# Patient Record
Sex: FEMALE | Race: WHITE | Employment: UNEMPLOYED | URBAN - METROPOLITAN AREA
[De-identification: names, ages, dates, MRNs, and addresses within clinical notes are randomized per-mention and may not be internally consistent; named-entity substitution may affect disease eponyms.]

---

## 2017-01-24 RX ORDER — VALSARTAN 160 MG/1
160 TABLET ORAL DAILY
Qty: 30 TABLET | Refills: 3 | Status: SHIPPED | OUTPATIENT
Start: 2017-01-24 | End: 2017-06-27 | Stop reason: SDUPTHER

## 2017-05-15 ENCOUNTER — HOSPITAL ENCOUNTER (OUTPATIENT)
Dept: ENDOSCOPY | Age: 57
Discharge: HOME | End: 2017-05-15
Attending: INTERNAL MEDICINE | Admitting: INTERNAL MEDICINE

## 2017-05-15 VITALS
DIASTOLIC BLOOD PRESSURE: 78 MMHG | RESPIRATION RATE: 20 BRPM | BODY MASS INDEX: 45.54 KG/M2 | HEART RATE: 58 BPM | SYSTOLIC BLOOD PRESSURE: 161 MMHG | TEMPERATURE: 96.7 F | OXYGEN SATURATION: 99 % | WEIGHT: 257 LBS | HEIGHT: 63 IN

## 2017-06-09 ENCOUNTER — OFFICE VISIT (OUTPATIENT)
Dept: ORTHOPEDIC SURGERY | Age: 57
End: 2017-06-09

## 2017-06-09 VITALS — HEIGHT: 63 IN | BODY MASS INDEX: 45.54 KG/M2 | WEIGHT: 257 LBS

## 2017-06-09 DIAGNOSIS — M25.511 RIGHT SHOULDER PAIN, UNSPECIFIED CHRONICITY: Primary | ICD-10-CM

## 2017-06-09 DIAGNOSIS — M75.111 INCOMPLETE TEAR OF RIGHT ROTATOR CUFF: ICD-10-CM

## 2017-06-09 PROCEDURE — L3660 SO 8 AB RSTR CAN/WEB PRE OTS: HCPCS | Performed by: PHYSICIAN ASSISTANT

## 2017-06-09 PROCEDURE — 73030 X-RAY EXAM OF SHOULDER: CPT | Performed by: PHYSICIAN ASSISTANT

## 2017-06-09 PROCEDURE — 99213 OFFICE O/P EST LOW 20 MIN: CPT | Performed by: PHYSICIAN ASSISTANT

## 2017-06-09 RX ORDER — TRAMADOL HYDROCHLORIDE 50 MG/1
50 TABLET ORAL EVERY 6 HOURS PRN
Qty: 25 TABLET | Refills: 0 | Status: SHIPPED | OUTPATIENT
Start: 2017-06-09 | End: 2017-06-19

## 2017-06-12 ENCOUNTER — TELEPHONE (OUTPATIENT)
Dept: ORTHOPEDIC SURGERY | Age: 57
End: 2017-06-12

## 2017-06-27 RX ORDER — VALSARTAN 160 MG/1
TABLET ORAL
Qty: 30 TABLET | Refills: 5 | Status: SHIPPED | OUTPATIENT
Start: 2017-06-27 | End: 2017-10-17 | Stop reason: SDUPTHER

## 2017-06-28 ENCOUNTER — OFFICE VISIT (OUTPATIENT)
Dept: ORTHOPEDIC SURGERY | Age: 57
End: 2017-06-28

## 2017-06-28 VITALS — HEIGHT: 63 IN | BODY MASS INDEX: 45.55 KG/M2 | WEIGHT: 257.06 LBS

## 2017-06-28 DIAGNOSIS — M75.111 INCOMPLETE TEAR OF RIGHT ROTATOR CUFF: Primary | ICD-10-CM

## 2017-06-28 PROCEDURE — 20610 DRAIN/INJ JOINT/BURSA W/O US: CPT | Performed by: ORTHOPAEDIC SURGERY

## 2017-06-28 PROCEDURE — 99214 OFFICE O/P EST MOD 30 MIN: CPT | Performed by: ORTHOPAEDIC SURGERY

## 2017-07-26 ENCOUNTER — OFFICE VISIT (OUTPATIENT)
Dept: ORTHOPEDIC SURGERY | Age: 57
End: 2017-07-26

## 2017-07-26 VITALS — BODY MASS INDEX: 47.29 KG/M2 | HEIGHT: 62 IN | WEIGHT: 257 LBS

## 2017-07-26 DIAGNOSIS — M75.101 TEAR OF RIGHT ROTATOR CUFF, UNSPECIFIED TEAR EXTENT: Primary | ICD-10-CM

## 2017-07-26 PROCEDURE — 99213 OFFICE O/P EST LOW 20 MIN: CPT | Performed by: ORTHOPAEDIC SURGERY

## 2017-07-26 PROCEDURE — L3670 SO ACRO/CLAV CAN WEB PRE OTS: HCPCS | Performed by: ORTHOPAEDIC SURGERY

## 2017-07-28 DIAGNOSIS — M75.121 COMPLETE ROTATOR CUFF TEAR OR RUPTURE OF RIGHT SHOULDER, NOT SPECIFIED AS TRAUMATIC: Primary | ICD-10-CM

## 2017-08-01 ENCOUNTER — OFFICE VISIT (OUTPATIENT)
Dept: FAMILY MEDICINE CLINIC | Age: 57
End: 2017-08-01

## 2017-08-01 ENCOUNTER — TELEPHONE (OUTPATIENT)
Dept: ORTHOPEDIC SURGERY | Age: 57
End: 2017-08-01

## 2017-08-01 VITALS
BODY MASS INDEX: 48.21 KG/M2 | DIASTOLIC BLOOD PRESSURE: 80 MMHG | TEMPERATURE: 97.4 F | HEART RATE: 60 BPM | SYSTOLIC BLOOD PRESSURE: 142 MMHG | RESPIRATION RATE: 16 BRPM | HEIGHT: 62 IN | OXYGEN SATURATION: 97 % | WEIGHT: 262 LBS

## 2017-08-01 DIAGNOSIS — I10 ESSENTIAL HYPERTENSION: ICD-10-CM

## 2017-08-01 DIAGNOSIS — Z01.818 PRE-OP EXAM: Primary | ICD-10-CM

## 2017-08-01 DIAGNOSIS — Z01.818 PRE-OP EXAM: ICD-10-CM

## 2017-08-01 DIAGNOSIS — M75.111 INCOMPLETE TEAR OF RIGHT ROTATOR CUFF: ICD-10-CM

## 2017-08-01 DIAGNOSIS — N18.3 CKD (CHRONIC KIDNEY DISEASE), STAGE 3 (MODERATE): ICD-10-CM

## 2017-08-01 LAB
ANION GAP SERPL CALCULATED.3IONS-SCNC: 19 MMOL/L (ref 3–16)
BUN BLDV-MCNC: 33 MG/DL (ref 7–20)
CALCIUM SERPL-MCNC: 9.6 MG/DL (ref 8.3–10.6)
CHLORIDE BLD-SCNC: 102 MMOL/L (ref 99–110)
CO2: 25 MMOL/L (ref 21–32)
CREAT SERPL-MCNC: 1.4 MG/DL (ref 0.6–1.1)
GFR AFRICAN AMERICAN: 47
GFR NON-AFRICAN AMERICAN: 39
GLUCOSE BLD-MCNC: 103 MG/DL (ref 70–99)
POTASSIUM SERPL-SCNC: 4.9 MMOL/L (ref 3.5–5.1)
SODIUM BLD-SCNC: 146 MMOL/L (ref 136–145)

## 2017-08-01 PROCEDURE — 99244 OFF/OP CNSLTJ NEW/EST MOD 40: CPT | Performed by: FAMILY MEDICINE

## 2017-08-01 PROCEDURE — 93000 ELECTROCARDIOGRAM COMPLETE: CPT | Performed by: FAMILY MEDICINE

## 2017-08-01 ASSESSMENT — ENCOUNTER SYMPTOMS
COLOR CHANGE: 0
EYE DISCHARGE: 0
EYE REDNESS: 0
NAUSEA: 0
SHORTNESS OF BREATH: 0
ABDOMINAL PAIN: 0

## 2017-08-01 ASSESSMENT — PATIENT HEALTH QUESTIONNAIRE - PHQ9
SUM OF ALL RESPONSES TO PHQ9 QUESTIONS 1 & 2: 0
SUM OF ALL RESPONSES TO PHQ QUESTIONS 1-9: 0
1. LITTLE INTEREST OR PLEASURE IN DOING THINGS: 0
2. FEELING DOWN, DEPRESSED OR HOPELESS: 0

## 2017-08-02 ENCOUNTER — TELEPHONE (OUTPATIENT)
Dept: FAMILY MEDICINE CLINIC | Age: 57
End: 2017-08-02

## 2017-08-22 ENCOUNTER — HOSPITAL ENCOUNTER (OUTPATIENT)
Dept: SURGERY | Age: 57
Discharge: HOME | End: 2017-08-22
Attending: ORTHOPAEDIC SURGERY | Admitting: ORTHOPAEDIC SURGERY

## 2017-08-22 VITALS
DIASTOLIC BLOOD PRESSURE: 62 MMHG | HEART RATE: 66 BPM | WEIGHT: 257 LBS | TEMPERATURE: 97.1 F | BODY MASS INDEX: 47.29 KG/M2 | RESPIRATION RATE: 17 BRPM | SYSTOLIC BLOOD PRESSURE: 140 MMHG | HEIGHT: 62 IN | OXYGEN SATURATION: 94 %

## 2017-08-22 RX ORDER — OXYCODONE HYDROCHLORIDE AND ACETAMINOPHEN 5; 325 MG/1; MG/1
1-2 TABLET ORAL EVERY 6 HOURS PRN
Qty: 40 TABLET | Refills: 0 | Status: SHIPPED | OUTPATIENT
Start: 2017-08-22 | End: 2017-10-25 | Stop reason: ALTCHOICE

## 2017-08-22 RX ORDER — FAMOTIDINE 10 MG
10 TABLET ORAL DAILY
COMMUNITY
End: 2018-10-12 | Stop reason: ALTCHOICE

## 2017-08-22 RX ORDER — SODIUM CHLORIDE 0.9 % (FLUSH) 0.9 %
10 SYRINGE (ML) INJECTION EVERY 12 HOURS SCHEDULED
Status: DISCONTINUED | OUTPATIENT
Start: 2017-08-22 | End: 2017-08-23 | Stop reason: HOSPADM

## 2017-08-22 RX ORDER — DIPHENHYDRAMINE HYDROCHLORIDE 50 MG/ML
12.5 INJECTION INTRAMUSCULAR; INTRAVENOUS
Status: ACTIVE | OUTPATIENT
Start: 2017-08-22 | End: 2017-08-22

## 2017-08-22 RX ORDER — ACETAMINOPHEN 10 MG/ML
1000 INJECTION, SOLUTION INTRAVENOUS ONCE
Status: COMPLETED | OUTPATIENT
Start: 2017-08-22 | End: 2017-08-22

## 2017-08-22 RX ORDER — ASCORBIC ACID 1000 MG
TABLET, EXTENDED RELEASE ORAL DAILY
COMMUNITY
End: 2019-04-29

## 2017-08-22 RX ORDER — LIDOCAINE HYDROCHLORIDE 10 MG/ML
1 INJECTION, SOLUTION EPIDURAL; INFILTRATION; INTRACAUDAL; PERINEURAL
Status: ACTIVE | OUTPATIENT
Start: 2017-08-22 | End: 2017-08-22

## 2017-08-22 RX ORDER — OXYCODONE HYDROCHLORIDE AND ACETAMINOPHEN 5; 325 MG/1; MG/1
1 TABLET ORAL PRN
Status: ACTIVE | OUTPATIENT
Start: 2017-08-22 | End: 2017-08-22

## 2017-08-22 RX ORDER — LABETALOL HYDROCHLORIDE 5 MG/ML
5 INJECTION, SOLUTION INTRAVENOUS EVERY 10 MIN PRN
Status: DISCONTINUED | OUTPATIENT
Start: 2017-08-22 | End: 2017-08-23 | Stop reason: HOSPADM

## 2017-08-22 RX ORDER — ONDANSETRON 2 MG/ML
4 INJECTION INTRAMUSCULAR; INTRAVENOUS
Status: COMPLETED | OUTPATIENT
Start: 2017-08-22 | End: 2017-08-22

## 2017-08-22 RX ORDER — OXYCODONE HYDROCHLORIDE AND ACETAMINOPHEN 5; 325 MG/1; MG/1
2 TABLET ORAL PRN
Status: ACTIVE | OUTPATIENT
Start: 2017-08-22 | End: 2017-08-22

## 2017-08-22 RX ORDER — HYDRALAZINE HYDROCHLORIDE 20 MG/ML
5 INJECTION INTRAMUSCULAR; INTRAVENOUS
Status: DISCONTINUED | OUTPATIENT
Start: 2017-08-22 | End: 2017-08-23 | Stop reason: HOSPADM

## 2017-08-22 RX ORDER — SODIUM CHLORIDE, SODIUM LACTATE, POTASSIUM CHLORIDE, CALCIUM CHLORIDE 600; 310; 30; 20 MG/100ML; MG/100ML; MG/100ML; MG/100ML
INJECTION, SOLUTION INTRAVENOUS CONTINUOUS
Status: DISCONTINUED | OUTPATIENT
Start: 2017-08-22 | End: 2017-08-23 | Stop reason: HOSPADM

## 2017-08-22 RX ORDER — MIDAZOLAM HYDROCHLORIDE 1 MG/ML
INJECTION INTRAMUSCULAR; INTRAVENOUS
Status: DISPENSED
Start: 2017-08-22 | End: 2017-08-22

## 2017-08-22 RX ORDER — SODIUM CHLORIDE 0.9 % (FLUSH) 0.9 %
10 SYRINGE (ML) INJECTION PRN
Status: DISCONTINUED | OUTPATIENT
Start: 2017-08-22 | End: 2017-08-23 | Stop reason: HOSPADM

## 2017-08-22 RX ADMIN — ACETAMINOPHEN 1000 MG: 10 INJECTION, SOLUTION INTRAVENOUS at 08:27

## 2017-08-22 RX ADMIN — ONDANSETRON 4 MG: 2 INJECTION INTRAMUSCULAR; INTRAVENOUS at 11:39

## 2017-08-22 RX ADMIN — SODIUM CHLORIDE, SODIUM LACTATE, POTASSIUM CHLORIDE, CALCIUM CHLORIDE: 600; 310; 30; 20 INJECTION, SOLUTION INTRAVENOUS at 08:10

## 2017-08-22 ASSESSMENT — PAIN - FUNCTIONAL ASSESSMENT: PAIN_FUNCTIONAL_ASSESSMENT: 0-10

## 2017-08-22 ASSESSMENT — PAIN SCALES - GENERAL: PAINLEVEL_OUTOF10: 0

## 2017-08-25 ENCOUNTER — HOSPITAL ENCOUNTER (OUTPATIENT)
Dept: PHYSICAL THERAPY | Age: 57
Discharge: HOME | End: 2017-07-31
Admitting: ORTHOPAEDIC SURGERY

## 2017-08-25 ENCOUNTER — HOSPITAL ENCOUNTER (OUTPATIENT)
Dept: PHYSICAL THERAPY | Age: 57
Discharge: HOME | End: 2017-08-25
Admitting: ORTHOPAEDIC SURGERY

## 2017-08-30 ENCOUNTER — HOSPITAL ENCOUNTER (OUTPATIENT)
Dept: PHYSICAL THERAPY | Age: 57
Discharge: HOME | End: 2017-08-30
Admitting: ORTHOPAEDIC SURGERY

## 2017-08-30 ENCOUNTER — OFFICE VISIT (OUTPATIENT)
Dept: ORTHOPEDIC SURGERY | Age: 57
End: 2017-08-30

## 2017-08-30 VITALS — BODY MASS INDEX: 47.29 KG/M2 | HEIGHT: 62 IN | WEIGHT: 257 LBS

## 2017-08-30 DIAGNOSIS — M75.101 TEAR OF RIGHT ROTATOR CUFF, UNSPECIFIED TEAR EXTENT: ICD-10-CM

## 2017-08-30 DIAGNOSIS — M25.511 PAIN OF RIGHT SHOULDER REGION: Primary | ICD-10-CM

## 2017-08-30 PROCEDURE — 73030 X-RAY EXAM OF SHOULDER: CPT | Performed by: ORTHOPAEDIC SURGERY

## 2017-08-30 PROCEDURE — 99024 POSTOP FOLLOW-UP VISIT: CPT | Performed by: ORTHOPAEDIC SURGERY

## 2017-09-06 ENCOUNTER — HOSPITAL ENCOUNTER (OUTPATIENT)
Dept: PHYSICAL THERAPY | Age: 57
Discharge: HOME | End: 2017-09-06
Admitting: ORTHOPAEDIC SURGERY

## 2017-09-13 ENCOUNTER — HOSPITAL ENCOUNTER (OUTPATIENT)
Dept: PHYSICAL THERAPY | Age: 57
Discharge: HOME | End: 2017-09-13
Admitting: ORTHOPAEDIC SURGERY

## 2017-09-20 ENCOUNTER — OFFICE VISIT (OUTPATIENT)
Dept: ORTHOPEDIC SURGERY | Age: 57
End: 2017-09-20

## 2017-09-20 VITALS
HEIGHT: 62 IN | DIASTOLIC BLOOD PRESSURE: 71 MMHG | BODY MASS INDEX: 47.29 KG/M2 | HEART RATE: 63 BPM | SYSTOLIC BLOOD PRESSURE: 121 MMHG | WEIGHT: 257 LBS

## 2017-09-20 DIAGNOSIS — M75.101 TEAR OF RIGHT ROTATOR CUFF, UNSPECIFIED TEAR EXTENT: Primary | ICD-10-CM

## 2017-09-20 PROCEDURE — 99024 POSTOP FOLLOW-UP VISIT: CPT | Performed by: ORTHOPAEDIC SURGERY

## 2017-09-21 ENCOUNTER — HOSPITAL ENCOUNTER (OUTPATIENT)
Dept: PHYSICAL THERAPY | Age: 57
Discharge: HOME | End: 2017-09-21
Admitting: ORTHOPAEDIC SURGERY

## 2017-09-28 ENCOUNTER — HOSPITAL ENCOUNTER (OUTPATIENT)
Dept: PHYSICAL THERAPY | Age: 57
Discharge: HOME | End: 2017-09-28
Admitting: ORTHOPAEDIC SURGERY

## 2017-10-04 ENCOUNTER — HOSPITAL ENCOUNTER (OUTPATIENT)
Dept: PHYSICAL THERAPY | Age: 57
Discharge: HOME | End: 2017-10-04
Admitting: ORTHOPAEDIC SURGERY

## 2017-10-04 NOTE — FLOWSHEET NOTE
[address]    [place], [address]                Physical Therapy Daily Treatment Note  Date:  10/4/2017    Patient Name:  Danielle Elliott   :  1960  MRN: [id number]  Restrictions/Precautions:    Medical/Treatment Diagnosis Information:  · Diagnosis: PT: R shoulder pain s/p RTC repair (patch), biceps debridement (17)  · Treatment Diagnosis: R shoulder pain O96.857  Insurance/Certification information:  PT Insurance Information: [facility]  Physician Information:  Referring Practitioner: Shantell Carvajal signed (Y/N):     Date of Patient follow up with Physician:     G-Code (if applicable):      Date G-Code Applied:         Progress Note: [x]  Yes  []  No  Next due by: Visit #10  MD 10/18/17    Latex Allergy:  [x]NO      []YES  Preferred Language for Healthcare:   [x]English       []other:    Visit # Insurance Allowable   8 30     Pain level:  5/10      SUBJECTIVE:  States she is sore from sleeping on her shdlr again last night    OBJECTIVE:   Observation:   Test measurements:  Shoulder flex: 140 degrees, ER 85 degrees    RESTRICTIONS/PRECAUTIONS: Passive ONLY 4 weeks- Ultra sling 3 weeks, Regular sling 3 weeks    Exercises/Interventions:   Therapeutic Ex Sets/sec Notes HEP   Seated P ER     Shoulder shrugs  Scap squeezes     Table slides flex  Table slides ER     Elbow, wrist ROM Reviewed           Manual therapy 15' PROM    Pulleys 5'     Supine cane ER  Supine cane flex  Supine cane chest press      SL ER  Prone ext  Supine shdlr flex   SL abd x30  x30 1#    pain     Painful today X  x   Wall slides x10  x   TB ext  TB IR x20  x30 Red  Red  Green to home                                                                                                                                                              Therapeutic Exercise and NMR EXR  [x] (55527) Provided verbal/tactile cueing for activities related to strengthening, flexibility, endurance, ROM  for improvements in scapular, scapulothoracic and UE control with self care, reaching, carrying, lifting, house/yardwork, driving/computer work.    [] (97384) Provided verbal/tactile cueing for activities related to improving balance, coordination, kinesthetic sense, posture, motor skill, proprioception  to assist with  scapular, scapulothoracic and UE control with self care, reaching, carrying, lifting, house/yardwork, driving/computer work. Therapeutic Activities:    [] (98852 or 72426) Provided verbal/tactile cueing for activities related to improving balance, coordination, kinesthetic sense, posture, motor skill, proprioception and motor activation to allow for proper function of scapular, scapulothoracic and UE control with self care, carrying, lifting, driving/computer work.      Home Exercise Program:    [x] (00218) Reviewed/Progressed HEP activities related to strengthening, flexibility, endurance, ROM of scapular, scapulothoracic and UE control with self care, reaching, carrying, lifting, house/yardwork, driving/computer work  [] (52223) Reviewed/Progressed HEP activities related to improving balance, coordination, kinesthetic sense, posture, motor skill, proprioception of scapular, scapulothoracic and UE control with self care, reaching, carrying, lifting, house/yardwork, driving/computer work      Manual Treatments:  PROM / STM / Oscillations-Mobs:  G-I, II, III, IV (PA's, Inf., Post.)  [x] (85781) Provided manual therapy to mobilize soft tissue/joints of cervical/CT, scapular GHJ and UE for the purpose of modulating pain, promoting relaxation,  increasing ROM, reducing/eliminating soft tissue swelling/inflammation/restriction, improving soft tissue extensibility and allowing for proper ROM for normal function with self care, reaching, carrying, lifting, house/yardwork, driving/computer work    Modalities:  CP at home per patient    Charges:  Timed Code Treatment Minutes: 30   Total Treatment Minutes: 30     [] EVAL LOW 05985  [x] WA(69572) x  1   [] IONTO  [] NMR (34365) x      [] VASO  [x] Manual (23586) x  1    [] Other:  [] TA x       [] Mech Traction (13994)  [] ES(attended) (49186)      [] ES (un) (56234):     GOALS:  Patient stated goal: Return arm to full function    Therapist goals for Patient:   Short Term Goals: To be achieved in: 2 weeks  1. Independent in HEP and progression per patient tolerance, in order to prevent re-injury. 2. Patient will have a decrease in pain to facilitate improvement in movement, function, and ADLs as indicated by Functional Deficits. Long Term Goals: To be achieved in: 12 weeks  1. Disability index score of 10% or less for the DASH to assist with reaching prior level of function. 2. Patient will demonstrate increased AROM to [facility] to allow for proper joint functioning as indicated by patients Functional Deficits. 3. Patient will demonstrate an increase in Strength to 5/5 to allow for proper functional mobility as indicated by patients Functional Deficits. 4. Patient will return to full functional activities without increased symptoms or restriction including ability to reach overhead, wash hair, and reach behind back without pain. 5. Patient will be able to repetitively reach lifting 5-10 pound items to return to work related activities. Progression Towards Functional goals:  [x] Patient is progressing as expected towards functional goals listed. [] Progression is slowed due to complexities listed. [] Progression has been slowed due to co-morbidities. [] Plan just implemented, too soon to assess goals progression  [] Other:     ASSESSMENT: added ex to HEP. ROM increasing. Progressing.  Green band to HEP to use when able    Treatment/Activity Tolerance:  [x] Patient tolerated treatment well [] Patient limited by fatique  [] Patient limited by pain  [] Patient limited by other medical complications  [] Other:     Prognosis: [x] Good [] Fair  [] Poor    Patient Requires Follow-up: [x] Yes  [] No    PLAN:   [x] Continue per plan of care [] Alter current plan (see comments)  [] Plan of care initiated [] Hold pending MD visit [] Discharge    Electronically signed by: Josephine Tran PT

## 2017-10-11 ENCOUNTER — OFFICE VISIT (OUTPATIENT)
Dept: FAMILY MEDICINE CLINIC | Age: 57
End: 2017-10-11

## 2017-10-11 VITALS
OXYGEN SATURATION: 96 % | HEART RATE: 76 BPM | HEIGHT: 62 IN | SYSTOLIC BLOOD PRESSURE: 166 MMHG | BODY MASS INDEX: 49.87 KG/M2 | WEIGHT: 271 LBS | DIASTOLIC BLOOD PRESSURE: 100 MMHG

## 2017-10-11 DIAGNOSIS — R53.83 FATIGUE, UNSPECIFIED TYPE: Primary | ICD-10-CM

## 2017-10-11 DIAGNOSIS — M25.50 ARTHRALGIA, UNSPECIFIED JOINT: ICD-10-CM

## 2017-10-11 DIAGNOSIS — G47.00 INSOMNIA, UNSPECIFIED TYPE: ICD-10-CM

## 2017-10-11 DIAGNOSIS — R53.83 FATIGUE, UNSPECIFIED TYPE: ICD-10-CM

## 2017-10-11 LAB
A/G RATIO: 1.3 (ref 1.1–2.2)
ALBUMIN SERPL-MCNC: 4.1 G/DL (ref 3.4–5)
ALP BLD-CCNC: 78 U/L (ref 40–129)
ALT SERPL-CCNC: 16 U/L (ref 10–40)
ANION GAP SERPL CALCULATED.3IONS-SCNC: 17 MMOL/L (ref 3–16)
AST SERPL-CCNC: 19 U/L (ref 15–37)
BASOPHILS ABSOLUTE: 0.1 K/UL (ref 0–0.2)
BASOPHILS RELATIVE PERCENT: 1.1 %
BILIRUB SERPL-MCNC: <0.2 MG/DL (ref 0–1)
BUN BLDV-MCNC: 25 MG/DL (ref 7–20)
C-REACTIVE PROTEIN: 15.4 MG/L (ref 0–5.1)
CALCIUM SERPL-MCNC: 9.5 MG/DL (ref 8.3–10.6)
CHLORIDE BLD-SCNC: 104 MMOL/L (ref 99–110)
CO2: 25 MMOL/L (ref 21–32)
CREAT SERPL-MCNC: 1.2 MG/DL (ref 0.6–1.1)
EOSINOPHILS ABSOLUTE: 0.3 K/UL (ref 0–0.6)
EOSINOPHILS RELATIVE PERCENT: 4.9 %
GFR AFRICAN AMERICAN: 56
GFR NON-AFRICAN AMERICAN: 46
GLOBULIN: 3.2 G/DL
GLUCOSE BLD-MCNC: 109 MG/DL (ref 70–99)
HCT VFR BLD CALC: 39.5 % (ref 36–48)
HEMOGLOBIN: 13.7 G/DL (ref 12–16)
LYMPHOCYTES ABSOLUTE: 2 K/UL (ref 1–5.1)
LYMPHOCYTES RELATIVE PERCENT: 29.1 %
MCH RBC QN AUTO: 30.9 PG (ref 26–34)
MCHC RBC AUTO-ENTMCNC: 34.8 G/DL (ref 31–36)
MCV RBC AUTO: 88.7 FL (ref 80–100)
MONO TEST: NEGATIVE
MONOCYTES ABSOLUTE: 0.5 K/UL (ref 0–1.3)
MONOCYTES RELATIVE PERCENT: 7.7 %
NEUTROPHILS ABSOLUTE: 4 K/UL (ref 1.7–7.7)
NEUTROPHILS RELATIVE PERCENT: 57.2 %
PDW BLD-RTO: 13.7 % (ref 12.4–15.4)
PLATELET # BLD: 298 K/UL (ref 135–450)
PMV BLD AUTO: 7.9 FL (ref 5–10.5)
POTASSIUM SERPL-SCNC: 4.6 MMOL/L (ref 3.5–5.1)
RBC # BLD: 4.45 M/UL (ref 4–5.2)
SEDIMENTATION RATE, ERYTHROCYTE: 50 MM/HR (ref 0–30)
SODIUM BLD-SCNC: 146 MMOL/L (ref 136–145)
TOTAL PROTEIN: 7.3 G/DL (ref 6.4–8.2)
TSH SERPL DL<=0.05 MIU/L-ACNC: 1.69 UIU/ML (ref 0.27–4.2)
WBC # BLD: 6.9 K/UL (ref 4–11)

## 2017-10-11 PROCEDURE — 99213 OFFICE O/P EST LOW 20 MIN: CPT | Performed by: NURSE PRACTITIONER

## 2017-10-11 RX ORDER — HYDROXYZINE HYDROCHLORIDE 25 MG/1
25 TABLET, FILM COATED ORAL NIGHTLY
Qty: 30 TABLET | Refills: 0 | Status: SHIPPED | OUTPATIENT
Start: 2017-10-11 | End: 2017-10-25 | Stop reason: ALTCHOICE

## 2017-10-11 RX ORDER — HYDROXYZINE HYDROCHLORIDE 10 MG/1
25 TABLET, FILM COATED ORAL NIGHTLY
Qty: 30 TABLET | Refills: 0 | Status: SHIPPED | OUTPATIENT
Start: 2017-10-11 | End: 2017-10-11 | Stop reason: SDUPTHER

## 2017-10-11 ASSESSMENT — ENCOUNTER SYMPTOMS
STRIDOR: 0
NAUSEA: 0
SORE THROAT: 0
VOMITING: 0
COUGH: 0
WHEEZING: 0
BACK PAIN: 1
SHORTNESS OF BREATH: 0

## 2017-10-11 NOTE — PROGRESS NOTES
Subjective:      Patient ID: Rayshawn Mitchell is a 61 y.o. female. Rich Stewart is here with complaints of joint pain and fatigue that started 4-5 weeks ago. She underwent right shoulder arthroscopy with rotator cuff repair on 8/22/17. She reports sleeping poorly due to \"hurting. \" Any little movement of her right arm wakes her up when sleeping. She also has to sleep in a certain position due to right shoulder pain. Fatigue   This is a new problem. The current episode started 1 to 4 weeks ago. The problem occurs constantly. The problem has been unchanged. Associated symptoms include arthralgias, fatigue and myalgias. Pertinent negatives include no chest pain, coughing, fever, joint swelling, nausea, neck pain, sore throat or vomiting. Review of Systems   Constitutional: Positive for fatigue. Negative for fever. HENT: Negative for sore throat. Respiratory: Negative for cough, shortness of breath, wheezing and stridor. Cardiovascular: Negative for chest pain, palpitations and leg swelling. Gastrointestinal: Negative for nausea and vomiting. Musculoskeletal: Positive for arthralgias, back pain and myalgias. Negative for gait problem, joint swelling, neck pain and neck stiffness. Right shoulder pain, joint pain \"all over\"     Vitals:    10/11/17 1507 10/11/17 1517   BP: (!) 166/100 (!) 166/100   Site: Left Arm    Position: Sitting    Pulse: 76    SpO2: 96%    Weight: 271 lb (122.9 kg)    Height: 5' 2\" (1.575 m)      Objective:   Physical Exam   Constitutional: She is oriented to person, place, and time. She appears well-developed and well-nourished. HENT:   Head: Normocephalic and atraumatic. Neck: Normal range of motion. Neck supple. No tracheal deviation present. No thyromegaly present. Cardiovascular: Normal rate, regular rhythm and normal heart sounds. Exam reveals no gallop. No murmur heard. Pulmonary/Chest: Effort normal and breath sounds normal. No respiratory distress.  She has no wheezes. She has no rales. She exhibits no tenderness. Lymphadenopathy:     She has no cervical adenopathy. Neurological: She is alert and oriented to person, place, and time. No cranial nerve deficit. Skin: Skin is warm and dry. No rash noted. She is not diaphoretic. No erythema. No pallor. Nursing note and vitals reviewed. Assessment:   1. Fatigue, unspecified type  - CBC Auto Differential; Future  - Comprehensive Metabolic Panel; Future  - Mononucleosis Screen; Future  - TSH without Reflex; Future  - Sedimentation rate, automated; Future  - C-Reactive Protein; Future    2. Insomnia, unspecified type  - hydrOXYzine (ATARAX) 25 MG tablet; Take 1 tablet by mouth nightly  Dispense: 30 tablet; Refill: 0    3. Arthralgia, unspecified joint  - Sedimentation rate, automated; Future  - C-Reactive Protein; Future    Plan:   - Will check blood work to rule out inflammatory/metablolic cause of symptoms  - Likely poor sleeping is causing her fatigue and limited sleep positions are causing increased joint pain  - Atarax PRN nightly for insomnia  - Tylenol for joint pain  - Follow up in 2 weeks    Outpatient Encounter Prescriptions as of 10/11/2017   Medication Sig Dispense Refill    hydrOXYzine (ATARAX) 25 MG tablet Take 1 tablet by mouth nightly 30 tablet 0    famotidine (PEPCID) 10 MG tablet Take 10 mg by mouth daily      Ascorbic Acid (VITAMIN C CR) 1000 MG TBCR Take by mouth daily      oxyCODONE-acetaminophen (PERCOCET) 5-325 MG per tablet Take 1-2 tablets by mouth every 6 hours as needed for Pain . 40 tablet 0    HYDROcodone-acetaminophen (NORCO) 5-325 MG per tablet Take 1 tablet by mouth every 6 hours as needed for Pain .  15 tablet 0    valsartan (DIOVAN) 160 MG tablet TAKE 1 TABLET BY MOUTH DAILY 30 tablet 5    citalopram (CELEXA) 40 MG tablet Take 1 tablet by mouth daily 30 tablet 11    Elastic Bandages & Supports (WRIST SPLINT/COCK-UP/RIGHT M) MISC 1 each by Does not apply route nightly 1

## 2017-10-11 NOTE — PATIENT INSTRUCTIONS
hydroxyzine passes into breast milk or if it could harm a nursing baby. You should not breast-feed while using this medicine. Do not give this medicine to a child without medical advice. How should I take hydroxyzine? Follow all directions on your prescription label. Your doctor may occasionally change your dose to make sure you get the best results. Do not use this medicine in larger or smaller amounts or for longer than recommended. Measure liquid medicine  with the dosing syringe provided, or with a special dose-measuring spoon or medicine cup. If you do not have a dose-measuring device, ask your pharmacist for one. Hydroxyzine is for short-term use only. You should not take this medicine for longer than 4 months. Call your doctor if your anxiety symptoms do not improve, or if they get worse. Store at room temperature away from moisture and heat. What happens if I miss a dose? Take the missed dose as soon as you remember. Skip the missed dose if it is almost time for your next scheduled dose. Do not take extra medicine to make up the missed dose. What happens if I overdose? Seek emergency medical attention or call the Poison Help line at 1-570.757.4145. Overdose symptoms may include severe drowsiness, nausea, vomiting, uncontrolled muscle movements, or seizure (convulsions). What should I avoid while taking hydroxyzine? This medicine may impair your thinking or reactions. Be careful if you drive or do anything that requires you to be alert. Drinking alcohol with this medicine can cause side effects. What are the possible side effects of hydroxyzine? Get emergency medical help if you have signs of an allergic reaction:  hives; difficulty breathing; swelling of your face, lips, tongue, or throat. Stop using hydroxyzine and call your doctor at once if you have:  · fast or pounding heartbeats;  · headache with chest pain; or  · severe dizziness, fainting.   Side effects such as dry mouth, outside of the [place] are appropriate, unless specifically indicated otherwise. [facility]'s drug information does not endorse drugs, diagnose patients or recommend therapy. [facility] drug information is an informational resource designed to assist licensed healthcare practitioners in caring for their patients and/or to serve consumers viewing this service as a supplement to, and not a substitute for, the expertise, skill, knowledge and judgment of healthcare practitioners. The absence of a warning for a given drug or drug combination in no way should be construed to indicate that the drug or drug combination is safe, effective or appropriate for any given patient. [facility] does not assume any responsibility for any aspect of healthcare administered with the aid of information [facility] provides. The information contained herein is not intended to cover all possible uses, directions, precautions, warnings, drug interactions, allergic reactions, or adverse effects. If you have questions about the drugs you are taking, check with your doctor, nurse or pharmacist.  Copyright 5733-8105 [address]: 7.. Revision date: 3/25/2016. Care instructions adapted under license by [facility] ([facility]). If you have questions about a medical condition or this instruction, always ask your healthcare professional. [place] [address] any warranty or liability for your use of this information.

## 2017-10-12 ENCOUNTER — HOSPITAL ENCOUNTER (OUTPATIENT)
Dept: PHYSICAL THERAPY | Age: 57
Discharge: HOME | End: 2017-10-12
Admitting: ORTHOPAEDIC SURGERY

## 2017-10-12 NOTE — OP NOTE
Orthopaedics and Sports Rehabilitation                        Date: 10/12/2017  Physician: Dawna Fink  Patient: María Guadarrama Diagnosis: R RC repair    Patient has received 8 sessions of Physical Therapy. ROM Initial (R) Initial (L) Current (R) Current (L)   PROM shldr flex                       ER                       IR   148  85  75    AROM shldr flex elbow extended   84    AROM shldr flex elbow flexed   129                  Strength                                            Functional Activity Checklist: The patient continues to have moderate difficulty with the following:   [] Personal care  [] Reaching / overhead  [] Standing    [] Housework chores  [] Climbing  [] Driving / riding in a vehicle    [] Work  [] Squatting  [] Bed / vehicle mobility    [] Lifting  [] Walking  [] Sleeping    [] Pushing / pulling  [] Sitting  [] Concentrating / reading     Specific Functional Improvement and Impression:  Good motion and strength. progressing    Summary:   [x] Patient is progressing as expected for this condition   [] Patient is progressing, but slower than expected for this condition   [] Patient is not progressing  Clinician Recommendations:   [x] Continue rehabilitation due to objective improvement and continued functional deficits. [] Follow up periodically to advance home exercise program to match level of function. [] Continue rehabitation due to objective improvement and continued functional deficits with progression to work conditioning. [] Discharge to post-rehab program secondary to maximizing \"medical necessity\" of physical / occupational therapy.    [] Discharge independent in a home program as:  [] All goals achieved  [] Maximized \"medical necessity\" of physical / occupational therapy  [] No subjective or objective improvements    Plan: cont PT 1-2x/week 6 weeks  Electronically signed by: Nery Causey PT   License #:     Physician Recommendations:  [] Follow treatment plan as

## 2017-10-12 NOTE — FLOWSHEET NOTE
[address]    [place], [address]                Physical Therapy Daily Treatment Note  Date:  10/12/2017    Patient Name:  Jenna Salas   :  1960  MRN: [id number]  Restrictions/Precautions:    Medical/Treatment Diagnosis Information:  · Diagnosis: PT: R shoulder pain s/p RTC repair (patch), biceps debridement (17)  · Treatment Diagnosis: R shoulder pain O59.604  Insurance/Certification information:  PT Insurance Information: [facility]  Physician Information:  Referring Practitioner: Charlotte Jacobs of care signed (Y/N):     Date of Patient follow up with Physician:     G-Code (if applicable):      Date G-Code Applied:         Progress Note: [x]  Yes  []  No  Next due by: Visit #10  MD 10/18/17    Latex Allergy:  [x]NO      []YES  Preferred Language for Healthcare:   [x]English       []other:    Visit # Insurance Allowable   8 30     Pain level:  5/10      SUBJECTIVE:  Nights are bad because her shldr keeps her up and now everything is hurting and she isn't feeling well because she can't sleep    OBJECTIVE:   Observation:   Test measurements:  Shoulder flex: 140 degrees, ER 85 degrees    RESTRICTIONS/PRECAUTIONS: Passive ONLY 4 weeks- Ultra sling 3 weeks, Regular sling 3 weeks    Exercises/Interventions:   Therapeutic Ex Sets/sec Notes HEP   Seated P ER     Shoulder shrugs  Scap squeezes     Table slides flex  Table slides ER     Elbow, wrist ROM Reviewed           Manual therapy 15' PROM    Pulleys 5'     Supine cane ER  Supine cane flex  Supine cane chest press      SL ER  Prone ext  Supine shdlr flex   SL abd x15/x15 1#  x30 1#    pain     Painful today X  x   Wall slides x7  x   TB ext  TB IR x20  x30 Red  Red  Green to home                                                                                                                                                              Therapeutic Exercise and NMR EXR  [x] (97777) Provided verbal/tactile cueing for activities related to strengthening, flexibility, endurance, ROM  for improvements in scapular, scapulothoracic and UE control with self care, reaching, carrying, lifting, house/yardwork, driving/computer work.    [] (96413) Provided verbal/tactile cueing for activities related to improving balance, coordination, kinesthetic sense, posture, motor skill, proprioception  to assist with  scapular, scapulothoracic and UE control with self care, reaching, carrying, lifting, house/yardwork, driving/computer work. Therapeutic Activities:    [] (75379 or 11372) Provided verbal/tactile cueing for activities related to improving balance, coordination, kinesthetic sense, posture, motor skill, proprioception and motor activation to allow for proper function of scapular, scapulothoracic and UE control with self care, carrying, lifting, driving/computer work.      Home Exercise Program:    [x] (61615) Reviewed/Progressed HEP activities related to strengthening, flexibility, endurance, ROM of scapular, scapulothoracic and UE control with self care, reaching, carrying, lifting, house/yardwork, driving/computer work  [] (44004) Reviewed/Progressed HEP activities related to improving balance, coordination, kinesthetic sense, posture, motor skill, proprioception of scapular, scapulothoracic and UE control with self care, reaching, carrying, lifting, house/yardwork, driving/computer work      Manual Treatments:  PROM / STM / Oscillations-Mobs:  G-I, II, III, IV (PA's, Inf., Post.)  [x] (09388) Provided manual therapy to mobilize soft tissue/joints of cervical/CT, scapular GHJ and UE for the purpose of modulating pain, promoting relaxation,  increasing ROM, reducing/eliminating soft tissue swelling/inflammation/restriction, improving soft tissue extensibility and allowing for proper ROM for normal function with self care, reaching, carrying, lifting, house/yardwork,

## 2017-10-17 DIAGNOSIS — F32.0 MILD SINGLE CURRENT EPISODE OF MAJOR DEPRESSIVE DISORDER (HCC): ICD-10-CM

## 2017-10-18 ENCOUNTER — OFFICE VISIT (OUTPATIENT)
Dept: ORTHOPEDIC SURGERY | Age: 57
End: 2017-10-18

## 2017-10-18 VITALS
DIASTOLIC BLOOD PRESSURE: 79 MMHG | HEART RATE: 88 BPM | WEIGHT: 257 LBS | SYSTOLIC BLOOD PRESSURE: 134 MMHG | HEIGHT: 62 IN | BODY MASS INDEX: 47.29 KG/M2

## 2017-10-18 DIAGNOSIS — M75.101 TEAR OF RIGHT ROTATOR CUFF, UNSPECIFIED TEAR EXTENT: Primary | ICD-10-CM

## 2017-10-18 PROCEDURE — 99024 POSTOP FOLLOW-UP VISIT: CPT | Performed by: ORTHOPAEDIC SURGERY

## 2017-10-18 RX ORDER — VALSARTAN 160 MG/1
160 TABLET ORAL DAILY
Qty: 90 TABLET | Refills: 2 | Status: ON HOLD | OUTPATIENT
Start: 2017-10-18 | End: 2018-06-20

## 2017-10-18 RX ORDER — CITALOPRAM 40 MG/1
40 TABLET ORAL DAILY
Qty: 90 TABLET | Refills: 2 | Status: SHIPPED | OUTPATIENT
Start: 2017-10-18 | End: 2018-10-19 | Stop reason: SDUPTHER

## 2017-10-18 NOTE — PROGRESS NOTES
Chief Complaint  Post-Op Check (RIGHT SHOULDER  SX 08/22/2017)    Post op right shoulder arthroscopy with rotator cuff repair using bioinductive patch, subacromial decompression and debridement of biceps tendon. Date of surgery: 08/22/2017       History of Present Illness:  Osman Cook is a 61 y.o. female     Here for follow up of right shoulder arthroscopy with rotator cuff repair using bioinductive patch, subacromial decompression and debridement of biceps tendon. Progressing well. She is 8 weeks postop. She currently is not taking any narcotic pain medicine. Has been taking aspirin. Since her last visit she had tried to do some painting which is aggravated her shoulder. Actually prior to that she had fallen twice which had injured her shoulder. She has seen improvement with outpatient physical therapy but it's been slow and some of that is because of the activities described above. 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 She certainly would not be able to do that at this point. Vital Signs:  Vitals:    10/18/17 1132   BP: 134/79   Pulse: 88       Pain Assessment:         Shoulder Examination  Patient is awake, alert, and in no acute distress. Distal circulatory status and neurologic status is intact. The wound is clean, dry, healing. There is no warmth, erythema, or purulent drainage over the incision. Sensation is intact to light touch in the axillary, median, radial, and ulnar nerve distribution. The EPL, FPL, and interossei are grossly intact. The right upper extremity is warm and well-perfused with brisk capillary refill. Patient tolerated gentle passive range of motion. Today she was able to hold her arm at 90° of abduction and forward flexion even against some resistance although it was painful. ROM has been progressing.         Assessment: Progressing well post op from right shoulder arthroscopy with rotator cuff repair

## 2017-10-24 ENCOUNTER — HOSPITAL ENCOUNTER (OUTPATIENT)
Dept: PHYSICAL THERAPY | Age: 57
Discharge: HOME | End: 2017-10-24
Admitting: ORTHOPAEDIC SURGERY

## 2017-10-24 NOTE — FLOWSHEET NOTE
Patient limited by pain  [] Patient limited by other medical complications  [] Other:     Prognosis: [x] Good [] Fair  [] Poor    Patient Requires Follow-up: [x] Yes  [] No    PLAN:   [x] Continue per plan of care [] Alter current plan (see comments)  [] Plan of care initiated [] Hold pending MD visit [] Discharge    Electronically signed by: Kimani Bernard PT

## 2017-10-25 ENCOUNTER — OFFICE VISIT (OUTPATIENT)
Dept: FAMILY MEDICINE CLINIC | Age: 57
End: 2017-10-25

## 2017-10-25 VITALS
HEIGHT: 62 IN | SYSTOLIC BLOOD PRESSURE: 130 MMHG | BODY MASS INDEX: 49.5 KG/M2 | HEART RATE: 68 BPM | DIASTOLIC BLOOD PRESSURE: 80 MMHG | WEIGHT: 269 LBS | OXYGEN SATURATION: 98 %

## 2017-10-25 DIAGNOSIS — R70.0 ELEVATED SED RATE: ICD-10-CM

## 2017-10-25 DIAGNOSIS — M25.50 PAIN IN JOINT INVOLVING MULTIPLE SITES: Primary | ICD-10-CM

## 2017-10-25 LAB — RHEUMATOID FACTOR: <10 IU/ML

## 2017-10-25 PROCEDURE — 3017F COLORECTAL CA SCREEN DOC REV: CPT | Performed by: FAMILY MEDICINE

## 2017-10-25 PROCEDURE — 99214 OFFICE O/P EST MOD 30 MIN: CPT | Performed by: FAMILY MEDICINE

## 2017-10-25 PROCEDURE — G8417 CALC BMI ABV UP PARAM F/U: HCPCS | Performed by: FAMILY MEDICINE

## 2017-10-25 PROCEDURE — G8484 FLU IMMUNIZE NO ADMIN: HCPCS | Performed by: FAMILY MEDICINE

## 2017-10-25 PROCEDURE — 1036F TOBACCO NON-USER: CPT | Performed by: FAMILY MEDICINE

## 2017-10-25 PROCEDURE — 3014F SCREEN MAMMO DOC REV: CPT | Performed by: FAMILY MEDICINE

## 2017-10-25 PROCEDURE — G8427 DOCREV CUR MEDS BY ELIG CLIN: HCPCS | Performed by: FAMILY MEDICINE

## 2017-10-25 ASSESSMENT — ENCOUNTER SYMPTOMS
EYE DISCHARGE: 0
EYE REDNESS: 0
SHORTNESS OF BREATH: 0
ABDOMINAL PAIN: 0
NAUSEA: 0
COLOR CHANGE: 0
BACK PAIN: 1

## 2017-10-25 NOTE — PROGRESS NOTES
Subjective:      Patient ID: Osman Cook is a 61 y.o. female. She is complaining of fatigue and joint pain today. The fatigue has been going on for about one month. It is slightly getting better. She is recovering from shoulder surgery. And she is doing shoulder exercises. Despite this she has having pain in many of her joints. Especially her hands and knees. She has no swelling in her legs. No shortness of breath. She does have a family history of pulmonary fibrosis which may be associated with connective tissue disease. Her sister is currently being worked up. I reviewed her lab and she is significant for an elevated sedimentation rate and elevated C reactive protein. Past Medical History:   Diagnosis Date    Chicken pox     Chronic back pain     CKD (chronic kidney disease) 4/15/2016    Depression 12/7/2010    Hypertension     Measles     Morbid obesity with BMI of 45.0-49.9, adult ([facility] [address] 1/29/2015    Osteoarthritis      Past Surgical History:   Procedure Laterality Date    JOINT REPLACEMENT  2009,2010    KNEES BILATERAL    SHOULDER ARTHROSCOPY Right 08/22/2017    TONSILLECTOMY AND ADENOIDECTOMY  1964     Social History     Social History    Marital status:      Spouse name: N/A    Number of children: N/A    Years of education: N/A     Occupational History    Not on file. Social History Main Topics    Smoking status: Former Smoker     Packs/day: 0.50     Years: 10.00     Types: Cigarettes     Quit date: 1/1/1985    Smokeless tobacco: Never Used    Alcohol use No    Drug use: No    Sexual activity: Yes     Partners: Male     Other Topics Concern    Not on file     Social History Narrative    No narrative on file       HPI    Review of Systems   Constitutional: Positive for fatigue. Negative for chills, fever and unexpected weight change. HENT: Negative for congestion. Eyes: Negative for discharge and redness. Respiratory: Negative for shortness of breath. Cardiovascular: Negative for chest pain, palpitations and leg swelling. Gastrointestinal: Negative for abdominal pain and nausea. Genitourinary: Negative for flank pain. Musculoskeletal: Positive for arthralgias, back pain, joint swelling and myalgias. Negative for gait problem. Skin: Negative for color change and pallor. Neurological: Negative for facial asymmetry, speech difficulty, weakness and numbness. Hematological: Does not bruise/bleed easily. Psychiatric/Behavioral: Negative for confusion. Objective:   Physical Exam   Constitutional: She is oriented to person, place, and time. She appears well-developed and well-nourished. No distress. HENT:   Head: Normocephalic and atraumatic. Eyes: Conjunctivae are normal. No scleral icterus. Cardiovascular: Normal rate. Pulmonary/Chest: Effort normal. No stridor. Musculoskeletal: She exhibits tenderness. She exhibits no edema. She has tenderness with range of motion of her hand and fingers and knee. She has no pedal edema. She admits to pedal edema at the end of the day only. Neurological: She is alert and oriented to person, place, and time. Skin: Skin is warm. She is not diaphoretic. No erythema. Psychiatric: She has a normal mood and affect. Her behavior is normal. Judgment and thought content normal.       Assessment:      1. Pain in joint involving multiple sites  Rheumatoid Factor    SAMMY profile    Anti-smooth muscle antibody    CCP Antibodies, IGG/IGA   2. Elevated sed rate  Rheumatoid Factor    SAMMY profile    Anti-smooth muscle antibody    CCP Antibodies, IGG/IGA           Plan:      Patrick Tran was seen today for fatigue and joint pain.     Diagnoses and all orders for this visit:    Pain in joint involving multiple sites  -     Rheumatoid Factor  -     SAMMY profile  -     Anti-smooth muscle antibody  -     CCP Antibodies, IGG/IGA    Elevated sed rate  -     Rheumatoid Factor  -     SAMMY profile  -     Anti-smooth muscle antibody  -     CCP Antibodies, IGG/IGA        We discussed her lab results today and her family history of possible connective tissue disease. Due to her elevated sedimentation rate and C-reactive protein and will go ahead and do more specific test. If they are positive or pain continues I recommend rheumatology.

## 2017-10-26 LAB
ANA INTERPRETATION: NORMAL
ANTI-NUCLEAR ANTIBODY (ANA): NEGATIVE

## 2017-10-27 LAB
CCP IGG ANTIBODIES: 4 UNITS (ref 0–19)
F-ACTIN AB IGG: 7 UNITS (ref 0–19)

## 2017-11-01 ENCOUNTER — HOSPITAL ENCOUNTER (OUTPATIENT)
Dept: PHYSICAL THERAPY | Age: 57
Discharge: HOME | End: 2017-11-01
Admitting: ORTHOPAEDIC SURGERY

## 2017-11-01 ENCOUNTER — HOSPITAL ENCOUNTER (OUTPATIENT)
Dept: PHYSICAL THERAPY | Age: 57
Discharge: HOME | End: 2017-11-30
Attending: ORTHOPAEDIC SURGERY | Admitting: ORTHOPAEDIC SURGERY

## 2017-11-01 NOTE — OP NOTE
Orthopaedics and Sports Rehabilitation                        Date: 11/1/2017 Physician: Bao Isaac  Patient: Isidoro Velasquez Diagnosis: R RC repair    Patient has received 10 sessions of Physical Therapy       ROM Initial (R) Initial (L) Current (R) Current (L)   Active shdlr flex   140    Passive ER   90    Passive IR   75                  Strength       shldr flex   4-/5    shdlr ER   5-/5    shdlr IR   5/5                    Functional Activity Checklist: The patient continues to have moderate difficulty with the following:   [] Personal care  [] Reaching / overhead  [] Standing    [] Housework chores  [] Climbing  [] Driving / riding in a vehicle    [] Work  [] Squatting  [] Bed / vehicle mobility    [] Lifting  [] Walking  [] Sleeping    [] Pushing / pulling  [] Sitting  [] Concentrating / reading     Specific Functional Improvement and Impression:  Strength progressing    Summary:   [x] Patient is progressing as expected for this condition   [] Patient is progressing, but slower than expected for this condition   [] Patient is not progressing  Clinician Recommendations:   [x] Continue rehabilitation due to objective improvement and continued functional deficits. [] Follow up periodically to advance home exercise program to match level of function. [] Continue rehabitation due to objective improvement and continued functional deficits with progression to work conditioning. [] Discharge to post-rehab program secondary to maximizing \"medical necessity\" of physical / occupational therapy.    [] Discharge independent in a home program as:  [] All goals achieved  [] Maximized \"medical necessity\" of physical / occupational therapy  [] No subjective or objective improvements    Plan: cont 1x/week to progress func strength  Electronically signed by: Luiz Mccullough PT   License #:     Physician Recommendations:  [] Follow treatment plan as above [] Discontinue physical therapy  [] Change plan to: _______________________________________________________________    [] JOHANNE [phone])  [x] EGO ([phone])  [] AND ([phone])          Fax   [phone]                       Fax  [phone]                  Fax  [phone]              [] [address] ([phone])  [] CBC ([phone])  [] [facility] ([phone])       Fax   [phone]                   Fax  [phone]                        Fax   [phone]

## 2017-11-01 NOTE — PLAN OF CARE
[address] [place] [address],[address] [place], [address]                Physical Therapy Daily Treatment Note  Date:  2017    Patient Name:  Adrianne Pace   :  1960  MRN: [id number]  Restrictions/Precautions:    Medical/Treatment Diagnosis Information:  · Diagnosis: PT: R shoulder pain s/p RTC repair (patch), biceps debridement (17)  · Treatment Diagnosis: R shoulder pain S57.672  Insurance/Certification information:  PT Insurance Information: [facility]  Physician Information:  Referring Practitioner: Ryan Sosa of care signed (Y/N):     Date of Patient follow up with Physician:     G-Code (if applicable):      Date G-Code Applied:    PT G-Codes  Functional Assessment Tool Used: quick  Score: 30%  Functional Limitation: Carrying, moving and handling objects  Carrying, Moving and Handling Objects Current Status (): At least 20 percent but less than 40 percent impaired, limited or restricted  Carrying, Moving and Handling Objects Goal Status (): At least 1 percent but less than 20 percent impaired, limited or restricted    Progress Note: [x]  Yes  []  No  Next due by: Visit #10  MD 17    Latex Allergy:  [x]NO      []YES  Preferred Language for Healthcare:   [x]English       []other:    Visit # Insurance Allowable   10 30     Pain level:  5/10      SUBJECTIVE:  Still feeling good.     OBJECTIVE:   Observation:   Test measurements:  Shoulder flex: 140 degrees, ER 85 degrees                                            AROM shldr flex 140   IR5/5, ER 5-/5 shldr flex=4-/5    RESTRICTIONS/PRECAUTIONS: Passive ONLY 4 weeks- Ultra sling 3 weeks, Regular sling 3 weeks    Exercises/Interventions:   Therapeutic Ex Sets/sec Notes HEP   Seated P ER     Shoulder shrugs  Scap squeezes     Table slides flex  Table slides ER     Elbow, wrist ROM Reviewed           Manual therapy 15' PROM    Pulleys 5'     Supine cane ER  Supine cane flex  Supine cane chest press      SL ER  Prone ext  Supine shdlr flex   SL abd x30 1#  x20 2#  x30  x30      X  x   Wall slides   x   TB ext  TB IR  TB rows  TB ER x20  x30  x30  x20 Blue  Blue  blue  black to home          See other flow sheet X                                                                                                                                                     Therapeutic Exercise and NMR EXR  [x] (54071) Provided verbal/tactile cueing for activities related to strengthening, flexibility, endurance, ROM  for improvements in scapular, scapulothoracic and UE control with self care, reaching, carrying, lifting, house/yardwork, driving/computer work.    [] (65847) Provided verbal/tactile cueing for activities related to improving balance, coordination, kinesthetic sense, posture, motor skill, proprioception  to assist with  scapular, scapulothoracic and UE control with self care, reaching, carrying, lifting, house/yardwork, driving/computer work. Therapeutic Activities:    [] (76570 or 08557) Provided verbal/tactile cueing for activities related to improving balance, coordination, kinesthetic sense, posture, motor skill, proprioception and motor activation to allow for proper function of scapular, scapulothoracic and UE control with self care, carrying, lifting, driving/computer work.      Home Exercise Program:    [x] (09413) Reviewed/Progressed HEP activities related to strengthening, flexibility, endurance, ROM of scapular, scapulothoracic and UE control with self care, reaching, carrying, lifting, house/yardwork, driving/computer work  [] (01792) Reviewed/Progressed HEP activities related to improving balance, coordination, kinesthetic sense, posture, motor skill, proprioception of scapular, scapulothoracic and UE control with self care, reaching, carrying, lifting, house/yardwork, driving/computer work      Manual Treatments:  PROM / STM / Oscillations-Mobs:  G-I, expected towards functional goals listed. [] Progression is slowed due to complexities listed. [] Progression has been slowed due to co-morbidities.   [] Plan just implemented, too soon to assess goals progression  [] Other:     ASSESSMENT: black band to HEP progressing with increased weights and ability to raise arm    Treatment/Activity Tolerance:  [x] Patient tolerated treatment well [] Patient limited by fatique  [] Patient limited by pain  [] Patient limited by other medical complications  [] Other:     Prognosis: [x] Good [] Fair  [] Poor    Patient Requires Follow-up: [x] Yes  [] No    PLAN:   [x] Continue per plan of care [] Alter current plan (see comments)  [] Plan of care initiated [] Hold pending MD visit [] Discharge    Electronically signed by: Cassia Louis PT

## 2017-11-08 ENCOUNTER — OFFICE VISIT (OUTPATIENT)
Dept: ORTHOPEDIC SURGERY | Age: 57
End: 2017-11-08

## 2017-11-08 VITALS — HEIGHT: 62 IN | WEIGHT: 269 LBS | BODY MASS INDEX: 49.5 KG/M2

## 2017-11-08 DIAGNOSIS — M75.101 TEAR OF RIGHT ROTATOR CUFF, UNSPECIFIED TEAR EXTENT: Primary | ICD-10-CM

## 2017-11-08 PROCEDURE — 99024 POSTOP FOLLOW-UP VISIT: CPT | Performed by: PHYSICIAN ASSISTANT

## 2017-11-08 NOTE — PROGRESS NOTES
rotator cuff, unspecified tear extent Yes         Treatment Plan:    Specific precautions were reviewed and emphasized. Patient will continue outpatient physical therapy To work on range of motion and strengthening  Follow up appointment was arranged at patient's convenience in approximately 6 weeks. This dictation was performed with a verbal recognition program (DRAGON) and it was checked for errors. It is possible that there are still dictated errors within this office note. If so, please bring any errors to my attention for an addendum. All efforts were made to ensure that this office note is accurate.

## 2017-11-16 ENCOUNTER — HOSPITAL ENCOUNTER (OUTPATIENT)
Dept: PAIN MANAGEMENT | Age: 57
Discharge: HOME | End: 2017-11-16
Attending: PAIN MEDICINE | Admitting: PAIN MEDICINE

## 2017-11-16 VITALS
TEMPERATURE: 97.2 F | OXYGEN SATURATION: 96 % | HEART RATE: 64 BPM | SYSTOLIC BLOOD PRESSURE: 139 MMHG | WEIGHT: 269 LBS | RESPIRATION RATE: 18 BRPM | HEIGHT: 62 IN | DIASTOLIC BLOOD PRESSURE: 80 MMHG | BODY MASS INDEX: 49.5 KG/M2

## 2017-11-16 PROCEDURE — 99152 MOD SED SAME PHYS/QHP 5/>YRS: CPT | Performed by: PAIN MEDICINE

## 2017-11-16 PROCEDURE — 64636 DESTROY L/S FACET JNT ADDL: CPT | Performed by: PAIN MEDICINE

## 2017-11-16 PROCEDURE — 64635 DESTROY LUMB/SAC FACET JNT: CPT | Performed by: PAIN MEDICINE

## 2017-11-16 PROCEDURE — 77003 FLUOROGUIDE FOR SPINE INJECT: CPT | Performed by: PAIN MEDICINE

## 2017-11-16 RX ORDER — SODIUM CHLORIDE, SODIUM LACTATE, POTASSIUM CHLORIDE, CALCIUM CHLORIDE 600; 310; 30; 20 MG/100ML; MG/100ML; MG/100ML; MG/100ML
INJECTION, SOLUTION INTRAVENOUS CONTINUOUS
Status: DISCONTINUED | OUTPATIENT
Start: 2017-11-16 | End: 2017-11-17 | Stop reason: HOSPADM

## 2017-11-16 RX ADMIN — SODIUM CHLORIDE, SODIUM LACTATE, POTASSIUM CHLORIDE, CALCIUM CHLORIDE: 600; 310; 30; 20 INJECTION, SOLUTION INTRAVENOUS at 13:22

## 2017-11-16 ASSESSMENT — PAIN - FUNCTIONAL ASSESSMENT: PAIN_FUNCTIONAL_ASSESSMENT: 0-10

## 2017-11-16 ASSESSMENT — PAIN DESCRIPTION - DESCRIPTORS: DESCRIPTORS: BURNING;STABBING

## 2017-11-16 ASSESSMENT — PAIN SCALES - GENERAL: PAINLEVEL_OUTOF10: 0

## 2017-11-16 NOTE — ANESTHESIA POST-OP
POST SEDATION ASSESSMENT      Patient:  Tanvir Palomino   :  1960  Medical Record No.:  [id number]   Date:  2017  Physician:  Trina Chen M.D.       Patient location: Recovery  Level of consciousness: Awake, Alert, Oriented  Pain Control: Good  Respiration: Adequate  Post-op assessment: No sedation complications    Last Vitals:   Vitals:    17 1507   BP: (!) 154/80   Pulse: 61   Resp: 12   Temp:    SpO2: 97%     Post-op Vitals: Stable    Hans Pierre  3:17 PM

## 2017-11-16 NOTE — PROCEDURES
Sim Tee is a 61 y.o. female patient. No diagnosis found. Past Medical History:   Diagnosis Date    Chicken pox     Chronic back pain     CKD (chronic kidney disease) 4/15/2016    Depression 12/7/2010    Hypertension     Measles     Morbid obesity with BMI of 45.0-49.9, adult ([facility] [address] 1/29/2015    Osteoarthritis      Blood pressure (!) 168/80, pulse 78, temperature 97.2 °F (36.2 °C), temperature source Temporal, resp. rate 16, height 5' 2\" (1.575 m), weight 269 lb (122 kg), last menstrual period 07/13/2011, SpO2 99 %, not currently breastfeeding. Samreen Duran MD  11/16/2017  DICTATING PHYSICIAN: Talisha Mcmahan M.D        PREOPERATIVE DIAGNOSES: Lumbar Spondylosis 721.3, M47.816, M47.817       POSTOPERATIVE DIAGNOSES: Lumbar Spondylosis       OPERATIVE PROCEDURES:  Radiofrequency Neurotomy of medial branches at _B L3,L4,L5__ levels to block the L45 and L5S1 facet joints. 91541 and 64636 X 1 WITH 28109,  BILATERAL   SURGEON: Talisha Mcmahan M.D   INDICATIONS:  Lumbar Spondylosis  OPERATIVE PROCEDURE:  Consent signed by patient after risks and benefits explained. Patient was in prone position. Back was prepped with Prevail and draped. Aseptic technique used at every stage of the procedure. Skin wheal with 1% Lidocaine with 30-gauge needle. A _18__ -gauge, _100__ -mm, curved tip radiofrequency needle with _10__ -mm active tip was placed under fluoroscopic guidance using A.P. views at the junction of superior articular process and sacral ala on the _B__ sides to access the __L5__ medial branches. The needle was walked anteriorly and superiorly by couple of millimeters. Then the lateral view was checked to make sure the tip of the needle is at the posterior part of the spine, clearly posterior to the posterior margin of the neural foramen at that level by at least couple of millimeters. Sensory stimulation at 50 hertz at 0.5 v did not create any sensation in the legs.  Motor stimulation at 2 hertz at 2 volts did not produce any muscle contraction in the _B__ legs. Sensory stimulation at 50hz at 0.5v did not create any pain down the leg. 0.3 cc of 1% lidocaine was injected and radiofrequency lesioning was done for 90 seconds at 80 degree centigrade. Needle pulled out intact. Exact similar procedure was performed at the junction of superior articular process and transverse process at _B L4,L5__ vertebral body levels to burn the __B L3,L4__ medial branches. Paraspinal muscle twitching was seen at _R L4,L5 __ vertebral levels. Patient did not feel radicular pain either during needle placement or during lesioning. Patient tolerated the procedure well. Intravenous sedation was with _100  mcg of Fentanyl and _2__ mg of Versed. Patient was monitored and stable. Discharge instructions were given.

## 2017-11-16 NOTE — PROGRESS NOTES
Pre-Operative:  1. Patient/Caregiver identifies - states name and date of birth. 2. The patient is free from signs and symptoms of injury. 3. The patient receives appropriate medication(s), safely administered during the Perioperative period. 4. The patient is free from signs and symptoms of infection. 5. The patient has wound / tissue perfusion. 6. The patients's fluid, electrolyte, and acid-base balances are established preoperatively. 7. The patient's pulmonary function is established preoperatively. 8. The patient's cardiovascular status is established preoperatively. 9. The patient / caregiver demonstrates knowledge of nutritional management related to the operative or other invasive procedure. 10. The patient/caregiver demonstrates knowledge of medication management. 11. The patient/caregiver demonstrates knowledge of pain management. 12. The patient participates in the rehabilitation process as applicable. 13. The patient/caregiver participates in decisions affection his or her Perioperative plan of care. 14. The patient's care is consistent with the individualized Perioperative plan of care. 15. The patient's right to privacy is maintained. 16. The patient is the recipient of competent and ethical care within legal standards of practice. 17. The patient's value system, lifestyle, ethnicity, and culture are considered, respected, and incorporated in the Perioperative plan of care and understands special services available. 18. The patient demonstrates and/or reports adequate pain control throughout the the Perioperative period. 19. The patient's neurological status is established preoperatively. 20. The patient/caregiver demonstrates knowledge of the expected responses to the operative or invasive procedure. 21. Patient/Caregiver has reduced anxiety. Interventions- Familiarize with environment and equipment.   22. Patient/Caregiver verbalizes understanding of Phase I and Phase II

## 2017-12-01 ENCOUNTER — HOSPITAL ENCOUNTER (OUTPATIENT)
Dept: PHYSICAL THERAPY | Age: 57
Discharge: HOME | End: 2017-12-31
Attending: ORTHOPAEDIC SURGERY | Admitting: ORTHOPAEDIC SURGERY

## 2017-12-20 ENCOUNTER — OFFICE VISIT (OUTPATIENT)
Dept: ORTHOPEDIC SURGERY | Age: 57
End: 2017-12-20

## 2017-12-20 VITALS — BODY MASS INDEX: 49.5 KG/M2 | WEIGHT: 269 LBS | HEIGHT: 62 IN

## 2017-12-20 DIAGNOSIS — M75.101 TEAR OF RIGHT ROTATOR CUFF, UNSPECIFIED TEAR EXTENT: Primary | ICD-10-CM

## 2017-12-20 PROCEDURE — 99213 OFFICE O/P EST LOW 20 MIN: CPT | Performed by: ORTHOPAEDIC SURGERY

## 2017-12-20 PROCEDURE — 3017F COLORECTAL CA SCREEN DOC REV: CPT | Performed by: ORTHOPAEDIC SURGERY

## 2017-12-20 PROCEDURE — G8484 FLU IMMUNIZE NO ADMIN: HCPCS | Performed by: ORTHOPAEDIC SURGERY

## 2017-12-20 PROCEDURE — 3014F SCREEN MAMMO DOC REV: CPT | Performed by: ORTHOPAEDIC SURGERY

## 2017-12-20 PROCEDURE — G8417 CALC BMI ABV UP PARAM F/U: HCPCS | Performed by: ORTHOPAEDIC SURGERY

## 2017-12-20 PROCEDURE — 1036F TOBACCO NON-USER: CPT | Performed by: ORTHOPAEDIC SURGERY

## 2017-12-20 PROCEDURE — G8427 DOCREV CUR MEDS BY ELIG CLIN: HCPCS | Performed by: ORTHOPAEDIC SURGERY

## 2017-12-20 NOTE — PROGRESS NOTES
Chief Complaint  Shoulder Pain (RIGHT     SX 08/22/2017    RCR, SAD)    Post op right shoulder arthroscopy with rotator cuff repair using bioinductive patch, subacromial decompression and debridement of biceps tendon. Date of surgery: 08/22/2017       History of Present Illness:  Sveta Mayes is a 61 y.o. female  Here for follow up of right shoulder arthroscopy with rotator cuff repair using bioinductive patch, subacromial decompression and debridement of biceps tendon. Progressing well. Has finished outpatient physical therapy. The patient's pain is rated at 0-2/10. The patient denies fever, wound drainage, increasing redness, pus, increasing swelling. Post op problems reported: none. Patient reports she's been very well overall. She is very happy with her range of motion. Taking oral pain medication as needed (OTC only). Vital Signs  There were no vitals filed for this visit. Pain Assessment            Shoulder Examination  Patient is awake, alert, and in no acute distress. Portals have healed well. Skin is intact. No swelling, ecchymosis or erythema. Distal circulatory status and neurologic status is intact. The wound is clean, dry, healing. There is no warmth, erythema, or purulent drainage over the incision. Sensation is intact to light touch in the axillary, median, radial, and ulnar nerve distribution. The EPL, FPL, and interossei are grossly intact. The right upper extremity is warm and well-perfused with brisk capillary refill. ROM has been progressing. Essentially full range of motion. Strength is continuing to improve. None  Diagnostic Test Findings: No new xrays were taken today        Assessment: Progressing well post op from right shoulder arthroscopy with rotator cuff repair using bioinductive patch, subacromial decompression and debridement of biceps tendon. .     Impression:  Encounter Diagnosis   Name Primary?     Tear of right rotator cuff, unspecified tear extent Yes         Treatment Plan:    Specific precautions were reviewed and emphasized. We encouraged the patient to continue with her home exercise program and will follow-up with us on a p.r.n. Basis    Deyanira Nolasco PA-C, scribing for Dr. Mirian Flores        This dictation was performed with a verbal recognition program [facility]) and it was checked for errors. It is possible that there are still dictated errors within this office note. If so, please bring any errors to my attention for an addendum. All efforts were made to ensure that this office note is accurate.

## 2017-12-21 ENCOUNTER — OFFICE VISIT (OUTPATIENT)
Dept: ORTHOPEDIC SURGERY | Age: 57
End: 2017-12-21

## 2017-12-21 ENCOUNTER — TELEPHONE (OUTPATIENT)
Dept: ORTHOPEDIC SURGERY | Age: 57
End: 2017-12-21

## 2017-12-21 VITALS — WEIGHT: 268.96 LBS | BODY MASS INDEX: 49.49 KG/M2 | HEIGHT: 62 IN

## 2017-12-21 DIAGNOSIS — G56.03 BILATERAL CARPAL TUNNEL SYNDROME: ICD-10-CM

## 2017-12-21 PROCEDURE — 3014F SCREEN MAMMO DOC REV: CPT | Performed by: ORTHOPAEDIC SURGERY

## 2017-12-21 PROCEDURE — G8427 DOCREV CUR MEDS BY ELIG CLIN: HCPCS | Performed by: ORTHOPAEDIC SURGERY

## 2017-12-21 PROCEDURE — 3017F COLORECTAL CA SCREEN DOC REV: CPT | Performed by: ORTHOPAEDIC SURGERY

## 2017-12-21 PROCEDURE — 1036F TOBACCO NON-USER: CPT | Performed by: ORTHOPAEDIC SURGERY

## 2017-12-21 PROCEDURE — G8417 CALC BMI ABV UP PARAM F/U: HCPCS | Performed by: ORTHOPAEDIC SURGERY

## 2017-12-21 PROCEDURE — G8484 FLU IMMUNIZE NO ADMIN: HCPCS | Performed by: ORTHOPAEDIC SURGERY

## 2017-12-21 PROCEDURE — 99214 OFFICE O/P EST MOD 30 MIN: CPT | Performed by: ORTHOPAEDIC SURGERY

## 2017-12-21 NOTE — LETTER
Cefazolin IVPB per weight base protocol If allergic to PCN         * Cefazolin 2 gram if < 119.9 kg    Clindamycin IVPB per weight base protocol         * Cefazolin 3 gram if > 120 kg  ( > 264 lbs)                     * Clindamycin 900 mg IVPB               *  Pediatric (<12 yo)  Dosin mg/kg                (maximum 2 grams)            IF ALLERGIC TO PCN/CEPHALOSPORIN, POSITIVE  MRSA/HISTORY OR >65 AGE (RISK OF C-DIFFICILE):       Vancomycin IVPB per weight base protocol           * Vancomycin 1gm if < 80 Kg (<176 lbs)           * Vancomycin 1.5 gm if > 80 kg to <120 kg (.176lbs to >264lbs.)           * Vancomycin 2 gm  If>120 kg(>264 lbs.)     ADDITIONAL MEDICATIONS:     OFIRMEV IVPB 1 gm over 15 minutes (adjust to body weight when needed)       DVT PREVENTION:        Knee high Antithrombic wraps for score of 3 or greater on VTE assessment form  (age 16 & older)           Bilateral                             Right                               Left       Thigh SHAYNA hose        Date: 2017  1:39 PM           Updated 2014

## 2017-12-21 NOTE — PROGRESS NOTES
changes      IMPRESSION AND PLAN:  Bilateral chronic carpal tunnel syndrome with failure to respond to conservative bracing. Ultimately I think she would be best served with definitive carpal tunnel release. We talked about staged or simultaneous surgeries and she would prefer to start initially with left carpal tunnel release, and then if doing well consider the opposite right carpal tunnel release. We discussed the risks, benefits, limitations, alternatives, and postop recovery following the proposed procedure. We will begin the process of scheduling surgery if there are no other preoperative medical contraindications. Please note that this transcription was created using voice recognition software. Any errors are unintentional and may be due to voice recognition transcription.

## 2017-12-22 NOTE — ADDENDUM NOTE
Encounter addended by: Piyush Carreon on: 12/22/2017  8:01 AM<BR>    Actions taken: Letter status changed

## 2017-12-26 ENCOUNTER — TELEPHONE (OUTPATIENT)
Dept: FAMILY MEDICINE CLINIC | Age: 57
End: 2017-12-26

## 2017-12-29 ENCOUNTER — TELEPHONE (OUTPATIENT)
Dept: ORTHOPEDIC SURGERY | Age: 57
End: 2017-12-29

## 2017-12-29 NOTE — ADDENDUM NOTE
Encounter addended by: Nancy Sidhu on: 12/29/2017  2:54 PM<BR>    Actions taken: Letter status changed

## 2018-01-02 ENCOUNTER — OFFICE VISIT (OUTPATIENT)
Dept: FAMILY MEDICINE CLINIC | Age: 58
End: 2018-01-02

## 2018-01-02 VITALS
DIASTOLIC BLOOD PRESSURE: 100 MMHG | HEART RATE: 70 BPM | TEMPERATURE: 97.7 F | SYSTOLIC BLOOD PRESSURE: 140 MMHG | HEIGHT: 62 IN | RESPIRATION RATE: 16 BRPM | WEIGHT: 276 LBS | BODY MASS INDEX: 50.79 KG/M2 | OXYGEN SATURATION: 94 %

## 2018-01-02 DIAGNOSIS — L65.9 HAIR LOSS: ICD-10-CM

## 2018-01-02 DIAGNOSIS — N18.30 STAGE 3 CHRONIC KIDNEY DISEASE (HCC): ICD-10-CM

## 2018-01-02 DIAGNOSIS — I10 ESSENTIAL HYPERTENSION: ICD-10-CM

## 2018-01-02 DIAGNOSIS — Z01.818 PRE-OP EXAM: Primary | ICD-10-CM

## 2018-01-02 DIAGNOSIS — G56.03 BILATERAL CARPAL TUNNEL SYNDROME: ICD-10-CM

## 2018-01-02 LAB — TSH REFLEX FT4: 3.9 UIU/ML (ref 0.27–4.2)

## 2018-01-02 PROCEDURE — 99244 OFF/OP CNSLTJ NEW/EST MOD 40: CPT | Performed by: FAMILY MEDICINE

## 2018-01-02 ASSESSMENT — ENCOUNTER SYMPTOMS
NAUSEA: 0
ABDOMINAL PAIN: 0
COLOR CHANGE: 0
EYE DISCHARGE: 0
SHORTNESS OF BREATH: 0
EYE REDNESS: 0

## 2018-01-02 NOTE — PROGRESS NOTES
Subjective:      Patient ID: Jayda Calderon is a 61 y.o. female. Nigel Marie is here to request of her surgeon for:  Dr. Rock Stevens, 1/8/18, Carpal tunnel  She has had pain and tingling in her hands and EMG showed bilateral carpal tunnel. Surgery was recommended. She is getting her left hand done first because her right arm is recovering from previous shoulder surgery in august. She tolerated that surgery well with no abnormal reactions. She has had no chest pain or shortness of breath since. She is under a lot of stress due to family member and hospice and son with pneumonia and daughter with concussion, As well as the holidays. Surgery for carpal tunnel recommended  after conservative therapy did not help. EMG result below  SUMMARY:   1.  Nerve conduction studies reveal abnormal prolongation of bilateral median  sensory and motor distal latencies. Bilateral median sensory evoked responses  are also diminished in amplitude. 2.  Needle EMG of the upper extremities is within normal limits.      IMPRESSION:    1. Moderate bilateral carpal tunnel syndrome. Bilateral median sensory  axonal loss appears to be present. No denervation appears to be present in  muscles tested today. 2.  No evidence of a cervical radiculopathy, brachioplexopathy, diffuse  peripheral neuropathy or other focal mononeuropathy involving the upper  extremities.     She is also here complaining of hair loss,   This is new. She does not have anything that is making it worse. Her last  TSH was in October 2017 and it was in the normal range. Her BMI is 50.     and her blood pressure is elevated today. She is taking her bp medication. We'll start daily as prescribed and tolerating it well.   Vitals:    01/02/18 1138 01/02/18 1143   BP: (!) 146/110 (!) 140/100   Site: Right Arm Left Arm   Position: Sitting Sitting   Cuff Size: Medium Adult Medium Adult   Pulse: 70    Resp: 16    Temp: 97.7 °F (36.5 °C)    SpO2: 94%    Weight: 276 lb (125.2 kg)    Height: 5' 2\" (1.575 m)      Body mass index is 50.48 kg/m². Wt Readings from Last 3 Encounters:   01/02/18 276 lb (125.2 kg)   12/29/17 268 lb (121.6 kg)   12/21/17 268 lb 15.4 oz (122 kg)     BP Readings from Last 3 Encounters:   01/02/18 (!) 140/100   11/16/17 139/80   10/25/17 130/80        Past Medical History:   Diagnosis Date    Acid reflux     Bilateral carpal tunnel syndrome 12/21/2017    Chicken pox     Chronic back pain     CKD (chronic kidney disease) 4/15/2016    Depression 12/7/2010    Hypertension     Measles     Morbid obesity with BMI of 45.0-49.9, adult ([facility] [address] 1/29/2015    Osteoarthritis      Patient Active Problem List   Diagnosis    Depression    Total knee replacement status    History of colonoscopy    Morbid obesity with BMI of 45.0-49.9, adult ([facility] [address]    Lumbar degenerative disc disease    Lumbar facet arthropathy    Lumbar stenosis    History of gout    CKD (chronic kidney disease)    Family history of hypertension    Essential hypertension    Lumbar spondylosis    Bilateral carpal tunnel syndrome     Past Surgical History:   Procedure Laterality Date    JOINT REPLACEMENT  2009,2010    KNEES BILATERAL    SHOULDER ARTHROSCOPY Right 08/22/2017    TONSILLECTOMY AND ADENOIDECTOMY  1964     Social History     Social History    Marital status:      Spouse name: N/A    Number of children: N/A    Years of education: N/A     Occupational History    Not on file.      Social History Main Topics    Smoking status: Former Smoker     Packs/day: 0.50     Years: 10.00     Types: Cigarettes     Quit date: 1/1/1985    Smokeless tobacco: Never Used    Alcohol use No    Drug use: No    Sexual activity: Yes     Partners: Male     Other Topics Concern    Not on file     Social History Narrative    No narrative on file     Allergies   Allergen Reactions    Protonix [Pantoprazole Sodium] Diarrhea     Outpatient Prescriptions Marked as Taking for the 1/2/18 thyromegaly present. Cardiovascular: Normal rate, regular rhythm, normal heart sounds and intact distal pulses. Exam reveals no gallop and no friction rub. No murmur heard. Pulmonary/Chest: Effort normal and breath sounds normal. No stridor. No respiratory distress. She has no wheezes. She has no rales. Abdominal: Soft. Bowel sounds are normal. She exhibits no mass. There is no tenderness. Musculoskeletal: She exhibits no edema or tenderness. Lymphadenopathy:     She has no cervical adenopathy. Neurological: She is alert and oriented to person, place, and time. She has normal reflexes. Skin: Skin is warm and dry. No rash noted. She is not diaphoretic. No erythema. No pallor. Psychiatric: She has a normal mood and affect. Her behavior is normal. Judgment and thought content normal.   Nursing note and vitals reviewed. Assessment:      1. Pre-op exam     2. Bilateral carpal tunnel syndrome     3. Hair loss     4. Essential hypertension     5. Stage 3 chronic kidney disease             Plan:          Pre-op exam  Pre op completed,   Cleared for surgery  Copy of this pre-op consultation was sent to the surgeon via [address] [place] [address] Rd, when in the [address] [place] [address] Rd system, as well as faxed to surgical pre op dept., and given to the patient to take with them on the day of surgery. Filled out form, see form. Bilateral carpal tunnel syndrome:She will be having surgery in her left arm/hand. Hair loss  TSH can be drawn today. And referral for dermatology as an option for her. Essential hypertension  We will continue to monitor because typically her blood pressures under control. No change in medication  Stage 3 chronic kidney disease, labs reviewed, stable, cleared for surgery. Previous EKG from within 6 months if his symptoms and no chest pain or exertional dyspnia since.

## 2018-01-05 ENCOUNTER — TELEPHONE (OUTPATIENT)
Dept: ORTHOPEDIC SURGERY | Age: 58
End: 2018-01-05

## 2018-01-29 ENCOUNTER — HOSPITAL ENCOUNTER (OUTPATIENT)
Dept: SURGERY | Age: 58
Discharge: HOME | End: 2018-01-29
Admitting: ORTHOPAEDIC SURGERY

## 2018-01-29 VITALS
WEIGHT: 276 LBS | BODY MASS INDEX: 50.79 KG/M2 | HEIGHT: 62 IN | RESPIRATION RATE: 18 BRPM | SYSTOLIC BLOOD PRESSURE: 132 MMHG | OXYGEN SATURATION: 94 % | DIASTOLIC BLOOD PRESSURE: 67 MMHG | HEART RATE: 58 BPM | TEMPERATURE: 97.1 F

## 2018-01-29 DIAGNOSIS — G56.03 BILATERAL CARPAL TUNNEL SYNDROME: Primary | ICD-10-CM

## 2018-01-29 RX ORDER — DIPHENHYDRAMINE HYDROCHLORIDE 50 MG/ML
12.5 INJECTION INTRAMUSCULAR; INTRAVENOUS
Status: ACTIVE | OUTPATIENT
Start: 2018-01-29 | End: 2018-01-29

## 2018-01-29 RX ORDER — HYDROCODONE BITARTRATE AND ACETAMINOPHEN 5; 325 MG/1; MG/1
1 TABLET ORAL EVERY 6 HOURS PRN
Qty: 10 TABLET | Refills: 0 | Status: SHIPPED | OUTPATIENT
Start: 2018-01-29 | End: 2018-02-05

## 2018-01-29 RX ORDER — SODIUM CHLORIDE 0.9 % (FLUSH) 0.9 %
10 SYRINGE (ML) INJECTION EVERY 12 HOURS SCHEDULED
Status: DISCONTINUED | OUTPATIENT
Start: 2018-01-29 | End: 2018-01-30 | Stop reason: HOSPADM

## 2018-01-29 RX ORDER — LABETALOL HYDROCHLORIDE 5 MG/ML
5 INJECTION, SOLUTION INTRAVENOUS EVERY 10 MIN PRN
Status: DISCONTINUED | OUTPATIENT
Start: 2018-01-29 | End: 2018-01-30 | Stop reason: HOSPADM

## 2018-01-29 RX ORDER — SODIUM CHLORIDE, SODIUM LACTATE, POTASSIUM CHLORIDE, CALCIUM CHLORIDE 600; 310; 30; 20 MG/100ML; MG/100ML; MG/100ML; MG/100ML
INJECTION, SOLUTION INTRAVENOUS CONTINUOUS
Status: DISCONTINUED | OUTPATIENT
Start: 2018-01-29 | End: 2018-01-30 | Stop reason: HOSPADM

## 2018-01-29 RX ORDER — SODIUM CHLORIDE 0.9 % (FLUSH) 0.9 %
10 SYRINGE (ML) INJECTION PRN
Status: DISCONTINUED | OUTPATIENT
Start: 2018-01-29 | End: 2018-01-30 | Stop reason: HOSPADM

## 2018-01-29 RX ORDER — ONDANSETRON 2 MG/ML
4 INJECTION INTRAMUSCULAR; INTRAVENOUS
Status: ACTIVE | OUTPATIENT
Start: 2018-01-29 | End: 2018-01-29

## 2018-01-29 RX ORDER — LIDOCAINE HYDROCHLORIDE 10 MG/ML
1 INJECTION, SOLUTION EPIDURAL; INFILTRATION; INTRACAUDAL; PERINEURAL
Status: ACTIVE | OUTPATIENT
Start: 2018-01-29 | End: 2018-01-29

## 2018-01-29 RX ORDER — MORPHINE SULFATE 2 MG/ML
2 INJECTION, SOLUTION INTRAMUSCULAR; INTRAVENOUS EVERY 5 MIN PRN
Status: DISCONTINUED | OUTPATIENT
Start: 2018-01-29 | End: 2018-01-30 | Stop reason: HOSPADM

## 2018-01-29 RX ORDER — HYDRALAZINE HYDROCHLORIDE 20 MG/ML
5 INJECTION INTRAMUSCULAR; INTRAVENOUS EVERY 10 MIN PRN
Status: DISCONTINUED | OUTPATIENT
Start: 2018-01-29 | End: 2018-01-30 | Stop reason: HOSPADM

## 2018-01-29 RX ORDER — MORPHINE SULFATE 2 MG/ML
1 INJECTION, SOLUTION INTRAMUSCULAR; INTRAVENOUS EVERY 5 MIN PRN
Status: DISCONTINUED | OUTPATIENT
Start: 2018-01-29 | End: 2018-01-30 | Stop reason: HOSPADM

## 2018-01-29 RX ORDER — OXYCODONE HYDROCHLORIDE AND ACETAMINOPHEN 5; 325 MG/1; MG/1
1 TABLET ORAL PRN
Status: ACTIVE | OUTPATIENT
Start: 2018-01-29 | End: 2018-01-29

## 2018-01-29 RX ORDER — PROMETHAZINE HYDROCHLORIDE 25 MG/ML
6.25 INJECTION, SOLUTION INTRAMUSCULAR; INTRAVENOUS
Status: ACTIVE | OUTPATIENT
Start: 2018-01-29 | End: 2018-01-29

## 2018-01-29 RX ORDER — OXYCODONE HYDROCHLORIDE AND ACETAMINOPHEN 5; 325 MG/1; MG/1
2 TABLET ORAL PRN
Status: ACTIVE | OUTPATIENT
Start: 2018-01-29 | End: 2018-01-29

## 2018-01-29 RX ORDER — MEPERIDINE HYDROCHLORIDE 50 MG/ML
12.5 INJECTION INTRAMUSCULAR; INTRAVENOUS; SUBCUTANEOUS EVERY 5 MIN PRN
Status: DISCONTINUED | OUTPATIENT
Start: 2018-01-29 | End: 2018-01-30 | Stop reason: HOSPADM

## 2018-01-29 RX ADMIN — SODIUM CHLORIDE, SODIUM LACTATE, POTASSIUM CHLORIDE, CALCIUM CHLORIDE: 600; 310; 30; 20 INJECTION, SOLUTION INTRAVENOUS at 07:11

## 2018-01-29 ASSESSMENT — PAIN SCALES - GENERAL
PAINLEVEL_OUTOF10: 0

## 2018-01-29 ASSESSMENT — PAIN - FUNCTIONAL ASSESSMENT: PAIN_FUNCTIONAL_ASSESSMENT: 0-10

## 2018-01-29 NOTE — ANESTHESIA PRE-OP
10/11/2017 03:53 PM    GLUCOSE 109 (H) 10/11/2017 03:53 PM     COAGS  No results found for: PROTIME, INR, APTT     Anesthesia Plan      MAC     ASA 3     (Risks, benefits and alternatives of MAC anesthesia discussed with pt. Questions answered. Willing to proceed.)  Induction: intravenous. Anesthetic plan and risks discussed with patient and spouse.                       Aaron Machuca MD   1/29/2018

## 2018-01-29 NOTE — PROGRESS NOTES
Discharge instructions reviewed with patient/responsible adult and understanding verbalized. Discharge instructions signed and copies given. Patient discharged home with belongings. Prescription norco sent with pt and/or family.   Assist to car

## 2018-01-29 NOTE — ANESTHESIA POST-OP
Postoperative Anesthesia Note    Name:    Ofe Flores  MRN:      [id number]    Patient Vitals for the past 12 hrs:   BP Temp Temp src Pulse Resp SpO2 Height Weight   01/29/18 0905 132/67 - - 58 18 94 % - -   01/29/18 0855 118/68 97.1 °F (36.2 °C) Temporal 60 18 94 % - -   01/29/18 0850 122/60 - - 64 18 96 % - -   01/29/18 0845 (!) 123/58 - - 59 16 92 % - -   01/29/18 0836 (!) 106/56 96.9 °F (36.1 °C) Temporal 70 15 96 % - -   01/29/18 0654 (!) 181/86 97.6 °F (36.4 °C) Oral 62 18 96 % 5' 2\" (1.575 m) 276 lb (125.2 kg)        LABS:    CBC  Lab Results   Component Value Date/Time    WBC 6.9 10/11/2017 03:53 PM    HGB 13.7 10/11/2017 03:53 PM    HCT 39.5 10/11/2017 03:53 PM     10/11/2017 03:53 PM     RENAL  Lab Results   Component Value Date/Time     (H) 10/11/2017 03:53 PM    K 4.6 10/11/2017 03:53 PM     10/11/2017 03:53 PM    CO2 25 10/11/2017 03:53 PM    BUN 25 (H) 10/11/2017 03:53 PM    CREATININE 1.2 (H) 10/11/2017 03:53 PM    GLUCOSE 109 (H) 10/11/2017 03:53 PM     COAGS  No results found for: PROTIME, INR, APTT    Intake & Output: In: 350 [I.V.:350]  Out: -     Nausea & Vomiting:  No    Level of Consciousness:  Awake    Pain Assessment:  Adequate analgesia    Anesthesia Complications:  No apparent anesthetic complications    SUMMARY      Vital signs stable on discharge from Stage I post anesthesia care.   Care transferred from Anesthesiology department on discharge from perioperative area

## 2018-02-09 ENCOUNTER — OFFICE VISIT (OUTPATIENT)
Dept: ORTHOPEDIC SURGERY | Age: 58
End: 2018-02-09

## 2018-02-09 VITALS — BODY MASS INDEX: 50.79 KG/M2 | WEIGHT: 276 LBS | HEIGHT: 62 IN

## 2018-02-09 DIAGNOSIS — G56.02 CARPAL TUNNEL SYNDROME OF LEFT WRIST: Primary | ICD-10-CM

## 2018-02-09 DIAGNOSIS — G56.03 BILATERAL CARPAL TUNNEL SYNDROME: ICD-10-CM

## 2018-02-09 PROCEDURE — 99024 POSTOP FOLLOW-UP VISIT: CPT | Performed by: ORTHOPAEDIC SURGERY

## 2018-02-09 PROCEDURE — L3908 WHO COCK-UP NONMOLDE PRE OTS: HCPCS | Performed by: ORTHOPAEDIC SURGERY

## 2018-02-12 ENCOUNTER — TELEPHONE (OUTPATIENT)
Dept: ORTHOPEDIC SURGERY | Age: 58
End: 2018-02-12

## 2018-03-01 ENCOUNTER — OFFICE VISIT (OUTPATIENT)
Dept: ORTHOPEDIC SURGERY | Age: 58
End: 2018-03-01

## 2018-03-01 VITALS — HEIGHT: 62 IN | WEIGHT: 276.02 LBS | BODY MASS INDEX: 50.79 KG/M2

## 2018-03-01 DIAGNOSIS — G56.01 CARPAL TUNNEL SYNDROME OF RIGHT WRIST: Primary | ICD-10-CM

## 2018-03-01 DIAGNOSIS — G56.03 BILATERAL CARPAL TUNNEL SYNDROME: ICD-10-CM

## 2018-03-01 PROCEDURE — L3908 WHO COCK-UP NONMOLDE PRE OTS: HCPCS | Performed by: ORTHOPAEDIC SURGERY

## 2018-03-01 PROCEDURE — 99024 POSTOP FOLLOW-UP VISIT: CPT | Performed by: ORTHOPAEDIC SURGERY

## 2018-03-19 ENCOUNTER — OFFICE VISIT (OUTPATIENT)
Dept: ORTHOPEDIC SURGERY | Age: 58
End: 2018-03-19

## 2018-03-19 VITALS
DIASTOLIC BLOOD PRESSURE: 75 MMHG | SYSTOLIC BLOOD PRESSURE: 134 MMHG | HEART RATE: 81 BPM | HEIGHT: 62 IN | BODY MASS INDEX: 50.79 KG/M2 | WEIGHT: 276 LBS

## 2018-03-19 DIAGNOSIS — M25.512 LEFT SHOULDER PAIN, UNSPECIFIED CHRONICITY: Primary | ICD-10-CM

## 2018-03-19 PROCEDURE — 20610 DRAIN/INJ JOINT/BURSA W/O US: CPT | Performed by: ORTHOPAEDIC SURGERY

## 2018-03-19 PROCEDURE — G8484 FLU IMMUNIZE NO ADMIN: HCPCS | Performed by: ORTHOPAEDIC SURGERY

## 2018-03-19 PROCEDURE — 3017F COLORECTAL CA SCREEN DOC REV: CPT | Performed by: ORTHOPAEDIC SURGERY

## 2018-03-19 PROCEDURE — 3014F SCREEN MAMMO DOC REV: CPT | Performed by: ORTHOPAEDIC SURGERY

## 2018-03-19 PROCEDURE — 99213 OFFICE O/P EST LOW 20 MIN: CPT | Performed by: ORTHOPAEDIC SURGERY

## 2018-03-19 PROCEDURE — 1036F TOBACCO NON-USER: CPT | Performed by: ORTHOPAEDIC SURGERY

## 2018-03-19 PROCEDURE — G8417 CALC BMI ABV UP PARAM F/U: HCPCS | Performed by: ORTHOPAEDIC SURGERY

## 2018-03-19 PROCEDURE — G8427 DOCREV CUR MEDS BY ELIG CLIN: HCPCS | Performed by: ORTHOPAEDIC SURGERY

## 2018-03-19 NOTE — PROGRESS NOTES
Chief Complaint:  Shoulder Pain (LEFT )      History of Present Illness:  Mary Carl is a 61 y.o. female here regarding left shoulder pain. She is right-hand dominant and has had a previous right shoulder rotator cuff repair. She apparently was moving boxes in late January and sustained an injury to her left nondominant shoulder. Since then she's noticed an inability to elevate her arm in a normal range of motion. She has difficulty sleeping and has some weakness. The pain is located diffusely throughout the shoulder. She describes the symptoms as aching and sharp. Symptoms improve with rest. The symptoms are worse with activity. Medical History:  Patient's medications, allergies, past medical, surgical, social and family histories were reviewed and updated as appropriate. Review of Systems:  Pertinent items are noted in HPI  Review of systems reviewed from Patient History Form dated on 3/19/2018 and available in the patient's chart under the Media tab. Vital Signs:  Vitals:    03/19/18 1343   BP: 134/75   Pulse: 81       Pain Assessment:  Pain Assessment  Location of Pain: Shoulder  Location Modifiers: Left  Severity of Pain: 6  Quality of Pain: Dull, Aching  Frequency of Pain: Intermittent  Aggravating Factors: Straightening, Stretching, Exercise  Limiting Behavior: No  Relieving Factors: Rest  Result of Injury: No  Work-Related Injury: No  Are there other pain locations you wish to document?: No         General Exam:   Constitutional: Patient is adequately groomed with no evidence of malnutrition  Mental Status: The patient is oriented to time, place and person. The patient's mood and affect are appropriate. Vascular: Examination reveals no swelling or calf tenderness. Peripheral pulses are palpable and 2+. Neurological: The patient has good coordination. There is no weakness or sensory deficit. Left Shoulder Examination:  Inspection:  No gross deformities noted.  No atrophy,

## 2018-04-12 ENCOUNTER — OFFICE VISIT (OUTPATIENT)
Dept: FAMILY MEDICINE CLINIC | Age: 58
End: 2018-04-12

## 2018-04-12 ENCOUNTER — HOSPITAL ENCOUNTER (OUTPATIENT)
Dept: GENERAL RADIOLOGY | Age: 58
Discharge: HOME | End: 2018-04-12

## 2018-04-12 VITALS
SYSTOLIC BLOOD PRESSURE: 132 MMHG | OXYGEN SATURATION: 96 % | BODY MASS INDEX: 49.32 KG/M2 | DIASTOLIC BLOOD PRESSURE: 88 MMHG | WEIGHT: 268 LBS | HEART RATE: 64 BPM | HEIGHT: 62 IN

## 2018-04-12 DIAGNOSIS — R05.9 COUGH: ICD-10-CM

## 2018-04-12 DIAGNOSIS — R07.9 CHEST PAIN, UNSPECIFIED TYPE: ICD-10-CM

## 2018-04-12 DIAGNOSIS — R06.02 SHORTNESS OF BREATH: Primary | ICD-10-CM

## 2018-04-12 DIAGNOSIS — J40 BRONCHITIS: ICD-10-CM

## 2018-04-12 PROCEDURE — 3017F COLORECTAL CA SCREEN DOC REV: CPT | Performed by: NURSE PRACTITIONER

## 2018-04-12 PROCEDURE — 3014F SCREEN MAMMO DOC REV: CPT | Performed by: NURSE PRACTITIONER

## 2018-04-12 PROCEDURE — 1036F TOBACCO NON-USER: CPT | Performed by: NURSE PRACTITIONER

## 2018-04-12 PROCEDURE — G8427 DOCREV CUR MEDS BY ELIG CLIN: HCPCS | Performed by: NURSE PRACTITIONER

## 2018-04-12 PROCEDURE — 93000 ELECTROCARDIOGRAM COMPLETE: CPT | Performed by: NURSE PRACTITIONER

## 2018-04-12 PROCEDURE — 99213 OFFICE O/P EST LOW 20 MIN: CPT | Performed by: NURSE PRACTITIONER

## 2018-04-12 PROCEDURE — G8417 CALC BMI ABV UP PARAM F/U: HCPCS | Performed by: NURSE PRACTITIONER

## 2018-04-12 RX ORDER — ALBUTEROL SULFATE 90 UG/1
2 AEROSOL, METERED RESPIRATORY (INHALATION) EVERY 6 HOURS PRN
Qty: 1 INHALER | Refills: 0 | Status: SHIPPED | OUTPATIENT
Start: 2018-04-12 | End: 2018-07-03

## 2018-04-12 RX ORDER — DOXYCYCLINE HYCLATE 100 MG
100 TABLET ORAL 2 TIMES DAILY
Qty: 20 TABLET | Refills: 0 | Status: SHIPPED | OUTPATIENT
Start: 2018-04-12 | End: 2018-04-22

## 2018-04-12 ASSESSMENT — ENCOUNTER SYMPTOMS
HEMOPTYSIS: 0
SORE THROAT: 0
COUGH: 1
SHORTNESS OF BREATH: 1
RHINORRHEA: 0
WHEEZING: 0

## 2018-04-25 ENCOUNTER — OFFICE VISIT (OUTPATIENT)
Dept: FAMILY MEDICINE CLINIC | Age: 58
End: 2018-04-25

## 2018-04-25 VITALS
DIASTOLIC BLOOD PRESSURE: 80 MMHG | HEART RATE: 69 BPM | WEIGHT: 269 LBS | OXYGEN SATURATION: 94 % | SYSTOLIC BLOOD PRESSURE: 116 MMHG | BODY MASS INDEX: 49.5 KG/M2 | HEIGHT: 62 IN

## 2018-04-25 DIAGNOSIS — R05.9 COUGH: ICD-10-CM

## 2018-04-25 DIAGNOSIS — R01.1 HEART MURMUR: ICD-10-CM

## 2018-04-25 DIAGNOSIS — I10 ESSENTIAL HYPERTENSION: ICD-10-CM

## 2018-04-25 DIAGNOSIS — R06.09 DYSPNEA ON EXERTION: Primary | ICD-10-CM

## 2018-04-25 LAB
A/G RATIO: 1.3 (ref 1.1–2.2)
ALBUMIN SERPL-MCNC: 4 G/DL (ref 3.4–5)
ALP BLD-CCNC: 69 U/L (ref 40–129)
ALT SERPL-CCNC: 21 U/L (ref 10–40)
ANION GAP SERPL CALCULATED.3IONS-SCNC: 13 MMOL/L (ref 3–16)
AST SERPL-CCNC: 24 U/L (ref 15–37)
BASOPHILS ABSOLUTE: 0.1 K/UL (ref 0–0.2)
BASOPHILS RELATIVE PERCENT: 1.3 %
BILIRUB SERPL-MCNC: 0.4 MG/DL (ref 0–1)
BUN BLDV-MCNC: 20 MG/DL (ref 7–20)
CALCIUM SERPL-MCNC: 9.2 MG/DL (ref 8.3–10.6)
CHLORIDE BLD-SCNC: 101 MMOL/L (ref 99–110)
CO2: 26 MMOL/L (ref 21–32)
CREAT SERPL-MCNC: 1.3 MG/DL (ref 0.6–1.1)
EOSINOPHILS ABSOLUTE: 0.3 K/UL (ref 0–0.6)
EOSINOPHILS RELATIVE PERCENT: 6.9 %
GFR AFRICAN AMERICAN: 51
GFR NON-AFRICAN AMERICAN: 42
GLOBULIN: 3 G/DL
GLUCOSE BLD-MCNC: 104 MG/DL (ref 70–99)
HCT VFR BLD CALC: 39 % (ref 36–48)
HEMOGLOBIN: 13.4 G/DL (ref 12–16)
LYMPHOCYTES ABSOLUTE: 2 K/UL (ref 1–5.1)
LYMPHOCYTES RELATIVE PERCENT: 42.3 %
MCH RBC QN AUTO: 29.6 PG (ref 26–34)
MCHC RBC AUTO-ENTMCNC: 34.4 G/DL (ref 31–36)
MCV RBC AUTO: 86 FL (ref 80–100)
MONOCYTES ABSOLUTE: 0.5 K/UL (ref 0–1.3)
MONOCYTES RELATIVE PERCENT: 9.8 %
NEUTROPHILS ABSOLUTE: 1.9 K/UL (ref 1.7–7.7)
NEUTROPHILS RELATIVE PERCENT: 39.7 %
PDW BLD-RTO: 14.4 % (ref 12.4–15.4)
PLATELET # BLD: 255 K/UL (ref 135–450)
PMV BLD AUTO: 7.9 FL (ref 5–10.5)
POTASSIUM SERPL-SCNC: 4.5 MMOL/L (ref 3.5–5.1)
RBC # BLD: 4.53 M/UL (ref 4–5.2)
SODIUM BLD-SCNC: 140 MMOL/L (ref 136–145)
TOTAL PROTEIN: 7 G/DL (ref 6.4–8.2)
TSH SERPL DL<=0.05 MIU/L-ACNC: 1.86 UIU/ML (ref 0.27–4.2)
WBC # BLD: 4.8 K/UL (ref 4–11)

## 2018-04-25 PROCEDURE — 99214 OFFICE O/P EST MOD 30 MIN: CPT | Performed by: NURSE PRACTITIONER

## 2018-04-25 PROCEDURE — G8417 CALC BMI ABV UP PARAM F/U: HCPCS | Performed by: NURSE PRACTITIONER

## 2018-04-25 PROCEDURE — 3017F COLORECTAL CA SCREEN DOC REV: CPT | Performed by: NURSE PRACTITIONER

## 2018-04-25 PROCEDURE — 1036F TOBACCO NON-USER: CPT | Performed by: NURSE PRACTITIONER

## 2018-04-25 PROCEDURE — G8427 DOCREV CUR MEDS BY ELIG CLIN: HCPCS | Performed by: NURSE PRACTITIONER

## 2018-04-25 ASSESSMENT — ENCOUNTER SYMPTOMS
HEMOPTYSIS: 0
SPUTUM PRODUCTION: 1
WHEEZING: 0
SHORTNESS OF BREATH: 1
RHINORRHEA: 0
COUGH: 1
SORE THROAT: 0

## 2018-05-02 ENCOUNTER — HOSPITAL ENCOUNTER (OUTPATIENT)
Dept: PULMONOLOGY | Age: 58
Discharge: HOME | End: 2018-05-02
Attending: NURSE PRACTITIONER | Admitting: NURSE PRACTITIONER

## 2018-05-02 VITALS — OXYGEN SATURATION: 96 %

## 2018-05-02 DIAGNOSIS — R06.09 OTHER FORMS OF DYSPNEA: ICD-10-CM

## 2018-05-02 RX ORDER — ALBUTEROL SULFATE 90 UG/1
4 AEROSOL, METERED RESPIRATORY (INHALATION) ONCE
Status: COMPLETED | OUTPATIENT
Start: 2018-05-02 | End: 2018-05-02

## 2018-05-02 RX ADMIN — ALBUTEROL SULFATE 4 PUFF: 90 AEROSOL, METERED RESPIRATORY (INHALATION) at 08:49

## 2018-05-04 ENCOUNTER — TELEPHONE (OUTPATIENT)
Dept: FAMILY MEDICINE CLINIC | Age: 58
End: 2018-05-04

## 2018-05-04 DIAGNOSIS — E66.01 MORBID OBESITY WITH BMI OF 45.0-49.9, ADULT (HCC): ICD-10-CM

## 2018-05-04 DIAGNOSIS — R06.09 DYSPNEA ON EXERTION: Primary | ICD-10-CM

## 2018-05-07 DIAGNOSIS — E66.01 MORBID OBESITY WITH BMI OF 45.0-49.9, ADULT (HCC): Primary | ICD-10-CM

## 2018-05-07 DIAGNOSIS — R06.09 OTHER FORM OF DYSPNEA: ICD-10-CM

## 2018-05-08 ENCOUNTER — OFFICE VISIT (OUTPATIENT)
Dept: FAMILY MEDICINE CLINIC | Age: 58
End: 2018-05-08

## 2018-05-08 VITALS
HEIGHT: 62 IN | SYSTOLIC BLOOD PRESSURE: 138 MMHG | OXYGEN SATURATION: 96 % | BODY MASS INDEX: 49.87 KG/M2 | WEIGHT: 271 LBS | HEART RATE: 73 BPM | DIASTOLIC BLOOD PRESSURE: 66 MMHG

## 2018-05-08 DIAGNOSIS — G89.29 ACUTE EXACERBATION OF CHRONIC LOW BACK PAIN: Primary | ICD-10-CM

## 2018-05-08 DIAGNOSIS — M54.50 ACUTE EXACERBATION OF CHRONIC LOW BACK PAIN: Primary | ICD-10-CM

## 2018-05-08 PROCEDURE — 99213 OFFICE O/P EST LOW 20 MIN: CPT | Performed by: NURSE PRACTITIONER

## 2018-05-08 PROCEDURE — 3017F COLORECTAL CA SCREEN DOC REV: CPT | Performed by: NURSE PRACTITIONER

## 2018-05-08 PROCEDURE — 1036F TOBACCO NON-USER: CPT | Performed by: NURSE PRACTITIONER

## 2018-05-08 PROCEDURE — G8427 DOCREV CUR MEDS BY ELIG CLIN: HCPCS | Performed by: NURSE PRACTITIONER

## 2018-05-08 PROCEDURE — G8417 CALC BMI ABV UP PARAM F/U: HCPCS | Performed by: NURSE PRACTITIONER

## 2018-05-08 RX ORDER — HYDROCODONE BITARTRATE AND ACETAMINOPHEN 5; 325 MG/1; MG/1
1 TABLET ORAL
COMMUNITY
Start: 2018-05-07 | End: 2018-05-10

## 2018-05-08 RX ORDER — GABAPENTIN 300 MG/1
300 CAPSULE ORAL
COMMUNITY
Start: 2018-05-07 | End: 2018-07-03

## 2018-05-08 RX ORDER — METHYLPREDNISOLONE 4 MG/1
4 TABLET ORAL
COMMUNITY
Start: 2018-05-07 | End: 2018-05-15 | Stop reason: ALTCHOICE

## 2018-05-08 RX ORDER — METHOCARBAMOL 750 MG/1
750 TABLET, FILM COATED ORAL
COMMUNITY
Start: 2018-05-07 | End: 2018-07-03

## 2018-05-08 ASSESSMENT — ENCOUNTER SYMPTOMS
BACK PAIN: 1
BOWEL INCONTINENCE: 0
ABDOMINAL PAIN: 0

## 2018-05-15 ENCOUNTER — OFFICE VISIT (OUTPATIENT)
Dept: FAMILY MEDICINE CLINIC | Age: 58
End: 2018-05-15

## 2018-05-15 VITALS
OXYGEN SATURATION: 97 % | BODY MASS INDEX: 49.69 KG/M2 | DIASTOLIC BLOOD PRESSURE: 78 MMHG | RESPIRATION RATE: 24 BRPM | SYSTOLIC BLOOD PRESSURE: 120 MMHG | WEIGHT: 270 LBS | HEART RATE: 67 BPM | HEIGHT: 62 IN

## 2018-05-15 DIAGNOSIS — M54.42 CHRONIC BILATERAL LOW BACK PAIN WITH LEFT-SIDED SCIATICA: Primary | ICD-10-CM

## 2018-05-15 DIAGNOSIS — G89.29 CHRONIC BILATERAL LOW BACK PAIN WITH LEFT-SIDED SCIATICA: Primary | ICD-10-CM

## 2018-05-15 PROCEDURE — G8427 DOCREV CUR MEDS BY ELIG CLIN: HCPCS | Performed by: NURSE PRACTITIONER

## 2018-05-15 PROCEDURE — 3017F COLORECTAL CA SCREEN DOC REV: CPT | Performed by: NURSE PRACTITIONER

## 2018-05-15 PROCEDURE — 96372 THER/PROPH/DIAG INJ SC/IM: CPT | Performed by: NURSE PRACTITIONER

## 2018-05-15 PROCEDURE — G8417 CALC BMI ABV UP PARAM F/U: HCPCS | Performed by: NURSE PRACTITIONER

## 2018-05-15 PROCEDURE — 1036F TOBACCO NON-USER: CPT | Performed by: NURSE PRACTITIONER

## 2018-05-15 PROCEDURE — 99213 OFFICE O/P EST LOW 20 MIN: CPT | Performed by: NURSE PRACTITIONER

## 2018-05-15 RX ORDER — DIPHENOXYLATE HYDROCHLORIDE AND ATROPINE SULFATE 2.5; .025 MG/1; MG/1
TABLET ORAL
COMMUNITY
Start: 2018-03-24 | End: 2018-09-07 | Stop reason: ALTCHOICE

## 2018-05-15 RX ORDER — KETOROLAC TROMETHAMINE 10 MG/1
10 TABLET, FILM COATED ORAL
Qty: 15 TABLET | Refills: 0 | Status: ON HOLD | OUTPATIENT
Start: 2018-05-15 | End: 2018-06-20 | Stop reason: HOSPADM

## 2018-05-15 ASSESSMENT — ENCOUNTER SYMPTOMS
BOWEL INCONTINENCE: 0
WHEEZING: 0
ABDOMINAL PAIN: 0
BACK PAIN: 1
STRIDOR: 0
SHORTNESS OF BREATH: 0

## 2018-05-18 ENCOUNTER — HOSPITAL ENCOUNTER (OUTPATIENT)
Dept: MRI IMAGING | Age: 58
Discharge: HOME | End: 2018-05-18

## 2018-05-18 DIAGNOSIS — M54.42 CHRONIC BILATERAL LOW BACK PAIN WITH LEFT-SIDED SCIATICA: ICD-10-CM

## 2018-05-18 DIAGNOSIS — G89.29 CHRONIC BILATERAL LOW BACK PAIN WITH LEFT-SIDED SCIATICA: ICD-10-CM

## 2018-05-18 DIAGNOSIS — M54.42 LOW BACK PAIN WITH LEFT-SIDED SCIATICA: ICD-10-CM

## 2018-05-22 ENCOUNTER — TELEPHONE (OUTPATIENT)
Dept: ORTHOPEDIC SURGERY | Age: 58
End: 2018-05-22

## 2018-05-22 ENCOUNTER — OFFICE VISIT (OUTPATIENT)
Dept: ORTHOPEDIC SURGERY | Age: 58
End: 2018-05-22

## 2018-05-22 VITALS
DIASTOLIC BLOOD PRESSURE: 75 MMHG | HEIGHT: 62 IN | WEIGHT: 270.06 LBS | BODY MASS INDEX: 49.7 KG/M2 | SYSTOLIC BLOOD PRESSURE: 134 MMHG

## 2018-05-22 DIAGNOSIS — M54.16 LUMBAR RADICULOPATHY: ICD-10-CM

## 2018-05-22 DIAGNOSIS — M48.061 SPINAL STENOSIS OF LUMBAR REGION WITHOUT NEUROGENIC CLAUDICATION: ICD-10-CM

## 2018-05-22 DIAGNOSIS — M43.16 SPONDYLOLISTHESIS OF LUMBAR REGION: Primary | ICD-10-CM

## 2018-05-22 PROCEDURE — G8417 CALC BMI ABV UP PARAM F/U: HCPCS | Performed by: PHYSICAL MEDICINE & REHABILITATION

## 2018-05-22 PROCEDURE — G8427 DOCREV CUR MEDS BY ELIG CLIN: HCPCS | Performed by: PHYSICAL MEDICINE & REHABILITATION

## 2018-05-22 PROCEDURE — 99244 OFF/OP CNSLTJ NEW/EST MOD 40: CPT | Performed by: PHYSICAL MEDICINE & REHABILITATION

## 2018-05-22 PROCEDURE — 3017F COLORECTAL CA SCREEN DOC REV: CPT | Performed by: PHYSICAL MEDICINE & REHABILITATION

## 2018-05-22 RX ORDER — PREDNISONE 10 MG/1
10 TABLET ORAL DAILY
Qty: 26 TABLET | Refills: 0 | Status: SHIPPED | OUTPATIENT
Start: 2018-05-22 | End: 2018-06-12 | Stop reason: ALTCHOICE

## 2018-05-24 ENCOUNTER — TELEPHONE (OUTPATIENT)
Dept: FAMILY MEDICINE CLINIC | Age: 58
End: 2018-05-24

## 2018-06-01 ENCOUNTER — TELEPHONE (OUTPATIENT)
Dept: ORTHOPEDIC SURGERY | Age: 58
End: 2018-06-01

## 2018-06-06 ENCOUNTER — OFFICE VISIT (OUTPATIENT)
Dept: PULMONOLOGY | Age: 58
End: 2018-06-06

## 2018-06-06 VITALS
OXYGEN SATURATION: 97 % | HEART RATE: 64 BPM | BODY MASS INDEX: 49.13 KG/M2 | WEIGHT: 267 LBS | SYSTOLIC BLOOD PRESSURE: 126 MMHG | DIASTOLIC BLOOD PRESSURE: 82 MMHG | HEIGHT: 62 IN

## 2018-06-06 DIAGNOSIS — R07.9 CHEST PAIN, UNSPECIFIED TYPE: Primary | ICD-10-CM

## 2018-06-06 DIAGNOSIS — R06.09 DOE (DYSPNEA ON EXERTION): ICD-10-CM

## 2018-06-06 PROCEDURE — G8417 CALC BMI ABV UP PARAM F/U: HCPCS | Performed by: INTERNAL MEDICINE

## 2018-06-06 PROCEDURE — 99204 OFFICE O/P NEW MOD 45 MIN: CPT | Performed by: INTERNAL MEDICINE

## 2018-06-06 PROCEDURE — 3017F COLORECTAL CA SCREEN DOC REV: CPT | Performed by: INTERNAL MEDICINE

## 2018-06-06 PROCEDURE — G8427 DOCREV CUR MEDS BY ELIG CLIN: HCPCS | Performed by: INTERNAL MEDICINE

## 2018-06-06 PROCEDURE — 1036F TOBACCO NON-USER: CPT | Performed by: INTERNAL MEDICINE

## 2018-06-08 ENCOUNTER — PAT TELEPHONE (OUTPATIENT)
Dept: PREADMISSION TESTING | Age: 58
End: 2018-06-08

## 2018-06-12 ENCOUNTER — HOSPITAL ENCOUNTER (OUTPATIENT)
Dept: PAIN MANAGEMENT | Age: 58
Discharge: HOME | End: 2018-06-12
Attending: PHYSICAL MEDICINE & REHABILITATION | Admitting: PHYSICAL MEDICINE & REHABILITATION

## 2018-06-12 ENCOUNTER — TELEPHONE (OUTPATIENT)
Dept: PULMONOLOGY | Age: 58
End: 2018-06-12

## 2018-06-12 VITALS
OXYGEN SATURATION: 96 % | TEMPERATURE: 97 F | SYSTOLIC BLOOD PRESSURE: 163 MMHG | HEIGHT: 62 IN | WEIGHT: 267 LBS | HEART RATE: 58 BPM | BODY MASS INDEX: 49.13 KG/M2 | DIASTOLIC BLOOD PRESSURE: 90 MMHG | RESPIRATION RATE: 16 BRPM

## 2018-06-12 ASSESSMENT — PAIN DESCRIPTION - DESCRIPTORS: DESCRIPTORS: ACHING;SHARP;BURNING

## 2018-06-12 ASSESSMENT — PAIN - FUNCTIONAL ASSESSMENT: PAIN_FUNCTIONAL_ASSESSMENT: 0-10

## 2018-06-13 ENCOUNTER — TELEPHONE (OUTPATIENT)
Dept: PULMONOLOGY | Age: 58
End: 2018-06-13

## 2018-06-13 ENCOUNTER — HOSPITAL ENCOUNTER (OUTPATIENT)
Dept: PULMONOLOGY | Age: 58
Discharge: HOME | End: 2018-06-13
Attending: INTERNAL MEDICINE | Admitting: INTERNAL MEDICINE

## 2018-06-13 DIAGNOSIS — R07.9 CHEST PAIN, UNSPECIFIED TYPE: ICD-10-CM

## 2018-06-13 DIAGNOSIS — R07.9 CHEST PAIN: ICD-10-CM

## 2018-06-13 LAB
LV EF: 66 %
LVEF MODALITY: NORMAL

## 2018-06-13 RX ORDER — 0.9 % SODIUM CHLORIDE 0.9 %
10 VIAL (ML) INJECTION PRN
Status: DISCONTINUED | OUTPATIENT
Start: 2018-06-13 | End: 2018-06-14 | Stop reason: HOSPADM

## 2018-06-13 RX ORDER — ALBUTEROL SULFATE 2.5 MG/3ML
2.5 SOLUTION RESPIRATORY (INHALATION) ONCE
Status: COMPLETED | OUTPATIENT
Start: 2018-06-13 | End: 2018-06-13

## 2018-06-13 RX ADMIN — Medication 10 ML: at 09:08

## 2018-06-13 RX ADMIN — ALBUTEROL SULFATE 2.5 MG: 2.5 SOLUTION RESPIRATORY (INHALATION) at 14:00

## 2018-06-13 RX ADMIN — Medication 10 ML: at 08:01

## 2018-06-14 ENCOUNTER — NURSE ONLY (OUTPATIENT)
Dept: CARDIOLOGY CLINIC | Age: 58
End: 2018-06-14

## 2018-06-14 DIAGNOSIS — I10 ESSENTIAL HYPERTENSION: Primary | ICD-10-CM

## 2018-06-14 DIAGNOSIS — R94.39 ABNORMAL STRESS TEST: ICD-10-CM

## 2018-06-15 ENCOUNTER — TELEPHONE (OUTPATIENT)
Dept: ORTHOPEDIC SURGERY | Age: 58
End: 2018-06-15

## 2018-06-19 PROBLEM — I25.10 CAD IN NATIVE ARTERY: Status: ACTIVE | Noted: 2018-06-19

## 2018-06-27 ENCOUNTER — OFFICE VISIT (OUTPATIENT)
Dept: CARDIOLOGY CLINIC | Age: 58
End: 2018-06-27

## 2018-06-27 ENCOUNTER — TELEPHONE (OUTPATIENT)
Dept: ORTHOPEDIC SURGERY | Age: 58
End: 2018-06-27

## 2018-06-27 VITALS
WEIGHT: 257.4 LBS | BODY MASS INDEX: 45.6 KG/M2 | DIASTOLIC BLOOD PRESSURE: 70 MMHG | HEART RATE: 62 BPM | SYSTOLIC BLOOD PRESSURE: 110 MMHG

## 2018-06-27 DIAGNOSIS — E78.00 PURE HYPERCHOLESTEROLEMIA: ICD-10-CM

## 2018-06-27 DIAGNOSIS — I10 ESSENTIAL HYPERTENSION: ICD-10-CM

## 2018-06-27 DIAGNOSIS — R06.02 SOB (SHORTNESS OF BREATH): ICD-10-CM

## 2018-06-27 DIAGNOSIS — I25.10 CORONARY ARTERY DISEASE INVOLVING NATIVE CORONARY ARTERY OF NATIVE HEART WITHOUT ANGINA PECTORIS: Primary | ICD-10-CM

## 2018-06-27 PROCEDURE — G8598 ASA/ANTIPLAT THER USED: HCPCS | Performed by: NURSE PRACTITIONER

## 2018-06-27 PROCEDURE — 1111F DSCHRG MED/CURRENT MED MERGE: CPT | Performed by: NURSE PRACTITIONER

## 2018-06-27 PROCEDURE — G8417 CALC BMI ABV UP PARAM F/U: HCPCS | Performed by: NURSE PRACTITIONER

## 2018-06-27 PROCEDURE — 99214 OFFICE O/P EST MOD 30 MIN: CPT | Performed by: NURSE PRACTITIONER

## 2018-06-27 PROCEDURE — G8427 DOCREV CUR MEDS BY ELIG CLIN: HCPCS | Performed by: NURSE PRACTITIONER

## 2018-06-27 PROCEDURE — 1036F TOBACCO NON-USER: CPT | Performed by: NURSE PRACTITIONER

## 2018-06-27 PROCEDURE — 3017F COLORECTAL CA SCREEN DOC REV: CPT | Performed by: NURSE PRACTITIONER

## 2018-06-29 ENCOUNTER — PAT TELEPHONE (OUTPATIENT)
Dept: PREADMISSION TESTING | Age: 58
End: 2018-06-29

## 2018-07-03 ENCOUNTER — HOSPITAL ENCOUNTER (OUTPATIENT)
Dept: PAIN MANAGEMENT | Age: 58
Discharge: HOME | End: 2018-07-03
Attending: PHYSICAL MEDICINE & REHABILITATION | Admitting: PHYSICAL MEDICINE & REHABILITATION

## 2018-07-03 VITALS
WEIGHT: 257 LBS | HEART RATE: 58 BPM | DIASTOLIC BLOOD PRESSURE: 66 MMHG | HEIGHT: 63 IN | BODY MASS INDEX: 45.54 KG/M2 | SYSTOLIC BLOOD PRESSURE: 160 MMHG | RESPIRATION RATE: 16 BRPM | TEMPERATURE: 97 F | OXYGEN SATURATION: 98 %

## 2018-07-03 ASSESSMENT — PAIN DESCRIPTION - DESCRIPTORS: DESCRIPTORS: ACHING

## 2018-07-03 ASSESSMENT — ACTIVITIES OF DAILY LIVING (ADL): EFFECT OF PAIN ON DAILY ACTIVITIES: STANDING, WALKING

## 2018-07-03 ASSESSMENT — PAIN - FUNCTIONAL ASSESSMENT: PAIN_FUNCTIONAL_ASSESSMENT: 0-10

## 2018-07-03 NOTE — PROGRESS NOTES
CASE CANCELLED BY DR Kristy Asif. PT ON NEWLY PRESCRIBED BLOOD THINNER. PT IN AGREEMENT, TAKEN TO PACU. ATTEMPTED TO INFORM FAMILY BUT NO ONE WAS IN LOBBY.     Bianca Mcmanus RN

## 2018-07-03 NOTE — LETTER
[address] [address]  Phone: Clint Hou MD        July 3, 2018     Patient: Gabriel Noel   YOB: 1960   Date of Visit: 7/3/2018       To Whom It May Concern:     Henry Islas's daughter was here with her for her procedure. Please excuse her absence from work. Thanks you    If you have any questions or concerns, please don't hesitate to call.     Sincerely,        Irvin Lu MD

## 2018-07-03 NOTE — PROGRESS NOTES
NURSING CARE PLANS FOLLOWED     · Potential for anxiety-decrease anxiety-allow patient to verbalize  · Potential for infection-no infection-proper infection controls  · Potential for fall the patient will move to fall risk after procedure- through the recovery  phase   · [place] to environment  · Call light in reach  · Instruct to call for assistance prior to getting up  · Non skid footwear on   · Bed wheels locked and bed in lowest position - siderails up times two  · Potential for deep sedation-no deep sedation-know maximum allowable dose         adverse reactions and nursing considerations.  Assess VS and LOC

## 2018-07-05 ENCOUNTER — TELEPHONE (OUTPATIENT)
Dept: ORTHOPEDIC SURGERY | Age: 58
End: 2018-07-05

## 2018-07-10 ENCOUNTER — TELEPHONE (OUTPATIENT)
Dept: CARDIOLOGY CLINIC | Age: 58
End: 2018-07-10

## 2018-07-10 ENCOUNTER — OFFICE VISIT (OUTPATIENT)
Dept: PULMONOLOGY | Age: 58
End: 2018-07-10

## 2018-07-10 VITALS
SYSTOLIC BLOOD PRESSURE: 130 MMHG | BODY MASS INDEX: 45.36 KG/M2 | HEIGHT: 63 IN | OXYGEN SATURATION: 96 % | HEART RATE: 88 BPM | WEIGHT: 256 LBS | DIASTOLIC BLOOD PRESSURE: 70 MMHG

## 2018-07-10 DIAGNOSIS — R06.09 DOE (DYSPNEA ON EXERTION): ICD-10-CM

## 2018-07-10 DIAGNOSIS — Z98.61 CAD S/P PERCUTANEOUS CORONARY ANGIOPLASTY: Primary | ICD-10-CM

## 2018-07-10 DIAGNOSIS — I25.10 CAD S/P PERCUTANEOUS CORONARY ANGIOPLASTY: Primary | ICD-10-CM

## 2018-07-10 PROCEDURE — 3017F COLORECTAL CA SCREEN DOC REV: CPT | Performed by: INTERNAL MEDICINE

## 2018-07-10 PROCEDURE — G8427 DOCREV CUR MEDS BY ELIG CLIN: HCPCS | Performed by: INTERNAL MEDICINE

## 2018-07-10 PROCEDURE — 1036F TOBACCO NON-USER: CPT | Performed by: INTERNAL MEDICINE

## 2018-07-10 PROCEDURE — G8417 CALC BMI ABV UP PARAM F/U: HCPCS | Performed by: INTERNAL MEDICINE

## 2018-07-10 PROCEDURE — 1111F DSCHRG MED/CURRENT MED MERGE: CPT | Performed by: INTERNAL MEDICINE

## 2018-07-10 PROCEDURE — G8598 ASA/ANTIPLAT THER USED: HCPCS | Performed by: INTERNAL MEDICINE

## 2018-07-10 PROCEDURE — 99214 OFFICE O/P EST MOD 30 MIN: CPT | Performed by: INTERNAL MEDICINE

## 2018-07-10 RX ORDER — CLOPIDOGREL BISULFATE 75 MG/1
75 TABLET ORAL DAILY
Qty: 90 TABLET | Refills: 3 | Status: ON HOLD | OUTPATIENT
Start: 2018-07-10 | End: 2019-06-27 | Stop reason: ALTCHOICE

## 2018-07-10 ASSESSMENT — SLEEP AND FATIGUE QUESTIONNAIRES
HOW LIKELY ARE YOU TO NOD OFF OR FALL ASLEEP WHILE SITTING INACTIVE IN A PUBLIC PLACE: 0
HOW LIKELY ARE YOU TO NOD OFF OR FALL ASLEEP WHILE SITTING AND TALKING TO SOMEONE: 0
ESS TOTAL SCORE: 6
HOW LIKELY ARE YOU TO NOD OFF OR FALL ASLEEP WHILE WATCHING TV: 0
HOW LIKELY ARE YOU TO NOD OFF OR FALL ASLEEP WHEN YOU ARE A PASSENGER IN A CAR FOR AN HOUR WITHOUT A BREAK: 0
HOW LIKELY ARE YOU TO NOD OFF OR FALL ASLEEP WHILE SITTING AND READING: 1
HOW LIKELY ARE YOU TO NOD OFF OR FALL ASLEEP WHILE LYING DOWN TO REST IN THE AFTERNOON WHEN CIRCUMSTANCES PERMIT: 3
HOW LIKELY ARE YOU TO NOD OFF OR FALL ASLEEP IN A CAR, WHILE STOPPED FOR A FEW MINUTES IN TRAFFIC: 0
HOW LIKELY ARE YOU TO NOD OFF OR FALL ASLEEP WHILE SITTING QUIETLY AFTER LUNCH WITHOUT ALCOHOL: 2

## 2018-07-10 NOTE — TELEPHONE ENCOUNTER
Please call pt. She is reporting that she is still having breathing issues with the Brilinta    Asking to use Plavix instead?     Please call her to advise

## 2018-07-10 NOTE — PROGRESS NOTES
[facility]    Outpatient Initial Note    Subjective:   CHIEF COMPLAINT / HPI:     The patient is 61 y.o. female who presents today for follow-up visit for  re-evaluation of dyspnea on exertion after [facility] s/p PTCA LAD. She had a positive nuclear stress test that prompted her coronary angiogram.  She also had a bronchial challenge that was negative. Since revascularization she's had no further chest heaviness. Her dyspnea on exertion is mildly improved but still present when she goes up a flight of stairs. She has no cough or wheezing. She occasionally snores but has no witnessed apnea. She does not have excessive daytime sleepiness and her [place] was 6    Initial visit June 12, 2018   She states that she gets short of breath walking up from the first to second floor. It is 14 steps and sometimes she has to stop midway and at the top. She also gets short of breath working in the kitchen. She also complains of occasional chest heaviness. She pretty had a cough but states that that is now mostly resolved. She can't take a deep breath and her appetite has been off. She and her  are on a ketogenic diet and have lost some weight. She denies any fevers, chills, hemoptysis, or peripheral edema. Her evaluation has included an echo which is normal.  She is also had PFTs and a chest x-ray. She is never had a cardiac stress test.  She has history of hypertension and hyperlipidemia. Past Medical History:    Past Medical History:   Diagnosis Date    Acid reflux     Bilateral carpal tunnel syndrome 12/21/2017    CAD in native artery 06/19/2018    Mid LAD stented with 3.5 x 15 mm Xience BRIAN.  EF 55%    Chicken pox     Chronic back pain     CKD (chronic kidney disease) 4/15/2016    Depression 12/7/2010    Hypertension     Measles     Morbid obesity with BMI of 45.0-49.9, adult ([facility] [address] 1/29/2015    Osteoarthritis     Pure hypercholesterolemia        Social History:    Patient is . She is unemployed. She smoked up to 1 PPD x 8 years and quit in 1985    Family History:  IPF    Current Medications:  Current Outpatient Prescriptions on File Prior to Visit   Medication Sig Dispense Refill    aspirin 81 MG chewable tablet Take 1 tablet by mouth daily 90 tablet 3    atorvastatin (LIPITOR) 40 MG tablet Take 1 tablet by mouth nightly 90 tablet 3    valsartan (DIOVAN) 160 MG tablet Take 1 tablet by mouth daily 90 tablet 3    diphenoxylate-atropine (LOMOTIL) 2.5-0.025 MG per tablet       Multiple Vitamin (MULTI-VITAMIN DAILY PO) Take by mouth daily       citalopram (CELEXA) 40 MG tablet Take 1 tablet by mouth daily 90 tablet 2    famotidine (PEPCID) 10 MG tablet Take 10 mg by mouth daily      Ascorbic Acid (VITAMIN C CR) 1000 MG TBCR Take by mouth daily      ticagrelor (BRILINTA) 90 MG TABS tablet Take 1 tablet by mouth 2 times daily 180 tablet 3     No current facility-administered medications on file prior to visit.         REVIEW OF SYSTEMS:    CONSTITUTIONAL: Negative for fevers and chills  HEENT: Negative for oropharyngeal exudate, post nasal drip, sinus pain / pressure, nasal congestion, ear pain  RESPIRATORY:  See HPI  CARDIOVASCULAR: Negative for chest pain, palpitations, edema  GASTROINTESTINAL: Negative for nausea, vomiting, diarrhea, constipation and abdominal pain  GENITOURINARY: Negative for dysuria, urinary frequency, urinary hesitancy  HEMATOLOGICAL: Negative for adenopathy  SKIN: Negative for clubbing, cyanosis, skin lesions  ENDOCRINE: Negative for polyuria, polydipsia, heat intolerance, cold intolerance   EXTREMITIES: Negative for weakness, decreased ROM  NEUROLOGICAL: Negative for unilateral weakness, speech or gait abnormalities    Objective:   PHYSICAL EXAM:        VITALS:  /70 (Site: Left Arm, Position: Sitting, Cuff Size: Large Adult)   Pulse 88   Ht 5' 3\" (1.6 m)   Wt 256 lb (116.1 kg)   LMP 07/13/2011   SpO2 96%   PF 98 L/min BMI 45.35 kg/m²   On RA  CONSTITUTIONAL:  Awake, alert, cooperative, no apparent distress, and appears stated age  [de-identified]: No oropharyngeal exudate, PERRL, no cervical adenopathy, no tracheal deviation, thyroid size normal  LUNGS:  No increased work of breathing and clear to auscultation, no crackles or wheezing  CARDIOVASCULAR:  normal S1 and S2 and no JVD  ABDOMEN:  Normal bowel sounds, non-distended and non-tender to palpation  EXT: No edema, no calf tenderness. Pulses are present bilaterally. NEUROLOGIC:  Mental Status Exam:  Level of Alertness:   awake  Orientation:   person, place, time. SKIN:  normal skin color, texture, turgor, no redness, warmth, or swelling     DATA:      Radiology Review:  Pertinent images / reports were reviewed as a part of this visit. CXr 4/12/2018 reveals the following:  FINDINGS:   The lungs are without acute focal process.  There is no effusion or   pneumothorax. The cardiomediastinal silhouette is without acute process. The   osseous structures are without acute process.           Impression   No acute process. Last PFTs: May 2018  TEST PERFORMED:  1. Spirometry with flow-volume loops obtained before and after  bronchodilation. 2.  Lung volumes by plethysmography. 3.  Diffusion capacity of carbon monoxide.     Test meets ATS criteria and the quality of flow-volume loops is sufficient  for interpretation. Good patient effort.     The FEV1 is 2.24 L or 89% predicted. The FEV1 to FVC ratio is 75. Post  bronchodilator, the FEV1 changed to 2.29 L or 91% predicted. Total lung  capacity is 94% predicted and diffusion is 83% predicted.     INTERPRETATION:  1. No obstruction, no significant post-bronchodilator improvement. 2.  Normal lung volumes. 3.  Normal diffusion capacity. 4.  Clinical correlation recommended, if strong suspicion for obstructive  lung disease exists, we would recommend further evaluation with  methacholine bronchoprovocation study.     Bronchial

## 2018-07-17 ENCOUNTER — OFFICE VISIT (OUTPATIENT)
Dept: ORTHOPEDIC SURGERY | Age: 58
End: 2018-07-17

## 2018-07-17 VITALS
DIASTOLIC BLOOD PRESSURE: 75 MMHG | BODY MASS INDEX: 44.3 KG/M2 | HEIGHT: 63 IN | WEIGHT: 250 LBS | SYSTOLIC BLOOD PRESSURE: 134 MMHG

## 2018-07-17 DIAGNOSIS — M51.26 LUMBAR DISC HERNIATION: Primary | ICD-10-CM

## 2018-07-17 DIAGNOSIS — M43.16 SPONDYLOLISTHESIS OF LUMBAR REGION: ICD-10-CM

## 2018-07-17 DIAGNOSIS — M54.16 LUMBAR RADICULOPATHY: ICD-10-CM

## 2018-07-17 PROCEDURE — G8598 ASA/ANTIPLAT THER USED: HCPCS | Performed by: PHYSICAL MEDICINE & REHABILITATION

## 2018-07-17 PROCEDURE — G8427 DOCREV CUR MEDS BY ELIG CLIN: HCPCS | Performed by: PHYSICAL MEDICINE & REHABILITATION

## 2018-07-17 PROCEDURE — 1111F DSCHRG MED/CURRENT MED MERGE: CPT | Performed by: PHYSICAL MEDICINE & REHABILITATION

## 2018-07-17 PROCEDURE — G8417 CALC BMI ABV UP PARAM F/U: HCPCS | Performed by: PHYSICAL MEDICINE & REHABILITATION

## 2018-07-17 PROCEDURE — 3017F COLORECTAL CA SCREEN DOC REV: CPT | Performed by: PHYSICAL MEDICINE & REHABILITATION

## 2018-07-17 PROCEDURE — 1036F TOBACCO NON-USER: CPT | Performed by: PHYSICAL MEDICINE & REHABILITATION

## 2018-07-17 PROCEDURE — 99213 OFFICE O/P EST LOW 20 MIN: CPT | Performed by: PHYSICAL MEDICINE & REHABILITATION

## 2018-07-17 NOTE — PROGRESS NOTES
deformity or injury. Range of motion is normal and pain-free. There is no gross instability. There are no rashes, ulcerations or lesions. Strength and tone are normal. No atrophy or abnormal movements are noted. Diagnostic Testing:    MR Lumbar spine shows 5/18/18  Mild degenerative anterolisthesis at L4-5, resulting in significant   subarticular effacement and likely descending L5 nerve root impingement. There is mild to moderate L4 neural foraminal narrowing bilaterally, and mild   spinal canal stenosis at this level.        Results for orders placed or performed during the hospital encounter of 57/51/70   Basic Metabolic Panel w/ Reflex to MG   Result Value Ref Range    Sodium 143 136 - 145 mmol/L    Potassium reflex Magnesium 4.1 3.5 - 5.1 mmol/L    Chloride 106 99 - 110 mmol/L    CO2 23 21 - 32 mmol/L    Anion Gap 14 3 - 16    Glucose 106 (H) 70 - 99 mg/dL    BUN 32 (H) 7 - 20 mg/dL    CREATININE 1.1 0.6 - 1.1 mg/dL    GFR Non- 51 (A) >60    GFR African American >60 >60    Calcium 9.2 8.3 - 10.6 mg/dL   CBC   Result Value Ref Range    WBC 5.4 4.0 - 11.0 K/uL    RBC 4.55 4.00 - 5.20 M/uL    Hemoglobin 13.5 12.0 - 16.0 g/dL    Hematocrit 40.2 36.0 - 48.0 %    MCV 88.4 80.0 - 100.0 fL    MCH 29.7 26.0 - 34.0 pg    MCHC 33.6 31.0 - 36.0 g/dL    RDW 13.8 12.4 - 15.4 %    Platelets 503 323 - 753 K/uL    MPV 7.8 5.0 - 10.5 fL   Protime-INR   Result Value Ref Range    Protime 11.6 9.8 - 13.0 sec    INR 1.02 0.86 - 1.14   Platelet count   Result Value Ref Range    Platelets 913 382 - 859 K/uL   CBC   Result Value Ref Range    WBC 6.6 4.0 - 11.0 K/uL    RBC 4.99 4.00 - 5.20 M/uL    Hemoglobin 14.6 12.0 - 16.0 g/dL    Hematocrit 43.9 36.0 - 48.0 %    MCV 88.0 80.0 - 100.0 fL    MCH 29.3 26.0 - 34.0 pg    MCHC 33.3 31.0 - 36.0 g/dL    RDW 13.8 12.4 - 15.4 %    Platelets 142 029 - 745 K/uL    MPV 7.6 5.0 - 10.5 fL   Basic Metabolic Panel w/ Reflex to MG   Result Value Ref Range    Sodium 138 136 - 145 mmol/L    Potassium reflex Magnesium 3.6 3.5 - 5.1 mmol/L    Chloride 99 99 - 110 mmol/L    CO2 23 21 - 32 mmol/L    Anion Gap 16 3 - 16    Glucose 108 (H) 70 - 99 mg/dL    BUN 34 (H) 7 - 20 mg/dL    CREATININE 1.3 (H) 0.6 - 1.1 mg/dL    GFR Non-African American 42 (A) >60    GFR  51 (A) >60    Calcium 9.7 8.3 - 10.6 mg/dL   Ejection Fraction Percentage   Result Value Ref Range    Left Ventricular Ejection Fraction 50 %    LEFT VENTRICULAR EJECTION FRACTION MODE Cardiac Cath     Left Ventricular Ejection Fraction High Value 55 %   POC ACT-Low Range   Result Value Ref Range    POC ACT  (H) 113 - 149 sec   POC ACT-Low Range   Result Value Ref Range    POC ACT  (H) 113 - 149 sec   Electrocardiogram, 12-lead    Result Value Ref Range    Ventricular Rate 47 BPM    Atrial Rate 47 BPM    P-R Interval 168 ms    QRS Duration 80 ms    Q-T Interval 480 ms    QTc Calculation (Bazett) 424 ms    P Axis 39 degrees    R Axis 22 degrees    T Axis 50 degrees    Diagnosis       Sinus bradycardia  Otherwise normal ECG    Confirmed by [facility] TAMMY CRAWFORD MD ([id number]) on 6/20/2018 3:40:32 PM     EKG 12 Lead   Result Value Ref Range    Ventricular Rate 53 BPM    Atrial Rate 53 BPM    P-R Interval 172 ms    QRS Duration 80 ms    Q-T Interval 496 ms    QTc Calculation (Bazett) 465 ms    P Axis 41 degrees    R Axis 18 degrees    T Axis 61 degrees    Diagnosis       Sinus bradycardia  Otherwise normal ECG    Confirmed by HACKWORTH MD, Madalyn Alpers ([id number]) on 6/20/2018 3:29:46 PM       Impression:       1. Lumbar disc herniation    2. Lumbar radiculopathy    3. Spondylolisthesis of lumbar region        Plan:  Clinical Course: Worsening right leg pain, improved left leg pain  1. Medications:  No new medications to add  2.  PT:  I will start the patient on a trial of PT to work on a lumbar stabilization program to focus on core strengthening, core stabilizing, lumbar stretches, hamstring flexibility, modalities as indicated for 6-8 visits over the next 4-6 weeks. 3. Further studies:  No further studies. 4. Interventional:  No further injections until she is off Plavix or it has been one year  5. Follow up:  4-6 weeks          Sury Mcbride MD, [place], [place]  Board Certified in [address] [place] [address] Certified and Fellowship Trained in [facility] ([facility])             This dictation was performed with a verbal recognition program [facility]) and it was checked for errors. It is possible that there are still dictated errors within this office note. If so, please bring any errors to my attention for an addendum. All efforts were made to ensure that this office note is accurate.

## 2018-07-19 ENCOUNTER — HOSPITAL ENCOUNTER (OUTPATIENT)
Dept: PHYSICAL THERAPY | Age: 58
Setting detail: THERAPIES SERIES
Discharge: HOME | End: 2018-07-19
Payer: COMMERCIAL

## 2018-07-19 PROCEDURE — 97140 MANUAL THERAPY 1/> REGIONS: CPT | Performed by: PHYSICAL THERAPIST

## 2018-07-19 PROCEDURE — 97110 THERAPEUTIC EXERCISES: CPT | Performed by: PHYSICAL THERAPIST

## 2018-07-19 PROCEDURE — G8982 BODY POS GOAL STATUS: HCPCS | Performed by: PHYSICAL THERAPIST

## 2018-07-19 PROCEDURE — 97161 PT EVAL LOW COMPLEX 20 MIN: CPT | Performed by: PHYSICAL THERAPIST

## 2018-07-19 PROCEDURE — G0283 ELEC STIM OTHER THAN WOUND: HCPCS | Performed by: PHYSICAL THERAPIST

## 2018-07-19 PROCEDURE — G8981 BODY POS CURRENT STATUS: HCPCS | Performed by: PHYSICAL THERAPIST

## 2018-07-19 NOTE — FLOWSHEET NOTE
Therapeutic Exercise and NMR EXR  [] (80104) Provided verbal/tactile cueing for activities related to strengthening, flexibility, endurance, ROM for improvements in LE, proximal hip, and core control with self care, mobility, lifting, ambulation.  [] (04090) Provided verbal/tactile cueing for activities related to improving balance, coordination, kinesthetic sense, posture, motor skill, proprioception  to assist with LE, proximal hip, and core control in self care, mobility, lifting, ambulation and eccentric single leg control.      NMR and Therapeutic Activities:    [] (16505 or 94975) Provided verbal/tactile cueing for activities related to improving balance, coordination, kinesthetic sense, posture, motor skill, proprioception and motor activation to allow for proper function of core, proximal hip and LE with self care and ADLs  [] (64170) Gait Re-education- Provided training and instruction to the patient for proper LE, core and proximal hip recruitment and positioning and eccentric body weight control with ambulation re-education including up and down stairs     Home Exercise Program:    [x] (78515) Reviewed/Progressed HEP activities related to strengthening, flexibility, endurance, ROM of core, proximal hip and LE for functional self-care, mobility, lifting and ambulation/stair navigation   [] (14715)Reviewed/Progressed HEP activities related to improving balance, coordination, kinesthetic sense, posture, motor skill, proprioception of core, proximal hip and LE for self care, mobility, lifting, and ambulation/stair navigation      Manual Treatments:  PROM / STM / Oscillations-Mobs:  G-I, II, III, IV (PA's, Inf., Post.)  [] (80031) Provided manual therapy to mobilize LE, proximal hip and/or LS spine soft tissue/joints for the purpose of modulating pain, promoting relaxation,  increasing ROM, reducing/eliminating soft tissue swelling/inflammation/restriction, improving soft tissue extensibility and allowing session. To be on vacation for one week, to FU when she returns.     Treatment/Activity Tolerance:  [] Patient tolerated treatment well [] Patient limited by fatique  [x] Patient limited by pain  [] Patient limited by other medical complications  [] Other:     Prognosis: [] Good [] Fair  [] Poor    Patient Requires Follow-up: [x] Yes  [] No    PLAN: See eval  [] Continue per plan of care [] Alter current plan (see comments)  [x] Plan of care initiated [] Hold pending MD visit [] Discharge    Electronically signed by: Phil Pardo PT

## 2018-07-19 NOTE — PLAN OF CARE
lower leg and foot (L5)      posterior calf (S1)      medial calcaneus (S2)        Neural dynamic tension testing Normal Abnormal Comments   Slump Test  - Degree of knee flexion:       SLR       0-30  x    30-70      Femoral nerve (L2-4)        Reflexes Normal Abnormal Comments   S1-2 Seated achilles      S1-2 Prone knee bend      L3-4 Patellar tendon      C5-6 Biceps      C6 Brachioradialis      C7-8 Triceps      Clonus      Babinski      Casillas's        Joint mobility: not tested   []Normal    []Hypo   []Hyper    Palpation: tender R L3-L5 paraspinals    Functional Mobility/Transfers: pain with bed mobility and changing of positions. Patient extends low back with pressure to R side with sharp pain into RLE. Posture: na    Gait: (include devices/WB status) decreased hip rotation, rigid spine    Bandages/Dressings/Incisions: NA    Orthopedic Special Tests:    Normal Abnormal N/A Comments   Toe walk         Heel Walk       Fwd Bend-aberrant or innominate mvmt)  x     Trendelenburg  x     Kemps/Quadrant       Stork       NIMISHA/Kwan       Hip scour       SLR  x     Crossed SLR  x     Supine to sit       Hip thrust       SI distraction/compression       PA/Spring       Prone Instability test       Prone knee bend       Sacral Spring/thrust                  [x] Patient history, allergies, meds reviewed. Medical chart reviewed. See intake form. Review Of Systems (ROS):  [x]Performed Review of systems (Integumentary, CardioPulmonary, Neurological) by intake and observation. Intake form has been scanned into medical record. Patient has been instructed to contact their primary care physician regarding ROS issues if not already being addressed at this time.       Co-morbidities/Complexities (which will affect course of rehabilitation):   []None           Arthritic conditions   []Rheumatoid arthritis (M05.9)  []Osteoarthritis (M19.91)   Cardiovascular conditions   [x]Hypertension (I10)  []Hyperlipidemia consistent with pathology which may benefit from Dry needling     []other:      Prognosis/Rehab Potential:      []Excellent   [x]Good    []Fair   []Poor    Tolerance of evaluation/treatment:    []Excellent   [x]Good    []Fair   []Poor     Physical Therapy Evaluation Complexity Justification  [] A history of present problem with:  [] no personal factors and/or comorbidities that impact the plan of care;  [x]1-2 personal factors and/or comorbidities that impact the plan of care  []3 personal factors and/or comorbidities that impact the plan of care  [] An examination of body systems using standardized tests and measures addressing any of the following: body structures and functions (impairments), activity limitations, and/or participation restrictions;:  [] a total of 1-2 or more elements   [] a total of 3 or more elements   [x] a total of 4 or more elements   [] A clinical presentation with:  [x] stable and/or uncomplicated characteristics   [] evolving clinical presentation with changing characteristics  [] unstable and unpredictable characteristics;   [] Clinical decision making of [x] low, [] moderate, [] high complexity using standardized patient assessment instrument and/or measurable assessment of functional outcome. [x] EVAL (LOW) 73075 (typically 20 minutes face-to-face)  [] EVAL (MOD) 73176 (typically 30 minutes face-to-face)  [] EVAL (HIGH) 15405 (typically 45 minutes face-to-face)  [] RE-EVAL     PLAN: Begin PT focusing on: proximal hip mobilizations, LB mobs, LB core activation, proximal hip activation, and HEP    Frequency/Duration:  1-2 days per week for 8 Weeks:  Interventions:  [x]  Therapeutic exercise including: strength training, ROM, for LE, Glutes and core   [x]  NMR activation and proprioception for glutes , LE and Core   [x]  Manual therapy as indicated for Hip complex, LE and spine to include: Dry Needling/IASTM, STM, PROM, Gr I-IV mobilizations, manipulation.    [x]  Modalities as needed that may include: thermal agents, E-stim, Biofeedback, US, iontophoresis as indicated  [x]  Patient education on joint protection, postural re-education, activity modification, progression of HEP. HEP instruction: (see scanned forms)    GOALS:  Patient stated goal: Stand > 30 minutes to cook    Therapist goals for Patient:   Short Term Goals: To be achieved in: 2 weeks  1. Independent in HEP and progression per patient tolerance, in order to prevent re-injury. 2. Patient will have a decrease in pain to facilitate improvement in movement, function, and ADLs as indicated by Functional Deficits. Long Term Goals: To be achieved in: 8 weeks  1. Disability index score of 10% or less for the LAURA to assist with reaching prior level of function. 2. Patient will demonstrate increased AROM to WNL, good LS mobility, good hip ROM to allow for proper joint functioning as indicated by patients Functional Deficits. 3. Patient will demonstrate an increase in Strength to good proximal hip and core activation to allow for proper functional mobility as indicated by patients Functional Deficits. 4. Patient will return to full functional activities without increased symptoms or restriction including ability to stand > 30 minutes and walk > 15 minutes painfree. 5. Patient will be able to sleep through the night without disturbance to return to PLOF.     Electronically signed by:  Natalie Frederick PT

## 2018-07-23 ENCOUNTER — TELEPHONE (OUTPATIENT)
Dept: CARDIOLOGY CLINIC | Age: 58
End: 2018-07-23

## 2018-07-23 RX ORDER — LOSARTAN POTASSIUM 50 MG/1
50 TABLET ORAL DAILY
Qty: 90 TABLET | Refills: 3 | Status: SHIPPED | OUTPATIENT
Start: 2018-07-23 | End: 2019-05-20 | Stop reason: SINTOL

## 2018-07-25 ENCOUNTER — OFFICE VISIT (OUTPATIENT)
Dept: FAMILY MEDICINE CLINIC | Age: 58
End: 2018-07-25

## 2018-07-25 ENCOUNTER — HOSPITAL ENCOUNTER (OUTPATIENT)
Dept: PHYSICAL THERAPY | Age: 58
Setting detail: THERAPIES SERIES
End: 2018-07-25
Payer: COMMERCIAL

## 2018-07-25 VITALS
BODY MASS INDEX: 45.35 KG/M2 | DIASTOLIC BLOOD PRESSURE: 86 MMHG | OXYGEN SATURATION: 95 % | SYSTOLIC BLOOD PRESSURE: 124 MMHG | HEART RATE: 57 BPM | WEIGHT: 256 LBS

## 2018-07-25 DIAGNOSIS — N95.1 POSTMENOPAUSAL SYNDROME: Primary | ICD-10-CM

## 2018-07-25 DIAGNOSIS — L75.0 BODY ODOR: ICD-10-CM

## 2018-07-25 PROCEDURE — G8598 ASA/ANTIPLAT THER USED: HCPCS | Performed by: FAMILY MEDICINE

## 2018-07-25 PROCEDURE — 3017F COLORECTAL CA SCREEN DOC REV: CPT | Performed by: FAMILY MEDICINE

## 2018-07-25 PROCEDURE — 99214 OFFICE O/P EST MOD 30 MIN: CPT | Performed by: FAMILY MEDICINE

## 2018-07-25 PROCEDURE — G8427 DOCREV CUR MEDS BY ELIG CLIN: HCPCS | Performed by: FAMILY MEDICINE

## 2018-07-25 PROCEDURE — 1036F TOBACCO NON-USER: CPT | Performed by: FAMILY MEDICINE

## 2018-07-25 PROCEDURE — G8417 CALC BMI ABV UP PARAM F/U: HCPCS | Performed by: FAMILY MEDICINE

## 2018-07-25 RX ORDER — PAROXETINE 7.5 MG/1
1 CAPSULE ORAL DAILY
Qty: 30 CAPSULE | Refills: 5 | Status: ON HOLD | OUTPATIENT
Start: 2018-07-25 | End: 2018-08-21

## 2018-07-25 RX ORDER — PAROXETINE HYDROCHLORIDE 20 MG/1
20 TABLET, FILM COATED ORAL DAILY
Qty: 30 TABLET | Refills: 5 | Status: SHIPPED | OUTPATIENT
Start: 2018-07-25 | End: 2018-10-12 | Stop reason: DRUGHIGH

## 2018-07-25 ASSESSMENT — ENCOUNTER SYMPTOMS
CONSTIPATION: 0
SHORTNESS OF BREATH: 0
EYE REDNESS: 0
NAUSEA: 0
DIARRHEA: 0
VOMITING: 0

## 2018-07-25 NOTE — PROGRESS NOTES
reviewed and are negative. Objective:   Physical Exam   Constitutional: She is oriented to person, place, and time. She appears well-developed and well-nourished. No distress. HENT:   Head: Normocephalic and atraumatic. Eyes: Conjunctivae are normal. Right eye exhibits no discharge. Left eye exhibits no discharge. No scleral icterus. Cardiovascular: Normal rate. Pulmonary/Chest: Effort normal. No stridor. Musculoskeletal: She exhibits no edema. Neurological: She is alert and oriented to person, place, and time. Skin: Skin is warm and dry. No rash noted. She is not diaphoretic. No erythema. No pallor. Psychiatric: She has a normal mood and affect. Her behavior is normal. Judgment and thought content normal.   Nursing note and vitals reviewed. Assessment:       Diagnosis Orders   1. Postmenopausal syndrome  Gris Khan MD   2. Body odor             Plan:      Brady Dinero was seen today for discuss medications. Diagnoses and all orders for this visit:    Postmenopausal syndrome  -     Gris Khan MD    Body odor       Miconazole Nitrate (ZEASORB-AF) 2 % LOTN; Apply 1 Film topically daily    See instructions below for:  -     PARoxetine (PAXIL) 20 MG tablet; Take 1 tablet by mouth daily  -     PARoxetine Mesylate (BRISDELLE) 7.5 MG CAPS; Take 1 capsule by mouth daily  Patient Instructions     Stop celexa  Start brisdelle if covered by insurance or reasonable price, if not get the printed Rx for Paxil and start it instead. Make appt with gyn for pap and postmenopausal symptoms  Refer to  [facility]  Dr. Salma Brunner.  [address] [address] [address]   [phone]       Or for perimenopausal consultation, sexual health consultation:  Dr Ivy Bobo  [address]   [facility] [address]  [phone]

## 2018-07-25 NOTE — PATIENT INSTRUCTIONS
Stop celexa  Start brisdelle if covered by insurance or reasonable price, if not get the printed Rx for Paxil and start it instead. Make appt with gyn for pap and postmenopausal symptoms  Refer to  [facility]  Dr. Prema Beasley.  [address] [address] [address] [facility]    or  [address]   [phone]       Or for perimenopausal consultation, sexual health consultation:  Dr Ronit Fournier  [address] [place] [address],[place] [id number] [phone]

## 2018-07-30 ENCOUNTER — OFFICE VISIT (OUTPATIENT)
Dept: CARDIOLOGY CLINIC | Age: 58
End: 2018-07-30

## 2018-07-30 VITALS
DIASTOLIC BLOOD PRESSURE: 72 MMHG | WEIGHT: 257.2 LBS | SYSTOLIC BLOOD PRESSURE: 124 MMHG | HEIGHT: 63 IN | OXYGEN SATURATION: 93 % | HEART RATE: 62 BPM | BODY MASS INDEX: 45.57 KG/M2

## 2018-07-30 DIAGNOSIS — I25.10 CAD IN NATIVE ARTERY: Primary | ICD-10-CM

## 2018-07-30 DIAGNOSIS — I10 ESSENTIAL HYPERTENSION: ICD-10-CM

## 2018-07-30 DIAGNOSIS — E78.00 PURE HYPERCHOLESTEROLEMIA: ICD-10-CM

## 2018-07-30 DIAGNOSIS — Z98.61 S/P PTCA (PERCUTANEOUS TRANSLUMINAL CORONARY ANGIOPLASTY): ICD-10-CM

## 2018-07-30 PROCEDURE — 99213 OFFICE O/P EST LOW 20 MIN: CPT | Performed by: INTERNAL MEDICINE

## 2018-07-30 PROCEDURE — 1036F TOBACCO NON-USER: CPT | Performed by: INTERNAL MEDICINE

## 2018-07-30 PROCEDURE — G8417 CALC BMI ABV UP PARAM F/U: HCPCS | Performed by: INTERNAL MEDICINE

## 2018-07-30 PROCEDURE — G8598 ASA/ANTIPLAT THER USED: HCPCS | Performed by: INTERNAL MEDICINE

## 2018-07-30 PROCEDURE — 93000 ELECTROCARDIOGRAM COMPLETE: CPT | Performed by: INTERNAL MEDICINE

## 2018-07-30 PROCEDURE — G8427 DOCREV CUR MEDS BY ELIG CLIN: HCPCS | Performed by: INTERNAL MEDICINE

## 2018-07-30 PROCEDURE — 3017F COLORECTAL CA SCREEN DOC REV: CPT | Performed by: INTERNAL MEDICINE

## 2018-07-30 NOTE — PROGRESS NOTES
60 y.o. here for CAD. Previous sob is much better. Doesn't really have it now; can do 14 steps w/o issue (had to do 2-3 stops). Was very sob on brlinta; switched to plavix and has no complaints. No angina. No n/v/LH/dizziness. No syncope. No LE edema. No palps. Past Medical History:   Diagnosis Date    Acid reflux     Bilateral carpal tunnel syndrome 12/21/2017    CAD in native artery 06/19/2018    Mid LAD stented with 3.5 x 15 mm Xience BRIAN. EF 55%    Chicken pox     Chronic back pain     CKD (chronic kidney disease) 4/15/2016    Depression 12/7/2010    Hypertension     Measles     Morbid obesity with BMI of 45.0-49.9, adult ([facility] [address] 1/29/2015    Osteoarthritis     Pure hypercholesterolemia      Past Surgical History:   Procedure Laterality Date    CARPAL TUNNEL RELEASE Left 01/29/2018    JOINT REPLACEMENT  0222,[id number]    KNEES BILATERAL    SHOULDER ARTHROSCOPY Right 08/22/2017    TONSILLECTOMY AND ADENOIDECTOMY  1964     Social History   Substance Use Topics    Smoking status: Former Smoker     Packs/day: 0.50     Years: 10.00     Types: Cigarettes     Quit date: 1/1/1985    Smokeless tobacco: Never Used    Alcohol use No     Allergies   Allergen Reactions    Protonix [Pantoprazole Sodium] Diarrhea     Family History   Problem Relation Age of Onset    High Blood Pressure Mother     Rheum Arthritis Mother     Cancer Father     Hypertension Father     Colon Cancer Father     High Blood Pressure Brother     Hypertension Sister     Emphysema Sister     Hypertension Brother     Rheum Arthritis Brother     Rheum Arthritis Sister      Review of Systems   General: No fevers, chills, fatigue, or night sweats. HEENT: No blurry or decreased vision. No changes in hearing, nasal discharge or sore throat. Cardiovascular: See HPI. No cramping in legs or buttocks when walking. Respiratory: No cough, hemoptysis, or wheezing. No history of asthma.    Gastrointestinal: No abdominal pain,

## 2018-08-01 ENCOUNTER — TELEPHONE (OUTPATIENT)
Dept: PRIMARY CARE CLINIC | Age: 58
End: 2018-08-01

## 2018-08-01 NOTE — TELEPHONE ENCOUNTER
Submitted PA via CMM for PARoxetine Mesylate 7.5MG OR CAPS. Trena COLEMAN Case ID: [id number]  Rx #: [id number]. Medication has been APPROVED through 08/01/2019. Please advise patient.

## 2018-08-09 ENCOUNTER — TELEPHONE (OUTPATIENT)
Dept: ORTHOPEDIC SURGERY | Age: 58
End: 2018-08-09

## 2018-08-09 ENCOUNTER — OFFICE VISIT (OUTPATIENT)
Dept: ORTHOPEDIC SURGERY | Age: 58
End: 2018-08-09

## 2018-08-09 VITALS
WEIGHT: 253 LBS | SYSTOLIC BLOOD PRESSURE: 134 MMHG | BODY MASS INDEX: 44.83 KG/M2 | HEIGHT: 63 IN | DIASTOLIC BLOOD PRESSURE: 75 MMHG

## 2018-08-09 DIAGNOSIS — M47.816 SPONDYLOSIS WITHOUT MYELOPATHY OR RADICULOPATHY, LUMBAR REGION: Primary | ICD-10-CM

## 2018-08-09 DIAGNOSIS — M70.62 GREATER TROCHANTERIC BURSITIS OF BOTH HIPS: ICD-10-CM

## 2018-08-09 DIAGNOSIS — M70.61 GREATER TROCHANTERIC BURSITIS OF BOTH HIPS: ICD-10-CM

## 2018-08-09 DIAGNOSIS — M53.3 SACROILIAC JOINT DYSFUNCTION OF RIGHT SIDE: ICD-10-CM

## 2018-08-09 PROCEDURE — 99213 OFFICE O/P EST LOW 20 MIN: CPT | Performed by: PHYSICAL MEDICINE & REHABILITATION

## 2018-08-09 PROCEDURE — 20610 DRAIN/INJ JOINT/BURSA W/O US: CPT | Performed by: PHYSICAL MEDICINE & REHABILITATION

## 2018-08-09 PROCEDURE — G8598 ASA/ANTIPLAT THER USED: HCPCS | Performed by: PHYSICAL MEDICINE & REHABILITATION

## 2018-08-09 PROCEDURE — G8427 DOCREV CUR MEDS BY ELIG CLIN: HCPCS | Performed by: PHYSICAL MEDICINE & REHABILITATION

## 2018-08-09 PROCEDURE — G8417 CALC BMI ABV UP PARAM F/U: HCPCS | Performed by: PHYSICAL MEDICINE & REHABILITATION

## 2018-08-09 PROCEDURE — 3017F COLORECTAL CA SCREEN DOC REV: CPT | Performed by: PHYSICAL MEDICINE & REHABILITATION

## 2018-08-09 PROCEDURE — 1036F TOBACCO NON-USER: CPT | Performed by: PHYSICAL MEDICINE & REHABILITATION

## 2018-08-09 NOTE — PROGRESS NOTES
Follow up: SPINE      CHIEF COMPLAINT:    Chief Complaint   Patient presents with    Lower Back Pain     states her low back pain is worsening, she is unable to have an EDGAR due to having a stent placed recently, she is requesting medication to help reduce her discomfort         HISTORY OF PRESENT ILLNESS:     The patient is a 60 y.o. female whom reports She has had worsening low back pain on the right side. She reports that this radiates from the right side of her gluteal area into the lateral thigh. She is currently on Plavix due to recent cardiac issues. She cannot come off this for at least 6 months. She was unable to complete her last lumbar epidural steroid injection due to being started on blood thinners in the interim. She also complains of difficulty ambulating due to hip pain. She denies having any focal weakness. She is frustrated by her level of pain and limitations with being on blood thinners. Pain Assessment  Location of Pain: Back  Location Modifiers: Posterior  Severity of Pain: 4  Quality of Pain: Aching, Throbbing  Duration of Pain: Persistent  Frequency of Pain: Constant  Aggravating Factors: Other (Comment), Bending (Worsens throughout the day / Twisting)  Limiting Behavior: Yes  Relieving Factors:  (N/A)  Result of Injury: No  Work-Related Injury: No  Are there other pain locations you wish to document?: No    Associated signs and symptoms:   Neurogenic bowel or bladder symptoms:  no   Perceived weakness:  no   Difficulty walking:  yes              Past Medical History:   Past Medical History:   Diagnosis Date    Acid reflux     Bilateral carpal tunnel syndrome 12/21/2017    CAD in native artery 06/19/2018    Mid LAD stented with 3.5 x 15 mm Xience BRIAN.  EF 55%    Chicken pox     Chronic back pain     CKD (chronic kidney disease) 4/15/2016    Depression 12/7/2010    Hypertension     Measles     Morbid obesity with BMI of 45.0-49.9, adult ([facility] [address] 1/29/2015    Osteoarthritis Hypertension Sister     Emphysema Sister     Hypertension Brother    [facility] Rheum Arthritis Brother     Rheum Arthritis Sister        REVIEW OF SYSTEMS:   CONSTITUTIONAL: Denies unexplained weight loss, fevers, chills or fatigue  NEUROLOGICAL: Denies unsteady gait or progressive weakness  MUSCULOSKELETAL: Denies joint swelling or redness  GI: Denies nausea, vomiting, diarrhea   : Denies bowel or bladder issues       PHYSICAL EXAM:    Vitals: Blood pressure 134/75, height 5' 3\" (1.6 m), weight 253 lb (114.8 kg), last menstrual period 07/13/2011, not currently breastfeeding. GENERAL EXAM:  · General Apparence: Patient is adequately groomed with no evidence of malnutrition. · Psychiatric: Orientation: The patient is oriented to time, place and person. The patient's mood and affect are appropriate   · Vascular: Examination reveals no swelling and palpation reveals no tenderness in upper or lower extremities. Good capillary refill. · Sensation is intact without deficit in the upper and lower extremities to light touch and pinprick  · Coordination of the upper and lower extremities are normal.      LUMBAR/SACRAL EXAMINATION:  · Inspection: Local inspection shows no step-off or bruising. Lumbar alignment is normal. No instability is noted. · Palpation:   No evidence of tenderness at the midline. Positive for tenderness over the right lumbar facets and over the right SI joint  There is no paraspinal spasm. · Range of Motion: Increased pain with facet loading to the right side  · Strength:   Strength testing is 5/5 in all muscle groups tested. · Special Tests:   Straight leg raise and crossed SLR negative. positive for Kwan's testing on the right side, positive for Gaenslen's testing on the right side  · Skin: There are no rashes, ulcerations or lesions. · Reflexes: Reflexes are symmetrically 2+ at the patellar and ankle tendons. Clonus absent bilaterally at the feet.   · Gait & station: antalgic on the 3:29:46 PM       Impression:       1. Spondylosis without myelopathy or radiculopathy, lumbar region    2. Sacroiliac joint dysfunction of right side    3. Greater trochanteric bursitis of both hips        Plan:  Clinical Course: are worsening    1. Medications:  She was encouraged to reduce her Tylenol intake  2. PT:  Encouraged to continue with HEP. 3. Further studies:  No further studies. 4. Interventional:  We discussed proceeding with lumbar facet blocks at the right L4 5, L5-S1 level as well as a right SI joint. Risks include but aren't limited to bleeding, infection, increased pain, lack pain relief and nerve injury. She verbalized understanding would like to proceed   After risks benefits alternatives discussed a right greater trochanter bursa injection was undertaken the bedside. The skin overlying the right hip was prepped with ChloraPrep ×3. A mixture of 40 mg of Kenalog with 3 ml of 0.5% Marcaine was drawn up and instilled over the most tender point of the right greater trochanter with a 22-gauge needle. The needle was removed after the mixture was instilled and a Band-Aid was applied. The identical procedure was repeated on the left side    5. Follow up:  4-6 weeks          Sury Francis MD, CRESENCIO, [facility]  Board Certified in [address] [place] [address]  Certified and Fellowship Trained in [facility] ([facility])             This dictation was performed with a verbal recognition program [facility]) and it was checked for errors. It is possible that there are still dictated errors within this office note. If so, please bring any errors to my attention for an addendum. All efforts were made to ensure that this office note is accurate.

## 2018-08-09 NOTE — TELEPHONE ENCOUNTER
Patient seen in the office today. Ordered IL, TF and SI joint injections. Patient has new heart stint x 1 month and placed on plavix. Per Dr. Lorrie Bolden, standard-patient can not be taken off of plavix or any other blood thinner for 1 year following stint.

## 2018-08-09 NOTE — TELEPHONE ENCOUNTER
Per Dr. Ze Shin, patients injections are facet and si joint. Patient does not need to hold plavix for these. Patient will be scheduled.

## 2018-08-10 RX ORDER — VALSARTAN 160 MG/1
160 TABLET ORAL DAILY
Qty: 90 TABLET | Refills: 2 | Status: ON HOLD | OUTPATIENT
Start: 2018-08-10 | End: 2018-08-21

## 2018-08-10 NOTE — TELEPHONE ENCOUNTER
Antibacterial creams are over the counter.  Polysporin ointment recommended, can use a thin film twice a day for 10 days

## 2018-08-15 ENCOUNTER — TELEPHONE (OUTPATIENT)
Dept: ORTHOPEDIC SURGERY | Age: 58
End: 2018-08-15

## 2018-08-15 NOTE — TELEPHONE ENCOUNTER
DOS   8/21/18  CPT   37709   [id number]   98993  93263.26  [id number]   NPR  DX   M47.816   M53.3   M70.61  M70.62  OP SX AUTH  [id number]  VALID  8/21/18 PER   WEBSITE  RIGHT  LEVELS    L4 - L5   L5 - S1    PROCEDURE   FACET INJECTION & SI JOINT INJECTION  DR. Melba Jacobs [address] [place] [address]

## 2018-08-21 ENCOUNTER — TELEPHONE (OUTPATIENT)
Dept: CARDIOLOGY CLINIC | Age: 58
End: 2018-08-21

## 2018-08-21 ENCOUNTER — HOSPITAL ENCOUNTER (OUTPATIENT)
Age: 58
Setting detail: OUTPATIENT SURGERY
Discharge: HOME | End: 2018-08-21
Attending: PHYSICAL MEDICINE & REHABILITATION | Admitting: PHYSICAL MEDICINE & REHABILITATION
Payer: COMMERCIAL

## 2018-08-21 VITALS
DIASTOLIC BLOOD PRESSURE: 78 MMHG | HEIGHT: 63 IN | WEIGHT: 247 LBS | BODY MASS INDEX: 43.77 KG/M2 | HEART RATE: 50 BPM | TEMPERATURE: 97 F | OXYGEN SATURATION: 99 % | RESPIRATION RATE: 16 BRPM | SYSTOLIC BLOOD PRESSURE: 148 MMHG

## 2018-08-21 PROCEDURE — 7100000010 HC PHASE II RECOVERY - FIRST 15 MIN: Performed by: PHYSICAL MEDICINE & REHABILITATION

## 2018-08-21 PROCEDURE — 3600000002 HC SURGERY LEVEL 2 BASE: Performed by: PHYSICAL MEDICINE & REHABILITATION

## 2018-08-21 PROCEDURE — 3600000012 HC SURGERY LEVEL 2 ADDTL 15MIN: Performed by: PHYSICAL MEDICINE & REHABILITATION

## 2018-08-21 PROCEDURE — 6360000002 HC RX W HCPCS: Performed by: PHYSICAL MEDICINE & REHABILITATION

## 2018-08-21 PROCEDURE — 7100000011 HC PHASE II RECOVERY - ADDTL 15 MIN: Performed by: PHYSICAL MEDICINE & REHABILITATION

## 2018-08-21 PROCEDURE — 2500000003 HC RX 250 WO HCPCS: Performed by: PHYSICAL MEDICINE & REHABILITATION

## 2018-08-21 PROCEDURE — 2709999900 HC NON-CHARGEABLE SUPPLY: Performed by: PHYSICAL MEDICINE & REHABILITATION

## 2018-08-21 PROCEDURE — 6360000004 HC RX CONTRAST MEDICATION: Performed by: PHYSICAL MEDICINE & REHABILITATION

## 2018-08-21 RX ORDER — LIDOCAINE HYDROCHLORIDE 10 MG/ML
INJECTION, SOLUTION EPIDURAL; INFILTRATION; INTRACAUDAL; PERINEURAL PRN
Status: DISCONTINUED | OUTPATIENT
Start: 2018-08-21 | End: 2018-08-21 | Stop reason: HOSPADM

## 2018-08-21 ASSESSMENT — PAIN DESCRIPTION - DESCRIPTORS
DESCRIPTORS: ACHING
DESCRIPTORS: OTHER (COMMENT)

## 2018-08-21 ASSESSMENT — PAIN DESCRIPTION - LOCATION: LOCATION: BACK

## 2018-08-21 ASSESSMENT — PAIN SCALES - GENERAL: PAINLEVEL_OUTOF10: 2

## 2018-08-21 ASSESSMENT — PAIN DESCRIPTION - PAIN TYPE: TYPE: CHRONIC PAIN

## 2018-08-21 ASSESSMENT — PAIN - FUNCTIONAL ASSESSMENT: PAIN_FUNCTIONAL_ASSESSMENT: 0-10

## 2018-08-21 NOTE — H&P
HISTORY AND PHYSICAL/PRE-SEDATION ASSESSMENT    Patient:  Ernie Kaba   :  1960  Medical Record No.:  [id number]   Date:  2018  Physician:  Riaz Cobos M.D. Facility: [address] [place] [address] History and Physical reviewed and agreed upon. Additional findings:    Allergies:  Protonix [pantoprazole sodium]    Home Medications:    Prior to Admission medications    Medication Sig Start Date End Date Taking? Authorizing Provider   PARoxetine (PAXIL) 20 MG tablet Take 1 tablet by mouth daily 18  Yes Dorie Holbrook DO   losartan (COZAAR) 50 MG tablet Take 1 tablet by mouth daily 18  Yes ROMEO Saavedra CNP   clopidogrel (PLAVIX) 75 MG tablet Take 1 tablet by mouth daily 7/10/18  Yes ROMEO Saavedra CNP   aspirin 81 MG chewable tablet Take 1 tablet by mouth daily 18  Yes ROMEO Saavedra CNP   atorvastatin (LIPITOR) 40 MG tablet Take 1 tablet by mouth nightly 18  Yes ROMEO Saavedra CNP   diphenoxylate-atropine (LOMOTIL) 2.5-0.025 MG per tablet  3/24/18  Yes Historical Provider, MD   Multiple Vitamin (MULTI-VITAMIN DAILY PO) Take by mouth daily    Yes Historical Provider, MD   citalopram (CELEXA) 40 MG tablet Take 1 tablet by mouth daily 10/18/17  Yes Dorie Holbrook DO   famotidine (PEPCID) 10 MG tablet Take 10 mg by mouth daily   Yes Historical Provider, MD   Ascorbic Acid (VITAMIN C CR) 1000 MG TBCR Take by mouth daily   Yes Historical Provider, MD       Vitals: Stable       PHYSICAL EXAM:  HENT: Airway patent and reviewed  Cardiovascular: Normal rate, regular rhythm, normal heart sounds. Pulmonary/Chest: No wheezes. No rhonchi. No rales. Abdominal: Soft. Bowel sounds are normal. No distension. ASA CLASS:         []   I. Normal, healthy adult           [x]   II.  Mild systemic disease            []   III.   Severe systemic disease      Sedation plan:   [x]  Local              []  Minimal                  []  General

## 2018-08-21 NOTE — TELEPHONE ENCOUNTER
Spoke with pt. Pt has been on Lomotil for about 2 years. Her HR trend, per Epic flowsheet, has been 50-60s since 6/2018. ECGs dating back to 6/2016 have shown mostly SB with HR ranging from 47-55. Pt is not on BB. She denies lightheadedness, dizziness, fatigue, or presyncope/syncope. Encouraged pt to occasionally check HR, keep log, and bring to appts. Advised to call our office if she develops any of the above symptoms. Pt verbalized understanding and was not aware of her h/o lower HR.

## 2018-08-28 ENCOUNTER — OFFICE VISIT (OUTPATIENT)
Dept: ORTHOPEDIC SURGERY | Age: 58
End: 2018-08-28

## 2018-08-28 VITALS
BODY MASS INDEX: 43.77 KG/M2 | DIASTOLIC BLOOD PRESSURE: 76 MMHG | WEIGHT: 247 LBS | HEIGHT: 63 IN | SYSTOLIC BLOOD PRESSURE: 135 MMHG

## 2018-08-28 DIAGNOSIS — M53.3 SACROILIAC JOINT DYSFUNCTION OF RIGHT SIDE: ICD-10-CM

## 2018-08-28 DIAGNOSIS — M43.16 SPONDYLOLISTHESIS OF LUMBAR REGION: ICD-10-CM

## 2018-08-28 DIAGNOSIS — M47.816 SPONDYLOSIS OF LUMBAR REGION WITHOUT MYELOPATHY OR RADICULOPATHY: Primary | ICD-10-CM

## 2018-08-28 PROCEDURE — 1036F TOBACCO NON-USER: CPT | Performed by: PHYSICAL MEDICINE & REHABILITATION

## 2018-08-28 PROCEDURE — 3017F COLORECTAL CA SCREEN DOC REV: CPT | Performed by: PHYSICAL MEDICINE & REHABILITATION

## 2018-08-28 PROCEDURE — G8427 DOCREV CUR MEDS BY ELIG CLIN: HCPCS | Performed by: PHYSICAL MEDICINE & REHABILITATION

## 2018-08-28 PROCEDURE — 99213 OFFICE O/P EST LOW 20 MIN: CPT | Performed by: PHYSICAL MEDICINE & REHABILITATION

## 2018-08-28 PROCEDURE — G8598 ASA/ANTIPLAT THER USED: HCPCS | Performed by: PHYSICAL MEDICINE & REHABILITATION

## 2018-08-28 PROCEDURE — G8417 CALC BMI ABV UP PARAM F/U: HCPCS | Performed by: PHYSICAL MEDICINE & REHABILITATION

## 2018-08-28 NOTE — PROGRESS NOTES
Follow up: VARINDER Cerda  1960  [id number]      CHIEF COMPLAINT:    Chief Complaint   Patient presents with    Lower Back Pain     f/u Right L4/5 & L5/S1 Facet and Right SI INJ 08/10/18, states she has substantial relief following the injections, she has a mild twinge of pain with certaing positions         HISTORY OF PRESENT ILLNESS:  Ms. Nehemiah Sifuentes is a 60 y.o. female returns for a follow up visit for multiple medical problems. Her current presenting problems are   1. Spondylosis of lumbar region without myelopathy or radiculopathy    2. Sacroiliac joint dysfunction of right side    3. Spondylolisthesis of lumbar region    . As per information/history obtained from the PADT(patient assessment and documentation tool) - She complains of pain in the lower back. She rates the pain 1/10 and describes it as aching. Pain is made worse by: certain positions when laying / sitting. She denies side effects from the current pain regimen. Patient reports that since the last follow up visit the physical functioning is better, family/social relationships are unchanged, mood is better and sleep patterns are unchanged, and that the overall functioning is better. Patient denies neurological bowel or bladder. She reports having 85% pain relief following lumbar facet injections the right L4 5 and L5-S1 as well as a right SI joint on 8/10/2018. Functionally she can perform of her activities (adls and iadls) without any problems. She also reports that she is overall much better mood as her pain has improved. She no longer has any left leg pain or any significant back pain. Certain twisting positions due to produce a twinge of pain.         Associated signs and symptoms:   Neurogenic bowel or bladder symptoms:  no   Perceived weakness:  no   Difficulty walking:  no              Past Medical History:   Past Medical History:   Diagnosis Date    Acid reflux     Bilateral carpal tunnel syndrome 12/21/2017  CAD in native artery 06/19/2018    Mid LAD stented with 3.5 x 15 mm Xience BRIAN. EF 55%    Chicken pox     Chronic back pain     CKD (chronic kidney disease) 4/15/2016    Depression 12/7/2010    Hypertension     Measles     Morbid obesity with BMI of 45.0-49.9, adult ([facility] [address] 1/29/2015    Osteoarthritis     Pure hypercholesterolemia       Past Surgical History:     Past Surgical History:   Procedure Laterality Date    CARPAL TUNNEL RELEASE Left 01/29/2018    JOINT REPLACEMENT  1986,1958    KNEES BILATERAL    NE NJX DX/THER SBST INTRLMNR CRV/THRC W/IMG GDN Right 8/21/2018    RIGHT LUMBAR FOUR/FIVE, LUMBAR FIVE/ SACRAL ONE FACET INJECTION AND RIGHT SACROILIAC JOINT INJECTION SITES CONFIRMED BY FLUOROSCOPY performed by Jorje Munoz MD at [address] [facility] ARTHROSCOPY Right 08/22/2017    TONSILLECTOMY AND ADENOIDECTOMY  1964     Current Medications:     Current Outpatient Prescriptions:     PARoxetine (PAXIL) 20 MG tablet, Take 1 tablet by mouth daily, Disp: 30 tablet, Rfl: 5    losartan (COZAAR) 50 MG tablet, Take 1 tablet by mouth daily, Disp: 90 tablet, Rfl: 3    clopidogrel (PLAVIX) 75 MG tablet, Take 1 tablet by mouth daily, Disp: 90 tablet, Rfl: 3    aspirin 81 MG chewable tablet, Take 1 tablet by mouth daily, Disp: 90 tablet, Rfl: 3    atorvastatin (LIPITOR) 40 MG tablet, Take 1 tablet by mouth nightly, Disp: 90 tablet, Rfl: 3    diphenoxylate-atropine (LOMOTIL) 2.5-0.025 MG per tablet, , Disp: , Rfl:     Multiple Vitamin (MULTI-VITAMIN DAILY PO), Take by mouth daily , Disp: , Rfl:     citalopram (CELEXA) 40 MG tablet, Take 1 tablet by mouth daily, Disp: 90 tablet, Rfl: 2    famotidine (PEPCID) 10 MG tablet, Take 10 mg by mouth daily, Disp: , Rfl:     Ascorbic Acid (VITAMIN C CR) 1000 MG TBCR, Take by mouth daily, Disp: , Rfl:   Allergies:  Protonix [pantoprazole sodium]  Social History:    reports that she quit smoking about 33 years ago. Her smoking use included Cigarettes. crossed SLR negative  · Skin: There are no rashes, ulcerations or lesions. · Reflexes: Reflexes are symmetrically 1+ at the patellar and ankle tendons. Clonus absent bilaterally at the feet. · Gait & station: normal, patient ambulates without assistance  · Additional Examinations:  · RIGHT LOWER EXTREMITY: Inspection/examination of the right lower extremity does not show any tenderness, deformity or injury. Range of motion is normal and pain-free. There is no gross instability. There are no rashes, ulcerations or lesions. Strength and tone are normal. No atrophy or abnormal movements are noted. · LEFT LOWER EXTREMITY:  Inspection/examination of the left lower extremity does not show any tenderness, deformity or injury. Range of motion is normal and pain-free. There is no gross instability. There are no rashes, ulcerations or lesions. Strength and tone are normal. No atrophy or abnormal movements are noted. Diagnostic Testing:    MR Lumbar spine shows 5/18/18  Mild degenerative anterolisthesis at L4-5, resulting in significant   subarticular effacement and likely descending L5 nerve root impingement. There is mild to moderate L4 neural foraminal narrowing bilaterally, and mild   spinal canal stenosis at this level.        Results for orders placed or performed during the hospital encounter of 73/96/58   Basic Metabolic Panel w/ Reflex to MG   Result Value Ref Range    Sodium 143 136 - 145 mmol/L    Potassium reflex Magnesium 4.1 3.5 - 5.1 mmol/L    Chloride 106 99 - 110 mmol/L    CO2 23 21 - 32 mmol/L    Anion Gap 14 3 - 16    Glucose 106 (H) 70 - 99 mg/dL    BUN 32 (H) 7 - 20 mg/dL    CREATININE 1.1 0.6 - 1.1 mg/dL    GFR Non- 51 (A) >60    GFR African American >60 >60    Calcium 9.2 8.3 - 10.6 mg/dL   CBC   Result Value Ref Range    WBC 5.4 4.0 - 11.0 K/uL    RBC 4.55 4.00 - 5.20 M/uL    Hemoglobin 13.5 12.0 - 16.0 g/dL    Hematocrit 40.2 36.0 - 48.0 %    MCV 88.4 80.0 - 100.0 fL    [facility]

## 2018-09-05 ENCOUNTER — OFFICE VISIT (OUTPATIENT)
Dept: OBGYN CLINIC | Age: 58
End: 2018-09-05

## 2018-09-05 VITALS
HEART RATE: 59 BPM | TEMPERATURE: 98.3 F | WEIGHT: 253.25 LBS | HEIGHT: 62 IN | SYSTOLIC BLOOD PRESSURE: 138 MMHG | BODY MASS INDEX: 46.6 KG/M2 | DIASTOLIC BLOOD PRESSURE: 74 MMHG

## 2018-09-05 DIAGNOSIS — Z12.39 BREAST CANCER SCREENING: ICD-10-CM

## 2018-09-05 DIAGNOSIS — Z01.419 ENCNTR FOR GYN EXAM (GENERAL) (ROUTINE) W/O ABN FINDINGS: Primary | ICD-10-CM

## 2018-09-05 DIAGNOSIS — Z12.4 PAP SMEAR FOR CERVICAL CANCER SCREENING: ICD-10-CM

## 2018-09-05 DIAGNOSIS — E66.01 MORBID OBESITY WITH BMI OF 45.0-49.9, ADULT (HCC): ICD-10-CM

## 2018-09-05 DIAGNOSIS — Z98.61 S/P PTCA (PERCUTANEOUS TRANSLUMINAL CORONARY ANGIOPLASTY): ICD-10-CM

## 2018-09-05 DIAGNOSIS — N95.1 HOT FLASHES DUE TO MENOPAUSE: ICD-10-CM

## 2018-09-05 DIAGNOSIS — I25.10 CAD IN NATIVE ARTERY: ICD-10-CM

## 2018-09-05 DIAGNOSIS — N95.2 VAGINAL ATROPHY: ICD-10-CM

## 2018-09-05 PROCEDURE — 99386 PREV VISIT NEW AGE 40-64: CPT | Performed by: OBSTETRICS & GYNECOLOGY

## 2018-09-05 ASSESSMENT — ENCOUNTER SYMPTOMS
VOMITING: 0
NAUSEA: 0
DIARRHEA: 0
CONSTIPATION: 0
BACK PAIN: 1
SORE THROAT: 0
COUGH: 0
ABDOMINAL PAIN: 0
SHORTNESS OF BREATH: 0
EYE DISCHARGE: 0

## 2018-09-05 NOTE — PROGRESS NOTES
Annual Exam      CC:   Chief Complaint   Patient presents with    Annual Exam       HPI:  60 y.o. Susan Pulido presents for her gynecologic annual exam.    Patient seen and examined. Patient is interested in HRT, had a stent and angioplasty placed 1.5 months ago and Cardiology said no to HRT. Dr. Soni Bath placed on Paxil and hot flashes have decreased from 3-4 times a day to 3 times a week. Patient became menopausal at age 51. Patient reports one episode of bleeding about 5 years ago with negative evaluation. No bleeding since that time. Reports vaginal dryness and associated dyspareunia, uses pure romance silicone like based lubricant with success. Health Maintenance:  Birth control: Postmenopausal  Pregnancy plans: None  Safe relationship: Yes -  to , Toro Santiago, for 37 years this November. Monogamous    Screening:  Last pap smear: Unknown - possible  - records not available. History of abnormal pap smears: Denies   Mammogram: 2016  Colonoscopy: Patient reports in 2017 with Dr. Danielle Matta - needs yearly   DEXA scan: N/A      Review of Systems:   Review of Systems   Constitutional: Negative for chills and fever. HENT: Negative for congestion and sore throat. Eyes: Negative for discharge and visual disturbance. Respiratory: Negative for cough and shortness of breath. Cardiovascular: Negative for chest pain and palpitations. Gastrointestinal: Negative for abdominal pain, constipation, diarrhea, nausea and vomiting. Genitourinary: Negative for dysuria, frequency, menstrual problem, pelvic pain and vaginal discharge. Musculoskeletal: Positive for back pain. Skin: Negative for rash. Neurological: Negative for dizziness and headaches. Hematological: Adenopathy: On Plavix. Bruises/bleeds easily.    Psychiatric/Behavioral:        Increased stress and depression - controlled with Celexa       Primary Care Physician: Michael Adan DO    Obstetric History  OB History    Para Term  AB Living   3 3 3         SAB TAB Ectopic Molar Multiple Live Births             3      # Outcome Date GA Lbr Tae/2nd Weight Sex Delivery Anes PTL Lv   3 Term 93 40w0d  6 lb 6 oz (2.892 kg) F Vag-Spont      2 Term 02/15/89 40w0d  6 lb 9 oz (2.977 kg) M Vag-Spont      1 Term 10/28/85 40w0d  6 lb 4 oz (2.835 kg) F Vag-Spont             Gynecologic History  Menstrual History:   LMP:    Age of Menarche: 10   Menstrual Period: regular   Interval Between Menses: 28 days   Duration of Menses: 2-3 days   Menstrual Flow: Light    Postmenopausal Bleeding: None currently, prior negative workup    Sexual History:   Contraception: see above   Currently is sexually active   One partner last 37 years   None history of STIs   Reports sexual problems - vaginal dryness and dyspareunia - improved with lubricant    Pap History:   History of abnormal pap smears: see above   Last pap: see above      Medical History:  Past Medical History:   Diagnosis Date    Acid reflux     Bilateral carpal tunnel syndrome 2017    CAD in native artery 2018    Mid LAD stented with 3.5 x 15 mm Xience BRIAN.  EF 55%    Chicken pox     Chronic back pain     CKD (chronic kidney disease) 4/15/2016    Depression 2010    Heart disease     Hypertension     Measles     Menopausal symptoms     Morbid obesity with BMI of 45.0-49.9, adult ([facility] [address] 2015    Osteoarthritis     Overactive bladder     Pure hypercholesterolemia     Stress incontinence        Medications:  Current Outpatient Prescriptions   Medication Sig Dispense Refill    PARoxetine (PAXIL) 20 MG tablet Take 1 tablet by mouth daily 30 tablet 5    losartan (COZAAR) 50 MG tablet Take 1 tablet by mouth daily 90 tablet 3    clopidogrel (PLAVIX) 75 MG tablet Take 1 tablet by mouth daily 90 tablet 3    aspirin 81 MG chewable tablet Take 1 tablet by mouth daily 90 tablet 3    atorvastatin (LIPITOR) 40 MG tablet Take 1 tablet by mouth nightly 90 tablet 3    Multiple Vitamin (MULTI-VITAMIN DAILY PO) Take by mouth daily       citalopram (CELEXA) 40 MG tablet Take 1 tablet by mouth daily 90 tablet 2    famotidine (PEPCID) 10 MG tablet Take 10 mg by mouth daily      Ascorbic Acid (VITAMIN C CR) 1000 MG TBCR Take by mouth daily      diphenoxylate-atropine (LOMOTIL) 2.5-0.025 MG per tablet        No current facility-administered medications for this visit. Surgical History:  Past Surgical History:   Procedure Laterality Date    CARPAL TUNNEL RELEASE Left 01/29/2018    JOINT REPLACEMENT  [id number]    KNEES BILATERAL    VT NJX DX/THER SBST INTRLMNR CRV/THRC W/IMG GDN Right 8/21/2018    RIGHT LUMBAR FOUR/FIVE, LUMBAR FIVE/ SACRAL ONE FACET INJECTION AND RIGHT SACROILIAC JOINT INJECTION SITES CONFIRMED BY FLUOROSCOPY performed by Riaz Cobos MD at [address] [facility] ARTHROSCOPY Right 08/22/2017    TONSILLECTOMY AND ADENOIDECTOMY  1964       Allergies:   Allergies   Allergen Reactions    Protonix [Pantoprazole Sodium] Diarrhea       Family History:  Family History   Problem Relation Age of Onset    High Blood Pressure Mother     Rheum Arthritis Mother     Cancer Father     Hypertension Father     Colon Cancer Father     High Blood Pressure Brother     Stroke Brother     Heart Defect Brother     Hypertension Sister     Rheum Arthritis Sister     Emphysema Sister     Rheum Arthritis Sister     Other Sister         bottom of lungs are getting hard    Hypertension Brother     Rheum Arthritis Brother     Heart Attack Paternal Grandfather     No Known Problems Paternal Grandmother     No Known Problems Maternal Grandmother     No Known Problems Maternal Grandfather     Heart Attack Maternal Aunt     No Known Problems Maternal Aunt     Brain Cancer Maternal Aunt     Other Maternal Aunt         Complications from surgery    Arthritis Maternal Aunt     Arthritis Maternal Aunt     Heart Disease Maternal Aunt gallop and no friction rub. No murmur heard. Pulmonary/Chest: Effort normal. No respiratory distress. She has no wheezes. Right breast exhibits no mass, no nipple discharge, no skin change and no tenderness. Left breast exhibits no mass, no nipple discharge, no skin change and no tenderness. Abdominal: Soft. She exhibits no distension and no mass. There is no tenderness. There is no rebound and no guarding. Genitourinary: There is no tenderness or lesion on the right labia. There is no tenderness or lesion on the left labia. Uterus is not deviated, not enlarged, not fixed and not tender. Cervix exhibits no motion tenderness, no discharge and no friability. Right adnexum displays no mass, no tenderness and no fullness. Left adnexum displays no mass, no tenderness and no fullness. No erythema or tenderness in the vagina. No vaginal discharge found. Musculoskeletal: She exhibits no edema. Neurological: She is alert and oriented to person, place, and time. Skin: Skin is warm and dry. Psychiatric: She has a normal mood and affect. Vitals reviewed. Assessment/Plan:  60 y.o.  presenting for her annual exam:    1. Encntr for gyn exam (general) (routine) w/o abn findings     - PAP SMEAR today     - Will call with results      - Discussed age based screening recommendations     - Plan for annual exam in 1 year    2. Pap smear for cervical cancer screening     - PAP SMEAR    3. Breast cancer screening     - [facility] DIGITAL SCREEN W OR WO CAD BILATERAL; Future    4. Vaginal atrophy     - Currently using lubricant     - Recommended vaginal moisturizers    5. Hot flashes due to menopause     - Current improvement with Paxil      - No HRT per Cardiology - will continue Paxil and follow up in  3 months to re-evaluate symptoms     6. CAD in native artery    7. S/P PTCA (percutaneous transluminal coronary angioplasty)    8.  Morbid obesity with BMI of 45.0-49.9, adult ([facility] [address]         Milton Clark DO

## 2018-09-06 ASSESSMENT — ENCOUNTER SYMPTOMS
VOMITING: 0
EYE REDNESS: 0
CONSTIPATION: 0
SHORTNESS OF BREATH: 0
NAUSEA: 0
DIARRHEA: 0

## 2018-09-06 NOTE — PROGRESS NOTES
09/07/18      Enedina Islas  See HPI for CC    HPI  Painful mole on back  New  And CC of leg cramps  Chronic intermittent  Getting worse  Happens several times a night  Tried nothing for it  Not improving with time  Outpatient Prescriptions Marked as Taking for the 9/7/18 encounter (Office Visit) with Tavares Nathaniel, DO   Medication Sig Dispense Refill    PARoxetine (PAXIL) 20 MG tablet Take 1 tablet by mouth daily 30 tablet 5    losartan (COZAAR) 50 MG tablet Take 1 tablet by mouth daily 90 tablet 3    clopidogrel (PLAVIX) 75 MG tablet Take 1 tablet by mouth daily 90 tablet 3    aspirin 81 MG chewable tablet Take 1 tablet by mouth daily 90 tablet 3    atorvastatin (LIPITOR) 40 MG tablet Take 1 tablet by mouth nightly 90 tablet 3    Multiple Vitamin (MULTI-VITAMIN DAILY PO) Take by mouth daily       citalopram (CELEXA) 40 MG tablet Take 1 tablet by mouth daily 90 tablet 2    famotidine (PEPCID) 10 MG tablet Take 10 mg by mouth daily      Ascorbic Acid (VITAMIN C CR) 1000 MG TBCR Take by mouth daily             Past Medical History:   Diagnosis Date    Acid reflux     Bilateral carpal tunnel syndrome 12/21/2017    CAD in native artery 06/19/2018    Mid LAD stented with 3.5 x 15 mm Xience BRIAN. EF 55%    Chicken pox     Chronic back pain     CKD (chronic kidney disease) 4/15/2016    Depression 12/7/2010    Heart disease     Hypertension     Measles     Menopausal symptoms     Morbid obesity with BMI of 45.0-49.9, [facility] 1/29/2015    Osteoarthritis     Overactive bladder     Pure hypercholesterolemia     Stress incontinence      Social History     Social History    Marital status:      Spouse name: N/A    Number of children: N/A    Years of education: N/A     Occupational History    Not on file.      Social History Main Topics    Smoking status: Former Smoker     Packs/day: 0.50     Years: 10.00     Types: Cigarettes     Quit date: 1/1/1985    Smokeless tobacco: Never Used

## 2018-09-07 ENCOUNTER — OFFICE VISIT (OUTPATIENT)
Dept: FAMILY MEDICINE CLINIC | Age: 58
End: 2018-09-07

## 2018-09-07 VITALS
BODY MASS INDEX: 46.74 KG/M2 | HEART RATE: 55 BPM | HEIGHT: 62 IN | OXYGEN SATURATION: 93 % | WEIGHT: 254 LBS | DIASTOLIC BLOOD PRESSURE: 92 MMHG | SYSTOLIC BLOOD PRESSURE: 144 MMHG

## 2018-09-07 DIAGNOSIS — R03.0 ELEVATED BLOOD PRESSURE READING: ICD-10-CM

## 2018-09-07 DIAGNOSIS — L91.8 INFLAMED ACROCHORDON: Primary | ICD-10-CM

## 2018-09-07 DIAGNOSIS — G47.62 NOCTURNAL LEG CRAMPS: ICD-10-CM

## 2018-09-07 LAB
HPV COMMENT: NORMAL
HPV TYPE 16: NOT DETECTED
HPV TYPE 18: NOT DETECTED
HPVOH (OTHER TYPES): NOT DETECTED
MAGNESIUM: 2.1 MG/DL (ref 1.8–2.4)
POTASSIUM SERPL-SCNC: 4.1 MMOL/L (ref 3.5–5.1)

## 2018-09-07 PROCEDURE — 99214 OFFICE O/P EST MOD 30 MIN: CPT | Performed by: FAMILY MEDICINE

## 2018-09-07 PROCEDURE — G8598 ASA/ANTIPLAT THER USED: HCPCS | Performed by: FAMILY MEDICINE

## 2018-09-07 PROCEDURE — G8417 CALC BMI ABV UP PARAM F/U: HCPCS | Performed by: FAMILY MEDICINE

## 2018-09-07 PROCEDURE — 1036F TOBACCO NON-USER: CPT | Performed by: FAMILY MEDICINE

## 2018-09-07 PROCEDURE — 3017F COLORECTAL CA SCREEN DOC REV: CPT | Performed by: FAMILY MEDICINE

## 2018-09-07 PROCEDURE — G8427 DOCREV CUR MEDS BY ELIG CLIN: HCPCS | Performed by: FAMILY MEDICINE

## 2018-09-07 ASSESSMENT — PATIENT HEALTH QUESTIONNAIRE - PHQ9
SUM OF ALL RESPONSES TO PHQ9 QUESTIONS 1 & 2: 0
SUM OF ALL RESPONSES TO PHQ QUESTIONS 1-9: 0
2. FEELING DOWN, DEPRESSED OR HOPELESS: 0
1. LITTLE INTEREST OR PLEASURE IN DOING THINGS: 0
SUM OF ALL RESPONSES TO PHQ QUESTIONS 1-9: 0

## 2018-09-07 NOTE — PATIENT INSTRUCTIONS
Eat a banana a day for potassium or other potassium rich foods:Bananas and other potassium rich foods such as beans, dark leafy greens, potatoes, squash, yogurt, fish, avocados, mushrooms. Take a multivitamin with magnesium, over the counter, one daily  Drink glass of tonic water each evening (contains quinine). Massage the cramps with your hands. Stretch the muscle in order to stop the cramp in action. Pull the toes up and back toward the knees to stretch out a cramping calf or lower leg. Patient Education        Nighttime Leg Cramps: Care Instructions  Your Care Instructions    Nighttime leg cramps happen when a leg muscle tightens up suddenly. This most often happens in the calf. But cramps in the thigh or foot are also common. Cramps often occur just as you fall asleep or wake up. Leg cramps can be painful. They can last a few seconds to a few minutes. Though they are common, experts don't know exactly what causes them. To treat muscle cramps, you can stretch and massage the muscle. If cramps keep coming back, your doctor may prescribe medicine that relaxes your muscles. Follow-up care is a key part of your treatment and safety. Be sure to make and go to all appointments, and call your doctor if you are having problems. It's also a good idea to know your test results and keep a list of the medicines you take. How can you care for yourself at home? · To stop a leg cramp, sit down and straighten your leg as you bend your foot up toward your knee. It may help to place a rolled towel under the ball of your foot and, while you hold the towel at both ends, gently pull the towel toward you while you keep your knee straight. This stretches the calf muscles. The cramp usually goes away after a few minutes. · Take a warm shower or bath to relax the muscle. Some people find that a heating pad placed on the muscle can also help. Others get relief by rubbing the calf with an ice pack.   · Stretch your muscles

## 2018-09-10 ENCOUNTER — OFFICE VISIT (OUTPATIENT)
Dept: SURGERY | Age: 58
End: 2018-09-10

## 2018-09-10 VITALS
HEART RATE: 68 BPM | BODY MASS INDEX: 46.45 KG/M2 | SYSTOLIC BLOOD PRESSURE: 99 MMHG | DIASTOLIC BLOOD PRESSURE: 82 MMHG | HEIGHT: 62 IN | WEIGHT: 252.4 LBS

## 2018-09-10 DIAGNOSIS — L98.9 SKIN LESION OF BACK: Primary | ICD-10-CM

## 2018-09-10 PROCEDURE — 11200 RMVL SKIN TAGS UP TO&INC 15: CPT | Performed by: SURGERY

## 2018-09-10 PROCEDURE — G8427 DOCREV CUR MEDS BY ELIG CLIN: HCPCS | Performed by: SURGERY

## 2018-09-10 PROCEDURE — 3017F COLORECTAL CA SCREEN DOC REV: CPT | Performed by: SURGERY

## 2018-09-10 PROCEDURE — 96372 THER/PROPH/DIAG INJ SC/IM: CPT | Performed by: SURGERY

## 2018-09-10 PROCEDURE — 99243 OFF/OP CNSLTJ NEW/EST LOW 30: CPT | Performed by: SURGERY

## 2018-09-10 PROCEDURE — G8417 CALC BMI ABV UP PARAM F/U: HCPCS | Performed by: SURGERY

## 2018-09-10 PROCEDURE — 11100 PR BIOPSY OF SKIN LESION: CPT | Performed by: SURGERY

## 2018-09-12 ENCOUNTER — TELEPHONE (OUTPATIENT)
Dept: CARDIOLOGY CLINIC | Age: 58
End: 2018-09-12

## 2018-09-13 DIAGNOSIS — R06.02 SOB (SHORTNESS OF BREATH): Primary | ICD-10-CM

## 2018-09-13 PROBLEM — L98.9 SKIN LESION OF BACK: Status: ACTIVE | Noted: 2018-09-13

## 2018-09-13 NOTE — PROGRESS NOTES
Department of General Surgery Consult    PATIENT NAME: Sonia Islas   YOB: 1960    ADMISSION DATE: No admission date for patient encounter. TODAY'S DATE: 9/10/18    Reason for Consult:  Skin tags of back    Chief Complaint: tender    Requesting Physician:  Kristy Byers    HISTORY OF PRESENT ILLNESS:              The patient is a 60 y.o. female who presents with three skin lesions. Has noticed for a while with some increase in size and tenderness of the central lesion. No drainage. No bleeding. Past Medical History:        Diagnosis Date    Acid reflux     Bilateral carpal tunnel syndrome 12/21/2017    CAD in native artery 06/19/2018    Mid LAD stented with 3.5 x 15 mm Xience BRIAN. EF 55%    Chicken pox     Chronic back pain     CKD (chronic kidney disease) 4/15/2016    Depression 12/7/2010    Heart disease     Hypertension     Measles     Menopausal symptoms     Morbid obesity with BMI of 45.0-49.9, adult ([address] 1/29/2015    Osteoarthritis     Overactive bladder     Pure hypercholesterolemia     Stress incontinence        Past Surgical History:        Procedure Laterality Date    CARPAL TUNNEL RELEASE Left 01/29/2018    JOINT REPLACEMENT  2009,2010    KNEES BILATERAL    MI NJX DX/THER SBST INTRLMNR CRV/THRC W/IMG GDN Right 8/21/2018    RIGHT LUMBAR FOUR/FIVE, LUMBAR FIVE/ SACRAL ONE FACET INJECTION AND RIGHT SACROILIAC JOINT INJECTION SITES CONFIRMED BY FLUOROSCOPY performed by Baron Murguia MD at [address] [place] [address] ARTHROSCOPY Right 08/22/2017    TONSILLECTOMY AND ADENOIDECTOMY  1964       Current Medications:   No current facility-administered medications for this visit. Prior to Admission medications    Medication Sig Start Date End Date Taking?  Authorizing Provider   PARoxetine (PAXIL) 20 MG tablet Take 1 tablet by mouth daily 7/25/18  Yes Renny Velasquez DO   losartan (COZAAR) 50 MG tablet Take 1 tablet by mouth daily 7/23/18  Yes Brayan Easley Maternal Uncle     Parkinsonism Maternal Uncle     Heart Attack Paternal Aunt     Breast Cancer Paternal Aunt     Dementia Paternal Aunt     Heart Attack Paternal Uncle     Heart Attack Paternal Uncle     Brain Cancer Maternal Uncle        REVIEW OF SYSTEMS:  CONSTITUTIONAL:  negative  HEENT:  negative  RESPIRATORY:  negative  CARDIOVASCULAR:  negative  GASTROINTESTINAL:  negative   GENITOURINARY:  negative  HEMATOLOGIC/LYMPHATIC:  negative  NEUROLOGICAL:  Negative  * All other ROS reviewed and negative. PHYSICAL EXAM:  VITALS:  BP 99/82 (Site: Right Wrist, Position: Sitting, Cuff Size: Medium Adult)   Pulse 68   Ht 5' 2\" (1.575 m)   Wt 252 lb 6.4 oz (114.5 kg)   LMP 07/13/2011   BMI 46.16 kg/m²   24HR INTAKE/OUTPUT:    [unfilled]  @UserTesting@    CONSTITUTIONAL:  alert, no apparent distress and morbidly obese  EYES:  PERRL, sclera clear  ENT:  Normocepalic,atraumatic, without obvious abnormality  NECK:  supple, symmetrical, trachea midline  LUNGS: Resp effort easy and unlabored, no crackles or wheezing  CARDIOVASCULAR:  NO JVD, regular rate and rhythm and no murmur noted  ABDOMEN:  , normal bowel sounds, soft, non-distended, non-tender, voluntary guarding absent, no masses palpated   MUSCULOSKELETAL: No clubbing or cyanosis, 0+ pitting edema lower extremities  NEUROLOGIC:  Mental Status Exam:  Level of Alertness:   awake  PSYCHIATRIC:   person, place, time  SKIN:  Two skin tags of central back about 0.5cm, central possible small skin tag vs epidermial cyst about 0.5cm    DATA:    CBC: No results for input(s): WBC, HGB, HCT, PLT in the last 72 hours. BMP:  No results for input(s): NA, K, CL, CO2, BUN, CREATININE, GLUCOSE in the last 72 hours. Hepatic: No results for input(s): AST, ALT, ALB, BILITOT, ALKPHOS in the last 72 hours. Mag:    No results for input(s): MG in the last 72 hours. Phos:   No results for input(s): PHOS in the last 72 hours.    INR: No results for input(s): INR in the

## 2018-09-17 ENCOUNTER — TELEPHONE (OUTPATIENT)
Dept: CARDIOLOGY CLINIC | Age: 58
End: 2018-09-17

## 2018-09-17 ENCOUNTER — OFFICE VISIT (OUTPATIENT)
Dept: FAMILY MEDICINE CLINIC | Age: 58
End: 2018-09-17

## 2018-09-17 VITALS
SYSTOLIC BLOOD PRESSURE: 146 MMHG | BODY MASS INDEX: 46.74 KG/M2 | OXYGEN SATURATION: 98 % | WEIGHT: 254 LBS | DIASTOLIC BLOOD PRESSURE: 88 MMHG | HEART RATE: 65 BPM | HEIGHT: 62 IN

## 2018-09-17 DIAGNOSIS — L30.9 DERMATITIS: Primary | ICD-10-CM

## 2018-09-17 PROCEDURE — G8417 CALC BMI ABV UP PARAM F/U: HCPCS | Performed by: NURSE PRACTITIONER

## 2018-09-17 PROCEDURE — 99213 OFFICE O/P EST LOW 20 MIN: CPT | Performed by: NURSE PRACTITIONER

## 2018-09-17 PROCEDURE — 1036F TOBACCO NON-USER: CPT | Performed by: NURSE PRACTITIONER

## 2018-09-17 PROCEDURE — 3017F COLORECTAL CA SCREEN DOC REV: CPT | Performed by: NURSE PRACTITIONER

## 2018-09-17 PROCEDURE — G8598 ASA/ANTIPLAT THER USED: HCPCS | Performed by: NURSE PRACTITIONER

## 2018-09-17 PROCEDURE — G8427 DOCREV CUR MEDS BY ELIG CLIN: HCPCS | Performed by: NURSE PRACTITIONER

## 2018-09-17 RX ORDER — METHYLPREDNISOLONE 4 MG/1
TABLET ORAL
Qty: 1 KIT | Refills: 0 | Status: SHIPPED | OUTPATIENT
Start: 2018-09-17 | End: 2018-10-12 | Stop reason: ALTCHOICE

## 2018-09-17 ASSESSMENT — ENCOUNTER SYMPTOMS
SHORTNESS OF BREATH: 0
WHEEZING: 0
COLOR CHANGE: 0
CHOKING: 0
COUGH: 0
STRIDOR: 0

## 2018-09-17 NOTE — TELEPHONE ENCOUNTER
Pt is calling to report that she has had a rash on her neck since 9/14    Went to PCP was prescribed a steroid dose pac    Is scheduled for a stress test wed  9/19    Can she still take the stress test    Please call to advise

## 2018-09-17 NOTE — PATIENT INSTRUCTIONS
Medrol dose pack (oral steroid taper)- you should avoid taking NSAIDs while on this medication (ex: ibuprofen, aleve, aspirin). Tylenol is OK to take.    Update in one week

## 2018-09-17 NOTE — PROGRESS NOTES
High Blood Pressure Brother     Stroke Brother     Heart Defect Brother     Hypertension Sister     Rheum Arthritis Sister     Emphysema Sister     Rheum Arthritis Sister     Other Sister         bottom of lungs are getting hard    Hypertension Brother     Rheum Arthritis Brother     Heart Attack Paternal Grandfather     No Known Problems Paternal Grandmother     No Known Problems Maternal Grandmother     No Known Problems Maternal Grandfather     Heart Attack Maternal Aunt     No Known Problems Maternal Aunt     Brain Cancer Maternal Aunt     Other Maternal Aunt         Complications from surgery    Arthritis Maternal Aunt     Arthritis Maternal Aunt     Heart Disease Maternal Aunt     High Blood Pressure Maternal Aunt     Cancer Maternal Uncle     Heart Disease Maternal Uncle     Heart Attack Maternal Uncle     High Blood Pressure Maternal Uncle     Heart Attack Maternal Uncle     High Blood Pressure Maternal Uncle     Parkinsonism Maternal Uncle     Heart Attack Paternal Aunt     Breast Cancer Paternal Aunt     Dementia Paternal Aunt     Heart Attack Paternal Uncle     Heart Attack Paternal Uncle     Brain Cancer Maternal Uncle        Review of Systems   Constitutional: Negative for diaphoresis, fatigue, fever and unexpected weight change. Respiratory: Negative for cough, choking, shortness of breath, wheezing and stridor. Cardiovascular: Negative for chest pain, palpitations and leg swelling. Skin: Positive for rash. Negative for color change and pallor. Physical Exam   Constitutional: She is oriented to person, place, and time. She appears well-developed and well-nourished. No distress. HENT:   Head: Normocephalic and atraumatic. Cardiovascular: Normal rate, regular rhythm and normal heart sounds. Exam reveals no gallop and no friction rub. No murmur heard. Pulmonary/Chest: Effort normal and breath sounds normal. No respiratory distress.  She has no

## 2018-09-18 ENCOUNTER — TELEPHONE (OUTPATIENT)
Dept: SURGERY | Age: 58
End: 2018-09-18

## 2018-09-19 ENCOUNTER — HOSPITAL ENCOUNTER (OUTPATIENT)
Dept: NUCLEAR MEDICINE | Age: 58
Discharge: HOME | End: 2018-09-19
Payer: COMMERCIAL

## 2018-09-19 ENCOUNTER — HOSPITAL ENCOUNTER (OUTPATIENT)
Dept: NON INVASIVE DIAGNOSTICS | Age: 58
Discharge: HOME | End: 2018-09-19
Payer: COMMERCIAL

## 2018-09-19 DIAGNOSIS — R06.02 SOB (SHORTNESS OF BREATH): ICD-10-CM

## 2018-09-19 LAB
LV EF: 62 %
LVEF MODALITY: NORMAL

## 2018-09-19 PROCEDURE — 2580000003 HC RX 258: Performed by: INTERNAL MEDICINE

## 2018-09-19 PROCEDURE — 78452 HT MUSCLE IMAGE SPECT MULT: CPT

## 2018-09-19 PROCEDURE — 6360000002 HC RX W HCPCS: Performed by: INTERNAL MEDICINE

## 2018-09-19 PROCEDURE — A9502 TC99M TETROFOSMIN: HCPCS | Performed by: INTERNAL MEDICINE

## 2018-09-19 PROCEDURE — 93017 CV STRESS TEST TRACING ONLY: CPT

## 2018-09-19 PROCEDURE — 3430000000 HC RX DIAGNOSTIC RADIOPHARMACEUTICAL: Performed by: INTERNAL MEDICINE

## 2018-09-19 RX ORDER — 0.9 % SODIUM CHLORIDE 0.9 %
10 VIAL (ML) INJECTION 2 TIMES DAILY
Status: DISCONTINUED | OUTPATIENT
Start: 2018-09-19 | End: 2018-09-20 | Stop reason: HOSPADM

## 2018-09-19 RX ADMIN — TETROFOSMIN 10 MILLICURIE: 1.38 INJECTION, POWDER, LYOPHILIZED, FOR SOLUTION INTRAVENOUS at 09:19

## 2018-09-19 RX ADMIN — SODIUM CHLORIDE 10 ML: 9 INJECTION, SOLUTION INTRAMUSCULAR; INTRAVENOUS; SUBCUTANEOUS at 10:32

## 2018-09-19 RX ADMIN — REGADENOSON 0.4 MG: 0.08 INJECTION, SOLUTION INTRAVENOUS at 10:32

## 2018-09-19 RX ADMIN — TETROFOSMIN 30 MILLICURIE: 1.38 INJECTION, POWDER, LYOPHILIZED, FOR SOLUTION INTRAVENOUS at 10:32

## 2018-09-19 RX ADMIN — SODIUM CHLORIDE 10 ML: 9 INJECTION, SOLUTION INTRAMUSCULAR; INTRAVENOUS; SUBCUTANEOUS at 09:19

## 2018-09-20 ENCOUNTER — OFFICE VISIT (OUTPATIENT)
Dept: SURGERY | Age: 58
End: 2018-09-20

## 2018-09-20 VITALS
WEIGHT: 255 LBS | HEIGHT: 62 IN | SYSTOLIC BLOOD PRESSURE: 156 MMHG | DIASTOLIC BLOOD PRESSURE: 95 MMHG | HEART RATE: 59 BPM | BODY MASS INDEX: 46.93 KG/M2

## 2018-09-20 DIAGNOSIS — L98.9 SKIN LESION OF BACK: Primary | ICD-10-CM

## 2018-09-20 PROCEDURE — 99024 POSTOP FOLLOW-UP VISIT: CPT | Performed by: SURGERY

## 2018-10-01 ENCOUNTER — TELEPHONE (OUTPATIENT)
Dept: FAMILY MEDICINE CLINIC | Age: 58
End: 2018-10-01

## 2018-10-12 ENCOUNTER — OFFICE VISIT (OUTPATIENT)
Dept: CARDIOLOGY CLINIC | Age: 58
End: 2018-10-12
Payer: COMMERCIAL

## 2018-10-12 VITALS
DIASTOLIC BLOOD PRESSURE: 80 MMHG | WEIGHT: 256.4 LBS | HEIGHT: 63 IN | SYSTOLIC BLOOD PRESSURE: 130 MMHG | BODY MASS INDEX: 45.43 KG/M2 | HEART RATE: 62 BPM | OXYGEN SATURATION: 96 %

## 2018-10-12 DIAGNOSIS — I25.10 CORONARY ARTERY DISEASE INVOLVING NATIVE CORONARY ARTERY OF NATIVE HEART WITHOUT ANGINA PECTORIS: ICD-10-CM

## 2018-10-12 DIAGNOSIS — E78.00 PURE HYPERCHOLESTEROLEMIA: ICD-10-CM

## 2018-10-12 DIAGNOSIS — R06.02 SOB (SHORTNESS OF BREATH): Primary | ICD-10-CM

## 2018-10-12 DIAGNOSIS — I10 ESSENTIAL HYPERTENSION: ICD-10-CM

## 2018-10-12 PROCEDURE — 1036F TOBACCO NON-USER: CPT | Performed by: NURSE PRACTITIONER

## 2018-10-12 PROCEDURE — 99214 OFFICE O/P EST MOD 30 MIN: CPT | Performed by: NURSE PRACTITIONER

## 2018-10-12 PROCEDURE — G8417 CALC BMI ABV UP PARAM F/U: HCPCS | Performed by: NURSE PRACTITIONER

## 2018-10-12 PROCEDURE — G8484 FLU IMMUNIZE NO ADMIN: HCPCS | Performed by: NURSE PRACTITIONER

## 2018-10-12 PROCEDURE — 3017F COLORECTAL CA SCREEN DOC REV: CPT | Performed by: NURSE PRACTITIONER

## 2018-10-12 PROCEDURE — G8598 ASA/ANTIPLAT THER USED: HCPCS | Performed by: NURSE PRACTITIONER

## 2018-10-12 PROCEDURE — G8427 DOCREV CUR MEDS BY ELIG CLIN: HCPCS | Performed by: NURSE PRACTITIONER

## 2018-10-12 PROCEDURE — 93000 ELECTROCARDIOGRAM COMPLETE: CPT | Performed by: NURSE PRACTITIONER

## 2018-10-12 RX ORDER — M-VIT,TX,IRON,MINS/CALC/FOLIC 27MG-0.4MG
1 TABLET ORAL DAILY
COMMUNITY
End: 2021-07-26

## 2018-10-12 NOTE — PROGRESS NOTES
CC/HPI:  60 y.o. with CAD, HTN, HLD and CKD who is here for follow up after Nuc stress test. She continues to have SOB with exertion an occasional fluttering in her chest. She denies angina, orthopnea, LH/dizzines, or syncope. No change in edema. No fevers, cough, n/v/d or GI/ bleeding. She is walking/exercising and dieting. Past Medical History:   Diagnosis Date    Acid reflux     Bilateral carpal tunnel syndrome 12/21/2017    CAD in native artery 06/19/2018    Mid LAD stented with 3.5 x 15 mm Xience BRIAN.  EF 55%    Chicken pox     Chronic back pain     CKD (chronic kidney disease) 4/15/2016    Depression 12/7/2010    Heart disease     Hypertension     Measles     Menopausal symptoms     Morbid obesity with BMI of 45.0-49.9, adult ([facility] [address] 1/29/2015    Osteoarthritis     Overactive bladder     Pure hypercholesterolemia     Stress incontinence      Past Surgical History:   Procedure Laterality Date    CARPAL TUNNEL RELEASE Left 01/29/2018    JOINT REPLACEMENT  2009,2010    KNEES BILATERAL    CT NJX DX/THER SBST INTRLMNR CRV/THRC W/IMG GDN Right 8/21/2018    RIGHT LUMBAR FOUR/FIVE, LUMBAR FIVE/ SACRAL ONE FACET INJECTION AND RIGHT SACROILIAC JOINT INJECTION SITES CONFIRMED BY FLUOROSCOPY performed by Dale Villegas MD at [address] [facility] ARTHROSCOPY Right 08/22/2017    TONSILLECTOMY AND ADENOIDECTOMY  1964     Family History   Problem Relation Age of Onset    High Blood Pressure Mother     Rheum Arthritis Mother     Cancer Father     Hypertension Father     Colon Cancer Father     High Blood Pressure Brother     Stroke Brother     Heart Defect Brother     Hypertension Sister     Rheum Arthritis Sister     Emphysema Sister     Rheum Arthritis Sister     Other Sister         bottom of lungs are getting hard    Hypertension Brother     Rheum Arthritis Brother     Heart Attack Paternal Grandfather     No Known Problems Paternal Grandmother     No Known Problems

## 2018-11-26 ENCOUNTER — OFFICE VISIT (OUTPATIENT)
Dept: ORTHOPEDIC SURGERY | Age: 58
End: 2018-11-26
Payer: COMMERCIAL

## 2018-11-26 VITALS
WEIGHT: 255 LBS | HEIGHT: 63 IN | HEART RATE: 86 BPM | SYSTOLIC BLOOD PRESSURE: 152 MMHG | BODY MASS INDEX: 45.18 KG/M2 | DIASTOLIC BLOOD PRESSURE: 88 MMHG

## 2018-11-26 DIAGNOSIS — M79.672 LEFT FOOT PAIN: Primary | ICD-10-CM

## 2018-11-26 PROCEDURE — L4361 PNEUMA/VAC WALK BOOT PRE OTS: HCPCS | Performed by: PHYSICIAN ASSISTANT

## 2018-11-26 PROCEDURE — 3017F COLORECTAL CA SCREEN DOC REV: CPT | Performed by: PHYSICIAN ASSISTANT

## 2018-11-26 PROCEDURE — G8427 DOCREV CUR MEDS BY ELIG CLIN: HCPCS | Performed by: PHYSICIAN ASSISTANT

## 2018-11-26 PROCEDURE — 1036F TOBACCO NON-USER: CPT | Performed by: PHYSICIAN ASSISTANT

## 2018-11-26 PROCEDURE — 99213 OFFICE O/P EST LOW 20 MIN: CPT | Performed by: PHYSICIAN ASSISTANT

## 2018-11-26 PROCEDURE — G8484 FLU IMMUNIZE NO ADMIN: HCPCS | Performed by: PHYSICIAN ASSISTANT

## 2018-11-26 PROCEDURE — G8417 CALC BMI ABV UP PARAM F/U: HCPCS | Performed by: PHYSICIAN ASSISTANT

## 2018-11-26 PROCEDURE — G8598 ASA/ANTIPLAT THER USED: HCPCS | Performed by: PHYSICIAN ASSISTANT

## 2018-11-26 RX ORDER — METHYLPREDNISOLONE 4 MG/1
TABLET ORAL
Qty: 1 KIT | Refills: 0 | Status: SHIPPED | OUTPATIENT
Start: 2018-11-26 | End: 2018-12-27

## 2018-11-27 ENCOUNTER — TELEPHONE (OUTPATIENT)
Dept: ORTHOPEDIC SURGERY | Age: 58
End: 2018-11-27

## 2018-12-05 ENCOUNTER — OFFICE VISIT (OUTPATIENT)
Dept: FAMILY MEDICINE CLINIC | Age: 58
End: 2018-12-05
Payer: COMMERCIAL

## 2018-12-05 VITALS
OXYGEN SATURATION: 93 % | HEART RATE: 85 BPM | HEIGHT: 63 IN | SYSTOLIC BLOOD PRESSURE: 136 MMHG | BODY MASS INDEX: 45.54 KG/M2 | WEIGHT: 257 LBS | TEMPERATURE: 98.2 F | DIASTOLIC BLOOD PRESSURE: 80 MMHG

## 2018-12-05 DIAGNOSIS — J11.1 BRONCHITIS WITH FLU: ICD-10-CM

## 2018-12-05 DIAGNOSIS — R68.89 FLU-LIKE SYMPTOMS: Primary | ICD-10-CM

## 2018-12-05 DIAGNOSIS — R05.9 COUGH IN ADULT: ICD-10-CM

## 2018-12-05 LAB
INFLUENZA A ANTIBODY: NORMAL
INFLUENZA B ANTIBODY: NORMAL

## 2018-12-05 PROCEDURE — G8417 CALC BMI ABV UP PARAM F/U: HCPCS | Performed by: INTERNAL MEDICINE

## 2018-12-05 PROCEDURE — G8427 DOCREV CUR MEDS BY ELIG CLIN: HCPCS | Performed by: INTERNAL MEDICINE

## 2018-12-05 PROCEDURE — G8484 FLU IMMUNIZE NO ADMIN: HCPCS | Performed by: INTERNAL MEDICINE

## 2018-12-05 PROCEDURE — G8598 ASA/ANTIPLAT THER USED: HCPCS | Performed by: INTERNAL MEDICINE

## 2018-12-05 PROCEDURE — 99213 OFFICE O/P EST LOW 20 MIN: CPT | Performed by: INTERNAL MEDICINE

## 2018-12-05 PROCEDURE — 1036F TOBACCO NON-USER: CPT | Performed by: INTERNAL MEDICINE

## 2018-12-05 PROCEDURE — 87804 INFLUENZA ASSAY W/OPTIC: CPT | Performed by: INTERNAL MEDICINE

## 2018-12-05 PROCEDURE — 3017F COLORECTAL CA SCREEN DOC REV: CPT | Performed by: INTERNAL MEDICINE

## 2018-12-05 RX ORDER — OSELTAMIVIR PHOSPHATE 75 MG/1
75 CAPSULE ORAL 2 TIMES DAILY
Qty: 10 CAPSULE | Refills: 0 | Status: SHIPPED | OUTPATIENT
Start: 2018-12-05 | End: 2018-12-10

## 2018-12-05 RX ORDER — DEXTROMETHORPHAN HYDROBROMIDE AND PROMETHAZINE HYDROCHLORIDE 15; 6.25 MG/5ML; MG/5ML
5 SYRUP ORAL 4 TIMES DAILY PRN
Qty: 180 ML | Refills: 0 | Status: SHIPPED | OUTPATIENT
Start: 2018-12-05 | End: 2018-12-12

## 2018-12-05 RX ORDER — AZITHROMYCIN 250 MG/1
250 TABLET, FILM COATED ORAL SEE ADMIN INSTRUCTIONS
Qty: 6 TABLET | Refills: 0 | Status: SHIPPED | OUTPATIENT
Start: 2018-12-05 | End: 2018-12-10

## 2018-12-05 ASSESSMENT — ENCOUNTER SYMPTOMS
COUGH: 1
FLU SYMPTOMS: 1

## 2018-12-05 NOTE — PATIENT INSTRUCTIONS
Patient Education        Influenza (Flu): Care Instructions  Your Care Instructions    Influenza (flu) is an infection in the lungs and breathing passages. It is caused by the influenza virus. There are different strains, or types, of the flu virus from year to year. Unlike the common cold, the flu comes on suddenly and the symptoms, such as a cough, congestion, fever, chills, fatigue, aches, and pains, are more severe. These symptoms may last up to 10 days. Although the flu can make you feel very sick, it usually doesn't cause serious health problems. Home treatment is usually all you need for flu symptoms. But your doctor may prescribe antiviral medicine to prevent other health problems, such as pneumonia, from developing. Older people and those who have a long-term health condition, such as lung disease, are most at risk for having pneumonia or other health problems. Follow-up care is a key part of your treatment and safety. Be sure to make and go to all appointments, and call your doctor if you are having problems. It's also a good idea to know your test results and keep a list of the medicines you take. How can you care for yourself at home? · Get plenty of rest.  · Drink plenty of fluids, enough so that your urine is light yellow or clear like water. If you have kidney, heart, or liver disease and have to limit fluids, talk with your doctor before you increase the amount of fluids you drink. · Take an over-the-counter pain medicine if needed, such as acetaminophen (Tylenol), ibuprofen (Advil, Motrin), or naproxen (Aleve), to relieve fever, headache, and muscle aches. Read and follow all instructions on the label. No one younger than 20 should take aspirin. It has been linked to Reye syndrome, a serious illness. · Do not smoke. Smoking can make the flu worse. If you need help quitting, talk to your doctor about stop-smoking programs and medicines.  These can increase your chances of quitting for you to talk to their doctors about preventing the flu. They may get antiviral medicine to keep from getting the flu from you. · To prevent the flu in the future, get a flu vaccine every fall. Encourage people living with you to get the vaccine. · Cover your mouth when you cough or sneeze. When should you call for help? Call 911 anytime you think you may need emergency care. For example, call if:    · You have severe trouble breathing.    Call your doctor now or seek immediate medical care if:    · You have new or worse trouble breathing.     · You seem to be getting much sicker.     · You feel very sleepy or confused.     · You have a new or higher fever.     · You get a new rash.    Watch closely for changes in your health, and be sure to contact your doctor if:    · You begin to get better and then get worse.     · You are not getting better after 1 week. Where can you learn more? Go to [email or web address] and sign in to your [facility] account. Enter K615 in the [employer] box to learn more about \"Influenza (Flu): Care Instructions. \"     If you do not have an account, please click on the \"Sign Up Now\" link. Current as of: December 6, 2017  Content Version: 11.8  © 9702-0636 Healthwise, Incorporated. Care instructions adapted under license by [facility] [place] ([facility]). If you have questions about a medical condition or this instruction, always ask your healthcare professional. [place] [address] any warranty or liability for your use of this information.

## 2018-12-05 NOTE — PROGRESS NOTES
Subjective:      Patient ID: Evens Morrison is a 60 y.o. female. Came in with flu-like symptoms. Influenza   This is a new problem. Episode onset: past 3 days. The problem occurs daily. The problem has been gradually worsening. Associated symptoms include arthralgias, chills, congestion, coughing, fatigue, a fever, headaches and myalgias. Nothing aggravates the symptoms. Review of Systems   Constitutional: Positive for chills, fatigue and fever. HENT: Positive for congestion. Respiratory: Positive for cough. Musculoskeletal: Positive for arthralgias and myalgias. Neurological: Positive for headaches. Outpatient Prescriptions Marked as Taking for the 12/5/18 encounter (Office Visit) with Rodri Abdi MD   Medication Sig Dispense Refill    methylPREDNISolone (MEDROL, JOSÉ LUIS,) 4 MG tablet Take by mouth.  6 po day one, 5 po day 2, 4 po day 3, 3 po day 4, 2 po day 5, 1 po day 6. 1 kit 0    citalopram (CELEXA) 40 MG tablet TAKE 1 TABLET BY MOUTH EVERY DAY 90 tablet 3    Multiple Vitamins-Minerals (THERAPEUTIC MULTIVITAMIN-MINERALS) tablet Take 1 tablet by mouth daily      PARoxetine Mesylate 7.5 MG CAPS TAKE 1 CAPSULE BY MOUTH DAILY 90 capsule 3    losartan (COZAAR) 50 MG tablet Take 1 tablet by mouth daily 90 tablet 3    clopidogrel (PLAVIX) 75 MG tablet Take 1 tablet by mouth daily 90 tablet 3    aspirin 81 MG chewable tablet Take 1 tablet by mouth daily 90 tablet 3    atorvastatin (LIPITOR) 40 MG tablet Take 1 tablet by mouth nightly 90 tablet 3    Ascorbic Acid (VITAMIN C CR) 1000 MG TBCR Take by mouth daily         Allergies   Allergen Reactions    Protonix [Pantoprazole Sodium] Diarrhea       Social History   Substance Use Topics    Smoking status: Former Smoker     Packs/day: 0.50     Years: 10.00     Types: Cigarettes     Quit date: 1/1/1985    Smokeless tobacco: Never Used    Alcohol use No       Objective:   /80 (Site: Left Upper Arm, Position: Sitting, Cuff

## 2018-12-10 ENCOUNTER — HOSPITAL ENCOUNTER (OUTPATIENT)
Dept: MAMMOGRAPHY | Age: 58
Discharge: HOME | End: 2018-12-10
Payer: COMMERCIAL

## 2018-12-10 DIAGNOSIS — Z12.39 BREAST CANCER SCREENING: ICD-10-CM

## 2018-12-10 PROCEDURE — 77063 BREAST TOMOSYNTHESIS BI: CPT

## 2018-12-11 ENCOUNTER — OFFICE VISIT (OUTPATIENT)
Dept: FAMILY MEDICINE CLINIC | Age: 58
End: 2018-12-11
Payer: COMMERCIAL

## 2018-12-11 VITALS
DIASTOLIC BLOOD PRESSURE: 80 MMHG | WEIGHT: 257 LBS | HEIGHT: 63 IN | HEART RATE: 71 BPM | OXYGEN SATURATION: 97 % | BODY MASS INDEX: 45.54 KG/M2 | SYSTOLIC BLOOD PRESSURE: 130 MMHG

## 2018-12-11 DIAGNOSIS — J20.9 ACUTE BRONCHITIS WITH BRONCHOSPASM: Primary | ICD-10-CM

## 2018-12-11 PROCEDURE — G8427 DOCREV CUR MEDS BY ELIG CLIN: HCPCS | Performed by: INTERNAL MEDICINE

## 2018-12-11 PROCEDURE — G8417 CALC BMI ABV UP PARAM F/U: HCPCS | Performed by: INTERNAL MEDICINE

## 2018-12-11 PROCEDURE — G8598 ASA/ANTIPLAT THER USED: HCPCS | Performed by: INTERNAL MEDICINE

## 2018-12-11 PROCEDURE — 3017F COLORECTAL CA SCREEN DOC REV: CPT | Performed by: INTERNAL MEDICINE

## 2018-12-11 PROCEDURE — 99213 OFFICE O/P EST LOW 20 MIN: CPT | Performed by: INTERNAL MEDICINE

## 2018-12-11 PROCEDURE — G8484 FLU IMMUNIZE NO ADMIN: HCPCS | Performed by: INTERNAL MEDICINE

## 2018-12-11 PROCEDURE — 1036F TOBACCO NON-USER: CPT | Performed by: INTERNAL MEDICINE

## 2018-12-11 RX ORDER — METHYLPREDNISOLONE 4 MG/1
4 TABLET ORAL SEE ADMIN INSTRUCTIONS
Qty: 1 KIT | Refills: 0 | Status: SHIPPED | OUTPATIENT
Start: 2018-12-11 | End: 2018-12-17

## 2018-12-16 NOTE — PROGRESS NOTES
Came in for follow up of uRI    SUBJECTIVE:   Donald Wheeler is a 60 y.o. female who complains of congestion and dry cough for 6 days. She denies a history of chest pain, fevers and weakness and has a history of asthma. Patient denies smoke cigarettes. OBJECTIVE:  She appears well, vital signs are as noted. Ears normal.  Throat and pharynx normal.  Neck supple. No adenopathy in the neck. Nose is congested. Sinuses non tender. The chest has some wheezes, no rales. ASSESSMENT:/plan:    1. Acute bronchitis with bronchospasm    - methylPREDNISolone (MEDROL, JOSÉ LUIS,) 4 MG tablet; Take 1 tablet by mouth See Admin Instructions for 6 days Take with food. Dispense: 1 kit; Refill: 0          . Call or return to clinic prn if these symptoms worsen or fail to improve as anticipated.

## 2018-12-27 ENCOUNTER — OFFICE VISIT (OUTPATIENT)
Dept: FAMILY MEDICINE CLINIC | Age: 58
End: 2018-12-27
Payer: COMMERCIAL

## 2018-12-27 VITALS
OXYGEN SATURATION: 95 % | HEART RATE: 67 BPM | BODY MASS INDEX: 45.54 KG/M2 | SYSTOLIC BLOOD PRESSURE: 130 MMHG | DIASTOLIC BLOOD PRESSURE: 88 MMHG | TEMPERATURE: 97.5 F | WEIGHT: 257 LBS | HEIGHT: 63 IN

## 2018-12-27 DIAGNOSIS — R05.9 COUGH: ICD-10-CM

## 2018-12-27 DIAGNOSIS — J21.9 ACUTE BRONCHIOLITIS DUE TO UNSPECIFIED ORGANISM: Primary | ICD-10-CM

## 2018-12-27 PROCEDURE — 99213 OFFICE O/P EST LOW 20 MIN: CPT | Performed by: NURSE PRACTITIONER

## 2018-12-27 RX ORDER — ALBUTEROL SULFATE 90 UG/1
2 AEROSOL, METERED RESPIRATORY (INHALATION) EVERY 4 HOURS PRN
Qty: 1 INHALER | Refills: 2 | Status: SHIPPED | OUTPATIENT
Start: 2018-12-27 | End: 2020-03-03 | Stop reason: SDUPTHER

## 2018-12-27 RX ORDER — BENZONATATE 100 MG/1
100 CAPSULE ORAL 2 TIMES DAILY PRN
Qty: 20 CAPSULE | Refills: 1 | Status: SHIPPED | OUTPATIENT
Start: 2018-12-27 | End: 2019-01-03

## 2018-12-27 ASSESSMENT — ENCOUNTER SYMPTOMS
WHEEZING: 1
TROUBLE SWALLOWING: 0
EYE REDNESS: 0
CHOKING: 0
EYE DISCHARGE: 0
ABDOMINAL PAIN: 0
SHORTNESS OF BREATH: 1
SORE THROAT: 0
COUGH: 1
SINUS PAIN: 1
VOICE CHANGE: 0
BACK PAIN: 0
SINUS PRESSURE: 1
COLOR CHANGE: 0
APNEA: 0
RHINORRHEA: 1
CHEST TIGHTNESS: 0
EYE PAIN: 0
FACIAL SWELLING: 0
ABDOMINAL DISTENTION: 0
EYE ITCHING: 0

## 2019-01-07 ENCOUNTER — HOSPITAL ENCOUNTER (OUTPATIENT)
Age: 59
Discharge: HOME | End: 2019-01-07
Payer: COMMERCIAL

## 2019-01-07 ENCOUNTER — HOSPITAL ENCOUNTER (OUTPATIENT)
Dept: GENERAL RADIOLOGY | Age: 59
Discharge: HOME | End: 2019-01-07
Payer: COMMERCIAL

## 2019-01-07 ENCOUNTER — OFFICE VISIT (OUTPATIENT)
Dept: PULMONOLOGY | Age: 59
End: 2019-01-07
Payer: COMMERCIAL

## 2019-01-07 ENCOUNTER — TELEPHONE (OUTPATIENT)
Dept: PULMONOLOGY | Age: 59
End: 2019-01-07

## 2019-01-07 VITALS
RESPIRATION RATE: 18 BRPM | SYSTOLIC BLOOD PRESSURE: 124 MMHG | WEIGHT: 257 LBS | HEIGHT: 63 IN | HEART RATE: 73 BPM | OXYGEN SATURATION: 94 % | BODY MASS INDEX: 45.54 KG/M2 | DIASTOLIC BLOOD PRESSURE: 70 MMHG

## 2019-01-07 DIAGNOSIS — R05.3 COUGH, PERSISTENT: ICD-10-CM

## 2019-01-07 DIAGNOSIS — R06.09 DOE (DYSPNEA ON EXERTION): ICD-10-CM

## 2019-01-07 DIAGNOSIS — R05.3 COUGH, PERSISTENT: Primary | ICD-10-CM

## 2019-01-07 PROCEDURE — 99214 OFFICE O/P EST MOD 30 MIN: CPT | Performed by: INTERNAL MEDICINE

## 2019-01-07 PROCEDURE — G8427 DOCREV CUR MEDS BY ELIG CLIN: HCPCS | Performed by: INTERNAL MEDICINE

## 2019-01-07 PROCEDURE — G8598 ASA/ANTIPLAT THER USED: HCPCS | Performed by: INTERNAL MEDICINE

## 2019-01-07 PROCEDURE — G8484 FLU IMMUNIZE NO ADMIN: HCPCS | Performed by: INTERNAL MEDICINE

## 2019-01-07 PROCEDURE — G8417 CALC BMI ABV UP PARAM F/U: HCPCS | Performed by: INTERNAL MEDICINE

## 2019-01-07 PROCEDURE — 71046 X-RAY EXAM CHEST 2 VIEWS: CPT

## 2019-01-07 PROCEDURE — 1036F TOBACCO NON-USER: CPT | Performed by: INTERNAL MEDICINE

## 2019-01-07 PROCEDURE — 3017F COLORECTAL CA SCREEN DOC REV: CPT | Performed by: INTERNAL MEDICINE

## 2019-01-07 RX ORDER — ALBUTEROL SULFATE 2.5 MG/3ML
SOLUTION RESPIRATORY (INHALATION)
Status: DISCONTINUED
Start: 2019-01-07 | End: 2019-01-08 | Stop reason: HOSPADM

## 2019-01-07 RX ORDER — PROMETHAZINE HYDROCHLORIDE AND CODEINE PHOSPHATE 6.25; 1 MG/5ML; MG/5ML
5 SYRUP ORAL EVERY 4 HOURS PRN
Qty: 180 ML | Refills: 0 | Status: SHIPPED | OUTPATIENT
Start: 2019-01-07 | End: 2019-06-18 | Stop reason: SDUPTHER

## 2019-01-07 RX ORDER — LEVOFLOXACIN 500 MG/1
500 TABLET, FILM COATED ORAL DAILY
Qty: 7 TABLET | Refills: 0 | Status: SHIPPED | OUTPATIENT
Start: 2019-01-07 | End: 2019-01-14

## 2019-01-07 RX ORDER — PREDNISONE 20 MG/1
TABLET ORAL
Qty: 30 TABLET | Refills: 0 | Status: SHIPPED | OUTPATIENT
Start: 2019-01-07 | End: 2019-03-25 | Stop reason: ALTCHOICE

## 2019-01-21 RX ORDER — PAROXETINE 7.5 MG/1
1 CAPSULE ORAL DAILY
Qty: 30 CAPSULE | Refills: 5 | Status: SHIPPED | OUTPATIENT
Start: 2019-01-21 | End: 2019-04-29 | Stop reason: ALTCHOICE

## 2019-01-28 ENCOUNTER — OFFICE VISIT (OUTPATIENT)
Dept: CARDIOLOGY CLINIC | Age: 59
End: 2019-01-28
Payer: COMMERCIAL

## 2019-01-28 VITALS
SYSTOLIC BLOOD PRESSURE: 130 MMHG | HEART RATE: 86 BPM | DIASTOLIC BLOOD PRESSURE: 76 MMHG | BODY MASS INDEX: 45.92 KG/M2 | WEIGHT: 259.2 LBS

## 2019-01-28 DIAGNOSIS — I25.10 CAD IN NATIVE ARTERY: ICD-10-CM

## 2019-01-28 DIAGNOSIS — E78.00 PURE HYPERCHOLESTEROLEMIA: ICD-10-CM

## 2019-01-28 DIAGNOSIS — R06.09 DOE (DYSPNEA ON EXERTION): Primary | ICD-10-CM

## 2019-01-28 DIAGNOSIS — E66.01 MORBID OBESITY WITH BMI OF 45.0-49.9, ADULT (HCC): ICD-10-CM

## 2019-01-28 DIAGNOSIS — I10 ESSENTIAL HYPERTENSION: ICD-10-CM

## 2019-01-28 DIAGNOSIS — Z98.61 S/P PTCA (PERCUTANEOUS TRANSLUMINAL CORONARY ANGIOPLASTY): ICD-10-CM

## 2019-01-28 PROCEDURE — G8417 CALC BMI ABV UP PARAM F/U: HCPCS | Performed by: INTERNAL MEDICINE

## 2019-01-28 PROCEDURE — G8598 ASA/ANTIPLAT THER USED: HCPCS | Performed by: INTERNAL MEDICINE

## 2019-01-28 PROCEDURE — 3017F COLORECTAL CA SCREEN DOC REV: CPT | Performed by: INTERNAL MEDICINE

## 2019-01-28 PROCEDURE — G8427 DOCREV CUR MEDS BY ELIG CLIN: HCPCS | Performed by: INTERNAL MEDICINE

## 2019-01-28 PROCEDURE — 1036F TOBACCO NON-USER: CPT | Performed by: INTERNAL MEDICINE

## 2019-01-28 PROCEDURE — 99214 OFFICE O/P EST MOD 30 MIN: CPT | Performed by: INTERNAL MEDICINE

## 2019-01-28 PROCEDURE — G8484 FLU IMMUNIZE NO ADMIN: HCPCS | Performed by: INTERNAL MEDICINE

## 2019-02-01 ENCOUNTER — OFFICE VISIT (OUTPATIENT)
Dept: PULMONOLOGY | Age: 59
End: 2019-02-01
Payer: COMMERCIAL

## 2019-02-01 ENCOUNTER — TELEPHONE (OUTPATIENT)
Dept: PULMONOLOGY | Age: 59
End: 2019-02-01

## 2019-02-01 VITALS
SYSTOLIC BLOOD PRESSURE: 140 MMHG | HEART RATE: 73 BPM | WEIGHT: 263 LBS | BODY MASS INDEX: 46.6 KG/M2 | OXYGEN SATURATION: 98 % | HEIGHT: 63 IN | DIASTOLIC BLOOD PRESSURE: 100 MMHG

## 2019-02-01 DIAGNOSIS — I25.10 CAD S/P PERCUTANEOUS CORONARY ANGIOPLASTY: ICD-10-CM

## 2019-02-01 DIAGNOSIS — Z98.61 CAD S/P PERCUTANEOUS CORONARY ANGIOPLASTY: ICD-10-CM

## 2019-02-01 DIAGNOSIS — R05.3 CHRONIC COUGH: Primary | ICD-10-CM

## 2019-02-01 DIAGNOSIS — K21.9 CHRONIC GERD: ICD-10-CM

## 2019-02-01 PROCEDURE — 99214 OFFICE O/P EST MOD 30 MIN: CPT | Performed by: INTERNAL MEDICINE

## 2019-02-01 PROCEDURE — G8417 CALC BMI ABV UP PARAM F/U: HCPCS | Performed by: INTERNAL MEDICINE

## 2019-02-01 PROCEDURE — 1036F TOBACCO NON-USER: CPT | Performed by: INTERNAL MEDICINE

## 2019-02-01 PROCEDURE — G8484 FLU IMMUNIZE NO ADMIN: HCPCS | Performed by: INTERNAL MEDICINE

## 2019-02-01 PROCEDURE — G8427 DOCREV CUR MEDS BY ELIG CLIN: HCPCS | Performed by: INTERNAL MEDICINE

## 2019-02-01 PROCEDURE — G8598 ASA/ANTIPLAT THER USED: HCPCS | Performed by: INTERNAL MEDICINE

## 2019-02-01 PROCEDURE — 3017F COLORECTAL CA SCREEN DOC REV: CPT | Performed by: INTERNAL MEDICINE

## 2019-02-15 ENCOUNTER — TELEPHONE (OUTPATIENT)
Dept: PULMONOLOGY | Age: 59
End: 2019-02-15

## 2019-02-15 DIAGNOSIS — R05.9 COUGH: Primary | ICD-10-CM

## 2019-02-22 ENCOUNTER — HOSPITAL ENCOUNTER (OUTPATIENT)
Dept: CT IMAGING | Age: 59
Discharge: HOME | End: 2019-02-22
Payer: COMMERCIAL

## 2019-02-22 DIAGNOSIS — R05.9 COUGH: ICD-10-CM

## 2019-02-22 PROCEDURE — 71250 CT THORAX DX C-: CPT

## 2019-02-27 ENCOUNTER — OFFICE VISIT (OUTPATIENT)
Dept: PULMONOLOGY | Age: 59
End: 2019-02-27
Payer: COMMERCIAL

## 2019-02-27 ENCOUNTER — TELEPHONE (OUTPATIENT)
Dept: CARDIOLOGY CLINIC | Age: 59
End: 2019-02-27

## 2019-02-27 VITALS
WEIGHT: 265 LBS | HEART RATE: 67 BPM | BODY MASS INDEX: 46.95 KG/M2 | OXYGEN SATURATION: 96 % | DIASTOLIC BLOOD PRESSURE: 80 MMHG | HEIGHT: 63 IN | SYSTOLIC BLOOD PRESSURE: 100 MMHG

## 2019-02-27 DIAGNOSIS — R05.3 CHRONIC COUGH: ICD-10-CM

## 2019-02-27 DIAGNOSIS — J84.9 ILD (INTERSTITIAL LUNG DISEASE) (HCC): Primary | ICD-10-CM

## 2019-02-27 DIAGNOSIS — J84.9 ILD (INTERSTITIAL LUNG DISEASE) (HCC): ICD-10-CM

## 2019-02-27 LAB
C-REACTIVE PROTEIN: 7.1 MG/L (ref 0–5.1)
RHEUMATOID FACTOR: <10 IU/ML

## 2019-02-27 PROCEDURE — 3017F COLORECTAL CA SCREEN DOC REV: CPT | Performed by: INTERNAL MEDICINE

## 2019-02-27 PROCEDURE — G8417 CALC BMI ABV UP PARAM F/U: HCPCS | Performed by: INTERNAL MEDICINE

## 2019-02-27 PROCEDURE — G8598 ASA/ANTIPLAT THER USED: HCPCS | Performed by: INTERNAL MEDICINE

## 2019-02-27 PROCEDURE — G8427 DOCREV CUR MEDS BY ELIG CLIN: HCPCS | Performed by: INTERNAL MEDICINE

## 2019-02-27 PROCEDURE — G8484 FLU IMMUNIZE NO ADMIN: HCPCS | Performed by: INTERNAL MEDICINE

## 2019-02-27 PROCEDURE — 99214 OFFICE O/P EST MOD 30 MIN: CPT | Performed by: INTERNAL MEDICINE

## 2019-02-27 PROCEDURE — 1036F TOBACCO NON-USER: CPT | Performed by: INTERNAL MEDICINE

## 2019-02-28 LAB
ANA INTERPRETATION: ABNORMAL
ANA PATTERN: ABNORMAL
ANA TITER: ABNORMAL
ANTI-NUCLEAR ANTIBODY (ANA): POSITIVE

## 2019-03-01 LAB
ANCA IFA: NORMAL
ANGIOTENSIN CONVERTING ENZYME: 53 U/L (ref 9–67)
CCP IGG ANTIBODIES: 3 UNITS (ref 0–19)

## 2019-03-02 LAB — IGE: 5 KU/L

## 2019-03-04 LAB
ALLERGEN BEEF: <0.1 KU/L
ALLERGEN PHOMA BETAE: <0.1 KU/L
ALLERGEN PORK: <0.1 KU/L
ALLERGEN SEE NOTE: NORMAL
ALLERGEN, FEATHER MIX: NEGATIVE KU/L
ASPERGILLUS FLAVUS ANTIBODIES: NORMAL
ASPERGILLUS FUMIGATUS #1: NORMAL
ASPERGILLUS FUMIGATUS #2: NORMAL
ASPERGILLUS FUMIGATUS #3: NORMAL
ASPERGILLUS FUMIGATUS #6: NORMAL
AUREOBASIDIUM PULLULANS: NORMAL
MICROPOLYSPORA FAENI: NORMAL
PIGEON SERUM ABS: NORMAL
SACCHAROMONOSPORA VIRIDIS: NORMAL
THERMOACTINOMYCES CANDIDUS AB: NORMAL
THERMOACTINOMYCES SACCHARI: NORMAL
THERMOACTINOMYCES VULGARIS #1: NORMAL

## 2019-03-08 ENCOUNTER — TELEPHONE (OUTPATIENT)
Dept: PULMONOLOGY | Age: 59
End: 2019-03-08

## 2019-03-13 ENCOUNTER — OFFICE VISIT (OUTPATIENT)
Dept: PULMONOLOGY | Age: 59
End: 2019-03-13
Payer: COMMERCIAL

## 2019-03-13 VITALS
OXYGEN SATURATION: 97 % | HEART RATE: 62 BPM | SYSTOLIC BLOOD PRESSURE: 124 MMHG | DIASTOLIC BLOOD PRESSURE: 78 MMHG | HEIGHT: 63 IN | BODY MASS INDEX: 45.79 KG/M2 | WEIGHT: 258.4 LBS

## 2019-03-13 DIAGNOSIS — K21.9 CHRONIC GERD: ICD-10-CM

## 2019-03-13 DIAGNOSIS — R05.3 CHRONIC COUGH: Primary | ICD-10-CM

## 2019-03-13 DIAGNOSIS — J84.9 ILD (INTERSTITIAL LUNG DISEASE) (HCC): ICD-10-CM

## 2019-03-13 PROCEDURE — 3017F COLORECTAL CA SCREEN DOC REV: CPT | Performed by: INTERNAL MEDICINE

## 2019-03-13 PROCEDURE — G8417 CALC BMI ABV UP PARAM F/U: HCPCS | Performed by: INTERNAL MEDICINE

## 2019-03-13 PROCEDURE — G8427 DOCREV CUR MEDS BY ELIG CLIN: HCPCS | Performed by: INTERNAL MEDICINE

## 2019-03-13 PROCEDURE — G8598 ASA/ANTIPLAT THER USED: HCPCS | Performed by: INTERNAL MEDICINE

## 2019-03-13 PROCEDURE — 1036F TOBACCO NON-USER: CPT | Performed by: INTERNAL MEDICINE

## 2019-03-13 PROCEDURE — G8484 FLU IMMUNIZE NO ADMIN: HCPCS | Performed by: INTERNAL MEDICINE

## 2019-03-13 PROCEDURE — 99214 OFFICE O/P EST MOD 30 MIN: CPT | Performed by: INTERNAL MEDICINE

## 2019-03-25 ENCOUNTER — OFFICE VISIT (OUTPATIENT)
Dept: PULMONOLOGY | Age: 59
End: 2019-03-25
Payer: COMMERCIAL

## 2019-03-25 VITALS
HEIGHT: 63 IN | BODY MASS INDEX: 45.71 KG/M2 | OXYGEN SATURATION: 98 % | HEART RATE: 68 BPM | SYSTOLIC BLOOD PRESSURE: 140 MMHG | DIASTOLIC BLOOD PRESSURE: 90 MMHG | WEIGHT: 258 LBS

## 2019-03-25 DIAGNOSIS — K21.9 CHRONIC GERD: ICD-10-CM

## 2019-03-25 DIAGNOSIS — R05.3 CHRONIC COUGH: Primary | ICD-10-CM

## 2019-03-25 DIAGNOSIS — J84.9 ILD (INTERSTITIAL LUNG DISEASE) (HCC): ICD-10-CM

## 2019-03-25 PROCEDURE — G8417 CALC BMI ABV UP PARAM F/U: HCPCS | Performed by: INTERNAL MEDICINE

## 2019-03-25 PROCEDURE — 99214 OFFICE O/P EST MOD 30 MIN: CPT | Performed by: INTERNAL MEDICINE

## 2019-03-25 PROCEDURE — 3017F COLORECTAL CA SCREEN DOC REV: CPT | Performed by: INTERNAL MEDICINE

## 2019-03-25 PROCEDURE — G8598 ASA/ANTIPLAT THER USED: HCPCS | Performed by: INTERNAL MEDICINE

## 2019-03-25 PROCEDURE — 1036F TOBACCO NON-USER: CPT | Performed by: INTERNAL MEDICINE

## 2019-03-25 PROCEDURE — G8427 DOCREV CUR MEDS BY ELIG CLIN: HCPCS | Performed by: INTERNAL MEDICINE

## 2019-03-25 PROCEDURE — G8484 FLU IMMUNIZE NO ADMIN: HCPCS | Performed by: INTERNAL MEDICINE

## 2019-03-25 RX ORDER — PREDNISONE 20 MG/1
TABLET ORAL
Qty: 35 TABLET | Refills: 0 | Status: SHIPPED | OUTPATIENT
Start: 2019-03-25 | End: 2019-04-22

## 2019-04-29 ENCOUNTER — OFFICE VISIT (OUTPATIENT)
Dept: CARDIOLOGY CLINIC | Age: 59
End: 2019-04-29
Payer: COMMERCIAL

## 2019-04-29 VITALS
OXYGEN SATURATION: 94 % | WEIGHT: 256.8 LBS | BODY MASS INDEX: 45.5 KG/M2 | HEART RATE: 71 BPM | HEIGHT: 63 IN | SYSTOLIC BLOOD PRESSURE: 132 MMHG | DIASTOLIC BLOOD PRESSURE: 82 MMHG

## 2019-04-29 DIAGNOSIS — E78.2 MIXED HYPERLIPIDEMIA: ICD-10-CM

## 2019-04-29 DIAGNOSIS — I25.10 CAD IN NATIVE ARTERY: Primary | ICD-10-CM

## 2019-04-29 DIAGNOSIS — R06.09 DOE (DYSPNEA ON EXERTION): ICD-10-CM

## 2019-04-29 DIAGNOSIS — I10 ESSENTIAL HYPERTENSION: ICD-10-CM

## 2019-04-29 PROCEDURE — 1036F TOBACCO NON-USER: CPT | Performed by: NURSE PRACTITIONER

## 2019-04-29 PROCEDURE — 3017F COLORECTAL CA SCREEN DOC REV: CPT | Performed by: NURSE PRACTITIONER

## 2019-04-29 PROCEDURE — G8417 CALC BMI ABV UP PARAM F/U: HCPCS | Performed by: NURSE PRACTITIONER

## 2019-04-29 PROCEDURE — G8598 ASA/ANTIPLAT THER USED: HCPCS | Performed by: NURSE PRACTITIONER

## 2019-04-29 PROCEDURE — 99214 OFFICE O/P EST MOD 30 MIN: CPT | Performed by: NURSE PRACTITIONER

## 2019-04-29 PROCEDURE — G8427 DOCREV CUR MEDS BY ELIG CLIN: HCPCS | Performed by: NURSE PRACTITIONER

## 2019-04-29 RX ORDER — FAMOTIDINE 10 MG
10 TABLET ORAL 2 TIMES DAILY
COMMUNITY
End: 2019-05-03 | Stop reason: SDUPTHER

## 2019-04-29 RX ORDER — CITALOPRAM 40 MG/1
40 TABLET ORAL DAILY
COMMUNITY
End: 2019-10-16

## 2019-04-29 NOTE — PROGRESS NOTES
CC/HPI:  60 y.o. patient of Dr. Ketty Mancia wthi CAD, HTN, HLD and chronic SOB who is here for follow up. She is seeing Dr Solomon Habermann for a chronic cough and TAVERAS. She's had a cough since Thanksgiving. She c/o fatigue and occasional LH/dizziness. Denies cp, orthopnea, palpitations or syncope. No LE edema, weight gain or GI/ bleeding. Tolerating medications. Past Medical History:   Diagnosis Date    Acid reflux     Bilateral carpal tunnel syndrome 12/21/2017    CAD in native artery 06/19/2018    Mid LAD stented with 3.5 x 15 mm Xience BRIAN.  EF 55%    Chicken pox     Chronic back pain     CKD (chronic kidney disease) 4/15/2016    Depression 12/7/2010    Heart disease     Hypertension     Measles     Menopausal symptoms     Morbid obesity with BMI of 45.0-49.9, adult ([facility] [address] 1/29/2015    Osteoarthritis     Overactive bladder     Pure hypercholesterolemia     Stress incontinence      Past Surgical History:   Procedure Laterality Date    CARPAL TUNNEL RELEASE Left 01/29/2018    JOINT REPLACEMENT  2009,2010    KNEES BILATERAL    MA NJX DX/THER SBST INTRLMNR CRV/THRC W/IMG GDN Right 8/21/2018    RIGHT LUMBAR FOUR/FIVE, LUMBAR FIVE/ SACRAL ONE FACET INJECTION AND RIGHT SACROILIAC JOINT INJECTION SITES CONFIRMED BY FLUOROSCOPY performed by Ingris Camacho MD at [address] [facility] ARTHROSCOPY Right 08/22/2017    TONSILLECTOMY AND ADENOIDECTOMY  1964     Family History   Problem Relation Age of Onset    High Blood Pressure Mother     Rheum Arthritis Mother     Cancer Father     Hypertension Father     Colon Cancer Father     High Blood Pressure Brother     Stroke Brother     Heart Defect Brother     Hypertension Sister     Rheum Arthritis Sister     Emphysema Sister     Rheum Arthritis Sister     Other Sister         bottom of lungs are getting hard    Hypertension Brother     Rheum Arthritis Brother     Heart Attack Paternal Grandfather     No Known Problems Paternal Grandmother     No Known Problems Maternal Grandmother     No Known Problems Maternal Grandfather     Heart Attack Maternal Aunt     No Known Problems Maternal Aunt     Brain Cancer Maternal Aunt     Other Maternal Aunt         Complications from surgery    Arthritis Maternal Aunt     Arthritis Maternal Aunt     Heart Disease Maternal Aunt     High Blood Pressure Maternal Aunt     Cancer Maternal Uncle     Heart Disease Maternal Uncle     Heart Attack Maternal Uncle     High Blood Pressure Maternal Uncle     Heart Attack Maternal Uncle     High Blood Pressure Maternal Uncle     Parkinsonism Maternal Uncle     Heart Attack Paternal Aunt     Breast Cancer Paternal Aunt     Dementia Paternal Aunt     Heart Attack Paternal Uncle     Heart Attack Paternal Uncle     Brain Cancer Maternal Uncle      Social History     Tobacco Use    Smoking status: Former Smoker     Packs/day: 0.50     Years: 10.00     Pack years: 5.00     Types: Cigarettes     Last attempt to quit: 1985     Years since quittin.3    Smokeless tobacco: Never Used   Substance Use Topics    Alcohol use: No    Drug use: No     Allergies:Protonix [pantoprazole sodium]    Review of Systems  General: No changes in weight, fatigue, or night sweats. HEENT: No blurry or decreased vision. No changes in hearing, nasal discharge or sore throat. Cardiovascular:  See HPI. Respiratory: No cough, hemoptysis, or wheezing. No history of asthma. +TAVERAS  Gastrointestinal:  No abdominal pain, hematochezia, melana, constipation, diarrhea, or history of GI ulcers. Genito-Urinary: No dysuria or hematuria. No urgency or polyuria. Musculoskeletal:  No complaints of joint pain, joint swelling or muscular weakness/soreness. Neurological:  No dizziness, headaches, numbness/tingling, speech problems or weakness. No history of a stroke or TIA. Psychological:  No anxiety or depression.   Hematological and Lymphatic: No abnormal bleeding or bruising, blood clots, jaundice or swollen lymph nodes. Endocrine:   No malaise/lethargy, palpitations, polydipsia/polyuria, temperature intolerance or unexpected weight changes  Skin:  No rashes or non-healing ulcers. Physical Exam:  LMP 07/13/2011  Last menstrual period 07/13/2011, not currently breastfeeding. Vitals:    04/29/19 1256   BP: 132/82   Pulse: 71   SpO2: 94%     General:  Alert and oriented. No acute distress. Appears comfortable. HEENT:  Normocephalic. No trauma. EOMI. Neck:  Supple, no JVD  Cardiovascular: RRR  Pulses: 2+ carotid no bruit   Lungs:  Clear to auscultation. No rales, wheezes, or rhonchi. Abd:  Soft, non-tender, non-distended. No peritoneal signs. Ext:  No clubbing, cyanosis, or edema. Neuro:  CN's 2-12 grossly in tact. Gait normal.  Motor and sensory exams grossly normal.  Skin:  No rashes or skin breakdown.     CBC:   Lab Results   Component Value Date    WBC 6.6 06/20/2018    HGB 14.6 06/20/2018    HCT 43.9 06/20/2018    MCV 88.0 06/20/2018     06/20/2018     BMP:  Lab Results   Component Value Date    CREATININE 1.3 (H) 06/20/2018    BUN 34 (H) 06/20/2018     06/20/2018    K 4.1 09/07/2018    CL 99 06/20/2018    CO2 23 06/20/2018     Mag:   Lab Results   Component Value Date    MG 2.10 09/07/2018     LIVER PROFILE:   Lab Results   Component Value Date    ALT 21 04/25/2018    AST 24 04/25/2018    ALKPHOS 69 04/25/2018    BILITOT 0.4 04/25/2018     PT/INR:   Lab Results   Component Value Date    INR 1.02 06/19/2018    PROTIME 11.6 06/19/2018     BNP:  No results found for: BNP  LIPIDS:  No components found for: CHLPL  Lab Results   Component Value Date    TRIG 350 (H) 04/15/2016    TRIG 256 (H) 10/14/2015     Lab Results   Component Value Date    HDL 43 04/15/2016    HDL 43 10/14/2015     Lab Results   Component Value Date    LDLCALC see below 04/15/2016    LDLCALC 140 (H) 10/14/2015     Lab Results   Component Value Date    LABVLDL see below 04/15/2016    LABVLDL 51 10/14/2015 TSH:  Lab Results   Component Value Date    TSH 1.86 2018       IMAGIN/22/2019 CT chest:     Bilateral and fairly symmetric reticulation most pronounced about the lower lobes with immediate subpleural sparing may relate to interstitial pneumonitis and may relate to nonspecific interstitial pneumonitis. Findings may be present in connective    tissue disease including SLE, autoimmune disease including rheumatoid arthritis as well as other etiologies including hypersensitivity pneumonitis, drug-induced pneumonitis as well as other causes.       No pulmonary fibrosis.       No lobar consolidation. 10/12/2018 ECG: Sinus, no ischemic abnl/changes    2018 Nuc stress:     1. Abnormal myocardial perfusion study with moderate perfusion abnormality along the anterior wall and lateral wall on both rest images and stress images suggesting remote infarct with scarring. Similar findings may represent attenuation artifact as    well. No evidence for pharmacologically-induced ischemia. 2. Normal wall motion and ejection fraction of 62%. 2018 Cath: Angiographic Findings:  Left Main:  Normal  Left Anterior Descending:  Mid 60-70% hazy stenosis. iFR was 0.87. Circumflex:  Dominant. No obstructive plaque  Right Coronary:  Non-dominant. No obstructive plaque  Left Ventriculogram:  LVEF 50-55%. 2018 Nuc stress:      Pharmacologic stress induced reversible ischemia anterior and   anterolateral walls     2018 Echo:   Normal left ventricular systolic function with ejection fraction of 55-60%.   No regional wall motion abnormalites are seen.   Left ventricular diastolic filling pressure is normal.   Aortic valve appears sclerotic but opens adequately.     Medications:   Current Outpatient Medications   Medication Sig Dispense Refill    AMOXICILLIN PO Take by mouth      PARoxetine Mesylate 7.5 MG CAPS TAKE 1 CAPSULE BY MOUTH DAILY 30 capsule 5    albuterol sulfate HFA (PROAIR HFA) 108 (90 Base) MCG/ACT inhaler Inhale 2 puffs into the lungs every 4 hours as needed for Wheezing 1 Inhaler 2    Multiple Vitamins-Minerals (THERAPEUTIC MULTIVITAMIN-MINERALS) tablet Take 1 tablet by mouth daily      losartan (COZAAR) 50 MG tablet Take 1 tablet by mouth daily 90 tablet 3    clopidogrel (PLAVIX) 75 MG tablet Take 1 tablet by mouth daily 90 tablet 3    aspirin 81 MG chewable tablet Take 1 tablet by mouth daily 90 tablet 3    atorvastatin (LIPITOR) 40 MG tablet Take 1 tablet by mouth nightly 90 tablet 3    Ascorbic Acid (VITAMIN C CR) 1000 MG TBCR Take by mouth daily       No current facility-administered medications for this visit. Assessment:  1. CAD in native artery    2. Essential hypertension    3. Mixed hyperlipidemia    4. TAVERAS (dyspnea on exertion)        Plan:  Echo  CMP/lipids  ASA, statin  Losartan   Ok to stop plavix in June.    Follow up in 6 months

## 2019-05-03 ENCOUNTER — HOSPITAL ENCOUNTER (OUTPATIENT)
Dept: NON INVASIVE DIAGNOSTICS | Age: 59
Discharge: HOME | End: 2019-05-03
Payer: COMMERCIAL

## 2019-05-03 ENCOUNTER — OFFICE VISIT (OUTPATIENT)
Dept: PULMONOLOGY | Age: 59
End: 2019-05-03
Payer: COMMERCIAL

## 2019-05-03 VITALS
OXYGEN SATURATION: 98 % | HEIGHT: 60 IN | BODY MASS INDEX: 51.28 KG/M2 | SYSTOLIC BLOOD PRESSURE: 134 MMHG | DIASTOLIC BLOOD PRESSURE: 78 MMHG | HEART RATE: 72 BPM | WEIGHT: 261.2 LBS

## 2019-05-03 DIAGNOSIS — I25.10 CAD IN NATIVE ARTERY: ICD-10-CM

## 2019-05-03 DIAGNOSIS — E78.2 MIXED HYPERLIPIDEMIA: ICD-10-CM

## 2019-05-03 DIAGNOSIS — R06.09 DOE (DYSPNEA ON EXERTION): ICD-10-CM

## 2019-05-03 DIAGNOSIS — K21.9 CHRONIC GERD: ICD-10-CM

## 2019-05-03 DIAGNOSIS — R93.89 ABNORMAL CT OF THE CHEST: ICD-10-CM

## 2019-05-03 DIAGNOSIS — R05.9 COUGH: Primary | ICD-10-CM

## 2019-05-03 DIAGNOSIS — J84.9 ILD (INTERSTITIAL LUNG DISEASE) (HCC): ICD-10-CM

## 2019-05-03 DIAGNOSIS — I10 ESSENTIAL HYPERTENSION: ICD-10-CM

## 2019-05-03 LAB
A/G RATIO: 1.3 (ref 1.1–2.2)
ALBUMIN SERPL-MCNC: 3.9 G/DL (ref 3.4–5)
ALP BLD-CCNC: 67 U/L (ref 40–129)
ALT SERPL-CCNC: 22 U/L (ref 10–40)
ANION GAP SERPL CALCULATED.3IONS-SCNC: 11 MMOL/L (ref 3–16)
AST SERPL-CCNC: 18 U/L (ref 15–37)
BILIRUB SERPL-MCNC: 0.4 MG/DL (ref 0–1)
BUN BLDV-MCNC: 21 MG/DL (ref 7–20)
CALCIUM SERPL-MCNC: 9.4 MG/DL (ref 8.3–10.6)
CHLORIDE BLD-SCNC: 107 MMOL/L (ref 99–110)
CHOLESTEROL, TOTAL: 182 MG/DL (ref 0–199)
CO2: 27 MMOL/L (ref 21–32)
CREAT SERPL-MCNC: 1.5 MG/DL (ref 0.6–1.1)
GFR AFRICAN AMERICAN: 43
GFR NON-AFRICAN AMERICAN: 35
GLOBULIN: 3.1 G/DL
GLUCOSE BLD-MCNC: 105 MG/DL (ref 70–99)
HDLC SERPL-MCNC: 42 MG/DL (ref 40–60)
LDL CHOLESTEROL CALCULATED: 91 MG/DL
LV EF: 58 %
LVEF MODALITY: NORMAL
POTASSIUM SERPL-SCNC: 4.3 MMOL/L (ref 3.5–5.1)
SODIUM BLD-SCNC: 145 MMOL/L (ref 136–145)
TOTAL PROTEIN: 7 G/DL (ref 6.4–8.2)
TRIGL SERPL-MCNC: 243 MG/DL (ref 0–150)
VLDLC SERPL CALC-MCNC: 49 MG/DL

## 2019-05-03 PROCEDURE — 99214 OFFICE O/P EST MOD 30 MIN: CPT | Performed by: INTERNAL MEDICINE

## 2019-05-03 PROCEDURE — 93306 TTE W/DOPPLER COMPLETE: CPT

## 2019-05-03 PROCEDURE — 3017F COLORECTAL CA SCREEN DOC REV: CPT | Performed by: INTERNAL MEDICINE

## 2019-05-03 PROCEDURE — G8598 ASA/ANTIPLAT THER USED: HCPCS | Performed by: INTERNAL MEDICINE

## 2019-05-03 PROCEDURE — G8417 CALC BMI ABV UP PARAM F/U: HCPCS | Performed by: INTERNAL MEDICINE

## 2019-05-03 PROCEDURE — G8427 DOCREV CUR MEDS BY ELIG CLIN: HCPCS | Performed by: INTERNAL MEDICINE

## 2019-05-03 PROCEDURE — 1036F TOBACCO NON-USER: CPT | Performed by: INTERNAL MEDICINE

## 2019-05-03 RX ORDER — FAMOTIDINE 40 MG/1
40 TABLET, FILM COATED ORAL 2 TIMES DAILY
Qty: 60 TABLET | Refills: 5 | Status: SHIPPED | OUTPATIENT
Start: 2019-05-03 | End: 2021-05-26 | Stop reason: SDUPTHER

## 2019-05-03 NOTE — PROGRESS NOTES
[facility]    Outpatient Initial Note    Subjective:   CHIEF COMPLAINT / HPI:     The patient is 60 y.o. female who is here for follow-up of chronic cough. I saw her on March 13 and 25th and her cough has  not improved since then despite avoiding woodworking for now 8 weeks and a extended prednisone taper starting at 40 mg and tapering over 4 weeks to off. She has associated dyspnea on exertion. She occasionally coughs with eating but there is no clear aspiration. She is intolerant of proton pump inhibitors and she thinks her reflux is well controlled on Pepcid 10 mg by mouth twice a day. She has no fevers, chills, night sweats, anorexia, sputum production, chest pain, or hemoptysis. 3/13/2019  Marlen presents for follow-up of blood work. Her SAMMY was speckled 1-80, otherwise blood work was unremarkable. For the last 2 weeks she has not been around woodworking. She states over the last day or so she's noticed substantial improvement of her cough    Last visit 2/28  Gilson Hernandez presents for follow-up of a dry cough. After last visit February 1 I added omeprazole to her Pepcid to see if this was a GERD related cough. I also obtained a CT chest.  Her dry cough is unchanged, occurring daily, and not associated with dyspnea on exertion, chest tightness, or wheezing. 2/1/2019  Marlen presents for re-eval of cough that has now gone on since Thanksgiving. Last visit I checked a CXR that was negative. I thought this was a post viral cough and gave an extended course of prednisone which did not help. She has no infectious symptoms. No post nasal drip. SHe does have uncontrolled GERD. Bronchial challenge over the summer was normal. SHe is not on an ACE    1/7/2019  Gilson Hernandez presents for evaluation of 5 weeks of a dry cough, chest congestion, wheezing and TAVERAS. She has no fevers, chills, purulent sputum, anorexia, hemoptysis or weight loss.  She has seen her PCP who has given her a medrol dose pack, Z pack, phenergan DM, Tessalon perles and an albuterol MDI without help. Her  has been battling the same and improved with a longer pred taper, levaquin and tussionex cough syrup    7/10/2018  Larry Lewis presents today for follow-up visit for  re-evaluation of dyspnea on exertion after [facility] s/p PTCA LAD. She had a positive nuclear stress test that prompted her coronary angiogram.  She also had a bronchial challenge that was negative. Since revascularization she's had no further chest heaviness. Her dyspnea on exertion is mildly improved but still present when she goes up a flight of stairs. She has no cough or wheezing. She occasionally snores but has no witnessed apnea. She does not have excessive daytime sleepiness and her Mullin was 6    Initial visit June 12, 2018   She states that she gets short of breath walking up from the first to second floor. It is 14 steps and sometimes she has to stop midway and at the top. She also gets short of breath working in the kitchen. She also complains of occasional chest heaviness. She pretty had a cough but states that that is now mostly resolved. She can't take a deep breath and her appetite has been off. She and her  are on a ketogenic diet and have lost some weight. She denies any fevers, chills, hemoptysis, or peripheral edema. Her evaluation has included an echo which is normal.  She is also had PFTs and a chest x-ray. She is never had a cardiac stress test.  She has history of hypertension and hyperlipidemia. Past Medical History:    Past Medical History:   Diagnosis Date    Acid reflux     Bilateral carpal tunnel syndrome 12/21/2017    CAD in native artery 06/19/2018    Mid LAD stented with 3.5 x 15 mm Xience BRIAN.  EF 55%    Chicken pox     Chronic back pain     CKD (chronic kidney disease) 4/15/2016    Depression 12/7/2010    Heart disease     Hypertension     Measles     Menopausal symptoms     Morbid obesity with BMI of 45.0-49.9, adult ([facility] [address] 1/29/2015    Osteoarthritis     Overactive bladder     Pure hypercholesterolemia     Stress incontinence        Social History:    Patient is . She is unemployed. She smoked up to 1 PPD x 8 years and quit in 1985    Family History:  IPF    Current Medications:  Current Outpatient Medications on File Prior to Visit   Medication Sig Dispense Refill    citalopram (CELEXA) 40 MG tablet Take 40 mg by mouth daily      albuterol sulfate HFA (PROAIR HFA) 108 (90 Base) MCG/ACT inhaler Inhale 2 puffs into the lungs every 4 hours as needed for Wheezing 1 Inhaler 2    Multiple Vitamins-Minerals (THERAPEUTIC MULTIVITAMIN-MINERALS) tablet Take 1 tablet by mouth daily      losartan (COZAAR) 50 MG tablet Take 1 tablet by mouth daily 90 tablet 3    clopidogrel (PLAVIX) 75 MG tablet Take 1 tablet by mouth daily 90 tablet 3    aspirin 81 MG chewable tablet Take 1 tablet by mouth daily 90 tablet 3    atorvastatin (LIPITOR) 40 MG tablet Take 1 tablet by mouth nightly 90 tablet 3     No current facility-administered medications on file prior to visit.         REVIEW OF SYSTEMS:    CONSTITUTIONAL: Negative for fevers and chills  HEENT: Negative for oropharyngeal exudate, post nasal drip, sinus pain / pressure, nasal congestion, ear pain  RESPIRATORY:  See HPI  CARDIOVASCULAR: Negative for chest pain, palpitations, edema  GASTROINTESTINAL: Negative for nausea, vomiting, diarrhea, constipation and abdominal pain  GENITOURINARY: Negative for dysuria, urinary frequency, urinary hesitancy  HEMATOLOGICAL: Negative for adenopathy  SKIN: Negative for clubbing, cyanosis, skin lesions  ENDOCRINE: Negative for polyuria, polydipsia, heat intolerance, cold intolerance   EXTREMITIES: Negative for weakness, decreased ROM  NEUROLOGICAL: Negative for unilateral weakness, speech or gait abnormalities    Objective:   PHYSICAL EXAM:        VITALS:  /78 (Site: Right Upper Arm, Position: Sitting, Cuff

## 2019-05-06 DIAGNOSIS — E78.2 MIXED HYPERLIPIDEMIA: ICD-10-CM

## 2019-05-06 DIAGNOSIS — I25.10 CAD IN NATIVE ARTERY: Primary | ICD-10-CM

## 2019-05-06 RX ORDER — ATORVASTATIN CALCIUM 80 MG/1
TABLET, FILM COATED ORAL
Qty: 90 TABLET | Refills: 2 | Status: SHIPPED | OUTPATIENT
Start: 2019-05-06 | End: 2019-07-25 | Stop reason: SDUPTHER

## 2019-05-06 RX ORDER — ATORVASTATIN CALCIUM 80 MG/1
80 TABLET, FILM COATED ORAL NIGHTLY
Qty: 30 TABLET | Refills: 3 | Status: SHIPPED | OUTPATIENT
Start: 2019-05-06 | End: 2019-05-06 | Stop reason: SDUPTHER

## 2019-05-06 NOTE — TELEPHONE ENCOUNTER
Medication:   Requested Prescriptions     Pending Prescriptions Disp Refills    atorvastatin (LIPITOR) 80 MG tablet [Pharmacy Med Name: ATORVASTATIN 80MG TABLETS] 90 tablet 2     Sig: TAKE ONE TABLET BY MOUTH NIGHTLY       Last OV: 4/19/2019  Last lab: 05/03/2019

## 2019-05-08 ENCOUNTER — TELEPHONE (OUTPATIENT)
Dept: CARDIOLOGY CLINIC | Age: 59
End: 2019-05-08

## 2019-05-08 DIAGNOSIS — R16.0 LIVER MASS: Primary | ICD-10-CM

## 2019-05-08 NOTE — TELEPHONE ENCOUNTER
Spoke with pt regarding results and new orders. Pt verbalized understanding and all questions answered at this time.

## 2019-05-08 NOTE — TELEPHONE ENCOUNTER
See echo result encounter. Echo looks ok. Normal EF and wall motion  Liver mass seen. Needs liver ultrasound .   Order placed in epic  Follow up with PCP

## 2019-05-10 ENCOUNTER — HOSPITAL ENCOUNTER (OUTPATIENT)
Dept: ULTRASOUND IMAGING | Age: 59
Discharge: HOME | End: 2019-05-10
Payer: COMMERCIAL

## 2019-05-10 DIAGNOSIS — R16.0 LIVER MASS: ICD-10-CM

## 2019-05-10 PROCEDURE — 76705 ECHO EXAM OF ABDOMEN: CPT

## 2019-05-13 ENCOUNTER — HOSPITAL ENCOUNTER (OUTPATIENT)
Dept: GENERAL RADIOLOGY | Age: 59
Discharge: HOME | End: 2019-05-13
Payer: COMMERCIAL

## 2019-05-13 DIAGNOSIS — R05.9 COUGH: ICD-10-CM

## 2019-05-13 PROCEDURE — 74230 X-RAY XM SWLNG FUNCJ C+: CPT

## 2019-05-13 PROCEDURE — 92611 MOTION FLUOROSCOPY/SWALLOW: CPT

## 2019-05-13 NOTE — PROCEDURES
reports having cough since November, Md wishes to r/o silent aspiration    Onset of problem:   Date of Onset: November 2018    Behavior/Cognition/Vision/Hearing:  Behavior/Cognition: Alert; Cooperative;Pleasant mood  Vision: Within Functional Limits  Hearing: Within functional limits    Impressions:  Swallow WNL's. No penetration/aspiration or pharyngeal residue identified. No difficulty with mastication of solids. Treatment Dx and ICD 10: dysphagia   Patient Position: Lateral and Patient Degrees: 90  Consistencies Administered: Reg solid; Thin cup; Thin teaspoon; Thin straw;Puree  Dysphagia Outcome Severity Scale: Level 7: Normal in all situations  Penetration-Aspiration Scale (PAS): 1 - Material does not enter the airway    Recommended Diet:  Solid consistency: Regular  Liquid consistency:  Thin  Liquid administration via: Cup;Straw    Medication administration: (as rachel)    Safe Swallow Protocol:  Supervision: Independent  Compensatory Swallowing Strategies:   Upright as possible for all oral intake    Recommendations/Treatment  Requires SLP Intervention: No  Recommendations: (follow up with referring Md)  D/C Recommendations: Home independently     Recommended Exercises: n/a   Therapeutic Interventions: Patient/Family education    Education: Images and recommendations were reviewed with pt following this exam.   Patient Education: Pt educated to MBS study and results  Patient Education Response: Verbalizes understanding    Prognosis for safe diet advancement: good  Duration/Frequency of Treatment  Duration/Frequency of Treatment: no follow up indicated    Goals:    n/a     Oral Preparation / Oral Phase  Oral Phase: WNL    Pharyngeal Phase  Pharyngeal Phase: WNL    Esophageal Phase  Not assessed    Pain   Patient Currently in Pain: Denies     Therapy Time:   Individual Concurrent Group Co-treatment   Time In 0930         Time Out 0955         Minutes 25               Plan:  Recommended diet: Regular diet/thin liquids  Follow up with referring MD for further recommendations. Needs met prior to leaving radiology  Althea Villegas M.S./[facility]-SLP #[id number]  Pg.  # [phone]

## 2019-05-20 ENCOUNTER — TELEPHONE (OUTPATIENT)
Dept: CARDIOLOGY CLINIC | Age: 59
End: 2019-05-20

## 2019-05-20 DIAGNOSIS — I25.10 CAD IN NATIVE ARTERY: Primary | ICD-10-CM

## 2019-05-20 RX ORDER — LISINOPRIL 20 MG/1
20 TABLET ORAL DAILY
Qty: 30 TABLET | Refills: 5 | Status: SHIPPED | OUTPATIENT
Start: 2019-05-20 | End: 2019-05-20 | Stop reason: SDUPTHER

## 2019-05-20 RX ORDER — LISINOPRIL 20 MG/1
20 TABLET ORAL DAILY
Qty: 90 TABLET | Refills: 3 | Status: SHIPPED | OUTPATIENT
Start: 2019-05-20 | End: 2020-05-16

## 2019-05-20 NOTE — TELEPHONE ENCOUNTER
[facility] Medication Refills:    Requested Prescriptions     Pending Prescriptions Disp Refills    lisinopril (PRINIVIL;ZESTRIL) 20 MG tablet [Pharmacy Med Name: LISINOPRIL 20MG TABLETS] 90 tablet 3     Sig: TAKE 1 TABLET BY MOUTH DAILY                     Last Office Visit:4/29/19Next Office Visit:12/4/19  Last Refill:5/20/19Last Labs: 5/3/19

## 2019-05-21 ENCOUNTER — OFFICE VISIT (OUTPATIENT)
Dept: PULMONOLOGY | Age: 59
End: 2019-05-21
Payer: COMMERCIAL

## 2019-05-21 VITALS
WEIGHT: 257 LBS | OXYGEN SATURATION: 97 % | DIASTOLIC BLOOD PRESSURE: 80 MMHG | BODY MASS INDEX: 45.54 KG/M2 | SYSTOLIC BLOOD PRESSURE: 100 MMHG | HEART RATE: 59 BPM | HEIGHT: 63 IN

## 2019-05-21 DIAGNOSIS — R05.3 CHRONIC COUGH: ICD-10-CM

## 2019-05-21 DIAGNOSIS — J84.9 ILD (INTERSTITIAL LUNG DISEASE) (HCC): Primary | ICD-10-CM

## 2019-05-21 DIAGNOSIS — Z98.61 CAD S/P PERCUTANEOUS CORONARY ANGIOPLASTY: ICD-10-CM

## 2019-05-21 DIAGNOSIS — I25.10 CAD S/P PERCUTANEOUS CORONARY ANGIOPLASTY: ICD-10-CM

## 2019-05-21 DIAGNOSIS — K21.9 CHRONIC GERD: ICD-10-CM

## 2019-05-21 PROCEDURE — 1036F TOBACCO NON-USER: CPT | Performed by: INTERNAL MEDICINE

## 2019-05-21 PROCEDURE — 3017F COLORECTAL CA SCREEN DOC REV: CPT | Performed by: INTERNAL MEDICINE

## 2019-05-21 PROCEDURE — G8598 ASA/ANTIPLAT THER USED: HCPCS | Performed by: INTERNAL MEDICINE

## 2019-05-21 PROCEDURE — 99214 OFFICE O/P EST MOD 30 MIN: CPT | Performed by: INTERNAL MEDICINE

## 2019-05-21 PROCEDURE — G8427 DOCREV CUR MEDS BY ELIG CLIN: HCPCS | Performed by: INTERNAL MEDICINE

## 2019-05-21 PROCEDURE — G8417 CALC BMI ABV UP PARAM F/U: HCPCS | Performed by: INTERNAL MEDICINE

## 2019-05-21 NOTE — PROGRESS NOTES
of prednisone which did not help. She has no infectious symptoms. No post nasal drip. SHe does have uncontrolled GERD. Bronchial challenge over the summer was normal. SHe is not on an ACE    1/7/2019  [email or web address] presents for evaluation of 5 weeks of a dry cough, chest congestion, wheezing and TAVERAS. She has no fevers, chills, purulent sputum, anorexia, hemoptysis or weight loss. She has seen her PCP who has given her a medrol dose pack, Z pack, phenergan DM, Tessalon perles and an albuterol MDI without help. Her  has been battling the same and improved with a longer pred taper, levaquin and tussionex cough syrup    7/10/2018  Christianvivian Harshal presents today for follow-up visit for  re-evaluation of dyspnea on exertion after [facility] s/p PTCA LAD. She had a positive nuclear stress test that prompted her coronary angiogram.  She also had a bronchial challenge that was negative. Since revascularization she's had no further chest heaviness. Her dyspnea on exertion is mildly improved but still present when she goes up a flight of stairs. She has no cough or wheezing. She occasionally snores but has no witnessed apnea. She does not have excessive daytime sleepiness and her Weiser was 6    Initial visit June 12, 2018   She states that she gets short of breath walking up from the first to second floor. It is 14 steps and sometimes she has to stop midway and at the top. She also gets short of breath working in the kitchen. She also complains of occasional chest heaviness. She pretty had a cough but states that that is now mostly resolved. She can't take a deep breath and her appetite has been off. She and her  are on a ketogenic diet and have lost some weight. She denies any fevers, chills, hemoptysis, or peripheral edema. Her evaluation has included an echo which is normal.  She is also had PFTs and a chest x-ray. She is never had a cardiac stress test.  She has history of hypertension and hyperlipidemia.     Past Medical History:    Past Medical History:   Diagnosis Date    Acid reflux     Bilateral carpal tunnel syndrome 12/21/2017    CAD in native artery 06/19/2018    Mid LAD stented with 3.5 x 15 mm Xience BRIAN. EF 55%    Chicken pox     Chronic back pain     CKD (chronic kidney disease) 4/15/2016    Depression 12/7/2010    Heart disease     Hypertension     Measles     Menopausal symptoms     Morbid obesity with BMI of 45.0-49.9, adult ([facility] [address] 1/29/2015    Osteoarthritis     Overactive bladder     Pure hypercholesterolemia     Stress incontinence        Social History:    Patient is . She is unemployed. She smoked up to 1 PPD x 8 years and quit in 1985    Family History:  IPF    Current Medications:  Current Outpatient Medications on File Prior to Visit   Medication Sig Dispense Refill    lisinopril (PRINIVIL;ZESTRIL) 20 MG tablet TAKE 1 TABLET BY MOUTH DAILY 90 tablet 3    atorvastatin (LIPITOR) 80 MG tablet TAKE ONE TABLET BY MOUTH NIGHTLY 90 tablet 2    famotidine (PEPCID) 40 MG tablet Take 1 tablet by mouth 2 times daily 60 tablet 5    citalopram (CELEXA) 40 MG tablet Take 40 mg by mouth daily      albuterol sulfate HFA (PROAIR HFA) 108 (90 Base) MCG/ACT inhaler Inhale 2 puffs into the lungs every 4 hours as needed for Wheezing 1 Inhaler 2    Multiple Vitamins-Minerals (THERAPEUTIC MULTIVITAMIN-MINERALS) tablet Take 1 tablet by mouth daily      aspirin 81 MG chewable tablet Take 1 tablet by mouth daily 90 tablet 3    clopidogrel (PLAVIX) 75 MG tablet Take 1 tablet by mouth daily 90 tablet 3     No current facility-administered medications on file prior to visit.         REVIEW OF SYSTEMS:    CONSTITUTIONAL: Negative for fevers and chills  HEENT: Negative for oropharyngeal exudate, post nasal drip, sinus pain / pressure, nasal congestion, ear pain  RESPIRATORY:  See HPI  CARDIOVASCULAR: Negative for chest pain, palpitations, edema  GASTROINTESTINAL: Negative for nausea, vomiting, diarrhea, pulmonary fibrosis.       No lobar consolidation. Last PFTs: May 2018  TEST PERFORMED:  1. Spirometry with flow-volume loops obtained before and after  bronchodilation. 2.  Lung volumes by plethysmography. 3.  Diffusion capacity of carbon monoxide.     Test meets ATS criteria and the quality of flow-volume loops is sufficient  for interpretation. Good patient effort.     The FEV1 is 2.24 L or 89% predicted. The FEV1 to FVC ratio is 75. Post  bronchodilator, the FEV1 changed to 2.29 L or 91% predicted. Total lung  capacity is 94% predicted and diffusion is 83% predicted.     INTERPRETATION:  1. No obstruction, no significant post-bronchodilator improvement. 2.  Normal lung volumes. 3.  Normal diffusion capacity. 4.  Clinical correlation recommended, if strong suspicion for obstructive  lung disease exists, we would recommend further evaluation with  methacholine bronchoprovocation study. Bronchial challenge June 13, 2018  IMPRESSION:  1. Normal spirometry. 2.  No significant response to bronchodilators. 3.  Negative bronchial challenge indicative of no airway hyperreactivity or  hyperresponsiveness.     Myocardial stress test June 13, 2018  Impression       Pharmacologic stress induced reversible ischemia anterior and   anterolateral walls. Left heart catheterization June 19, 2018  Conclusions:  -Severe single vessel CAD; LAD stented with a 3.5 x 15 mm Xiecne BRIAN to 10 RUBY. A \"pinched\" diag was treated with a 2.0 x 12 mm balloon.  -Elevated LVEDP  -Systemic HTN  -Normal LVEF    MBS  Impression       Normal modified barium swallow. Assessment:      Diagnosis Orders   1. ILD (interstitial lung disease) ([address]  CT CHEST WO CONTRAST   2. Chronic GERD     3. Chronic cough     4. CAD S/P percutaneous coronary angioplasty         Plan:     Etiology of chronic cough still unclear. However a steroid responsive ILD, CTD-ILD and GERD seem unlikely at this point.   She does mention that the symptoms started after being placed on Plavix and losartan, although these medicines are rarely found to be pneumo toxic. She stopped losartan several days ago and will be off Plavix in approximately 8 days nonetheless and we can see if her cough improves. Continue therapy with Pepcid for her GERD    Repeat CT chest in 4 weeks and follow up for reevaluation. If cough and CT abnormality still present then will need either bronchoscopy with BAL and transbronchial biopsy versus open lung biopsy.

## 2019-06-04 DIAGNOSIS — I25.10 CAD IN NATIVE ARTERY: ICD-10-CM

## 2019-06-04 DIAGNOSIS — E78.2 MIXED HYPERLIPIDEMIA: ICD-10-CM

## 2019-06-04 LAB
A/G RATIO: 1.5 (ref 1.1–2.2)
ALBUMIN SERPL-MCNC: 4.4 G/DL (ref 3.4–5)
ALP BLD-CCNC: 67 U/L (ref 40–129)
ALT SERPL-CCNC: 17 U/L (ref 10–40)
ANION GAP SERPL CALCULATED.3IONS-SCNC: 13 MMOL/L (ref 3–16)
AST SERPL-CCNC: 19 U/L (ref 15–37)
BILIRUB SERPL-MCNC: 0.5 MG/DL (ref 0–1)
BUN BLDV-MCNC: 20 MG/DL (ref 7–20)
CALCIUM SERPL-MCNC: 9.2 MG/DL (ref 8.3–10.6)
CHLORIDE BLD-SCNC: 107 MMOL/L (ref 99–110)
CHOLESTEROL, TOTAL: 166 MG/DL (ref 0–199)
CO2: 24 MMOL/L (ref 21–32)
CREAT SERPL-MCNC: 1.2 MG/DL (ref 0.6–1.1)
GFR AFRICAN AMERICAN: 55
GFR NON-AFRICAN AMERICAN: 46
GLOBULIN: 2.9 G/DL
GLUCOSE BLD-MCNC: 107 MG/DL (ref 70–99)
HDLC SERPL-MCNC: 38 MG/DL (ref 40–60)
LDL CHOLESTEROL CALCULATED: 91 MG/DL
POTASSIUM SERPL-SCNC: 3.9 MMOL/L (ref 3.5–5.1)
SODIUM BLD-SCNC: 144 MMOL/L (ref 136–145)
TOTAL PROTEIN: 7.3 G/DL (ref 6.4–8.2)
TRIGL SERPL-MCNC: 187 MG/DL (ref 0–150)
VLDLC SERPL CALC-MCNC: 37 MG/DL

## 2019-06-13 ENCOUNTER — HOSPITAL ENCOUNTER (OUTPATIENT)
Dept: CT IMAGING | Age: 59
Discharge: HOME | End: 2019-06-13
Payer: COMMERCIAL

## 2019-06-13 DIAGNOSIS — J84.9 ILD (INTERSTITIAL LUNG DISEASE) (HCC): ICD-10-CM

## 2019-06-13 PROCEDURE — 71250 CT THORAX DX C-: CPT

## 2019-06-18 ENCOUNTER — OFFICE VISIT (OUTPATIENT)
Dept: PULMONOLOGY | Age: 59
End: 2019-06-18
Payer: COMMERCIAL

## 2019-06-18 VITALS
HEART RATE: 69 BPM | DIASTOLIC BLOOD PRESSURE: 92 MMHG | HEIGHT: 63 IN | BODY MASS INDEX: 44.3 KG/M2 | SYSTOLIC BLOOD PRESSURE: 174 MMHG | WEIGHT: 250 LBS | RESPIRATION RATE: 16 BRPM | OXYGEN SATURATION: 97 %

## 2019-06-18 DIAGNOSIS — J84.9 ILD (INTERSTITIAL LUNG DISEASE) (HCC): Primary | ICD-10-CM

## 2019-06-18 DIAGNOSIS — I25.10 CAD S/P PERCUTANEOUS CORONARY ANGIOPLASTY: ICD-10-CM

## 2019-06-18 DIAGNOSIS — R05.3 COUGH, PERSISTENT: ICD-10-CM

## 2019-06-18 DIAGNOSIS — Z98.61 CAD S/P PERCUTANEOUS CORONARY ANGIOPLASTY: ICD-10-CM

## 2019-06-18 PROCEDURE — 1036F TOBACCO NON-USER: CPT | Performed by: INTERNAL MEDICINE

## 2019-06-18 PROCEDURE — 3017F COLORECTAL CA SCREEN DOC REV: CPT | Performed by: INTERNAL MEDICINE

## 2019-06-18 PROCEDURE — 99214 OFFICE O/P EST MOD 30 MIN: CPT | Performed by: INTERNAL MEDICINE

## 2019-06-18 PROCEDURE — G8598 ASA/ANTIPLAT THER USED: HCPCS | Performed by: INTERNAL MEDICINE

## 2019-06-18 PROCEDURE — G8427 DOCREV CUR MEDS BY ELIG CLIN: HCPCS | Performed by: INTERNAL MEDICINE

## 2019-06-18 PROCEDURE — G8417 CALC BMI ABV UP PARAM F/U: HCPCS | Performed by: INTERNAL MEDICINE

## 2019-06-18 RX ORDER — PROMETHAZINE HYDROCHLORIDE AND CODEINE PHOSPHATE 6.25; 1 MG/5ML; MG/5ML
5 SYRUP ORAL EVERY 4 HOURS PRN
Qty: 180 ML | Refills: 0 | Status: SHIPPED | OUTPATIENT
Start: 2019-06-18 | End: 2020-03-04 | Stop reason: SDUPTHER

## 2019-06-18 NOTE — PROGRESS NOTES
[facility]    Outpatient Initial Note    Subjective:   CHIEF COMPLAINT / HPI:     The patient is 60 y.o. female who is here for follow-up of chronic cough and nonspecific ILD. She is now off Plavix and losartan without any change in cough. She had CT chest that shows unchanged basilar predominate nonspecific ILD. Her cough is daily and unrelenting. She coughed continuously through my office visit. She feels that her dyspnea on exertion is somewhat worse and gets winded doing routine activities of daily living. No fever, chills, night sweats, weight loss, hemoptysis, sputum production, CP or wheezing    5/21/2019  Homer Call is here for follow-up of chronic cough and nonspecific ILD. Last visit I doubled her Pepcid to 40 mg by mouth twice a day and obtained a modified barium swallow which was normal.  Her daily chronic dry cough is unchanged. She has some associated dyspnea on exertion and wheezing but no chest pain. She has no fevers, chills, night sweats, anorexia, weight loss, hemoptysis, or sputum production    5/3/2019  Homer Call is here for follow-up of chronic cough. I saw her on March 13 and 25th and her cough has  not improved since then despite avoiding woodworking for now 8 weeks and a extended prednisone taper starting at 40 mg and tapering over 4 weeks to off. She has associated dyspnea on exertion. She occasionally coughs with eating but there is no clear aspiration. She is intolerant of proton pump inhibitors and she thinks her reflux is well controlled on Pepcid 10 mg by mouth twice a day. She has no fevers, chills, night sweats, anorexia, sputum production, chest pain, or hemoptysis. 3/13/2019  Marlen presents for follow-up of blood work. Her SAMMY was speckled 1-80, otherwise blood work was unremarkable. For the last 2 weeks she has not been around woodworking.   She states over the last day or so she's noticed substantial improvement of her cough    Last visit 2/28  Kylie Castañeda presents for follow-up of a dry cough. After last visit February 1 I added omeprazole to her Pepcid to see if this was a GERD related cough. I also obtained a CT chest.  Her dry cough is unchanged, occurring daily, and not associated with dyspnea on exertion, chest tightness, or wheezing. 2/1/2019  Marlen presents for re-eval of cough that has now gone on since Thanksgiving. Last visit I checked a CXR that was negative. I thought this was a post viral cough and gave an extended course of prednisone which did not help. She has no infectious symptoms. No post nasal drip. SHe does have uncontrolled GERD. Bronchial challenge over the summer was normal. SHe is not on an ACE    1/7/2019  Kylie Castañeda presents for evaluation of 5 weeks of a dry cough, chest congestion, wheezing and TAVERAS. She has no fevers, chills, purulent sputum, anorexia, hemoptysis or weight loss. She has seen her PCP who has given her a medrol dose pack, Z pack, phenergan DM, Tessalon perles and an albuterol MDI without help. Her  has been battling the same and improved with a longer pred taper, levaquin and tussionex cough syrup    7/10/2018  Daljit Nelson presents today for follow-up visit for  re-evaluation of dyspnea on exertion after [facility] s/p PTCA LAD. She had a positive nuclear stress test that prompted her coronary angiogram.  She also had a bronchial challenge that was negative. Since revascularization she's had no further chest heaviness. Her dyspnea on exertion is mildly improved but still present when she goes up a flight of stairs. She has no cough or wheezing. She occasionally snores but has no witnessed apnea. She does not have excessive daytime sleepiness and her [place] was 6    Initial visit June 12, 2018   She states that she gets short of breath walking up from the first to second floor. It is 14 steps and sometimes she has to stop midway and at the top.   She also gets short of breath working in the kitchen. She also complains of occasional chest heaviness. She pretty had a cough but states that that is now mostly resolved. She can't take a deep breath and her appetite has been off. She and her  are on a ketogenic diet and have lost some weight. She denies any fevers, chills, hemoptysis, or peripheral edema. Her evaluation has included an echo which is normal.  She is also had PFTs and a chest x-ray. She is never had a cardiac stress test.  She has history of hypertension and hyperlipidemia. Past Medical History:    Past Medical History:   Diagnosis Date    Acid reflux     Bilateral carpal tunnel syndrome 12/21/2017    CAD in native artery 06/19/2018    Mid LAD stented with 3.5 x 15 mm Xience BRIAN. EF 55%    Chicken pox     Chronic back pain     CKD (chronic kidney disease) 4/15/2016    Depression 12/7/2010    Heart disease     Hypertension     Measles     Menopausal symptoms     Morbid obesity with BMI of 45.0-49.9, adult ([facility] [address] 1/29/2015    Osteoarthritis     Overactive bladder     Pure hypercholesterolemia     Stress incontinence        Social History:    Patient is . She is unemployed.   She smoked up to 1 PPD x 8 years and quit in 1985    Family History:  IPF    Current Medications:  Current Outpatient Medications on File Prior to Visit   Medication Sig Dispense Refill    atorvastatin (LIPITOR) 80 MG tablet TAKE ONE TABLET BY MOUTH NIGHTLY 90 tablet 2    famotidine (PEPCID) 40 MG tablet Take 1 tablet by mouth 2 times daily 60 tablet 5    citalopram (CELEXA) 40 MG tablet Take 40 mg by mouth daily      albuterol sulfate HFA (PROAIR HFA) 108 (90 Base) MCG/ACT inhaler Inhale 2 puffs into the lungs every 4 hours as needed for Wheezing 1 Inhaler 2    Multiple Vitamins-Minerals (THERAPEUTIC MULTIVITAMIN-MINERALS) tablet Take 1 tablet by mouth daily      aspirin 81 MG chewable tablet Take 1 tablet by mouth daily 90 tablet 3    lisinopril (PRINIVIL;ZESTRIL) 20 MG tablet TAKE 1 TABLET BY MOUTH DAILY 90 tablet 3    clopidogrel (PLAVIX) 75 MG tablet Take 1 tablet by mouth daily 90 tablet 3     No current facility-administered medications on file prior to visit. REVIEW OF SYSTEMS:    CONSTITUTIONAL: Negative for fevers and chills  HEENT: Negative for oropharyngeal exudate, post nasal drip, sinus pain / pressure, nasal congestion, ear pain  RESPIRATORY:  See HPI  CARDIOVASCULAR: Negative for chest pain, palpitations, edema  GASTROINTESTINAL: Negative for nausea, vomiting, diarrhea, constipation and abdominal pain  GENITOURINARY: Negative for dysuria, urinary frequency, urinary hesitancy  HEMATOLOGICAL: Negative for adenopathy  SKIN: Negative for clubbing, cyanosis, skin lesions  ENDOCRINE: Negative for polyuria, polydipsia, heat intolerance, cold intolerance   EXTREMITIES: Negative for weakness, decreased ROM  NEUROLOGICAL: Negative for unilateral weakness, speech or gait abnormalities    Objective:   PHYSICAL EXAM:        VITALS:  BP (!) 174/92 Comment: pt was coughing right before BP was taken  Pulse 69   Resp 16   Ht 5' 3\" (1.6 m)   Wt 250 lb (113.4 kg)   LMP 07/13/2011   SpO2 97%   Breastfeeding? No   BMI 44.29 kg/m²   On RA  CONSTITUTIONAL:  Awake, alert, cooperative, no apparent distress, and appears stated age  [de-identified]: No oropharyngeal exudate, PERRL, no cervical adenopathy, no tracheal deviation, thyroid size normal  LUNGS:  No increased work of breathing and clear to auscultation, no crackles or wheezing  CARDIOVASCULAR:  normal S1 and S2 and no JVD  ABDOMEN:  Normal bowel sounds, non-distended and non-tender to palpation  EXT: No edema, no calf tenderness. Pulses are present bilaterally. NEUROLOGIC:  Mental Status Exam:  Level of Alertness:   awake  Orientation:   person, place, time.   SKIN:  normal skin color, texture, turgor, no redness, warmth, or swelling     DATA:      Radiology Review:  Pertinent images / reports were reviewed as a part of this visit. CT Chest 2/22/2019 reveals the following:     Impression       Bilateral and fairly symmetric reticulation most pronounced about the lower lobes with immediate subpleural sparing may relate to interstitial pneumonitis and may relate to nonspecific interstitial pneumonitis. Findings may be present in connective    tissue disease including SLE, autoimmune disease including rheumatoid arthritis as well as other etiologies including hypersensitivity pneumonitis, drug-induced pneumonitis as well as other causes.       No pulmonary fibrosis.       No lobar consolidation. CT chest June 13, 2019  FINDINGS: The mediastinum appears normal without signs of any lymph node enlargement. The thyroid gland extends down to the level of the sternum bilaterally.       The aorta is of normal caliber as well as the pulmonary arteries.       Subpleural areas of pulmonary linear changes, likely pulmonary fibrosis or interstitial lung disease are appreciated with 1 to 2 mm pulmonary nodule in the left upper lobe on image 42 series 9, well circumscribed. Additional subpleural linear changes    noted right middle lobe right left lower lobe and lingula near the lung base.       Poorly defined nodules noted just above the right hemidiaphragm the anterior aspect right lower lobe measuring 5 x 4 mm       There are some calcified subcarinal lymph nodes appreciated.       Calcification the left anterior descending coronary artery is appreciated without cardiac enlargement.       No pleural or pericardial effusion is noted.           Impression       1.  Small amount of subpleural linear change which may represent interstitial pneumonitis or interstitial lung disease most pronounced in the lower lobes and middle lobes without any significant pulmonary fibrotic change or volume loss.    2.  Several punctate nodules appreciated one in the left upper lobe subpleural measuring 1 to 2 mm another in the left upper lobe with one poorly defined 3. CAD S/P percutaneous coronary angioplasty         Plan:     Patient still with constant daily dry cough and dyspnea on exertion. Extensive work-up has revealed basilar predominant ILD without honeycombing or bronchiectasis. 1.  Bronch with BAL +/- TBBx  2.   Phenergan with codeine to help control cough    Likely will need open lung biopsy

## 2019-06-27 ENCOUNTER — HOSPITAL ENCOUNTER (OUTPATIENT)
Age: 59
Setting detail: OUTPATIENT SURGERY
Discharge: HOME | End: 2019-06-27
Attending: INTERNAL MEDICINE | Admitting: INTERNAL MEDICINE
Payer: COMMERCIAL

## 2019-06-27 VITALS
BODY MASS INDEX: 44.3 KG/M2 | HEART RATE: 77 BPM | TEMPERATURE: 97.2 F | HEIGHT: 63 IN | WEIGHT: 250 LBS | SYSTOLIC BLOOD PRESSURE: 172 MMHG | RESPIRATION RATE: 18 BRPM | DIASTOLIC BLOOD PRESSURE: 75 MMHG | OXYGEN SATURATION: 98 %

## 2019-06-27 LAB
APPEARANCE BAL (LAVAGE): CLEAR
CLOT EVALUATION BAL: ABNORMAL
COLOR LAVAGE: COLORLESS
EOSIN: 3 %
EPITHELIAL CELLS FLUID: 4 %
LYMPHOCYTES, BAL: 27 % (ref 5–10)
MACROPHAGES, BAL: 44 % (ref 90–95)
MONOCYTES, BAL: 4 %
NUMBER OF CELLS COUNTED BAL (LAVAGE): 200
RBC, BAL: 89 /CUMM
SEGMENTED NEUTROPHILS, BAL: 18 % (ref 5–10)
WBC/EPI CELLS BAL: 315 /CUMM

## 2019-06-27 PROCEDURE — 87641 MR-STAPH DNA AMP PROBE: CPT

## 2019-06-27 PROCEDURE — 3609011300 HC BRONCHOSCOPY BRONCHIAL/ENDOBRNCL BX 1+ SITES: Performed by: INTERNAL MEDICINE

## 2019-06-27 PROCEDURE — 88185 FLOWCYTOMETRY/TC ADD-ON: CPT

## 2019-06-27 PROCEDURE — 88305 TISSUE EXAM BY PATHOLOGIST: CPT

## 2019-06-27 PROCEDURE — 87205 SMEAR GRAM STAIN: CPT

## 2019-06-27 PROCEDURE — 31624 DX BRONCHOSCOPE/LAVAGE: CPT | Performed by: INTERNAL MEDICINE

## 2019-06-27 PROCEDURE — 6360000002 HC RX W HCPCS: Performed by: INTERNAL MEDICINE

## 2019-06-27 PROCEDURE — 87077 CULTURE AEROBIC IDENTIFY: CPT

## 2019-06-27 PROCEDURE — 87486 CHLMYD PNEUM DNA AMP PROBE: CPT

## 2019-06-27 PROCEDURE — 2709999900 HC NON-CHARGEABLE SUPPLY: Performed by: INTERNAL MEDICINE

## 2019-06-27 PROCEDURE — 88112 CYTOPATH CELL ENHANCE TECH: CPT

## 2019-06-27 PROCEDURE — 87254 VIRUS INOCULATION SHELL VIA: CPT

## 2019-06-27 PROCEDURE — 87206 SMEAR FLUORESCENT/ACID STAI: CPT

## 2019-06-27 PROCEDURE — 3609011500 HC BRONCHOSCOPY DIAGNOSTIC OR CELL WASH ONLY: Performed by: INTERNAL MEDICINE

## 2019-06-27 PROCEDURE — 88184 FLOWCYTOMETRY/ TC 1 MARKER: CPT

## 2019-06-27 PROCEDURE — 88312 SPECIAL STAINS GROUP 1: CPT

## 2019-06-27 PROCEDURE — 87116 MYCOBACTERIA CULTURE: CPT

## 2019-06-27 PROCEDURE — 87581 M.PNEUMON DNA AMP PROBE: CPT

## 2019-06-27 PROCEDURE — 89051 BODY FLUID CELL COUNT: CPT

## 2019-06-27 PROCEDURE — 87651 STREP A DNA AMP PROBE: CPT

## 2019-06-27 PROCEDURE — 87541 LEGION PNEUMO DNA AMP PROB: CPT

## 2019-06-27 PROCEDURE — 87015 SPECIMEN INFECT AGNT CONCNTJ: CPT

## 2019-06-27 PROCEDURE — 3609010800 HC BRONCHOSCOPY ALVEOLAR LAVAGE: Performed by: INTERNAL MEDICINE

## 2019-06-27 PROCEDURE — 99152 MOD SED SAME PHYS/QHP 5/>YRS: CPT | Performed by: INTERNAL MEDICINE

## 2019-06-27 PROCEDURE — 87798 DETECT AGENT NOS DNA AMP: CPT

## 2019-06-27 PROCEDURE — 87253 VIRUS INOCULATE TISSUE ADDL: CPT

## 2019-06-27 PROCEDURE — 7100000011 HC PHASE II RECOVERY - ADDTL 15 MIN: Performed by: INTERNAL MEDICINE

## 2019-06-27 PROCEDURE — 87305 ASPERGILLUS AG IA: CPT

## 2019-06-27 PROCEDURE — 99153 MOD SED SAME PHYS/QHP EA: CPT | Performed by: INTERNAL MEDICINE

## 2019-06-27 PROCEDURE — 7100000010 HC PHASE II RECOVERY - FIRST 15 MIN: Performed by: INTERNAL MEDICINE

## 2019-06-27 PROCEDURE — 87070 CULTURE OTHR SPECIMN AEROBIC: CPT

## 2019-06-27 PROCEDURE — 87081 CULTURE SCREEN ONLY: CPT

## 2019-06-27 PROCEDURE — 87186 SC STD MICRODIL/AGAR DIL: CPT

## 2019-06-27 PROCEDURE — 87633 RESP VIRUS 12-25 TARGETS: CPT

## 2019-06-27 PROCEDURE — 87252 VIRUS INOCULATION TISSUE: CPT

## 2019-06-27 PROCEDURE — 31625 BRONCHOSCOPY W/BIOPSY(S): CPT | Performed by: INTERNAL MEDICINE

## 2019-06-27 RX ORDER — MIDAZOLAM HYDROCHLORIDE 1 MG/ML
INJECTION INTRAMUSCULAR; INTRAVENOUS PRN
Status: DISCONTINUED | OUTPATIENT
Start: 2019-06-27 | End: 2019-06-27 | Stop reason: ALTCHOICE

## 2019-06-27 RX ORDER — FENTANYL CITRATE 50 UG/ML
INJECTION, SOLUTION INTRAMUSCULAR; INTRAVENOUS PRN
Status: DISCONTINUED | OUTPATIENT
Start: 2019-06-27 | End: 2019-06-27 | Stop reason: ALTCHOICE

## 2019-06-27 ASSESSMENT — PAIN - FUNCTIONAL ASSESSMENT
PAIN_FUNCTIONAL_ASSESSMENT: FACES
PAIN_FUNCTIONAL_ASSESSMENT: 0-10
PAIN_FUNCTIONAL_ASSESSMENT: PREVENTS OR INTERFERES WITH ALL ACTIVE AND SOME PASSIVE ACTIVITIES
PAIN_FUNCTIONAL_ASSESSMENT: FACES
PAIN_FUNCTIONAL_ASSESSMENT: 0-10
PAIN_FUNCTIONAL_ASSESSMENT: 0-10
PAIN_FUNCTIONAL_ASSESSMENT: FACES
PAIN_FUNCTIONAL_ASSESSMENT: 0-10

## 2019-06-27 ASSESSMENT — PAIN SCALES - GENERAL: PAINLEVEL_OUTOF10: 0

## 2019-06-27 ASSESSMENT — PAIN DESCRIPTION - DESCRIPTORS: DESCRIPTORS: BURNING

## 2019-06-27 NOTE — H&P
H&P  PROCEDURE:  BRONCHOSCOPY WITH BRONCHOALVEOLAR LAVAGE    HPI: 60 y.o. female who is here for Bronchoscopy for nonspecific ILD. She is now off Plavix and losartan without any change in cough. She had CT chest that shows unchanged basilar predominate nonspecific ILD. Her cough is daily and unrelenting. She coughed continuously through my office visit. She feels that her dyspnea on exertion is somewhat worse and gets winded doing routine activities of daily living. No fever, chills, night sweats, weight loss, hemoptysis, sputum production, CP or wheezing    Allergies and medications have been reviewed    HENT: Airway patent and reviewed  Cardiovascular: Normal rate, regular rhythm, normal heart sounds. Pulmonary/Chest: No wheezes. No rhonchi. No rales. Abdominal: Soft. Bowel sounds are normal. No distension. ASA CLASS      II. Mild systemic disease      Mallampati: 2    Sedation plan: Moderate       Post Procedure Plan   Return to same level of care    CT Chest 6/13/2019     FINDINGS: The mediastinum appears normal without signs of any lymph node enlargement. The thyroid gland extends down to the level of the sternum bilaterally.       The aorta is of normal caliber as well as the pulmonary arteries.       Subpleural areas of pulmonary linear changes, likely pulmonary fibrosis or interstitial lung disease are appreciated with 1 to 2 mm pulmonary nodule in the left upper lobe on image 42 series 9, well circumscribed.  Additional subpleural linear changes    noted right middle lobe right left lower lobe and lingula near the lung base.       Poorly defined nodules noted just above the right hemidiaphragm the anterior aspect right lower lobe measuring 5 x 4 mm       There are some calcified subcarinal lymph nodes appreciated.       Calcification the left anterior descending coronary artery is appreciated without cardiac enlargement.       No pleural or pericardial effusion is noted.           Impression     1.  Small amount of subpleural linear change which may represent interstitial pneumonitis or interstitial lung disease most pronounced in the lower lobes and middle lobes without any significant pulmonary fibrotic change or volume loss. 2.  Several punctate nodules appreciated one in the left upper lobe subpleural measuring 1 to 2 mm another in the left upper lobe with one poorly defined irregular nodule just above the right hemidiaphragm on image 69 image series 9 and image 68 series 3    measuring up to 5 x 4 mm which should be followed yearly.       3. No spiculated masses. Mild bronchial dilation in the lower lobes     The risks and benefits as well as alternatives to the procedure have been discussed with the patient. The patient understands and agrees to proceed.      A/P    ILD NOS - Will proceed with Bronch BAL +/- TBBx LLL

## 2019-06-27 NOTE — PROCEDURES
PROCEDURE:  BRONCHOSCOPY WITH BRONCHOALVEOLAR LAVAGE and endobronchial biopsy     The risks and benefits as well as alternatives to the procedure have been discussed with the patient. The patient understands and agrees to proceed. DESCRIPTION OF PROCEDURE: A time out was taken. Type of sedation used: Moderate Sedation  Physician/patient face-to-face sedation start time: 8:43 am  Physician/patient face-to-face sedation stop time: 09:08 am  Total moderate sedation time in minutes: 25 minutes  Patient was monitored continuously 1:1 throughout the entire procedure while sedation was administered    ASA 2  Mallampati 3  Versed 3 mg IV push  Fentanyl 75 mcg IV push    The scope was passed with ease via the right nares. A complete airway inspection was performed. No endobronchial lesions were identified. Mucosa was normal.  No secretions throughout the bilateral airways. 1.  Washings were obtained from throughout the airways and sent for AFB  2. A bronchoalveolar lavage was obtained from the RLL with 120 ml instilled and 35 ml recovered. BAL was sent for immunocompromise panel and CD4 to CD8 ratio  4. Endobronchial biopsies were obtained from vicente x3 and secondary vicente between right middle lobe and right lower lobe x1    The patient tolerated the procedure well. Recovery will be per endoscopy protocol. FOLLOW UP:  is with me in approximately 10 days. Patient is instructed to call with concerns and if follow up has not already been scheduled. In the event of severe symptoms or if the patient is unable to reach my office, instructions are given to proceed to the emergency department.

## 2019-06-28 ENCOUNTER — TELEPHONE (OUTPATIENT)
Dept: PULMONOLOGY | Age: 59
End: 2019-06-28

## 2019-06-28 LAB
ASPERGILLUS GALACTO AG: NEGATIVE
ASPERGILLUS GALACTO INDEX: 0.13

## 2019-06-28 NOTE — TELEPHONE ENCOUNTER
Dr. Gilbert Jose was notified that [employer] from yesterday's bronch was resulted and that nothing was detected.

## 2019-06-30 LAB
FINAL REPORT: NORMAL
PRELIMINARY: NORMAL

## 2019-07-01 LAB
CULTURE, RESPIRATORY: ABNORMAL
CULTURE, RESPIRATORY: ABNORMAL
GRAM STAIN RESULT: ABNORMAL
ORGANISM: ABNORMAL
REPORT: NORMAL

## 2019-07-04 LAB
FINAL REPORT: NORMAL
PRELIMINARY: NORMAL

## 2019-07-05 LAB
FINAL REPORT: NORMAL
PRELIMINARY: NORMAL

## 2019-07-08 LAB
FINAL REPORT: NORMAL
PRELIMINARY: NORMAL

## 2019-07-09 ENCOUNTER — OFFICE VISIT (OUTPATIENT)
Dept: PULMONOLOGY | Age: 59
End: 2019-07-09
Payer: COMMERCIAL

## 2019-07-09 VITALS
HEIGHT: 63 IN | HEART RATE: 71 BPM | SYSTOLIC BLOOD PRESSURE: 130 MMHG | WEIGHT: 256 LBS | BODY MASS INDEX: 45.36 KG/M2 | OXYGEN SATURATION: 95 % | DIASTOLIC BLOOD PRESSURE: 78 MMHG

## 2019-07-09 DIAGNOSIS — J84.9 ILD (INTERSTITIAL LUNG DISEASE) (HCC): Primary | ICD-10-CM

## 2019-07-09 PROCEDURE — G8428 CUR MEDS NOT DOCUMENT: HCPCS | Performed by: INTERNAL MEDICINE

## 2019-07-09 PROCEDURE — 3017F COLORECTAL CA SCREEN DOC REV: CPT | Performed by: INTERNAL MEDICINE

## 2019-07-09 PROCEDURE — G8598 ASA/ANTIPLAT THER USED: HCPCS | Performed by: INTERNAL MEDICINE

## 2019-07-09 PROCEDURE — G8417 CALC BMI ABV UP PARAM F/U: HCPCS | Performed by: INTERNAL MEDICINE

## 2019-07-09 PROCEDURE — 1036F TOBACCO NON-USER: CPT | Performed by: INTERNAL MEDICINE

## 2019-07-09 PROCEDURE — 99214 OFFICE O/P EST MOD 30 MIN: CPT | Performed by: INTERNAL MEDICINE

## 2019-07-09 RX ORDER — LEVOFLOXACIN 500 MG/1
500 TABLET, FILM COATED ORAL DAILY
Qty: 7 TABLET | Refills: 0 | Status: SHIPPED | OUTPATIENT
Start: 2019-07-09 | End: 2019-07-16

## 2019-07-10 NOTE — PROGRESS NOTES
HFA) 108 (90 Base) MCG/ACT inhaler Inhale 2 puffs into the lungs every 4 hours as needed for Wheezing 1 Inhaler 2    Multiple Vitamins-Minerals (THERAPEUTIC MULTIVITAMIN-MINERALS) tablet Take 1 tablet by mouth daily      aspirin 81 MG chewable tablet Take 1 tablet by mouth daily 90 tablet 3     No current facility-administered medications on file prior to visit. REVIEW OF SYSTEMS:    CONSTITUTIONAL: Negative for fevers and chills  HEENT: Negative for oropharyngeal exudate, post nasal drip, sinus pain / pressure, nasal congestion, ear pain  RESPIRATORY:  See HPI  CARDIOVASCULAR: Negative for chest pain, palpitations, edema  GASTROINTESTINAL: Negative for nausea, vomiting, diarrhea, constipation and abdominal pain  GENITOURINARY: Negative for dysuria, urinary frequency, urinary hesitancy  HEMATOLOGICAL: Negative for adenopathy  SKIN: Negative for clubbing, cyanosis, skin lesions  ENDOCRINE: Negative for polyuria, polydipsia, heat intolerance, cold intolerance   EXTREMITIES: Negative for weakness, decreased ROM  NEUROLOGICAL: Negative for unilateral weakness, speech or gait abnormalities    Objective:   PHYSICAL EXAM:        VITALS:  /78 (Site: Right Upper Arm, Position: Sitting, Cuff Size: Large Adult)   Pulse 71   Ht 5' 3\" (1.6 m)   Wt 256 lb (116.1 kg)   LMP 07/13/2009   SpO2 95%   BMI 45.35 kg/m²   On RA  CONSTITUTIONAL:  Awake, alert, cooperative, no apparent distress, and appears stated age  HEENT: No oropharyngeal exudate, PERRL, no cervical adenopathy, no tracheal deviation, thyroid size normal  LUNGS:  No increased work of breathing and clear to auscultation, no crackles or wheezing  CARDIOVASCULAR:  normal S1 and S2 and no JVD  ABDOMEN:  Normal bowel sounds, non-distended and non-tender to palpation  EXT: No edema, no calf tenderness. Pulses are present bilaterally. NEUROLOGIC:  Mental Status Exam:  Level of Alertness:   awake  Orientation:   person, place, time.   SKIN:  normal skin color, texture, turgor, no redness, warmth, or swelling     DATA:      Radiology Review:  Pertinent images / reports were reviewed as a part of this visit. CT Chest 2/22/2019 reveals the following:     Impression       Bilateral and fairly symmetric reticulation most pronounced about the lower lobes with immediate subpleural sparing may relate to interstitial pneumonitis and may relate to nonspecific interstitial pneumonitis. Findings may be present in connective    tissue disease including SLE, autoimmune disease including rheumatoid arthritis as well as other etiologies including hypersensitivity pneumonitis, drug-induced pneumonitis as well as other causes.       No pulmonary fibrosis.       No lobar consolidation. CT chest June 13, 2019  FINDINGS: The mediastinum appears normal without signs of any lymph node enlargement. The thyroid gland extends down to the level of the sternum bilaterally.       The aorta is of normal caliber as well as the pulmonary arteries.       Subpleural areas of pulmonary linear changes, likely pulmonary fibrosis or interstitial lung disease are appreciated with 1 to 2 mm pulmonary nodule in the left upper lobe on image 42 series 9, well circumscribed. Additional subpleural linear changes    noted right middle lobe right left lower lobe and lingula near the lung base.       Poorly defined nodules noted just above the right hemidiaphragm the anterior aspect right lower lobe measuring 5 x 4 mm       There are some calcified subcarinal lymph nodes appreciated.       Calcification the left anterior descending coronary artery is appreciated without cardiac enlargement.       No pleural or pericardial effusion is noted.           Impression       1.  Small amount of subpleural linear change which may represent interstitial pneumonitis or interstitial lung disease most pronounced in the lower lobes and middle lobes without any significant pulmonary fibrotic change or volume loss.    2.  Several punctate nodules appreciated one in the left upper lobe subpleural measuring 1 to 2 mm another in the left upper lobe with one poorly defined irregular nodule just above the right hemidiaphragm on image 69 image series 9 and image 68 series 3    measuring up to 5 x 4 mm which should be followed yearly.       3. No spiculated masses. Mild bronchial dilation in the lower lobes     Last PFTs: May 2018  TEST PERFORMED:  1. Spirometry with flow-volume loops obtained before and after  bronchodilation. 2.  Lung volumes by plethysmography. 3.  Diffusion capacity of carbon monoxide.     Test meets ATS criteria and the quality of flow-volume loops is sufficient  for interpretation. Good patient effort.     The FEV1 is 2.24 L or 89% predicted. The FEV1 to FVC ratio is 75. Post  bronchodilator, the FEV1 changed to 2.29 L or 91% predicted. Total lung  capacity is 94% predicted and diffusion is 83% predicted.     INTERPRETATION:  1. No obstruction, no significant post-bronchodilator improvement. 2.  Normal lung volumes. 3.  Normal diffusion capacity. 4.  Clinical correlation recommended, if strong suspicion for obstructive  lung disease exists, we would recommend further evaluation with  methacholine bronchoprovocation study. Bronchial challenge June 13, 2018  IMPRESSION:  1. Normal spirometry. 2.  No significant response to bronchodilators. 3.  Negative bronchial challenge indicative of no airway hyperreactivity or  hyperresponsiveness.     Myocardial stress test June 13, 2018  Impression       Pharmacologic stress induced reversible ischemia anterior and   anterolateral walls. Left heart catheterization June 19, 2018  Conclusions:  -Severe single vessel CAD; LAD stented with a 3.5 x 15 mm Xiecne BRIAN to 10 RUBY. A \"pinched\" diag was treated with a 2.0 x 12 mm balloon.  -Elevated LVEDP  -Systemic HTN  -Normal LVEF    MBS  Impression       Normal modified barium swallow.          Assessment: Diagnosis Orders   1. ILD (interstitial lung disease) [facility])  Samira Ramirez MD, Cardiothoracic Surgery, [facility]       Plan:     Patient still with constant daily dry cough and dyspnea on exertion. Extensive work-up has revealed basilar predominant ILD without honeycombing or bronchiectasis. 1.  Await Cx  2.   Phenergan with codeine to help control cough    Refer to Dr. Lionel Hendricks for open lung biopsy once/if Cx negative

## 2019-07-19 NOTE — TELEPHONE ENCOUNTER
Office received a refill request for pt's Atorvastatin 40 mg Tablets. This Rx is too soon. CG last filled on 5/6/19 QTY 90 w/2 refills. Tried to contact pharmacy [phone]  transferred me to customer service and hung up.

## 2019-07-22 ENCOUNTER — OFFICE VISIT (OUTPATIENT)
Dept: CARDIOTHORACIC SURGERY | Age: 59
End: 2019-07-22
Payer: COMMERCIAL

## 2019-07-22 VITALS
BODY MASS INDEX: 45.54 KG/M2 | HEIGHT: 63 IN | HEART RATE: 64 BPM | OXYGEN SATURATION: 98 % | WEIGHT: 257 LBS | DIASTOLIC BLOOD PRESSURE: 80 MMHG | SYSTOLIC BLOOD PRESSURE: 110 MMHG

## 2019-07-22 DIAGNOSIS — E78.00 PURE HYPERCHOLESTEROLEMIA: ICD-10-CM

## 2019-07-22 DIAGNOSIS — E66.01 MORBID OBESITY WITH BMI OF 45.0-49.9, ADULT (HCC): ICD-10-CM

## 2019-07-22 DIAGNOSIS — R06.02 SOB (SHORTNESS OF BREATH): Primary | ICD-10-CM

## 2019-07-22 PROCEDURE — G8417 CALC BMI ABV UP PARAM F/U: HCPCS | Performed by: THORACIC SURGERY (CARDIOTHORACIC VASCULAR SURGERY)

## 2019-07-22 PROCEDURE — G8427 DOCREV CUR MEDS BY ELIG CLIN: HCPCS | Performed by: THORACIC SURGERY (CARDIOTHORACIC VASCULAR SURGERY)

## 2019-07-22 PROCEDURE — 3017F COLORECTAL CA SCREEN DOC REV: CPT | Performed by: THORACIC SURGERY (CARDIOTHORACIC VASCULAR SURGERY)

## 2019-07-22 PROCEDURE — G8598 ASA/ANTIPLAT THER USED: HCPCS | Performed by: THORACIC SURGERY (CARDIOTHORACIC VASCULAR SURGERY)

## 2019-07-22 PROCEDURE — 1036F TOBACCO NON-USER: CPT | Performed by: THORACIC SURGERY (CARDIOTHORACIC VASCULAR SURGERY)

## 2019-07-22 PROCEDURE — 99203 OFFICE O/P NEW LOW 30 MIN: CPT | Performed by: THORACIC SURGERY (CARDIOTHORACIC VASCULAR SURGERY)

## 2019-07-22 NOTE — PROGRESS NOTES
Sensation and motor function grossly intact. Psychiatric: oriented, appropriate mood/affect. MEDICAL DECISION MAKING/TESTING  Studies personally reviewed. Echo:     CXR:     CT  Small amount of subpleural linear change which may represent interstitial pneumonitis or interstitial lung disease most pronounced in the lower lobes and middle lobes without any significant pulmonary fibrotic change or volume loss. 2.  Several punctate nodules appreciated one in the left upper lobe subpleural measuring 1 to 2 mm another in the left upper lobe with one poorly defined irregular nodule just above the right hemidiaphragm on image 69 image series 9 and image 68 series 3    measuring up to 5 x 4 mm which should be followed yearly.       3. No spiculated masses. Mild bronchial dilation in the lower lobes         PET/CT    PFT          Labs:   CBC: No results for input(s): WBC, HGB, HCT, MCV, PLT in the last 72 hours. BMP: No results for input(s): NA, K, CL, CO2, PHOS, BUN, CREATININE, CALCIUM, MG in the last 72 hours. Cardiac Enzymes: No results for input(s): CKTOTAL, CKMB, CKMBINDEX, TROPONINI in the last 72 hours. PT/INR: No results for input(s): PROTIME, INR in the last 72 hours. APTT: No results for input(s): APTT in the last 72 hours.   Liver Profile:  Lab Results   Component Value Date    AST 19 06/04/2019    ALT 17 06/04/2019    BILITOT 0.5 06/04/2019    ALKPHOS 67 06/04/2019     Lab Results   Component Value Date    CHOL 166 06/04/2019    HDL 38 06/04/2019    TRIG 187 06/04/2019     UA: No results found for: NITRITE, COLORU, PHUR, LABCAST, [address], RBCUA, MUCUS, TRICHOMONAS, YEAST, BACTERIA, CLARITYU, SPECGRAV, LEUKOCYTESUR, UROBILINOGEN, BILIRUBINUR, BLOODU, GLUCOSEU, AMORPHOUS    History obtained: chart, pt    Risk factors:      Diagnosis: Interstitial lung disease of unknown cause    Plan: Right video-assisted thoracoscopy wedge resection right upper lobe and lower lobe for diagnosis we will send for pathology and culture and sensitivity      Typical periop/postop course reviewed including initial limitations on driving/heavy lifting. Risks, benefits and postoperative complications discussed including bleeding, infection, stroke, death, postop pulmonary and renal issues.     Mena Funez MD FACS

## 2019-07-23 ENCOUNTER — TELEPHONE (OUTPATIENT)
Dept: CARDIOTHORACIC SURGERY | Age: 59
End: 2019-07-23

## 2019-07-25 RX ORDER — ATORVASTATIN CALCIUM 80 MG/1
TABLET, FILM COATED ORAL
Qty: 90 TABLET | Refills: 2 | Status: SHIPPED | OUTPATIENT
Start: 2019-07-25 | End: 2019-12-04

## 2019-08-05 RX ORDER — CLOPIDOGREL BISULFATE 75 MG/1
75 TABLET ORAL DAILY
Qty: 90 TABLET | Refills: 3 | OUTPATIENT
Start: 2019-08-05

## 2019-08-05 RX ORDER — ASPIRIN 81 MG/1
81 TABLET, CHEWABLE ORAL DAILY
Qty: 90 TABLET | Refills: 3 | Status: SHIPPED | OUTPATIENT
Start: 2019-08-05 | End: 2020-10-19

## 2019-08-13 LAB
AFB CULTURE (MYCOBACTERIA): NORMAL
AFB CULTURE (MYCOBACTERIA): NORMAL
AFB SMEAR: NORMAL
AFB SMEAR: NORMAL

## 2019-08-22 RX ORDER — SODIUM CHLORIDE 9 MG/ML
INJECTION, SOLUTION INTRAVENOUS CONTINUOUS
Status: CANCELLED | OUTPATIENT
Start: 2019-09-04

## 2019-08-23 ENCOUNTER — HOSPITAL ENCOUNTER (OUTPATIENT)
Dept: PREADMISSION TESTING | Age: 59
Discharge: HOME | End: 2019-08-27
Payer: COMMERCIAL

## 2019-08-23 ENCOUNTER — HOSPITAL ENCOUNTER (OUTPATIENT)
Dept: GENERAL RADIOLOGY | Age: 59
Discharge: HOME | End: 2019-08-23
Payer: COMMERCIAL

## 2019-08-23 LAB
ABO/RH: NORMAL
ALBUMIN SERPL-MCNC: 4.1 G/DL (ref 3.4–5)
ALP BLD-CCNC: 80 U/L (ref 40–129)
ALT SERPL-CCNC: 17 U/L (ref 10–40)
ANION GAP SERPL CALCULATED.3IONS-SCNC: 10 MMOL/L (ref 3–16)
ANTIBODY SCREEN: NORMAL
APTT: 36.5 SEC (ref 26–36)
AST SERPL-CCNC: 19 U/L (ref 15–37)
BACTERIA: ABNORMAL /HPF
BILIRUB SERPL-MCNC: 0.5 MG/DL (ref 0–1)
BILIRUBIN DIRECT: <0.2 MG/DL (ref 0–0.3)
BILIRUBIN URINE: NEGATIVE
BILIRUBIN, INDIRECT: NORMAL MG/DL (ref 0–1)
BLOOD, URINE: NEGATIVE
BUN BLDV-MCNC: 20 MG/DL (ref 7–20)
CALCIUM SERPL-MCNC: 9.6 MG/DL (ref 8.3–10.6)
CHLORIDE BLD-SCNC: 104 MMOL/L (ref 99–110)
CLARITY: CLEAR
CO2: 29 MMOL/L (ref 21–32)
COLOR: YELLOW
CREAT SERPL-MCNC: 1.4 MG/DL (ref 0.6–1.2)
EKG ATRIAL RATE: 53 BPM
EKG DIAGNOSIS: NORMAL
EKG P AXIS: 47 DEGREES
EKG P-R INTERVAL: 158 MS
EKG Q-T INTERVAL: 458 MS
EKG QRS DURATION: 80 MS
EKG QTC CALCULATION (BAZETT): 429 MS
EKG R AXIS: 54 DEGREES
EKG T AXIS: 43 DEGREES
EKG VENTRICULAR RATE: 53 BPM
EPITHELIAL CELLS, UA: ABNORMAL /HPF
GFR AFRICAN AMERICAN: 46
GFR NON-AFRICAN AMERICAN: 38
GLUCOSE BLD-MCNC: 105 MG/DL (ref 70–99)
GLUCOSE URINE: NEGATIVE MG/DL
HCT VFR BLD CALC: 38.7 % (ref 36–48)
HEMOGLOBIN: 13.2 G/DL (ref 12–16)
INR BLD: 1.02 (ref 0.86–1.14)
KETONES, URINE: NEGATIVE MG/DL
LEUKOCYTE ESTERASE, URINE: ABNORMAL
MAGNESIUM: 1.8 MG/DL (ref 1.8–2.4)
MCH RBC QN AUTO: 30.2 PG (ref 26–34)
MCHC RBC AUTO-ENTMCNC: 34.2 G/DL (ref 31–36)
MCV RBC AUTO: 88.1 FL (ref 80–100)
MICROSCOPIC EXAMINATION: YES
MUCUS: ABNORMAL /LPF
NITRITE, URINE: NEGATIVE
PDW BLD-RTO: 13.4 % (ref 12.4–15.4)
PH UA: 6 (ref 5–8)
PLATELET # BLD: 262 K/UL (ref 135–450)
PMV BLD AUTO: 7.5 FL (ref 5–10.5)
POTASSIUM SERPL-SCNC: 4.2 MMOL/L (ref 3.5–5.1)
PROTEIN UA: NEGATIVE MG/DL
PROTHROMBIN TIME: 11.6 SEC (ref 9.8–13)
RBC # BLD: 4.39 M/UL (ref 4–5.2)
RBC UA: ABNORMAL /HPF (ref 0–2)
SODIUM BLD-SCNC: 143 MMOL/L (ref 136–145)
SPECIFIC GRAVITY UA: 1.02 (ref 1–1.03)
TOTAL PROTEIN: 7.8 G/DL (ref 6.4–8.2)
URINE TYPE: ABNORMAL
UROBILINOGEN, URINE: 0.2 E.U./DL
WBC # BLD: 4.7 K/UL (ref 4–11)
WBC UA: ABNORMAL /HPF (ref 0–5)

## 2019-08-23 PROCEDURE — 86901 BLOOD TYPING SEROLOGIC RH(D): CPT

## 2019-08-23 PROCEDURE — 85610 PROTHROMBIN TIME: CPT

## 2019-08-23 PROCEDURE — 71046 X-RAY EXAM CHEST 2 VIEWS: CPT

## 2019-08-23 PROCEDURE — 83735 ASSAY OF MAGNESIUM: CPT

## 2019-08-23 PROCEDURE — 81001 URINALYSIS AUTO W/SCOPE: CPT

## 2019-08-23 PROCEDURE — 93005 ELECTROCARDIOGRAM TRACING: CPT | Performed by: THORACIC SURGERY (CARDIOTHORACIC VASCULAR SURGERY)

## 2019-08-23 PROCEDURE — 80076 HEPATIC FUNCTION PANEL: CPT

## 2019-08-23 PROCEDURE — 86900 BLOOD TYPING SEROLOGIC ABO: CPT

## 2019-08-23 PROCEDURE — 85730 THROMBOPLASTIN TIME PARTIAL: CPT

## 2019-08-23 PROCEDURE — 85027 COMPLETE CBC AUTOMATED: CPT

## 2019-08-23 PROCEDURE — 93010 ELECTROCARDIOGRAM REPORT: CPT | Performed by: INTERNAL MEDICINE

## 2019-08-23 PROCEDURE — 86850 RBC ANTIBODY SCREEN: CPT

## 2019-08-23 PROCEDURE — 80048 BASIC METABOLIC PNL TOTAL CA: CPT

## 2019-08-23 PROCEDURE — 87086 URINE CULTURE/COLONY COUNT: CPT

## 2019-08-23 NOTE — PROGRESS NOTES
discharge. 3. You and your family will be given written instructions about your diet, activity, dressing care, medications, and return visits. 4. Once at home, should issues with nausea, pain, or bleeding occur, or should you notice any signs of infection, you should call your surgeon. 5. Always clean your hands before and after caring for your wound. Do not let your family touch your surgery site without cleaning their hands. 6. Narcotic pain medications can cause significant constipation. You may want to add a stool softener to your postoperative medication schedule or speak to your surgeon on how best to manage this SIDE EFFECT. SPECIAL INSTRUCTIONS     Thank you for allowing us to care for you. We strive to exceed your expectations in the overall delivery of care and service provided to you and your family. If you need to contact us for any reason, please call us at [phone]. Instructions reviewed and copy given to patient during preadmission testing visit. Juliette Saenz. 8/23/2019 .1:09 PM      ADDITIONAL EDUCATIONAL INFORMATION REVIEWED / PROVIDED TO YOU AND YOUR FAMILY:  Yes Taking Control of Your Pain   Yes FAQs about Surgical Site Infections    No Cardiac Surgery Instructions for AM admission to the hospital  No Bactroban® Nasal Ointment Instructions for Cardiac Surgery  No Learning About Preventing Pressure Sores  Yes Cardiac Surgery Preoperative Hibiclens® Bathing Instructions  YES / [id number]} Your Care after Heart Surgery Binder    No Jackson® Wipes Bathing Instructions (Obtained from:  [email or web address] pdf )  Yes Hibiclens® Bathing Instructions  No Antibacterial Soap

## 2019-08-24 LAB — URINE CULTURE, ROUTINE: NORMAL

## 2019-08-26 VITALS
TEMPERATURE: 97.1 F | HEART RATE: 59 BPM | OXYGEN SATURATION: 98 % | WEIGHT: 255 LBS | SYSTOLIC BLOOD PRESSURE: 148 MMHG | DIASTOLIC BLOOD PRESSURE: 73 MMHG | BODY MASS INDEX: 45.18 KG/M2 | HEIGHT: 63 IN | RESPIRATION RATE: 16 BRPM

## 2019-09-03 ENCOUNTER — ANESTHESIA EVENT (OUTPATIENT)
Dept: OPERATING ROOM | Age: 59
DRG: 167 | End: 2019-09-03
Payer: COMMERCIAL

## 2019-09-04 ENCOUNTER — APPOINTMENT (OUTPATIENT)
Dept: GENERAL RADIOLOGY | Age: 59
DRG: 167 | End: 2019-09-04
Attending: THORACIC SURGERY (CARDIOTHORACIC VASCULAR SURGERY)
Payer: COMMERCIAL

## 2019-09-04 ENCOUNTER — HOSPITAL ENCOUNTER (INPATIENT)
Age: 59
LOS: 1 days | Discharge: HOME | DRG: 167 | End: 2019-09-05
Attending: THORACIC SURGERY (CARDIOTHORACIC VASCULAR SURGERY) | Admitting: THORACIC SURGERY (CARDIOTHORACIC VASCULAR SURGERY)
Payer: COMMERCIAL

## 2019-09-04 ENCOUNTER — ANESTHESIA (OUTPATIENT)
Dept: OPERATING ROOM | Age: 59
DRG: 167 | End: 2019-09-04
Payer: COMMERCIAL

## 2019-09-04 VITALS
DIASTOLIC BLOOD PRESSURE: 69 MMHG | RESPIRATION RATE: 8 BRPM | SYSTOLIC BLOOD PRESSURE: 147 MMHG | TEMPERATURE: 95.5 F | OXYGEN SATURATION: 100 %

## 2019-09-04 DIAGNOSIS — J84.9 INTERSTITIAL LUNG DISEASE (HCC): Primary | ICD-10-CM

## 2019-09-04 LAB
ABO/RH: NORMAL
ANTIBODY SCREEN: NORMAL
HCT VFR BLD CALC: 38.5 % (ref 36–48)
HEMOGLOBIN: 12.9 G/DL (ref 12–16)
MCH RBC QN AUTO: 29.5 PG (ref 26–34)
MCHC RBC AUTO-ENTMCNC: 33.4 G/DL (ref 31–36)
MCV RBC AUTO: 88.5 FL (ref 80–100)
PDW BLD-RTO: 13.5 % (ref 12.4–15.4)
PLATELET # BLD: 230 K/UL (ref 135–450)
PMV BLD AUTO: 7.6 FL (ref 5–10.5)
RBC # BLD: 4.36 M/UL (ref 4–5.2)
WBC # BLD: 6.3 K/UL (ref 4–11)

## 2019-09-04 PROCEDURE — 86850 RBC ANTIBODY SCREEN: CPT

## 2019-09-04 PROCEDURE — G0008 ADMIN INFLUENZA VIRUS VAC: HCPCS | Performed by: THORACIC SURGERY (CARDIOTHORACIC VASCULAR SURGERY)

## 2019-09-04 PROCEDURE — 2580000003 HC RX 258: Performed by: THORACIC SURGERY (CARDIOTHORACIC VASCULAR SURGERY)

## 2019-09-04 PROCEDURE — 87205 SMEAR GRAM STAIN: CPT

## 2019-09-04 PROCEDURE — 6370000000 HC RX 637 (ALT 250 FOR IP)

## 2019-09-04 PROCEDURE — 6360000002 HC RX W HCPCS: Performed by: NURSE ANESTHETIST, CERTIFIED REGISTERED

## 2019-09-04 PROCEDURE — 6360000002 HC RX W HCPCS: Performed by: THORACIC SURGERY (CARDIOTHORACIC VASCULAR SURGERY)

## 2019-09-04 PROCEDURE — 3600000004 HC SURGERY LEVEL 4 BASE: Performed by: THORACIC SURGERY (CARDIOTHORACIC VASCULAR SURGERY)

## 2019-09-04 PROCEDURE — 87116 MYCOBACTERIA CULTURE: CPT

## 2019-09-04 PROCEDURE — 88342 IMHCHEM/IMCYTCHM 1ST ANTB: CPT

## 2019-09-04 PROCEDURE — 2500000003 HC RX 250 WO HCPCS: Performed by: NURSE ANESTHETIST, CERTIFIED REGISTERED

## 2019-09-04 PROCEDURE — 2500000003 HC RX 250 WO HCPCS: Performed by: THORACIC SURGERY (CARDIOTHORACIC VASCULAR SURGERY)

## 2019-09-04 PROCEDURE — 88341 IMHCHEM/IMCYTCHM EA ADD ANTB: CPT

## 2019-09-04 PROCEDURE — 0BBF4ZX EXCISION OF RIGHT LOWER LUNG LOBE, PERCUTANEOUS ENDOSCOPIC APPROACH, DIAGNOSTIC: ICD-10-PCS | Performed by: THORACIC SURGERY (CARDIOTHORACIC VASCULAR SURGERY)

## 2019-09-04 PROCEDURE — 7100000000 HC PACU RECOVERY - FIRST 15 MIN: Performed by: THORACIC SURGERY (CARDIOTHORACIC VASCULAR SURGERY)

## 2019-09-04 PROCEDURE — 87206 SMEAR FLUORESCENT/ACID STAI: CPT

## 2019-09-04 PROCEDURE — 88307 TISSUE EXAM BY PATHOLOGIST: CPT

## 2019-09-04 PROCEDURE — 2720000010 HC SURG SUPPLY STERILE: Performed by: THORACIC SURGERY (CARDIOTHORACIC VASCULAR SURGERY)

## 2019-09-04 PROCEDURE — 71045 X-RAY EXAM CHEST 1 VIEW: CPT

## 2019-09-04 PROCEDURE — C9290 INJ, BUPIVACAINE LIPOSOME: HCPCS | Performed by: THORACIC SURGERY (CARDIOTHORACIC VASCULAR SURGERY)

## 2019-09-04 PROCEDURE — 3600000014 HC SURGERY LEVEL 4 ADDTL 15MIN: Performed by: THORACIC SURGERY (CARDIOTHORACIC VASCULAR SURGERY)

## 2019-09-04 PROCEDURE — 94150 VITAL CAPACITY TEST: CPT

## 2019-09-04 PROCEDURE — 87070 CULTURE OTHR SPECIMN AEROBIC: CPT

## 2019-09-04 PROCEDURE — 3700000001 HC ADD 15 MINUTES (ANESTHESIA): Performed by: THORACIC SURGERY (CARDIOTHORACIC VASCULAR SURGERY)

## 2019-09-04 PROCEDURE — 90686 IIV4 VACC NO PRSV 0.5 ML IM: CPT | Performed by: THORACIC SURGERY (CARDIOTHORACIC VASCULAR SURGERY)

## 2019-09-04 PROCEDURE — 2709999900 HC NON-CHARGEABLE SUPPLY: Performed by: THORACIC SURGERY (CARDIOTHORACIC VASCULAR SURGERY)

## 2019-09-04 PROCEDURE — 2060000000 HC ICU INTERMEDIATE R&B

## 2019-09-04 PROCEDURE — 32607 THORACOSCOPY W/BX INFILTRATE: CPT | Performed by: THORACIC SURGERY (CARDIOTHORACIC VASCULAR SURGERY)

## 2019-09-04 PROCEDURE — 87176 TISSUE HOMOGENIZATION CULTR: CPT

## 2019-09-04 PROCEDURE — 85027 COMPLETE CBC AUTOMATED: CPT

## 2019-09-04 PROCEDURE — 6360000002 HC RX W HCPCS: Performed by: ANESTHESIOLOGY

## 2019-09-04 PROCEDURE — 87015 SPECIMEN INFECT AGNT CONCNTJ: CPT

## 2019-09-04 PROCEDURE — 3E0T3BZ INTRODUCTION OF ANESTHETIC AGENT INTO PERIPHERAL NERVES AND PLEXI, PERCUTANEOUS APPROACH: ICD-10-PCS | Performed by: THORACIC SURGERY (CARDIOTHORACIC VASCULAR SURGERY)

## 2019-09-04 PROCEDURE — 86900 BLOOD TYPING SEROLOGIC ABO: CPT

## 2019-09-04 PROCEDURE — 6370000000 HC RX 637 (ALT 250 FOR IP): Performed by: THORACIC SURGERY (CARDIOTHORACIC VASCULAR SURGERY)

## 2019-09-04 PROCEDURE — 3700000000 HC ANESTHESIA ATTENDED CARE: Performed by: THORACIC SURGERY (CARDIOTHORACIC VASCULAR SURGERY)

## 2019-09-04 PROCEDURE — 0WP9X0Z REMOVAL OF DRAINAGE DEVICE FROM RIGHT PLEURAL CAVITY, EXTERNAL APPROACH: ICD-10-PCS | Performed by: THORACIC SURGERY (CARDIOTHORACIC VASCULAR SURGERY)

## 2019-09-04 PROCEDURE — 7100000001 HC PACU RECOVERY - ADDTL 15 MIN: Performed by: THORACIC SURGERY (CARDIOTHORACIC VASCULAR SURGERY)

## 2019-09-04 PROCEDURE — 0BBC4ZX EXCISION OF RIGHT UPPER LUNG LOBE, PERCUTANEOUS ENDOSCOPIC APPROACH, DIAGNOSTIC: ICD-10-PCS | Performed by: THORACIC SURGERY (CARDIOTHORACIC VASCULAR SURGERY)

## 2019-09-04 PROCEDURE — 88312 SPECIAL STAINS GROUP 1: CPT

## 2019-09-04 PROCEDURE — 86901 BLOOD TYPING SEROLOGIC RH(D): CPT

## 2019-09-04 PROCEDURE — C1729 CATH, DRAINAGE: HCPCS | Performed by: THORACIC SURGERY (CARDIOTHORACIC VASCULAR SURGERY)

## 2019-09-04 PROCEDURE — 88313 SPECIAL STAINS GROUP 2: CPT

## 2019-09-04 PROCEDURE — 87102 FUNGUS ISOLATION CULTURE: CPT

## 2019-09-04 RX ORDER — MAGNESIUM HYDROXIDE 1200 MG/15ML
LIQUID ORAL CONTINUOUS PRN
Status: COMPLETED | OUTPATIENT
Start: 2019-09-04 | End: 2019-09-04

## 2019-09-04 RX ORDER — MORPHINE SULFATE 4 MG/ML
4 INJECTION, SOLUTION INTRAMUSCULAR; INTRAVENOUS
Status: DISCONTINUED | OUTPATIENT
Start: 2019-09-04 | End: 2019-09-05

## 2019-09-04 RX ORDER — M-VIT,TX,IRON,MINS/CALC/FOLIC 27MG-0.4MG
1 TABLET ORAL DAILY
Status: DISCONTINUED | OUTPATIENT
Start: 2019-09-04 | End: 2019-09-05 | Stop reason: HOSPADM

## 2019-09-04 RX ORDER — METOCLOPRAMIDE HYDROCHLORIDE 5 MG/ML
10 INJECTION INTRAMUSCULAR; INTRAVENOUS
Status: DISCONTINUED | OUTPATIENT
Start: 2019-09-04 | End: 2019-09-04 | Stop reason: HOSPADM

## 2019-09-04 RX ORDER — DEXAMETHASONE SODIUM PHOSPHATE 4 MG/ML
4 INJECTION, SOLUTION INTRA-ARTICULAR; INTRALESIONAL; INTRAMUSCULAR; INTRAVENOUS; SOFT TISSUE EVERY 6 HOURS
Status: DISCONTINUED | OUTPATIENT
Start: 2019-09-04 | End: 2019-09-05 | Stop reason: HOSPADM

## 2019-09-04 RX ORDER — LIDOCAINE HYDROCHLORIDE 10 MG/ML
INJECTION, SOLUTION INFILTRATION; PERINEURAL
Status: DISCONTINUED
Start: 2019-09-04 | End: 2019-09-04

## 2019-09-04 RX ORDER — MEPERIDINE HYDROCHLORIDE 25 MG/ML
12.5 INJECTION INTRAMUSCULAR; INTRAVENOUS; SUBCUTANEOUS EVERY 5 MIN PRN
Status: DISCONTINUED | OUTPATIENT
Start: 2019-09-04 | End: 2019-09-04 | Stop reason: HOSPADM

## 2019-09-04 RX ORDER — ROCURONIUM BROMIDE 10 MG/ML
INJECTION, SOLUTION INTRAVENOUS PRN
Status: DISCONTINUED | OUTPATIENT
Start: 2019-09-04 | End: 2019-09-04 | Stop reason: SDUPTHER

## 2019-09-04 RX ORDER — ALBUTEROL SULFATE 2.5 MG/3ML
2.5 SOLUTION RESPIRATORY (INHALATION)
Status: DISCONTINUED | OUTPATIENT
Start: 2019-09-04 | End: 2019-09-04 | Stop reason: ALTCHOICE

## 2019-09-04 RX ORDER — SODIUM CHLORIDE, SODIUM LACTATE, POTASSIUM CHLORIDE, CALCIUM CHLORIDE 600; 310; 30; 20 MG/100ML; MG/100ML; MG/100ML; MG/100ML
INJECTION, SOLUTION INTRAVENOUS CONTINUOUS
Status: DISCONTINUED | OUTPATIENT
Start: 2019-09-04 | End: 2019-09-04

## 2019-09-04 RX ORDER — ALBUTEROL SULFATE 2.5 MG/3ML
2.5 SOLUTION RESPIRATORY (INHALATION) EVERY 4 HOURS PRN
Status: DISCONTINUED | OUTPATIENT
Start: 2019-09-04 | End: 2019-09-05 | Stop reason: HOSPADM

## 2019-09-04 RX ORDER — LISINOPRIL 20 MG/1
20 TABLET ORAL DAILY
Status: DISCONTINUED | OUTPATIENT
Start: 2019-09-04 | End: 2019-09-05 | Stop reason: HOSPADM

## 2019-09-04 RX ORDER — LABETALOL 20 MG/4 ML (5 MG/ML) INTRAVENOUS SYRINGE
5 EVERY 10 MIN PRN
Status: DISCONTINUED | OUTPATIENT
Start: 2019-09-04 | End: 2019-09-04 | Stop reason: HOSPADM

## 2019-09-04 RX ORDER — OXYCODONE HYDROCHLORIDE 5 MG/1
10 TABLET ORAL PRN
Status: DISCONTINUED | OUTPATIENT
Start: 2019-09-04 | End: 2019-09-04 | Stop reason: HOSPADM

## 2019-09-04 RX ORDER — PHENYLEPHRINE HYDROCHLORIDE 10 MG/ML
INJECTION INTRAVENOUS PRN
Status: DISCONTINUED | OUTPATIENT
Start: 2019-09-04 | End: 2019-09-04 | Stop reason: SDUPTHER

## 2019-09-04 RX ORDER — OXYCODONE HYDROCHLORIDE 5 MG/1
10 TABLET ORAL EVERY 4 HOURS PRN
Status: DISCONTINUED | OUTPATIENT
Start: 2019-09-04 | End: 2019-09-05 | Stop reason: HOSPADM

## 2019-09-04 RX ORDER — DIPHENHYDRAMINE HYDROCHLORIDE 50 MG/ML
12.5 INJECTION INTRAMUSCULAR; INTRAVENOUS
Status: DISCONTINUED | OUTPATIENT
Start: 2019-09-04 | End: 2019-09-04 | Stop reason: HOSPADM

## 2019-09-04 RX ORDER — HYDRALAZINE HYDROCHLORIDE 20 MG/ML
5 INJECTION INTRAMUSCULAR; INTRAVENOUS EVERY 10 MIN PRN
Status: DISCONTINUED | OUTPATIENT
Start: 2019-09-04 | End: 2019-09-04 | Stop reason: HOSPADM

## 2019-09-04 RX ORDER — SODIUM CHLORIDE 9 MG/ML
INJECTION, SOLUTION INTRAVENOUS CONTINUOUS
Status: DISCONTINUED | OUTPATIENT
Start: 2019-09-04 | End: 2019-09-04

## 2019-09-04 RX ORDER — OXYCODONE HYDROCHLORIDE 5 MG/1
5 TABLET ORAL PRN
Status: DISCONTINUED | OUTPATIENT
Start: 2019-09-04 | End: 2019-09-04 | Stop reason: HOSPADM

## 2019-09-04 RX ORDER — DOCUSATE SODIUM 100 MG/1
100 CAPSULE, LIQUID FILLED ORAL 2 TIMES DAILY
Status: DISCONTINUED | OUTPATIENT
Start: 2019-09-04 | End: 2019-09-05 | Stop reason: HOSPADM

## 2019-09-04 RX ORDER — ACETAMINOPHEN 325 MG/1
650 TABLET ORAL EVERY 4 HOURS PRN
Status: DISCONTINUED | OUTPATIENT
Start: 2019-09-04 | End: 2019-09-05 | Stop reason: HOSPADM

## 2019-09-04 RX ORDER — CITALOPRAM 40 MG/1
40 TABLET ORAL DAILY
Status: DISCONTINUED | OUTPATIENT
Start: 2019-09-04 | End: 2019-09-05 | Stop reason: HOSPADM

## 2019-09-04 RX ORDER — ONDANSETRON 2 MG/ML
4 INJECTION INTRAMUSCULAR; INTRAVENOUS EVERY 6 HOURS PRN
Status: DISCONTINUED | OUTPATIENT
Start: 2019-09-04 | End: 2019-09-05 | Stop reason: HOSPADM

## 2019-09-04 RX ORDER — ONDANSETRON 2 MG/ML
INJECTION INTRAMUSCULAR; INTRAVENOUS PRN
Status: DISCONTINUED | OUTPATIENT
Start: 2019-09-04 | End: 2019-09-04 | Stop reason: SDUPTHER

## 2019-09-04 RX ORDER — FAMOTIDINE 20 MG/1
20 TABLET, FILM COATED ORAL 2 TIMES DAILY
Status: DISCONTINUED | OUTPATIENT
Start: 2019-09-05 | End: 2019-09-05 | Stop reason: HOSPADM

## 2019-09-04 RX ORDER — GLYCOPYRROLATE 1 MG/5 ML
SYRINGE (ML) INTRAVENOUS PRN
Status: DISCONTINUED | OUTPATIENT
Start: 2019-09-04 | End: 2019-09-04 | Stop reason: SDUPTHER

## 2019-09-04 RX ORDER — FENTANYL CITRATE 50 UG/ML
INJECTION, SOLUTION INTRAMUSCULAR; INTRAVENOUS PRN
Status: DISCONTINUED | OUTPATIENT
Start: 2019-09-04 | End: 2019-09-04 | Stop reason: SDUPTHER

## 2019-09-04 RX ORDER — DEXAMETHASONE SODIUM PHOSPHATE 4 MG/ML
INJECTION, SOLUTION INTRA-ARTICULAR; INTRALESIONAL; INTRAMUSCULAR; INTRAVENOUS; SOFT TISSUE PRN
Status: DISCONTINUED | OUTPATIENT
Start: 2019-09-04 | End: 2019-09-04 | Stop reason: SDUPTHER

## 2019-09-04 RX ORDER — DEXTROSE, SODIUM CHLORIDE, AND POTASSIUM CHLORIDE 5; .45; .15 G/100ML; G/100ML; G/100ML
INJECTION INTRAVENOUS CONTINUOUS
Status: DISCONTINUED | OUTPATIENT
Start: 2019-09-04 | End: 2019-09-05

## 2019-09-04 RX ORDER — SODIUM CHLORIDE 0.9 % (FLUSH) 0.9 %
10 SYRINGE (ML) INJECTION EVERY 12 HOURS SCHEDULED
Status: DISCONTINUED | OUTPATIENT
Start: 2019-09-04 | End: 2019-09-05 | Stop reason: HOSPADM

## 2019-09-04 RX ORDER — PROPOFOL 10 MG/ML
INJECTION, EMULSION INTRAVENOUS PRN
Status: DISCONTINUED | OUTPATIENT
Start: 2019-09-04 | End: 2019-09-04 | Stop reason: SDUPTHER

## 2019-09-04 RX ORDER — ATORVASTATIN CALCIUM 20 MG/1
20 TABLET, FILM COATED ORAL DAILY
Status: DISCONTINUED | OUTPATIENT
Start: 2019-09-04 | End: 2019-09-05 | Stop reason: HOSPADM

## 2019-09-04 RX ORDER — SODIUM CHLORIDE 0.9 % (FLUSH) 0.9 %
10 SYRINGE (ML) INJECTION PRN
Status: DISCONTINUED | OUTPATIENT
Start: 2019-09-04 | End: 2019-09-05 | Stop reason: HOSPADM

## 2019-09-04 RX ORDER — ASPIRIN 81 MG/1
81 TABLET, CHEWABLE ORAL DAILY
Status: DISCONTINUED | OUTPATIENT
Start: 2019-09-04 | End: 2019-09-05 | Stop reason: HOSPADM

## 2019-09-04 RX ORDER — PROMETHAZINE HYDROCHLORIDE 25 MG/ML
6.25 INJECTION, SOLUTION INTRAMUSCULAR; INTRAVENOUS
Status: DISCONTINUED | OUTPATIENT
Start: 2019-09-04 | End: 2019-09-04 | Stop reason: HOSPADM

## 2019-09-04 RX ORDER — OXYCODONE HYDROCHLORIDE 5 MG/1
5 TABLET ORAL EVERY 4 HOURS PRN
Status: DISCONTINUED | OUTPATIENT
Start: 2019-09-04 | End: 2019-09-05 | Stop reason: HOSPADM

## 2019-09-04 RX ORDER — MORPHINE SULFATE 2 MG/ML
2 INJECTION, SOLUTION INTRAMUSCULAR; INTRAVENOUS
Status: DISCONTINUED | OUTPATIENT
Start: 2019-09-04 | End: 2019-09-05

## 2019-09-04 RX ORDER — MORPHINE SULFATE 4 MG/ML
1 INJECTION, SOLUTION INTRAMUSCULAR; INTRAVENOUS EVERY 5 MIN PRN
Status: DISCONTINUED | OUTPATIENT
Start: 2019-09-04 | End: 2019-09-04 | Stop reason: HOSPADM

## 2019-09-04 RX ADMIN — SUGAMMADEX 200 MG: 100 INJECTION, SOLUTION INTRAVENOUS at 08:23

## 2019-09-04 RX ADMIN — HYDROMORPHONE HYDROCHLORIDE 0.5 MG: 1 INJECTION, SOLUTION INTRAMUSCULAR; INTRAVENOUS; SUBCUTANEOUS at 09:14

## 2019-09-04 RX ADMIN — CEFAZOLIN SODIUM 2 G: 10 INJECTION, POWDER, FOR SOLUTION INTRAVENOUS at 16:42

## 2019-09-04 RX ADMIN — ATORVASTATIN CALCIUM 20 MG: 20 TABLET, FILM COATED ORAL at 16:42

## 2019-09-04 RX ADMIN — OXYCODONE HYDROCHLORIDE 10 MG: 5 TABLET ORAL at 21:58

## 2019-09-04 RX ADMIN — HYDROMORPHONE HYDROCHLORIDE 0.25 MG: 1 INJECTION, SOLUTION INTRAMUSCULAR; INTRAVENOUS; SUBCUTANEOUS at 15:06

## 2019-09-04 RX ADMIN — Medication 0.2 MG: at 08:01

## 2019-09-04 RX ADMIN — FAMOTIDINE 20 MG: 10 INJECTION, SOLUTION INTRAVENOUS at 07:43

## 2019-09-04 RX ADMIN — HYDROMORPHONE HYDROCHLORIDE 0.25 MG: 1 INJECTION, SOLUTION INTRAMUSCULAR; INTRAVENOUS; SUBCUTANEOUS at 15:58

## 2019-09-04 RX ADMIN — CITALOPRAM HYDROBROMIDE 40 MG: 40 TABLET ORAL at 16:38

## 2019-09-04 RX ADMIN — FENTANYL CITRATE 200 MCG: 50 INJECTION INTRAMUSCULAR; INTRAVENOUS at 07:48

## 2019-09-04 RX ADMIN — HYDROMORPHONE HYDROCHLORIDE 0.5 MG: 1 INJECTION, SOLUTION INTRAMUSCULAR; INTRAVENOUS; SUBCUTANEOUS at 11:44

## 2019-09-04 RX ADMIN — PHENYLEPHRINE HYDROCHLORIDE 160 MCG: 10 INJECTION INTRAVENOUS at 08:03

## 2019-09-04 RX ADMIN — DEXAMETHASONE SODIUM PHOSPHATE 4 MG: 4 INJECTION, SOLUTION INTRAMUSCULAR; INTRAVENOUS at 21:58

## 2019-09-04 RX ADMIN — FENTANYL CITRATE 50 MCG: 50 INJECTION INTRAMUSCULAR; INTRAVENOUS at 08:19

## 2019-09-04 RX ADMIN — PROPOFOL 70 MG: 10 INJECTION, EMULSION INTRAVENOUS at 08:25

## 2019-09-04 RX ADMIN — SODIUM CHLORIDE: 9 INJECTION, SOLUTION INTRAVENOUS at 06:22

## 2019-09-04 RX ADMIN — LISINOPRIL 20 MG: 20 TABLET ORAL at 16:38

## 2019-09-04 RX ADMIN — POTASSIUM CHLORIDE, DEXTROSE MONOHYDRATE AND SODIUM CHLORIDE: 150; 5; 450 INJECTION, SOLUTION INTRAVENOUS at 11:07

## 2019-09-04 RX ADMIN — DOCUSATE SODIUM 100 MG: 100 CAPSULE, LIQUID FILLED ORAL at 21:58

## 2019-09-04 RX ADMIN — ONDANSETRON 4 MG: 2 INJECTION INTRAMUSCULAR; INTRAVENOUS at 07:43

## 2019-09-04 RX ADMIN — DEXAMETHASONE SODIUM PHOSPHATE 4 MG: 4 INJECTION, SOLUTION INTRAMUSCULAR; INTRAVENOUS at 10:09

## 2019-09-04 RX ADMIN — PROPOFOL 150 MG: 10 INJECTION, EMULSION INTRAVENOUS at 07:48

## 2019-09-04 RX ADMIN — HYDROMORPHONE HYDROCHLORIDE 0.5 MG: 1 INJECTION, SOLUTION INTRAMUSCULAR; INTRAVENOUS; SUBCUTANEOUS at 09:38

## 2019-09-04 RX ADMIN — FENTANYL CITRATE 100 MCG: 50 INJECTION INTRAMUSCULAR; INTRAVENOUS at 07:43

## 2019-09-04 RX ADMIN — OXYCODONE HYDROCHLORIDE 10 MG: 5 TABLET ORAL at 17:26

## 2019-09-04 RX ADMIN — Medication 10 ML: at 21:59

## 2019-09-04 RX ADMIN — ASPIRIN 81 MG 81 MG: 81 TABLET ORAL at 16:38

## 2019-09-04 RX ADMIN — ROCURONIUM BROMIDE 100 MG: 10 INJECTION, SOLUTION INTRAVENOUS at 07:48

## 2019-09-04 RX ADMIN — INFLUENZA A VIRUS A/BRISBANE/02/2018 IVR-190 (H1N1) ANTIGEN (PROPIOLACTONE INACTIVATED), INFLUENZA A VIRUS A/KANSAS/14/2017 X-327 (H3N2) ANTIGEN (PROPIOLACTONE INACTIVATED), INFLUENZA B VIRUS B/MARYLAND/15/2016 ANTIGEN (PROPIOLACTONE INACTIVATED), INFLUENZA B VIRUS B/PHUKET/3073/2013 BVR-1B ANTIGEN (PROPIOLACTONE INACTIVATED) 0.5 ML: 15; 15; 15; 15 INJECTION, SUSPENSION INTRAMUSCULAR at 16:39

## 2019-09-04 RX ADMIN — HYDROMORPHONE HYDROCHLORIDE 0.5 MG: 1 INJECTION, SOLUTION INTRAMUSCULAR; INTRAVENOUS; SUBCUTANEOUS at 09:05

## 2019-09-04 RX ADMIN — DEXAMETHASONE SODIUM PHOSPHATE 4 MG: 4 INJECTION, SOLUTION INTRAMUSCULAR; INTRAVENOUS at 16:38

## 2019-09-04 RX ADMIN — SUGAMMADEX 200 MG: 100 INJECTION, SOLUTION INTRAVENOUS at 08:26

## 2019-09-04 RX ADMIN — Medication 2 G: at 08:01

## 2019-09-04 RX ADMIN — MULTIPLE VITAMINS W/ MINERALS TAB 1 TABLET: TAB at 16:38

## 2019-09-04 RX ADMIN — DEXAMETHASONE SODIUM PHOSPHATE 8 MG: 4 INJECTION, SOLUTION INTRAMUSCULAR; INTRAVENOUS at 07:58

## 2019-09-04 ASSESSMENT — PULMONARY FUNCTION TESTS
PIF_VALUE: 0
PIF_VALUE: 21
PIF_VALUE: 28
PIF_VALUE: 22
PIF_VALUE: 20
PIF_VALUE: 15
PIF_VALUE: 25
PIF_VALUE: 16
PIF_VALUE: 4
PIF_VALUE: 24
PIF_VALUE: 28
PIF_VALUE: 15
PIF_VALUE: 18
PIF_VALUE: 28
PIF_VALUE: 24
PIF_VALUE: 28
PIF_VALUE: 26
PIF_VALUE: 0
PIF_VALUE: 16
PIF_VALUE: 10
PIF_VALUE: 5
PIF_VALUE: 15
PIF_VALUE: 27
PIF_VALUE: 27
PIF_VALUE: 18
PIF_VALUE: 7
PIF_VALUE: 0
PIF_VALUE: 22
PIF_VALUE: 20
PIF_VALUE: 18
PIF_VALUE: 0
PIF_VALUE: 22
PIF_VALUE: 4
PIF_VALUE: 27
PIF_VALUE: 20
PIF_VALUE: 33
PIF_VALUE: 28
PIF_VALUE: 27
PIF_VALUE: 30
PIF_VALUE: 5
PIF_VALUE: 5
PIF_VALUE: 20
PIF_VALUE: 5
PIF_VALUE: 28
PIF_VALUE: 16
PIF_VALUE: 14
PIF_VALUE: 19
PIF_VALUE: 16
PIF_VALUE: 27
PIF_VALUE: 0
PIF_VALUE: 23
PIF_VALUE: 18
PIF_VALUE: 10
PIF_VALUE: 15
PIF_VALUE: 23
PIF_VALUE: 11
PIF_VALUE: 28
PIF_VALUE: 27
PIF_VALUE: 28
PIF_VALUE: 18

## 2019-09-04 ASSESSMENT — PAIN - FUNCTIONAL ASSESSMENT
PAIN_FUNCTIONAL_ASSESSMENT: 0-10
PAIN_FUNCTIONAL_ASSESSMENT: ACTIVITIES ARE NOT PREVENTED

## 2019-09-04 ASSESSMENT — PAIN SCALES - GENERAL
PAINLEVEL_OUTOF10: 0
PAINLEVEL_OUTOF10: 1
PAINLEVEL_OUTOF10: 0
PAINLEVEL_OUTOF10: 7
PAINLEVEL_OUTOF10: 7
PAINLEVEL_OUTOF10: 6
PAINLEVEL_OUTOF10: 5
PAINLEVEL_OUTOF10: 5
PAINLEVEL_OUTOF10: 8
PAINLEVEL_OUTOF10: 0
PAINLEVEL_OUTOF10: 8
PAINLEVEL_OUTOF10: 7
PAINLEVEL_OUTOF10: 8

## 2019-09-04 ASSESSMENT — PAIN DESCRIPTION - ONSET
ONSET: OTHER (COMMENT)
ONSET: ON-GOING

## 2019-09-04 ASSESSMENT — PAIN DESCRIPTION - PAIN TYPE
TYPE: SURGICAL PAIN
TYPE: SURGICAL PAIN
TYPE: ACUTE PAIN

## 2019-09-04 ASSESSMENT — PAIN DESCRIPTION - LOCATION
LOCATION: CHEST
LOCATION: RIB CAGE;OTHER (COMMENT)
LOCATION: CHEST

## 2019-09-04 ASSESSMENT — PAIN DESCRIPTION - FREQUENCY
FREQUENCY: CONTINUOUS
FREQUENCY: INTERMITTENT
FREQUENCY: CONTINUOUS

## 2019-09-04 ASSESSMENT — PAIN DESCRIPTION - DESCRIPTORS
DESCRIPTORS: ACHING;SHARP
DESCRIPTORS: ACHING

## 2019-09-04 ASSESSMENT — ENCOUNTER SYMPTOMS: SHORTNESS OF BREATH: 1

## 2019-09-04 ASSESSMENT — PAIN DESCRIPTION - PROGRESSION
CLINICAL_PROGRESSION: GRADUALLY IMPROVING
CLINICAL_PROGRESSION: NOT CHANGED
CLINICAL_PROGRESSION: RAPIDLY WORSENING

## 2019-09-04 ASSESSMENT — PAIN DESCRIPTION - ORIENTATION
ORIENTATION: RIGHT

## 2019-09-04 NOTE — ANESTHESIA PRE PROCEDURE
> = 3 FB Dental:    (+) upper dentures and partials      Pulmonary:   (+) shortness of breath:                             Cardiovascular:    (+) hypertension:, CAD:,     CABG/stent: s/p stent in 2018. Neuro/Psych:   (+) neuromuscular disease:, psychiatric history:            GI/Hepatic/Renal:   (+) GERD:,           Endo/Other:                     Abdominal:           Vascular:                                        Anesthesia Plan      general     ASA 1    (43-year-old female presents for RIGHT VIDEO ASSISTED THORACOSCOPIC SURGERY, WEDGE RESECTION AND RIGHT UPPER AND LOWER LOBE NODULES WITH BIOPSY (Right ). Plan general anesthesia with ASA standard monitors. Questions answered. Patient agreeable with anesthetic plan.  )  Induction: intravenous. Anesthetic plan and risks discussed with patient. Plan discussed with CRNA.     Attending anesthesiologist reviewed and agrees with Pre Eval content        Heri Mcclure MD   9/4/2019

## 2019-09-04 NOTE — PROGRESS NOTES
Department of Surgery:  Post-op Note      Procedure(s) Performed: RIGHT VIDEO ASSISTED THORACOSCOPIC SURGERY, WEDGE RESECTION AND RIGHT UPPER AND LOWER LOBE NODULES WITH BIOPSY (Right)    Subjective:  Patient's pain is well-controlled. She only complains of some mild muscular-like pain in her right shoulder, which feels better with placement of an ice-pack. She denies any shortness of breath or chest pain. Has tolerated some ginger-susana, denies nausea or vomiting. Her throat is mildly sore, but tolerable. Was OOB to the restroom with minimal SOB while walking, voiding appropriately. Objective:  Anesthesia type: General      I/O    Intra op    Post op     Fluids  700 155     EBL <50 0     Urine  -  1x     Exam:/65   Pulse 60   Temp 97 °F (36.1 °C) (Temporal)   Resp 11   Ht 5' 3\" (1.6 m)   Wt 255 lb (115.7 kg)   LMP 07/13/2009   SpO2 97%   BMI 45.17 kg/m²   Post-op vital signs:  Stable   Exam:   General appearance: alert, sitting up in bed, in NAD  Lungs: mild atelectatic changes in right lung base, clear to auscultation in all other fields, on RA, normal effort  Chest: incisions c/d/i, well approximated with surgical glue; chest tube site covered with dressing with minimal ss staining on outer gauze. No crepitus  Heart: regular rate and rhythm, S1, S2 normal, no murmur, click, rub or gallop  Abdomen: soft, nontender  Extremities: no edema or cyanosis, SCDs in place    AP CHEST X-RAY AT 1520:       REASON FOR EXAM: Right-sided chest tube removal       COMPARISON: 5 hours earlier same day       FINDINGS:       Portable AP view of the chest demonstrates interval removal of the right-sided chest tube since earlier same day. Tiny residual right apical pneumothorax with only a few millimeters of pleural separation. Lungs clear. Heart size within normal limits. No    pleural fluid.          Assessment and Plan  Pt is a 60y.o. year old female s/p RIGHT VIDEO ASSISTED THORACOSCOPIC SURGERY, WEDGE RESECTION

## 2019-09-04 NOTE — PROGRESS NOTES
Patient's  now in at bedside. Update given to him about patient's present condition. He verbalized his understanding. Dr. Bebe Arcos also in to see patient. Update given to him about patient's present condition.

## 2019-09-04 NOTE — PROGRESS NOTES
PACU Transfer Note    Vitals:    09/04/19 1530   BP: (!) 146/68   Pulse: 58   Resp: 15   Temp: 98.5 °F (36.9 °C)   SpO2: 95%   Blood pressure is withn 20% of her admission blood pressure    No intake/output data recorded. Pain assessment:  present - adequately treated  Pain Level:  5(Aching pain post procedure)  Pain is presently a 2 out of 10  Report given to Receiving unit RN.    9/4/2019 3:37 PM

## 2019-09-04 NOTE — BRIEF OP NOTE
Brief Postoperative Note  ______________________________________________________________    Patient: Shannan Bailey  YOB: 1960  MRN: [id number]  Date of Procedure: 9/4/2019    Pre-Op Diagnosis: INTERSTITIAL LUNG DISEASE J84.9    Post-Op Diagnosis: Same       Procedure(s):  RIGHT VIDEO ASSISTED THORACOSCOPIC SURGERY, WEDGE RESECTION AND RIGHT UPPER AND LOWER LOBE NODULES WITH BIOPSY, INTERCOSTAL NERVE BLOCK TIMES SEVEN LEVELS    Anesthesia: General    Surgeon(s):  Vera Gallagher MD    Assistant:jaquelin henderson    Estimated Blood Loss (mL): less than 50     Complications: None    Specimens:   ID Type Source Tests Collected by Time Destination   1 : 1. RIGHT LOWER LOBE STAPLE LINE Tissue Tissue FUNGUS CULTURE, TISSUE CULTURE, ACID FAST CULTURE WITH SMEAR Jami Mariee MD 9/4/2019 1092    A : A. RIGHT LOWER LOBE Tissue Tissue SURGICAL PATHOLOGY Vera Gallagher MD 9/4/2019 5901    B : B. RIGHT UPPER LOBE Tissue Tissue SURGICAL PATHOLOGY Vera Gallagher MD 9/4/2019 9979        Implants:  * No implants in log *      Drains:   Chest Tube 1 Right Pleural 24 Urdu (Active)   Suction To water seal 9/4/2019  8:45 AM   Chest Tube Airleak No 9/4/2019  8:45 AM   Drainage Description Serosanguinous 9/4/2019  8:45 AM   Dressing Status Clean;Dry; Intact 9/4/2019  8:45 AM   Dressing Type Other (Comment) 9/4/2019  8:45 AM       Findings: clean lung     Vera Gallagher MD  Date: 9/4/2019  Time: 9:24 AM

## 2019-09-05 ENCOUNTER — APPOINTMENT (OUTPATIENT)
Dept: GENERAL RADIOLOGY | Age: 59
DRG: 167 | End: 2019-09-05
Attending: THORACIC SURGERY (CARDIOTHORACIC VASCULAR SURGERY)
Payer: COMMERCIAL

## 2019-09-05 VITALS
OXYGEN SATURATION: 95 % | RESPIRATION RATE: 18 BRPM | WEIGHT: 255 LBS | BODY MASS INDEX: 45.18 KG/M2 | TEMPERATURE: 98 F | HEIGHT: 63 IN | SYSTOLIC BLOOD PRESSURE: 134 MMHG | DIASTOLIC BLOOD PRESSURE: 56 MMHG | HEART RATE: 60 BPM

## 2019-09-05 LAB
A/G RATIO: 1.1 (ref 1.1–2.2)
ALBUMIN SERPL-MCNC: 3.9 G/DL (ref 3.4–5)
ALBUMIN SERPL-MCNC: 4 G/DL (ref 3.4–5)
ALP BLD-CCNC: 72 U/L (ref 40–129)
ALT SERPL-CCNC: 8 U/L (ref 10–40)
ANION GAP SERPL CALCULATED.3IONS-SCNC: 10 MMOL/L (ref 3–16)
ANION GAP SERPL CALCULATED.3IONS-SCNC: 9 MMOL/L (ref 3–16)
AST SERPL-CCNC: 24 U/L (ref 15–37)
BILIRUB SERPL-MCNC: <0.2 MG/DL (ref 0–1)
BUN BLDV-MCNC: 23 MG/DL (ref 7–20)
BUN BLDV-MCNC: 24 MG/DL (ref 7–20)
CALCIUM SERPL-MCNC: 9 MG/DL (ref 8.3–10.6)
CALCIUM SERPL-MCNC: 9.2 MG/DL (ref 8.3–10.6)
CHLORIDE BLD-SCNC: 103 MMOL/L (ref 99–110)
CHLORIDE BLD-SCNC: 107 MMOL/L (ref 99–110)
CO2: 24 MMOL/L (ref 21–32)
CO2: 25 MMOL/L (ref 21–32)
CREAT SERPL-MCNC: 1.3 MG/DL (ref 0.6–1.2)
CREAT SERPL-MCNC: 1.3 MG/DL (ref 0.6–1.2)
GFR AFRICAN AMERICAN: 51
GFR AFRICAN AMERICAN: 51
GFR NON-AFRICAN AMERICAN: 42
GFR NON-AFRICAN AMERICAN: 42
GLOBULIN: 3.6 G/DL
GLUCOSE BLD-MCNC: 158 MG/DL (ref 70–99)
GLUCOSE BLD-MCNC: 189 MG/DL (ref 70–99)
HCT VFR BLD CALC: 36.8 % (ref 36–48)
HEMOGLOBIN: 12.3 G/DL (ref 12–16)
MCH RBC QN AUTO: 30 PG (ref 26–34)
MCHC RBC AUTO-ENTMCNC: 33.6 G/DL (ref 31–36)
MCV RBC AUTO: 89.2 FL (ref 80–100)
PDW BLD-RTO: 13.3 % (ref 12.4–15.4)
PHOSPHORUS: 2.6 MG/DL (ref 2.5–4.9)
PLATELET # BLD: 241 K/UL (ref 135–450)
PMV BLD AUTO: 7.9 FL (ref 5–10.5)
POTASSIUM REFLEX MAGNESIUM: 5 MMOL/L (ref 3.5–5.1)
POTASSIUM SERPL-SCNC: 5.3 MMOL/L (ref 3.5–5.1)
RBC # BLD: 4.12 M/UL (ref 4–5.2)
SODIUM BLD-SCNC: 137 MMOL/L (ref 136–145)
SODIUM BLD-SCNC: 141 MMOL/L (ref 136–145)
TOTAL PROTEIN: 7.5 G/DL (ref 6.4–8.2)
WBC # BLD: 10.1 K/UL (ref 4–11)

## 2019-09-05 PROCEDURE — 80053 COMPREHEN METABOLIC PANEL: CPT

## 2019-09-05 PROCEDURE — 71045 X-RAY EXAM CHEST 1 VIEW: CPT

## 2019-09-05 PROCEDURE — 2500000003 HC RX 250 WO HCPCS: Performed by: THORACIC SURGERY (CARDIOTHORACIC VASCULAR SURGERY)

## 2019-09-05 PROCEDURE — 6360000002 HC RX W HCPCS: Performed by: THORACIC SURGERY (CARDIOTHORACIC VASCULAR SURGERY)

## 2019-09-05 PROCEDURE — 85027 COMPLETE CBC AUTOMATED: CPT

## 2019-09-05 PROCEDURE — 36415 COLL VENOUS BLD VENIPUNCTURE: CPT

## 2019-09-05 PROCEDURE — 6370000000 HC RX 637 (ALT 250 FOR IP): Performed by: THORACIC SURGERY (CARDIOTHORACIC VASCULAR SURGERY)

## 2019-09-05 PROCEDURE — 2580000003 HC RX 258: Performed by: THORACIC SURGERY (CARDIOTHORACIC VASCULAR SURGERY)

## 2019-09-05 RX ORDER — OXYCODONE HYDROCHLORIDE AND ACETAMINOPHEN 5; 325 MG/1; MG/1
1 TABLET ORAL EVERY 6 HOURS PRN
Qty: 28 TABLET | Refills: 0 | Status: SHIPPED | OUTPATIENT
Start: 2019-09-05 | End: 2019-09-12

## 2019-09-05 RX ORDER — METHYLPREDNISOLONE 4 MG/1
TABLET ORAL
Qty: 21 TABLET | Refills: 0 | Status: SHIPPED | OUTPATIENT
Start: 2019-09-05 | End: 2019-09-11

## 2019-09-05 RX ORDER — DOCUSATE SODIUM 100 MG/1
100 CAPSULE, LIQUID FILLED ORAL 2 TIMES DAILY
Qty: 14 CAPSULE | Refills: 0 | Status: SHIPPED | OUTPATIENT
Start: 2019-09-05 | End: 2019-09-12

## 2019-09-05 RX ADMIN — ENOXAPARIN SODIUM 40 MG: 40 INJECTION SUBCUTANEOUS at 10:24

## 2019-09-05 RX ADMIN — FAMOTIDINE 20 MG: 20 TABLET ORAL at 10:25

## 2019-09-05 RX ADMIN — DEXAMETHASONE SODIUM PHOSPHATE 4 MG: 4 INJECTION, SOLUTION INTRAMUSCULAR; INTRAVENOUS at 04:37

## 2019-09-05 RX ADMIN — Medication 10 ML: at 08:37

## 2019-09-05 RX ADMIN — ASPIRIN 81 MG 81 MG: 81 TABLET ORAL at 08:36

## 2019-09-05 RX ADMIN — POTASSIUM CHLORIDE, DEXTROSE MONOHYDRATE AND SODIUM CHLORIDE: 150; 5; 450 INJECTION, SOLUTION INTRAVENOUS at 04:38

## 2019-09-05 RX ADMIN — ATORVASTATIN CALCIUM 20 MG: 20 TABLET, FILM COATED ORAL at 08:36

## 2019-09-05 RX ADMIN — DEXAMETHASONE SODIUM PHOSPHATE 4 MG: 4 INJECTION, SOLUTION INTRAMUSCULAR; INTRAVENOUS at 10:24

## 2019-09-05 RX ADMIN — MULTIPLE VITAMINS W/ MINERALS TAB 1 TABLET: TAB at 08:36

## 2019-09-05 RX ADMIN — LISINOPRIL 20 MG: 20 TABLET ORAL at 08:36

## 2019-09-05 RX ADMIN — CITALOPRAM HYDROBROMIDE 40 MG: 40 TABLET ORAL at 08:36

## 2019-09-05 RX ADMIN — CEFAZOLIN SODIUM 2 G: 10 INJECTION, POWDER, FOR SOLUTION INTRAVENOUS at 00:25

## 2019-09-05 ASSESSMENT — PAIN SCALES - GENERAL
PAINLEVEL_OUTOF10: 0

## 2019-09-05 NOTE — CARE COORDINATION
Disposition: Home- No Services Needed    LOC at discharge: Not Applicable  YAKOV Completed: Not Indicated    Notification completed in HENS/PAS?:  Not Applicable    IMM Completed:   Not Indicated    Transportation:  Transportation PLAN for discharge: family   Mode of Transport: Private Car  Reason for medical transport: Not Applicable  Name of Transport Company: Not Applicable  Time of Transport:     Transport form completed: Not Indicated    Home Care:  [address] ordered at discharge: Not [address]: Not Applicable  Orders faxed: No      Referrals made at [facility] for outpatient continued care:  Not Applicable    Additional CM Notes: CM following for discharge planning. Patient indicated no CM needs at this time. Patient will transport home with spouse. Dale Flynn and her family were provided with choice of provider; she and her family are in agreement with the discharge plan.     Care Transitions patient: No    Shae Boudreaux RN  [facility]  Case Management Department  Ph: [phone]  Fax: [phone]

## 2019-09-05 NOTE — PLAN OF CARE
Problem: Pain:  Goal: Pain level will decrease  Description  Pain level will decrease  9/5/2019 0930 by Evelin Snider RN  Note:   Pt reports surgical pain in R ribcage area 3/10 on numeric pain scale from VATS procedure yesterday. Pt denies need for pain medication at this time and states pain level is improving. Will continue to monitor pain level. Problem: Breathing Pattern - Ineffective:  Goal: Ability to achieve and maintain a regular respiratory rate will improve  Description  Ability to achieve and maintain a regular respiratory rate will improve  Note:   SpO2 level 94% on room air. Lungs are clear to auscultation in all lobes. Pt reports baseline TAVERAS, but denies dizziness or cough at this time. Pt reports she has been ambulating to the bathroom and in the hallways independently without difficulty. CXR completed this AM. Dressing to R chest wall dry/intact with old serosanguinous drainage present. No crepitus noted. Will continue to monitor breathing pattern/oxygenation and will report changes in condition to physician.

## 2019-09-09 ENCOUNTER — OFFICE VISIT (OUTPATIENT)
Dept: CARDIOTHORACIC SURGERY | Age: 59
End: 2019-09-09

## 2019-09-09 VITALS
SYSTOLIC BLOOD PRESSURE: 136 MMHG | HEART RATE: 68 BPM | HEIGHT: 63 IN | TEMPERATURE: 97.6 F | WEIGHT: 257 LBS | BODY MASS INDEX: 45.54 KG/M2 | DIASTOLIC BLOOD PRESSURE: 70 MMHG | OXYGEN SATURATION: 94 %

## 2019-09-09 DIAGNOSIS — J84.9 INTERSTITIAL LUNG DISEASE (HCC): Primary | ICD-10-CM

## 2019-09-09 LAB
ANAEROBIC CULTURE: NORMAL
GRAM STAIN RESULT: NORMAL
WOUND/ABSCESS: NORMAL

## 2019-09-09 PROCEDURE — 99024 POSTOP FOLLOW-UP VISIT: CPT | Performed by: THORACIC SURGERY (CARDIOTHORACIC VASCULAR SURGERY)

## 2019-09-17 ENCOUNTER — OFFICE VISIT (OUTPATIENT)
Dept: PULMONOLOGY | Age: 59
End: 2019-09-17
Payer: COMMERCIAL

## 2019-09-17 ENCOUNTER — TELEPHONE (OUTPATIENT)
Dept: CARDIOLOGY CLINIC | Age: 59
End: 2019-09-17

## 2019-09-17 VITALS
HEIGHT: 63 IN | DIASTOLIC BLOOD PRESSURE: 60 MMHG | OXYGEN SATURATION: 97 % | HEART RATE: 66 BPM | SYSTOLIC BLOOD PRESSURE: 98 MMHG | WEIGHT: 258.3 LBS | BODY MASS INDEX: 45.77 KG/M2

## 2019-09-17 DIAGNOSIS — J84.9 ILD (INTERSTITIAL LUNG DISEASE) (HCC): ICD-10-CM

## 2019-09-17 DIAGNOSIS — J84.9 ILD (INTERSTITIAL LUNG DISEASE) (HCC): Primary | ICD-10-CM

## 2019-09-17 DIAGNOSIS — I25.10 CAD S/P PERCUTANEOUS CORONARY ANGIOPLASTY: ICD-10-CM

## 2019-09-17 DIAGNOSIS — R06.09 DOE (DYSPNEA ON EXERTION): ICD-10-CM

## 2019-09-17 DIAGNOSIS — Z98.61 CAD S/P PERCUTANEOUS CORONARY ANGIOPLASTY: ICD-10-CM

## 2019-09-17 PROCEDURE — G8427 DOCREV CUR MEDS BY ELIG CLIN: HCPCS | Performed by: INTERNAL MEDICINE

## 2019-09-17 PROCEDURE — 99214 OFFICE O/P EST MOD 30 MIN: CPT | Performed by: INTERNAL MEDICINE

## 2019-09-17 PROCEDURE — 1111F DSCHRG MED/CURRENT MED MERGE: CPT | Performed by: INTERNAL MEDICINE

## 2019-09-17 PROCEDURE — 3017F COLORECTAL CA SCREEN DOC REV: CPT | Performed by: INTERNAL MEDICINE

## 2019-09-17 PROCEDURE — G8417 CALC BMI ABV UP PARAM F/U: HCPCS | Performed by: INTERNAL MEDICINE

## 2019-09-17 PROCEDURE — 1036F TOBACCO NON-USER: CPT | Performed by: INTERNAL MEDICINE

## 2019-09-17 PROCEDURE — G8598 ASA/ANTIPLAT THER USED: HCPCS | Performed by: INTERNAL MEDICINE

## 2019-09-17 NOTE — TELEPHONE ENCOUNTER
Patient noticed in her My Chart that she had bradycardia on her EKG from 8/23/19. She wanted to know if she should schedule appointment with Dr. Fatou Clarke for this. She can be reached at [phone].

## 2019-09-18 LAB
C-REACTIVE PROTEIN: 10.3 MG/L (ref 0–5.1)
C3 COMPLEMENT: 151.8 MG/DL (ref 90–180)
C4 COMPLEMENT: 32.7 MG/DL (ref 10–40)
RHEUMATOID FACTOR: <10 IU/ML

## 2019-09-18 NOTE — PROGRESS NOTES
Former[facility]    Outpatient Initial Note    Subjective:   CHIEF COMPLAINT / HPI:     The patient is 60 y.o. female is here for follow-up of open lung biopsy for undiagnosed ILD. She had no postoperative complications. Her chronic dyspnea on exertion is unchanged but she does state that her cough is better. 7/9  Ligia Mccann is here for follow up of ILD NOS. She had Bronch 6/27 with BAL and Bx of vicente. Other than 10,000 - 100,000 S Aureus and mild elevation of CD4:CD8 in BAL the bronch has been non-diagnostic. She continues to have Mullen and cough. No new changes    6/18  [facility] is here for follow-up of chronic cough and nonspecific ILD. She is now off Plavix and losartan without any change in cough. She had CT chest that shows unchanged basilar predominate nonspecific ILD. Her cough is daily and unrelenting. She coughed continuously through my office visit. She feels that her dyspnea on exertion is somewhat worse and gets winded doing routine activities of daily living. No fever, chills, night sweats, weight loss, hemoptysis, sputum production, CP or wheezing    5/21/2019  [facility] is here for follow-up of chronic cough and nonspecific ILD. Last visit I doubled her Pepcid to 40 mg by mouth twice a day and obtained a modified barium swallow which was normal.  Her daily chronic dry cough is unchanged. She has some associated dyspnea on exertion and wheezing but no chest pain. She has no fevers, chills, night sweats, anorexia, weight loss, hemoptysis, or sputum production    5/3/2019  [facility] is here for follow-up of chronic cough. I saw her on March 13 and 25th and her cough has  not improved since then despite avoiding woodworking for now 8 weeks and a extended prednisone taper starting at 40 mg and tapering over 4 weeks to off. She has associated dyspnea on exertion. She occasionally coughs with eating but there is no clear aspiration.   She is intolerant of proton pump inhibitors and she thinks her reflux is well controlled on Pepcid 10 mg by mouth twice a day. She has no fevers, chills, night sweats, anorexia, sputum production, chest pain, or hemoptysis. 3/13/2019  Marlen presents for follow-up of blood work. Her SAMMY was speckled 1-80, otherwise blood work was unremarkable. For the last 2 weeks she has not been around woodworking. She states over the last day or so she's noticed substantial improvement of her cough    Last visit 2/28  Hannah Sharma presents for follow-up of a dry cough. After last visit February 1 I added omeprazole to her Pepcid to see if this was a GERD related cough. I also obtained a CT chest.  Her dry cough is unchanged, occurring daily, and not associated with dyspnea on exertion, chest tightness, or wheezing. 2/1/2019  Marlen presents for re-eval of cough that has now gone on since Thanksgiving. Last visit I checked a CXR that was negative. I thought this was a post viral cough and gave an extended course of prednisone which did not help. She has no infectious symptoms. No post nasal drip. SHe does have uncontrolled GERD. Bronchial challenge over the summer was normal. SHe is not on an ACE    1/7/2019  Hannah Sharma presents for evaluation of 5 weeks of a dry cough, chest congestion, wheezing and TAVERAS. She has no fevers, chills, purulent sputum, anorexia, hemoptysis or weight loss. She has seen her PCP who has given her a medrol dose pack, Z pack, phenergan DM, Tessalon perles and an albuterol MDI without help. Her  has been battling the same and improved with a longer pred taper, levaquin and tussionex cough syrup    7/10/2018  Eliazar Castelan presents today for follow-up visit for  re-evaluation of dyspnea on exertion after [facility] s/p PTCA LAD. She had a positive nuclear stress test that prompted her coronary angiogram.  She also had a bronchial challenge that was negative. Since revascularization she's had no further chest heaviness.   Her dyspnea on exertion and no JVD  ABDOMEN:  Normal bowel sounds, non-distended and non-tender to palpation  EXT: No edema, no calf tenderness. Pulses are present bilaterally. NEUROLOGIC:  Mental Status Exam:  Level of Alertness:   awake  Orientation:   person, place, time. SKIN:  normal skin color, texture, turgor, no redness, warmth, or swelling     DATA:      Radiology Review:  Pertinent images / reports were reviewed as a part of this visit. CT Chest 2/22/2019 reveals the following:     Impression       Bilateral and fairly symmetric reticulation most pronounced about the lower lobes with immediate subpleural sparing may relate to interstitial pneumonitis and may relate to nonspecific interstitial pneumonitis. Findings may be present in connective    tissue disease including SLE, autoimmune disease including rheumatoid arthritis as well as other etiologies including hypersensitivity pneumonitis, drug-induced pneumonitis as well as other causes.       No pulmonary fibrosis.       No lobar consolidation. CT chest June 13, 2019  FINDINGS: The mediastinum appears normal without signs of any lymph node enlargement. The thyroid gland extends down to the level of the sternum bilaterally.       The aorta is of normal caliber as well as the pulmonary arteries.       Subpleural areas of pulmonary linear changes, likely pulmonary fibrosis or interstitial lung disease are appreciated with 1 to 2 mm pulmonary nodule in the left upper lobe on image 42 series 9, well circumscribed. Additional subpleural linear changes    noted right middle lobe right left lower lobe and lingula near the lung base.       Poorly defined nodules noted just above the right hemidiaphragm the anterior aspect right lower lobe measuring 5 x 4 mm       There are some calcified subcarinal lymph nodes appreciated.       Calcification the left anterior descending coronary artery is appreciated without cardiac enlargement.       No pleural or pericardial effusion is noted. collagen stain performed on part A  show no evidence of increased interstitial fibrosis are collagen  fibrosis.  Please see outside consultation report for complete detailed  report and comments . ............................ Lenny Thurman M.D.  (Electronic Signature)  09/12/2019      FINAL DIAGNOSIS:       A. Wedge biopsy, right lower lobe, lung:       - Specimen sent for external consultation to evaluate for         interstitial lung disease, consultative report currently pending.       B. Wedge biopsy, right upper lobe, lung:       - Specimen sent for external consultation evaluate for interstitial         lung disease, consultative report currently pending.       -    Assessment:      Diagnosis Orders   1. ILD (interstitial lung disease) ([facility])  SAMMY    Anti-Scleroderma Antibody    C3 Complement    C4 Complement    Complement, Total    Rheumatoid Factor    Sedimentation rate, automated    C-Reactive Protein    Anti SSA    Anti SSB    ANTI-DON 1 ANTIBODY, IGG    Anti-Centromere B    Ribonucleic Protein Antibody    Anti-Scleroderma Antibody   2. TAVERAS (dyspnea on exertion)     3. CAD S/P percutaneous coronary angioplasty         Plan:     Open lung biopsy shows no evidence of UIP which is fortunate. It does show a patchy interstitial chronic pneumonitis with ill formed nonnecrotizing granulomas and arterial vasculopathy. Differential includes connective tissue disease, hypersensitivity pneumonitis, and sarcoid. I have previously checked an SAMMY that was mildly elevated with a negative RF. ACE level and hypersensitive pneumonitis panel was unremarkable. Hypersensitive pneumonitis was in my differential and she was doing woodworking. I had her stop that and gave her prednisone over about a month which did not really help. I will do an extensive serologic connective tissue disease evaluation.   I will likely want to start her on prednisone at 0.5 mg/kg, which would work out to about 60 mg a day with a gradual

## 2019-09-18 NOTE — TELEPHONE ENCOUNTER
No she does not need to see Dr. Flavia Amato unless she is having LH/dizziness or other symptoms. She's had sinus bradycardia since at least 2016.

## 2019-09-19 LAB
ANA INTERPRETATION: ABNORMAL
ANA PATTERN: ABNORMAL
ANA TITER: ABNORMAL
ANTI-CENTROMERE B IGG: 4.3 AI (ref 0–0.9)
ANTI-JO1 IGG: <0.2 AI (ref 0–0.9)
ANTI-NUCLEAR ANTIBODY (ANA): POSITIVE
ANTI-RNP IGG: 0.6 AI (ref 0–0.9)
ANTI-SCL70 IGG: <0.2 AI (ref 0–0.9)
ANTI-SMITH IGG: <0.2 AI (ref 0–0.9)
ANTI-SS-A IGG: <0.2 AI (ref 0–0.9)
ANTI-SS-B IGG: <0.2 AI (ref 0–0.9)

## 2019-09-21 LAB — COMPLEMENT TOTAL (CH50): 96 CAE UNITS (ref 60–144)

## 2019-09-24 ENCOUNTER — OFFICE VISIT (OUTPATIENT)
Dept: ORTHOPEDIC SURGERY | Age: 59
End: 2019-09-24
Payer: COMMERCIAL

## 2019-09-24 VITALS
DIASTOLIC BLOOD PRESSURE: 76 MMHG | HEIGHT: 63 IN | SYSTOLIC BLOOD PRESSURE: 141 MMHG | BODY MASS INDEX: 45.78 KG/M2 | WEIGHT: 258.38 LBS | HEART RATE: 72 BPM

## 2019-09-24 DIAGNOSIS — M70.62 GREATER TROCHANTERIC BURSITIS OF BOTH HIPS: ICD-10-CM

## 2019-09-24 DIAGNOSIS — M51.36 DDD (DEGENERATIVE DISC DISEASE), LUMBAR: ICD-10-CM

## 2019-09-24 DIAGNOSIS — M25.559: ICD-10-CM

## 2019-09-24 DIAGNOSIS — M43.16 SPONDYLOLISTHESIS OF LUMBAR REGION: Primary | ICD-10-CM

## 2019-09-24 DIAGNOSIS — M70.61 GREATER TROCHANTERIC BURSITIS OF BOTH HIPS: ICD-10-CM

## 2019-09-24 PROCEDURE — G8427 DOCREV CUR MEDS BY ELIG CLIN: HCPCS | Performed by: PHYSICAL MEDICINE & REHABILITATION

## 2019-09-24 PROCEDURE — 1111F DSCHRG MED/CURRENT MED MERGE: CPT | Performed by: PHYSICAL MEDICINE & REHABILITATION

## 2019-09-24 PROCEDURE — 20611 DRAIN/INJ JOINT/BURSA W/US: CPT | Performed by: PHYSICAL MEDICINE & REHABILITATION

## 2019-09-24 PROCEDURE — 3017F COLORECTAL CA SCREEN DOC REV: CPT | Performed by: PHYSICAL MEDICINE & REHABILITATION

## 2019-09-24 PROCEDURE — G8598 ASA/ANTIPLAT THER USED: HCPCS | Performed by: PHYSICAL MEDICINE & REHABILITATION

## 2019-09-24 PROCEDURE — 1036F TOBACCO NON-USER: CPT | Performed by: PHYSICAL MEDICINE & REHABILITATION

## 2019-09-24 PROCEDURE — G8417 CALC BMI ABV UP PARAM F/U: HCPCS | Performed by: PHYSICAL MEDICINE & REHABILITATION

## 2019-09-24 PROCEDURE — 99214 OFFICE O/P EST MOD 30 MIN: CPT | Performed by: PHYSICAL MEDICINE & REHABILITATION

## 2019-09-24 NOTE — PROGRESS NOTES
Maternal Uncle     Heart Attack Paternal Aunt     Breast Cancer Paternal Aunt     Dementia Paternal Aunt     Heart Attack Paternal Uncle     Heart Attack Paternal Uncle     Brain Cancer Maternal Uncle        REVIEW OF SYSTEMS:   CONSTITUTIONAL: Denies unexplained weight loss, fevers, chills or fatigue  NEUROLOGICAL: Denies unsteady gait or progressive weakness  MUSCULOSKELETAL: Denies joint swelling or redness  GI: Denies nausea, vomiting, diarrhea   : Denies bowel or bladder issues       PHYSICAL EXAM:    Vitals: Blood pressure (!) 141/76, pulse 72, height 5' 2.99\" (1.6 m), weight 258 lb 6.1 oz (117.2 kg), last menstrual period 07/13/2009, not currently breastfeeding. GENERAL EXAM:  · General Apparence: Patient is adequately groomed with no evidence of malnutrition. · Psychiatric: Orientation: The patient is oriented to time, place and person. The patient's mood and affect are appropriate   · Vascular: Examination reveals no swelling and palpation reveals no tenderness in upper or lower extremities. Good capillary refill. · The lymphatic examination of the neck, axillae and groin reveals all areas to be without enlargement or induration  · Sensation is intact without deficit in the upper and lower extremities to light touch and pinprick  · Coordination of the upper and lower extremities are normal.    CERVICAL EXAMINATION:  · Inspection: Local inspection shows no step-off or bruising. Cervical alignment is normal. No instability is noted. · Palpation and Percussion: No evidence of tenderness at the midline. Paraspinal tenderness is not present. There is no paraspinal spasm. Skin:There are no rashes, ulcerations or lesions  · Range of Motion:  limited by 25% in all planes due to pain   · Strength: 5/5 bilateral upper extremities  · Special Tests:   Spurling's and Casillas's are negative bilaterally. Leonard and Impingement tests are negative bilaterally.   · Skin:There are no rashes, ulcerations or lesions in right & left upper extremities. · Reflexes: Bilaterally triceps, biceps and brachioradialis are 2+. Clonus absent bilaterally at the feet. No pathological reflexes are noted. · Gait & station: normal, patient ambulates without assistance and no ataxia  · Additional Examinations:  · RIGHT UPPER EXTREMITY:  Inspection/examination of the right upper extremity does not show any tenderness, deformity or injury. Range of motion is unremarkable and pain-free. There is no gross instability. There are no rashes, ulcerations or lesions. Strength and tone are normal. No atrophy or abnormal movements are noted. · LEFT UPPER EXTREMITY: Inspection/examination of the left upper extremity does not show any tenderness, deformity or injury. Range of motion is unremarkable and pain-free. There is no gross instability. There are no rashes, ulcerations or lesions. Strength and tone are normal. No atrophy or abnormal movements are noted. LUMBAR/SACRAL EXAMINATION:  · Inspection: Local inspection shows no step-off or bruising. Lumbar alignment is normal. No instability is noted. · Palpation:   No evidence of tenderness at the midline. Lumbar paraspinal tenderness: Mild L4/5 and L5/S1 tenderness  Bursal tenderness Bilateral GT bursa tenderness  There is no paraspinal spasm. · Range of Motion: limited by 50% in all planes due to pain  · Strength:   Strength testing is 5/5 in all muscle groups tested. · Special Tests:   Straight leg raise and crossed SLR negative. Kwan's testing is negative bilaterally. FADIR's testing is negative bilaterally. · Skin: There are no rashes, ulcerations or lesions. · Reflexes: Reflexes are symmetrically 2+ at the patellar and ankle tendons. Clonus absent bilaterally at the feet.   · Gait & station: normal, patient ambulates without assistance and no ataxia  · Additional Examinations:  · RIGHT LOWER EXTREMITY: Inspection/examination of the right lower extremity does not show any

## 2019-09-24 NOTE — LETTER
Please schedule the following with:     Date:   10/15/209 @ 8:15     Account: [de-identified]  Patient: Wilmar Edgar    : 1960  Address:  [address] [place] [address] [place]    Phone (H):  [phone] (home) [phone] (work)     ----------------------------------------------------------------------------------------------  Diagnosis:     ICD-10-CM    1. Spondylolisthesis of lumbar region M43.16    2. DDD (degenerative disc disease), lumbar M51.36    3. Greater trochanteric bursitis of both hips M70.61 US GUIDED NEEDLE PLACEMENT    M70.62 PA ARTHROCENTESIS ASPIR&/INJ MAJOR JT/BURSA W/O US     PA TRIAMCINOLONE ACETONIDE INJ   4. 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  Dewayne Breen MD.      ----------------------------------------------------------------------------------------------  Injection Scheduled For:    At:    [address]    Pre-Cert#    [employer] Insurance     Pre-Cert#    Comments or Special instructions:    · Infection control  · Tested positive for MRSA in past 12 months:  no  · Tested positive for MSSA \"staph infection\" in past 12 months: no  · Tested positive for VRE (Vancomycin Resistant Enterococci) in past 12 months:   no  · Currently on any antibiotics for an infection: no  · Anticoagulants:  · On a blood thinner:  yes , ASA 81 mg   · Any history of bleeding disorder: no   · Advanced Liver disease: no   · Advanced Renal disease: no   · Glaucoma: no   · Diabetes: no     Sedation:  No  -----------------------------------------------------------------------------------------------  Allergies   Allergen Reactions    Protonix [Pantoprazole Sodium] Diarrhea

## 2019-10-02 ENCOUNTER — TELEPHONE (OUTPATIENT)
Dept: PULMONOLOGY | Age: 59
End: 2019-10-02

## 2019-10-02 RX ORDER — PREDNISONE 20 MG/1
60 TABLET ORAL DAILY
Qty: 30 TABLET | Refills: 2 | Status: SHIPPED | OUTPATIENT
Start: 2019-10-02 | End: 2019-10-02

## 2019-10-02 RX ORDER — PREDNISONE 20 MG/1
60 TABLET ORAL DAILY
Qty: 90 TABLET | Refills: 2 | Status: SHIPPED | OUTPATIENT
Start: 2019-10-02 | End: 2020-04-21 | Stop reason: SDUPTHER

## 2019-10-02 RX ORDER — SULFAMETHOXAZOLE AND TRIMETHOPRIM 400; 80 MG/1; MG/1
TABLET ORAL
Qty: 12 TABLET | Refills: 5 | Status: SHIPPED | OUTPATIENT
Start: 2019-10-02 | End: 2020-04-01

## 2019-10-04 ENCOUNTER — TELEPHONE (OUTPATIENT)
Dept: CARDIOTHORACIC SURGERY | Age: 59
End: 2019-10-04

## 2019-10-07 LAB
FUNGUS (MYCOLOGY) CULTURE: NORMAL
FUNGUS STAIN: NORMAL

## 2019-10-08 ENCOUNTER — TELEPHONE (OUTPATIENT)
Dept: ORTHOPEDIC SURGERY | Age: 59
End: 2019-10-08

## 2019-10-14 ENCOUNTER — TELEPHONE (OUTPATIENT)
Dept: PULMONOLOGY | Age: 59
End: 2019-10-14

## 2019-10-15 ENCOUNTER — HOSPITAL ENCOUNTER (OUTPATIENT)
Age: 59
Setting detail: OUTPATIENT SURGERY
Discharge: HOME | End: 2019-10-15
Attending: PHYSICAL MEDICINE & REHABILITATION | Admitting: PHYSICAL MEDICINE & REHABILITATION
Payer: COMMERCIAL

## 2019-10-15 VITALS
HEIGHT: 63 IN | OXYGEN SATURATION: 97 % | RESPIRATION RATE: 16 BRPM | SYSTOLIC BLOOD PRESSURE: 153 MMHG | BODY MASS INDEX: 42.88 KG/M2 | TEMPERATURE: 97.8 F | HEART RATE: 51 BPM | DIASTOLIC BLOOD PRESSURE: 52 MMHG | WEIGHT: 242 LBS

## 2019-10-15 PROCEDURE — 6360000004 HC RX CONTRAST MEDICATION: Performed by: PHYSICAL MEDICINE & REHABILITATION

## 2019-10-15 PROCEDURE — 6360000002 HC RX W HCPCS: Performed by: PHYSICAL MEDICINE & REHABILITATION

## 2019-10-15 PROCEDURE — 2580000003 HC RX 258: Performed by: PHYSICAL MEDICINE & REHABILITATION

## 2019-10-15 PROCEDURE — 2500000003 HC RX 250 WO HCPCS: Performed by: PHYSICAL MEDICINE & REHABILITATION

## 2019-10-15 PROCEDURE — 2709999900 HC NON-CHARGEABLE SUPPLY: Performed by: PHYSICAL MEDICINE & REHABILITATION

## 2019-10-15 PROCEDURE — 7100000010 HC PHASE II RECOVERY - FIRST 15 MIN: Performed by: PHYSICAL MEDICINE & REHABILITATION

## 2019-10-15 PROCEDURE — 3600000002 HC SURGERY LEVEL 2 BASE: Performed by: PHYSICAL MEDICINE & REHABILITATION

## 2019-10-15 RX ORDER — BUPIVACAINE HYDROCHLORIDE 5 MG/ML
INJECTION, SOLUTION EPIDURAL; INTRACAUDAL PRN
Status: DISCONTINUED | OUTPATIENT
Start: 2019-10-15 | End: 2019-10-15 | Stop reason: ALTCHOICE

## 2019-10-15 RX ORDER — METHYLPREDNISOLONE ACETATE 80 MG/ML
INJECTION, SUSPENSION INTRA-ARTICULAR; INTRALESIONAL; INTRAMUSCULAR; SOFT TISSUE PRN
Status: DISCONTINUED | OUTPATIENT
Start: 2019-10-15 | End: 2019-10-15 | Stop reason: ALTCHOICE

## 2019-10-15 RX ORDER — LIDOCAINE HYDROCHLORIDE 10 MG/ML
INJECTION, SOLUTION EPIDURAL; INFILTRATION; INTRACAUDAL; PERINEURAL PRN
Status: DISCONTINUED | OUTPATIENT
Start: 2019-10-15 | End: 2019-10-15 | Stop reason: ALTCHOICE

## 2019-10-15 RX ORDER — 0.9 % SODIUM CHLORIDE 0.9 %
VIAL (ML) INJECTION PRN
Status: DISCONTINUED | OUTPATIENT
Start: 2019-10-15 | End: 2019-10-15 | Stop reason: ALTCHOICE

## 2019-10-15 ASSESSMENT — PAIN - FUNCTIONAL ASSESSMENT
PAIN_FUNCTIONAL_ASSESSMENT: 0-10
PAIN_FUNCTIONAL_ASSESSMENT: PREVENTS OR INTERFERES SOME ACTIVE ACTIVITIES AND ADLS

## 2019-10-16 DIAGNOSIS — F32.0 MILD SINGLE CURRENT EPISODE OF MAJOR DEPRESSIVE DISORDER (HCC): ICD-10-CM

## 2019-10-16 RX ORDER — CITALOPRAM 40 MG/1
TABLET ORAL
Qty: 90 TABLET | Refills: 3 | Status: SHIPPED | OUTPATIENT
Start: 2019-10-16 | End: 2021-03-04 | Stop reason: SDUPTHER

## 2019-10-22 LAB
AFB CULTURE (MYCOBACTERIA): NORMAL
AFB SMEAR: NORMAL

## 2019-11-01 ENCOUNTER — OFFICE VISIT (OUTPATIENT)
Dept: ORTHOPEDIC SURGERY | Age: 59
End: 2019-11-01
Payer: COMMERCIAL

## 2019-11-01 VITALS
WEIGHT: 242.06 LBS | HEIGHT: 63 IN | BODY MASS INDEX: 42.89 KG/M2 | SYSTOLIC BLOOD PRESSURE: 138 MMHG | DIASTOLIC BLOOD PRESSURE: 73 MMHG

## 2019-11-01 DIAGNOSIS — M43.16 SPONDYLOLISTHESIS OF LUMBAR REGION: ICD-10-CM

## 2019-11-01 DIAGNOSIS — M51.36 DDD (DEGENERATIVE DISC DISEASE), LUMBAR: ICD-10-CM

## 2019-11-01 DIAGNOSIS — M70.62 GREATER TROCHANTERIC BURSITIS OF BOTH HIPS: ICD-10-CM

## 2019-11-01 DIAGNOSIS — M25.559: Primary | ICD-10-CM

## 2019-11-01 DIAGNOSIS — M70.61 GREATER TROCHANTERIC BURSITIS OF BOTH HIPS: ICD-10-CM

## 2019-11-01 PROCEDURE — G8417 CALC BMI ABV UP PARAM F/U: HCPCS | Performed by: PHYSICIAN ASSISTANT

## 2019-11-01 PROCEDURE — 99213 OFFICE O/P EST LOW 20 MIN: CPT | Performed by: PHYSICIAN ASSISTANT

## 2019-11-01 PROCEDURE — 1036F TOBACCO NON-USER: CPT | Performed by: PHYSICIAN ASSISTANT

## 2019-11-01 PROCEDURE — G8598 ASA/ANTIPLAT THER USED: HCPCS | Performed by: PHYSICIAN ASSISTANT

## 2019-11-01 PROCEDURE — G8427 DOCREV CUR MEDS BY ELIG CLIN: HCPCS | Performed by: PHYSICIAN ASSISTANT

## 2019-11-01 PROCEDURE — 3017F COLORECTAL CA SCREEN DOC REV: CPT | Performed by: PHYSICIAN ASSISTANT

## 2019-11-01 PROCEDURE — G8482 FLU IMMUNIZE ORDER/ADMIN: HCPCS | Performed by: PHYSICIAN ASSISTANT

## 2019-11-04 ENCOUNTER — TELEPHONE (OUTPATIENT)
Dept: PULMONOLOGY | Age: 59
End: 2019-11-04

## 2019-11-15 ENCOUNTER — OFFICE VISIT (OUTPATIENT)
Dept: PULMONOLOGY | Age: 59
End: 2019-11-15
Payer: COMMERCIAL

## 2019-11-15 VITALS
DIASTOLIC BLOOD PRESSURE: 82 MMHG | OXYGEN SATURATION: 95 % | HEART RATE: 83 BPM | RESPIRATION RATE: 16 BRPM | BODY MASS INDEX: 44.65 KG/M2 | HEIGHT: 63 IN | SYSTOLIC BLOOD PRESSURE: 126 MMHG | WEIGHT: 252 LBS

## 2019-11-15 DIAGNOSIS — Z98.61 CAD S/P PERCUTANEOUS CORONARY ANGIOPLASTY: ICD-10-CM

## 2019-11-15 DIAGNOSIS — I25.10 CAD S/P PERCUTANEOUS CORONARY ANGIOPLASTY: ICD-10-CM

## 2019-11-15 DIAGNOSIS — J84.9 ILD (INTERSTITIAL LUNG DISEASE) (HCC): Primary | ICD-10-CM

## 2019-11-15 PROCEDURE — G8427 DOCREV CUR MEDS BY ELIG CLIN: HCPCS | Performed by: INTERNAL MEDICINE

## 2019-11-15 PROCEDURE — G8598 ASA/ANTIPLAT THER USED: HCPCS | Performed by: INTERNAL MEDICINE

## 2019-11-15 PROCEDURE — G8417 CALC BMI ABV UP PARAM F/U: HCPCS | Performed by: INTERNAL MEDICINE

## 2019-11-15 PROCEDURE — G8482 FLU IMMUNIZE ORDER/ADMIN: HCPCS | Performed by: INTERNAL MEDICINE

## 2019-11-15 PROCEDURE — 99214 OFFICE O/P EST MOD 30 MIN: CPT | Performed by: INTERNAL MEDICINE

## 2019-11-15 PROCEDURE — 1036F TOBACCO NON-USER: CPT | Performed by: INTERNAL MEDICINE

## 2019-11-15 PROCEDURE — 3017F COLORECTAL CA SCREEN DOC REV: CPT | Performed by: INTERNAL MEDICINE

## 2019-11-19 ENCOUNTER — OFFICE VISIT (OUTPATIENT)
Dept: FAMILY MEDICINE CLINIC | Age: 59
End: 2019-11-19
Payer: COMMERCIAL

## 2019-11-19 VITALS
HEART RATE: 95 BPM | BODY MASS INDEX: 43.79 KG/M2 | DIASTOLIC BLOOD PRESSURE: 88 MMHG | WEIGHT: 247.2 LBS | SYSTOLIC BLOOD PRESSURE: 132 MMHG | OXYGEN SATURATION: 98 %

## 2019-11-19 DIAGNOSIS — Z76.89 ENCOUNTER TO ESTABLISH CARE WITH NEW DOCTOR: Primary | ICD-10-CM

## 2019-11-19 DIAGNOSIS — Z23 NEED FOR VACCINATION: ICD-10-CM

## 2019-11-19 DIAGNOSIS — R25.2 MUSCLE CRAMPS: ICD-10-CM

## 2019-11-19 DIAGNOSIS — Z76.89 ENCOUNTER TO ESTABLISH CARE WITH NEW DOCTOR: ICD-10-CM

## 2019-11-19 DIAGNOSIS — J84.9 INTERSTITIAL LUNG DISEASE (HCC): ICD-10-CM

## 2019-11-19 DIAGNOSIS — E78.00 PURE HYPERCHOLESTEROLEMIA: ICD-10-CM

## 2019-11-19 DIAGNOSIS — I25.10 CAD IN NATIVE ARTERY: ICD-10-CM

## 2019-11-19 DIAGNOSIS — Z98.61 S/P PTCA (PERCUTANEOUS TRANSLUMINAL CORONARY ANGIOPLASTY): ICD-10-CM

## 2019-11-19 PROBLEM — M79.672 LEFT FOOT PAIN: Status: RESOLVED | Noted: 2018-11-26 | Resolved: 2019-11-19

## 2019-11-19 LAB
ANION GAP SERPL CALCULATED.3IONS-SCNC: 18 MMOL/L (ref 3–16)
BUN BLDV-MCNC: 30 MG/DL (ref 7–20)
CALCIUM SERPL-MCNC: 9.5 MG/DL (ref 8.3–10.6)
CHLORIDE BLD-SCNC: 105 MMOL/L (ref 99–110)
CHOLESTEROL, TOTAL: 181 MG/DL (ref 0–199)
CO2: 22 MMOL/L (ref 21–32)
CREAT SERPL-MCNC: 1.7 MG/DL (ref 0.6–1.2)
GFR AFRICAN AMERICAN: 37
GFR NON-AFRICAN AMERICAN: 31
GLUCOSE BLD-MCNC: 148 MG/DL (ref 70–99)
HDLC SERPL-MCNC: 51 MG/DL (ref 40–60)
LDL CHOLESTEROL CALCULATED: 77 MG/DL
MAGNESIUM: 2.1 MG/DL (ref 1.8–2.4)
POTASSIUM SERPL-SCNC: 4.5 MMOL/L (ref 3.5–5.1)
SODIUM BLD-SCNC: 145 MMOL/L (ref 136–145)
TOTAL CK: 53 U/L (ref 26–192)
TRIGL SERPL-MCNC: 265 MG/DL (ref 0–150)
VLDLC SERPL CALC-MCNC: 53 MG/DL

## 2019-11-19 PROCEDURE — 90472 IMMUNIZATION ADMIN EACH ADD: CPT | Performed by: FAMILY MEDICINE

## 2019-11-19 PROCEDURE — 90750 HZV VACC RECOMBINANT IM: CPT | Performed by: FAMILY MEDICINE

## 2019-11-19 PROCEDURE — 90715 TDAP VACCINE 7 YRS/> IM: CPT | Performed by: FAMILY MEDICINE

## 2019-11-19 PROCEDURE — 90471 IMMUNIZATION ADMIN: CPT | Performed by: FAMILY MEDICINE

## 2019-11-19 PROCEDURE — 99214 OFFICE O/P EST MOD 30 MIN: CPT | Performed by: FAMILY MEDICINE

## 2019-11-19 PROCEDURE — 90732 PPSV23 VACC 2 YRS+ SUBQ/IM: CPT | Performed by: FAMILY MEDICINE

## 2019-11-19 ASSESSMENT — ENCOUNTER SYMPTOMS
RHINORRHEA: 0
VOMITING: 0
SHORTNESS OF BREATH: 0
SORE THROAT: 0
PHOTOPHOBIA: 0
ABDOMINAL PAIN: 0
BLOOD IN STOOL: 0
COUGH: 0
CHEST TIGHTNESS: 0
NAUSEA: 0
DIARRHEA: 0
COLOR CHANGE: 0
TROUBLE SWALLOWING: 0
CONSTIPATION: 0
BACK PAIN: 0

## 2019-11-20 LAB
ESTIMATED AVERAGE GLUCOSE: 137 MG/DL
HBA1C MFR BLD: 6.4 %

## 2019-12-04 ENCOUNTER — OFFICE VISIT (OUTPATIENT)
Dept: CARDIOLOGY CLINIC | Age: 59
End: 2019-12-04
Payer: COMMERCIAL

## 2019-12-04 VITALS
DIASTOLIC BLOOD PRESSURE: 84 MMHG | HEART RATE: 72 BPM | HEIGHT: 63 IN | SYSTOLIC BLOOD PRESSURE: 130 MMHG | WEIGHT: 257.2 LBS | BODY MASS INDEX: 45.57 KG/M2

## 2019-12-04 DIAGNOSIS — Z98.61 S/P PTCA (PERCUTANEOUS TRANSLUMINAL CORONARY ANGIOPLASTY): ICD-10-CM

## 2019-12-04 DIAGNOSIS — I25.10 CAD IN NATIVE ARTERY: Primary | ICD-10-CM

## 2019-12-04 DIAGNOSIS — I10 ESSENTIAL HYPERTENSION: ICD-10-CM

## 2019-12-04 DIAGNOSIS — E78.2 MIXED HYPERLIPIDEMIA: ICD-10-CM

## 2019-12-04 DIAGNOSIS — E66.01 MORBID OBESITY WITH BMI OF 45.0-49.9, ADULT (HCC): ICD-10-CM

## 2019-12-04 PROCEDURE — G8598 ASA/ANTIPLAT THER USED: HCPCS | Performed by: INTERNAL MEDICINE

## 2019-12-04 PROCEDURE — 99214 OFFICE O/P EST MOD 30 MIN: CPT | Performed by: INTERNAL MEDICINE

## 2019-12-04 PROCEDURE — 1036F TOBACCO NON-USER: CPT | Performed by: INTERNAL MEDICINE

## 2019-12-04 PROCEDURE — G8427 DOCREV CUR MEDS BY ELIG CLIN: HCPCS | Performed by: INTERNAL MEDICINE

## 2019-12-04 PROCEDURE — G8417 CALC BMI ABV UP PARAM F/U: HCPCS | Performed by: INTERNAL MEDICINE

## 2019-12-04 PROCEDURE — 3017F COLORECTAL CA SCREEN DOC REV: CPT | Performed by: INTERNAL MEDICINE

## 2019-12-04 PROCEDURE — G8482 FLU IMMUNIZE ORDER/ADMIN: HCPCS | Performed by: INTERNAL MEDICINE

## 2019-12-04 RX ORDER — ROSUVASTATIN CALCIUM 5 MG/1
5 TABLET, COATED ORAL DAILY
Qty: 30 TABLET | Refills: 5 | Status: ON HOLD | OUTPATIENT
Start: 2019-12-04 | End: 2020-03-03

## 2019-12-12 ENCOUNTER — OFFICE VISIT (OUTPATIENT)
Dept: RHEUMATOLOGY | Age: 59
End: 2019-12-12
Payer: COMMERCIAL

## 2019-12-12 VITALS
SYSTOLIC BLOOD PRESSURE: 122 MMHG | DIASTOLIC BLOOD PRESSURE: 80 MMHG | BODY MASS INDEX: 45 KG/M2 | WEIGHT: 254 LBS | HEIGHT: 63 IN

## 2019-12-12 DIAGNOSIS — Z13.89 SCREENING FOR RHEUMATIC DISORDER: ICD-10-CM

## 2019-12-12 DIAGNOSIS — Z78.0 POSTMENOPAUSAL: ICD-10-CM

## 2019-12-12 DIAGNOSIS — J84.9 ILD (INTERSTITIAL LUNG DISEASE) (HCC): Primary | ICD-10-CM

## 2019-12-12 DIAGNOSIS — R76.8 POSITIVE ANA (ANTINUCLEAR ANTIBODY): ICD-10-CM

## 2019-12-12 DIAGNOSIS — R79.89 ELEVATED SERUM CREATININE: ICD-10-CM

## 2019-12-12 PROCEDURE — 99204 OFFICE O/P NEW MOD 45 MIN: CPT | Performed by: INTERNAL MEDICINE

## 2019-12-12 PROCEDURE — G8427 DOCREV CUR MEDS BY ELIG CLIN: HCPCS | Performed by: INTERNAL MEDICINE

## 2019-12-12 PROCEDURE — G8417 CALC BMI ABV UP PARAM F/U: HCPCS | Performed by: INTERNAL MEDICINE

## 2019-12-12 PROCEDURE — G8482 FLU IMMUNIZE ORDER/ADMIN: HCPCS | Performed by: INTERNAL MEDICINE

## 2019-12-16 ENCOUNTER — HOSPITAL ENCOUNTER (OUTPATIENT)
Dept: GENERAL RADIOLOGY | Age: 59
Discharge: HOME | End: 2019-12-16
Payer: COMMERCIAL

## 2019-12-16 DIAGNOSIS — Z78.0 POSTMENOPAUSAL: ICD-10-CM

## 2019-12-16 PROCEDURE — 77080 DXA BONE DENSITY AXIAL: CPT

## 2019-12-18 ENCOUNTER — TELEPHONE (OUTPATIENT)
Dept: RHEUMATOLOGY | Age: 59
End: 2019-12-18

## 2019-12-18 RX ORDER — ALENDRONATE SODIUM 35 MG/1
35 TABLET ORAL
Qty: 4 TABLET | Refills: 3 | Status: SHIPPED | OUTPATIENT
Start: 2019-12-18 | End: 2020-03-04

## 2019-12-20 ENCOUNTER — TELEPHONE (OUTPATIENT)
Dept: PULMONOLOGY | Age: 59
End: 2019-12-20

## 2019-12-23 DIAGNOSIS — J84.9 ILD (INTERSTITIAL LUNG DISEASE) (HCC): Primary | ICD-10-CM

## 2019-12-24 DIAGNOSIS — J84.9 ILD (INTERSTITIAL LUNG DISEASE) (HCC): ICD-10-CM

## 2019-12-27 LAB — CHROMIUM, SERUM: 8.4 UG/L

## 2020-01-03 ENCOUNTER — OFFICE VISIT (OUTPATIENT)
Dept: FAMILY MEDICINE CLINIC | Age: 60
End: 2020-01-03
Payer: COMMERCIAL

## 2020-01-03 VITALS
SYSTOLIC BLOOD PRESSURE: 134 MMHG | DIASTOLIC BLOOD PRESSURE: 84 MMHG | OXYGEN SATURATION: 96 % | HEART RATE: 90 BPM | WEIGHT: 257 LBS | BODY MASS INDEX: 45.53 KG/M2

## 2020-01-03 PROCEDURE — G8417 CALC BMI ABV UP PARAM F/U: HCPCS | Performed by: FAMILY MEDICINE

## 2020-01-03 PROCEDURE — 3017F COLORECTAL CA SCREEN DOC REV: CPT | Performed by: FAMILY MEDICINE

## 2020-01-03 PROCEDURE — 1036F TOBACCO NON-USER: CPT | Performed by: FAMILY MEDICINE

## 2020-01-03 PROCEDURE — G8482 FLU IMMUNIZE ORDER/ADMIN: HCPCS | Performed by: FAMILY MEDICINE

## 2020-01-03 PROCEDURE — 99213 OFFICE O/P EST LOW 20 MIN: CPT | Performed by: FAMILY MEDICINE

## 2020-01-03 PROCEDURE — G8427 DOCREV CUR MEDS BY ELIG CLIN: HCPCS | Performed by: FAMILY MEDICINE

## 2020-01-03 RX ORDER — AMOXICILLIN AND CLAVULANATE POTASSIUM 875; 125 MG/1; MG/1
1 TABLET, FILM COATED ORAL 2 TIMES DAILY
Qty: 14 TABLET | Refills: 0 | Status: SHIPPED | OUTPATIENT
Start: 2020-01-03 | End: 2020-01-10

## 2020-01-03 ASSESSMENT — ENCOUNTER SYMPTOMS
COUGH: 1
SORE THROAT: 1
HOARSE VOICE: 0
WHEEZING: 0
CHEST TIGHTNESS: 0
RHINORRHEA: 0
SINUS PRESSURE: 1
SWOLLEN GLANDS: 0
SHORTNESS OF BREATH: 0
TROUBLE SWALLOWING: 0
HEMOPTYSIS: 0
HEARTBURN: 0

## 2020-01-03 NOTE — PROGRESS NOTES
[address] [facility]  Office Visit      Patient: Madeleine Hendrix is a 60 y.o. female who presents today to [facility] care and with the following Chief Complaint(s):  Chief Complaint   Patient presents with    Cough    Pharyngitis         Sinusitis   This is a new problem. Episode onset: 1 week. The problem is unchanged. There has been no fever. The pain is moderate. Associated symptoms include congestion, coughing, headaches, sinus pressure and a sore throat. Pertinent negatives include no chills, diaphoresis, ear pain, hoarse voice, neck pain, shortness of breath, sneezing or swollen glands. Treatments tried: tylenol pm. The treatment provided no relief. Cough   The cough is productive of sputum. Associated symptoms include headaches, nasal congestion, postnasal drip and a sore throat. Pertinent negatives include no chest pain, chills, ear congestion, ear pain, fever, heartburn, hemoptysis, myalgias, rash, rhinorrhea, shortness of breath, sweats, weight loss or wheezing. The symptoms are aggravated by lying down. She has tried oral steroids for the symptoms. The treatment provided no relief.  ILD on prednisone         Current Outpatient Medications   Medication Sig Dispense Refill    amoxicillin-clavulanate (AUGMENTIN) 875-125 MG per tablet Take 1 tablet by mouth 2 times daily for 7 days 14 tablet 0    alendronate (FOSAMAX) 35 MG tablet Take 1 tablet by mouth every 7 days 4 tablet 3    rosuvastatin (CRESTOR) 5 MG tablet Take 1 tablet by mouth daily 30 tablet 5    citalopram (CELEXA) 40 MG tablet TAKE 1 TABLET BY MOUTH EVERY DAY 90 tablet 3    sulfamethoxazole-trimethoprim (BACTRIM) 400-80 MG per tablet Take Bactrim 1 pill every Monday, Wednesday and Friday 12 tablet 5    predniSONE (DELTASONE) 20 MG tablet Take 3 tablets by mouth daily (Patient taking differently: Take 30 mg by mouth daily ) 90 tablet 2    aspirin 81 MG chewable tablet Take 1 tablet by mouth daily 90 tablet 3    lisinopril (PRINIVIL;ZESTRIL) 20 MG tablet TAKE 1 TABLET BY MOUTH DAILY 90 tablet 3    famotidine (PEPCID) 40 MG tablet Take 1 tablet by mouth 2 times daily 60 tablet 5    albuterol sulfate HFA (PROAIR HFA) 108 (90 Base) MCG/ACT inhaler Inhale 2 puffs into the lungs every 4 hours as needed for Wheezing 1 Inhaler 2    Multiple Vitamins-Minerals (THERAPEUTIC MULTIVITAMIN-MINERALS) tablet Take 1 tablet by mouth daily       No current facility-administered medications for this visit. Past Medical History:   Diagnosis Date    Acid reflux     Bilateral carpal tunnel syndrome 12/21/2017    CAD in native artery 06/19/2018    Mid LAD stented with 3.5 x 15 mm Xience BRIAN.  EF 55%    Chicken pox     Chronic back pain     CKD (chronic kidney disease) 4/15/2016    Depression 12/7/2010    Heart disease     Hypertension     Interstitial lung disease [facility] [address] [facility]     Measles     Menopausal symptoms     Morbid obesity with BMI of 45.0-49.9, adult ([facility] [address] 1/29/2015    Osteoarthritis     Overactive bladder     Pure hypercholesterolemia     Stress incontinence      Past Surgical History:   Procedure Laterality Date    BRONCHOSCOPY  6/27/2019    BRONCHOSCOPY ALVEOLAR LAVAGE performed by Maria Teresa Harrison MD at [address]  6/27/2019    BRONCHOSCOPY DIAGNOSTIC OR CELL [address] ONLY performed by Maria Teresa Harrison MD at [address]  6/27/2019    BRONCHOSCOPY BIOPSY BRONCHUS performed by Maria Teresa Harrison MD at [address] [place] [address] Bilateral 01/29/2018    EPIDURAL STEROID INJECTION N/A 10/15/2019    MIDLINE CAUDAL EPIDURAL STEROID INJECTION AND COCCYGEAL JOINT INJECTION SITES CONFIRMED BY FLUOROSCOPY performed by Violetet Fabian MD at [place] [address]  2009,2010    KNEES BILATERAL    MI Cesar Kelvin [address] 83 DX/THER SBST INTRLMNR CRV/THRC W/IMG GDN Right 8/21/2018    RIGHT LUMBAR FOUR/FIVE, LUMBAR FIVE/ SACRAL ONE FACET INJECTION AND RIGHT SACROILIAC JOINT INJECTION SITES CONFIRMED BY FLUOROSCOPY performed by Josph Goltz, MD at [address] [facility] ARTHROSCOPY Right 08/22/2017    ROTATOR CUFF REPAIR    THORACOSCOPY Right 9/4/2019    RIGHT VIDEO ASSISTED THORACOSCOPIC SURGERY, WEDGE RESECTION AND RIGHT UPPER AND LOWER LOBE NODULES WITH BIOPSY, INTERCOSTAL NERVE BLOCK TIMES SEVEN LEVELS performed by Abhishek Welch MD at [place] [address]     Family History   Problem Relation Age of Onset    High Blood Pressure Mother     Rheum Arthritis Mother     Cancer Father     Hypertension Father     Colon Cancer Father     High Blood Pressure Brother     Stroke Brother     Heart Defect Brother     Hypertension Sister     Rheum Arthritis Sister     Emphysema Sister     Rheum Arthritis Sister     Other Sister         bottom of lungs are getting hard    Hypertension Brother     Rheum Arthritis Brother     Heart Attack Paternal Grandfather     No Known Problems Paternal Grandmother     No Known Problems Maternal Grandmother     No Known Problems Maternal Grandfather     Heart Attack Maternal Aunt     No Known Problems Maternal Aunt     Brain Cancer Maternal Aunt     Other Maternal Aunt         Complications from surgery    Arthritis Maternal Aunt     Arthritis Maternal Aunt     Heart Disease Maternal Aunt     High Blood Pressure Maternal Aunt     Cancer Maternal Uncle     Heart Disease Maternal Uncle     Heart Attack Maternal Uncle     High Blood Pressure Maternal Uncle     Heart Attack Maternal Uncle     High Blood Pressure Maternal Uncle     Parkinsonism Maternal Uncle     Heart Attack Paternal Aunt     Breast Cancer Paternal Aunt     Dementia Paternal Aunt     Heart Attack Paternal Uncle     Heart Attack Paternal Uncle     Brain Cancer Maternal Uncle      Social History     Tobacco Use    Smoking status: Former Smoker     Packs/day: 0.50     Years: 10.00     Pack years: 5.00     Types: Cigarettes     Last attempt to quit: Medicine  1/3/2020

## 2020-01-14 ENCOUNTER — OFFICE VISIT (OUTPATIENT)
Dept: ORTHOPEDIC SURGERY | Age: 60
End: 2020-01-14
Payer: COMMERCIAL

## 2020-01-14 ENCOUNTER — OFFICE VISIT (OUTPATIENT)
Dept: PULMONOLOGY | Age: 60
End: 2020-01-14
Payer: COMMERCIAL

## 2020-01-14 VITALS
DIASTOLIC BLOOD PRESSURE: 84 MMHG | BODY MASS INDEX: 46.6 KG/M2 | OXYGEN SATURATION: 97 % | HEIGHT: 63 IN | SYSTOLIC BLOOD PRESSURE: 138 MMHG | HEART RATE: 83 BPM | RESPIRATION RATE: 16 BRPM | WEIGHT: 263 LBS

## 2020-01-14 VITALS
SYSTOLIC BLOOD PRESSURE: 114 MMHG | HEART RATE: 80 BPM | DIASTOLIC BLOOD PRESSURE: 73 MMHG | WEIGHT: 263.01 LBS | HEIGHT: 63 IN | BODY MASS INDEX: 46.6 KG/M2

## 2020-01-14 PROCEDURE — 1036F TOBACCO NON-USER: CPT | Performed by: PHYSICAL MEDICINE & REHABILITATION

## 2020-01-14 PROCEDURE — 99214 OFFICE O/P EST MOD 30 MIN: CPT | Performed by: PHYSICAL MEDICINE & REHABILITATION

## 2020-01-14 PROCEDURE — 1036F TOBACCO NON-USER: CPT | Performed by: INTERNAL MEDICINE

## 2020-01-14 PROCEDURE — G8428 CUR MEDS NOT DOCUMENT: HCPCS | Performed by: INTERNAL MEDICINE

## 2020-01-14 PROCEDURE — 99214 OFFICE O/P EST MOD 30 MIN: CPT | Performed by: INTERNAL MEDICINE

## 2020-01-14 PROCEDURE — G8482 FLU IMMUNIZE ORDER/ADMIN: HCPCS | Performed by: PHYSICAL MEDICINE & REHABILITATION

## 2020-01-14 PROCEDURE — G8417 CALC BMI ABV UP PARAM F/U: HCPCS | Performed by: PHYSICAL MEDICINE & REHABILITATION

## 2020-01-14 PROCEDURE — G8482 FLU IMMUNIZE ORDER/ADMIN: HCPCS | Performed by: INTERNAL MEDICINE

## 2020-01-14 PROCEDURE — 3017F COLORECTAL CA SCREEN DOC REV: CPT | Performed by: INTERNAL MEDICINE

## 2020-01-14 PROCEDURE — G8417 CALC BMI ABV UP PARAM F/U: HCPCS | Performed by: INTERNAL MEDICINE

## 2020-01-14 PROCEDURE — 3017F COLORECTAL CA SCREEN DOC REV: CPT | Performed by: PHYSICAL MEDICINE & REHABILITATION

## 2020-01-14 PROCEDURE — G8427 DOCREV CUR MEDS BY ELIG CLIN: HCPCS | Performed by: PHYSICAL MEDICINE & REHABILITATION

## 2020-01-14 RX ORDER — PREDNISONE 10 MG/1
TABLET ORAL
Qty: 60 TABLET | Refills: 3 | Status: SHIPPED | OUTPATIENT
Start: 2020-01-14 | End: 2020-01-14

## 2020-01-14 NOTE — PROGRESS NOTES
[facility] Pulmonary and Critical Care    Outpatient Initial Note    Subjective:   CHIEF COMPLAINT / HPI:     The patient is 60 y.o. 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 Since last visit she was seen by rheumatology. She is down to prednisone 30 mg daily and dyspnea exertion is much improved. She has no significant cough. She does mention she has gained weight especially in the face. 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 Differential included connective tissue disease, sarcoidosis, and chronic hypersensitivity pneumonitis. I did have her fill out a ILD questionnaire and did not find a suspected cause for hypersensitivity pneumonitis. Woodworking was a thought in the past.  Last visit I did do an extensive serologic connective tissue disease work-up. Pertinent positives were a mildly positive SAMMY at 1-80 nuclear older pattern and a positive anti-centromere. She has no history of connective tissue disease and never has seen a rheumatologist.  Clinically I do not have a lot of symptoms that is overwhelmingly positive for a connective tissue disease. She currently is on prednisone 40 mg a day and clinically has responded nicely. Dyspnea on exertion and cough are significantly improved. She does not seem to have any significant adverse effects from her prednisone. She is taking Bactrim for PCP prophylaxis    11/15/2019  Joshua Turcios is here for follow-up of open lung biopsy for undiagnosed ILD. She had no postoperative complications. Her chronic dyspnea on exertion is unchanged but she does state that her cough is better. 7/9  Elsa Duran is here for follow up of ILD NOS. She had Bronch 6/27 with BAL and Bx of vicente. Other than 10,000 - 100,000 S Aureus and mild elevation of CD4:CD8 in BAL the bronch has been non-diagnostic. She continues to have Mullen and cough.  No new changes    6/18  [place] is here for follow-up of chronic cough and nonspecific ILD. She is now off Plavix and losartan without any change in cough. She had CT chest that shows unchanged basilar predominate nonspecific ILD. Her cough is daily and unrelenting. She coughed continuously through my office visit. She feels that her dyspnea on exertion is somewhat worse and gets winded doing routine activities of daily living. No fever, chills, night sweats, weight loss, hemoptysis, sputum production, CP or wheezing    5/21/2019  Sunita Dinero is here for follow-up of chronic cough and nonspecific ILD. Last visit I doubled her Pepcid to 40 mg by mouth twice a day and obtained a modified barium swallow which was normal.  Her daily chronic dry cough is unchanged. She has some associated dyspnea on exertion and wheezing but no chest pain. She has no fevers, chills, night sweats, anorexia, weight loss, hemoptysis, or sputum production    5/3/2019  Mauriceeric Bar is here for follow-up of chronic cough. I saw her on March 13 and 25th and her cough has  not improved since then despite avoiding woodworking for now 8 weeks and a extended prednisone taper starting at 40 mg and tapering over 4 weeks to off. She has associated dyspnea on exertion. She occasionally coughs with eating but there is no clear aspiration. She is intolerant of proton pump inhibitors and she thinks her reflux is well controlled on Pepcid 10 mg by mouth twice a day. She has no fevers, chills, night sweats, anorexia, sputum production, chest pain, or hemoptysis. 3/13/2019  Marlen presents for follow-up of blood work. Her SAMMY was speckled 1-80, otherwise blood work was unremarkable. For the last 2 weeks she has not been around woodworking. She states over the last day or so she's noticed substantial improvement of her cough    Last visit 2/28  Sunita Dinero presents for follow-up of a dry cough. After last visit February 1 I added omeprazole to her Pepcid to see if this was a GERD related cough.   I also obtained a CT chest.  Her dry cough is unchanged, occurring daily, and not associated with dyspnea on exertion, chest tightness, or wheezing. 2/1/2019  Marlen presents for re-eval of cough that has now gone on since Thanksgiving. Last visit I checked a CXR that was negative. I thought this was a post viral cough and gave an extended course of prednisone which did not help. She has no infectious symptoms. No post nasal drip. SHe does have uncontrolled GERD. Bronchial challenge over the summer was normal. SHe is not on an ACE    1/7/2019  Mateone Leroy presents for evaluation of 5 weeks of a dry cough, chest congestion, wheezing and TAVERAS. She has no fevers, chills, purulent sputum, anorexia, hemoptysis or weight loss. She has seen her PCP who has given her a medrol dose pack, Z pack, phenergan DM, Tessalon perles and an albuterol MDI without help. Her  has been battling the same and improved with a longer pred taper, levaquin and tussionex cough syrup    7/10/2018  Skyla Arvizu presents today for follow-up visit for  re-evaluation of dyspnea on exertion after [facility] s/p PTCA LAD. She had a positive nuclear stress test that prompted her coronary angiogram.  She also had a bronchial challenge that was negative. Since revascularization she's had no further chest heaviness. Her dyspnea on exertion is mildly improved but still present when she goes up a flight of stairs. She has no cough or wheezing. She occasionally snores but has no witnessed apnea. She does not have excessive daytime sleepiness and her [place] was 6    Initial visit June 12, 2018   She states that she gets short of breath walking up from the first to second floor. It is 14 steps and sometimes she has to stop midway and at the top. She also gets short of breath working in the kitchen. She also complains of occasional chest heaviness. She pretty had a cough but states that that is now mostly resolved.   She can't take a deep breath and her appetite has been off. She and her  are on a ketogenic diet and have lost some weight. She denies any fevers, chills, hemoptysis, or peripheral edema. Her evaluation has included an echo which is normal.  She is also had PFTs and a chest x-ray. She is never had a cardiac stress test.  She has history of hypertension and hyperlipidemia. Past Medical History:    Past Medical History:   Diagnosis Date    Acid reflux     Bilateral carpal tunnel syndrome 12/21/2017    CAD in native artery 06/19/2018    Mid LAD stented with 3.5 x 15 mm Xience BRIAN. EF 55%    Chicken pox     Chronic back pain     CKD (chronic kidney disease) 4/15/2016    Depression 12/7/2010    Heart disease     Hypertension     Interstitial lung disease ([facility] [address]     Measles     Menopausal symptoms     Morbid obesity with BMI of 45.0-49.9, adult ([facility] [address] 1/29/2015    Osteoarthritis     Overactive bladder     Pure hypercholesterolemia     Stress incontinence        Social History:    Patient is . She is unemployed.   She smoked up to 1 PPD x 8 years and quit in 1985    Family History:  IPF    Current Medications:  Current Outpatient Medications on File Prior to Visit   Medication Sig Dispense Refill    rosuvastatin (CRESTOR) 5 MG tablet Take 1 tablet by mouth daily 30 tablet 5    citalopram (CELEXA) 40 MG tablet TAKE 1 TABLET BY MOUTH EVERY DAY 90 tablet 3    sulfamethoxazole-trimethoprim (BACTRIM) 400-80 MG per tablet Take Bactrim 1 pill every Monday, Wednesday and Friday 12 tablet 5    predniSONE (DELTASONE) 20 MG tablet Take 3 tablets by mouth daily (Patient taking differently: Take 30 mg by mouth daily ) 90 tablet 2    aspirin 81 MG chewable tablet Take 1 tablet by mouth daily 90 tablet 3    lisinopril (PRINIVIL;ZESTRIL) 20 MG tablet TAKE 1 TABLET BY MOUTH DAILY 90 tablet 3    famotidine (PEPCID) 40 MG tablet Take 1 tablet by mouth 2 times daily 60 tablet 5    albuterol sulfate HFA (PROAIR HFA) 108 (90 Base) MCG/ACT significant pulmonary fibrotic change or volume loss. 2.  Several punctate nodules appreciated one in the left upper lobe subpleural measuring 1 to 2 mm another in the left upper lobe with one poorly defined irregular nodule just above the right hemidiaphragm on image 69 image series 9 and image 68 series 3    measuring up to 5 x 4 mm which should be followed yearly.       3. No spiculated masses. Mild bronchial dilation in the lower lobes     Last PFTs: May 2018  TEST PERFORMED:  1. Spirometry with flow-volume loops obtained before and after  bronchodilation. 2.  Lung volumes by plethysmography. 3.  Diffusion capacity of carbon monoxide.     Test meets ATS criteria and the quality of flow-volume loops is sufficient  for interpretation. Good patient effort.     The FEV1 is 2.24 L or 89% predicted. The FEV1 to FVC ratio is 75. Post  bronchodilator, the FEV1 changed to 2.29 L or 91% predicted. Total lung  capacity is 94% predicted and diffusion is 83% predicted.     INTERPRETATION:  1. No obstruction, no significant post-bronchodilator improvement. 2.  Normal lung volumes. 3.  Normal diffusion capacity. 4.  Clinical correlation recommended, if strong suspicion for obstructive  lung disease exists, we would recommend further evaluation with  methacholine bronchoprovocation study. Bronchial challenge June 13, 2018  IMPRESSION:  1. Normal spirometry. 2.  No significant response to bronchodilators. 3.  Negative bronchial challenge indicative of no airway hyperreactivity or  hyperresponsiveness.     Myocardial stress test June 13, 2018  Impression       Pharmacologic stress induced reversible ischemia anterior and   anterolateral walls. Left heart catheterization June 19, 2018  Conclusions:  -Severe single vessel CAD; LAD stented with a 3.5 x 15 mm Xiecne BRIAN to 10 RUBY.  A \"pinched\" diag was treated with a 2.0 x 12 mm balloon.  -Elevated LVEDP  -Systemic HTN  -Normal LVEF    MBS  Impression     prednisone therapy

## 2020-01-14 NOTE — PROGRESS NOTES
days, Disp: 4 tablet, Rfl: 3    rosuvastatin (CRESTOR) 5 MG tablet, Take 1 tablet by mouth daily, Disp: 30 tablet, Rfl: 5    citalopram (CELEXA) 40 MG tablet, TAKE 1 TABLET BY MOUTH EVERY DAY, Disp: 90 tablet, Rfl: 3    sulfamethoxazole-trimethoprim (BACTRIM) 400-80 MG per tablet, Take Bactrim 1 pill every Monday, Wednesday and Friday, Disp: 12 tablet, Rfl: 5    predniSONE (DELTASONE) 20 MG tablet, Take 3 tablets by mouth daily (Patient taking differently: Take 30 mg by mouth daily ), Disp: 90 tablet, Rfl: 2    aspirin 81 MG chewable tablet, Take 1 tablet by mouth daily, Disp: 90 tablet, Rfl: 3    lisinopril (PRINIVIL;ZESTRIL) 20 MG tablet, TAKE 1 TABLET BY MOUTH DAILY, Disp: 90 tablet, Rfl: 3    famotidine (PEPCID) 40 MG tablet, Take 1 tablet by mouth 2 times daily, Disp: 60 tablet, Rfl: 5    albuterol sulfate HFA (PROAIR HFA) 108 (90 Base) MCG/ACT inhaler, Inhale 2 puffs into the lungs every 4 hours as needed for Wheezing, Disp: 1 Inhaler, Rfl: 2    Multiple Vitamins-Minerals (THERAPEUTIC MULTIVITAMIN-MINERALS) tablet, Take 1 tablet by mouth daily, Disp: , Rfl:   Allergies:  Atorvastatin and Protonix [pantoprazole sodium]  Social History:    reports that she quit smoking about 35 years ago. Her smoking use included cigarettes. She has a 5.00 pack-year smoking history. She has never used smokeless tobacco. She reports that she does not drink alcohol or use drugs.   Family History:   Family History   Problem Relation Age of Onset    High Blood Pressure Mother     Rheum Arthritis Mother     Cancer Father     Hypertension Father     Colon Cancer Father     High Blood Pressure Brother     Stroke Brother     Heart Defect Brother     Hypertension Sister     Rheum Arthritis Sister     Emphysema Sister     Rheum Arthritis Sister     Other Sister         bottom of lungs are getting hard    Hypertension Brother     Rheum Arthritis Brother     Heart Attack Paternal Grandfather     No Known Problems Paternal Grandmother     No Known Problems Maternal Grandmother     No Known Problems Maternal Grandfather     Heart Attack Maternal Aunt     No Known Problems Maternal Aunt     Brain Cancer Maternal Aunt     Other Maternal Aunt         Complications from surgery    Arthritis Maternal Aunt     Arthritis Maternal Aunt     Heart Disease Maternal Aunt     High Blood Pressure Maternal Aunt     Cancer Maternal Uncle     Heart Disease Maternal Uncle     Heart Attack Maternal Uncle     High Blood Pressure Maternal Uncle     Heart Attack Maternal Uncle     High Blood Pressure Maternal Uncle     Parkinsonism Maternal Uncle     Heart Attack Paternal Aunt     Breast Cancer Paternal Aunt     Dementia Paternal Aunt     Heart Attack Paternal Uncle     Heart Attack Paternal Uncle     Brain Cancer Maternal Uncle        REVIEW OF SYSTEMS:   CONSTITUTIONAL: Denies unexplained weight loss, fevers, chills or fatigue  NEUROLOGICAL: Denies unsteady gait or progressive weakness  MUSCULOSKELETAL: Denies joint swelling or redness  GI: Denies nausea, vomiting, diarrhea   : Denies bowel or bladder issues       PHYSICAL EXAM:    Vitals: Blood pressure 114/73, pulse 80, height 5' 2.99\" (1.6 m), weight 263 lb 0.1 oz (119.3 kg), last menstrual period 07/13/2009, not currently breastfeeding. GENERAL EXAM:  · General Apparence: Patient is adequately groomed with no evidence of malnutrition. · Psychiatric: Orientation: The patient is oriented to time, place and person. The patient's mood and affect are appropriate   · Vascular: Examination reveals no swelling and palpation reveals no tenderness in upper or lower extremities. Good capillary refill.    · The lymphatic examination of the neck, axillae and groin reveals all areas to be without enlargement or induration  · Sensation is intact without deficit in the upper and lower extremities to light touch and pinprick  · Coordination of the upper and lower extremities are planes due to pain  · Strength:   Strength testing is 5/5 in all muscle groups tested. · Special Tests:   Straight leg raise and crossed SLR negative. · Reflexes: Reflexes are symmetrically 2+ at the patellar and ankle tendons. Clonus absent bilaterally at the feet. · Gait & station: antalgic on the right and no ataxia  · Additional Examinations:  · RIGHT LOWER EXTREMITY: Inspection/examination of the right lower extremity does not show any tenderness, deformity or injury. Range of motion is normal and pain-free. There is no gross instability. There are no rashes, ulcerations or lesions. Strength and tone are normal. No atrophy or abnormal movements are noted. · LEFT LOWER EXTREMITY:  Inspection/examination of the left lower extremity does not show any tenderness, deformity or injury. Range of motion is normal and pain-free. There is no gross instability. There are no rashes, ulcerations or lesions. Strength and tone are normal. No atrophy or abnormal movements are noted. Diagnostic Testing:    MR Lumbar spine shows 2018 -   Mild degenerative anterolisthesis at L4-5, resulting in significant   subarticular effacement and likely descending L5 nerve root impingement. There is mild to moderate L4 neural foraminal narrowing bilaterally, and mild   spinal canal stenosis at this level       Results for orders placed or performed in visit on 12/24/19   CHROMIUM LEVEL   Result Value Ref Range    Chromium, Serum 8.4 (H) <=5.0 ug/L     Impression:       1. Spondylolisthesis of lumbar region    2. DDD (degenerative disc disease), lumbar    3. Lumbar spondylosis    4. Lumbar radiculitis        Plan:  Clinical Course: Above diagnoses are worsening    I discussed the diagnosis and the treatment options with Brady Lee today.      In Summary:  The various treatment options were outlined and discussed with Enedina Islas including:  Conservative care options: physical therapy, ice, medications, bracing, and activity modification. The indications for therapeutic injections. The indications for additional imaging/laboratory studies. The indications for (possible future) interventions. After considering the various options discussed, Deisy Parr elected to pursue a course of treatment that includes the followin. Medications:  No further recommendations for new medications. 2. PT:  Encouraged to continue with HEP. 3. Further studies:  I will obtain a Lumbar MR without contrast to evaluate for soft tissue pathology or stenosis contributing to the back pain and paresthesias. 4. Interventional:  After further imaging is obtained, interventional options will be reviewed and recommended. 5. Follow up:  1-2 weeks      Enedina Islas was instructed to call the office if her symptoms worsen or if new symptoms appear prior to the next scheduled visit. She is specifically instructed to contact the office between now & her scheduled appointment if she has concerns related to her condition or if she needs assistance in scheduling the above tests. She is welcome to call for an appointment sooner if she has any additional concerns or questions. Sebastian Hannah ATC, am scribing for and in the presence of Dr. Cristiano Sanders. 20 1:04 PM Ayaan Stout ATC    The physical examination was performed between the patient and Dr. Cristiano Sanders. All counseling during the appointment was performed between the patient and provider. I, Dr. Bernardo Crandall, personally performed the services described in this documentation as scribed by EDDI Marcos in my presence and it is both accurate and complete. Kiana Aguirre.  Eula Dixon MD, CRESENCIO, [facility]  Board Certified in [address] [place] [address]  Certified and Fellowship Trained in [facility] ([facility])             This dictation was performed with a verbal recognition program [facility] CF) and it was checked for errors. It is possible that there are still dictated errors within this office note. If so, please bring any errors to my attention for an addendum. All efforts were made to ensure that this office note is accurate.

## 2020-01-15 ENCOUNTER — TELEPHONE (OUTPATIENT)
Dept: ORTHOPEDIC SURGERY | Age: 60
End: 2020-01-15

## 2020-01-21 ENCOUNTER — HOSPITAL ENCOUNTER (OUTPATIENT)
Dept: MRI IMAGING | Age: 60
Discharge: HOME | End: 2020-01-21
Payer: COMMERCIAL

## 2020-01-21 PROCEDURE — 72148 MRI LUMBAR SPINE W/O DYE: CPT

## 2020-01-29 ENCOUNTER — TELEPHONE (OUTPATIENT)
Dept: PULMONOLOGY | Age: 60
End: 2020-01-29

## 2020-01-29 ENCOUNTER — TELEPHONE (OUTPATIENT)
Dept: ORTHOPEDIC SURGERY | Age: 60
End: 2020-01-29

## 2020-01-29 ENCOUNTER — OFFICE VISIT (OUTPATIENT)
Dept: ORTHOPEDIC SURGERY | Age: 60
End: 2020-01-29
Payer: COMMERCIAL

## 2020-01-29 VITALS
DIASTOLIC BLOOD PRESSURE: 73 MMHG | BODY MASS INDEX: 46.6 KG/M2 | WEIGHT: 263.01 LBS | HEIGHT: 63 IN | SYSTOLIC BLOOD PRESSURE: 138 MMHG

## 2020-01-29 PROCEDURE — G8482 FLU IMMUNIZE ORDER/ADMIN: HCPCS | Performed by: PHYSICIAN ASSISTANT

## 2020-01-29 PROCEDURE — G8427 DOCREV CUR MEDS BY ELIG CLIN: HCPCS | Performed by: PHYSICIAN ASSISTANT

## 2020-01-29 PROCEDURE — G8417 CALC BMI ABV UP PARAM F/U: HCPCS | Performed by: PHYSICIAN ASSISTANT

## 2020-01-29 PROCEDURE — 99214 OFFICE O/P EST MOD 30 MIN: CPT | Performed by: PHYSICIAN ASSISTANT

## 2020-01-29 PROCEDURE — 1036F TOBACCO NON-USER: CPT | Performed by: PHYSICIAN ASSISTANT

## 2020-01-29 PROCEDURE — 3017F COLORECTAL CA SCREEN DOC REV: CPT | Performed by: PHYSICIAN ASSISTANT

## 2020-01-29 NOTE — PROGRESS NOTES
disease) 4/15/2016    Depression 12/7/2010    Heart disease     Hypertension     Interstitial lung disease ([facility] [address]     Measles     Menopausal symptoms     Morbid obesity with BMI of 45.0-49.9, adult ([facility] [address] 1/29/2015    Osteoarthritis     Overactive bladder     Pure hypercholesterolemia     Stress incontinence       Past Surgical History:     Past Surgical History:   Procedure Laterality Date    BRONCHOSCOPY  6/27/2019    BRONCHOSCOPY ALVEOLAR LAVAGE performed by Sher Pisano MD at [address] [facility]  6/27/2019    BRONCHOSCOPY DIAGNOSTIC OR CELL [address] performed by Sher Pisano MD at [address] [facility]  6/27/2019    BRONCHOSCOPY BIOPSY BRONCHUS performed by Sher Pisano MD at [address] [facility] Bilateral 01/29/2018    EPIDURAL STEROID INJECTION N/A 10/15/2019    MIDLINE CAUDAL EPIDURAL STEROID INJECTION AND COCCYGEAL JOINT INJECTION SITES CONFIRMED BY FLUOROSCOPY performed by Jamarcus Pagan MD at [facility] [address]  2009,2010    KNEES BILATERAL    LA Cesar Kelvin [address] DX/THER SBST INTRLMNR CRV/THRC W/IMG GDN Right 8/21/2018    RIGHT LUMBAR FOUR/FIVE, LUMBAR FIVE/ SACRAL ONE FACET INJECTION AND RIGHT SACROILIAC JOINT INJECTION SITES CONFIRMED BY FLUOROSCOPY performed by Jamarcus Pagan MD at [address] [facility] ARTHROSCOPY Right 08/22/2017    ROTATOR CUFF REPAIR    THORACOSCOPY Right 9/4/2019    RIGHT VIDEO ASSISTED THORACOSCOPIC SURGERY, WEDGE RESECTION AND RIGHT UPPER AND LOWER LOBE NODULES WITH BIOPSY, INTERCOSTAL NERVE BLOCK TIMES SEVEN LEVELS performed by Fadi Rivera MD at [place] [address]     Current Medications:     Current Outpatient Medications:     Pantoprazole Sodium (PROTONIX PO), Take by mouth, Disp: , Rfl:     alendronate (FOSAMAX) 35 MG tablet, Take 1 tablet by mouth every 7 days, Disp: 4 tablet, Rfl: 3    rosuvastatin (CRESTOR) 5 MG tablet, Take 1 tablet by mouth daily, Disp: 30 tablet, Rfl: 5    citalopram (CELEXA) 40 MG tablet, TAKE 1 TABLET BY MOUTH EVERY DAY, Disp: 90 tablet, Rfl: 3    sulfamethoxazole-trimethoprim (BACTRIM) 400-80 MG per tablet, Take Bactrim 1 pill every Monday, Wednesday and Friday, Disp: 12 tablet, Rfl: 5    predniSONE (DELTASONE) 20 MG tablet, Take 3 tablets by mouth daily (Patient taking differently: Take 30 mg by mouth daily ), Disp: 90 tablet, Rfl: 2    aspirin 81 MG chewable tablet, Take 1 tablet by mouth daily, Disp: 90 tablet, Rfl: 3    lisinopril (PRINIVIL;ZESTRIL) 20 MG tablet, TAKE 1 TABLET BY MOUTH DAILY, Disp: 90 tablet, Rfl: 3    famotidine (PEPCID) 40 MG tablet, Take 1 tablet by mouth 2 times daily, Disp: 60 tablet, Rfl: 5    albuterol sulfate HFA (PROAIR HFA) 108 (90 Base) MCG/ACT inhaler, Inhale 2 puffs into the lungs every 4 hours as needed for Wheezing, Disp: 1 Inhaler, Rfl: 2    Multiple Vitamins-Minerals (THERAPEUTIC MULTIVITAMIN-MINERALS) tablet, Take 1 tablet by mouth daily, Disp: , Rfl:   Allergies:  Atorvastatin and Protonix [pantoprazole sodium]  Social History:    reports that she quit smoking about 35 years ago. Her smoking use included cigarettes. She has a 5.00 pack-year smoking history. She has never used smokeless tobacco. She reports that she does not drink alcohol or use drugs.   Family History:   Family History   Problem Relation Age of Onset    High Blood Pressure Mother     Rheum Arthritis Mother     Cancer Father     Hypertension Father     Colon Cancer Father     High Blood Pressure Brother     Stroke Brother     Heart Defect Brother     Hypertension Sister     Rheum Arthritis Sister     Emphysema Sister     Rheum Arthritis Sister     Other Sister         bottom of lungs are getting hard    Hypertension Brother     Rheum Arthritis Brother     Heart Attack Paternal Grandfather     No Known Problems Paternal Grandmother     No Known Problems Maternal Grandmother     No Known Problems Maternal Grandfather deformity or injury. Range of motion is unremarkable and pain-free. There is no gross instability. There are no rashes, ulcerations or lesions. Strength and tone are normal. No atrophy or abnormal movements are noted. · LEFT UPPER EXTREMITY: Inspection/examination of the left upper extremity does not show any tenderness, deformity or injury. Range of motion is unremarkable and pain-free. There is no gross instability. There are no rashes, ulcerations or lesions. Strength and tone are normal. No atrophy or abnormal movements are noted. LUMBAR/SACRAL EXAMINATION:  · Inspection: Local inspection shows no step-off or bruising. Lumbar alignment is normal. No instability is noted. · Palpation:   No evidence of tenderness at the midline. Lumbar paraspinal tenderness: Mild L4/5 and L5/S1 tenderness  Bursal tenderness No tenderness bilaterally  There is no paraspinal spasm. · Range of Motion: limited by 50% in all planes due to pain  · Strength:   Strength testing is 5/5 in all muscle groups tested. · Special Tests:   Straight leg raise and crossed SLR negative. Kwan's testing is negative bilaterally. FADIR's testing is negative bilaterally. Bowstring test negative. Slump test negative. · Skin: There are no rashes, ulcerations or lesions. · Reflexes: Reflexes are symmetrically 1+ at the patellar and ankle tendons. Clonus absent bilaterally at the feet. · Gait & station: normal, patient ambulates without assistance and no ataxia  · Additional Examinations:  · RIGHT LOWER EXTREMITY: Inspection/examination of the right lower extremity does show knee tenderness, with no deformity or injury. Range of motion is normal and pain-free. There is no gross instability. There are no rashes, ulcerations or lesions. Strength and tone are normal. No atrophy or abnormal movements are noted. · LEFT LOWER EXTREMITY:  Inspection/examination of the left lower extremity does not show any tenderness, deformity or injury.  Range

## 2020-01-29 NOTE — TELEPHONE ENCOUNTER
S/W [facility] requested message to be sent to Dr. Nico Charlton and staff regarding clearance for patient for procedure due to long-term Bactrim Rx. Provided office number for call back.

## 2020-01-30 ENCOUNTER — TELEPHONE (OUTPATIENT)
Dept: ORTHOPEDIC SURGERY | Age: 60
End: 2020-01-30

## 2020-01-30 NOTE — TELEPHONE ENCOUNTER
PT IS CLEARED FOR EDGAR LSP BY DR. Carolina Hancock. Deepika Montalvo DOES SHE NEED ANY CLEARANCE PAPERWORK COMPLETED? PLEASE CONTACT WILDER -[phone].

## 2020-02-03 ENCOUNTER — TELEPHONE (OUTPATIENT)
Dept: ORTHOPEDIC SURGERY | Age: 60
End: 2020-02-03

## 2020-02-03 NOTE — TELEPHONE ENCOUNTER
DOS   02/11/2020  CPT   59823   90156  DX   M43.16   M51.36  M54.16  OP SX AUTH  [id number]  VALID   02/11/2020 - 05/11/2020    RIGHT  LEVELS   L4  L5     PROCEDURE   TRANS FORAMINAL EDGAR  DR. Salter  INSURANCE:   [facility]      CPT   04354.36  [id number]   [address] [facility]

## 2020-02-11 ENCOUNTER — TELEPHONE (OUTPATIENT)
Dept: ORTHOPEDIC SURGERY | Age: 60
End: 2020-02-11

## 2020-02-12 ENCOUNTER — OFFICE VISIT (OUTPATIENT)
Dept: ORTHOPEDIC SURGERY | Age: 60
End: 2020-02-12
Payer: COMMERCIAL

## 2020-02-12 VITALS
SYSTOLIC BLOOD PRESSURE: 138 MMHG | BODY MASS INDEX: 46.6 KG/M2 | WEIGHT: 263.01 LBS | HEIGHT: 63 IN | DIASTOLIC BLOOD PRESSURE: 73 MMHG

## 2020-02-12 PROCEDURE — 99214 OFFICE O/P EST MOD 30 MIN: CPT | Performed by: PHYSICIAN ASSISTANT

## 2020-02-12 PROCEDURE — G8427 DOCREV CUR MEDS BY ELIG CLIN: HCPCS | Performed by: PHYSICIAN ASSISTANT

## 2020-02-12 PROCEDURE — 3017F COLORECTAL CA SCREEN DOC REV: CPT | Performed by: PHYSICIAN ASSISTANT

## 2020-02-12 PROCEDURE — G8482 FLU IMMUNIZE ORDER/ADMIN: HCPCS | Performed by: PHYSICIAN ASSISTANT

## 2020-02-12 PROCEDURE — G8417 CALC BMI ABV UP PARAM F/U: HCPCS | Performed by: PHYSICIAN ASSISTANT

## 2020-02-12 PROCEDURE — 1036F TOBACCO NON-USER: CPT | Performed by: PHYSICIAN ASSISTANT

## 2020-02-12 RX ORDER — TRAMADOL HYDROCHLORIDE 50 MG/1
50 TABLET ORAL 3 TIMES DAILY
Qty: 21 TABLET | Refills: 0 | Status: SHIPPED | OUTPATIENT
Start: 2020-02-12 | End: 2020-02-19

## 2020-02-12 NOTE — PROGRESS NOTES
Medications:     traMADol (ULTRAM) 50 MG tablet, Take 1 tablet by mouth 3 times daily for 7 days. , Disp: 21 tablet, Rfl: 0    Pantoprazole Sodium (PROTONIX PO), Take by mouth, Disp: , Rfl:     alendronate (FOSAMAX) 35 MG tablet, Take 1 tablet by mouth every 7 days, Disp: 4 tablet, Rfl: 3    rosuvastatin (CRESTOR) 5 MG tablet, Take 1 tablet by mouth daily, Disp: 30 tablet, Rfl: 5    citalopram (CELEXA) 40 MG tablet, TAKE 1 TABLET BY MOUTH EVERY DAY, Disp: 90 tablet, Rfl: 3    sulfamethoxazole-trimethoprim (BACTRIM) 400-80 MG per tablet, Take Bactrim 1 pill every Monday, Wednesday and Friday, Disp: 12 tablet, Rfl: 5    predniSONE (DELTASONE) 20 MG tablet, Take 3 tablets by mouth daily (Patient taking differently: Take 30 mg by mouth daily ), Disp: 90 tablet, Rfl: 2    aspirin 81 MG chewable tablet, Take 1 tablet by mouth daily, Disp: 90 tablet, Rfl: 3    lisinopril (PRINIVIL;ZESTRIL) 20 MG tablet, TAKE 1 TABLET BY MOUTH DAILY, Disp: 90 tablet, Rfl: 3    famotidine (PEPCID) 40 MG tablet, Take 1 tablet by mouth 2 times daily, Disp: 60 tablet, Rfl: 5    albuterol sulfate HFA (PROAIR HFA) 108 (90 Base) MCG/ACT inhaler, Inhale 2 puffs into the lungs every 4 hours as needed for Wheezing, Disp: 1 Inhaler, Rfl: 2    Multiple Vitamins-Minerals (THERAPEUTIC MULTIVITAMIN-MINERALS) tablet, Take 1 tablet by mouth daily, Disp: , Rfl:   Allergies:  Atorvastatin and Protonix [pantoprazole sodium]  Social History:    reports that she quit smoking about 35 years ago. Her smoking use included cigarettes. She has a 5.00 pack-year smoking history. She has never used smokeless tobacco. She reports that she does not drink alcohol or use drugs.   Family History:   Family History   Problem Relation Age of Onset    High Blood Pressure Mother     Rheum Arthritis Mother     Cancer Father     Hypertension Father     Colon Cancer Father     High Blood Pressure Brother     Stroke Brother     Heart Defect Brother     Hypertension Sister     Rheum Arthritis Sister     Emphysema Sister     Rheum Arthritis Sister     Other Sister         bottom of lungs are getting hard    Hypertension Brother     Rheum Arthritis Brother     Heart Attack Paternal Grandfather     No Known Problems Paternal Grandmother     No Known Problems Maternal Grandmother     No Known Problems Maternal Grandfather     Heart Attack Maternal Aunt     No Known Problems Maternal Aunt     Brain Cancer Maternal Aunt     Other Maternal Aunt         Complications from surgery    Arthritis Maternal Aunt     Arthritis Maternal Aunt     Heart Disease Maternal Aunt     High Blood Pressure Maternal Aunt     Cancer Maternal Uncle     Heart Disease Maternal Uncle     Heart Attack Maternal Uncle     High Blood Pressure Maternal Uncle     Heart Attack Maternal Uncle     High Blood Pressure Maternal Uncle     Parkinsonism Maternal Uncle     Heart Attack Paternal Aunt     Breast Cancer Paternal Aunt     Dementia Paternal Aunt     Heart Attack Paternal Uncle     Heart Attack Paternal Uncle     Brain Cancer Maternal Uncle        REVIEW OF SYSTEMS:   CONSTITUTIONAL: Denies unexplained weight loss, fevers, chills or fatigue  NEUROLOGICAL: Denies unsteady gait or progressive weakness  MUSCULOSKELETAL: Denies joint swelling or redness  GI: Denies nausea, vomiting, diarrhea   : Denies bowel or bladder issues       PHYSICAL EXAM:    Vitals: Blood pressure 138/73, height 5' 2.99\" (1.6 m), weight 263 lb 0.1 oz (119.3 kg), last menstrual period 07/13/2009, not currently breastfeeding. GENERAL EXAM:  · General Apparence: Patient is adequately groomed with no evidence of malnutrition. · Psychiatric: Orientation: The patient is oriented to time, place and person. The patient's mood and affect are appropriate   · Vascular: Examination reveals no swelling and palpation reveals no tenderness in upper or lower extremities. Good capillary refill.    · The lymphatic examination of the neck, axillae and groin reveals all areas to be without enlargement or induration  · Sensation is intact without deficit in the upper and lower extremities to light touch and pinprick  · Coordination of the upper and lower extremities are normal.  · RIGHT UPPER EXTREMITY:  Inspection/examination of the right upper extremity does not show any tenderness, deformity or injury. Range of motion is unremarkable and pain-free. There is no gross instability. There are no rashes, ulcerations or lesions. Strength and tone are normal. No atrophy or abnormal movements are noted. · LEFT UPPER EXTREMITY: Inspection/examination of the left upper extremity does not show any tenderness, deformity or injury. Range of motion is unremarkable and pain-free. There is no gross instability. There are no rashes, ulcerations or lesions. Strength and tone are normal. No atrophy or abnormal movements are noted. LUMBAR/SACRAL EXAMINATION:  · Inspection: Local inspection shows no step-off or bruising. Lumbar alignment is normal. No instability is noted. · Palpation:   No evidence of tenderness at the midline. Lumbar paraspinal tenderness: Mild L4/5 and L5/S1 tenderness  Bursal tenderness No tenderness bilaterally  There is no paraspinal spasm. · Range of Motion: limited by 50% in all planes due to pain  · Strength:   Strength testing is 5/5 in all muscle groups tested. · Special Tests:   Straight leg raise positive right and negative left and crossed SLR negative. Kwan's testing is negative bilaterally. FADIR's testing is negative bilaterally. Bowstring test negative. Slump test negative. · Skin: There are no rashes, ulcerations or lesions. · Reflexes: Reflexes are symmetrically 2+ at the patellar and ankle tendons. Clonus absent bilaterally at the feet.   · Gait & station: normal, patient ambulates without assistance and no ataxia  · Additional Examinations:  · RIGHT LOWER EXTREMITY: Inspection/examination of the right lower extremity does not show any tenderness, deformity or injury. Range of motion is normal and pain-free. There is no gross instability. There are no rashes, ulcerations or lesions. Strength and tone are normal. No atrophy or abnormal movements are noted. · LEFT LOWER EXTREMITY:  Inspection/examination of the left lower extremity does not show any tenderness, deformity or injury. Range of motion is normal and pain-free. There is no gross instability. There are no rashes, ulcerations or lesions. Strength and tone are normal. No atrophy or abnormal movements are noted. Diagnostic Testing:    No new diagnostics  Results for orders placed or performed in visit on 19   CHROMIUM LEVEL   Result Value Ref Range    Chromium, Serum 8.4 (H) <=5.0 ug/L     Impression:       1. Spondylolisthesis of lumbar region    2. DDD (degenerative disc disease), lumbar    3. Lumbar radiculopathy        Plan:  Clinical Course: Above diagnoses are worsening    I discussed the diagnosis and the treatment options with Amy Ham today. In Summary:  The various treatment options were outlined and discussed with Enedina Islas including:  Conservative care options: physical therapy, ice, medications, bracing, and activity modification. The indications for therapeutic injections. The indications for additional imaging/laboratory studies. The indications for (possible future) interventions. After considering the various options discussed, Amy Ham elected to pursue a course of treatment that includes the followin. Medications:    OARRS reviewed and appropriate. Low risk on ORT. 4 A's reviewed. Begin Tramadol 50 mg 1 po BID #21. Informed verbal consent was obtained.    Goals of the current treatment regimen include: improvement in pain, improvement in overall in physical performance, ability to perform daily activities, work or disability, emotional distress, health care utilization and decreased medication consumption. Progress will be monitored towards achieving/maintaining therapeutic goals:    1. Improving perceived interference of pain with ADL's, ability to perform HEP, continue to improve in overall flexibility, ROM, strength and endurance, ability to do household activities indoor and/or outdoor, work and social/leisure activities. Improve psychosocial and physical functioning. 2. Improving sleep to 6-7 hours a night. Improve mood/ anxiety and depression symptoms    3. Reduction of reliance on opioid analgesia/more appropriate opioid use. Utilization of adjuvant medications as appropriate. Risks and benefits of the medications and alternative treatments have been discussed with the patient. The patient was advised against drinking alcohol with the opioid pain medicines, advised against driving or handling machinery when starting or adjusting the dose of medicines, feeling groggy or drowsy, or if having any cognitive issues related to the current medications. The patient is fully aware of the risk of overdose and death, if medicines are misused and not taken as prescribed. If the patient develops new symptoms or if the symptoms worsen, the patient was told to call the office. RX Monitoring 11/6/2017   Attestation The Prescription Monitoring Report for this patient was reviewed today. 2. PT:  Encouraged to continue with Home exercise program.    3. Further studies: No further studies. 4. Interventional:  We discussed pursuing a Right L4 and L5 TF epidural steroid injection to address the pain. Radiologic imaging and symptoms confirm the pain etiology. Risks, benefits and alternatives of interventional options were discussed. These include and are not limited to bleeding, infection, spinal headache, nerve injury and lack of pain relief. The patient verbalized understanding and would like to proceed. The patient will be scheduled accordingly.     5. Follow up:

## 2020-02-12 NOTE — LETTER
Please schedule the following with:     Date:   20 @ 8:00    Account: [de-identified]  Patient: Mitali Parker    : 1960  Address:  [address] [place] [address] [place]    Phone (H):  [phone] (home) [phone] (work)     ----------------------------------------------------------------------------------------------  Diagnosis:     ICD-10-CM    1. Spondylolisthesis of lumbar region M43.16    2. DDD (degenerative disc disease), lumbar M51.36    3. Lumbar radiculopathy M54.16      Procedure: Transforaminal Epidural Steroid Injection     Levels: L4 and L5   Side: Right   CPT Codes 23525, 59439    ----------------------------------------------------------------------------------------------  Injection # 1  [email or web address]    Attending Physician       Manisha Armenta. Raegan Dudley MD.  ----------------------------------------------------------------------------------------------  Injection Scheduled For:    At:    [address] [facility]    Pre-Cert#    2nd Insurance     Pre-Cert#    Comments:     · Infection control  ? Tested positive for MRSA in past 12 months:  no  ? Tested positive for MSSA \"staph infection\" in past 12 months: no  ? Tested positive for VRE (Vancomycin Resistant Enterococci) in past 12 months:   no  ? Currently on any antibiotics for an infection: no  · Anticoagulants:  ? On a blood thinner:  ASA   ?  Any history of bleeding disorder: no   · Advanced Liver disease: no   · Advanced Renal disease: no   · Glaucoma: no   · Diabetes: no      Sedation:         No  -----------------------------------------------------------------------------------------------  Allergies   Allergen Reactions    Atorvastatin Other (See Comments)     Multiple muscle spasms/cramps over body    Protonix [Pantoprazole Sodium] Diarrhea

## 2020-03-03 ENCOUNTER — HOSPITAL ENCOUNTER (OUTPATIENT)
Age: 60
Setting detail: OUTPATIENT SURGERY
Discharge: HOME | End: 2020-03-03
Attending: PHYSICAL MEDICINE & REHABILITATION | Admitting: PHYSICAL MEDICINE & REHABILITATION
Payer: COMMERCIAL

## 2020-03-03 VITALS
BODY MASS INDEX: 46.07 KG/M2 | OXYGEN SATURATION: 96 % | HEIGHT: 63 IN | WEIGHT: 260 LBS | RESPIRATION RATE: 16 BRPM | DIASTOLIC BLOOD PRESSURE: 89 MMHG | TEMPERATURE: 98.6 F | HEART RATE: 96 BPM | SYSTOLIC BLOOD PRESSURE: 151 MMHG

## 2020-03-03 PROCEDURE — 6360000002 HC RX W HCPCS: Performed by: PHYSICAL MEDICINE & REHABILITATION

## 2020-03-03 PROCEDURE — 3600000002 HC SURGERY LEVEL 2 BASE: Performed by: PHYSICAL MEDICINE & REHABILITATION

## 2020-03-03 PROCEDURE — 2500000003 HC RX 250 WO HCPCS: Performed by: PHYSICAL MEDICINE & REHABILITATION

## 2020-03-03 PROCEDURE — 2709999900 HC NON-CHARGEABLE SUPPLY: Performed by: PHYSICAL MEDICINE & REHABILITATION

## 2020-03-03 PROCEDURE — 6360000004 HC RX CONTRAST MEDICATION: Performed by: PHYSICAL MEDICINE & REHABILITATION

## 2020-03-03 PROCEDURE — 7100000010 HC PHASE II RECOVERY - FIRST 15 MIN: Performed by: PHYSICAL MEDICINE & REHABILITATION

## 2020-03-03 RX ORDER — ALBUTEROL SULFATE 90 UG/1
2 AEROSOL, METERED RESPIRATORY (INHALATION) EVERY 4 HOURS PRN
Qty: 1 INHALER | Refills: 2 | Status: SHIPPED | OUTPATIENT
Start: 2020-03-03 | End: 2020-03-03

## 2020-03-03 RX ORDER — LIDOCAINE HYDROCHLORIDE 10 MG/ML
INJECTION, SOLUTION EPIDURAL; INFILTRATION; INTRACAUDAL; PERINEURAL PRN
Status: DISCONTINUED | OUTPATIENT
Start: 2020-03-03 | End: 2020-03-03 | Stop reason: ALTCHOICE

## 2020-03-03 RX ORDER — ALBUTEROL SULFATE 90 UG/1
2 AEROSOL, METERED RESPIRATORY (INHALATION) EVERY 4 HOURS PRN
Qty: 42.5 G | Refills: 0 | Status: SHIPPED | OUTPATIENT
Start: 2020-03-03 | End: 2020-04-13

## 2020-03-03 ASSESSMENT — PAIN SCALES - GENERAL: PAINLEVEL_OUTOF10: 0

## 2020-03-03 ASSESSMENT — PAIN DESCRIPTION - DESCRIPTORS: DESCRIPTORS: BURNING;ACHING

## 2020-03-03 NOTE — H&P
RIGHT LUMBAR FOUR/FIVE, LUMBAR FIVE/ SACRAL ONE FACET INJECTION AND RIGHT SACROILIAC JOINT INJECTION SITES CONFIRMED BY FLUOROSCOPY performed by Marina Gillette MD at [address] [facility] ARTHROSCOPY Right 08/22/2017    ROTATOR CUFF REPAIR    THORACOSCOPY Right 9/4/2019    RIGHT VIDEO ASSISTED THORACOSCOPIC SURGERY, WEDGE RESECTION AND RIGHT UPPER AND LOWER LOBE NODULES WITH BIOPSY, INTERCOSTAL NERVE BLOCK TIMES SEVEN LEVELS performed by Alexi Vega MD at [place] [address]     Current Medications:   Prior to Admission medications    Medication Sig Start Date End Date Taking?  Authorizing Provider   Pantoprazole Sodium (PROTONIX PO) Take by mouth    Historical Provider, MD   alendronate (FOSAMAX) 35 MG tablet Take 1 tablet by mouth every 7 days 12/18/19   Kirt Turner MD   rosuvastatin (CRESTOR) 5 MG tablet Take 1 tablet by mouth daily 12/4/19   Martine Wu MD   citalopram (CELEXA) 40 MG tablet TAKE 1 TABLET BY MOUTH EVERY DAY 10/16/19   Nancy Rodarte DO   sulfamethoxazole-trimethoprim (BACTRIM) 400-80 MG per tablet Take Bactrim 1 pill every Monday, Wednesday and Friday 10/2/19   Raoul Yanez MD   predniSONE (DELTASONE) 20 MG tablet Take 3 tablets by mouth daily  Patient taking differently: Take 30 mg by mouth daily  10/2/19 10/1/20  Raoul Yanez MD   aspirin 81 MG chewable tablet Take 1 tablet by mouth daily 8/5/19   ROMEO Estrada CNP   lisinopril (PRINIVIL;ZESTRIL) 20 MG tablet TAKE 1 TABLET BY MOUTH DAILY 5/20/19   ROMEO Estrada CNP   famotidine (PEPCID) 40 MG tablet Take 1 tablet by mouth 2 times daily 5/3/19   Raoul Yanez MD   albuterol sulfate HFA (PROAIR HFA) 108 (90 Base) MCG/ACT inhaler Inhale 2 puffs into the lungs every 4 hours as needed for Wheezing 12/27/18   ROMEO Hicks CNP   Multiple Vitamins-Minerals (THERAPEUTIC MULTIVITAMIN-MINERALS) tablet Take 1 tablet by mouth daily    Historical Provider, MD     Allergies: Atorvastatin and Protonix [pantoprazole sodium]  Social History:    reports that she quit smoking about 35 years ago. Her smoking use included cigarettes. She has a 5.00 pack-year smoking history. She has never used smokeless tobacco. She reports that she does not drink alcohol or use drugs. Family History:   Family History   Problem Relation Age of Onset    High Blood Pressure Mother     Rheum Arthritis Mother     Cancer Father     Hypertension Father     Colon Cancer Father     High Blood Pressure Brother     Stroke Brother     Heart Defect Brother     Hypertension Sister     Rheum Arthritis Sister     Emphysema Sister     Rheum Arthritis Sister     Other Sister         bottom of lungs are getting hard    Hypertension Brother     Rheum Arthritis Brother     Heart Attack Paternal Grandfather     No Known Problems Paternal Grandmother     No Known Problems Maternal Grandmother     No Known Problems Maternal Grandfather     Heart Attack Maternal Aunt     No Known Problems Maternal Aunt     Brain Cancer Maternal Aunt     Other Maternal Aunt         Complications from surgery    Arthritis Maternal Aunt     Arthritis Maternal Aunt     Heart Disease Maternal Aunt     High Blood Pressure Maternal Aunt     Cancer Maternal Uncle     Heart Disease Maternal Uncle     Heart Attack Maternal Uncle     High Blood Pressure Maternal Uncle     Heart Attack Maternal Uncle     High Blood Pressure Maternal Uncle     Parkinsonism Maternal Uncle     Heart Attack Paternal Aunt     Breast Cancer Paternal Aunt     Dementia Paternal Aunt     Heart Attack Paternal Uncle     Heart Attack Paternal Uncle     Brain Cancer Maternal Uncle        Vitals: Blood pressure (!) 144/96, pulse 102, temperature 98.6 °F (37 °C), temperature source Temporal, resp. rate 16, height 5' 3\" (1.6 m), weight 260 lb (117.9 kg), last menstrual period 07/13/2009, SpO2 95 %, not currently breastfeeding.       PHYSICAL EXAM:  HENT: Airway patent and reviewed  Cardiovascular: Normal rate, regular rhythm, normal heart sounds. Pulmonary/Chest: No wheezes. No rhonchi. No rales. Abdominal: Soft. Bowel sounds are normal. No distension. Extremities: Moves all extremities equally  Lumbar Spine: Painful range of motion, no midline tenderness       Diagnosis:Lumbar radiculopathy    Plan: Proceed with planned procedure      ASA CLASS:         []   I. Normal, healthy adult           [x]   II.  Mild systemic disease            []   III. Severe systemic disease      Mallampati: Mallampati Class II - (soft palate, fauces & uvula are visible)      Sedation plan:   [x]  Local              []  Minimal                  []  General anesthesia    Patient's condition acceptable for planned procedure/sedation. Post Procedure Plan   Return to same level of care   ______________________     The risks and benefits as well as alternatives to the procedure have been discussed with the patient and or family. The patient and or next of kin understands and agrees to proceed.     Leyla Carbajal M.D.

## 2020-03-04 ENCOUNTER — OFFICE VISIT (OUTPATIENT)
Dept: FAMILY MEDICINE CLINIC | Age: 60
End: 2020-03-04
Payer: COMMERCIAL

## 2020-03-04 VITALS
SYSTOLIC BLOOD PRESSURE: 140 MMHG | DIASTOLIC BLOOD PRESSURE: 90 MMHG | OXYGEN SATURATION: 97 % | HEART RATE: 78 BPM | BODY MASS INDEX: 46.23 KG/M2 | WEIGHT: 261 LBS

## 2020-03-04 PROCEDURE — G8417 CALC BMI ABV UP PARAM F/U: HCPCS | Performed by: FAMILY MEDICINE

## 2020-03-04 PROCEDURE — 3017F COLORECTAL CA SCREEN DOC REV: CPT | Performed by: FAMILY MEDICINE

## 2020-03-04 PROCEDURE — G8482 FLU IMMUNIZE ORDER/ADMIN: HCPCS | Performed by: FAMILY MEDICINE

## 2020-03-04 PROCEDURE — G8427 DOCREV CUR MEDS BY ELIG CLIN: HCPCS | Performed by: FAMILY MEDICINE

## 2020-03-04 PROCEDURE — 99213 OFFICE O/P EST LOW 20 MIN: CPT | Performed by: FAMILY MEDICINE

## 2020-03-04 PROCEDURE — 1036F TOBACCO NON-USER: CPT | Performed by: FAMILY MEDICINE

## 2020-03-04 RX ORDER — TRAZODONE HYDROCHLORIDE 50 MG/1
50 TABLET ORAL NIGHTLY
Qty: 30 TABLET | Refills: 0 | Status: SHIPPED | OUTPATIENT
Start: 2020-03-04 | End: 2020-03-31

## 2020-03-04 RX ORDER — PROMETHAZINE HYDROCHLORIDE AND CODEINE PHOSPHATE 6.25; 1 MG/5ML; MG/5ML
5 SYRUP ORAL EVERY 4 HOURS PRN
Qty: 180 ML | Refills: 0 | Status: SHIPPED | OUTPATIENT
Start: 2020-03-04 | End: 2020-03-18

## 2020-03-04 ASSESSMENT — ENCOUNTER SYMPTOMS
SORE THROAT: 1
FLU SYMPTOMS: 1
RHINORRHEA: 1
TROUBLE SWALLOWING: 0
DIARRHEA: 0
CONSTIPATION: 0
VISUAL CHANGE: 0
ABDOMINAL PAIN: 0
VOMITING: 0
CHANGE IN BOWEL HABIT: 0
NAUSEA: 0
SWOLLEN GLANDS: 0
COUGH: 1
SHORTNESS OF BREATH: 0
SINUS PAIN: 0
WHEEZING: 0
CHEST TIGHTNESS: 0
SINUS PRESSURE: 0

## 2020-03-11 ENCOUNTER — OFFICE VISIT (OUTPATIENT)
Dept: CARDIOLOGY CLINIC | Age: 60
End: 2020-03-11
Payer: COMMERCIAL

## 2020-03-11 VITALS
DIASTOLIC BLOOD PRESSURE: 60 MMHG | HEART RATE: 64 BPM | SYSTOLIC BLOOD PRESSURE: 134 MMHG | WEIGHT: 263.6 LBS | BODY MASS INDEX: 48.51 KG/M2 | HEIGHT: 62 IN

## 2020-03-11 PROCEDURE — G8482 FLU IMMUNIZE ORDER/ADMIN: HCPCS | Performed by: NURSE PRACTITIONER

## 2020-03-11 PROCEDURE — 3017F COLORECTAL CA SCREEN DOC REV: CPT | Performed by: NURSE PRACTITIONER

## 2020-03-11 PROCEDURE — G8417 CALC BMI ABV UP PARAM F/U: HCPCS | Performed by: NURSE PRACTITIONER

## 2020-03-11 PROCEDURE — 1036F TOBACCO NON-USER: CPT | Performed by: NURSE PRACTITIONER

## 2020-03-11 PROCEDURE — 99214 OFFICE O/P EST MOD 30 MIN: CPT | Performed by: NURSE PRACTITIONER

## 2020-03-11 PROCEDURE — G8427 DOCREV CUR MEDS BY ELIG CLIN: HCPCS | Performed by: NURSE PRACTITIONER

## 2020-03-12 ENCOUNTER — OFFICE VISIT (OUTPATIENT)
Dept: PULMONOLOGY | Age: 60
End: 2020-03-12
Payer: COMMERCIAL

## 2020-03-12 VITALS
BODY MASS INDEX: 45.95 KG/M2 | DIASTOLIC BLOOD PRESSURE: 82 MMHG | HEIGHT: 63 IN | HEART RATE: 74 BPM | OXYGEN SATURATION: 98 % | SYSTOLIC BLOOD PRESSURE: 128 MMHG | RESPIRATION RATE: 16 BRPM | WEIGHT: 259.3 LBS

## 2020-03-12 PROCEDURE — G8482 FLU IMMUNIZE ORDER/ADMIN: HCPCS | Performed by: INTERNAL MEDICINE

## 2020-03-12 PROCEDURE — G8427 DOCREV CUR MEDS BY ELIG CLIN: HCPCS | Performed by: INTERNAL MEDICINE

## 2020-03-12 PROCEDURE — 3017F COLORECTAL CA SCREEN DOC REV: CPT | Performed by: INTERNAL MEDICINE

## 2020-03-12 PROCEDURE — 99214 OFFICE O/P EST MOD 30 MIN: CPT | Performed by: INTERNAL MEDICINE

## 2020-03-12 PROCEDURE — 1036F TOBACCO NON-USER: CPT | Performed by: INTERNAL MEDICINE

## 2020-03-12 PROCEDURE — G8417 CALC BMI ABV UP PARAM F/U: HCPCS | Performed by: INTERNAL MEDICINE

## 2020-03-12 NOTE — PROGRESS NOTES
Former Smoker     Packs/day: 0.50     Years: 10.00     Pack years: 5.00     Types: Cigarettes     Last attempt to quit: 1985     Years since quittin.2    Smokeless tobacco: Never Used   Substance Use Topics    Alcohol use: No    Drug use: No     Allergies:Atorvastatin and Protonix [pantoprazole sodium]    Review of Systems  General: No changes in weight, fatigue, or night sweats. HEENT: No blurry or decreased vision. No changes in hearing, nasal discharge or sore throat. Cardiovascular:  See HPI. Respiratory: No cough, hemoptysis, or wheezing. No history of asthma. +TAVERAS  Gastrointestinal:  No abdominal pain, hematochezia, melana, constipation, diarrhea, or history of GI ulcers. Genito-Urinary: No dysuria or hematuria. No urgency or polyuria. Musculoskeletal:  No complaints of joint pain, joint swelling or muscular weakness/soreness. Neurological:  No dizziness, headaches, numbness/tingling, speech problems or weakness. No history of a stroke or TIA. Psychological:  No anxiety or depression. Hematological and Lymphatic: No abnormal bleeding or bruising, blood clots, jaundice or swollen lymph nodes. Endocrine:   No malaise/lethargy, palpitations, polydipsia/polyuria, temperature intolerance or unexpected weight changes  Skin:  No rashes or non-healing ulcers. Physical Exam:  /60   Pulse 64   Ht 5' 2\" (1.575 m)   Wt 263 lb 9.6 oz (119.6 kg)   LMP 2009   BMI 48.21 kg/m²  Blood pressure 134/60, pulse 64, height 5' 2\" (1.575 m), weight 263 lb 9.6 oz (119.6 kg), last menstrual period 2009, not currently breastfeeding. General:  Alert and oriented. No acute distress. Appears comfortable. HEENT:  Normocephalic. No trauma. EOMI. Neck:  Supple, no JVD  Cardiovascular: RRR  Pulses: 2+ carotid no bruit   Lungs:  Clear. Normal effort  Abd:  Soft, non-tender, non-distended. No peritoneal signs. Ext:  No clubbing, cyanosis, or edema. Neuro:  CN's 2-12 grossly in tact. consolidation. 10/12/2018 ECG: Sinus, no ischemic abnl/changes    9/19/2018 Nuc stress:     1. Abnormal myocardial perfusion study with moderate perfusion abnormality along the anterior wall and lateral wall on both rest images and stress images suggesting remote infarct with scarring. Similar findings may represent attenuation artifact as    well. No evidence for pharmacologically-induced ischemia. 2. Normal wall motion and ejection fraction of 62%. 6/2018 Cath: Angiographic Findings:  Left Main:  Normal  Left Anterior Descending:  Mid 60-70% hazy stenosis. iFR was 0.87. Circumflex:  Dominant. No obstructive plaque  Right Coronary:  Non-dominant. No obstructive plaque  Left Ventriculogram:  LVEF 50-55%. Mid LAD:  Direct Stent: 3.5 x 15 mm Xience BRIAN to 10 RUBY     Post stent, the first large diagonal had a 95-99% stenosis. We performed kissing balloon inflations on the ostial diag (through the stent struts) and the LAD using a 2.0 x 12 mm balloon in the diagonal and the 3.5 x 15 mm stent balloon in the LAD. Inflations taken to 10 RUBY each. 70-80% stenosis remained, but there was MARYLU 3 flow in both arteries. 6/13/2018 Nuc stress:      Pharmacologic stress induced reversible ischemia anterior and   anterolateral walls     5/2/2018 Echo:   Normal left ventricular systolic function with ejection fraction of 55-60%.   No regional wall motion abnormalites are seen.   Left ventricular diastolic filling pressure is normal.   Aortic valve appears sclerotic but opens adequately. Medications:   Current Outpatient Medications   Medication Sig Dispense Refill    promethazine-codeine (PHENERGAN WITH CODEINE) 6.25-10 MG/5ML syrup Take 5 mLs by mouth every 4 hours as needed for Cough for up to 14 days.  180 mL 0    traZODone (DESYREL) 50 MG tablet Take 1 tablet by mouth nightly 30 tablet 0    CO ENZYME Q-10 PO Take by mouth daily      albuterol sulfate  (90 Base) MCG/ACT inhaler INHALE 2 PUFFS INTO THE LUNGS EVERY 4 HOURS AS NEEDED FOR WHEEZING 42.5 g 0    citalopram (CELEXA) 40 MG tablet TAKE 1 TABLET BY MOUTH EVERY DAY 90 tablet 3    sulfamethoxazole-trimethoprim (BACTRIM) 400-80 MG per tablet Take Bactrim 1 pill every Monday, Wednesday and Friday 12 tablet 5    predniSONE (DELTASONE) 20 MG tablet Take 3 tablets by mouth daily (Patient taking differently: Take 20 mg by mouth daily ) 90 tablet 2    aspirin 81 MG chewable tablet Take 1 tablet by mouth daily 90 tablet 3    lisinopril (PRINIVIL;ZESTRIL) 20 MG tablet TAKE 1 TABLET BY MOUTH DAILY 90 tablet 3    famotidine (PEPCID) 40 MG tablet Take 1 tablet by mouth 2 times daily 60 tablet 5    Multiple Vitamins-Minerals (THERAPEUTIC MULTIVITAMIN-MINERALS) tablet Take 1 tablet by mouth daily       No current facility-administered medications for this visit. Assessment:  1. CAD in native artery    2. Essential hypertension    3. Mixed hyperlipidemia    4.  TAVERAS (dyspnea on exertion)        Plan:  CAD   ASA, lisinopril   No BB d/t bradycardia  HTN   /60   Lisinopril, Will not increase d/t Cr 1.7  HLD   LDL 77   Intolerant of statin; mylagia   Co-Q10  TAVERAS   Chronic and unchanged,    Cont to f/u Dr. Rajat Valente     F/U with Dr Sindhu Devine in September

## 2020-03-13 NOTE — PROGRESS NOTES
[facility] Pulmonary and Critical Care    Outpatient Initial Note    Subjective:   CHIEF COMPLAINT / HPI:     The patient is 60 y.o. 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 She is down to prednisone 15 mg daily and dyspnea exertion is much improved. 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 Differential included connective tissue disease, sarcoidosis, and chronic hypersensitivity pneumonitis. I did have her fill out a ILD questionnaire and did not find a suspected cause for hypersensitivity pneumonitis. Woodworking was a thought in the past.  Last visit I did do an extensive serologic connective tissue disease work-up. Pertinent positives were a mildly positive SAMMY at 1-80 nuclear older pattern and a positive anti-centromere. She has no history of connective tissue disease and never has seen a rheumatologist.  Clinically I do not have a lot of symptoms that is overwhelmingly positive for a connective tissue disease. She currently is on prednisone 40 mg a day and clinically has responded nicely. Dyspnea on exertion and cough are significantly improved. She does not seem to have any significant adverse effects from her prednisone. She is taking Bactrim for PCP prophylaxis    11/15/2019  Susie Domingo is here for follow-up of open lung biopsy for undiagnosed ILD. She had no postoperative complications. Her chronic dyspnea on exertion is unchanged but she does state that her cough is better. 7/9  Anthony Rushing is here for follow up of ILD NOS. She had Bronch 6/27 with BAL and Bx of vicente. Other than 10,000 - 100,000 S Aureus and mild elevation of CD4:CD8 in BAL the bronch has been non-diagnostic. She continues to have Mullen and cough. No new changes    6/18  Susie Domingo is here for follow-up of chronic cough and nonspecific ILD.   She is now off Plavix and losartan without any change in cough. She had CT chest that shows unchanged basilar predominate nonspecific ILD. Her cough is daily and unrelenting. She coughed continuously through my office visit. She feels that her dyspnea on exertion is somewhat worse and gets winded doing routine activities of daily living. No fever, chills, night sweats, weight loss, hemoptysis, sputum production, CP or wheezing    5/21/2019  Jackelin Cuadra is here for follow-up of chronic cough and nonspecific ILD. Last visit I doubled her Pepcid to 40 mg by mouth twice a day and obtained a modified barium swallow which was normal.  Her daily chronic dry cough is unchanged. She has some associated dyspnea on exertion and wheezing but no chest pain. She has no fevers, chills, night sweats, anorexia, weight loss, hemoptysis, or sputum production    5/3/2019  Jackelin Cuadra is here for follow-up of chronic cough. I saw her on March 13 and 25th and her cough has  not improved since then despite avoiding woodworking for now 8 weeks and a extended prednisone taper starting at 40 mg and tapering over 4 weeks to off. She has associated dyspnea on exertion. She occasionally coughs with eating but there is no clear aspiration. She is intolerant of proton pump inhibitors and she thinks her reflux is well controlled on Pepcid 10 mg by mouth twice a day. She has no fevers, chills, night sweats, anorexia, sputum production, chest pain, or hemoptysis. 3/13/2019  Marlen presents for follow-up of blood work. Her SAMMY was speckled 1-80, otherwise blood work was unremarkable. For the last 2 weeks she has not been around woodworking. She states over the last day or so she's noticed substantial improvement of her cough    Last visit 2/28  Jackelin Cuadra presents for follow-up of a dry cough. After last visit February 1 I added omeprazole to her Pepcid to see if this was a GERD related cough.   I also obtained a CT chest.  Her dry cough is unchanged, occurring daily, and not associated with dyspnea on exertion, chest tightness, or wheezing. 2/1/2019  Marlen presents for re-eval of cough that has now gone on since Thanksgiving. Last visit I checked a CXR that was negative. I thought this was a post viral cough and gave an extended course of prednisone which did not help. She has no infectious symptoms. No post nasal drip. SHe does have uncontrolled GERD. Bronchial challenge over the summer was normal. SHe is not on an ACE    1/7/2019  Leonardo Tejeda presents for evaluation of 5 weeks of a dry cough, chest congestion, wheezing and TAVERAS. She has no fevers, chills, purulent sputum, anorexia, hemoptysis or weight loss. She has seen her PCP who has given her a medrol dose pack, Z pack, phenergan DM, Tessalon perles and an albuterol MDI without help. Her  has been battling the same and improved with a longer pred taper, levaquin and tussionex cough syrup    7/10/2018  Alina Blackwood presents today for follow-up visit for  re-evaluation of dyspnea on exertion after [facility] s/p PTCA LAD. She had a positive nuclear stress test that prompted her coronary angiogram.  She also had a bronchial challenge that was negative. Since revascularization she's had no further chest heaviness. Her dyspnea on exertion is mildly improved but still present when she goes up a flight of stairs. She has no cough or wheezing. She occasionally snores but has no witnessed apnea. She does not have excessive daytime sleepiness and her Chitina was 6    Initial visit June 12, 2018   She states that she gets short of breath walking up from the first to second floor. It is 14 steps and sometimes she has to stop midway and at the top. She also gets short of breath working in the kitchen. She also complains of occasional chest heaviness. She pretty had a cough but states that that is now mostly resolved. She can't take a deep breath and her appetite has been off.   She and her  are on a ketogenic diet and have lost some weight. She denies any fevers, chills, hemoptysis, or peripheral edema. Her evaluation has included an echo which is normal.  She is also had PFTs and a chest x-ray. She is never had a cardiac stress test.  She has history of hypertension and hyperlipidemia. Past Medical History:    Past Medical History:   Diagnosis Date    Acid reflux     Bilateral carpal tunnel syndrome 12/21/2017    CAD in native artery 06/19/2018    Mid LAD stented with 3.5 x 15 mm Xience BRIAN. EF 55%    Chicken pox     Chronic back pain     CKD (chronic kidney disease) 4/15/2016    Depression 12/7/2010    Heart disease     Hypertension     Interstitial lung disease ([facility] [address]     Measles     Menopausal symptoms     Morbid obesity with BMI of 45.0-49.9, adult ([facility] [address] 1/29/2015    Osteoarthritis     Overactive bladder     Pure hypercholesterolemia     Stress incontinence        Social History:    Patient is . She is unemployed. She smoked up to 1 PPD x 8 years and quit in 1985    Family History:  IPF    Current Medications:  Current Outpatient Medications on File Prior to Visit   Medication Sig Dispense Refill    promethazine-codeine (PHENERGAN WITH CODEINE) 6.25-10 MG/5ML syrup Take 5 mLs by mouth every 4 hours as needed for Cough for up to 14 days.  180 mL 0    traZODone (DESYREL) 50 MG tablet Take 1 tablet by mouth nightly 30 tablet 0    CO ENZYME Q-10 PO Take by mouth daily      albuterol sulfate  (90 Base) MCG/ACT inhaler INHALE 2 PUFFS INTO THE LUNGS EVERY 4 HOURS AS NEEDED FOR WHEEZING 42.5 g 0    citalopram (CELEXA) 40 MG tablet TAKE 1 TABLET BY MOUTH EVERY DAY 90 tablet 3    sulfamethoxazole-trimethoprim (BACTRIM) 400-80 MG per tablet Take Bactrim 1 pill every Monday, Wednesday and Friday 12 tablet 5    predniSONE (DELTASONE) 20 MG tablet Take 3 tablets by mouth daily (Patient taking differently: Take 20 mg by mouth daily ) 90 tablet 2    aspirin 81 MG chewable tablet Take 1 tablet by mouth daily 90 tablet 3    lisinopril (PRINIVIL;ZESTRIL) 20 MG tablet TAKE 1 TABLET BY MOUTH DAILY 90 tablet 3    famotidine (PEPCID) 40 MG tablet Take 1 tablet by mouth 2 times daily 60 tablet 5    Multiple Vitamins-Minerals (THERAPEUTIC MULTIVITAMIN-MINERALS) tablet Take 1 tablet by mouth daily       No current facility-administered medications on file prior to visit. REVIEW OF SYSTEMS:    CONSTITUTIONAL: Negative for fevers and chills  HEENT: Negative for oropharyngeal exudate, post nasal drip, sinus pain / pressure, nasal congestion, ear pain  RESPIRATORY:  See HPI  CARDIOVASCULAR: Negative for chest pain, palpitations, edema  GASTROINTESTINAL: Negative for nausea, vomiting, diarrhea, constipation and abdominal pain  GENITOURINARY: Negative for dysuria, urinary frequency, urinary hesitancy  HEMATOLOGICAL: Negative for adenopathy  SKIN: Negative for clubbing, cyanosis, skin lesions  ENDOCRINE: Negative for polyuria, polydipsia, heat intolerance, cold intolerance   EXTREMITIES: Negative for weakness, decreased ROM  NEUROLOGICAL: Negative for unilateral weakness, speech or gait abnormalities    Objective:   PHYSICAL EXAM:        VITALS:  /82 (Site: Left Upper Arm, Position: Sitting, Cuff Size: Large Adult)   Pulse 74   Resp 16   Ht 5' 3\" (1.6 m)   Wt 259 lb 4.8 oz (117.6 kg)   LMP 07/13/2009   SpO2 98%   Breastfeeding No   BMI 45.93 kg/m²   On RA  CONSTITUTIONAL:  Awake, alert, cooperative, no apparent distress, and appears stated age  HEENT: No oropharyngeal exudate, PERRL, no cervical adenopathy, no tracheal deviation, thyroid size normal  LUNGS:  No increased work of breathing and clear to auscultation, no crackles or wheezing  CARDIOVASCULAR:  normal S1 and S2 and no JVD  ABDOMEN:  Normal bowel sounds, non-distended and non-tender to palpation  EXT: No edema, no calf tenderness. Pulses are present bilaterally.   NEUROLOGIC:  Mental Status Exam:  Level of Alertness:   awake  Orientation: person, place, time. SKIN:  normal skin color, texture, turgor, no redness, warmth, or swelling     DATA:      Radiology Review:  Pertinent images / reports were reviewed as a part of this visit. CT Chest 2/22/2019 reveals the following:     Impression       Bilateral and fairly symmetric reticulation most pronounced about the lower lobes with immediate subpleural sparing may relate to interstitial pneumonitis and may relate to nonspecific interstitial pneumonitis. Findings may be present in connective    tissue disease including SLE, autoimmune disease including rheumatoid arthritis as well as other etiologies including hypersensitivity pneumonitis, drug-induced pneumonitis as well as other causes.       No pulmonary fibrosis.       No lobar consolidation. CT chest June 13, 2019  FINDINGS: The mediastinum appears normal without signs of any lymph node enlargement. The thyroid gland extends down to the level of the sternum bilaterally.       The aorta is of normal caliber as well as the pulmonary arteries.       Subpleural areas of pulmonary linear changes, likely pulmonary fibrosis or interstitial lung disease are appreciated with 1 to 2 mm pulmonary nodule in the left upper lobe on image 42 series 9, well circumscribed.  Additional subpleural linear changes    noted right middle lobe right left lower lobe and lingula near the lung base.       Poorly defined nodules noted just above the right hemidiaphragm the anterior aspect right lower lobe measuring 5 x 4 mm       There are some calcified subcarinal lymph nodes appreciated.       Calcification the left anterior descending coronary artery is appreciated without cardiac enlargement.       No pleural or pericardial effusion is noted.           Impression       1.  Small amount of subpleural linear change which may represent interstitial pneumonitis or interstitial lung disease most pronounced in the lower lobes and middle lobes without any significant pulmonary jeana. Surgical pathology -open lung biopsy -September 4, 2019  ADDENDUM:  ... External consultation has been completed on this case and  the slides have been reviewed by Dr. Joya Larry MD,  PhD at [address] [place] [address].  Dr. Alivia Andrews consultative diagnosis is as follows:  \" Lung, right upper and lower lobes, VATS wedge biopsies (A and B):     - Patchy cellular chronic interstitial pneumonia associated with       ill-formed nonnecrotizing granulomas.     - Arterial vasculopathy.     - No fungal or acid fast microorganisms identified by special stains.     - See case comment \". .. Ema Sites Ema Sites Ema Sites      In a comment, Dr. Alivia Andrews states that: \" The main       findings are patchy bronchiolocentric and subpleural chronic       interstitial pneumonia associated with ill-defined, nonnecrotizing       granulomas and a moderate to severe arterial vasculopathy.  A       primary etiologic consideration for this combination of       histopathologic findings is collagen vascular disease.  Infection       and chronic hypersensitivity pneumonitis are also differential       diagnosis.  Morphologic features diagnostic of usual interstitial       pneumonia seen in idiopathic pulmonary fibrosis are not present. AFB and GMS stain shows no evidence of fungal forms CD3 stains and CD20  stains are positive in the inflammatory component with no atypical T or B  cell infiltrates identified.  CD3 and CD20 stains were performed on  selected blocks from both parts A and B.  CD68 stain material is positive  in a macro 5 population and a nonspecific staining pattern.  Trichrome  stains performed on parts A and B and collagen stain performed on part A  show no evidence of increased interstitial fibrosis are collagen  fibrosis.  Please see outside consultation report for complete detailed  report and comments . ............................     DANIELA COLEMAN

## 2020-03-19 ENCOUNTER — TELEPHONE (OUTPATIENT)
Dept: ORTHOPEDIC SURGERY | Age: 60
End: 2020-03-19

## 2020-03-19 ENCOUNTER — HOSPITAL ENCOUNTER (OUTPATIENT)
Dept: ULTRASOUND IMAGING | Age: 60
Discharge: HOME | End: 2020-03-19
Payer: COMMERCIAL

## 2020-03-19 PROCEDURE — 76770 US EXAM ABDO BACK WALL COMP: CPT

## 2020-03-30 ENCOUNTER — HOSPITAL ENCOUNTER (EMERGENCY)
Age: 60
Discharge: HOME | End: 2020-03-30
Attending: EMERGENCY MEDICINE
Payer: COMMERCIAL

## 2020-03-30 ENCOUNTER — APPOINTMENT (OUTPATIENT)
Dept: CT IMAGING | Age: 60
End: 2020-03-30
Payer: COMMERCIAL

## 2020-03-30 VITALS
RESPIRATION RATE: 20 BRPM | HEART RATE: 92 BPM | SYSTOLIC BLOOD PRESSURE: 155 MMHG | OXYGEN SATURATION: 96 % | DIASTOLIC BLOOD PRESSURE: 78 MMHG | TEMPERATURE: 98.5 F

## 2020-03-30 LAB
A/G RATIO: 1.4 (ref 1.1–2.2)
ALBUMIN SERPL-MCNC: 4.1 G/DL (ref 3.4–5)
ALP BLD-CCNC: 62 U/L (ref 40–129)
ALT SERPL-CCNC: 22 U/L (ref 10–40)
ANION GAP SERPL CALCULATED.3IONS-SCNC: 14 MMOL/L (ref 3–16)
AST SERPL-CCNC: 20 U/L (ref 15–37)
BASOPHILS ABSOLUTE: 0 K/UL (ref 0–0.2)
BASOPHILS RELATIVE PERCENT: 0.4 %
BILIRUB SERPL-MCNC: 0.3 MG/DL (ref 0–1)
BILIRUBIN URINE: NEGATIVE
BLOOD, URINE: NEGATIVE
BUN BLDV-MCNC: 25 MG/DL (ref 7–20)
C-REACTIVE PROTEIN: 27.2 MG/L (ref 0–5.1)
CALCIUM SERPL-MCNC: 9.6 MG/DL (ref 8.3–10.6)
CHLORIDE BLD-SCNC: 105 MMOL/L (ref 99–110)
CLARITY: CLEAR
CO2: 27 MMOL/L (ref 21–32)
COLOR: YELLOW
CREAT SERPL-MCNC: 1.5 MG/DL (ref 0.6–1.2)
EOSINOPHILS ABSOLUTE: 0.3 K/UL (ref 0–0.6)
EOSINOPHILS RELATIVE PERCENT: 3.2 %
GFR AFRICAN AMERICAN: 43
GFR NON-AFRICAN AMERICAN: 35
GLOBULIN: 3 G/DL
GLUCOSE BLD-MCNC: 102 MG/DL (ref 70–99)
GLUCOSE URINE: NEGATIVE MG/DL
HCT VFR BLD CALC: 41.1 % (ref 36–48)
HEMOGLOBIN: 13.7 G/DL (ref 12–16)
KETONES, URINE: NEGATIVE MG/DL
LEUKOCYTE ESTERASE, URINE: NEGATIVE
LIPASE: 16 U/L (ref 13–60)
LYMPHOCYTES ABSOLUTE: 1.9 K/UL (ref 1–5.1)
LYMPHOCYTES RELATIVE PERCENT: 21.4 %
MCH RBC QN AUTO: 30.2 PG (ref 26–34)
MCHC RBC AUTO-ENTMCNC: 33.3 G/DL (ref 31–36)
MCV RBC AUTO: 90.6 FL (ref 80–100)
MICROSCOPIC EXAMINATION: NORMAL
MONOCYTES ABSOLUTE: 0.8 K/UL (ref 0–1.3)
MONOCYTES RELATIVE PERCENT: 8.8 %
NEUTROPHILS ABSOLUTE: 5.9 K/UL (ref 1.7–7.7)
NEUTROPHILS RELATIVE PERCENT: 66.2 %
NITRITE, URINE: NEGATIVE
PDW BLD-RTO: 13.2 % (ref 12.4–15.4)
PH UA: 6 (ref 5–8)
PLATELET # BLD: 268 K/UL (ref 135–450)
PMV BLD AUTO: 7.4 FL (ref 5–10.5)
POTASSIUM REFLEX MAGNESIUM: 4.2 MMOL/L (ref 3.5–5.1)
PROTEIN UA: NEGATIVE MG/DL
RBC # BLD: 4.54 M/UL (ref 4–5.2)
SEDIMENTATION RATE, ERYTHROCYTE: 37 MM/HR (ref 0–30)
SODIUM BLD-SCNC: 146 MMOL/L (ref 136–145)
SPECIFIC GRAVITY UA: 1.02 (ref 1–1.03)
TOTAL PROTEIN: 7.1 G/DL (ref 6.4–8.2)
URINE REFLEX TO CULTURE: NORMAL
URINE TYPE: NORMAL
UROBILINOGEN, URINE: 0.2 E.U./DL
WBC # BLD: 8.9 K/UL (ref 4–11)

## 2020-03-30 PROCEDURE — 6360000004 HC RX CONTRAST MEDICATION: Performed by: EMERGENCY MEDICINE

## 2020-03-30 PROCEDURE — 81003 URINALYSIS AUTO W/O SCOPE: CPT

## 2020-03-30 PROCEDURE — 6360000002 HC RX W HCPCS: Performed by: EMERGENCY MEDICINE

## 2020-03-30 PROCEDURE — 85025 COMPLETE CBC W/AUTO DIFF WBC: CPT

## 2020-03-30 PROCEDURE — 36415 COLL VENOUS BLD VENIPUNCTURE: CPT

## 2020-03-30 PROCEDURE — 96375 TX/PRO/DX INJ NEW DRUG ADDON: CPT

## 2020-03-30 PROCEDURE — 99284 EMERGENCY DEPT VISIT MOD MDM: CPT

## 2020-03-30 PROCEDURE — 86140 C-REACTIVE PROTEIN: CPT

## 2020-03-30 PROCEDURE — 85652 RBC SED RATE AUTOMATED: CPT

## 2020-03-30 PROCEDURE — 96376 TX/PRO/DX INJ SAME DRUG ADON: CPT

## 2020-03-30 PROCEDURE — 83690 ASSAY OF LIPASE: CPT

## 2020-03-30 PROCEDURE — 74176 CT ABD & PELVIS W/O CONTRAST: CPT

## 2020-03-30 PROCEDURE — 80053 COMPREHEN METABOLIC PANEL: CPT

## 2020-03-30 PROCEDURE — 96374 THER/PROPH/DIAG INJ IV PUSH: CPT

## 2020-03-30 PROCEDURE — 2580000003 HC RX 258: Performed by: EMERGENCY MEDICINE

## 2020-03-30 RX ORDER — CIPROFLOXACIN 500 MG/1
500 TABLET, FILM COATED ORAL 3 TIMES DAILY
Qty: 21 TABLET | Refills: 0 | Status: SHIPPED | OUTPATIENT
Start: 2020-03-30 | End: 2020-04-06

## 2020-03-30 RX ORDER — METRONIDAZOLE 500 MG/1
500 TABLET ORAL 2 TIMES DAILY
Qty: 14 TABLET | Refills: 0 | Status: SHIPPED | OUTPATIENT
Start: 2020-03-30 | End: 2020-04-06

## 2020-03-30 RX ORDER — MORPHINE SULFATE 4 MG/ML
4 INJECTION, SOLUTION INTRAMUSCULAR; INTRAVENOUS EVERY 30 MIN PRN
Status: COMPLETED | OUTPATIENT
Start: 2020-03-30 | End: 2020-03-30

## 2020-03-30 RX ORDER — OXYCODONE HYDROCHLORIDE AND ACETAMINOPHEN 5; 325 MG/1; MG/1
1 TABLET ORAL EVERY 6 HOURS PRN
Qty: 10 TABLET | Refills: 0 | Status: SHIPPED | OUTPATIENT
Start: 2020-03-30 | End: 2020-04-01 | Stop reason: SDUPTHER

## 2020-03-30 RX ORDER — 0.9 % SODIUM CHLORIDE 0.9 %
1000 INTRAVENOUS SOLUTION INTRAVENOUS ONCE
Status: COMPLETED | OUTPATIENT
Start: 2020-03-30 | End: 2020-03-30

## 2020-03-30 RX ORDER — MORPHINE SULFATE 4 MG/ML
4 INJECTION, SOLUTION INTRAMUSCULAR; INTRAVENOUS ONCE
Status: COMPLETED | OUTPATIENT
Start: 2020-03-30 | End: 2020-03-30

## 2020-03-30 RX ORDER — ONDANSETRON 2 MG/ML
4 INJECTION INTRAMUSCULAR; INTRAVENOUS ONCE
Status: COMPLETED | OUTPATIENT
Start: 2020-03-30 | End: 2020-03-30

## 2020-03-30 RX ADMIN — MORPHINE SULFATE 4 MG: 4 INJECTION INTRAVENOUS at 07:40

## 2020-03-30 RX ADMIN — ONDANSETRON 4 MG: 2 INJECTION INTRAMUSCULAR; INTRAVENOUS at 06:37

## 2020-03-30 RX ADMIN — MORPHINE SULFATE 4 MG: 4 INJECTION INTRAVENOUS at 06:37

## 2020-03-30 RX ADMIN — SODIUM CHLORIDE 1000 ML: 9 INJECTION, SOLUTION INTRAVENOUS at 07:40

## 2020-03-30 RX ADMIN — IOHEXOL 50 ML: 240 INJECTION, SOLUTION INTRATHECAL; INTRAVASCULAR; INTRAVENOUS; ORAL at 08:45

## 2020-03-30 RX ADMIN — MORPHINE SULFATE 4 MG: 4 INJECTION INTRAVENOUS at 10:08

## 2020-03-30 ASSESSMENT — PAIN DESCRIPTION - ORIENTATION
ORIENTATION: RIGHT;LOWER
ORIENTATION: RIGHT;LOWER

## 2020-03-30 ASSESSMENT — PAIN DESCRIPTION - LOCATION
LOCATION: ABDOMEN
LOCATION: ABDOMEN

## 2020-03-30 ASSESSMENT — PAIN - FUNCTIONAL ASSESSMENT
PAIN_FUNCTIONAL_ASSESSMENT: ACTIVITIES ARE NOT PREVENTED
PAIN_FUNCTIONAL_ASSESSMENT: ACTIVITIES ARE NOT PREVENTED

## 2020-03-30 ASSESSMENT — PAIN DESCRIPTION - FREQUENCY
FREQUENCY: CONTINUOUS
FREQUENCY: CONTINUOUS

## 2020-03-30 ASSESSMENT — ENCOUNTER SYMPTOMS
SHORTNESS OF BREATH: 0
DIARRHEA: 0
WHEEZING: 0
EYE PAIN: 0
NAUSEA: 1
VOMITING: 0
COUGH: 0
ABDOMINAL PAIN: 1

## 2020-03-30 ASSESSMENT — PAIN SCALES - GENERAL
PAINLEVEL_OUTOF10: 3
PAINLEVEL_OUTOF10: 8
PAINLEVEL_OUTOF10: 6
PAINLEVEL_OUTOF10: 7

## 2020-03-30 ASSESSMENT — PAIN DESCRIPTION - PAIN TYPE
TYPE: ACUTE PAIN
TYPE: ACUTE PAIN

## 2020-03-30 ASSESSMENT — PAIN DESCRIPTION - DESCRIPTORS
DESCRIPTORS: DISCOMFORT;ACHING
DESCRIPTORS: DISCOMFORT;ACHING;SHARP;STABBING

## 2020-03-30 ASSESSMENT — PAIN DESCRIPTION - ONSET
ONSET: AWAKENED FROM SLEEP
ONSET: AWAKENED FROM SLEEP

## 2020-03-30 NOTE — ED PROVIDER NOTES
[address] ENCOUNTER          ATTENDING PHYSICIAN NOTE       Date of evaluation: 3/30/2020    Chief Complaint     Abdominal Pain      History of Present Illness     Chiquita Guillaume is a 60 y.o. female who presents with a chief complaint of abdominal pain. Pain began yesterday evening around 6pm. The pain has been constant since then. It is pressure like in nature. Located in the right lower quadrant of the abdomen and radiating to the umbilicus. It is worsened with movement. Currently 7/10 in severity. Associated with nausea but no vomiting. No fevers. No dysuria or hematuria. No vaginal bleeding or discharge. Review of Systems     Review of Systems   Constitutional: Negative for chills and fever. HENT: Negative for congestion. Eyes: Negative for pain. Respiratory: Negative for cough, shortness of breath and wheezing. Cardiovascular: Negative for chest pain and leg swelling. Gastrointestinal: Positive for abdominal pain and nausea. Negative for diarrhea and vomiting. Genitourinary: Negative for dysuria. Musculoskeletal: Negative for arthralgias. Skin: Negative for rash. Neurological: Negative for weakness and headaches. All other systems reviewed and are negative. Past Medical, Surgical, Family, and Social History         Diagnosis Date    Acid reflux     Bilateral carpal tunnel syndrome 12/21/2017    CAD in native artery 06/19/2018    Mid LAD stented with 3.5 x 15 mm Xience BRIAN.  EF 55%    Chicken pox     Chronic back pain     CKD (chronic kidney disease) 4/15/2016    Depression 12/7/2010    Heart disease     Hypertension     Interstitial lung disease ([address]     Measles     Menopausal symptoms     Morbid obesity with BMI of 45.0-49.9, adult ([address] 1/29/2015    Osteoarthritis     Overactive bladder     Pure hypercholesterolemia     Stress incontinence          Procedure Laterality Date    BRONCHOSCOPY  6/27/2019    BRONCHOSCOPY ALVEOLAR LAVAGE performed by Gisel Argueta MD at [address]  6/27/2019    BRONCHOSCOPY DIAGNOSTIC OR CELL [address] ONLY performed by Gisel Argueta MD at [address]  6/27/2019    BRONCHOSCOPY BIOPSY BRONCHUS performed by Gisel Argueta MD at [address] Bilateral 01/29/2018    EPIDURAL STEROID INJECTION N/A 10/15/2019    MIDLINE CAUDAL EPIDURAL STEROID INJECTION AND COCCYGEAL JOINT INJECTION SITES CONFIRMED BY FLUOROSCOPY performed by Ernst Louis MD at [facility]  2009,2010    KNEES BILATERAL    PAIN MANAGEMENT PROCEDURE Right 3/3/2020    RIGHT LUMBAR FOUR AND LUMBAR FIVE TRANSFORAMINAL EPIDURAL STEROID INJECTION SITE CONFIRMED BY FLUOROSCOPY performed by Ernst Louis MD at [facility] DX/THER SBST INTRLMNR CRV/THRC W/IMG GDN Right 8/21/2018    RIGHT LUMBAR FOUR/FIVE, LUMBAR FIVE/ SACRAL ONE FACET INJECTION AND RIGHT SACROILIAC JOINT INJECTION SITES CONFIRMED BY FLUOROSCOPY performed by Ernst Louis MD at [facility] ARTHROSCOPY Right 08/22/2017    ROTATOR CUFF REPAIR    THORACOSCOPY Right 9/4/2019    RIGHT VIDEO ASSISTED THORACOSCOPIC SURGERY, WEDGE RESECTION AND RIGHT UPPER AND LOWER LOBE NODULES WITH BIOPSY, INTERCOSTAL NERVE BLOCK TIMES SEVEN LEVELS performed by Tarik Rider MD at [place] [address]     Her family history includes Arthritis in her maternal aunt and maternal aunt; Syliva Grumet in her maternal aunt and maternal uncle; Breast Cancer in her paternal aunt; Cancer in her father and maternal uncle; Colon Cancer in her father; Dementia in her paternal aunt; Emphysema in her sister; Heart Attack in her maternal aunt, maternal uncle, maternal uncle, paternal aunt, paternal grandfather, paternal uncle, and paternal uncle;  Heart Defect in her brother; Heart Disease in her maternal aunt and maternal uncle; High Blood Pressure in her brother, General:  Non-toxic, no acute distress, fully cooperative with my exam    HEENT:  NCAT    Neck:  Supple    Pulmonary:   No increased work of breathing    Cardiac:  RRR, no murmur, thrill or gallop. Capillary refill <3s. 2+ distal pulses    Abdomen:  Soft, TTP in the RLQ with guarding, nondistended    Musculoskeletal:  Grossly intact without obvious injury or deformity    Neuro:  No focal motor deficit    Skin: No rashes      Diagnostic Results     RADIOLOGY:  No orders to display       LABS:   No results found for this visit on 03/30/20. RECENT VITALS:  BP: (!) 186/93, Temp: 98.5 °F (36.9 °C), Pulse: 92, Resp: 20, SpO2: 96 %     Procedures         ED Course     Nursing Notes, Past Medical Hx, Past Surgical Hx, Social Hx, Allergies, and Family Hx were reviewed. The patient was given the following medications:  Orders Placed This Encounter   Medications    ondansetron (ZOFRAN) injection 4 mg    morphine injection 4 mg       CONSULTS:  None    MEDICAL DECISION MAKING / ASSESSMENT / Grace Hutchison is a 60 y.o. female with right lower abdominal pain. Tender on examination. Concern for appendicitis. No UTI symptoms. Labs pending. Turned over to Dr. Brenna Guillen to follow up on labs and order imaging as indicated. Clinical Impression     Abdominal Pain    Disposition     PATIENT REFERRED TO:  No follow-up provider specified.     DISCHARGE MEDICATIONS:  New Prescriptions    No medications on file       [address] Enrique Lazcano MD  03/30/20 [id number]

## 2020-03-30 NOTE — ED PROVIDER NOTES
Encounter   Medications    ondansetron (ZOFRAN) injection 4 mg    morphine injection 4 mg    0.9 % sodium chloride bolus    iohexol (OMNIPAQUE 240) injection 50 mL    morphine injection 4 mg    ciprofloxacin (CIPRO) 500 MG tablet     Sig: Take 1 tablet by mouth 3 times daily for 7 days     Dispense:  21 tablet     Refill:  0    metroNIDAZOLE (FLAGYL) 500 MG tablet     Sig: Take 1 tablet by mouth 2 times daily for 7 days     Dispense:  14 tablet     Refill:  0    oxyCODONE-acetaminophen (PERCOCET) 5-325 MG per tablet     Sig: Take 1 tablet by mouth every 6 hours as needed for Pain for up to 10 doses. WARNING:  May cause drowsiness. May impair ability to operate vehicles or machinery. Do not use in combination with alcohol. Dispense:  10 tablet     Refill:  0            CONSULTS:  IP CONSULT TO PRIMARY CARE PROVIDER    MEDICAL DECISION MAKING     Lashanda Marie is a 60 y.o. female patient had still right lower quadrant pain but from 10 down to 3. She was given 4 mg of morphine. CT showed mild wall thickening and terminal ileum. Normal appendix no bowel obstruction. I discussed at length with the patient the possible etiologies. She denies any hematochezia diarrhea. I did speak with her primary care. We will send sed rate and CRP. We will start her on Cipro Flagyl. Percocet No. 10 was prescribed short course for her pain they will call her for recheck and she is return for worsening symptoms of  pain or vomiting      Clinical Impression     1.  Ileitis        Disposition/Plan     PATIENT REFERRED TO:  Srinivas Connors MD  [address] [place] [address]  [phone]      for recheck 24 hrs      DISCHARGE MEDICATIONS:  New Prescriptions    CIPROFLOXACIN (CIPRO) 500 MG TABLET    Take 1 tablet by mouth 3 times daily for 7 days    METRONIDAZOLE (FLAGYL) 500 MG TABLET    Take 1 tablet by mouth 2 times daily for 7 days    OXYCODONE-ACETAMINOPHEN (PERCOCET) 5-325 MG PER TABLET

## 2020-03-31 RX ORDER — TRAZODONE HYDROCHLORIDE 50 MG/1
TABLET ORAL
Qty: 30 TABLET | Refills: 0 | Status: SHIPPED | OUTPATIENT
Start: 2020-03-31 | End: 2020-07-14 | Stop reason: SDUPTHER

## 2020-04-01 ENCOUNTER — TELEMEDICINE (OUTPATIENT)
Dept: FAMILY MEDICINE CLINIC | Age: 60
End: 2020-04-01
Payer: COMMERCIAL

## 2020-04-01 VITALS
HEART RATE: 70 BPM | TEMPERATURE: 98.6 F | SYSTOLIC BLOOD PRESSURE: 131 MMHG | BODY MASS INDEX: 46.41 KG/M2 | WEIGHT: 262 LBS | DIASTOLIC BLOOD PRESSURE: 55 MMHG

## 2020-04-01 PROCEDURE — G8427 DOCREV CUR MEDS BY ELIG CLIN: HCPCS | Performed by: FAMILY MEDICINE

## 2020-04-01 PROCEDURE — 99213 OFFICE O/P EST LOW 20 MIN: CPT | Performed by: FAMILY MEDICINE

## 2020-04-01 PROCEDURE — 3017F COLORECTAL CA SCREEN DOC REV: CPT | Performed by: FAMILY MEDICINE

## 2020-04-01 RX ORDER — OXYCODONE HYDROCHLORIDE AND ACETAMINOPHEN 5; 325 MG/1; MG/1
1 TABLET ORAL EVERY 6 HOURS PRN
Qty: 12 TABLET | Refills: 0 | Status: SHIPPED | OUTPATIENT
Start: 2020-04-02 | End: 2020-04-05

## 2020-04-01 ASSESSMENT — ENCOUNTER SYMPTOMS
ANAL BLEEDING: 0
CONSTIPATION: 0
FLATUS: 0
NAUSEA: 0
ABDOMINAL PAIN: 1
HEMATOCHEZIA: 0
ABDOMINAL DISTENTION: 0
BLOOD IN STOOL: 0
VOMITING: 0
DIARRHEA: 0
BELCHING: 0

## 2020-04-01 NOTE — PROGRESS NOTES
[address] [facility]  Office Visit      Patient: Jay Beatty is a 60 y.o. female who presents today with the following Chief Complaint(s):  Chief Complaint   Patient presents with    Abdominal Pain     presents with abdominal pain, has been recently dx'd w/ illeitis. still presents pain. ** Visit was completed virtually by video using [email or web address]  **      Abdominal Pain   This is a new problem. Episode onset: 3 days ago. The onset quality is sudden. The problem occurs constantly. The problem has been unchanged. The pain is located in the RLQ. The pain is moderate. The quality of the pain is aching and sharp. The abdominal pain does not radiate. Pertinent negatives include no anorexia, arthralgias, belching, constipation, diarrhea, dysuria, fever, flatus, frequency, headaches, hematochezia, hematuria, melena, myalgias, nausea, vomiting or weight loss. The pain is aggravated by movement. Relieved by: pain medication. She has tried oral narcotic analgesics (antibiotics) for the symptoms. Prior diagnostic workup includes CT scan. Diagnosed with ileitis on CT scan as there was inflammation / infection on distal ileum  She has no fever / chills, weight loss, blood in stool to suggest Crohn's   Currently on prednisone 5 mg for interstitial lung disease  Was started on cipro / flagyl for ileitis   Has not yet noticed improvement      Current Outpatient Medications   Medication Sig Dispense Refill    traZODone (DESYREL) 50 MG tablet TAKE 1 TABLET BY MOUTH AT NIGHT 30 tablet 0    ciprofloxacin (CIPRO) 500 MG tablet Take 1 tablet by mouth 3 times daily for 7 days 21 tablet 0    metroNIDAZOLE (FLAGYL) 500 MG tablet Take 1 tablet by mouth 2 times daily for 7 days 14 tablet 0    oxyCODONE-acetaminophen (PERCOCET) 5-325 MG per tablet Take 1 tablet by mouth every 6 hours as needed for Pain for up to 10 doses. WARNING:  May cause drowsiness. May impair ability to operate vehicles or machinery.   Do not use in distress. Appearance: Normal appearance. She is not ill-appearing, toxic-appearing or diaphoretic. HENT:      Head: Normocephalic and atraumatic. Eyes:      Extraocular Movements: Extraocular movements intact. Conjunctiva/sclera: Conjunctivae normal.   Neck:      Musculoskeletal: Normal range of motion and neck supple. Pulmonary:      Effort: Pulmonary effort is normal.   Musculoskeletal: Normal range of motion. Skin:     General: Skin is warm and dry. Coloration: Skin is not jaundiced. Neurological:      General: No focal deficit present. Mental Status: She is alert and oriented to person, place, and time. Mental status is at baseline. Assessment:  Encounter Diagnosis   Name Primary?  Ileitis        Plan:  1. Ileitis  - wall thickening of distal ileum on CT scan -- suggestive of ileitis vs inflammation  - mildly elevated sed rate / CRP but no leukocytosis  - suspect infectious ileitis which should improve with antibiotics  - if not getting better, must consider possibility of new-onset Crohn's however she has no other symptoms besides pain at this point-- would recommend GI referral if not better after course of cipro / flagyl    - discussed reasons to call / return  - oxyCODONE-acetaminophen (PERCOCET) 5-325 MG per tablet; Take 1 tablet by mouth every 6 hours as needed for Pain for up to 3 days. WARNING:  May cause drowsiness. May impair ability to operate vehicles or machinery. Do not use in combination with alcohol. Dispense: 12 tablet; Refill: 0      Return if symptoms worsen or fail to improve. ** Visit was completed virtually by video using [email or web address], [address] [facility]  4/1/2020

## 2020-04-02 ENCOUNTER — HOSPITAL ENCOUNTER (OUTPATIENT)
Dept: CT IMAGING | Age: 60
Discharge: HOME | End: 2020-04-02
Payer: COMMERCIAL

## 2020-04-02 ENCOUNTER — TELEPHONE (OUTPATIENT)
Dept: FAMILY MEDICINE CLINIC | Age: 60
End: 2020-04-02

## 2020-04-02 PROCEDURE — 74177 CT ABD & PELVIS W/CONTRAST: CPT

## 2020-04-02 PROCEDURE — 6360000004 HC RX CONTRAST MEDICATION: Performed by: FAMILY MEDICINE

## 2020-04-02 RX ADMIN — IOHEXOL 50 ML: 240 INJECTION, SOLUTION INTRATHECAL; INTRAVASCULAR; INTRAVENOUS; ORAL at 12:10

## 2020-04-02 RX ADMIN — IOPAMIDOL 80 ML: 755 INJECTION, SOLUTION INTRAVENOUS at 12:10

## 2020-04-02 NOTE — TELEPHONE ENCOUNTER
Pt in excruciating pain. Not able to get comfortable standing, sitting, or laying. Pain meds not helping. Pain level with med is 4/10. Right now pain level is a 9/10- took last dose of med at 8:34 am. Nausea on and off, no fever, no vomiting. Not eaten or had anything to drink today. No other meds taken today. No diarrhea or constipation. No pain in back just abdominal area. Pt has not had a bm today. Last one was Sunday- normal bm on Sunday. Cannot press on stomach. Too much pain. Last night was last time patient had any gas. She is able to drink fine but not much appetite.

## 2020-04-02 NOTE — TELEPHONE ENCOUNTER
Discussed with patient -- will need stat CT scan to re-evaluate for worsening of her ileitis, appendicitis, abscess formation, or worst case signs of perforation

## 2020-04-06 ENCOUNTER — TELEPHONE (OUTPATIENT)
Dept: PULMONOLOGY | Age: 60
End: 2020-04-06

## 2020-04-13 RX ORDER — ALBUTEROL SULFATE 90 UG/1
2 AEROSOL, METERED RESPIRATORY (INHALATION) EVERY 4 HOURS PRN
Qty: 8.5 G | Refills: 1 | Status: SHIPPED | OUTPATIENT
Start: 2020-04-13 | End: 2020-04-13

## 2020-04-13 RX ORDER — ALBUTEROL SULFATE 90 UG/1
2 AEROSOL, METERED RESPIRATORY (INHALATION) EVERY 4 HOURS PRN
Qty: 42.5 G | Refills: 1 | Status: SHIPPED | OUTPATIENT
Start: 2020-04-13

## 2020-04-21 ENCOUNTER — VIRTUAL VISIT (OUTPATIENT)
Dept: FAMILY MEDICINE CLINIC | Age: 60
End: 2020-04-21
Payer: COMMERCIAL

## 2020-04-21 VITALS — BODY MASS INDEX: 45.17 KG/M2 | WEIGHT: 255 LBS

## 2020-04-21 PROCEDURE — G8427 DOCREV CUR MEDS BY ELIG CLIN: HCPCS | Performed by: FAMILY MEDICINE

## 2020-04-21 PROCEDURE — 99213 OFFICE O/P EST LOW 20 MIN: CPT | Performed by: FAMILY MEDICINE

## 2020-04-21 PROCEDURE — 3017F COLORECTAL CA SCREEN DOC REV: CPT | Performed by: FAMILY MEDICINE

## 2020-04-21 RX ORDER — PREDNISONE 20 MG/1
TABLET ORAL
Qty: 26 TABLET | Refills: 0 | Status: SHIPPED | OUTPATIENT
Start: 2020-04-21 | End: 2020-05-07

## 2020-04-21 NOTE — PROGRESS NOTES
Social History     Tobacco Use    Smoking status: Former Smoker     Packs/day: 0.50     Years: 10.00     Pack years: 5.00     Types: Cigarettes     Last attempt to quit: 1985     Years since quittin.3    Smokeless tobacco: Never Used   Substance Use Topics    Alcohol use: Yes     Alcohol/week: 1.0 standard drinks     Types: 1 Shots of liquor per week     Comment: occ    Drug use: No            PHYSICAL EXAMINATION:  [ INSTRUCTIONS:  \"[x]\" Indicates a positive item  \"[]\" Indicates a negative item  -- DELETE ALL ITEMS NOT EXAMINED]  Vital Signs: (As obtained by patient/caregiver or practitioner observation)    Blood pressure-  Heart rate-    Respiratory rate-    Temperature-  Pulse oximetry-     Constitutional: [x] Appears well-developed and well-nourished [x] No apparent distress      [] Abnormal-   Mental status  [x] Alert and awake  [x] Oriented to person/place/time [x]Able to follow commands      Eyes:  EOM    [x]  Normal  [] Abnormal-  Sclera  [x]  Normal  [] Abnormal -         Discharge [x]  None visible  [] Abnormal -    HENT:   [x] Normocephalic, atraumatic.   [] Abnormal   [x] Mouth/Throat: Mucous membranes are moist.     External Ears [] Normal  [] Abnormal-     Neck: [x] No visualized mass     Pulmonary/Chest: [x] Respiratory effort normal.  [x] No visualized signs of difficulty breathing or respiratory distress        [] Abnormal-      Musculoskeletal:   [] Normal gait with no signs of ataxia         [] Normal range of motion of neck        [x] Abnormal- antalgic gait due to foot pain,  Swelling and erythema present on left foot located near base of big toe and spreads over dorsum of foot,  Tenderness to light touch when patient palpates      Neurological:        [x] No Facial Asymmetry (Cranial nerve 7 motor function) (limited exam to video visit)          [] No gaze palsy        [] Abnormal-         Skin:        [x] No significant exanthematous lesions or discoloration noted on facial electronic signature was used to authenticate this note.

## 2020-05-07 ENCOUNTER — VIRTUAL VISIT (OUTPATIENT)
Dept: PULMONOLOGY | Age: 60
End: 2020-05-07
Payer: COMMERCIAL

## 2020-05-07 PROCEDURE — 3017F COLORECTAL CA SCREEN DOC REV: CPT | Performed by: INTERNAL MEDICINE

## 2020-05-07 PROCEDURE — G8428 CUR MEDS NOT DOCUMENT: HCPCS | Performed by: INTERNAL MEDICINE

## 2020-05-07 PROCEDURE — 1036F TOBACCO NON-USER: CPT | Performed by: INTERNAL MEDICINE

## 2020-05-07 PROCEDURE — 99214 OFFICE O/P EST MOD 30 MIN: CPT | Performed by: INTERNAL MEDICINE

## 2020-05-07 PROCEDURE — G8417 CALC BMI ABV UP PARAM F/U: HCPCS | Performed by: INTERNAL MEDICINE

## 2020-05-07 NOTE — PROGRESS NOTES
Pertinent positives were a mildly positive SAMMY at 1-80 nuclear older pattern and a positive anti-centromere. She has no history of connective tissue disease and never has seen a rheumatologist.  Clinically I do not have a lot of symptoms that is overwhelmingly positive for a connective tissue disease. She currently is on prednisone 40 mg a day and clinically has responded nicely. Dyspnea on exertion and cough are significantly improved. She does not seem to have any significant adverse effects from her prednisone. She is taking Bactrim for PCP prophylaxis    11/15/2019  Ji Perera is here for follow-up of open lung biopsy for undiagnosed ILD. She had no postoperative complications. Her chronic dyspnea on exertion is unchanged but she does state that her cough is better. 7/9  Pedro Luis Valentine is here for follow up of ILD NOS. She had Bronch 6/27 with BAL and Bx of vicente. Other than 10,000 - 100,000 S Aureus and mild elevation of CD4:CD8 in BAL the bronch has been non-diagnostic. She continues to have Mullen and cough. No new changes    6/18  Ji Perera is here for follow-up of chronic cough and nonspecific ILD. She is now off Plavix and losartan without any change in cough. She had CT chest that shows unchanged basilar predominate nonspecific ILD. Her cough is daily and unrelenting. She coughed continuously through my office visit. She feels that her dyspnea on exertion is somewhat worse and gets winded doing routine activities of daily living. No fever, chills, night sweats, weight loss, hemoptysis, sputum production, CP or wheezing    5/21/2019  Ji Perera is here for follow-up of chronic cough and nonspecific ILD. Last visit I doubled her Pepcid to 40 mg by mouth twice a day and obtained a modified barium swallow which was normal.  Her daily chronic dry cough is unchanged. She has some associated dyspnea on exertion and wheezing but no chest pain.   She has no fevers, chills, night sweats, anorexia, weight loss, has been battling the same and improved with a longer pred taper, levaquin and tussionex cough syrup    7/10/2018  Wesley Patel presents today for follow-up visit for  re-evaluation of dyspnea on exertion after [facility] s/p PTCA LAD. She had a positive nuclear stress test that prompted her coronary angiogram.  She also had a bronchial challenge that was negative. Since revascularization she's had no further chest heaviness. Her dyspnea on exertion is mildly improved but still present when she goes up a flight of stairs. She has no cough or wheezing. She occasionally snores but has no witnessed apnea. She does not have excessive daytime sleepiness and her [place] was 6    Initial visit June 12, 2018   She states that she gets short of breath walking up from the first to second floor. It is 14 steps and sometimes she has to stop midway and at the top. She also gets short of breath working in the kitchen. She also complains of occasional chest heaviness. She pretty had a cough but states that that is now mostly resolved. She can't take a deep breath and her appetite has been off. She and her  are on a ketogenic diet and have lost some weight. She denies any fevers, chills, hemoptysis, or peripheral edema. Her evaluation has included an echo which is normal.  She is also had PFTs and a chest x-ray. She is never had a cardiac stress test.  She has history of hypertension and hyperlipidemia. Past Medical History:    Past Medical History:   Diagnosis Date    Acid reflux     Bilateral carpal tunnel syndrome 12/21/2017    CAD in native artery 06/19/2018    Mid LAD stented with 3.5 x 15 mm Xience BRIAN.  EF 55%    Chicken pox     Chronic back pain     CKD (chronic kidney disease) 4/15/2016    Depression 12/7/2010    Heart disease     Hypertension     Interstitial lung disease ([address]     Measles     Menopausal symptoms     Morbid obesity with BMI of 45.0-49.9, adult ([address] 1/29/2015    Osteoarthritis EXTREMITIES: Negative for weakness, decreased ROM  NEUROLOGICAL: Negative for unilateral weakness, speech or gait abnormalities    Objective:   PHYSICAL EXAM:        VITALS:  LMP 07/13/2011   On RA    No physical exam due to virtual visit    DATA:      Radiology Review:  Pertinent images / reports were reviewed as a part of this visit. CT Chest 2/22/2019 reveals the following:     Impression       Bilateral and fairly symmetric reticulation most pronounced about the lower lobes with immediate subpleural sparing may relate to interstitial pneumonitis and may relate to nonspecific interstitial pneumonitis. Findings may be present in connective    tissue disease including SLE, autoimmune disease including rheumatoid arthritis as well as other etiologies including hypersensitivity pneumonitis, drug-induced pneumonitis as well as other causes.       No pulmonary fibrosis.       No lobar consolidation. CT chest June 13, 2019  FINDINGS: The mediastinum appears normal without signs of any lymph node enlargement. The thyroid gland extends down to the level of the sternum bilaterally.       The aorta is of normal caliber as well as the pulmonary arteries.       Subpleural areas of pulmonary linear changes, likely pulmonary fibrosis or interstitial lung disease are appreciated with 1 to 2 mm pulmonary nodule in the left upper lobe on image 42 series 9, well circumscribed.  Additional subpleural linear changes    noted right middle lobe right left lower lobe and lingula near the lung base.       Poorly defined nodules noted just above the right hemidiaphragm the anterior aspect right lower lobe measuring 5 x 4 mm       There are some calcified subcarinal lymph nodes appreciated.       Calcification the left anterior descending coronary artery is appreciated without cardiac enlargement.       No pleural or pericardial effusion is noted.           Impression       1.  Small amount of subpleural linear change which may represent interstitial pneumonitis or interstitial lung disease most pronounced in the lower lobes and middle lobes without any significant pulmonary fibrotic change or volume loss. 2.  Several punctate nodules appreciated one in the left upper lobe subpleural measuring 1 to 2 mm another in the left upper lobe with one poorly defined irregular nodule just above the right hemidiaphragm on image 69 image series 9 and image 68 series 3    measuring up to 5 x 4 mm which should be followed yearly.       3. No spiculated masses. Mild bronchial dilation in the lower lobes     Last PFTs: May 2018  TEST PERFORMED:  1. Spirometry with flow-volume loops obtained before and after  bronchodilation. 2.  Lung volumes by plethysmography. 3.  Diffusion capacity of carbon monoxide.     Test meets ATS criteria and the quality of flow-volume loops is sufficient  for interpretation. Good patient effort.     The FEV1 is 2.24 L or 89% predicted. The FEV1 to FVC ratio is 75. Post  bronchodilator, the FEV1 changed to 2.29 L or 91% predicted. Total lung  capacity is 94% predicted and diffusion is 83% predicted.     INTERPRETATION:  1. No obstruction, no significant post-bronchodilator improvement. 2.  Normal lung volumes. 3.  Normal diffusion capacity. 4.  Clinical correlation recommended, if strong suspicion for obstructive  lung disease exists, we would recommend further evaluation with  methacholine bronchoprovocation study. Bronchial challenge June 13, 2018  IMPRESSION:  1. Normal spirometry. 2.  No significant response to bronchodilators. 3.  Negative bronchial challenge indicative of no airway hyperreactivity or  hyperresponsiveness.     Myocardial stress test June 13, 2018  Impression       Pharmacologic stress induced reversible ischemia anterior and   anterolateral walls. Left heart catheterization June 19, 2018  Conclusions:  -Severe single vessel CAD; LAD stented with a 3.5 x 15 mm Xiecne BRIAN to 10 RUBY.  A

## 2020-05-15 NOTE — TELEPHONE ENCOUNTER
[facility] Medication Refills:    lisinopril (PRINIVIL;ZESTRIL) 20 MG tablet         Number: 90    Refills:3    Last Office Visit: 3/11/20    Next Office Visit: 9/23/20

## 2020-05-16 RX ORDER — LISINOPRIL 20 MG/1
20 TABLET ORAL DAILY
Qty: 90 TABLET | Refills: 3 | Status: SHIPPED | OUTPATIENT
Start: 2020-05-16 | End: 2020-08-13 | Stop reason: HOSPADM

## 2020-06-17 ENCOUNTER — TELEPHONE (OUTPATIENT)
Dept: FAMILY MEDICINE CLINIC | Age: 60
End: 2020-06-17

## 2020-06-17 ENCOUNTER — TELEPHONE (OUTPATIENT)
Dept: OBGYN CLINIC | Age: 60
End: 2020-06-17

## 2020-06-18 ENCOUNTER — OFFICE VISIT (OUTPATIENT)
Dept: OBGYN CLINIC | Age: 60
End: 2020-06-18
Payer: COMMERCIAL

## 2020-06-18 VITALS
BODY MASS INDEX: 47.31 KG/M2 | WEIGHT: 267 LBS | HEIGHT: 63 IN | SYSTOLIC BLOOD PRESSURE: 124 MMHG | DIASTOLIC BLOOD PRESSURE: 82 MMHG | TEMPERATURE: 97.2 F | HEART RATE: 70 BPM

## 2020-06-18 PROCEDURE — 99396 PREV VISIT EST AGE 40-64: CPT | Performed by: OBSTETRICS & GYNECOLOGY

## 2020-06-18 RX ORDER — ASCORBIC ACID 500 MG
500 TABLET ORAL DAILY
COMMUNITY
End: 2021-02-17

## 2020-06-18 NOTE — PROGRESS NOTES
Allergies: Allergies   Allergen Reactions    Atorvastatin Other (See Comments)     Multiple muscle spasms/cramps over body    Protonix [Pantoprazole Sodium] Diarrhea       Family History:  Family History   Problem Relation Age of Onset    High Blood Pressure Mother     Rheum Arthritis Mother     Cancer Father     Hypertension Father     Colon Cancer Father     High Blood Pressure Brother     Stroke Brother     Heart Defect Brother     Hypertension Sister     Rheum Arthritis Sister     Emphysema Sister     Rheum Arthritis Sister     Other Sister         bottom of lungs are getting hard    Hypertension Brother     Rheum Arthritis Brother     Heart Attack Paternal Grandfather     No Known Problems Paternal Grandmother     No Known Problems Maternal Grandmother     No Known Problems Maternal Grandfather     Heart Attack Maternal Aunt     No Known Problems Maternal Aunt     Brain Cancer Maternal Aunt     Other Maternal Aunt         Complications from surgery    Arthritis Maternal Aunt     Arthritis Maternal Aunt     Heart Disease Maternal Aunt     High Blood Pressure Maternal Aunt     Cancer Maternal Uncle     Heart Disease Maternal Uncle     Heart Attack Maternal Uncle     High Blood Pressure Maternal Uncle     Heart Attack Maternal Uncle     High Blood Pressure Maternal Uncle     Parkinsonism Maternal Uncle     Heart Attack Paternal Aunt     Breast Cancer Paternal Aunt     Dementia Paternal Aunt     Heart Attack Paternal Uncle     Heart Attack Paternal Uncle     Brain Cancer Maternal Uncle         Reports personal/family history of cervical, uterine, ovarian, vulvar, breast, or colon cancers.      - Father  from colorectal CA     - Paternal sister - Breast cancer - (age 19-27) no genetic testing  Denies personal/family history of bleeding or clotting disorders  Denies personal/family history of genetic disorders    Social History:  Social History     Socioeconomic History    Marital status:      Spouse name: None    Number of children: None    Years of education: None    Highest education level: None   Occupational History    None   Social Needs    Financial resource strain: None    Food insecurity     Worry: None     Inability: None    Transportation needs     Medical: None     Non-medical: None   Tobacco Use    Smoking status: Former Smoker     Packs/day: 0.50     Years: 10.00     Pack years: 5.00     Types: Cigarettes     Last attempt to quit: 1985     Years since quittin.4    Smokeless tobacco: Never Used   Substance and Sexual Activity    Alcohol use: Yes     Alcohol/week: 1.0 standard drinks     Types: 1 Shots of liquor per week     Comment: occ    Drug use: No    Sexual activity: Yes     Partners: Male   Lifestyle    Physical activity     Days per week: None     Minutes per session: None    Stress: None   Relationships    Social connections     Talks on phone: None     Gets together: None     Attends Muslim service: None     Active member of club or organization: None     Attends meetings of clubs or organizations: None     Relationship status: None    Intimate partner violence     Fear of current or ex partner: None     Emotionally abused: None     Physically abused: None     Forced sexual activity: None   Other Topics Concern    None   Social History Narrative    None       Objective:  /82 (Site: Right Lower Arm, Position: Sitting, Cuff Size: Medium Adult)   Pulse 70   Temp 97.2 °F (36.2 °C) (Temporal)   Ht 5' 3\" (1.6 m)   Wt 267 lb (121.1 kg)   LMP 2011   BMI 47.30 kg/m²     Exam:   Physical Exam  Vitals signs reviewed. Exam conducted with a chaperone present. Constitutional:       Appearance: She is well-developed. HENT:      Head: Normocephalic and atraumatic. Eyes:      Extraocular Movements: Extraocular movements intact.       Conjunctiva/sclera: Conjunctivae normal.   Neck:      Musculoskeletal: screening     - Pap smear collected today - will call with results     - Age based screening recommendations discussed     - Will follow-up in 1 year for annual exam     3. Dyspareunia in female     - Currently using lubricants with minimal relief     - Reviewed vaginal moisturizers and patient would like to try     - Reviewed use of vaginal estrogen, patient is concerned regarding this as Cardiology has advised no HRT. Reviewed low systemic absorption and patient is still hesitant. Reviewed use of Intrarosa, a DHEA product. Patient would like to review with her Cardiologist at upcoming follow-up.      4. Vaginal atrophy     - See above       Molly Gonzalez, DO

## 2020-06-22 ENCOUNTER — HOSPITAL ENCOUNTER (OUTPATIENT)
Dept: CT IMAGING | Age: 60
Discharge: HOME | End: 2020-06-22
Payer: COMMERCIAL

## 2020-06-22 LAB
HPV COMMENT: NORMAL
HPV TYPE 16: NOT DETECTED
HPV TYPE 18: NOT DETECTED
HPVOH (OTHER TYPES): NOT DETECTED

## 2020-06-22 PROCEDURE — 71250 CT THORAX DX C-: CPT

## 2020-06-26 ENCOUNTER — HOSPITAL ENCOUNTER (OUTPATIENT)
Dept: MAMMOGRAPHY | Age: 60
Discharge: HOME | End: 2020-06-26
Payer: COMMERCIAL

## 2020-06-26 PROCEDURE — 77063 BREAST TOMOSYNTHESIS BI: CPT

## 2020-06-30 ASSESSMENT — ENCOUNTER SYMPTOMS
CONSTIPATION: 0
COUGH: 0
NAUSEA: 0
SHORTNESS OF BREATH: 0
DIARRHEA: 0
SORE THROAT: 0
VOMITING: 0
ABDOMINAL PAIN: 0

## 2020-07-01 ENCOUNTER — VIRTUAL VISIT (OUTPATIENT)
Dept: PULMONOLOGY | Age: 60
End: 2020-07-01
Payer: COMMERCIAL

## 2020-07-01 PROCEDURE — 3017F COLORECTAL CA SCREEN DOC REV: CPT | Performed by: INTERNAL MEDICINE

## 2020-07-01 PROCEDURE — 1036F TOBACCO NON-USER: CPT | Performed by: INTERNAL MEDICINE

## 2020-07-01 PROCEDURE — G8428 CUR MEDS NOT DOCUMENT: HCPCS | Performed by: INTERNAL MEDICINE

## 2020-07-01 PROCEDURE — G8417 CALC BMI ABV UP PARAM F/U: HCPCS | Performed by: INTERNAL MEDICINE

## 2020-07-01 PROCEDURE — 99214 OFFICE O/P EST MOD 30 MIN: CPT | Performed by: INTERNAL MEDICINE

## 2020-07-01 NOTE — PROGRESS NOTES
Pulmonology Video Visit    Pursuant to the emergency declaration under the [facility], [facility]5 waiver authority and the Coronavirus Preparedness and [facility] Appropriations Act this Video Visit was insisted, with patient's consent, to reduce the patient's risk of exposure to COVID-19 and provide continuity of care for an established patient. The patient was at home, while the provider was at the clinic. Services were provided through a synchronous discussion through a Video Visit to substitute for in-person clinic visit, and coded as such. [facility] Pulmonary and Critical Care    Outpatient Initial Note    Subjective:   CHIEF COMPLAINT / HPI:     The patient is 60 y.o. female that has asked for a virtual visit for ILD. Since last visit her prednisone has been weaned slowly to 10 mg daily and she had a CT chest about 1 week ago. Overall she continues to do pretty well and still has a significant improvement in dyspnea on exertion and cough compared to initiation of her prednisone. She is no longer gaining weight and her weight has gone up and down dependent on her dietary habits. She does mention that the current weather does make her symptoms a little bit worse. She is excited for her daughter's wedding coming up in early September. 5/7/2020  Yury Burris has asked for a virtual visit for her ILD. Her ileitis has resolved. However while she was in [place] visiting her terminally ill father-in-law, whom is subsequently passed away, she developed gout and currently is on prednisone 60 mg with intended wean back to her chronic 10 mg dose over the next 9 days. She states that she has no cough or dyspnea on exertion. Her gout is slowly getting better. She has no specific adverse side effects from her prednisone, which she has been on since September 2019    3/12/2020  Yury Burris is here for follow-up of nonspecific ILD despite open lung biopsy.  She is her dyspnea on exertion is somewhat worse and gets winded doing routine activities of daily living. No fever, chills, night sweats, weight loss, hemoptysis, sputum production, CP or wheezing    5/21/2019  Rachel Iglesias is here for follow-up of chronic cough and nonspecific ILD. Last visit I doubled her Pepcid to 40 mg by mouth twice a day and obtained a modified barium swallow which was normal.  Her daily chronic dry cough is unchanged. She has some associated dyspnea on exertion and wheezing but no chest pain. She has no fevers, chills, night sweats, anorexia, weight loss, hemoptysis, or sputum production    5/3/2019  Rachel Iglesias is here for follow-up of chronic cough. I saw her on March 13 and 25th and her cough has  not improved since then despite avoiding woodworking for now 8 weeks and a extended prednisone taper starting at 40 mg and tapering over 4 weeks to off. She has associated dyspnea on exertion. She occasionally coughs with eating but there is no clear aspiration. She is intolerant of proton pump inhibitors and she thinks her reflux is well controlled on Pepcid 10 mg by mouth twice a day. She has no fevers, chills, night sweats, anorexia, sputum production, chest pain, or hemoptysis. 3/13/2019  Marlen presents for follow-up of blood work. Her SAMMY was speckled 1-80, otherwise blood work was unremarkable. For the last 2 weeks she has not been around woodworking. She states over the last day or so she's noticed substantial improvement of her cough    Last visit 2/28  Rachel Iglesias presents for follow-up of a dry cough. After last visit February 1 I added omeprazole to her Pepcid to see if this was a GERD related cough. I also obtained a CT chest.  Her dry cough is unchanged, occurring daily, and not associated with dyspnea on exertion, chest tightness, or wheezing. 2/1/2019  Marlen presents for re-eval of cough that has now gone on since Thanksgiving. Last visit I checked a CXR that was negative.  I thought this was a post viral cough and gave an extended course of prednisone which did not help. She has no infectious symptoms. No post nasal drip. SHe does have uncontrolled GERD. Bronchial challenge over the summer was normal. SHe is not on an ACE    1/7/2019  Vedia Kaur presents for evaluation of 5 weeks of a dry cough, chest congestion, wheezing and TAVERAS. She has no fevers, chills, purulent sputum, anorexia, hemoptysis or weight loss. She has seen her PCP who has given her a medrol dose pack, Z pack, phenergan DM, Tessalon perles and an albuterol MDI without help. Her  has been battling the same and improved with a longer pred taper, levaquin and tussionex cough syrup    7/10/2018  Cali Centeno presents today for follow-up visit for  re-evaluation of dyspnea on exertion after [facility] s/p PTCA LAD. She had a positive nuclear stress test that prompted her coronary angiogram.  She also had a bronchial challenge that was negative. Since revascularization she's had no further chest heaviness. Her dyspnea on exertion is mildly improved but still present when she goes up a flight of stairs. She has no cough or wheezing. She occasionally snores but has no witnessed apnea. She does not have excessive daytime sleepiness and her [place] was 6    Initial visit June 12, 2018   She states that she gets short of breath walking up from the first to second floor. It is 14 steps and sometimes she has to stop midway and at the top. She also gets short of breath working in the kitchen. She also complains of occasional chest heaviness. She pretty had a cough but states that that is now mostly resolved. She can't take a deep breath and her appetite has been off. She and her  are on a ketogenic diet and have lost some weight. She denies any fevers, chills, hemoptysis, or peripheral edema. Her evaluation has included an echo which is normal.  She is also had PFTs and a chest x-ray.   She is never had a cardiac stress test.  She has history of hypertension and hyperlipidemia. Past Medical History:    Past Medical History:   Diagnosis Date    Acid reflux     Bilateral carpal tunnel syndrome 12/21/2017    CAD in native artery 06/19/2018    Mid LAD stented with 3.5 x 15 mm Xience BRIAN. EF 55%    Chicken pox     Chronic back pain     CKD (chronic kidney disease) 4/15/2016    Depression 12/7/2010    Heart disease     Hypertension     Interstitial lung disease ([facility] [address]     Measles     Menopausal symptoms     Morbid obesity with BMI of 45.0-49.9, adult ([facility] [address] 1/29/2015    Osteoarthritis     Overactive bladder     Pure hypercholesterolemia     Stress incontinence        Social History:    Patient is . She is unemployed. She smoked up to 1 PPD x 8 years and quit in 1985    Family History:  IPF    Current Medications:  Current Outpatient Medications on File Prior to Visit   Medication Sig Dispense Refill    vitamin C (ASCORBIC ACID) 500 MG tablet Take 500 mg by mouth daily      lisinopril (PRINIVIL;ZESTRIL) 20 MG tablet TAKE 1 TABLET BY MOUTH DAILY 90 tablet 3    predniSONE (DELTASONE) 10 MG tablet Take 1 tablet by mouth daily 30 tablet 2    albuterol sulfate  (90 Base) MCG/ACT inhaler INHALE 2 PUFFS INTO THE LUNGS EVERY 4 HOURS AS NEEDED FOR WHEEZING 42.5 g 1    traZODone (DESYREL) 50 MG tablet TAKE 1 TABLET BY MOUTH AT NIGHT (Patient taking differently: as needed ) 30 tablet 0    CO ENZYME Q-10 PO Take by mouth daily      citalopram (CELEXA) 40 MG tablet TAKE 1 TABLET BY MOUTH EVERY DAY 90 tablet 3    aspirin 81 MG chewable tablet Take 1 tablet by mouth daily 90 tablet 3    famotidine (PEPCID) 40 MG tablet Take 1 tablet by mouth 2 times daily 60 tablet 5    Multiple Vitamins-Minerals (THERAPEUTIC MULTIVITAMIN-MINERALS) tablet Take 1 tablet by mouth daily       No current facility-administered medications on file prior to visit.         REVIEW OF SYSTEMS:    CONSTITUTIONAL: Negative for fevers and chills  HEENT: Negative for oropharyngeal exudate, post nasal drip, sinus pain / pressure, nasal congestion, ear pain  RESPIRATORY:  See HPI  CARDIOVASCULAR: Negative for chest pain, palpitations, edema  GASTROINTESTINAL: Negative for nausea, vomiting, diarrhea, constipation and abdominal pain  GENITOURINARY: Negative for dysuria, urinary frequency, urinary hesitancy  HEMATOLOGICAL: Negative for adenopathy  SKIN: Negative for clubbing, cyanosis, skin lesions  ENDOCRINE: Negative for polyuria, polydipsia, heat intolerance, cold intolerance   EXTREMITIES: Negative for weakness, decreased ROM  NEUROLOGICAL: Negative for unilateral weakness, speech or gait abnormalities    Objective:   PHYSICAL EXAM:        VITALS:  LMP 07/13/2011   On RA    No physical exam due to virtual visit    DATA:      Radiology Review:  Pertinent images / reports were reviewed as a part of this visit. CT Chest 2/22/2019 reveals the following:     Impression       Bilateral and fairly symmetric reticulation most pronounced about the lower lobes with immediate subpleural sparing may relate to interstitial pneumonitis and may relate to nonspecific interstitial pneumonitis. Findings may be present in connective    tissue disease including SLE, autoimmune disease including rheumatoid arthritis as well as other etiologies including hypersensitivity pneumonitis, drug-induced pneumonitis as well as other causes.       No pulmonary fibrosis.       No lobar consolidation. CT chest June 13, 2019  FINDINGS: The mediastinum appears normal without signs of any lymph node enlargement. The thyroid gland extends down to the level of the sternum bilaterally.       The aorta is of normal caliber as well as the pulmonary arteries.       Subpleural areas of pulmonary linear changes, likely pulmonary fibrosis or interstitial lung disease are appreciated with 1 to 2 mm pulmonary nodule in the left upper lobe on image 42 series 9, well circumscribed.  Additional subpleural linear changes    noted right middle lobe right left lower lobe and lingula near the lung base.       Poorly defined nodules noted just above the right hemidiaphragm the anterior aspect right lower lobe measuring 5 x 4 mm       There are some calcified subcarinal lymph nodes appreciated.       Calcification the left anterior descending coronary artery is appreciated without cardiac enlargement.       No pleural or pericardial effusion is noted.           Impression       1.  Small amount of subpleural linear change which may represent interstitial pneumonitis or interstitial lung disease most pronounced in the lower lobes and middle lobes without any significant pulmonary fibrotic change or volume loss. 2.  Several punctate nodules appreciated one in the left upper lobe subpleural measuring 1 to 2 mm another in the left upper lobe with one poorly defined irregular nodule just above the right hemidiaphragm on image 69 image series 9 and image 68 series 3    measuring up to 5 x 4 mm which should be followed yearly.       3. No spiculated masses. Mild bronchial dilation in the lower lobes     Last PFTs: May 2018  TEST PERFORMED:  1. Spirometry with flow-volume loops obtained before and after  bronchodilation. 2.  Lung volumes by plethysmography. 3.  Diffusion capacity of carbon monoxide.     Test meets ATS criteria and the quality of flow-volume loops is sufficient  for interpretation. Good patient effort.     The FEV1 is 2.24 L or 89% predicted. The FEV1 to FVC ratio is 75. Post  bronchodilator, the FEV1 changed to 2.29 L or 91% predicted. Total lung  capacity is 94% predicted and diffusion is 83% predicted.     INTERPRETATION:  1. No obstruction, no significant post-bronchodilator improvement. 2.  Normal lung volumes. 3.  Normal diffusion capacity.   4.  Clinical correlation recommended, if strong suspicion for obstructive  lung disease exists, we would recommend further evaluation with  methacholine bronchoprovocation study. Bronchial challenge June 13, 2018  IMPRESSION:  1. Normal spirometry. 2.  No significant response to bronchodilators. 3.  Negative bronchial challenge indicative of no airway hyperreactivity or  hyperresponsiveness.     Myocardial stress test June 13, 2018  Impression       Pharmacologic stress induced reversible ischemia anterior and   anterolateral walls. Left heart catheterization June 19, 2018  Conclusions:  -Severe single vessel CAD; LAD stented with a 3.5 x 15 mm Xiecne BRIAN to 10 RUBY. A \"pinched\" diag was treated with a 2.0 x 12 mm balloon.  -Elevated LVEDP  -Systemic HTN  -Normal LVEF    MBS  Impression       Normal modified barium swallow. Surgical pathology -open lung biopsy -September 4, 2019  ADDENDUM:  ... External consultation has been completed on this case and  the slides have been reviewed by Dr. Yuri Castelan MD,  PhD at [address] [place] [address].  Dr. Kristen Osorio consultative diagnosis is as follows:  \" Lung, right upper and lower lobes, VATS wedge biopsies (A and B):     - Patchy cellular chronic interstitial pneumonia associated with       ill-formed nonnecrotizing granulomas.     - Arterial vasculopathy.     - No fungal or acid fast microorganisms identified by special stains.     - See case comment \". .. Az Kirk          In a comment, Dr. Kristen Osorio states that: \" The main       findings are patchy bronchiolocentric and subpleural chronic       interstitial pneumonia associated with ill-defined, nonnecrotizing       granulomas and a moderate to severe arterial vasculopathy.  A       primary etiologic consideration for this combination of       histopathologic findings is collagen vascular disease.  Infection       and chronic hypersensitivity pneumonitis are also differential       diagnosis.  Morphologic features diagnostic of usual interstitial       pneumonia seen in idiopathic pulmonary fibrosis are not present. AFB and GMS stain shows no evidence of fungal forms CD3 stains and CD20  stains are positive in the inflammatory component with no atypical T or B  cell infiltrates identified.  CD3 and CD20 stains were performed on  selected blocks from both parts A and B.  CD68 stain material is positive  in a macro 5 population and a nonspecific staining pattern.  Trichrome  stains performed on parts A and B and collagen stain performed on part A  show no evidence of increased interstitial fibrosis are collagen  fibrosis.  Please see outside consultation report for complete detailed  report and comments . ............................ Phillip Lacey M.D.  (Electronic Signature)  09/12/2019      FINAL DIAGNOSIS:       A. Wedge biopsy, right lower lobe, lung:       - Specimen sent for external consultation to evaluate for         interstitial lung disease, consultative report currently pending.       B. Wedge biopsy, right upper lobe, lung:       - Specimen sent for external consultation evaluate for interstitial         lung disease, consultative report currently pending.       -    Assessment:      Diagnosis Orders   1. ILD (interstitial lung disease) ([facility] [address]     2. Chronic GERD         Plan:     Open lung biopsy shows no evidence of UIP which is fortunate. It does show a patchy interstitial chronic pneumonitis with ill formed nonnecrotizing granulomas and arterial vasculopathy. Differential includes connective tissue disease, hypersensitivity pneumonitis, and sarcoid. I have previously checked an SAMMY that was mildly elevated with a negative RF. ACE level and hypersensitive pneumonitis panel was unremarkable. Hypersensitive pneumonitis was in my differential when she was doing woodworking and hypersensitive pneumonitis still could be the etiology.   I had her stop that in the spring of this year and gave her prednisone over about a month which did not really

## 2020-07-08 ENCOUNTER — VIRTUAL VISIT (OUTPATIENT)
Dept: FAMILY MEDICINE CLINIC | Age: 60
End: 2020-07-08
Payer: COMMERCIAL

## 2020-07-08 PROCEDURE — 3017F COLORECTAL CA SCREEN DOC REV: CPT | Performed by: NURSE PRACTITIONER

## 2020-07-08 PROCEDURE — G8427 DOCREV CUR MEDS BY ELIG CLIN: HCPCS | Performed by: NURSE PRACTITIONER

## 2020-07-08 PROCEDURE — 99214 OFFICE O/P EST MOD 30 MIN: CPT | Performed by: NURSE PRACTITIONER

## 2020-07-08 ASSESSMENT — PATIENT HEALTH QUESTIONNAIRE - PHQ9
SUM OF ALL RESPONSES TO PHQ QUESTIONS 1-9: 0
SUM OF ALL RESPONSES TO PHQ QUESTIONS 1-9: 0
SUM OF ALL RESPONSES TO PHQ9 QUESTIONS 1 & 2: 0
2. FEELING DOWN, DEPRESSED OR HOPELESS: 0
1. LITTLE INTEREST OR PLEASURE IN DOING THINGS: 0

## 2020-07-08 NOTE — PROGRESS NOTES
2020    TELEHEALTH EVALUATION -- Audio/Visual (During IBJXT-06 public health emergency)    HPI:    Danny Donovan (:  1960) has requested an audio/video evaluation for the following concern(s):        Review of Systems    Prior to Visit Medications    Medication Sig Taking? Authorizing Provider   vitamin C (ASCORBIC ACID) 500 MG tablet Take 500 mg by mouth daily Yes Historical Provider, MD   lisinopril (PRINIVIL;ZESTRIL) 20 MG tablet TAKE 1 TABLET BY MOUTH DAILY Yes ROMEO Stokes CNP   predniSONE (DELTASONE) 10 MG tablet Take 1 tablet by mouth daily Yes Cate Thomas MD   albuterol sulfate  (90 Base) MCG/ACT inhaler INHALE 2 PUFFS INTO THE LUNGS EVERY 4 HOURS AS NEEDED FOR WHEEZING Yes Yasmeen Wilkinson MD   traZODone (DESYREL) 50 MG tablet TAKE 1 TABLET BY MOUTH AT NIGHT  Patient taking differently: as needed  Yes Yasmeen Wilkinson MD   CO ENZYME Q-10 PO Take by mouth daily Yes Historical Provider, MD   citalopram (CELEXA) 40 MG tablet TAKE 1 TABLET BY MOUTH EVERY DAY Yes Yue Tomlin DO   aspirin 81 MG chewable tablet Take 1 tablet by mouth daily Yes ROMEO Stokes CNP   famotidine (PEPCID) 40 MG tablet Take 1 tablet by mouth 2 times daily Yes Cate Thomas MD   Multiple Vitamins-Minerals (THERAPEUTIC MULTIVITAMIN-MINERALS) tablet Take 1 tablet by mouth daily Yes Historical Provider, MD       Social History     Tobacco Use    Smoking status: Former Smoker     Packs/day: 0.50     Years: 10.00     Pack years: 5.00     Types: Cigarettes     Last attempt to quit: 1985     Years since quittin.5    Smokeless tobacco: Never Used   Substance Use Topics    Alcohol use: Yes     Alcohol/week: 1.0 standard drinks     Types: 1 Shots of liquor per week     Comment: occ    Drug use: No        Past Medical History:   Diagnosis Date    Acid reflux     Bilateral carpal tunnel syndrome 2017    CAD in native artery 2018    Mid LAD stented with 3.5 x 15 mm Xience BRIAN. EF 55%    Chicken pox     Chronic back pain     CKD (chronic kidney disease) 4/15/2016    Depression 12/7/2010    Heart disease     Hypertension     Interstitial lung disease ([facility] [address]     Measles     Menopausal symptoms     Morbid obesity with BMI of 45.0-49.9, adult ([facility] [address] 1/29/2015    Osteoarthritis     Overactive bladder     Pure hypercholesterolemia     Stress incontinence        Past Surgical History:   Procedure Laterality Date    BRONCHOSCOPY  6/27/2019    BRONCHOSCOPY ALVEOLAR LAVAGE performed by Lise Caldwell MD at [address]  6/27/2019    BRONCHOSCOPY DIAGNOSTIC OR CELL [address] ONLY performed by Lise Caldwell MD at [address]  6/27/2019    BRONCHOSCOPY BIOPSY BRONCHUS performed by Lise Caldwell MD at [address] [facility] Bilateral 01/29/2018    EPIDURAL STEROID INJECTION N/A 10/15/2019    MIDLINE CAUDAL EPIDURAL STEROID INJECTION AND COCCYGEAL JOINT INJECTION SITES CONFIRMED BY FLUOROSCOPY performed by Bishnu Matthews MD at [facility]  2009,2010    KNEES BILATERAL    PAIN MANAGEMENT PROCEDURE Right 3/3/2020    RIGHT LUMBAR FOUR AND LUMBAR FIVE TRANSFORAMINAL EPIDURAL STEROID INJECTION SITE CONFIRMED BY FLUOROSCOPY performed by Bishnu Matthews MD at [facility] DX/THER SBST INTRLMNR CRV/THRC W/IMG GDN Right 8/21/2018    RIGHT LUMBAR FOUR/FIVE, LUMBAR FIVE/ SACRAL ONE FACET INJECTION AND RIGHT SACROILIAC JOINT INJECTION SITES CONFIRMED BY FLUOROSCOPY performed by Bishnu Matthews MD at [address] [place] [address] ARTHROSCOPY Right 08/22/2017    ROTATOR CUFF REPAIR    THORACOSCOPY Right 9/4/2019    RIGHT VIDEO ASSISTED THORACOSCOPIC SURGERY, WEDGE RESECTION AND RIGHT UPPER AND LOWER LOBE NODULES WITH BIOPSY, INTERCOSTAL NERVE BLOCK TIMES SEVEN LEVELS performed by Marianne Waterman MD at [address] [place] [address] North:  [ INSTRUCTIONS:  \"[x]\" Indicates

## 2020-07-08 NOTE — ASSESSMENT & PLAN NOTE
Stable.   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

## 2020-07-09 ENCOUNTER — TELEPHONE (OUTPATIENT)
Dept: FAMILY MEDICINE CLINIC | Age: 60
End: 2020-07-09

## 2020-07-13 NOTE — TELEPHONE ENCOUNTER
[facility] received a call from:    Name of Caller: Patient    Relationship to patient: self    Organization name: N/A/    Best contact number: [phone]    Reason for call:   Patient called to follow up on med refill request.  In reading notes - it is the prescription for:  traZODone (DESYREL) 50 MG tablet   That she needs:    Pharmacy verified in Epic:  [facility] [address] [place] [address] - [place] [address] [place] [address], [facility].

## 2020-07-14 RX ORDER — TRAZODONE HYDROCHLORIDE 50 MG/1
TABLET ORAL
Qty: 30 TABLET | Refills: 0 | Status: SHIPPED | OUTPATIENT
Start: 2020-07-14 | End: 2020-08-17

## 2020-07-20 ENCOUNTER — OFFICE VISIT (OUTPATIENT)
Dept: CARDIOLOGY CLINIC | Age: 60
End: 2020-07-20
Payer: COMMERCIAL

## 2020-07-20 VITALS
BODY MASS INDEX: 47.58 KG/M2 | WEIGHT: 268.6 LBS | DIASTOLIC BLOOD PRESSURE: 80 MMHG | TEMPERATURE: 97.4 F | HEART RATE: 80 BPM | SYSTOLIC BLOOD PRESSURE: 122 MMHG

## 2020-07-20 PROCEDURE — G8427 DOCREV CUR MEDS BY ELIG CLIN: HCPCS | Performed by: NURSE PRACTITIONER

## 2020-07-20 PROCEDURE — 1036F TOBACCO NON-USER: CPT | Performed by: NURSE PRACTITIONER

## 2020-07-20 PROCEDURE — G8417 CALC BMI ABV UP PARAM F/U: HCPCS | Performed by: NURSE PRACTITIONER

## 2020-07-20 PROCEDURE — 93000 ELECTROCARDIOGRAM COMPLETE: CPT | Performed by: NURSE PRACTITIONER

## 2020-07-20 PROCEDURE — 99214 OFFICE O/P EST MOD 30 MIN: CPT | Performed by: NURSE PRACTITIONER

## 2020-07-20 PROCEDURE — 3017F COLORECTAL CA SCREEN DOC REV: CPT | Performed by: NURSE PRACTITIONER

## 2020-07-20 NOTE — PROGRESS NOTES
[facility]  Office Visit           Debora Herrmann MD,  [address] [facility] FNP CVNP       Cardiology             Davide Caseyford  1960 July 20, 2020    Primary Cardiologist:  Dominic Villagran      CC:SOB   Interval Hx: Ms. Kerry Jade is here today with c/o increasing SOB with exertion /  c/o left arm numbness that occurs at rest that wax & wanes for over a week at rest,   she describes it as two fingers numb which may be nerve related but will do Lexiscan d/t  cardiac hx & SOB  / no c/p chest pressure with actiivty   (of note she c/o hx of chronic back problems)     Today VSS/ wt stable and no c/o pain at this time   Appears euvolemic     PMH CAD, stent 6/2018 / HTN  optimal / HLD  optimal  obesity  Body mass index is 47.58 kg/m². / discussed diet & activity  /on steroids  ILD / follows Mike Pap  / CRI stable     6/2018 Cath:  Left Main:  Normal  Left Anterior Descending:  Mid 60-70% hazy stenosis. iFR was 0.87. Circumflex:  Dominant. No obstructive plaque  Right Coronary:  Non-dominant. No obstructive plaque  Left Ventriculogram:  LVEF 50-55%. 9/19/18 stress test   1. Abnormal myocardial perfusion study with moderate perfusion abnormality along the anterior wall and lateral wall on both rest images and stress images suggesting remote infarct with scarring. Similar findings may represent attenuation artifact as    well. No evidence for pharmacologically-induced ischemia. 2. Normal wall motion and ejection fraction of 62%. Echo 5/7/2019    Normal left ventricle size. Borderline left ventricular hypertrophy. Overall   left ventricular systolic function appears normal with an ejection fraction   visually estimated at 55-60%. No regional wall motion abnormalities are   noted. Diastolic filling parameters suggests normal diastolic function. MItral leaflet tip mildly thickened. Mild mitral regurgitation is present. Mild tricuspid regurgitation.  Estimated pulmonary artery systolic pressure   is normal at 29 mmHg assuming a right atrial pressure of 3 mmHg. Hyperechoic structure in the liver. Suggest liver ultrasound. I have examined pt and reviewed notes / any lab work EKGs stress test, angiograms, & images reviewed  I  have spent >20 minutes of face to face time with the patient with more than 50% spent counseling and coordinating care this patient. Review of Systems:  Constitutional: Denies  fatigue, weakness, night sweats or fever. HEENT: Denies new visual changes, ringing in ears, nosebleeds,nasal congestion  Respiratory: Denies new or change in SOB, PND, orthopnea or cough. Cardiovascular: see HPI  GI: Denies N/V, diarrhea, constipation, abdominal pain, change in bowel habits, melena or hematochezia  : Denies urinary frequency, urgency, incontinence, hematuria or dysuria. Skin: Denies rash, hives, or cyanosis  Musculoskeletal: Denies joint or muscle aches/pain  Neurological: Denies syncope or TIA-like symptoms. Psychiatric: Denies anxiety, insomnia or depression     Past Medical History:   Diagnosis Date    Acid reflux     Bilateral carpal tunnel syndrome 12/21/2017    CAD in native artery 06/19/2018    Mid LAD stented with 3.5 x 15 mm Xience BRIAN.  EF 55%    Chicken pox     Chronic back pain     CKD (chronic kidney disease) 4/15/2016    Depression 12/7/2010    Heart disease     Hypertension     Interstitial lung disease ([address]     Measles     Menopausal symptoms     Morbid obesity with BMI of 45.0-49.9, adult ([facility] [address] 1/29/2015    Osteoarthritis     Overactive bladder     Pure hypercholesterolemia     Stress incontinence      Past Surgical History:   Procedure Laterality Date    BRONCHOSCOPY  6/27/2019    BRONCHOSCOPY ALVEOLAR LAVAGE performed by Brett Escalante MD at [address] [place] [address]  6/27/2019    BRONCHOSCOPY DIAGNOSTIC OR CELL [address] ONLY performed by Brett Escalante MD at [address] [place] [address]  6/27/2019 BRONCHOSCOPY BIOPSY BRONCHUS performed by Srini Peguero MD at [address] Bilateral 01/29/2018    EPIDURAL STEROID INJECTION N/A 10/15/2019    MIDLINE CAUDAL EPIDURAL STEROID INJECTION AND COCCYGEAL JOINT INJECTION SITES CONFIRMED BY FLUOROSCOPY performed by Juhi Bundy MD at [facility]  2009,2010    KNEES BILATERAL    PAIN MANAGEMENT PROCEDURE Right 3/3/2020    RIGHT LUMBAR FOUR AND LUMBAR FIVE TRANSFORAMINAL EPIDURAL STEROID INJECTION SITE CONFIRMED BY FLUOROSCOPY performed by Juhi Bundy MD at [facility] DX/THER SBST INTRLMNR CRV/THRC W/IMG GDN Right 8/21/2018    RIGHT LUMBAR FOUR/FIVE, LUMBAR FIVE/ SACRAL ONE FACET INJECTION AND RIGHT SACROILIAC JOINT INJECTION SITES CONFIRMED BY FLUOROSCOPY performed by Juhi Bundy MD at [facility] ARTHROSCOPY Right 08/22/2017    ROTATOR CUFF REPAIR    THORACOSCOPY Right 9/4/2019    RIGHT VIDEO ASSISTED THORACOSCOPIC SURGERY, WEDGE RESECTION AND RIGHT UPPER AND LOWER LOBE NODULES WITH BIOPSY, INTERCOSTAL NERVE BLOCK TIMES SEVEN LEVELS performed by Yohan Magdaleno MD at [place] [address]     Family History   Problem Relation Age of Onset    High Blood Pressure Mother     Rheum Arthritis Mother     Cancer Father     Hypertension Father     Colon Cancer Father     High Blood Pressure Brother     Stroke Brother     Heart Defect Brother     Hypertension Sister     Rheum Arthritis Sister     Emphysema Sister     Rheum Arthritis Sister     Other Sister         bottom of lungs are getting hard    Hypertension Brother     Rheum Arthritis Brother     Heart Attack Paternal Grandfather     No Known Problems Paternal Grandmother     No Known Problems Maternal Grandmother     No Known Problems Maternal Grandfather     Heart Attack Maternal Aunt     No Known Problems Maternal Aunt     Brain Cancer Maternal Aunt     Other Maternal Aunt         Complications from surgery    Arthritis Maternal Aunt     Arthritis Maternal Aunt     Heart Disease Maternal Aunt     High Blood Pressure Maternal Aunt     Cancer Maternal Uncle     Heart Disease Maternal Uncle     Heart Attack Maternal Uncle     High Blood Pressure Maternal Uncle     Heart Attack Maternal Uncle     High Blood Pressure Maternal Uncle     Parkinsonism Maternal Uncle     Heart Attack Paternal Aunt     Breast Cancer Paternal Aunt     Dementia Paternal Aunt     Heart Attack Paternal Uncle     Heart Attack Paternal Uncle     Brain Cancer Maternal Uncle      Social History     Tobacco Use    Smoking status: Former Smoker     Packs/day: 0.50     Years: 10.00     Pack years: 5.00     Types: Cigarettes     Last attempt to quit: 1985     Years since quittin.5    Smokeless tobacco: Never Used   Substance Use Topics    Alcohol use:  Yes     Alcohol/week: 1.0 standard drinks     Types: 1 Shots of liquor per week     Comment: occ    Drug use: No       Allergies   Allergen Reactions    Atorvastatin Other (See Comments)     Multiple muscle spasms/cramps over body    Protonix [Pantoprazole Sodium] Diarrhea     Current Outpatient Medications   Medication Sig Dispense Refill    traZODone (DESYREL) 50 MG tablet TAKE 1 TABLET BY MOUTH AT NIGHT 30 tablet 0    vitamin C (ASCORBIC ACID) 500 MG tablet Take 500 mg by mouth daily      lisinopril (PRINIVIL;ZESTRIL) 20 MG tablet TAKE 1 TABLET BY MOUTH DAILY 90 tablet 3    predniSONE (DELTASONE) 10 MG tablet Take 1 tablet by mouth daily 30 tablet 2    albuterol sulfate  (90 Base) MCG/ACT inhaler INHALE 2 PUFFS INTO THE LUNGS EVERY 4 HOURS AS NEEDED FOR WHEEZING 42.5 g 1    CO ENZYME Q-10 PO Take by mouth daily      citalopram (CELEXA) 40 MG tablet TAKE 1 TABLET BY MOUTH EVERY DAY 90 tablet 3    aspirin 81 MG chewable tablet Take 1 tablet by mouth daily 90 tablet 3    famotidine (PEPCID) 40 MG tablet Take 1 tablet by ultrasound. ILD  follows Zehng Valenzuela     HTN   optimal     HLD    Optimal    Obesity   obesity  Body mass index is 47.58 kg/m².   / discussed diet & activity   On steroid for ILD     CRI  1.4 stable    Plan:  c/o increasing SOB with exertion /  c/o left arm numbness that occurs at rest that wax & wanes for over a week at rest, appears euvolemic   she describes it as two fingers numb which may be nerve related but will do Lexiscan d/t  cardiac hx & SOB  / no c/p chest pressure with actiivty   (of note she c/o hx of chronic back problems)     lexiscan blood work   Fu in  2-3 weeks        Doris Lyman APRN,CVNP

## 2020-07-28 ENCOUNTER — HOSPITAL ENCOUNTER (OUTPATIENT)
Dept: NON INVASIVE DIAGNOSTICS | Age: 60
Discharge: HOME | End: 2020-07-28
Payer: COMMERCIAL

## 2020-07-28 ENCOUNTER — HOSPITAL ENCOUNTER (OUTPATIENT)
Age: 60
Discharge: HOME | End: 2020-07-28
Payer: COMMERCIAL

## 2020-07-28 LAB
A/G RATIO: 1.1 (ref 1.1–2.2)
ALBUMIN SERPL-MCNC: 3.8 G/DL (ref 3.4–5)
ALP BLD-CCNC: 58 U/L (ref 40–129)
ALT SERPL-CCNC: 16 U/L (ref 10–40)
ANION GAP SERPL CALCULATED.3IONS-SCNC: 17 MMOL/L (ref 3–16)
AST SERPL-CCNC: 19 U/L (ref 15–37)
BILIRUB SERPL-MCNC: 0.3 MG/DL (ref 0–1)
BILIRUBIN DIRECT: <0.2 MG/DL (ref 0–0.3)
BILIRUBIN, INDIRECT: NORMAL MG/DL (ref 0–1)
BUN BLDV-MCNC: 22 MG/DL (ref 7–20)
CALCIUM SERPL-MCNC: 9.2 MG/DL (ref 8.3–10.6)
CHLORIDE BLD-SCNC: 103 MMOL/L (ref 99–110)
CHOLESTEROL, TOTAL: 276 MG/DL (ref 0–199)
CO2: 25 MMOL/L (ref 21–32)
CREAT SERPL-MCNC: 1.3 MG/DL (ref 0.6–1.2)
GFR AFRICAN AMERICAN: 50
GFR NON-AFRICAN AMERICAN: 42
GLOBULIN: 3.5 G/DL
GLUCOSE BLD-MCNC: 101 MG/DL (ref 70–99)
HCT VFR BLD CALC: 38 % (ref 36–48)
HDLC SERPL-MCNC: 41 MG/DL (ref 40–60)
HEMOGLOBIN: 12.8 G/DL (ref 12–16)
LDL CHOLESTEROL CALCULATED: ABNORMAL MG/DL
LDL CHOLESTEROL DIRECT: 180 MG/DL
LV EF: 77 %
LVEF MODALITY: NORMAL
MAGNESIUM: 2.1 MG/DL (ref 1.8–2.4)
MCH RBC QN AUTO: 29.7 PG (ref 26–34)
MCHC RBC AUTO-ENTMCNC: 33.7 G/DL (ref 31–36)
MCV RBC AUTO: 88.1 FL (ref 80–100)
PDW BLD-RTO: 14.6 % (ref 12.4–15.4)
PLATELET # BLD: 270 K/UL (ref 135–450)
PMV BLD AUTO: 8.3 FL (ref 5–10.5)
POTASSIUM SERPL-SCNC: 3.9 MMOL/L (ref 3.5–5.1)
PRO-BNP: 194 PG/ML (ref 0–124)
RBC # BLD: 4.31 M/UL (ref 4–5.2)
SODIUM BLD-SCNC: 145 MMOL/L (ref 136–145)
TOTAL PROTEIN: 7.3 G/DL (ref 6.4–8.2)
TRIGL SERPL-MCNC: 348 MG/DL (ref 0–150)
TSH REFLEX FT4: 2.03 UIU/ML (ref 0.27–4.2)
VITAMIN D 25-HYDROXY: 32.2 NG/ML
VLDLC SERPL CALC-MCNC: ABNORMAL MG/DL
WBC # BLD: 6.3 K/UL (ref 4–11)

## 2020-07-28 PROCEDURE — 80061 LIPID PANEL: CPT

## 2020-07-28 PROCEDURE — 83880 ASSAY OF NATRIURETIC PEPTIDE: CPT

## 2020-07-28 PROCEDURE — A9502 TC99M TETROFOSMIN: HCPCS | Performed by: NURSE PRACTITIONER

## 2020-07-28 PROCEDURE — 93017 CV STRESS TEST TRACING ONLY: CPT

## 2020-07-28 PROCEDURE — 80053 COMPREHEN METABOLIC PANEL: CPT

## 2020-07-28 PROCEDURE — 85027 COMPLETE CBC AUTOMATED: CPT

## 2020-07-28 PROCEDURE — 83735 ASSAY OF MAGNESIUM: CPT

## 2020-07-28 PROCEDURE — 78452 HT MUSCLE IMAGE SPECT MULT: CPT

## 2020-07-28 PROCEDURE — 84443 ASSAY THYROID STIM HORMONE: CPT

## 2020-07-28 PROCEDURE — 82248 BILIRUBIN DIRECT: CPT

## 2020-07-28 PROCEDURE — 3430000000 HC RX DIAGNOSTIC RADIOPHARMACEUTICAL: Performed by: NURSE PRACTITIONER

## 2020-07-28 PROCEDURE — 82306 VITAMIN D 25 HYDROXY: CPT

## 2020-07-28 PROCEDURE — 6360000002 HC RX W HCPCS: Performed by: NURSE PRACTITIONER

## 2020-07-28 PROCEDURE — 36415 COLL VENOUS BLD VENIPUNCTURE: CPT

## 2020-07-28 RX ADMIN — TETROFOSMIN 10 MILLICURIE: 1.38 INJECTION, POWDER, LYOPHILIZED, FOR SOLUTION INTRAVENOUS at 14:43

## 2020-07-28 RX ADMIN — TETROFOSMIN 30 MILLICURIE: 1.38 INJECTION, POWDER, LYOPHILIZED, FOR SOLUTION INTRAVENOUS at 14:43

## 2020-07-28 RX ADMIN — REGADENOSON 0.4 MG: 0.08 INJECTION, SOLUTION INTRAVENOUS at 14:43

## 2020-07-29 ENCOUNTER — HOSPITAL ENCOUNTER (OUTPATIENT)
Dept: NON INVASIVE DIAGNOSTICS | Age: 60
Discharge: HOME | End: 2020-07-29
Payer: COMMERCIAL

## 2020-07-29 DIAGNOSIS — E78.5 HYPERLIPIDEMIA, UNSPECIFIED HYPERLIPIDEMIA TYPE: ICD-10-CM

## 2020-07-29 DIAGNOSIS — E78.00 PURE HYPERCHOLESTEROLEMIA: Primary | ICD-10-CM

## 2020-07-29 RX ORDER — MELATONIN
1000 DAILY
Qty: 90 TABLET | Refills: 1 | Status: SHIPPED | OUTPATIENT
Start: 2020-07-29 | End: 2021-07-13

## 2020-07-29 RX ORDER — ROSUVASTATIN CALCIUM 10 MG/1
10 TABLET, COATED ORAL DAILY
Qty: 30 TABLET | Refills: 3 | Status: SHIPPED | OUTPATIENT
Start: 2020-07-29 | End: 2020-09-22

## 2020-07-31 ENCOUNTER — TELEPHONE (OUTPATIENT)
Dept: CARDIOLOGY CLINIC | Age: 60
End: 2020-07-31

## 2020-07-31 NOTE — TELEPHONE ENCOUNTER
Abnormal stress per Flavia Juarez, do you want to schedule a cath or see in office first?- Thanks, Tonia

## 2020-08-03 ENCOUNTER — PREP FOR PROCEDURE (OUTPATIENT)
Dept: CARDIOLOGY CLINIC | Age: 60
End: 2020-08-03

## 2020-08-03 RX ORDER — CLOPIDOGREL BISULFATE 75 MG/1
75 TABLET ORAL DAILY
Qty: 30 TABLET | Refills: 0 | Status: SHIPPED | OUTPATIENT
Start: 2020-08-03 | End: 2020-08-03

## 2020-08-03 RX ORDER — ASPIRIN 325 MG
325 TABLET ORAL ONCE
Status: CANCELLED | OUTPATIENT
Start: 2020-08-13

## 2020-08-03 RX ORDER — SODIUM CHLORIDE 9 MG/ML
INJECTION, SOLUTION INTRAVENOUS CONTINUOUS
Status: CANCELLED | OUTPATIENT
Start: 2020-08-03

## 2020-08-03 RX ORDER — SODIUM CHLORIDE 0.9 % (FLUSH) 0.9 %
10 SYRINGE (ML) INJECTION PRN
Status: CANCELLED | OUTPATIENT
Start: 2020-08-03

## 2020-08-03 RX ORDER — CLOPIDOGREL BISULFATE 75 MG/1
75 TABLET ORAL DAILY
Qty: 90 TABLET | Refills: 3 | Status: SHIPPED | OUTPATIENT
Start: 2020-08-03 | End: 2020-08-13 | Stop reason: HOSPADM

## 2020-08-03 RX ORDER — SODIUM CHLORIDE 0.9 % (FLUSH) 0.9 %
10 SYRINGE (ML) INJECTION EVERY 12 HOURS SCHEDULED
Status: CANCELLED | OUTPATIENT
Start: 2020-08-03

## 2020-08-03 NOTE — PROGRESS NOTES
Left Heart Catheterization    A left heart catheterization is a procedure that provides your cardiologist with detailed information regarding how your heart functions. A small catheter (long, fine tube) is inserted into an artery (a vessel that carries blood and oxygen) that leads to your heart. While watching with x-ray equipment, small amounts of dye are injected which enables visualization of the heart arteries and chambers. The pictures that your cardiologist receives from the cardiac catheterization enable him or her to decide on the best treatment for you. Date of the procedure:  8/13/20    Time of arrival: 0730    Cardiologist performing the procedure: ___Dr. Hoover_________________    Instructions for your left heart catheterization:    1. Bring a list of your medications to the hospital.    2.  Please notify us before the procedure if you are allergic to anything; especially x-ray contrast dye, iodine, nickel, or any type of jewelry. This is very important! 3. Do not eat or drink anything at all after midnight (or 8 hours) prior to the procedure. 4.  Take all morning medications EXCEPT any diuretics (water pills) the day of the procedure with a small sip of water. 5.  If you are on Coumadin, Warfarin, or Ethlyn Daubs, please notify us so that we can make adjustments to your medication. 6.  If you are taking Xarelto, Eliquis, or Pradaxa, please stop staking these medications two days prior to the procedure (including the day of the procedure). 7.  If you are diabetic, check your blood sugar in the morning. If your blood sugar is 120 or less, do not take insulin. If your blood sugar is more than 120, take half the dose of your normal insulin. Do not take Metformin the night before your procedure or morning of the procedure. 8.  You MUST have someone to drive you home--no driving for 24 hours after your procedure.   If an intervention is performed, you might stay overnight in the hospital.    9.  Discharge instructions will be given to you at the time of your procedure. 10.  For any questions or if you cannot keep this appointment for any reason, please call [phone]. Spoke with pt regarding procedure. Pre-op instructions, time and location of arrival discussed. Pt will get COVID test 5-6 days prior to procedure 8/7/20. Pt verbalized understanding and all questions answered at this time.

## 2020-08-03 NOTE — TELEPHONE ENCOUNTER
Requested Prescriptions     Pending Prescriptions Disp Refills    clopidogrel (PLAVIX) 75 MG tablet [Pharmacy Med Name: CLOPIDOGREL 75MG TABLETS] 90 tablet 3     Sig: TAKE 1 TABLET BY MOUTH DAILY                      Last Office Visit: 7/20/2020     Next Office Visit: 8/20/2020

## 2020-08-07 ENCOUNTER — OFFICE VISIT (OUTPATIENT)
Dept: PRIMARY CARE CLINIC | Age: 60
End: 2020-08-07

## 2020-08-07 NOTE — PROGRESS NOTES
Enedina SOLIZ Roshan received a viral test for COVID-19. They were educated on isolation and quarantine as appropriate. For any symptoms, they were directed to seek care from their PCP, given contact information to establish with a doctor, directed to an urgent care or the emergency room.

## 2020-08-07 NOTE — PATIENT INSTRUCTIONS

## 2020-08-08 LAB — SARS-COV-2, NAA: NOT DETECTED

## 2020-08-10 ENCOUNTER — NURSE TRIAGE (OUTPATIENT)
Dept: OTHER | Facility: CLINIC | Age: 60
End: 2020-08-10

## 2020-08-10 NOTE — TELEPHONE ENCOUNTER
Patient called [facility]-service [facility]) to schedule appointment with red flag complaint; transferred to Nurse Access for triage by Joe Zelaya (agent's name). Reason for Disposition   [1] Continuous (nonstop) coughing interferes with work or school AND [2] no improvement using cough treatment per protocol    Answer Assessment - Initial Assessment Questions  1. ONSET: \"When did the cough begin? \"       3-4 months  2. SEVERITY: \"How bad is the cough today? \"       mild  3. RESPIRATORY DISTRESS: \"Describe your breathing. \"       fine  4. FEVER: \"Do you have a fever? \" If so, ask: \"What is your temperature, how was it measured, and when did it start? \"      No  5. HEMOPTYSIS: \"Are you coughing up any blood? \" If so ask: \"How much? \" (flecks, streaks, tablespoons, etc.)      No  6. TREATMENT: \"What have you done so far to treat the cough? \" (e.g., meds, fluids, humidifier)      fluids  7. CARDIAC HISTORY: \"Do you have any history of heart disease? \" (e.g., heart attack, congestive heart failure)       angioplasty  8. LUNG HISTORY: \"Do you have any history of lung disease? \"  (e.g., pulmonary embolus, asthma, emphysema)      Interstitial lung disease  9. PE RISK FACTORS: \"Do you have a history of blood clots? \" (or: recent major surgery, recent prolonged travel, bedridden)      No  10. OTHER SYMPTOMS: \"Do you have any other symptoms? (e.g., runny nose, wheezing, chest pain)        No  11. PREGNANCY: \"Is there any chance you are pregnant? \" \"When was your last menstrual period? \"        No  12. TRAVEL: \"Have you traveled out of the country in the last month? \" (e.g., travel history, exposures)        No    Protocols used: COUGH - ACUTE NON-PRODUCTIVE-ADULT-    Informed of disposition. Care advice as documented. Instructed to call back with worsening symptoms. Soft transfer to [facility].) to schedule appointment as recommended. Please do not respond to the triage nurse through this encounter.  Any subsequent communication should be directly with the patient.

## 2020-08-12 ENCOUNTER — OFFICE VISIT (OUTPATIENT)
Dept: FAMILY MEDICINE CLINIC | Age: 60
End: 2020-08-12
Payer: COMMERCIAL

## 2020-08-12 ENCOUNTER — PRE-PROCEDURE TELEPHONE (OUTPATIENT)
Dept: CARDIAC CATH/INVASIVE PROCEDURES | Age: 60
End: 2020-08-12

## 2020-08-12 VITALS
HEIGHT: 63 IN | OXYGEN SATURATION: 98 % | TEMPERATURE: 97.3 F | SYSTOLIC BLOOD PRESSURE: 138 MMHG | BODY MASS INDEX: 47.84 KG/M2 | WEIGHT: 270 LBS | DIASTOLIC BLOOD PRESSURE: 80 MMHG | HEART RATE: 71 BPM

## 2020-08-12 PROCEDURE — G8417 CALC BMI ABV UP PARAM F/U: HCPCS | Performed by: NURSE PRACTITIONER

## 2020-08-12 PROCEDURE — 90750 HZV VACC RECOMBINANT IM: CPT | Performed by: NURSE PRACTITIONER

## 2020-08-12 PROCEDURE — 1036F TOBACCO NON-USER: CPT | Performed by: NURSE PRACTITIONER

## 2020-08-12 PROCEDURE — 90471 IMMUNIZATION ADMIN: CPT | Performed by: NURSE PRACTITIONER

## 2020-08-12 PROCEDURE — G8427 DOCREV CUR MEDS BY ELIG CLIN: HCPCS | Performed by: NURSE PRACTITIONER

## 2020-08-12 PROCEDURE — 99214 OFFICE O/P EST MOD 30 MIN: CPT | Performed by: NURSE PRACTITIONER

## 2020-08-12 PROCEDURE — 3017F COLORECTAL CA SCREEN DOC REV: CPT | Performed by: NURSE PRACTITIONER

## 2020-08-12 ASSESSMENT — ENCOUNTER SYMPTOMS
GASTROINTESTINAL NEGATIVE: 1
RHINORRHEA: 0
SINUS PAIN: 0
COUGH: 1
EYES NEGATIVE: 1
SINUS PRESSURE: 0

## 2020-08-12 NOTE — PROGRESS NOTES
Called patient about procedure. Told to be here at 0730 for procedure at 0900. NPO after midnight, but can take morning medication with sips of water, patient stated they are not on any blood thinners. To have a responsible adult be with patient take them home and stay with them afterwards, if they do not get admitted to [address] [place] [address]. And if available bring current list of medications. No other questions or concerns.

## 2020-08-12 NOTE — ASSESSMENT & PLAN NOTE
Scheduled for angiogram in the morning.   Do evaluate shortness of breath and left arm numbness that she has had for about a month

## 2020-08-12 NOTE — PROGRESS NOTES
Subjective:      Patient ID: Bev Nolen is a 63 y.o. female who presents for:      Chief Complaint   Patient presents with    Cough    Breast Pain       Papule to left breast wall    Cough x6 months, every day, 6-10x day, all times  Non productive  Always at night  No sinus drainage  On Lisinopril x 2 years, no angio edema  Has known ILD, sees Dr. Brent Tiwari with pulmonology. Takes prednisone 10 mg daily for her baseline. Scheduled for angio in am for Left arm numbness and SOB x3 weeks      Review of Systems   Constitutional: Negative. HENT: Negative for congestion, postnasal drip, rhinorrhea, sinus pressure, sinus pain and sneezing. Sensation of cough origination from left side of throat   Eyes: Negative. Respiratory: Positive for cough. Cardiovascular: Positive for chest pain. Gastrointestinal: Negative. Endocrine: Negative. Genitourinary: Negative. Musculoskeletal: Negative. Skin:        Pink papule to left breast   All other systems reviewed and are negative. Past Medical History:   Diagnosis Date    Acid reflux     Bilateral carpal tunnel syndrome 12/21/2017    CAD in native artery 06/19/2018    Mid LAD stented with 3.5 x 15 mm Xience BRIAN.  EF 55%    Chicken pox     Chronic back pain     CKD (chronic kidney disease) 4/15/2016    Depression 12/7/2010    Heart disease     Hypertension     Interstitial lung disease ([facility] [facility]     Measles     Menopausal symptoms     Morbid obesity with BMI of 45.0-49.9, adult ([facility] [address] 1/29/2015    Osteoarthritis     Overactive bladder     Pure hypercholesterolemia     Stress incontinence        Past Surgical History:   Procedure Laterality Date    BRONCHOSCOPY  6/27/2019    BRONCHOSCOPY ALVEOLAR LAVAGE performed by Pedro Luis Izaguirre MD at [address]  6/27/2019    BRONCHOSCOPY DIAGNOSTIC OR CELL [address] ONLY performed by Pedro Luis Izaguirre MD at [address]  6/27/2019    BRONCHOSCOPY BIOPSY BRONCHUS performed by Shahram Felix MD at [facility] Bilateral 01/29/2018    EPIDURAL STEROID INJECTION N/A 10/15/2019    MIDLINE CAUDAL EPIDURAL STEROID INJECTION AND COCCYGEAL JOINT INJECTION SITES CONFIRMED BY FLUOROSCOPY performed by Sophie Steele MD at [facility] [address]  2009,2010    KNEES BILATERAL    PAIN MANAGEMENT PROCEDURE Right 3/3/2020    RIGHT LUMBAR FOUR AND LUMBAR FIVE TRANSFORAMINAL EPIDURAL STEROID INJECTION SITE CONFIRMED BY FLUOROSCOPY performed by Sophie Steele MD at [facility] DX/THER SBST INTRLMNR CRV/THRC W/IMG GDN Right 8/21/2018    RIGHT LUMBAR FOUR/FIVE, LUMBAR FIVE/ SACRAL ONE FACET INJECTION AND RIGHT SACROILIAC JOINT INJECTION SITES CONFIRMED BY FLUOROSCOPY performed by Sophie Steele MD at [address] [place] [address] ARTHROSCOPY Right 08/22/2017    ROTATOR CUFF REPAIR    THORACOSCOPY Right 9/4/2019    RIGHT VIDEO ASSISTED THORACOSCOPIC SURGERY, WEDGE RESECTION AND RIGHT UPPER AND LOWER LOBE NODULES WITH BIOPSY, INTERCOSTAL NERVE BLOCK TIMES SEVEN LEVELS performed by Bala Bose MD at [place] [address]       Family History   Problem Relation Age of Onset    High Blood Pressure Mother     Rheum Arthritis Mother     Cancer Father     Hypertension Father     Colon Cancer Father     High Blood Pressure Brother     Stroke Brother     Heart Defect Brother     Hypertension Sister     Rheum Arthritis Sister     Emphysema Sister     Rheum Arthritis Sister     Other Sister         bottom of lungs are getting hard    Hypertension Brother     Rheum Arthritis Brother     Heart Attack Paternal Grandfather     No Known Problems Paternal Grandmother     No Known Problems Maternal Grandmother     No Known Problems Maternal Grandfather     Heart Attack Maternal Aunt     No Known Problems Maternal Aunt     Brain Cancer Maternal Aunt     Other Maternal Aunt         Complications mouth daily       No current facility-administered medications for this visit. Objective:     Vitals:    08/12/20 0946   BP: 138/80   Pulse: 71   Temp: 97.3 °F (36.3 °C)   SpO2: 98%       Physical Exam  Constitutional:       Appearance: She is well-developed. HENT:      Head: Normocephalic. Eyes:      Pupils: Pupils are equal, round, and reactive to light. Neck:      Musculoskeletal: Normal range of motion. Cardiovascular:      Rate and Rhythm: Normal rate and regular rhythm. Heart sounds: Normal heart sounds. Pulmonary:      Effort: Pulmonary effort is normal.      Breath sounds: Normal breath sounds. Musculoskeletal: Normal range of motion. Skin:     General: Skin is warm and dry. Comments: 5 mm Firm pink papule to left upper breast/chest wall- pt reports unchanged for decades   Neurological:      Mental Status: She is alert and oriented to person, place, and time. Assessment & Plan:     Health Maintenance   Topic Date Due    Flu vaccine (1) 09/01/2020    A1C test (Diabetic or Prediabetic)  11/19/2020    Lipid screen  07/28/2021    Potassium monitoring  07/28/2021    Creatinine monitoring  07/28/2021    Breast cancer screen  06/26/2022    Cervical cancer screen  06/18/2023    Colon cancer screen colonoscopy  06/04/2027    DTaP/Tdap/Td vaccine (4 - Td) 11/19/2029    Shingles Vaccine  Completed    Pneumococcal 0-64 years Vaccine  Completed    Hepatitis A vaccine  Aged Out    Hepatitis B vaccine  Aged Out    Hib vaccine  Aged Out    Meningococcal (ACWY) vaccine  Aged Out    Hepatitis C screen  Discontinued    HIV screen  Discontinued        Diagnosis Orders   1. Need for shingles vaccine  Zoster recombinant [facility])   2. Bilateral carpal tunnel syndrome     3. Chronic bilateral low back pain with left-sided sciatica     4. Interstitial lung disease ([facility] [address]     5.  CAD in native artery         Bilateral carpal tunnel syndrome  stable    Chronic bilateral low back pain with left-sided sciatica  stable    Interstitial lung disease (HCC)  Active with pulm. Chronic cough may be 2/2 acei. Will discuss with cards tomorrow if need to change to ARB, otherwise will refer back to Dr. Willa Marshall for treatment    CAD in native artery  Scheduled for angiogram in the morning. Do evaluate shortness of breath and left arm numbness that she has had for about a month    Follow-up pending angiogram in the morning.   Small papule on breast while not concerning    Call office for for follow up for any worsening of condition or failure to improve    Monae Causey, APRN - CNP

## 2020-08-12 NOTE — ASSESSMENT & PLAN NOTE
Active with pulm. Chronic cough may be 2/2 acei.   Will discuss with cards tomorrow if need to change to ARB, otherwise will refer back to Dr. Rosina Crow for treatment

## 2020-08-13 ENCOUNTER — HOSPITAL ENCOUNTER (OUTPATIENT)
Dept: CARDIAC CATH/INVASIVE PROCEDURES | Age: 60
Discharge: HOME | End: 2020-08-15
Payer: COMMERCIAL

## 2020-08-13 VITALS — TEMPERATURE: 97.9 F | WEIGHT: 262 LBS | BODY MASS INDEX: 46.42 KG/M2 | HEIGHT: 63 IN

## 2020-08-13 LAB
A/G RATIO: 1.3 (ref 1.1–2.2)
ALBUMIN SERPL-MCNC: 3.9 G/DL (ref 3.4–5)
ALP BLD-CCNC: 67 U/L (ref 40–129)
ALT SERPL-CCNC: 17 U/L (ref 10–40)
ANION GAP SERPL CALCULATED.3IONS-SCNC: 11 MMOL/L (ref 3–16)
AST SERPL-CCNC: 24 U/L (ref 15–37)
BILIRUB SERPL-MCNC: 0.4 MG/DL (ref 0–1)
BUN BLDV-MCNC: 24 MG/DL (ref 7–20)
CALCIUM SERPL-MCNC: 9 MG/DL (ref 8.3–10.6)
CHLORIDE BLD-SCNC: 104 MMOL/L (ref 99–110)
CO2: 25 MMOL/L (ref 21–32)
CREAT SERPL-MCNC: 1.3 MG/DL (ref 0.6–1.2)
EKG ATRIAL RATE: 71 BPM
EKG DIAGNOSIS: NORMAL
EKG P AXIS: 53 DEGREES
EKG P-R INTERVAL: 152 MS
EKG Q-T INTERVAL: 430 MS
EKG QRS DURATION: 80 MS
EKG QTC CALCULATION (BAZETT): 467 MS
EKG R AXIS: 32 DEGREES
EKG T AXIS: 65 DEGREES
EKG VENTRICULAR RATE: 71 BPM
GFR AFRICAN AMERICAN: 50
GFR NON-AFRICAN AMERICAN: 42
GLOBULIN: 3.1 G/DL
GLUCOSE BLD-MCNC: 99 MG/DL (ref 70–99)
HCT VFR BLD CALC: 39.8 % (ref 36–48)
HEMOGLOBIN: 13.3 G/DL (ref 12–16)
INR BLD: 1.2 (ref 0.86–1.14)
MCH RBC QN AUTO: 29.7 PG (ref 26–34)
MCHC RBC AUTO-ENTMCNC: 33.5 G/DL (ref 31–36)
MCV RBC AUTO: 88.7 FL (ref 80–100)
PDW BLD-RTO: 15 % (ref 12.4–15.4)
PLATELET # BLD: 238 K/UL (ref 135–450)
PMV BLD AUTO: 7.7 FL (ref 5–10.5)
POTASSIUM SERPL-SCNC: 4.5 MMOL/L (ref 3.5–5.1)
RBC # BLD: 4.49 M/UL (ref 4–5.2)
SODIUM BLD-SCNC: 140 MMOL/L (ref 136–145)
TOTAL PROTEIN: 7 G/DL (ref 6.4–8.2)
WBC # BLD: 8.2 K/UL (ref 4–11)

## 2020-08-13 PROCEDURE — C1769 GUIDE WIRE: HCPCS

## 2020-08-13 PROCEDURE — 93010 ELECTROCARDIOGRAM REPORT: CPT | Performed by: INTERNAL MEDICINE

## 2020-08-13 PROCEDURE — 99217 PR OBSERVATION CARE DISCHARGE MANAGEMENT: CPT | Performed by: NURSE PRACTITIONER

## 2020-08-13 PROCEDURE — 93458 L HRT ARTERY/VENTRICLE ANGIO: CPT | Performed by: INTERNAL MEDICINE

## 2020-08-13 PROCEDURE — C1894 INTRO/SHEATH, NON-LASER: HCPCS

## 2020-08-13 PROCEDURE — 99152 MOD SED SAME PHYS/QHP 5/>YRS: CPT | Performed by: INTERNAL MEDICINE

## 2020-08-13 PROCEDURE — 80053 COMPREHEN METABOLIC PANEL: CPT

## 2020-08-13 PROCEDURE — 6360000004 HC RX CONTRAST MEDICATION: Performed by: INTERNAL MEDICINE

## 2020-08-13 PROCEDURE — 93005 ELECTROCARDIOGRAM TRACING: CPT | Performed by: INTERNAL MEDICINE

## 2020-08-13 PROCEDURE — 93458 L HRT ARTERY/VENTRICLE ANGIO: CPT

## 2020-08-13 PROCEDURE — 2500000003 HC RX 250 WO HCPCS

## 2020-08-13 PROCEDURE — 2709999900 HC NON-CHARGEABLE SUPPLY

## 2020-08-13 PROCEDURE — 85610 PROTHROMBIN TIME: CPT

## 2020-08-13 PROCEDURE — 6360000002 HC RX W HCPCS

## 2020-08-13 PROCEDURE — 85027 COMPLETE CBC AUTOMATED: CPT

## 2020-08-13 PROCEDURE — 99152 MOD SED SAME PHYS/QHP 5/>YRS: CPT

## 2020-08-13 RX ORDER — ACETAMINOPHEN 325 MG/1
650 TABLET ORAL EVERY 4 HOURS PRN
Status: DISCONTINUED | OUTPATIENT
Start: 2020-08-13 | End: 2020-08-16 | Stop reason: HOSPADM

## 2020-08-13 RX ORDER — VALSARTAN 80 MG/1
80 TABLET ORAL DAILY
Status: DISCONTINUED | OUTPATIENT
Start: 2020-08-13 | End: 2020-08-16 | Stop reason: HOSPADM

## 2020-08-13 RX ORDER — SODIUM CHLORIDE 0.9 % (FLUSH) 0.9 %
10 SYRINGE (ML) INJECTION PRN
Status: DISCONTINUED | OUTPATIENT
Start: 2020-08-13 | End: 2020-08-16 | Stop reason: HOSPADM

## 2020-08-13 RX ORDER — ASPIRIN 325 MG
325 TABLET ORAL ONCE
Status: DISCONTINUED | OUTPATIENT
Start: 2020-08-13 | End: 2020-08-16 | Stop reason: HOSPADM

## 2020-08-13 RX ORDER — ATROPINE SULFATE 0.4 MG/ML
0.5 AMPUL (ML) INJECTION
Status: ACTIVE | OUTPATIENT
Start: 2020-08-13 | End: 2020-08-13

## 2020-08-13 RX ORDER — FUROSEMIDE 10 MG/ML
20 INJECTION INTRAMUSCULAR; INTRAVENOUS ONCE
Status: DISCONTINUED | OUTPATIENT
Start: 2020-08-13 | End: 2020-08-16 | Stop reason: HOSPADM

## 2020-08-13 RX ORDER — VALSARTAN 80 MG/1
80 TABLET ORAL DAILY
Qty: 90 TABLET | Refills: 3 | Status: SHIPPED | OUTPATIENT
Start: 2020-08-13 | End: 2020-08-24 | Stop reason: SDUPTHER

## 2020-08-13 RX ORDER — SODIUM CHLORIDE 0.9 % (FLUSH) 0.9 %
10 SYRINGE (ML) INJECTION EVERY 12 HOURS SCHEDULED
Status: DISCONTINUED | OUTPATIENT
Start: 2020-08-13 | End: 2020-08-16 | Stop reason: HOSPADM

## 2020-08-13 RX ORDER — FUROSEMIDE 40 MG/1
40 TABLET ORAL DAILY
Qty: 90 TABLET | Refills: 3 | Status: SHIPPED | OUTPATIENT
Start: 2020-08-13 | End: 2020-11-16 | Stop reason: ALTCHOICE

## 2020-08-13 RX ORDER — SODIUM CHLORIDE 9 MG/ML
INJECTION, SOLUTION INTRAVENOUS CONTINUOUS
Status: DISCONTINUED | OUTPATIENT
Start: 2020-08-13 | End: 2020-08-16 | Stop reason: HOSPADM

## 2020-08-13 RX ADMIN — IOHEXOL 150 ML: 350 INJECTION, SOLUTION INTRAVENOUS at 09:49

## 2020-08-13 NOTE — PROCEDURES
Lee Carrasquillo  63 y.o.  [id number]    Indication:  Abnormal stress test    Mallampati Class: 3    ASA Class: 3    After informed consent was obtained and history and exam reviewed, the patient was taken to the cardiac cath lab. The patient had both groins and the right wrist prepped and draped in sterile fashion. 1% Xylocaine was used to anesthetize the right wrist.  A needle was inserted into the radial artery and a 5/6 Fr sheath was inserted. A solution of 2.5 mg verapamil, 200 mcg nitroglycerin, and 3,000U of heparin was administered via the sheath. Continuous pulse-oximetry and ECG monitoring was performed, and frequent blood pressure assessment was performed. An independent trained observer pushed medications at my direction. We monitored the patient's level of consciousness and vital signs/physiologic status throughout the procedure duration (see start and stop times below). A JR4 catheter was advanced through the sheath over a guide wire and advanced under fluoroscopic guidance into the ascending aorta. The wire was removed and the right coronary artery was engaged. Digital angiograms were recorded. The catheter was then removed and a JL 3.5 catheter was then advanced over the wire into the ascending aorta. The wire was removed, and the left main coronary artery was engaged. Digital angiograms were recorded. The catheter was then removed, and a pigtail catheter was advanced over the wire to the ascending aorta. The pigtail catheter was then placed into the left ventricle. Pressures were recorded. A pullback were performed. The pigtail catheter was then removed. All catheter exchanges done over a wire. A femoral angiogram was not performed.     Hemostasis was obtained by TR Band    Complications: None    Estimated blood loss: < 30 mL    Sedation: 1 mg Versed, 25 mcg Fentanyl  Sedation start: 0921  Sedation stop: 0936    Angiographic Findings:  Left dominant system  Left Main:  Normal    Left Anterior Descending:  Widely patent stent. Mild irregular plaque in the mid LAD.     Circumflex:  No significant plaque    Right Coronary:  No significant plaque    Left Ventriculogram:  Not performed due to elevated Cr    Hemodynamics (mm Hg):  Left Ventricular Pressure:  148/4, 25  Central Aortic Pressure:  135/64 (94)    Conclusions:  -Widely patent mid LAD stent  -Elevated LVEDP  -No other significant obstructive CAD    Recommendations:  -Change ace to arb d/t cough  -Add lasix 40 mg daily to oral regimen  -Weight loss

## 2020-08-13 NOTE — PLAN OF CARE
Brief Pre-Op Note/Sedation Assessment      José Miguel Mishra  1960  Room/bed info not found      [id number]  9:13 AM    Planned Procedure: Cardiac Catheterization Procedure    Post Procedure Plan: Return to same level of care    Consent: I have discussed with the patient and/or the patient representative the indication, alternatives, and the possible risks and/or complications of the planned procedure and the anesthesia methods. The patient and/or patient representative appear to understand and agree to proceed. Chief Complaint: Anginal Equivalent      Indications for Cath Procedure:  Stable Known CAD  Anginal Classification within 2 weeks:  CCS III - Symptoms with everyday living activities, i.e., moderate limitation  NYHA Heart Failure Class within 2 weeks: No symptoms  Is Cath Lab Visit Valve-related?: No  Surgical Risk: N/A  Functional Type: < 4 METS    Anti- Anginal Meds within 2 weeks:   Yes: Aspirin and Statin (Any)    Stress or Imaging Studies Performed:  Stress Test with SPECT Result: Positive:  anterior Risk/Extent of Ischemia:  Intermediate     Vital Signs:  Temp 97.9 °F (36.6 °C) (Oral)   Ht 5' 3\" (1.6 m)   Wt 262 lb (118.8 kg)   LMP 07/13/2011   BMI 46.41 kg/m²     Allergies: Allergies   Allergen Reactions    Atorvastatin Other (See Comments)     Multiple muscle spasms/cramps over body    Protonix [Pantoprazole Sodium] Diarrhea       Past Medical History:  Past Medical History:   Diagnosis Date    Acid reflux     Bilateral carpal tunnel syndrome 12/21/2017    CAD in native artery 06/19/2018    Mid LAD stented with 3.5 x 15 mm Xience BRIAN.  EF 55%    Chicken pox     Chronic back pain     CKD (chronic kidney disease) 4/15/2016    Depression 12/7/2010    Heart disease     Hypertension     Interstitial lung disease ([facility]     Measles     Menopausal symptoms     Morbid obesity with BMI of 45.0-49.9, adult ([facility] 1/29/2015    Osteoarthritis     Overactive bladder     Pure hypercholesterolemia     Stress incontinence          Surgical History:  Past Surgical History:   Procedure Laterality Date    BRONCHOSCOPY  6/27/2019    BRONCHOSCOPY ALVEOLAR LAVAGE performed by Valentina Tomlin MD at [address]  6/27/2019    BRONCHOSCOPY DIAGNOSTIC OR CELL [address] ONLY performed by Valentina Tomlin MD at [address]  6/27/2019    BRONCHOSCOPY BIOPSY BRONCHUS performed by Valentina Tomlin MD at [facility] Bilateral 01/29/2018    EPIDURAL STEROID INJECTION N/A 10/15/2019    MIDLINE CAUDAL EPIDURAL STEROID INJECTION AND COCCYGEAL JOINT INJECTION SITES CONFIRMED BY FLUOROSCOPY performed by Dirk Lew MD at [place] [address]  2009,2010    KNEES BILATERAL    PAIN MANAGEMENT PROCEDURE Right 3/3/2020    RIGHT LUMBAR FOUR AND LUMBAR FIVE TRANSFORAMINAL EPIDURAL STEROID INJECTION SITE CONFIRMED BY FLUOROSCOPY performed by Dirk Lew MD at [facility] DX/THER SBST INTRLMNR CRV/THRC W/IMG GDN Right 8/21/2018    RIGHT LUMBAR FOUR/FIVE, LUMBAR FIVE/ SACRAL ONE FACET INJECTION AND RIGHT SACROILIAC JOINT INJECTION SITES CONFIRMED BY FLUOROSCOPY performed by Dirk Lew MD at [address] [facility] ARTHROSCOPY Right 08/22/2017    ROTATOR CUFF REPAIR    THORACOSCOPY Right 9/4/2019    RIGHT VIDEO ASSISTED THORACOSCOPIC SURGERY, WEDGE RESECTION AND RIGHT UPPER AND LOWER LOBE NODULES WITH BIOPSY, INTERCOSTAL NERVE BLOCK TIMES SEVEN LEVELS performed by Mee Gracia MD at [place] [address]         Medications:  Current Outpatient Medications   Medication Sig Dispense Refill    APPLE CIDER VINEGAR PO Take by mouth      clopidogrel (PLAVIX) 75 MG tablet TAKE 1 TABLET BY MOUTH DAILY 90 tablet 3    rosuvastatin (CRESTOR) 10 MG tablet Take 1 tablet by mouth daily 30 tablet 3    vitamin D3 (CHOLECALCIFEROL) 25 MCG (1000 UT) TABS tablet Take 1 tablet by mouth daily 90 tablet 1    traZODone (DESYREL) 50 MG tablet TAKE 1 TABLET BY MOUTH AT NIGHT 30 tablet 0    vitamin C (ASCORBIC ACID) 500 MG tablet Take 500 mg by mouth daily      lisinopril (PRINIVIL;ZESTRIL) 20 MG tablet TAKE 1 TABLET BY MOUTH DAILY 90 tablet 3    predniSONE (DELTASONE) 10 MG tablet Take 1 tablet by mouth daily 30 tablet 2    CO ENZYME Q-10 PO Take by mouth daily      citalopram (CELEXA) 40 MG tablet TAKE 1 TABLET BY MOUTH EVERY DAY 90 tablet 3    aspirin 81 MG chewable tablet Take 1 tablet by mouth daily 90 tablet 3    famotidine (PEPCID) 40 MG tablet Take 1 tablet by mouth 2 times daily 60 tablet 5    Multiple Vitamins-Minerals (THERAPEUTIC MULTIVITAMIN-MINERALS) tablet Take 1 tablet by mouth daily      albuterol sulfate  (90 Base) MCG/ACT inhaler INHALE 2 PUFFS INTO THE LUNGS EVERY 4 HOURS AS NEEDED FOR WHEEZING 42.5 g 1     Current Facility-Administered Medications   Medication Dose Route Frequency Provider Last Rate Last Dose    0.9 % sodium chloride infusion   Intravenous Continuous Mercy Benitez MD        aspirin tablet 325 mg  325 mg Oral Once Mercy Benitez MD        sodium chloride flush 0.9 % injection 10 mL  10 mL Intravenous 2 times per day Mercy Benitez MD        sodium chloride flush 0.9 % injection 10 mL  10 mL Intravenous PRN Mercy Benitez MD               Pre-Sedation:    Pre-Sedation Documentation and Exam:  I have personally completed a history, physical exam & review of systems for this patient (see notes). Prior History of Anesthesia Complications:   none    Modified Mallampati:  III (soft palate, base of uvula visible)    ASA Classification:  Class 3 - A patient with severe systemic disease that limits activity but is not incapacitating      Rito Scale:   Activity:  2 - Able to move 4 extremities voluntarily on command  Respiration:  2 - Able to breathe deeply and cough freely  Circulation:  1 - BP+/- 20-50mmHg of normal  Consciousness:  2 - Fully awake  Oxygen Saturation (color):  2 - Able to maintain oxygen saturation >92% on room air    Sedation/Anesthesia Plan:  Guard the patient's safety and welfare. Minimize physical discomfort and pain. Minimize negative psychological responses to treatment by providing sedation and analgesia and maximize the potential amnesia. Patient to meet pre-procedure discharge plan.     Medication Planned:  midazolam intravenously and fentanyl intravenously    Patient is an appropriate candidate for plan of sedation: yes      Electronically signed by Ramon Flynn MD on 8/13/2020 at 9:13 AM

## 2020-08-13 NOTE — PROGRESS NOTES
CC: s/p coronary angiogram  HPI: 63 y.o. with TAVERAS and abnormal stress test    S: S/p coronary angiogram. No change to chronic SOB. No cp or right wrist pain     Tele:    O:  Physical Exam:  Temp 97.9 °F (36.6 °C) (Oral)   Ht 5' 3\" (1.6 m)   Wt 262 lb (118.8 kg)   LMP 07/13/2011   BMI 46.41 kg/m²    General (appearance):  No acute distress  Eyes: anicteric   Neck: soft, No JVD  Ears/Nose/Mouth/Thorat: No cyanosis  CV: RRR   Respiratory:  Clear, diminished, normal effort  GI: soft, non-tender, non-distended  Skin: Warm, dry. No rashes  Neuro/Psych: Alert and oriented x 3. Appropriate behavior  Ext:  No c/c. No edema  Pulses:  2+ right radial . Right wrist soft, no hematoma. Good cap refill. I.O's=     Weight  Admission: Weight: 262 lb (118.8 kg)   Today: Weight: 262 lb (118.8 kg)    CBC:   Recent Labs     08/13/20  0807   WBC 8.2   HGB 13.3   HCT 39.8   MCV 88.7        BMP:   Recent Labs     08/13/20  0807      K 4.5      CO2 25   BUN 24*   CREATININE 1.3*     Mag:   Lab Results   Component Value Date    MG 2.10 07/28/2020     LIVER PROFILE:   Recent Labs     08/13/20  0807   AST 24   ALT 17   BILITOT 0.4   ALKPHOS 67     PT/INR:   Recent Labs     08/13/20  0803   INR 1.20*     APTT: No results for input(s): APTT in the last 72 hours. Pro-BNP:   Lab Results   Component Value Date    PROBNP 194 07/28/2020     CARDIAC ENZYMES: No results for input(s): CKTOTAL, CKMB, TROPONINI in the last 72 hours.   TSH:   Lab Results   Component Value Date    TSH 1.86 04/25/2018     LIPIDS:   Lab Results   Component Value Date    CHOL 276 (H) 07/28/2020    CHOL 181 11/19/2019    CHOL 166 06/04/2019     Lab Results   Component Value Date    TRIG 348 (H) 07/28/2020    TRIG 265 (H) 11/19/2019    TRIG 187 (H) 06/04/2019     Lab Results   Component Value Date    HDL 41 07/28/2020    HDL 51 11/19/2019    HDL 38 (L) 06/04/2019     Lab Results   Component Value Date    LDLCALC see below 07/28/2020    LDLCALC 77 2019    [address] 2019     Lab Results   Component Value Date    LABVLDL see below 2020    LABVLDL 53 2019    LABVLDL 37 2019     No results found for: CHOLHDLRATIO    Imagin2020 Coronary angiogram   Angiographic Findings:  Left dominant system  Left Main:  Normal  Left Anterior Descending:  Widely patent stent. Mild irregular plaque in the mid LAD. Circumflex:  No significant plaque  Right Coronary:  No significant plaque  Left Ventriculogram:  Not performed due to elevated Cr  Hemodynamics (mm Hg):  Left Ventricular Pressure:  148/4, 25  Central Aortic Pressure:  135/64 (94)    2020 Nuc stress:      Overall findings represent a intermediate risk scan.     Normal LV size and systolic function.     Small /medium size reversible defect involving the basal/mid/distal anterior     cardiac segments.  FIndings are concerning for ischemia.     ECG: Non-diagnostic EKG response due to failure to reach target heart rate. 2019 Echo:   Normal left ventricle size. Borderline left ventricular hypertrophy. Overall   left ventricular systolic function appears normal with an ejection fraction   visually estimated at 55-60%. No regional wall motion abnormalities are   noted. Diastolic filling parameters suggests normal diastolic function. MItral leaflet tip mildly thickened. Mild mitral regurgitation is present. Mild tricuspid regurgitation. Estimated pulmonary artery systolic pressure   is normal at 29 mmHg assuming a right atrial pressure of 3 mmHg. Hyperechoic structure in the liver. Suggest liver ultrasound. 2019 CT chest:      Bilateral and fairly symmetric reticulation most pronounced about the lower lobes with immediate subpleural sparing may relate to interstitial pneumonitis and may relate to nonspecific interstitial pneumonitis.  Findings may be present in connective    tissue disease including SLE, autoimmune disease including rheumatoid arthritis as well as other etiologies including hypersensitivity pneumonitis, drug-induced pneumonitis as well as other causes.       No pulmonary fibrosis.       No lobar consolidation.         9/19/2018 Nuc stress:      1. Abnormal myocardial perfusion study with moderate perfusion abnormality along the anterior wall and lateral wall on both rest images and stress images suggesting remote infarct with scarring. Similar findings may represent attenuation artifact as    well. No evidence for pharmacologically-induced ischemia. 2. Normal wall motion and ejection fraction of 62%.      6/2018 Cath: Angiographic Findings:  Left Main:  Normal  Left Anterior Descending:  Mid 60-70% hazy stenosis. iFR was 0.87. Circumflex:  Dominant. No obstructive plaque  Right Coronary:  Non-dominant. No obstructive plaque  Left Ventriculogram:  LVEF 50-55%. Mid LAD:  Direct Stent: 3.5 x 15 mm Xience BRIAN to 10 RUBY     Post stent, the first large diagonal had a 95-99% stenosis. We performed kissing balloon inflations on the ostial diag (through the stent struts) and the LAD using a 2.0 x 12 mm balloon in the diagonal and the 3.5 x 15 mm stent balloon in the LAD. Inflations taken to 10 RUBY each. 70-80% stenosis remained, but there was MARYLU 3 flow in both arteries.     6/13/2018 Nuc stress:      Pharmacologic stress induced reversible ischemia anterior and   anterolateral walls      5/2/2018 Echo:   Normal left ventricular systolic function with ejection fraction of 55-60%.   No regional wall motion abnormalites are seen.   Left ventricular diastolic filling pressure is normal.   Aortic valve appears sclerotic but opens adequately    Assessment:  63 y.o. s/p coronary angiogram.  Issues:  -TAVERAS  -CAD (LAD stent)  -HTN  -HLD    Plan:  Lasix IV x 1 today  Start lasix 40 mg po daily  BMP in 1 week  Switch lisinopril to valsartan d/t cough  Cestor  ASA

## 2020-08-13 NOTE — DISCHARGE SUMMARY
[facility] Discharge Note      Patient ID: Yaya Connelly 63 y.o. 1960    Admission Date: 8/13/2020     Discharge Date: 8/13/2020     Reason for Admission:  Principal Diagnosis: TAVERAS  Secondary Diagnosis: CAD, HTN, HLD    Procedures:  Coronary angiogram     Brief Summary of Patient's Course:  63 y.o. with CAD/PCI, HTN, HLD, TAVERAS and ILD who had worsening TAVERAS and an abnormal stress test so she presented for coronary angiogram. The angiogram demonstrated no obstructive CAD and a patent LAD stent. LVEDP was elevated. A TR band was used to obtain hemostasis of the right radial arteriotomy. Lasix IV was given. She was monitored in the holding bay and discharged home. She was started on lasix 40 mg po daily. Lisinopril was switched to Valsartan d/t cough. Order was given to check BMP in 1 week. Weight loss encouraged. She will follow up in 1 week. Discharge Condition:  Good     Discharge Instructions: Activity Limitations:  No heavy lifting. Diet:  low fat and low sodium    Follow Up Instructions: To office in: Follow up with Angelica Richmond NP in 1 week and Dr Ariella Martinez in 4 weeks  Instructed Re: Discharge medications, activity and dietary restrictions and follow up appointments were discussed.        Discharge Medications:   Jaspreet Flowers \"Marlen\"   Home Medication Instructions TOZ:[id number]    Printed on:08/13/20 1224   Medication Information                      albuterol sulfate  (90 Base) MCG/ACT inhaler  INHALE 2 PUFFS INTO THE LUNGS EVERY 4 HOURS AS NEEDED FOR WHEEZING             APPLE CIDER VINEGAR PO  Take by mouth             aspirin 81 MG chewable tablet  Take 1 tablet by mouth daily             citalopram (CELEXA) 40 MG tablet  TAKE 1 TABLET BY MOUTH EVERY DAY             CO ENZYME Q-10 PO  Take by mouth daily             famotidine (PEPCID) 40 MG tablet  Take 1 tablet by mouth 2 times daily             furosemide (LASIX) 40 MG tablet  Take 1 tablet by mouth daily

## 2020-08-17 ENCOUNTER — HOSPITAL ENCOUNTER (OUTPATIENT)
Age: 60
Discharge: HOME | End: 2020-08-17
Payer: COMMERCIAL

## 2020-08-17 LAB
ANION GAP SERPL CALCULATED.3IONS-SCNC: 16 MMOL/L (ref 3–16)
BUN BLDV-MCNC: 29 MG/DL (ref 7–20)
CALCIUM SERPL-MCNC: 9 MG/DL (ref 8.3–10.6)
CHLORIDE BLD-SCNC: 104 MMOL/L (ref 99–110)
CO2: 23 MMOL/L (ref 21–32)
CREAT SERPL-MCNC: 1.5 MG/DL (ref 0.6–1.2)
GFR AFRICAN AMERICAN: 43
GFR NON-AFRICAN AMERICAN: 35
GLUCOSE BLD-MCNC: 143 MG/DL (ref 70–99)
POTASSIUM SERPL-SCNC: 3.8 MMOL/L (ref 3.5–5.1)
SODIUM BLD-SCNC: 143 MMOL/L (ref 136–145)

## 2020-08-17 PROCEDURE — 36415 COLL VENOUS BLD VENIPUNCTURE: CPT

## 2020-08-17 PROCEDURE — 80048 BASIC METABOLIC PNL TOTAL CA: CPT

## 2020-08-17 RX ORDER — TRAZODONE HYDROCHLORIDE 50 MG/1
TABLET ORAL
Qty: 30 TABLET | Refills: 0 | Status: SHIPPED | OUTPATIENT
Start: 2020-08-17 | End: 2020-10-12

## 2020-08-17 NOTE — TELEPHONE ENCOUNTER
Requested Prescriptions     Pending Prescriptions Disp Refills    traZODone (DESYREL) 50 MG tablet [Pharmacy Med Name: TRAZODONE 50MG TABLETS] 30 tablet 0     Sig: TAKE 1 TABLET BY MOUTH EVERY EVENING     LOV:8/12/2020  FOV: NONE

## 2020-08-24 ENCOUNTER — OFFICE VISIT (OUTPATIENT)
Dept: CARDIOLOGY CLINIC | Age: 60
End: 2020-08-24
Payer: COMMERCIAL

## 2020-08-24 VITALS
DIASTOLIC BLOOD PRESSURE: 80 MMHG | SYSTOLIC BLOOD PRESSURE: 150 MMHG | HEART RATE: 64 BPM | BODY MASS INDEX: 47.4 KG/M2 | WEIGHT: 267.6 LBS

## 2020-08-24 PROCEDURE — G8427 DOCREV CUR MEDS BY ELIG CLIN: HCPCS | Performed by: NURSE PRACTITIONER

## 2020-08-24 PROCEDURE — 99214 OFFICE O/P EST MOD 30 MIN: CPT | Performed by: NURSE PRACTITIONER

## 2020-08-24 PROCEDURE — G8417 CALC BMI ABV UP PARAM F/U: HCPCS | Performed by: NURSE PRACTITIONER

## 2020-08-24 PROCEDURE — 3017F COLORECTAL CA SCREEN DOC REV: CPT | Performed by: NURSE PRACTITIONER

## 2020-08-24 PROCEDURE — 1036F TOBACCO NON-USER: CPT | Performed by: NURSE PRACTITIONER

## 2020-08-24 RX ORDER — VALSARTAN 160 MG/1
160 TABLET ORAL DAILY
Qty: 90 TABLET | Refills: 3 | Status: SHIPPED | OUTPATIENT
Start: 2020-08-24 | End: 2021-11-10

## 2020-08-24 NOTE — PROGRESS NOTES
CC/HPI:    CAD/HTN/HLD/TAVERAS    63 y.o. patient of Dr. Emanuel Hernandez here for CAD/HTN/HLD/TAVERAS who recently had coronary angiogram which demonstrated patent LAD stent and no other obstructive CAD. LVEDP was elevated and she was started on lasix. She denies cp, LH/dizziness, or syncope. No LE edema, orthopnea or PND. No fever, chills, n/v/d or GI/ bleeding. Denies right radial pain or numbness. Has her typical TAVERAS and she follows with Dr Linda Cole. She's had palpitations lately but feels its related to stress (daughter getting  in 4 weeks and  being laid off work). Past Medical History:   Diagnosis Date    Acid reflux     Bilateral carpal tunnel syndrome 12/21/2017    CAD in native artery 06/19/2018    Mid LAD stented with 3.5 x 15 mm Xience BRIAN.  EF 55%    Chicken pox     Chronic back pain     CKD (chronic kidney disease) 4/15/2016    Depression 12/7/2010    Heart disease     Hypertension     Interstitial lung disease ([address]     Measles     Menopausal symptoms     Morbid obesity with BMI of 45.0-49.9, adult ([facility] [address] 1/29/2015    Osteoarthritis     Overactive bladder     Pure hypercholesterolemia     Stress incontinence      Past Surgical History:   Procedure Laterality Date    BRONCHOSCOPY  6/27/2019    BRONCHOSCOPY ALVEOLAR LAVAGE performed by Ciera Bhagat MD at [address]  6/27/2019    BRONCHOSCOPY DIAGNOSTIC OR CELL [address] ONLY performed by Ciera Bhagat MD at [address]  6/27/2019    BRONCHOSCOPY BIOPSY BRONCHUS performed by Ciera Bhagat MD at [address] [facility] Bilateral 01/29/2018    EPIDURAL STEROID INJECTION N/A 10/15/2019    MIDLINE CAUDAL EPIDURAL STEROID INJECTION AND COCCYGEAL JOINT INJECTION SITES CONFIRMED BY FLUOROSCOPY performed by Adrián Patel MD at [facility]  2009,2010    KNEES BILATERAL    PAIN MANAGEMENT PROCEDURE Right 3/3/2020    RIGHT LUMBAR FOUR AND LUMBAR FIVE Paternal Aunt     Dementia Paternal Aunt     Heart Attack Paternal Uncle     Heart Attack Paternal Uncle     Brain Cancer Maternal Uncle      Social History     Tobacco Use    Smoking status: Former Smoker     Packs/day: 0.50     Years: 10.00     Pack years: 5.00     Types: Cigarettes     Last attempt to quit: 1985     Years since quittin.6    Smokeless tobacco: Never Used   Substance Use Topics    Alcohol use: Yes     Alcohol/week: 1.0 standard drinks     Types: 1 Shots of liquor per week     Comment: occ    Drug use: No     Allergies:Atorvastatin and Protonix [pantoprazole sodium]    Review of Systems  General: No changes in weight, fatigue, or night sweats. HEENT: No blurry or decreased vision. No changes in hearing, nasal discharge or sore throat. Cardiovascular:  See HPI. Respiratory: No cough, hemoptysis, or wheezing. No history of asthma. +TAVERAS  Gastrointestinal:  No abdominal pain, hematochezia, melana, constipation, diarrhea, or history of GI ulcers. Genito-Urinary: No dysuria or hematuria. No urgency or polyuria. Musculoskeletal:  No complaints of joint pain, joint swelling or muscular weakness/soreness. Neurological:  No dizziness, headaches, numbness/tingling, speech problems or weakness. No history of a stroke or TIA. Psychological:  No anxiety or depression. Hematological and Lymphatic: No abnormal bleeding or bruising, blood clots, jaundice or swollen lymph nodes. Endocrine:   No malaise/lethargy, palpitations, polydipsia/polyuria, temperature intolerance or unexpected weight changes  Skin:  No rashes or non-healing ulcers. Physical Exam:  BP (!) 150/80   Pulse 64   Wt 267 lb 9.6 oz (121.4 kg)   LMP 2011   BMI 47.40 kg/m²  Blood pressure (!) 150/80, pulse 64, weight 267 lb 9.6 oz (121.4 kg), last menstrual period 2011, not currently breastfeeding. General:  Alert and oriented. No acute distress. Appears comfortable. HEENT:  Normocephalic.   No trauma. EOMI. Neck:  Supple, no JVD  Cardiovascular: RRR  Pulses: 2+ right radial. Right wrist soft, no hematoma, good cap refill. Lungs:  Clear. Normal effort  Abd:  Soft, non-tender, non-distended. No peritoneal signs. Ext:  No clubbing, cyanosis, or edema. Neuro:  CN's 2-12 grossly in tact. Gait normal.  Motor and sensory exams grossly normal.  Skin:  No rashes or skin breakdown. CBC:   Lab Results   Component Value Date    WBC 8.2 2020    HGB 13.3 2020    HCT 39.8 2020    MCV 88.7 2020     2020     BMP:  Lab Results   Component Value Date    CREATININE 1.5 (H) 2020    BUN 29 (H) 2020     2020    K 3.8 2020     2020    CO2 23 2020     Mag:   Lab Results   Component Value Date    MG 2.10 2020     LIVER PROFILE:   Lab Results   Component Value Date    ALT 17 2020    AST 24 2020    ALKPHOS 67 2020    BILITOT 0.4 2020     PT/INR:   Lab Results   Component Value Date    INR 1.20 (H) 2020    INR 1.02 2019    INR 1.02 2018    PROTIME 11.6 2019    PROTIME 11.6 2018     BNP:  No results found for: BNP  LIPIDS:  No components found for: CHLPL  Lab Results   Component Value Date    TRIG 348 (H) 2020    TRIG 265 (H) 2019    TRIG 187 (H) 2019     Lab Results   Component Value Date    HDL 41 2020    HDL 51 2019    HDL 38 (L) 2019     Lab Results   Component Value Date    LDLCALC see below 2020    LDLCALC 77 2019    [address] 2019     Lab Results   Component Value Date    LABVLDL see below 2020    LABVLDL 53 2019    LABVLDL 37 2019     TSH:  Lab Results   Component Value Date    TSH 1.86 2018       IMAGIN/13/2020 Coronary angiogram   Angiographic Findings:  Left dominant system  Left Main:  Normal  Left Anterior Descending:  Widely patent stent.  Mild irregular plaque in the mid along the anterior wall and lateral wall on both rest images and stress images suggesting remote infarct with scarring. Similar findings may represent attenuation artifact as    well. No evidence for pharmacologically-induced ischemia. 2. Normal wall motion and ejection fraction of 62%.      6/2018 Cath: Angiographic Findings:  Left Main:  Normal  Left Anterior Descending:  Mid 60-70% hazy stenosis. iFR was 0.87. Circumflex:  Dominant. No obstructive plaque  Right Coronary:  Non-dominant. No obstructive plaque  Left Ventriculogram:  LVEF 50-55%. Mid LAD:  Direct Stent: 3.5 x 15 mm Xience BRIAN to 10 RUBY     Post stent, the first large diagonal had a 95-99% stenosis. We performed kissing balloon inflations on the ostial diag (through the stent struts) and the LAD using a 2.0 x 12 mm balloon in the diagonal and the 3.5 x 15 mm stent balloon in the LAD. Inflations taken to 10 RUBY each.  70-80% stenosis remained, but there was MARYLU 3 flow in both arteries.     6/13/2018 Nuc stress:      Pharmacologic stress induced reversible ischemia anterior and   anterolateral walls      5/2/2018 Echo:   Normal left ventricular systolic function with ejection fraction of 55-60%.   No regional wall motion abnormalites are seen.   Left ventricular diastolic filling pressure is normal.   Aortic valve appears sclerotic but opens adequately      Medications:   Current Outpatient Medications   Medication Sig Dispense Refill    valsartan (DIOVAN) 160 MG tablet Take 1 tablet by mouth daily 90 tablet 3    traZODone (DESYREL) 50 MG tablet TAKE 1 TABLET BY MOUTH EVERY EVENING 30 tablet 0    furosemide (LASIX) 40 MG tablet Take 1 tablet by mouth daily 90 tablet 3    APPLE CIDER VINEGAR PO Take by mouth      rosuvastatin (CRESTOR) 10 MG tablet Take 1 tablet by mouth daily 30 tablet 3    vitamin D3 (CHOLECALCIFEROL) 25 MCG (1000 UT) TABS tablet Take 1 tablet by mouth daily 90 tablet 1    vitamin C (ASCORBIC ACID) 500 MG tablet Take 500 mg by mouth daily      predniSONE (DELTASONE) 10 MG tablet Take 1 tablet by mouth daily 30 tablet 2    albuterol sulfate  (90 Base) MCG/ACT inhaler INHALE 2 PUFFS INTO THE LUNGS EVERY 4 HOURS AS NEEDED FOR WHEEZING 42.5 g 1    CO ENZYME Q-10 PO Take by mouth daily      citalopram (CELEXA) 40 MG tablet TAKE 1 TABLET BY MOUTH EVERY DAY 90 tablet 3    aspirin 81 MG chewable tablet Take 1 tablet by mouth daily 90 tablet 3    famotidine (PEPCID) 40 MG tablet Take 1 tablet by mouth 2 times daily 60 tablet 5    Multiple Vitamins-Minerals (THERAPEUTIC MULTIVITAMIN-MINERALS) tablet Take 1 tablet by mouth daily       No current facility-administered medications for this visit. Assessment:  1. Essential hypertension    2. CAD in native artery    3. Mixed hyperlipidemia    4.  TAVERAS (dyspnea on exertion)          Plan:  CAD   ASA, statin   Valsartan   No BB d/t bradycardia   Lasix   HTN   /80   Valsartan increased to 160 mg po daily   BMP 1 week   HLD   Crestor   TAVERAS   Chronic and unchanged, No new CAD    Cont to f/u Dr. Eulice Primrose     F/U with Dr Luis Gill in 1 month

## 2020-09-14 ENCOUNTER — TELEPHONE (OUTPATIENT)
Dept: FAMILY MEDICINE CLINIC | Age: 60
End: 2020-09-14

## 2020-09-14 NOTE — TELEPHONE ENCOUNTER
----- Message from Nicki Bryant sent at 9/14/2020  2:56 PM EDT -----  Subject: Message to Provider    QUESTIONS  Information for Provider? Patient was stung by a bee in the mouth   she is wondering is she should come in to be seen. Not having any   symptoms other than pain the mouth from the sting.   ---------------------------------------------------------------------------  --------------  CALL BACK INFO  What is the best way for the office to contact you? OK to leave message on   voicemail  Preferred Call Back Phone Number? [phone]  ---------------------------------------------------------------------------  --------------  SCRIPT ANSWERS  Relationship to Patient?  Self

## 2020-09-21 ENCOUNTER — HOSPITAL ENCOUNTER (OUTPATIENT)
Age: 60
Discharge: HOME | End: 2020-09-21
Payer: COMMERCIAL

## 2020-09-21 LAB
ANION GAP SERPL CALCULATED.3IONS-SCNC: 12 MMOL/L (ref 3–16)
BUN BLDV-MCNC: 21 MG/DL (ref 7–20)
CALCIUM SERPL-MCNC: 9.8 MG/DL (ref 8.3–10.6)
CHLORIDE BLD-SCNC: 104 MMOL/L (ref 99–110)
CO2: 25 MMOL/L (ref 21–32)
CREAT SERPL-MCNC: 1.5 MG/DL (ref 0.6–1.2)
GFR AFRICAN AMERICAN: 43
GFR NON-AFRICAN AMERICAN: 35
GLUCOSE BLD-MCNC: 140 MG/DL (ref 70–99)
POTASSIUM SERPL-SCNC: 4.2 MMOL/L (ref 3.5–5.1)
SODIUM BLD-SCNC: 141 MMOL/L (ref 136–145)

## 2020-09-21 PROCEDURE — 80048 BASIC METABOLIC PNL TOTAL CA: CPT

## 2020-09-21 PROCEDURE — 36415 COLL VENOUS BLD VENIPUNCTURE: CPT

## 2020-09-22 RX ORDER — ROSUVASTATIN CALCIUM 10 MG/1
10 TABLET, COATED ORAL DAILY
Qty: 90 TABLET | Refills: 1 | Status: SHIPPED | OUTPATIENT
Start: 2020-09-22 | End: 2021-02-20 | Stop reason: SDUPTHER

## 2020-09-30 ENCOUNTER — HOSPITAL ENCOUNTER (OUTPATIENT)
Age: 60
Discharge: HOME | End: 2020-09-30
Payer: COMMERCIAL

## 2020-09-30 ENCOUNTER — NURSE TRIAGE (OUTPATIENT)
Dept: OTHER | Facility: CLINIC | Age: 60
End: 2020-09-30

## 2020-09-30 LAB
AMORPHOUS: ABNORMAL /HPF
BACTERIA: ABNORMAL /HPF
COMMENT UA: ABNORMAL
RBC UA: ABNORMAL /HPF (ref 0–4)
URINE TYPE: ABNORMAL
WBC UA: ABNORMAL /HPF (ref 0–5)

## 2020-09-30 PROCEDURE — 87186 SC STD MICRODIL/AGAR DIL: CPT

## 2020-09-30 PROCEDURE — 87086 URINE CULTURE/COLONY COUNT: CPT

## 2020-09-30 PROCEDURE — 87088 URINE BACTERIA CULTURE: CPT

## 2020-09-30 PROCEDURE — 81015 MICROSCOPIC EXAM OF URINE: CPT

## 2020-09-30 NOTE — TELEPHONE ENCOUNTER
Reason for Disposition   Urinating more frequently than usual (i.e., frequency)    Answer Assessment - Initial Assessment Questions  1. SYMPTOM: \"What's the main symptom you're concerned about? \" (e.g., frequency, incontinence)      Urgency, dark urine and foul smelling urine  2. ONSET: \"When did the  symptoms  start? \"      Two months ago  3. PAIN: \"Is there any pain? \" If so, ask: \"How bad is it? \" (Scale: 1-10; mild, moderate, severe)      No pain  4. CAUSE: \"What do you think is causing the symptoms? \"      Not sure, maybe a UTI  5. OTHER SYMPTOMS: \"Do you have any other symptoms? \" (e.g., fever, flank pain, blood in urine, pain with urination)      Dark urine, foul odor to urine, urgency  6. PREGNANCY: \"Is there any chance you are pregnant? \" \"When was your last menstrual period? \"      no    Protocols used: URINARY SYMPTOMS-ADULT-[place]    POD 1 Murphy  Urgency and frequency  Dark urine  Foul smelling urine  Started two months ago    [facility] triage, care advice provided, caller verbalized understanding, transferred to [facility] for scheduling. Please do not reply to the triage nurse through this encounter. Any subsequent communication should be directly with the patient.

## 2020-10-01 RX ORDER — SULFAMETHOXAZOLE AND TRIMETHOPRIM 800; 160 MG/1; MG/1
1 TABLET ORAL 2 TIMES DAILY
Qty: 10 TABLET | Refills: 0 | Status: SHIPPED | OUTPATIENT
Start: 2020-10-01 | End: 2020-10-06

## 2020-10-02 LAB
ORGANISM: ABNORMAL
URINE CULTURE, ROUTINE: ABNORMAL

## 2020-10-12 ENCOUNTER — OFFICE VISIT (OUTPATIENT)
Dept: CARDIOLOGY CLINIC | Age: 60
End: 2020-10-12
Payer: COMMERCIAL

## 2020-10-12 VITALS
DIASTOLIC BLOOD PRESSURE: 78 MMHG | HEART RATE: 64 BPM | SYSTOLIC BLOOD PRESSURE: 136 MMHG | TEMPERATURE: 97.3 F | HEIGHT: 63 IN | WEIGHT: 266.8 LBS | BODY MASS INDEX: 47.27 KG/M2

## 2020-10-12 PROCEDURE — G8417 CALC BMI ABV UP PARAM F/U: HCPCS | Performed by: INTERNAL MEDICINE

## 2020-10-12 PROCEDURE — 99214 OFFICE O/P EST MOD 30 MIN: CPT | Performed by: INTERNAL MEDICINE

## 2020-10-12 PROCEDURE — 1036F TOBACCO NON-USER: CPT | Performed by: INTERNAL MEDICINE

## 2020-10-12 PROCEDURE — G8427 DOCREV CUR MEDS BY ELIG CLIN: HCPCS | Performed by: INTERNAL MEDICINE

## 2020-10-12 PROCEDURE — 3017F COLORECTAL CA SCREEN DOC REV: CPT | Performed by: INTERNAL MEDICINE

## 2020-10-12 PROCEDURE — G8484 FLU IMMUNIZE NO ADMIN: HCPCS | Performed by: INTERNAL MEDICINE

## 2020-10-12 RX ORDER — TRAZODONE HYDROCHLORIDE 50 MG/1
TABLET ORAL
Qty: 30 TABLET | Refills: 0 | Status: SHIPPED | OUTPATIENT
Start: 2020-10-12 | End: 2020-11-10 | Stop reason: SDUPTHER

## 2020-10-12 NOTE — PROGRESS NOTES
63 y.o. here for CAD. No angina. No sob. No n/v/LH/dizziness. No syncope. Had CP, had recent cath that showed widely patent stent. No LE edema. No orthopnea or PND. Didn't tolerate lipitor; tolerating crestor. Past Medical History:   Diagnosis Date    Acid reflux     Bilateral carpal tunnel syndrome 12/21/2017    CAD in native artery 06/19/2018    Mid LAD stented with 3.5 x 15 mm Xience BRIAN.  EF 55%    Chicken pox     Chronic back pain     CKD (chronic kidney disease) 4/15/2016    Depression 12/7/2010    Heart disease     Hypertension     Interstitial lung disease ([facility]     Measles     Menopausal symptoms     Morbid obesity with BMI of 45.0-49.9, adult ([facility] 1/29/2015    Osteoarthritis     Overactive bladder     Pure hypercholesterolemia     Stress incontinence      Past Surgical History:   Procedure Laterality Date    BRONCHOSCOPY  6/27/2019    BRONCHOSCOPY ALVEOLAR LAVAGE performed by Naga Nye MD at [address] [place] [address]  6/27/2019    BRONCHOSCOPY DIAGNOSTIC OR CELL [address] ONLY performed by Naga Nye MD at [address] [place] [address]  6/27/2019    BRONCHOSCOPY BIOPSY BRONCHUS performed by Naga Nye MD at [address] [place] [address] Bilateral 01/29/2018    EPIDURAL STEROID INJECTION N/A 10/15/2019    MIDLINE CAUDAL EPIDURAL STEROID INJECTION AND COCCYGEAL JOINT INJECTION SITES CONFIRMED BY FLUOROSCOPY performed by Markie Boone MD at [place] [address]  2009,2010    KNEES BILATERAL    PAIN MANAGEMENT PROCEDURE Right 3/3/2020    RIGHT LUMBAR FOUR AND LUMBAR FIVE TRANSFORAMINAL EPIDURAL STEROID INJECTION SITE CONFIRMED BY FLUOROSCOPY performed by Markie Boone MD at [facility] DX/THER SBST INTRLMNR CRV/THRC W/IMG GDN Right 8/21/2018    RIGHT LUMBAR FOUR/FIVE, LUMBAR FIVE/ SACRAL ONE FACET INJECTION AND RIGHT SACROILIAC JOINT INJECTION SITES CONFIRMED BY FLUOROSCOPY performed by [facility] Boone MD at [facility]    SHOULDER ARTHROSCOPY Right 2017    ROTATOR CUFF REPAIR    THORACOSCOPY Right 2019    RIGHT VIDEO ASSISTED THORACOSCOPIC SURGERY, WEDGE RESECTION AND RIGHT UPPER AND LOWER LOBE NODULES WITH BIOPSY, INTERCOSTAL NERVE BLOCK TIMES SEVEN LEVELS performed by Anca Pineda MD at [place] [address]     Social History     Tobacco Use    Smoking status: Former Smoker     Packs/day: 0.50     Years: 10.00     Pack years: 5.00     Types: Cigarettes     Last attempt to quit: 1985     Years since quittin.8    Smokeless tobacco: Never Used   Substance Use Topics    Alcohol use:  Yes     Alcohol/week: 1.0 standard drinks     Types: 1 Shots of liquor per week     Comment: occ    Drug use: No     Allergies   Allergen Reactions    Atorvastatin Other (See Comments)     Multiple muscle spasms/cramps over body    Protonix [Pantoprazole Sodium] Diarrhea     Family History   Problem Relation Age of Onset    High Blood Pressure Mother     Rheum Arthritis Mother     Cancer Father     Hypertension Father     Colon Cancer Father     High Blood Pressure Brother     Stroke Brother     Heart Defect Brother     Hypertension Sister     Rheum Arthritis Sister     Emphysema Sister     Rheum Arthritis Sister     Other Sister         bottom of lungs are getting hard    Hypertension Brother     Rheum Arthritis Brother     Heart Attack Paternal Grandfather     No Known Problems Paternal Grandmother     No Known Problems Maternal Grandmother     No Known Problems Maternal Grandfather     Heart Attack Maternal Aunt     No Known Problems Maternal Aunt     Brain Cancer Maternal Aunt     Other Maternal Aunt         Complications from surgery    Arthritis Maternal Aunt     Arthritis Maternal Aunt     Heart Disease Maternal Aunt     High Blood Pressure Maternal Aunt     Cancer Maternal Uncle     Heart Disease Maternal Uncle     Heart Attack Maternal Uncle     High Blood Pressure Maternal Uncle     Heart Attack Maternal Uncle     High Blood Pressure Maternal Uncle     Parkinsonism Maternal Uncle     Heart Attack Paternal Aunt     Breast Cancer Paternal Aunt     Dementia Paternal Aunt     Heart Attack Paternal Uncle     Heart Attack Paternal Uncle     Brain Cancer Maternal Uncle      Review of Systems   General: No f/c/s     HEENT: No blurry or decreased vision. No changes in hearing, nasal discharge or sore throat. Cardiovascular: See HPI. No cramping in legs or buttocks when walking. Respiratory: No sig cough  Gastrointestinal: No abdominal pain, hematochezia, melana. Genito-Urinary: No dysuria or hematuria. No urgency or polyuria. Musculoskeletal: No complaints of joint pain, joint swelling or muscular weakness/soreness. Neurological: No dizziness or headaches. No numbness/tingling, speech problems or weakness. No history of a stroke or TIA. Psychological: No anxiety or depression  Hematological and Lymphatic: No abnormal bleeding or bruising, blood clots, jaundice. Endocrine: No malaise/lethargy, palpitations, polydipsia/polyuria, temperature intolerance or unexpected weight changes. Skin: No rashes or non-healing ulcers. PE:  Blood pressure 136/78, pulse 64, temperature 97.3 °F (36.3 °C), height 5' 3\" (1.6 m), weight 266 lb 12.8 oz (121 kg), last menstrual period 07/13/2011, not currently breastfeeding. General (appearance): Well developed. No distress. Eyes: Anicteric  Neck: Supple. No LAD  Ears/Nose/Mouth/Thorat: No cyanosis  CV: RRR. No r/g   Respiratory:  Clear B, normal respiratory effort  GI: Abd soft. No peritoneal signs. Obese  Skin: Warm, dry. No rashes  Neuro/Psych: Alert and oriented x 3. Appropriate behavior  Ext:  No c/c. No edema.   Pulses:  2+ radial     Lab Results   Component Value Date    WBC 8.2 08/13/2020    HGB 13.3 08/13/2020    HCT 39.8 08/13/2020    MCV 88.7 08/13/2020     08/13/2020     Lab Results Component Value Date     09/21/2020    K 4.2 09/21/2020     09/21/2020    CO2 25 09/21/2020    BUN 21 (H) 09/21/2020    CREATININE 1.5 (H) 09/21/2020    GLUCOSE 140 (H) 09/21/2020    CALCIUM 9.8 09/21/2020    PROT 7.0 08/13/2020    LABALBU 3.9 08/13/2020    BILITOT 0.4 08/13/2020    ALKPHOS 67 08/13/2020    AST 24 08/13/2020    ALT 17 08/13/2020    LABGLOM 35 (A) 09/21/2020    GFRAA 43 (A) 09/21/2020    AGRATIO 1.3 08/13/2020    GLOB 3.1 08/13/2020     Lab Results   Component Value Date    INR 1.20 (H) 08/13/2020    INR 1.02 08/23/2019    INR 1.02 06/19/2018    PROTIME 11.6 08/23/2019    PROTIME 11.6 06/19/2018 8/2020 LHC:  Angiographic Findings:  Left dominant system  Left Main:  Normal  Left Anterior Descending:  Widely patent stent. Mild irregular plaque in the mid LAD. Circumflex:  No significant plaque  Right Coronary:  No significant plaque  Left Ventriculogram:  Not performed due to elevated Cr    6/2018 Cath: Angiographic Findings:  Left Main:  Normal  Left Anterior Descending:  Mid 60-70% hazy stenosis. iFR was 0.87. Stented with 3.5 x 15 mm Xience  Circumflex:  Dominant. No obstructive plaque  Right Coronary:  Non-dominant. No obstructive plaque  Left Ventriculogram:  LVEF 50-55%. 6/2018 Nuc Stress: Anterolateral ischemia. SSS 14    5/2018 Echo:   Normal left ventricular systolic function with ejection fraction of 55-60%.   No regional wall motion abnormalites are seen.   Left ventricular diastolic filling pressure is normal.   Aortic valve appears sclerotic but opens adequately.       Current Outpatient Medications:     rosuvastatin (CRESTOR) 10 MG tablet, TAKE 1 TABLET BY MOUTH DAILY, Disp: 90 tablet, Rfl: 1    valsartan (DIOVAN) 160 MG tablet, Take 1 tablet by mouth daily, Disp: 90 tablet, Rfl: 3    traZODone (DESYREL) 50 MG tablet, TAKE 1 TABLET BY MOUTH EVERY EVENING, Disp: 30 tablet, Rfl: 0    furosemide (LASIX) 40 MG tablet, Take 1 tablet by mouth daily, Disp: 90 tablet, Rfl: 3    APPLE CIDER VINEGAR PO, Take by mouth, Disp: , Rfl:     vitamin D3 (CHOLECALCIFEROL) 25 MCG (1000 UT) TABS tablet, Take 1 tablet by mouth daily, Disp: 90 tablet, Rfl: 1    vitamin C (ASCORBIC ACID) 500 MG tablet, Take 500 mg by mouth daily, Disp: , Rfl:     predniSONE (DELTASONE) 10 MG tablet, Take 1 tablet by mouth daily, Disp: 30 tablet, Rfl: 2    albuterol sulfate  (90 Base) MCG/ACT inhaler, INHALE 2 PUFFS INTO THE LUNGS EVERY 4 HOURS AS NEEDED FOR WHEEZING, Disp: 42.5 g, Rfl: 1    CO ENZYME Q-10 PO, Take by mouth daily, Disp: , Rfl:     citalopram (CELEXA) 40 MG tablet, TAKE 1 TABLET BY MOUTH EVERY DAY, Disp: 90 tablet, Rfl: 3    aspirin 81 MG chewable tablet, Take 1 tablet by mouth daily, Disp: 90 tablet, Rfl: 3    famotidine (PEPCID) 40 MG tablet, Take 1 tablet by mouth 2 times daily, Disp: 60 tablet, Rfl: 5    Multiple Vitamins-Minerals (THERAPEUTIC MULTIVITAMIN-MINERALS) tablet, Take 1 tablet by mouth daily, Disp: , Rfl:     A/P:  63 y.o. here for f./u on cad. 1. CAD in native artery    2. Mixed hyperlipidemia    3. Pure hypercholesterolemia    4. S/P PTCA (percutaneous transluminal coronary angioplasty)    5. Benign essential HTN      Recs:  -Cont to lose wt. Given Cr of 1.5, will not increase ARB. -Cont crestor for cad and lipids  -Cont ASA  -Cont diovan at present dose for HTN  -F/U Rosa Isela in 6 mo. If SBP over 140, start amlodipine 5 mg daily. See me in a year    Nevaeh Chau.  Stacia Falcon MD, [facility], [facility]

## 2020-10-13 ENCOUNTER — HOSPITAL ENCOUNTER (OUTPATIENT)
Age: 60
Discharge: HOME | End: 2020-10-13
Payer: COMMERCIAL

## 2020-10-13 ENCOUNTER — OFFICE VISIT (OUTPATIENT)
Dept: FAMILY MEDICINE CLINIC | Age: 60
End: 2020-10-13
Payer: COMMERCIAL

## 2020-10-13 ENCOUNTER — HOSPITAL ENCOUNTER (OUTPATIENT)
Dept: GENERAL RADIOLOGY | Age: 60
Discharge: HOME | End: 2020-10-13
Payer: COMMERCIAL

## 2020-10-13 ENCOUNTER — NURSE TRIAGE (OUTPATIENT)
Dept: OTHER | Facility: CLINIC | Age: 60
End: 2020-10-13

## 2020-10-13 VITALS
SYSTOLIC BLOOD PRESSURE: 137 MMHG | OXYGEN SATURATION: 98 % | BODY MASS INDEX: 46.06 KG/M2 | WEIGHT: 260 LBS | HEART RATE: 89 BPM | DIASTOLIC BLOOD PRESSURE: 68 MMHG

## 2020-10-13 DIAGNOSIS — Z87.39 HISTORY OF GOUT: ICD-10-CM

## 2020-10-13 LAB — URIC ACID, SERUM: 8.9 MG/DL (ref 2.6–6)

## 2020-10-13 PROCEDURE — G8484 FLU IMMUNIZE NO ADMIN: HCPCS | Performed by: NURSE PRACTITIONER

## 2020-10-13 PROCEDURE — 1036F TOBACCO NON-USER: CPT | Performed by: NURSE PRACTITIONER

## 2020-10-13 PROCEDURE — G8417 CALC BMI ABV UP PARAM F/U: HCPCS | Performed by: NURSE PRACTITIONER

## 2020-10-13 PROCEDURE — 99214 OFFICE O/P EST MOD 30 MIN: CPT | Performed by: NURSE PRACTITIONER

## 2020-10-13 PROCEDURE — 73630 X-RAY EXAM OF FOOT: CPT

## 2020-10-13 PROCEDURE — G8427 DOCREV CUR MEDS BY ELIG CLIN: HCPCS | Performed by: NURSE PRACTITIONER

## 2020-10-13 PROCEDURE — 3017F COLORECTAL CA SCREEN DOC REV: CPT | Performed by: NURSE PRACTITIONER

## 2020-10-13 RX ORDER — ALLOPURINOL 100 MG/1
100 TABLET ORAL DAILY
Qty: 90 TABLET | Refills: 1 | Status: SHIPPED | OUTPATIENT
Start: 2020-10-13 | End: 2021-02-17

## 2020-10-13 RX ORDER — COLCHICINE 0.6 MG/1
0.6 TABLET ORAL DAILY
Qty: 30 TABLET | Refills: 3 | Status: SHIPPED | OUTPATIENT
Start: 2020-10-13 | End: 2020-11-16 | Stop reason: ALTCHOICE

## 2020-10-13 NOTE — PROGRESS NOTES
BILATERAL    PAIN MANAGEMENT PROCEDURE Right 3/3/2020    RIGHT LUMBAR FOUR AND LUMBAR FIVE TRANSFORAMINAL EPIDURAL STEROID INJECTION SITE CONFIRMED BY FLUOROSCOPY performed by Yen Drake MD at [facility] DX/THER SBST INTRLMNR CRV/THRC W/IMG GDN Right 8/21/2018    RIGHT LUMBAR FOUR/FIVE, LUMBAR FIVE/ SACRAL ONE FACET INJECTION AND RIGHT SACROILIAC JOINT INJECTION SITES CONFIRMED BY FLUOROSCOPY performed by Yen Drake MD at [facility] ARTHROSCOPY Right 08/22/2017    ROTATOR CUFF REPAIR    THORACOSCOPY Right 9/4/2019    RIGHT VIDEO ASSISTED THORACOSCOPIC SURGERY, WEDGE RESECTION AND RIGHT UPPER AND LOWER LOBE NODULES WITH BIOPSY, INTERCOSTAL NERVE BLOCK TIMES SEVEN LEVELS performed by Alanna Aaron MD at [address] [place] [address] History     Social History Narrative    Not on file     Family History   Problem Relation Age of Onset    High Blood Pressure Mother     Rheum Arthritis Mother     Cancer Father     Hypertension Father     Colon Cancer Father     High Blood Pressure Brother     Stroke Brother     Heart Defect Brother     Hypertension Sister     Rheum Arthritis Sister     Emphysema Sister     Rheum Arthritis Sister     Other Sister         bottom of lungs are getting hard    Hypertension Brother     Rheum Arthritis Brother     Heart Attack Paternal Grandfather     No Known Problems Paternal Grandmother     No Known Problems Maternal Grandmother     No Known Problems Maternal Grandfather     Heart Attack Maternal Aunt     No Known Problems Maternal Aunt     Brain Cancer Maternal Aunt     Other Maternal Aunt         Complications from surgery    Arthritis Maternal Aunt     Arthritis Maternal Aunt     Heart Disease Maternal Aunt     High Blood Pressure Maternal Aunt     Cancer Maternal Uncle     Heart Disease Maternal Uncle     Heart Attack Maternal Uncle     High Blood Pressure Maternal Uncle     Heart Attack Maternal Uncle     High Blood Pressure Maternal Uncle     Parkinsonism Maternal Uncle     Heart Attack Paternal Aunt     Breast Cancer Paternal Aunt     Dementia Paternal Aunt     Heart Attack Paternal Uncle     Heart Attack Paternal Uncle     Brain Cancer Maternal Uncle      Social History     Tobacco Use    Smoking status: Former Smoker     Packs/day: 0.50     Years: 10.00     Pack years: 5.00     Types: Cigarettes     Last attempt to quit: 1985     Years since quittin.8    Smokeless tobacco: Never Used   Substance Use Topics    Alcohol use:  Yes     Alcohol/week: 1.0 standard drinks     Types: 1 Shots of liquor per week     Comment: occ     Allergies   Allergen Reactions    Atorvastatin Other (See Comments)     Multiple muscle spasms/cramps over body    Protonix [Pantoprazole Sodium] Diarrhea     Current Outpatient Medications   Medication Sig Dispense Refill    colchicine (COLCRYS) 0.6 MG tablet Take 1 tablet by mouth daily Take 1 tablet 3 times daily on day 1tab then twice daily until 48 hours after flare resolved 30 tablet 3    allopurinol (ZYLOPRIM) 100 MG tablet Take 1 tablet by mouth daily 90 tablet 1    traZODone (DESYREL) 50 MG tablet TAKE 1 TABLET BY MOUTH AT BEDTIME 30 tablet 0    rosuvastatin (CRESTOR) 10 MG tablet TAKE 1 TABLET BY MOUTH DAILY 90 tablet 1    valsartan (DIOVAN) 160 MG tablet Take 1 tablet by mouth daily 90 tablet 3    vitamin D3 (CHOLECALCIFEROL) 25 MCG (1000 UT) TABS tablet Take 1 tablet by mouth daily 90 tablet 1    vitamin C (ASCORBIC ACID) 500 MG tablet Take 500 mg by mouth daily      predniSONE (DELTASONE) 10 MG tablet Take 1 tablet by mouth daily 30 tablet 2    albuterol sulfate  (90 Base) MCG/ACT inhaler INHALE 2 PUFFS INTO THE LUNGS EVERY 4 HOURS AS NEEDED FOR WHEEZING 42.5 g 1    citalopram (CELEXA) 40 MG tablet TAKE 1 TABLET BY MOUTH EVERY DAY 90 tablet 3    aspirin 81 MG chewable tablet Take 1 tablet by mouth daily 90 tablet 3    famotidine (PEPCID) 40 MG tablet Take 1 tablet by mouth 2 times daily 60 tablet 5    Multiple Vitamins-Minerals (THERAPEUTIC MULTIVITAMIN-MINERALS) tablet Take 1 tablet by mouth daily      furosemide (LASIX) 40 MG tablet Take 1 tablet by mouth daily (Patient not taking: Reported on 10/13/2020) 90 tablet 3    APPLE CIDER VINEGAR PO Take by mouth      CO ENZYME Q-10 PO Take by mouth daily       No current facility-administered medications for this visit. Patient's past medical history, surgical history, family history, medications,  andallergies  were all reviewed and updated as appropriate today. Objective:     Vitals:    10/13/20 1202   BP: 137/68   Pulse: 89   SpO2: 98%     Physical Exam  Musculoskeletal:         General: Tenderness present. Right ankle: Normal.      Comments: Tenderness over all proximal metatarsals and along lateral edge of the fifth metatarsal.  No noted edema, no erythema,         Assessment      Encounter Diagnoses   Name Primary?  History of gout Yes    Acute gout of right foot, unspecified cause        PLAN:  Health Maintenance   Topic Date Due    Flu vaccine (1) 10/13/2021 (Originally 9/1/2020)    A1C test (Diabetic or Prediabetic)  11/19/2020    Lipid screen  07/28/2021    Potassium monitoring  09/21/2021    Creatinine monitoring  09/21/2021    Breast cancer screen  06/26/2022    Cervical cancer screen  06/18/2023    Colon cancer screen colonoscopy  06/04/2027    DTaP/Tdap/Td vaccine (4 - Td) 11/19/2029    Shingles Vaccine  Completed    Pneumococcal 0-64 years Vaccine  Completed    Hepatitis A vaccine  Aged Out    Hepatitis B vaccine  Aged Out    Hib vaccine  Aged Out    Meningococcal (ACWY) vaccine  Aged Out    Hepatitis C screen  Discontinued    HIV screen  Discontinued     1. History of gout  Given her stage III chronic kidney disease I am hesitant to give her any further nonsteroidal anti-inflammatories.   We will try Colcrys for this flare and start her on a daily allopurinol at this time. - URIC ACID; Future  - colchicine (COLCRYS) 0.6 MG tablet; Take 1 tablet by mouth daily Take 1 tablet 3 times daily on day 1tab then twice daily until 48 hours after flare resolved  Dispense: 30 tablet; Refill: 3  - allopurinol (ZYLOPRIM) 100 MG tablet; Take 1 tablet by mouth daily  Dispense: 90 tablet; Refill: 1  - XR FOOT RIGHT (MIN 3 VIEWS); Future    2. Acute gout of right foot, unspecified cause  - XR FOOT RIGHT (MIN 3 VIEWS); Future    Patient declines influenza vaccine today    No follow-ups on file.     ROMEO Zapata - CNP  [place]  10/13/2020

## 2020-10-13 NOTE — TELEPHONE ENCOUNTER
Patient called [address] contact center [facility]) with red flag complaint; transferred for triage by [place]. Pt calls to report symptoms of gout in right foot. Pt states pain started 3-4 weeks ago. Rates the pain as severe. Reports she has tried using steroids to help. Denies any fevers, numbness, or injury. Informed of disposition. Care advice as documented. Instructed to call back with worsening symptoms. Soft transfer to [facility] ([facility] [place]) to schedule appointment as recommended. Attention Provider: Thank you for allowing me to participate in the care of your patient. The patient was connected to triage in response to information provided to the [facility]. Please do not respond through this encounter as the response is not directed to a shared pool. Reason for Disposition   SEVERE pain (e.g., excruciating, unable to do any normal activities)    Answer Assessment - Initial Assessment Questions  1. ONSET: \"When did the pain start? \"       3-4 weeks   2. LOCATION: \"Where is the pain located? \"       Right foot  3. PAIN: \"How bad is the pain? \"    (Scale 1-10; or mild, moderate, severe)    -  MILD (1-3): doesn't interfere with normal activities     -  MODERATE (4-7): interferes with normal activities (e.g., work or school) or awakens from sleep, limping     -  SEVERE (8-10): excruciating pain, unable to do any normal activities, unable to walk      severe  4. WORK OR EXERCISE: \"Has there been any recent work or exercise that involved this part of the body? \"       No  5. CAUSE: \"What do you think is causing the foot pain? \"      gout  6. OTHER SYMPTOMS: \"Do you have any other symptoms? \" (e.g., leg pain, rash, fever, numbness)      No  7. PREGNANCY: \"Is there any chance you are pregnant? \" \"When was your last menstrual period? \"      No    Protocols used: FOOT PAIN-ADULT-[place]

## 2020-10-19 RX ORDER — ASPIRIN 81 MG
TABLET,CHEWABLE ORAL
Qty: 90 TABLET | Refills: 3 | Status: SHIPPED | OUTPATIENT
Start: 2020-10-19 | End: 2021-02-20 | Stop reason: SDUPTHER

## 2020-10-20 ENCOUNTER — OFFICE VISIT (OUTPATIENT)
Dept: ORTHOPEDIC SURGERY | Age: 60
End: 2020-10-20
Payer: COMMERCIAL

## 2020-10-20 ENCOUNTER — TELEPHONE (OUTPATIENT)
Dept: ORTHOPEDIC SURGERY | Age: 60
End: 2020-10-20

## 2020-10-20 VITALS — HEIGHT: 63 IN | WEIGHT: 259.92 LBS | TEMPERATURE: 97.1 F | BODY MASS INDEX: 46.05 KG/M2 | RESPIRATION RATE: 14 BRPM

## 2020-10-20 PROCEDURE — 99214 OFFICE O/P EST MOD 30 MIN: CPT | Performed by: STUDENT IN AN ORGANIZED HEALTH CARE EDUCATION/TRAINING PROGRAM

## 2020-10-20 NOTE — PROGRESS NOTES
Follow up: SPINE    María Lin  1960  [id number]      CHIEF COMPLAINT:    Chief Complaint   Patient presents with    Lower Back Pain     3/3/2020: R L4 + R L5 TF (#1)         HISTORY OF PRESENT ILLNESS:  Ms. Amos Rivera is a 63 y.o. female returns for a follow up visit for multiple medical problems. Her current presenting problems are   1. Lumbar radiculopathy    2. Spondylosis without myelopathy or radiculopathy, lumbar region    3. Spondylolisthesis of lumbar region    4. DDD (degenerative disc disease), lumbar    . As per information/history obtained from the PADT(patient assessment and documentation tool) - She complains of pain in the lower back with radiation to the buttocks, hips Right and lower leg Right She rates the pain 9/10 and describes it as sharp and aching. Pain is made worse by: walking, standing. She denies side effects from the current pain regimen. Patient reports that since the last follow up visit the physical functioning is worse, family/social relationships are worse, mood is worse and sleep patterns are worse, and that the overall functioning is worse. Patient denies neurological bowel or bladder. Ms. Amos Rivera presents for ongoing low back pain. She last underwent a lumbar epidural steroid injection in March of 2020. This provided 75% relief for about 2 months. The patient reports she did fall about a month ago on to her right buttock. Today the patient complains of low back pain and right leg pain. She rates her back pain 9/10 and leg pain 8/10. She describes the back pain as sharp and aching and the leg pain as stabbing and shooting. She states the right leg pain is not as constant as the back pain. Aggravating factors to her back pain include walking and standing for longer than 30-45 minutes. Sitting for short periods alleviates the pain. She states moving from sit to stand significantly aggravates her back pain.  Alleviating factors include sitting and oral pain medication. The patient reports a history of lumbar radiofrequency ablation which has helped significantly and she is interested in pursuing this option in the near future. The patient does report bowel incontinence which was resolved with her last lumbar epidural steroid injection. She is able to walk and stand for 30 to 45 minutes however she can sit for over an hour without pain. Associated signs and symptoms:   Neurogenic bowel or bladder symptoms:  no   Perceived weakness:  no   Difficulty walking:  yes              Past Medical History:   Past Medical History:   Diagnosis Date    Acid reflux     Bilateral carpal tunnel syndrome 12/21/2017    CAD in native artery 06/19/2018    Mid LAD stented with 3.5 x 15 mm Xience BRIAN.  EF 55%    Chicken pox     Chronic back pain     CKD (chronic kidney disease) 4/15/2016    Depression 12/7/2010    Heart disease     Hypertension     Interstitial lung disease ([facility] [address]     Measles     Menopausal symptoms     Morbid obesity with BMI of 45.0-49.9, adult ([facility] [address] 1/29/2015    Osteoarthritis     Overactive bladder     Pure hypercholesterolemia     Stress incontinence       Past Surgical History:     Past Surgical History:   Procedure Laterality Date    BRONCHOSCOPY  6/27/2019    BRONCHOSCOPY ALVEOLAR LAVAGE performed by Dell Carmichael MD at [address] [place] [address]  6/27/2019    BRONCHOSCOPY DIAGNOSTIC OR CELL [address] ONLY performed by Dell Carmichael MD at [address] [place] [address]  6/27/2019    BRONCHOSCOPY BIOPSY BRONCHUS performed by Dell Carmichael MD at [address] [place] [address] Bilateral 01/29/2018    EPIDURAL STEROID INJECTION N/A 10/15/2019    MIDLINE CAUDAL EPIDURAL STEROID INJECTION AND COCCYGEAL JOINT INJECTION SITES CONFIRMED BY FLUOROSCOPY performed by Kelvin Adamson MD at [place] [address]  2009,2010    KNEES BILATERAL    PAIN MANAGEMENT PROCEDURE Right 3/3/2020    RIGHT LUMBAR FOUR AND LUMBAR FIVE TRANSFORAMINAL EPIDURAL STEROID INJECTION SITE CONFIRMED BY FLUOROSCOPY performed by Williams Tiwari MD at [facility] DX/THER SBST INTRLMNR CRV/THRC W/IMG GDN Right 8/21/2018    RIGHT LUMBAR FOUR/FIVE, LUMBAR FIVE/ SACRAL ONE FACET INJECTION AND RIGHT SACROILIAC JOINT INJECTION SITES CONFIRMED BY FLUOROSCOPY performed by Williams Tiwari MD at [address] [place] [address] ARTHROSCOPY Right 08/22/2017    ROTATOR CUFF REPAIR    THORACOSCOPY Right 9/4/2019    RIGHT VIDEO ASSISTED THORACOSCOPIC SURGERY, WEDGE RESECTION AND RIGHT UPPER AND LOWER LOBE NODULES WITH BIOPSY, INTERCOSTAL NERVE BLOCK TIMES SEVEN LEVELS performed by Latoya Lee MD at [place] [address]     Current Medications:     Current Outpatient Medications:     ASPIRIN LOW DOSE 81 MG chewable tablet, CHEW AND SWALLOW ONE TABLET EVERY DAY, Disp: 90 tablet, Rfl: 3    colchicine (COLCRYS) 0.6 MG tablet, Take 1 tablet by mouth daily Take 1 tablet 3 times daily on day 1tab then twice daily until 48 hours after flare resolved, Disp: 30 tablet, Rfl: 3    allopurinol (ZYLOPRIM) 100 MG tablet, Take 1 tablet by mouth daily, Disp: 90 tablet, Rfl: 1    traZODone (DESYREL) 50 MG tablet, TAKE 1 TABLET BY MOUTH AT BEDTIME, Disp: 30 tablet, Rfl: 0    rosuvastatin (CRESTOR) 10 MG tablet, TAKE 1 TABLET BY MOUTH DAILY, Disp: 90 tablet, Rfl: 1    valsartan (DIOVAN) 160 MG tablet, Take 1 tablet by mouth daily, Disp: 90 tablet, Rfl: 3    furosemide (LASIX) 40 MG tablet, Take 1 tablet by mouth daily, Disp: 90 tablet, Rfl: 3    APPLE CIDER VINEGAR PO, Take by mouth, Disp: , Rfl:     vitamin D3 (CHOLECALCIFEROL) 25 MCG (1000 UT) TABS tablet, Take 1 tablet by mouth daily, Disp: 90 tablet, Rfl: 1    vitamin C (ASCORBIC ACID) 500 MG tablet, Take 500 mg by mouth daily, Disp: , Rfl:     predniSONE (DELTASONE) 10 MG tablet, Take 1 tablet by mouth daily, Disp: 30 tablet, Rfl: 2    albuterol sulfate  (90 Base) MCG/ACT inhaler, INHALE 2 PUFFS INTO THE LUNGS EVERY 4 HOURS AS NEEDED FOR WHEEZING, Disp: 42.5 g, Rfl: 1    CO ENZYME Q-10 PO, Take by mouth daily, Disp: , Rfl:     citalopram (CELEXA) 40 MG tablet, TAKE 1 TABLET BY MOUTH EVERY DAY, Disp: 90 tablet, Rfl: 3    famotidine (PEPCID) 40 MG tablet, Take 1 tablet by mouth 2 times daily, Disp: 60 tablet, Rfl: 5    Multiple Vitamins-Minerals (THERAPEUTIC MULTIVITAMIN-MINERALS) tablet, Take 1 tablet by mouth daily, Disp: , Rfl:   Allergies:  Atorvastatin and Protonix [pantoprazole sodium]  Social History:    reports that she quit smoking about 35 years ago. Her smoking use included cigarettes. She has a 5.00 pack-year smoking history. She has never used smokeless tobacco. She reports current alcohol use of about 1.0 standard drinks of alcohol per week. She reports that she does not use drugs.   Family History:   Family History   Problem Relation Age of Onset    High Blood Pressure Mother     Rheum Arthritis Mother     Cancer Father     Hypertension Father     Colon Cancer Father     High Blood Pressure Brother     Stroke Brother     Heart Defect Brother     Hypertension Sister     Rheum Arthritis Sister     Emphysema Sister     Rheum Arthritis Sister     Other Sister         bottom of lungs are getting hard    Hypertension Brother     Rheum Arthritis Brother     Heart Attack Paternal Grandfather     No Known Problems Paternal Grandmother     No Known Problems Maternal Grandmother     No Known Problems Maternal Grandfather     Heart Attack Maternal Aunt     No Known Problems Maternal Aunt     Brain Cancer Maternal Aunt     Other Maternal Aunt         Complications from surgery    Arthritis Maternal Aunt     Arthritis Maternal Aunt     Heart Disease Maternal Aunt     High Blood Pressure Maternal Aunt     Cancer Maternal Uncle     Heart Disease Maternal Uncle     Heart Attack Maternal Uncle     High Blood Pressure Maternal Uncle     Heart Attack Maternal Uncle     High Blood Pressure Maternal Uncle     Parkinsonism Maternal Uncle     Heart Attack Paternal Aunt     Breast Cancer Paternal Aunt     Dementia Paternal Aunt     Heart Attack Paternal Uncle     Heart Attack Paternal Uncle     Brain Cancer Maternal Uncle        REVIEW OF SYSTEMS:   CONSTITUTIONAL: Denies unexplained weight loss, fevers, chills or fatigue  NEUROLOGICAL: Denies unsteady gait or progressive weakness  MUSCULOSKELETAL: Denies joint swelling or redness  GI: Denies nausea, vomiting, diarrhea   : Denies bowel or bladder issues       PHYSICAL EXAM:    Vitals: Temperature 97.1 °F (36.2 °C), resp. rate 14, height 5' 2.99\" (1.6 m), weight 259 lb 14.8 oz (117.9 kg), last menstrual period 07/13/2011, not currently breastfeeding. GENERAL EXAM:  · General Apparence: Patient is adequately groomed with no evidence of malnutrition. · Psychiatric: Orientation: The patient is oriented to time, place and person. The patient's mood and affect are appropriate   · Vascular: Examination reveals no swelling and palpation reveals no tenderness in upper or lower extremities. Good capillary refill. · The lymphatic examination of the neck, axillae and groin reveals all areas to be without enlargement or induration  · Sensation is intact without deficit in the upper and lower extremities to light touch and pinprick  · Coordination of the upper and lower extremities are normal.  · RIGHT UPPER EXTREMITY:  Inspection/examination of the right upper extremity does not show any tenderness, deformity or injury. Range of motion is unremarkable and pain-free. There is no gross instability. There are no rashes, ulcerations or lesions. Strength and tone are normal. No atrophy or abnormal movements are noted. · LEFT UPPER EXTREMITY: Inspection/examination of the left upper extremity does not show any tenderness, deformity or injury. Range of motion is unremarkable and pain-free.  There is no gross instability. There are no rashes, ulcerations or lesions. Strength and tone are normal. No atrophy or abnormal movements are noted. LUMBAR/SACRAL EXAMINATION:  · Inspection: Local inspection shows no step-off or bruising. Lumbar alignment is normal. No instability is noted. · Palpation:   No evidence of tenderness at the midline. Lumbar paraspinal tenderness: Mild L4/5 and L5/S1 tenderness  Bursal tenderness No tenderness bilaterally  There is no paraspinal spasm. · Range of Motion: limited by 50% in all planes due to pain. Pain with bilateral facet loading. · Strength:   Strength testing is 5/5 in all muscle groups tested. · Special Tests:   Straight leg raise positive, right negative left and crossed SLR negative. Kwan's testing is negative bilaterally. FADIR's testing is negative bilaterally. · Skin: There are no rashes, ulcerations or lesions. · Reflexes: Reflexes are symmetrically 1+ at the patellar and ankle tendons. Clonus absent bilaterally at the feet. · Gait & station: antalgic on the right  · Additional Examinations:  · RIGHT LOWER EXTREMITY: Inspection/examination of the right lower extremity does not show any tenderness, deformity or injury. Range of motion is normal and pain-free. There is no gross instability. There are no rashes, ulcerations or lesions. Strength and tone are normal. No atrophy or abnormal movements are noted. · LEFT LOWER EXTREMITY:  Inspection/examination of the left lower extremity does not show any tenderness, deformity or injury. Range of motion is normal and pain-free. There is no gross instability. There are no rashes, ulcerations or lesions. Strength and tone are normal. No atrophy or abnormal movements are noted. Diagnostic Testing:    MR Lumbar spine shows  1/21/20       L1-L2-normal with foraminal patency         L2-L3 and L3-L4-mild disc desiccation without disc thinning, stable. No compressive disc pathology.  Foramina patent despite facet arthropathy at L3-L4.         L4-L5-grade 1 degenerative anterolisthesis measures 4 mm, without significant change. Annular pseudobulge, in combination with marked facet arthropathy results in moderate central spinal canal stenosis, unchanged. Bilateral lateral recess stenosis    impinges mildly on the proximal L4 nerve roots. Both foramina mildly compromised         L5-S1-normal. Facets are arthropathic with foraminal patency              Impression         Stable grade 1 degenerative anterolisthesis L4 over L5 resulting in moderate central spinal canal stenosis. Bilateral lateral recess stenosis is redemonstrated with mild impingement of the proximal L4 nerve roots. .        Results for orders placed or performed in visit on 10/13/20   URIC ACID   Result Value Ref Range    Uric Acid, Serum 8.9 (H) 2.6 - 6.0 mg/dL     Impression:       1. Lumbar radiculopathy    2. Spondylosis without myelopathy or radiculopathy, lumbar region    3. Spondylolisthesis of lumbar region    4. DDD (degenerative disc disease), lumbar        Plan:  Clinical Course: Above diagnoses are worsening    I discussed the diagnosis and the treatment options with Dayton Cortez today. In Summary:  The various treatment options were outlined and discussed with Enedina Islas including:  Conservative care options: physical therapy, ice, medications, bracing, and activity modification. The indications for therapeutic injections. The indications for additional imaging/laboratory studies. The indications for (possible future) interventions. After considering the various options discussed, Dayton Cortez elected to pursue a course of treatment that includes the followin. Medications:  Continue anti-inflammatories with appropriate GI Precautions including to stop if develop dark tarry stools or GI upset and to take with food. 2. PT:  Encouraged to continue with HEP. 3. Further studies:  COVID-19 PCR prior to procedure     4. Interventional:  We discussed pursuing a right L4 and L5 transforaminal  epidural steroid injection to address the pain. Radiologic imaging and symptoms confirm the pain etiology. Risks, benefits and alternatives of interventional options were discussed. These include and are not limited to bleeding, infection, spinal headache, nerve injury and lack of pain relief. The patient verbalized understanding and would like to proceed. The patient will be scheduled accordingly. Repeat Therapeutic EDGAR with positive relief from first therapeutic injection    As such, I have confirmed the patient has met the general requirements including failure of conservative management including prescription strength analgesics, adjunctive medications were utilized , therapeutic exercise program, and no underlying addiction or behavioral disorders were identified to the ability of the provider. Symptoms are impacting their ADLs or iADLs such as walking and transferring and significant pain was noted in the HPI. Imaging studies as noted in the diagnostic imaging section of the consultation were reviewed and correlated with clinical findings. Fluoroscopy is utilized for interventional procedures. A repeat injection is being recommended due to at least 50% pain reduction and functional improvement of at least 3 weeks duration. The patient notes significant functional limitations and continues to adhere to conservative treatment between injections. We also discussed pursuing bilateral L3, L4, L5 dorsal ramus lumbar medial branch blocks to help with the patient's back pain. We can proceed with this if the back pain does not improve with a lumbar epidural steroid injection. 5. Follow up:  2-3 weeks      Enedina HEYDI Islas was instructed to call the office if her symptoms worsen or if new symptoms appear prior to the next scheduled visit.  She is specifically instructed to contact the office between now & her scheduled

## 2020-10-20 NOTE — LETTER
Please schedule the following with:     Date:   20 @ 11:00    Account: [de-identified]  Patient: Pascual Chaudhry    : 1960  Address:  [address] [place] [address] [place]    Phone (H):  [phone] (home) [phone] (work)     ----------------------------------------------------------------------------------------------  Diagnosis:     ICD-10-CM    1. Spondylosis without myelopathy or radiculopathy, lumbar region  M47.816    2. Spondylolisthesis of lumbar region  M43.16    3. DDD (degenerative disc disease), lumbar  M51.36          Levels:L4 and L5   Side: Right  Procedure: Transforaminal epidural steroid injection   CPT Codes 89862, 71525    ----------------------------------------------------------------------------------------------  Injection # 1  F    Attending Physician       Chacho Rodarte. Sachin Boudreaux MD.  ----------------------------------------------------------------------------------------------  Injection Scheduled For:    At:    [address] [facility]    Pre-Cert#    [employer] Insurance     Pre-Cert#    Comments:  COVID TEST Needed: yes    · Infection control  ? Tested positive for MRSA in past 12 months:  no  ? Tested positive for MSSA \"staph infection\" in past 12 months: no  ? Tested positive for VRE (Vancomycin Resistant Enterococci) in past 12 months:   no  ? Currently on any antibiotics for an infection: no  · Anticoagulants:  ? On a blood thinner:  ASA   ?  Any history of bleeding disorder: no   · Advanced Liver disease: no   · Advanced Renal disease: no   · Glaucoma: no   · Diabetes: no      Sedation:  No  -----------------------------------------------------------------------------------------------  Allergies   Allergen Reactions    Atorvastatin Other (See Comments)     Multiple muscle spasms/cramps over body    Protonix [Pantoprazole Sodium] Diarrhea

## 2020-10-27 ENCOUNTER — OFFICE VISIT (OUTPATIENT)
Dept: PRIMARY CARE CLINIC | Age: 60
End: 2020-10-27
Payer: COMMERCIAL

## 2020-10-27 DIAGNOSIS — Z01.818 PRE-OP TESTING: Primary | ICD-10-CM

## 2020-10-27 PROCEDURE — G8417 CALC BMI ABV UP PARAM F/U: HCPCS | Performed by: FAMILY MEDICINE

## 2020-10-27 PROCEDURE — G8428 CUR MEDS NOT DOCUMENT: HCPCS | Performed by: FAMILY MEDICINE

## 2020-10-27 PROCEDURE — 99211 OFF/OP EST MAY X REQ PHY/QHP: CPT | Performed by: FAMILY MEDICINE

## 2020-10-27 NOTE — PATIENT INSTRUCTIONS

## 2020-10-28 LAB — SARS-COV-2, NAA: NOT DETECTED

## 2020-10-28 RX ORDER — ZINC GLUCONATE 50 MG
50 TABLET ORAL DAILY
COMMUNITY
End: 2021-02-17

## 2020-10-28 NOTE — PROGRESS NOTES
PATIENT REACHED   YES__X__NO____    PREOP INSTUCTIONS     Patient instructed to get their COVID-19 test done as directed by their doctor (5-7 days prior to procedure)  or patient states will get on ___10/27______. Patient was notified that they need to have an appointment,number to call provided. The day the COVID test is done is considered day one. Instructed to self quarantine after test until DOS. There is a one visitor policy at [facility] for all surgeries and endoscopies. Whether the visitor can stay or will be asked to wait in the car will depend on the current policy and if social distancing can be maintained. The policy is subject to change at any time. Please make sure the visitor has a cell phone that is on,charged and able to accept calls, as this may be the way that the staff communicates with them. Pain management is NO VISITOR policyThe patients ride is expected to remain in the car with a cell phone for communication. If the ride is leaving the hospital grounds please make sure they are back in time for pickup. Have the patient inform the staff on arrival what their rides plans are while the patient is in the facility. At the MAIN there is one visitor allowed. Please note that the visitor policy is subject to change. DATE__11/2_______ TIME__1100_______ARRIVAL__1000______PLACE__masc__________  NOTHING TO EAT OR DRINK  AFTER MIDNIGHT THE EVENING PRIOR OR AS INSTRUCTED BY YOUR DR.  Tori Knight NEED A RESPONSIBLE ADULT AGE 18 OR OLDER TO DRIVE YOU HOME  PLEASE BRING INSURANCE CARD. PICTURE ID AND COMPLETE LIST OF MEDS  WEAR LOOSE COMFORTABLE CLOTHING  FOLLOW ANY INSTRUCTIONS YOUR [facility] OFFICE HAS GIVEN YOU,INCLUDING WHAT MEDICATIONS TO TAKE THE AM OF PROCEDURE AND WHEN AND IF YOU NEED TO STOP ANY BLOOD THINNERS. IF YOU HAVE QUESTIONS REGARDING THIS CALL THE OFFICE  THE GOAL BLOOD SUGAR THE AM OF PROCEDURE  OR LESS ABOVE THAT THE PROCEDURE MAY BE CANCELLED  ANY QUESTIONS CALL YOUR DOCTOR. ALSO,PLEASE READ THE INSTRUCTION PACKET FROM YOUR DR IF YOU RECEIVED ONE.   SPINE INTERVENTION NUMBER [phone]

## 2020-10-29 NOTE — RESULT ENCOUNTER NOTE
Your Covid-19 test resulted not detected/negative. What happens if I have a negative test?  Remember to wash your hands often, avoid touching your face, stay 6 feet from people you do not live with, and wear a cloth facemask when you go out in public. A negative COVID-19 test at one point in time does not mean you will stay negative. You could become ill with COVID-19 and/or test positive at any time. If you are a close contact of a confirmed or suspected case, continue to stay home and away from others until 14 days after your last exposure. If you do not have symptoms and were not in close contact with a confirmed or suspected case, you can stop isolating. If you currently have symptoms of COVID-19 and were not in close contact with a confirmed or suspected case, you should keep monitoring symptoms and talk to your doctor or other healthcare provider about staying home and if you need to get tested again. If you develop symptoms of COVID-19, stay at home and away from others and talk to your doctor or other healthcare provider about getting tested again. For additional information, visit coronavirus. [place].gov. For answers to your COVID-19 questions, call [phone] ([phone]).

## 2020-11-02 ENCOUNTER — HOSPITAL ENCOUNTER (OUTPATIENT)
Age: 60
Setting detail: OUTPATIENT SURGERY
Discharge: HOME | End: 2020-11-02
Attending: PHYSICAL MEDICINE & REHABILITATION | Admitting: PHYSICAL MEDICINE & REHABILITATION
Payer: COMMERCIAL

## 2020-11-02 ENCOUNTER — APPOINTMENT (OUTPATIENT)
Dept: GENERAL RADIOLOGY | Age: 60
End: 2020-11-02
Attending: PHYSICAL MEDICINE & REHABILITATION
Payer: COMMERCIAL

## 2020-11-02 VITALS
BODY MASS INDEX: 45 KG/M2 | HEART RATE: 72 BPM | WEIGHT: 254 LBS | OXYGEN SATURATION: 100 % | TEMPERATURE: 97.6 F | RESPIRATION RATE: 20 BRPM | HEIGHT: 63 IN | DIASTOLIC BLOOD PRESSURE: 94 MMHG | SYSTOLIC BLOOD PRESSURE: 184 MMHG

## 2020-11-02 PROCEDURE — 2709999900 HC NON-CHARGEABLE SUPPLY: Performed by: PHYSICAL MEDICINE & REHABILITATION

## 2020-11-02 PROCEDURE — 3209999900 FLUORO FOR SURGICAL PROCEDURES

## 2020-11-02 PROCEDURE — 99152 MOD SED SAME PHYS/QHP 5/>YRS: CPT | Performed by: PHYSICAL MEDICINE & REHABILITATION

## 2020-11-02 PROCEDURE — 2500000003 HC RX 250 WO HCPCS: Performed by: PHYSICAL MEDICINE & REHABILITATION

## 2020-11-02 PROCEDURE — 3610000056 HC PAIN LEVEL 4 BASE (NON-OR): Performed by: PHYSICAL MEDICINE & REHABILITATION

## 2020-11-02 PROCEDURE — 6360000002 HC RX W HCPCS: Performed by: PHYSICAL MEDICINE & REHABILITATION

## 2020-11-02 RX ORDER — FENTANYL CITRATE 50 UG/ML
INJECTION, SOLUTION INTRAMUSCULAR; INTRAVENOUS
Status: COMPLETED | OUTPATIENT
Start: 2020-11-02 | End: 2020-11-02

## 2020-11-02 RX ORDER — BUPIVACAINE HYDROCHLORIDE 5 MG/ML
INJECTION, SOLUTION EPIDURAL; INTRACAUDAL
Status: COMPLETED | OUTPATIENT
Start: 2020-11-02 | End: 2020-11-02

## 2020-11-02 RX ORDER — MIDAZOLAM HYDROCHLORIDE 1 MG/ML
INJECTION INTRAMUSCULAR; INTRAVENOUS
Status: COMPLETED | OUTPATIENT
Start: 2020-11-02 | End: 2020-11-02

## 2020-11-02 RX ORDER — LIDOCAINE HYDROCHLORIDE 10 MG/ML
INJECTION, SOLUTION INFILTRATION; PERINEURAL
Status: COMPLETED | OUTPATIENT
Start: 2020-11-02 | End: 2020-11-02

## 2020-11-02 RX ORDER — BETAMETHASONE SODIUM PHOSPHATE AND BETAMETHASONE ACETATE 3; 3 MG/ML; MG/ML
INJECTION, SUSPENSION INTRA-ARTICULAR; INTRALESIONAL; INTRAMUSCULAR; SOFT TISSUE
Status: COMPLETED | OUTPATIENT
Start: 2020-11-02 | End: 2020-11-02

## 2020-11-02 ASSESSMENT — PAIN SCALES - GENERAL
PAINLEVEL_OUTOF10: 0
PAINLEVEL_OUTOF10: 0

## 2020-11-02 ASSESSMENT — PAIN - FUNCTIONAL ASSESSMENT: PAIN_FUNCTIONAL_ASSESSMENT: 0-10

## 2020-11-02 ASSESSMENT — PAIN DESCRIPTION - DESCRIPTORS: DESCRIPTORS: BURNING;STABBING;ACHING

## 2020-11-02 NOTE — H&P
tablet Take 1 tablet by mouth daily 5/13/20 5/13/21 Yes Dinorah Redman MD   albuterol sulfate  (90 Base) MCG/ACT inhaler INHALE 2 PUFFS INTO THE LUNGS EVERY 4 HOURS AS NEEDED FOR WHEEZING 4/13/20  Yes Miladis Castaneda MD   citalopram (CELEXA) 40 MG tablet TAKE 1 TABLET BY MOUTH EVERY DAY 10/16/19  Yes Amarjit Muro DO   famotidine (PEPCID) 40 MG tablet Take 1 tablet by mouth 2 times daily 5/3/19  Yes Dinorah Redman MD   Multiple Vitamins-Minerals (THERAPEUTIC MULTIVITAMIN-MINERALS) tablet Take 1 tablet by mouth daily   Yes Historical Provider, MD   furosemide (LASIX) 40 MG tablet Take 1 tablet by mouth daily 8/13/20   Conard Hatchet, APRN - CNP     Allergies:  Atorvastatin and Protonix [pantoprazole sodium]  Social History:    reports that she quit smoking about 35 years ago. Her smoking use included cigarettes. She has a 5.00 pack-year smoking history. She has never used smokeless tobacco. She reports current alcohol use of about 1.0 standard drinks of alcohol per week. She reports that she does not use drugs.   Family History:   Family History   Problem Relation Age of Onset    High Blood Pressure Mother     Rheum Arthritis Mother     Cancer Father     Hypertension Father     Colon Cancer Father     High Blood Pressure Brother     Stroke Brother     Heart Defect Brother     Hypertension Sister     Rheum Arthritis Sister     Emphysema Sister     Rheum Arthritis Sister     Other Sister         bottom of lungs are getting hard    Hypertension Brother     Rheum Arthritis Brother     Heart Attack Paternal Grandfather     No Known Problems Paternal Grandmother     No Known Problems Maternal Grandmother     No Known Problems Maternal Grandfather     Heart Attack Maternal Aunt     No Known Problems Maternal Aunt     Brain Cancer Maternal Aunt     Other Maternal Aunt         Complications from surgery    Arthritis Maternal Aunt     Arthritis Maternal Aunt     Heart Disease Maternal Aunt     High Blood Pressure Maternal Aunt     Cancer Maternal Uncle     Heart Disease Maternal Uncle     Heart Attack Maternal Uncle     High Blood Pressure Maternal Uncle     Heart Attack Maternal Uncle     High Blood Pressure Maternal Uncle     Parkinsonism Maternal Uncle     Heart Attack Paternal Aunt     Breast Cancer Paternal Aunt     Dementia Paternal Aunt     Heart Attack Paternal Uncle     Heart Attack Paternal Uncle     Brain Cancer Maternal Uncle        Vitals: Blood pressure (!) 183/93, pulse 66, temperature 97.6 °F (36.4 °C), temperature source Temporal, resp. rate 15, height 5' 3\" (1.6 m), weight 254 lb (115.2 kg), last menstrual period 07/13/2011, SpO2 100 %, not currently breastfeeding. PHYSICAL EXAM:including affected areas  HENT: Airway patent and reviewed  Cardiovascular: Normal rate, regular rhythm, normal heart sounds. Pulmonary/Chest: No wheezes. No rhonchi. No rales. Abdominal: Soft. Bowel sounds are normal. No distension. Extremities: Moves all extremities equally  Cervical and Lumbar Spine: Painful range of motion, no midline tenderness       Diagnosis:Lumbar radiculopathy  M47.816  M43.16  M51.36    Plan: Proceed with planned procedure      ASA CLASS:         []   I. Normal, healthy adult           [x]   II.  Mild systemic disease            []   III. Severe systemic disease      Mallampati: Mallampati Class II - (soft palate, fauces & uvula are visible)      Sedation plan:   [x]  Local              []  Minimal                  []  General anesthesia    Patient's condition acceptable for planned procedure/sedation. Post Procedure Plan   Return to same level of care   ______________________     The patient was counseled at length about the risks of yuli Covid-19 in the echo-operative and post-operative states including the recovery window of their procedure.   The patient was made aware that yuli Covid-19 after a surgical procedure may worsen their prognosis for recovering from the virus and lend to a higher morbidity and or mortality risk. The patient was given the options of postponing their procedure. All of the risks, benefits, and alternatives were discussed. The patient does wish to proceed with the procedure. The risks and benefits as well as alternatives to the procedure have been discussed with the patient and or family. The patient and or next of kin understands and agrees to proceed.     Tammy Huntley M.D.

## 2020-11-02 NOTE — OP NOTE
Patient:  Hossein Leon  YOB: 1960  Medical Record #:  [id number]   Place: [address] [place] [address]  Date:  11/2/2020   Physician:  Cici Rice MD, CRESENCIO    Procedure: 1. Transforaminal Lumbar Epidural Steroid Injection -  right L4  CPT 87135          2. Transforaminal Lumbar Epidural Steroid Injection -  right L5  CPT 05794  EDGAR #1    Pre-Procedure Diagnosis: Lumbar radiculopathy      Post-Procedure Diagnosis: Same    Sedation: Local with 1% Lidocaine 3 ml and 1 mg of IV Versed and 25 mcg IV fentanyl    EBL: None    Complications: None    Procedure Summary:        The patient was brought to the procedure suite and placed in the prone position. The skin overlying the lumbar spine was prepped and draped in the usual sterile fashion. Using fluoroscopic guidance, the right L4 foramen was identified. Through anesthetized skin, a 22 gauge 5 inch curved tip spinal needle was advanced into the foramen. Isovue M 300 was instilled showing an epidurogram/nerve root outline pattern without evidence of vascular or intrathecal spread. Following which, 7.5 mg of Celestone mixed with 1 ml of 0.5% Marcaine was instilled. The needle was removed. Using fluoroscopic guidance, the right L5 foramen was identified. Through anesthetized skin, a 22 gauge 5 inch curved tip spinal needle was advanced into the foramen. Isovue M 300 was instilled showing an epidurogram/nerve root outline pattern without evidence of vascular or intrathecal spread. Following which, 7.5 mg of Celestone mixed with 1 ml of 0.5% Marcaine was instilled. The needle was removed and band-aids were applied. The patient was transferred to the post-operative area in stable condition.

## 2020-11-06 ENCOUNTER — TELEPHONE (OUTPATIENT)
Dept: ORTHOPEDIC SURGERY | Age: 60
End: 2020-11-06

## 2020-11-07 RX ORDER — PREDNISONE 20 MG/1
20 TABLET ORAL 2 TIMES DAILY
Qty: 10 TABLET | Refills: 0 | Status: SHIPPED | OUTPATIENT
Start: 2020-11-07 | End: 2020-11-12

## 2020-11-07 RX ORDER — PROBENECID 500 MG/1
500 TABLET, FILM COATED ORAL 2 TIMES DAILY
Qty: 60 TABLET | Refills: 3 | Status: SHIPPED | OUTPATIENT
Start: 2020-11-07 | End: 2020-11-09 | Stop reason: SDUPTHER

## 2020-11-09 NOTE — TELEPHONE ENCOUNTER
Auth: # [id number]    Date: 11/17/2020 thru 02/15/2021  Type of SX:  Outpatient MBB #1  Location: [facility]  CPT: 51116, 68032   DX Code: D64.963, M43.16, M51.36  SX area: Lumbar spine  Insurance: [facility]    CPT: [address] [facility], [id number] 61, [id number]

## 2020-11-10 RX ORDER — PROBENECID 500 MG/1
500 TABLET, FILM COATED ORAL 2 TIMES DAILY
Qty: 180 TABLET | Refills: 1 | Status: SHIPPED | OUTPATIENT
Start: 2020-11-10 | End: 2020-11-18

## 2020-11-10 RX ORDER — TRAZODONE HYDROCHLORIDE 50 MG/1
TABLET ORAL
Qty: 30 TABLET | Refills: 0 | Status: SHIPPED | OUTPATIENT
Start: 2020-11-10 | End: 2020-12-10 | Stop reason: SDUPTHER

## 2020-11-11 ENCOUNTER — VIRTUAL VISIT (OUTPATIENT)
Dept: ORTHOPEDIC SURGERY | Age: 60
End: 2020-11-11
Payer: COMMERCIAL

## 2020-11-11 PROCEDURE — 99213 OFFICE O/P EST LOW 20 MIN: CPT | Performed by: STUDENT IN AN ORGANIZED HEALTH CARE EDUCATION/TRAINING PROGRAM

## 2020-11-11 NOTE — PROGRESS NOTES
Follow up: [facility] visit (Audio/visual connection present)    Esme Lauren  1960  [id number]         Chief Complaint   Patient presents with    Lower Back Pain     F/u Right L4 & Right L5 TF 11/2  (#1)         HISTORY OF PRESENT ILLNESS:  Ms. Gurinder Santiago is a 63 y.o. female returns for a follow up visit for multiple medical problems. Her current presenting problems are   1. Spondylosis without myelopathy or radiculopathy, lumbar region    2. Spondylolisthesis of lumbar region    3. DDD (degenerative disc disease), lumbar    4. Lumbar radiculopathy    . As per information/history obtained from the PADT(patient assessment and documentation tool) - She complains of pain in the lower back with radiation to the lower back She rates the pain 1/10 and describes it as aching. Pain is made worse by: walking, standing, sitting. She denies side effects from the current pain regimen. Patient reports that since the last follow up visit the physical functioning is better, family/social relationships are better, mood is better and sleep patterns are better, and that the overall functioning is better. Patient denies neurological bowel or bladder. Ms. presents for follow up of ongoing low back pain. The patient recently underwent bilateral L5 transforaminal epidural steroid injection on 11/17/2020. She reports 60% relief of their pain following this procedure for one week. She states the pain returned about a week after the procedure The patient continues to complain of low back pain which is worse on the right. Aggravating factors include waking up in the morning, standing for long periods of time and getting up from a seated position. Alleviating measures include Tylenol and changing positions. The patient is scheduled for lumbar medial branch blocks on 11/17/2020.        Associated signs and symptoms:   Neurogenic bowel or bladder symptoms:  no   Perceived weakness:  no   Difficulty walking:  no Past medical, surgical, social and family history reviewed with the patient. No pertinent relevant history  Past Medical History:   Past Medical History:   Diagnosis Date    Acid reflux     Bilateral carpal tunnel syndrome 12/21/2017    CAD in native artery 06/19/2018    Mid LAD stented with 3.5 x 15 mm Xience BRIAN.  EF 55%    Chicken pox     Chronic back pain     CKD (chronic kidney disease) 4/15/2016    Depression 12/7/2010    Heart disease     Hypertension     Interstitial lung disease ([facility] [address]     Measles     Menopausal symptoms     Morbid obesity with BMI of 45.0-49.9, adult ([facility] [address] 1/29/2015    Osteoarthritis     Overactive bladder     Pure hypercholesterolemia     Stress incontinence       Past Surgical History:     Past Surgical History:   Procedure Laterality Date    BRONCHOSCOPY  6/27/2019    BRONCHOSCOPY ALVEOLAR LAVAGE performed by Marha Flores MD at [address]  6/27/2019    BRONCHOSCOPY DIAGNOSTIC OR CELL [address] ONLY performed by Marah Flores MD at [address]  6/27/2019    BRONCHOSCOPY BIOPSY BRONCHUS performed [facility] MD at [facility] Bilateral 01/29/2018    EPIDURAL STEROID INJECTION N/A 10/15/2019    MIDLINE CAUDAL EPIDURAL STEROID INJECTION AND COCCYGEAL JOINT INJECTION SITES CONFIRMED BY FLUOROSCOPY performed by Benjie Mares MD at [place] [address]  2009,2010    KNEES BILATERAL    PAIN MANAGEMENT PROCEDURE Right 3/3/2020    RIGHT LUMBAR FOUR AND LUMBAR FIVE TRANSFORAMINAL EPIDURAL STEROID INJECTION SITE CONFIRMED BY FLUOROSCOPY performed by Benjie Mares MD at [address] [facility] Right 11/2/2020    RIGHT L4 AND L5 TRANSFORAMINAL EPIDURAL STEROID INJECTION WITH FLUOROSCOPY (25417, 11823) performed by Benjie Mares MD at [facility] DX/THER SBST INTRLMNR CRV/THRC W/IMG GDN Right 8/21/2018    RIGHT LUMBAR FOUR/FIVE, LUMBAR FIVE/ SACRAL ONE FACET INJECTION AND RIGHT SACROILIAC JOINT INJECTION SITES CONFIRMED BY FLUOROSCOPY performed by Micehlle Perdomo MD at [address] [facility] ARTHROSCOPY Right 08/22/2017    ROTATOR CUFF REPAIR    THORACOSCOPY Right 9/4/2019    RIGHT VIDEO ASSISTED THORACOSCOPIC SURGERY, WEDGE RESECTION AND RIGHT UPPER AND LOWER LOBE NODULES WITH BIOPSY, INTERCOSTAL NERVE BLOCK TIMES SEVEN LEVELS performed by Shyam Florian MD at [place] [address]     Current Medications:     Current Outpatient Medications:     traZODone (DESYREL) 50 MG tablet, TAKE 1 TABLET BY MOUTH AT BEDTIME, Disp: 30 tablet, Rfl: 0    probenecid (BENEMID) 500 MG tablet, Take 1 tablet by mouth 2 times daily, Disp: 180 tablet, Rfl: 1    predniSONE (DELTASONE) 20 MG tablet, Take 1 tablet by mouth 2 times daily for 5 days, Disp: 10 tablet, Rfl: 0    zinc gluconate 50 MG tablet, Take 50 mg by mouth daily, Disp: , Rfl:     ASPIRIN LOW DOSE 81 MG chewable tablet, CHEW AND SWALLOW ONE TABLET EVERY DAY, Disp: 90 tablet, Rfl: 3    colchicine (COLCRYS) 0.6 MG tablet, Take 1 tablet by mouth daily Take 1 tablet 3 times daily on day 1tab then twice daily until 48 hours after flare resolved, Disp: 30 tablet, Rfl: 3    allopurinol (ZYLOPRIM) 100 MG tablet, Take 1 tablet by mouth daily, Disp: 90 tablet, Rfl: 1    rosuvastatin (CRESTOR) 10 MG tablet, TAKE 1 TABLET BY MOUTH DAILY, Disp: 90 tablet, Rfl: 1    valsartan (DIOVAN) 160 MG tablet, Take 1 tablet by mouth daily, Disp: 90 tablet, Rfl: 3    furosemide (LASIX) 40 MG tablet, Take 1 tablet by mouth daily, Disp: 90 tablet, Rfl: 3    vitamin D3 (CHOLECALCIFEROL) 25 MCG (1000 UT) TABS tablet, Take 1 tablet by mouth daily, Disp: 90 tablet, Rfl: 1    vitamin C (ASCORBIC ACID) 500 MG tablet, Take 500 mg by mouth daily, Disp: , Rfl:     predniSONE (DELTASONE) 10 MG tablet, Take 1 tablet by mouth daily, Disp: 30 tablet, Rfl: 2    albuterol sulfate  (90 Base) MCG/ACT inhaler, INHALE 2 PUFFS INTO THE LUNGS EVERY 4 HOURS AS NEEDED FOR WHEEZING, Disp: 42.5 g, Rfl: 1    citalopram (CELEXA) 40 MG tablet, TAKE 1 TABLET BY MOUTH EVERY DAY, Disp: 90 tablet, Rfl: 3    famotidine (PEPCID) 40 MG tablet, Take 1 tablet by mouth 2 times daily, Disp: 60 tablet, Rfl: 5    Multiple Vitamins-Minerals (THERAPEUTIC MULTIVITAMIN-MINERALS) tablet, Take 1 tablet by mouth daily, Disp: , Rfl:   Allergies:  Atorvastatin and Protonix [pantoprazole sodium]  Social History:    reports that she quit smoking about 35 years ago. Her smoking use included cigarettes. She has a 5.00 pack-year smoking history. She has never used smokeless tobacco. She reports current alcohol use of about 1.0 standard drinks of alcohol per week. She reports that she does not use drugs.   Family History:   Family History   Problem Relation Age of Onset    High Blood Pressure Mother     Rheum Arthritis Mother     Cancer Father     Hypertension Father     Colon Cancer Father     High Blood Pressure Brother     Stroke Brother     Heart Defect Brother     Hypertension Sister     Rheum Arthritis Sister     Emphysema Sister     Rheum Arthritis Sister     Other Sister         bottom of lungs are getting hard    Hypertension Brother     Rheum Arthritis Brother     Heart Attack Paternal Grandfather     No Known Problems Paternal Grandmother     No Known Problems Maternal Grandmother     No Known Problems Maternal Grandfather     Heart Attack Maternal Aunt     No Known Problems Maternal Aunt     Brain Cancer Maternal Aunt     Other Maternal Aunt         Complications from surgery    Arthritis Maternal Aunt     Arthritis Maternal Aunt     Heart Disease Maternal Aunt     High Blood Pressure Maternal Aunt     Cancer Maternal Uncle     Heart Disease Maternal Uncle     Heart Attack Maternal Uncle     High Blood Pressure Maternal Uncle     Heart Attack Maternal Uncle     High Blood Pressure Maternal Uncle     Parkinsonism bruising. Lumbar alignment is normal.   · Palpation by patient:   No evidence of tenderness at the midline. Lumbar paraspinal tenderness Mild L4/5 and L5/S1 tenderness  Bursal tenderness No tenderness bilaterally  There is no paraspinal spasm. · Range of Motion: painful with facet loading with extension and rotation to the right and left  · Strength:   Strength testing is at least 4/5 in all muscle groups tested based on visual exam  · Special Tests:   Straight leg raise and crossed SLR negative. · Skin: There are no rashes, ulcerations or lesions. · Gait & station: normal, patient ambulates without assistance and no ataxia  · Additional Examinations:  · RIGHT LOWER EXTREMITY: Inspection/examination of the right lower extremity does not show any tenderness, deformity or injury. Range of motion is unremarkable. There is no gross instability. There are no rashes, ulcerations or lesions. Strength and tone are normal. No atrophy or abnormal movements are noted. · LEFT LOWER EXTREMITY:  Inspection/examination of the left lower extremity does not show any tenderness, deformity or injury. Range of motion is unremarkable. There is no gross instability. There are no rashes, ulcerations or lesions. Strength and tone are normal. No atrophy or abnormal movements are noted. Diagnostic Testing:    No new diagnostics  Results for orders placed or performed in visit on 11/04/20   Vitamin D 25 Hydroxy   Result Value Ref Range    Vit D, 25-Hydroxy 43.0 >=30 ng/mL     Impression:       1. Spondylosis without myelopathy or radiculopathy, lumbar region    2. Spondylolisthesis of lumbar region    3. DDD (degenerative disc disease), lumbar    4. Lumbar radiculopathy        Plan:  Clinical Course: Above diagnoses are worsening, diagnosis #4 is improving. I discussed the diagnosis and the treatment options with Segundo Corona today.      In Summary:  The various treatment options were outlined and discussed with Natividad Noble Roshan including:  Conservative care options: physical therapy, ice, medications, bracing, and activity modification. The indications for therapeutic injections. The indications for additional imaging/laboratory studies. The indications for (possible future) interventions. After considering the various options discussed, Ant Alaniz elected to pursue a course of treatment that includes the followin. Medications:  No further recommendations for new medications. 2. PT:  Encouraged to continue with Home exercise program.    3. Further studies: No further studies. 4. Interventional:  We discussed pursuing lumbar medial branch blocks for diagnostic purposes. Based on physical exam findings of increased pain with facet loading and radiologic imaging, we will pursue lumbar medial branch blocks at the bilateral L3, L4, L5 DR levels. Risks, benefits and alternatives were discussed. These include but are not limited to bleeding, infection, increased pain, lack of pain relief and nerve injury. The patient verbalized understanding and would like to proceed. If there is significant pain relief and functional improvement, the patient may be a good candidate for Radiofrequency ablation. 5. Follow up:  1-2 weeks      Enedina SOLIZ Roshan was instructed to call the office if her symptoms worsen or if new symptoms appear prior to the next scheduled visit. She is specifically instructed to contact the office between now & her scheduled appointment if she has concerns related to her condition or if she needs assistance in scheduling the above tests. She is welcome to call for an appointment sooner if she has any additional concerns or questions.        Loretta Adan PA-C  Board Certified by the [employer] on Certification of Physician Assistants  [address] [employer] [facility]  Partner of [facility] ([facility])         This dictation was performed with a verbal recognition program (DRAGON) and it was checked for errors. It is possible that there are still dictated errors within this office note. If so, please bring any errors to my attention for an addendum. All efforts were made to ensure that this office note is accurate. Patient has verbally accepted the following: We confirm that, for purposes of billing, this is a virtual visit with the provider for which we will submit a claim for reimbursement with the insurance company. The patient accepts responsibility for any co-pays, coinsurance amounts or other amounts not covered by the insurance company. Patient location: [facility]  Physician location: home  Time spent: not billed by time  Present on the call: myself and patient    Pursuant to the emergency declaration under the [facility], [address] waiver authority and the [employer] Act, this Virtual  Visit was conducted, with patient's consent, to reduce the patient's risk of exposure to COVID-19 and provide continuity of care for an established patient. Services were provided through a video synchronous discussion virtually to substitute for in-person clinic visit. We have confirmed that, for purposes of billing, this is a virtual visit with for which we will submit a claim for reimbursement with your insurance company. The patient has accepted responsibility for any copays, coinsurance amounts or other amounts not covered by their insurance company. This visit was completed virtually using [email or web address] me.

## 2020-11-13 ENCOUNTER — TELEPHONE (OUTPATIENT)
Dept: FAMILY MEDICINE CLINIC | Age: 60
End: 2020-11-13

## 2020-11-13 NOTE — TELEPHONE ENCOUNTER
----- Message from Neo Rick sent at 11/13/2020  9:27 AM EST -----  Subject: Appointment Request    Reason for Call: Routine Physical Exam    QUESTIONS  Type of Appointment? Established Patient  Reason for appointment request? Available appointments did not meet   patient need  Additional Information for Provider? Patient is having severe pain in her   big toe on the right foot. She is asking for a steroid injection asap. She   has stated that her pain is an 8-9   and cannot wait until 12/08. Screened [place]. ..  ---------------------------------------------------------------------------  --------------  CALL BACK INFO  What is the best way for the office to contact you? OK to leave message on   voicemail  Preferred Call Back Phone Number? [phone]  ---------------------------------------------------------------------------  --------------  SCRIPT ANSWERS  Relationship to Patient? Self  Appointment reason? Well Care/Follow Ups  Select a Well Care/Follow Ups appointment reason? Adult Physical Exam   [Medicare Annual Wellness   AWV   PAP   Pelvic]  (If the patient is Medicare / 65+ ask this question) Are you requesting a   Medicare Annual Wellness Visit? No  (If the patient is female   ask this question) Are you requesting a pap smear with your physical   exam? No  (Is the patient requesting their annual physical and does not need PAP or   AWV per above)? Yes   Have you been diagnosed with   tested for   or told that you are suspected of having COVID-19 (Coronavirus)? No  Have you had a fever or taken medication to treat a fever within the past   3 days? No  Have you had a cough   shortness of breath or flu-like symptoms within the past 3 days? No  Do you currently have flu-like symptoms including fever or chills   cough   shortness of breath   or difficulty breathing   or new loss of taste or smell? No  (Service Expert  click yes below to proceed with [employer] As Usual   Scheduling)?  Yes

## 2020-11-14 NOTE — TELEPHONE ENCOUNTER
I do not do joint injections. She will need to see a rheumatologist to have that done.   She may schedule an appt with me for a referral.

## 2020-11-16 ENCOUNTER — OFFICE VISIT (OUTPATIENT)
Dept: FAMILY MEDICINE CLINIC | Age: 60
End: 2020-11-16
Payer: COMMERCIAL

## 2020-11-16 ENCOUNTER — NURSE TRIAGE (OUTPATIENT)
Dept: OTHER | Facility: CLINIC | Age: 60
End: 2020-11-16

## 2020-11-16 VITALS
BODY MASS INDEX: 44.29 KG/M2 | WEIGHT: 250 LBS | OXYGEN SATURATION: 97 % | DIASTOLIC BLOOD PRESSURE: 76 MMHG | SYSTOLIC BLOOD PRESSURE: 144 MMHG | HEART RATE: 87 BPM | TEMPERATURE: 97.7 F

## 2020-11-16 PROBLEM — M10.9 GOUT: Status: ACTIVE | Noted: 2020-11-16

## 2020-11-16 PROCEDURE — 3017F COLORECTAL CA SCREEN DOC REV: CPT | Performed by: NURSE PRACTITIONER

## 2020-11-16 PROCEDURE — 99213 OFFICE O/P EST LOW 20 MIN: CPT | Performed by: NURSE PRACTITIONER

## 2020-11-16 PROCEDURE — G8484 FLU IMMUNIZE NO ADMIN: HCPCS | Performed by: NURSE PRACTITIONER

## 2020-11-16 PROCEDURE — G8417 CALC BMI ABV UP PARAM F/U: HCPCS | Performed by: NURSE PRACTITIONER

## 2020-11-16 PROCEDURE — 1036F TOBACCO NON-USER: CPT | Performed by: NURSE PRACTITIONER

## 2020-11-16 PROCEDURE — G8427 DOCREV CUR MEDS BY ELIG CLIN: HCPCS | Performed by: NURSE PRACTITIONER

## 2020-11-16 NOTE — PROGRESS NOTES
Subjective:      Patient ID: María Lin is a 63 y.o. female who presents for:   Chief Complaint   Patient presents with    Gout       Worsening gout to rt foot  Uric acid 8.9  Failed tx with allopurinol and Colchicine  Failed Steroid burst  Currently on Probenicid with no relief  Did not make appt with Rheumatology as previously directed  Has not been aspirated for crystal evaluation previously      Review of Systems  Past Medical History:   Diagnosis Date    Acid reflux     Bilateral carpal tunnel syndrome 12/21/2017    CAD in native artery 06/19/2018    Mid LAD stented with 3.5 x 15 mm Xience BRIAN.  EF 55%    Chicken pox     Chronic back pain     CKD (chronic kidney disease) 4/15/2016    Depression 12/7/2010    Heart disease     Hypertension     Interstitial lung disease ([facility] [address]     Measles     Menopausal symptoms     Morbid obesity with BMI of 45.0-49.9, adult ([facility] [address] 1/29/2015    Osteoarthritis     Overactive bladder     Pure hypercholesterolemia     Stress incontinence      Past Surgical History:   Procedure Laterality Date    BRONCHOSCOPY  6/27/2019    BRONCHOSCOPY ALVEOLAR LAVAGE performed by Arben Virgen MD at [facility]  6/27/2019    BRONCHOSCOPY DIAGNOSTIC OR CELL [address] ONLY performed by Arben Virgen MD at [facility]  6/27/2019    BRONCHOSCOPY BIOPSY BRONCHUS performed by Arben Virgen MD at [address] Bilateral 01/29/2018    EPIDURAL STEROID INJECTION N/A 10/15/2019    MIDLINE CAUDAL EPIDURAL STEROID INJECTION AND COCCYGEAL JOINT INJECTION SITES CONFIRMED BY FLUOROSCOPY performed by Aguilar Murray MD at [place] [address]  2009,2010    KNEES BILATERAL    PAIN MANAGEMENT PROCEDURE Right 3/3/2020    RIGHT LUMBAR FOUR AND LUMBAR FIVE TRANSFORAMINAL EPIDURAL STEROID INJECTION SITE CONFIRMED BY FLUOROSCOPY performed by Aguilar Murray MD at [facility] Right 11/2/2020    RIGHT L4 AND L5 TRANSFORAMINAL EPIDURAL STEROID INJECTION WITH FLUOROSCOPY (98791, 59521) performed by Adolfo Boogie MD at [facility] DX/THER SBST INTRLMNR CRV/THRC W/IMG GDN Right 8/21/2018    RIGHT LUMBAR FOUR/FIVE, LUMBAR FIVE/ SACRAL ONE FACET INJECTION AND RIGHT SACROILIAC JOINT INJECTION SITES CONFIRMED BY FLUOROSCOPY performed by Adolfo Boogie MD at [address] [facility] ARTHROSCOPY Right 08/22/2017    ROTATOR CUFF REPAIR    THORACOSCOPY Right 9/4/2019    RIGHT VIDEO ASSISTED THORACOSCOPIC SURGERY, WEDGE RESECTION AND RIGHT UPPER AND LOWER LOBE NODULES WITH BIOPSY, INTERCOSTAL NERVE BLOCK TIMES SEVEN LEVELS performed by Bia Grove MD at [address] [place] [address] History     Social History Narrative    Not on file     Family History   Problem Relation Age of Onset    High Blood Pressure Mother     Rheum Arthritis Mother     Cancer Father     Hypertension Father     Colon Cancer Father     High Blood Pressure Brother     Stroke Brother     Heart Defect Brother     Hypertension Sister     Rheum Arthritis Sister     Emphysema Sister     Rheum Arthritis Sister     Other Sister         bottom of lungs are getting hard    Hypertension Brother     Rheum Arthritis Brother     Heart Attack Paternal Grandfather     No Known Problems Paternal Grandmother     No Known Problems Maternal Grandmother     No Known Problems Maternal Grandfather     Heart Attack Maternal Aunt     No Known Problems Maternal Aunt     Brain Cancer Maternal Aunt     Other Maternal Aunt         Complications from surgery    Arthritis Maternal Aunt     Arthritis Maternal Aunt     Heart Disease Maternal Aunt     High Blood Pressure Maternal Aunt     Cancer Maternal Uncle     Heart Disease Maternal Uncle     Heart Attack Maternal Uncle     High Blood Pressure Maternal Uncle     Heart Attack Maternal Uncle     High Blood Pressure Maternal Uncle  Parkinsonism Maternal Uncle     Heart Attack Paternal Aunt     Breast Cancer Paternal Aunt     Dementia Paternal Aunt     Heart Attack Paternal Uncle     Heart Attack Paternal Uncle     Brain Cancer Maternal Uncle      Social History     Tobacco Use    Smoking status: Former Smoker     Packs/day: 0.50     Years: 10.00     Pack years: 5.00     Types: Cigarettes     Last attempt to quit: 1985     Years since quittin.8    Smokeless tobacco: Never Used   Substance Use Topics    Alcohol use:  Yes     Alcohol/week: 1.0 standard drinks     Types: 1 Shots of liquor per week     Comment: occ     Allergies   Allergen Reactions    Atorvastatin Other (See Comments)     Multiple muscle spasms/cramps over body    Protonix [Pantoprazole Sodium] Diarrhea     Current Outpatient Medications   Medication Sig Dispense Refill    traZODone (DESYREL) 50 MG tablet TAKE 1 TABLET BY MOUTH AT BEDTIME 30 tablet 0    zinc gluconate 50 MG tablet Take 50 mg by mouth daily      ASPIRIN LOW DOSE 81 MG chewable tablet CHEW AND SWALLOW ONE TABLET EVERY DAY 90 tablet 3    allopurinol (ZYLOPRIM) 100 MG tablet Take 1 tablet by mouth daily 90 tablet 1    rosuvastatin (CRESTOR) 10 MG tablet TAKE 1 TABLET BY MOUTH DAILY 90 tablet 1    valsartan (DIOVAN) 160 MG tablet Take 1 tablet by mouth daily 90 tablet 3    vitamin D3 (CHOLECALCIFEROL) 25 MCG (1000 UT) TABS tablet Take 1 tablet by mouth daily 90 tablet 1    citalopram (CELEXA) 40 MG tablet TAKE 1 TABLET BY MOUTH EVERY DAY 90 tablet 3    famotidine (PEPCID) 40 MG tablet Take 1 tablet by mouth 2 times daily 60 tablet 5    probenecid (BENEMID) 500 MG tablet Take 1 tablet by mouth 2 times daily (Patient not taking: Reported on 2020) 180 tablet 1    vitamin C (ASCORBIC ACID) 500 MG tablet Take 500 mg by mouth daily      predniSONE (DELTASONE) 10 MG tablet Take 1 tablet by mouth daily (Patient not taking: Reported on 2020) 30 tablet 2    albuterol sulfate HFA 108 (90 Base) MCG/ACT inhaler INHALE 2 PUFFS INTO THE LUNGS EVERY 4 HOURS AS NEEDED FOR WHEEZING (Patient not taking: Reported on 11/16/2020) 42.5 g 1    Multiple Vitamins-Minerals (THERAPEUTIC MULTIVITAMIN-MINERALS) tablet Take 1 tablet by mouth daily       No current facility-administered medications for this visit. Patient's past medical history, surgical history, family history, medications,  andallergies  were all reviewed and updated as appropriate today. Objective:     Vitals:    11/16/20 1504   BP: (!) 144/76   Pulse: 87   Temp: 97.7 °F (36.5 °C)   SpO2: 97%     Physical Exam  Vitals signs reviewed. Musculoskeletal:         General: Swelling and tenderness present. Right lower leg: Edema present. Comments: Dorsal surface of rt foot red, warm and tender. She reports this is typical of her severe gout flares. Assessment      Encounter Diagnosis   Name Primary?     Acute gout due to other secondary cause involving toe of right foot Yes       PLAN:  Health Maintenance   Topic Date Due    A1C test (Diabetic or Prediabetic)  11/19/2020    Flu vaccine (1) 10/13/2021 (Originally 9/1/2020)    Lipid screen  07/28/2021    Potassium monitoring  11/04/2021    Creatinine monitoring  11/04/2021    Breast cancer screen  06/26/2022    Cervical cancer screen  06/18/2023    Colon cancer screen colonoscopy  06/04/2027    DTaP/Tdap/Td vaccine (4 - Td) 11/19/2029    Shingles Vaccine  Completed    Pneumococcal 0-64 years Vaccine  Completed    Hepatitis A vaccine  Aged Out    Hepatitis B vaccine  Aged Out    Hib vaccine  Aged Out    Meningococcal (ACWY) vaccine  Aged Out    Hepatitis C screen  Discontinued    HIV screen  Discontinued   Gout  To see ortho in morning for injection and aspiration  I explained I do not do intraarticular injections or aspirations  She is to schedule with Dr Mueller Come for follow up care for this uncontrolled gout  Continue vegetarian diet  Low purines    Return in about 4 weeks (around 12/14/2020).     ROMEO Hull - CNP  [place]  11/16/2020

## 2020-11-16 NOTE — ASSESSMENT & PLAN NOTE
To see ortho in morning for injection and aspiration  I explained I do not do intraarticular injections or aspirations  She is to schedule with Dr Eden Mcgrath for follow up care for this uncontrolled gout  Continue vegetarian diet  Low purines

## 2020-11-16 NOTE — TELEPHONE ENCOUNTER
Reason for Disposition   SEVERE pain (e.g., excruciating, unable to do any normal activities)    Answer Assessment - Initial Assessment Questions  1. ONSET: \"When did the pain start? \"       *No Answer*  2. LOCATION: \"Where is the pain located? \"       Foot - sometimes both. 3. PAIN: \"How bad is the pain? \"    (Scale 1-10; or mild, moderate, severe)    -  MILD (1-3): doesn't interfere with normal activities     -  MODERATE (4-7): interferes with normal activities (e.g., work or school) or awakens from sleep, limping     -  SEVERE (8-10): excruciating pain, unable to do any normal activities, unable to walk      Severe, can't walk     4. WORK OR EXERCISE: \"Has there been any recent work or exercise that involved this part of the body? \"       *No Answer*  5. CAUSE: \"What do you think is causing the foot pain? \"      Gout     6. OTHER SYMPTOMS: \"Do you have any other symptoms? \" (e.g., leg pain, rash, fever, numbness)      Can't walk on it, swollen and even covers hurt when touching it. 7. PREGNANCY: \"Is there any chance you are pregnant? \" \"When was your last menstrual period? \"      No    Protocols used: FOOT PAIN-ADULT-[place]    Thank you for allowing me to participate in the care of your patient. The patient was connected to triage in response to information provided to the [facility]. Please do not respond through this encounter as the response is not directed to a shared pool.

## 2020-11-17 ENCOUNTER — HOSPITAL ENCOUNTER (OUTPATIENT)
Age: 60
Setting detail: OUTPATIENT SURGERY
Discharge: HOME | End: 2020-11-17
Attending: PHYSICAL MEDICINE & REHABILITATION | Admitting: PHYSICAL MEDICINE & REHABILITATION
Payer: COMMERCIAL

## 2020-11-17 ENCOUNTER — OFFICE VISIT (OUTPATIENT)
Dept: ORTHOPEDIC SURGERY | Age: 60
End: 2020-11-17
Payer: COMMERCIAL

## 2020-11-17 VITALS
OXYGEN SATURATION: 99 % | HEIGHT: 63 IN | HEART RATE: 74 BPM | TEMPERATURE: 97.3 F | WEIGHT: 254 LBS | SYSTOLIC BLOOD PRESSURE: 182 MMHG | RESPIRATION RATE: 16 BRPM | BODY MASS INDEX: 45 KG/M2 | DIASTOLIC BLOOD PRESSURE: 90 MMHG

## 2020-11-17 VITALS — BODY MASS INDEX: 45 KG/M2 | HEIGHT: 63 IN | WEIGHT: 254 LBS

## 2020-11-17 PROCEDURE — 2500000003 HC RX 250 WO HCPCS: Performed by: PHYSICAL MEDICINE & REHABILITATION

## 2020-11-17 PROCEDURE — L3260 AMBULATORY SURGICAL BOOT EAC: HCPCS | Performed by: ORTHOPAEDIC SURGERY

## 2020-11-17 PROCEDURE — 6360000004 HC RX CONTRAST MEDICATION: Performed by: PHYSICAL MEDICINE & REHABILITATION

## 2020-11-17 PROCEDURE — 3600000002 HC SURGERY LEVEL 2 BASE: Performed by: PHYSICAL MEDICINE & REHABILITATION

## 2020-11-17 PROCEDURE — 3600000012 HC SURGERY LEVEL 2 ADDTL 15MIN: Performed by: PHYSICAL MEDICINE & REHABILITATION

## 2020-11-17 PROCEDURE — 99214 OFFICE O/P EST MOD 30 MIN: CPT | Performed by: ORTHOPAEDIC SURGERY

## 2020-11-17 PROCEDURE — 7100000010 HC PHASE II RECOVERY - FIRST 15 MIN: Performed by: PHYSICAL MEDICINE & REHABILITATION

## 2020-11-17 PROCEDURE — 20600 DRAIN/INJ JOINT/BURSA W/O US: CPT | Performed by: ORTHOPAEDIC SURGERY

## 2020-11-17 PROCEDURE — 2709999900 HC NON-CHARGEABLE SUPPLY: Performed by: PHYSICAL MEDICINE & REHABILITATION

## 2020-11-17 RX ORDER — COLCHICINE 0.6 MG/1
0.6 TABLET ORAL SEE ADMIN INSTRUCTIONS
Qty: 30 TABLET | Refills: 3 | Status: SHIPPED | OUTPATIENT
Start: 2020-11-17 | End: 2021-03-31

## 2020-11-17 RX ORDER — LIDOCAINE HYDROCHLORIDE 10 MG/ML
20 INJECTION, SOLUTION INFILTRATION; PERINEURAL ONCE
Status: COMPLETED | OUTPATIENT
Start: 2020-11-17 | End: 2020-11-17

## 2020-11-17 RX ORDER — BUPIVACAINE HYDROCHLORIDE 5 MG/ML
INJECTION, SOLUTION EPIDURAL; INTRACAUDAL PRN
Status: DISCONTINUED | OUTPATIENT
Start: 2020-11-17 | End: 2020-11-17 | Stop reason: ALTCHOICE

## 2020-11-17 RX ORDER — BUPIVACAINE HYDROCHLORIDE 5 MG/ML
INJECTION, SOLUTION EPIDURAL; INTRACAUDAL
Status: DISCONTINUED
Start: 2020-11-17 | End: 2020-11-17 | Stop reason: HOSPADM

## 2020-11-17 RX ORDER — METHYLPREDNISOLONE ACETATE 40 MG/ML
40 INJECTION, SUSPENSION INTRA-ARTICULAR; INTRALESIONAL; INTRAMUSCULAR; SOFT TISSUE ONCE
Status: COMPLETED | OUTPATIENT
Start: 2020-11-17 | End: 2020-11-17

## 2020-11-17 RX ORDER — LIDOCAINE HYDROCHLORIDE 10 MG/ML
INJECTION, SOLUTION EPIDURAL; INFILTRATION; INTRACAUDAL; PERINEURAL PRN
Status: DISCONTINUED | OUTPATIENT
Start: 2020-11-17 | End: 2020-11-17 | Stop reason: ALTCHOICE

## 2020-11-17 RX ORDER — TRAMADOL HYDROCHLORIDE 50 MG/1
50 TABLET ORAL EVERY 4 HOURS PRN
Qty: 42 TABLET | Refills: 0 | Status: SHIPPED | OUTPATIENT
Start: 2020-11-17 | End: 2020-11-24

## 2020-11-17 RX ADMIN — LIDOCAINE HYDROCHLORIDE 20 ML: 10 INJECTION, SOLUTION INFILTRATION; PERINEURAL at 15:44

## 2020-11-17 RX ADMIN — METHYLPREDNISOLONE ACETATE 40 MG: 40 INJECTION, SUSPENSION INTRA-ARTICULAR; INTRALESIONAL; INTRAMUSCULAR; SOFT TISSUE at 15:44

## 2020-11-17 RX ADMIN — METHYLPREDNISOLONE ACETATE 40 MG: 40 INJECTION, SUSPENSION INTRA-ARTICULAR; INTRALESIONAL; INTRAMUSCULAR; SOFT TISSUE at 15:45

## 2020-11-17 RX ADMIN — LIDOCAINE HYDROCHLORIDE 20 ML: 10 INJECTION, SOLUTION INFILTRATION; PERINEURAL at 15:43

## 2020-11-17 ASSESSMENT — PAIN - FUNCTIONAL ASSESSMENT
PAIN_FUNCTIONAL_ASSESSMENT: 0-10
PAIN_FUNCTIONAL_ASSESSMENT: ACTIVITIES ARE NOT PREVENTED

## 2020-11-17 NOTE — LETTER
[facility]  [address] [address]  Phone: [phone]  Fax: [phone]    ROMEO Mcnair*        November 18, 2020       Patient: Segundo Corona   MR Number: [id number]   YOB: 1960   Date of Visit: 11/17/2020       Dear Dr. Katz Alt: Thank you for the request for consultation for Manuel Arreola. Below are the relevant portions of my assessment and plan of care. If you have questions, please do not hesitate to call me. I look forward to following oYu Mascorro along with you.     Sincerely,        Marianna Green MD    CC providers:  ROMEO Houston - CNP  [address]  [address] [facility] [address]

## 2020-11-17 NOTE — H&P
TRANSFORAMINAL EPIDURAL STEROID INJECTION SITE CONFIRMED BY FLUOROSCOPY performed by Maryjo Lee MD at [facility] [facility] Right 11/2/2020    RIGHT L4 AND L5 TRANSFORAMINAL EPIDURAL STEROID INJECTION WITH FLUOROSCOPY (79466, 99647) performed by Maryjo Lee MD at [facility] DX/THER SBST INTRLMNR CRV/THRC W/IMG GDN Right 8/21/2018    RIGHT LUMBAR FOUR/FIVE, LUMBAR FIVE/ SACRAL ONE FACET INJECTION AND RIGHT SACROILIAC JOINT INJECTION SITES CONFIRMED BY FLUOROSCOPY performed by Maryjo Lee MD at [address] [facility] ARTHROSCOPY Right 08/22/2017    ROTATOR CUFF REPAIR    THORACOSCOPY Right 9/4/2019    RIGHT VIDEO ASSISTED THORACOSCOPIC SURGERY, WEDGE RESECTION AND RIGHT UPPER AND LOWER LOBE NODULES WITH BIOPSY, INTERCOSTAL NERVE BLOCK TIMES SEVEN LEVELS performed by Jesus Pollack MD at [place] [address]     Current Medications:   Prior to Admission medications    Medication Sig Start Date End Date Taking?  Authorizing Provider   traZODone (DESYREL) 50 MG tablet TAKE 1 TABLET BY MOUTH AT BEDTIME 11/10/20  Yes ROMEO Boykin CNP   probenecid (BENEMID) 500 MG tablet Take 1 tablet by mouth 2 times daily 11/10/20  Yes ROMEO Jaramillo CNP   zinc gluconate 50 MG tablet Take 50 mg by mouth daily   Yes Historical Provider, MD   ASPIRIN LOW DOSE 81 MG chewable tablet CHEW AND SWALLOW ONE TABLET EVERY DAY 10/19/20  Yes ROMEO Brennan CNP   allopurinol (ZYLOPRIM) 100 MG tablet Take 1 tablet by mouth daily 10/13/20  Yes ROMEO Boykin CNP   rosuvastatin (CRESTOR) 10 MG tablet TAKE 1 TABLET BY MOUTH DAILY 9/22/20  Yes ROMEO Brennan CNP   valsartan (DIOVAN) 160 MG tablet Take 1 tablet by mouth daily 8/24/20  Yes ROMEO Brennan CNP   vitamin D3 (CHOLECALCIFEROL) 25 MCG (1000 UT) TABS tablet Take 1 tablet by mouth daily 7/29/20  Yes Rondall Romberg, MD   vitamin C (ASCORBIC ACID) 500 MG tablet Take 500 mg by mouth daily   Yes Historical Provider, MD   albuterol sulfate  (90 Base) MCG/ACT inhaler INHALE 2 PUFFS INTO THE LUNGS EVERY 4 HOURS AS NEEDED FOR WHEEZING 4/13/20  Yes Vicky Gandhi MD   citalopram (CELEXA) 40 MG tablet TAKE 1 TABLET BY MOUTH EVERY DAY 10/16/19  Yes Thee Blair, DO   famotidine (PEPCID) 40 MG tablet Take 1 tablet by mouth 2 times daily 5/3/19  Yes Ricardo Ludwig MD   Multiple Vitamins-Minerals (THERAPEUTIC MULTIVITAMIN-MINERALS) tablet Take 1 tablet by mouth daily   Yes Historical Provider, MD     Allergies:  Atorvastatin and Protonix [pantoprazole sodium]  Social History:    reports that she quit smoking about 35 years ago. Her smoking use included cigarettes. She has a 5.00 pack-year smoking history. She has never used smokeless tobacco. She reports current alcohol use of about 1.0 standard drinks of alcohol per week. She reports that she does not use drugs.   Family History:   Family History   Problem Relation Age of Onset    High Blood Pressure Mother     Rheum Arthritis Mother     Cancer Father     Hypertension Father     Colon Cancer Father     High Blood Pressure Brother     Stroke Brother     Heart Defect Brother     Hypertension Sister     Rheum Arthritis Sister     Emphysema Sister     Rheum Arthritis Sister     Other Sister         bottom of lungs are getting hard    Hypertension Brother     Rheum Arthritis Brother     Heart Attack Paternal Grandfather     No Known Problems Paternal Grandmother     No Known Problems Maternal Grandmother     No Known Problems Maternal Grandfather     Heart Attack Maternal Aunt     No Known Problems Maternal Aunt     Brain Cancer Maternal Aunt     Other Maternal Aunt         Complications from surgery    Arthritis Maternal Aunt     Arthritis Maternal Aunt     Heart Disease Maternal Aunt     High Blood Pressure Maternal Aunt     Cancer Maternal Uncle     Heart Disease Maternal Uncle     Heart Attack Maternal Uncle     High Blood Pressure Maternal Uncle     Heart Attack Maternal Uncle     High Blood Pressure Maternal Uncle     Parkinsonism Maternal Uncle     Heart Attack Paternal Aunt     Breast Cancer Paternal Aunt     Dementia Paternal Aunt     Heart Attack Paternal Uncle     Heart Attack Paternal Uncle     Brain Cancer Maternal Uncle        Vitals: Blood pressure (!) 155/118, pulse 71, temperature 97.3 °F (36.3 °C), temperature source Temporal, resp. rate 20, height 5' 3\" (1.6 m), weight 254 lb (115.2 kg), last menstrual period 07/13/2011, SpO2 99 %, not currently breastfeeding. PHYSICAL EXAM:  HENT: Airway patent and reviewed  Cardiovascular: Normal rate, regular rhythm, normal heart sounds. Pulmonary/Chest: No wheezes. No rhonchi. No rales. Abdominal: Soft. Bowel sounds are normal. No distension. Extremities: Moves all extremities equally  Lumbar Spine: Painful range of motion, no midline tenderness       Diagnosis:Lumbar spondylosis without radiculopathy    Plan: Proceed with planned procedure    The patient was counseled at length about the risks of yuli Covid-19 in the echo-operative and post-operative states including the recovery window of their procedure. The patient was made aware that yuli Covid-19 after a surgical procedure may worsen their prognosis for recovering from the virus and lend to a higher morbidity and or mortality risk. The patient was given the options of postponing their procedure. All of the risks, benefits, and alternatives were discussed. The patient does wish to proceed with the procedure. ASA CLASS:         []   I. Normal, healthy adult           [x]   II.  Mild systemic disease            []   III.   Severe systemic disease      Mallampati: Mallampati Class II - (soft palate, fauces & uvula are visible)      Sedation plan:   [x]  Local              []  Minimal                  []  General anesthesia    Patient's condition acceptable for planned procedure/sedation. Post Procedure Plan   Return to same level of care   ______________________     The risks and benefits as well as alternatives to the procedure have been discussed with the patient and or family. The patient and or next of kin understands and agrees to proceed.     Maame Malone M.D.

## 2020-11-17 NOTE — OP NOTE
Patient:  Kasia Gill  YOB: 1960  Medical Record #:  [id number]   Place:   [address] [facility]  Date:  11/17/2020   Physician:  Stevie Jenkins MD, CRESENCIO    Procedure: Lumbar Medial Branch Blocks - Bilateral L2, L3, L4 and L5 Dorsal ramus    CPT (32392 Modifier 48 [id number] Modifier 50 x2)      Pre-Procedure Diagnosis: Lumbar spondylosis without radiculopathy      Post-Procedure Diagnosis: Same      Sedation: Local with 1% Lidocaine 5 ml      EBL: None      Complications: None      Procedure Summary:      The patient was seen in the office for complaints of low back pain. Review of the imaging and physical exam of the patient confirmed the pre-procedure diagnosis. After a thorough discussion of risks, benefits and alternatives informed consent was obtained. The patient was brought to the procedure suite and placed in the prone position. The skin overlying the lumbar spine was prepped with chloraprep and draped in the usual sterile fashion. Using fluoroscopic guidance, the right L3, L4, L5 and sacral ala levels were identified. Through anesthetized skin a 22 gauge 3.5 inch curved tip spinal needle was advanced to the juncture of the superior articular process and the transverse process at the right L3 level where the right L2 medial branch resides. .3 ml of Isovue M 300 was instilled showing a nerve root outline pattern, without evidence of vascular spread. A total of 0.5 ml of 0.5 % Marcaine was instilled at the right L-3 level corresponding to the right L2 medial branch. This was repeated for the right L4, L5 and sacral ala levels corresponding to the right L3, L4 medial branch and L5 Dorsal ramus. The identical procedure was repeated on the left side. The needles were removed and a band-aid applied. The patient was transferred to the post-operative area in stable condition.

## 2020-11-17 NOTE — PROGRESS NOTES
Dr. Elvi Putnam notified of patients BP readings and that patient states she is in pain due to her gout in feet. Patient states she has not taken her BP med since yesterday morning. I instructed her to take her BP med as soon as she gets home, patient verbalized understanding. I instructed pateint to call er internist if BP does not come down for instructions, patient verbalized instructions. Pateint states she has an appointment to see a dr for her feet this afternoon.

## 2020-11-18 ENCOUNTER — OFFICE VISIT (OUTPATIENT)
Dept: RHEUMATOLOGY | Age: 60
End: 2020-11-18
Payer: COMMERCIAL

## 2020-11-18 ENCOUNTER — VIRTUAL VISIT (OUTPATIENT)
Dept: ORTHOPEDIC SURGERY | Age: 60
End: 2020-11-18
Payer: COMMERCIAL

## 2020-11-18 VITALS — BODY MASS INDEX: 45 KG/M2 | WEIGHT: 254 LBS | TEMPERATURE: 97 F | HEIGHT: 63 IN

## 2020-11-18 PROCEDURE — 99214 OFFICE O/P EST MOD 30 MIN: CPT | Performed by: INTERNAL MEDICINE

## 2020-11-18 PROCEDURE — 1036F TOBACCO NON-USER: CPT | Performed by: INTERNAL MEDICINE

## 2020-11-18 PROCEDURE — G8417 CALC BMI ABV UP PARAM F/U: HCPCS | Performed by: INTERNAL MEDICINE

## 2020-11-18 PROCEDURE — 3017F COLORECTAL CA SCREEN DOC REV: CPT | Performed by: PHYSICIAN ASSISTANT

## 2020-11-18 PROCEDURE — G8427 DOCREV CUR MEDS BY ELIG CLIN: HCPCS | Performed by: INTERNAL MEDICINE

## 2020-11-18 PROCEDURE — 99213 OFFICE O/P EST LOW 20 MIN: CPT | Performed by: PHYSICIAN ASSISTANT

## 2020-11-18 PROCEDURE — 3017F COLORECTAL CA SCREEN DOC REV: CPT | Performed by: INTERNAL MEDICINE

## 2020-11-18 PROCEDURE — G8484 FLU IMMUNIZE NO ADMIN: HCPCS | Performed by: INTERNAL MEDICINE

## 2020-11-18 PROCEDURE — G8427 DOCREV CUR MEDS BY ELIG CLIN: HCPCS | Performed by: PHYSICIAN ASSISTANT

## 2020-11-18 RX ORDER — ALLOPURINOL 300 MG/1
300 TABLET ORAL DAILY
Qty: 90 TABLET | Refills: 0 | Status: SHIPPED | OUTPATIENT
Start: 2020-11-18 | End: 2021-02-15

## 2020-11-18 NOTE — LETTER
Please schedule the following with:     Date:   20 @ 7:30    Account: [de-identified]  Patient: Chiquita Berger    : 1960  Address:  [address] [place] [address] [place]    Phone (X):  [phone] (home) [phone] (work)     ----------------------------------------------------------------------------------------------  Diagnosis:     ICD-10-CM    1. Spondylosis without myelopathy or radiculopathy, lumbar region  M47.816    2. Spondylolisthesis of lumbar region  M43.16    3. DDD (degenerative disc disease), lumbar  M51.36      Procedure: Lumbar Medial Branch Block  #2   Levels: L3, L4, L5 DR   Side: Bilateral   CPT Codes [id number], 04458    ----------------------------------------------------------------------------------------------  Injection #[id number]  [email or web address]    Attending Physician       Lai Laurent. Shelda Siemens, MD.  ----------------------------------------------------------------------------------------------  Injection Scheduled For:    At:    [address] [address]    Pre-Cert#    [employer] Insurance     Pre-Cert#    Comments:    COVID TEST Needed: no     · Infection control  ? Tested positive for MRSA in past 12 months:  no  ? Tested positive for MSSA \"staph infection\" in past 12 months: no  ? Tested positive for VRE (Vancomycin Resistant Enterococci) in past 12 months:   no  ? Currently on any antibiotics for an infection: no  · Anticoagulants:  ? On a blood thinner:  ASA/HERBAL  ?  Any history of bleeding disorder: no   · Advanced Liver disease: no   · Advanced Renal disease: no   · Glaucoma: no   · Diabetes: no      Sedation:         No  -----------------------------------------------------------------------------------------------  Allergies   Allergen Reactions    Atorvastatin Other (See Comments)     Multiple muscle spasms/cramps over body    Protonix [Pantoprazole Sodium] Diarrhea

## 2020-11-18 NOTE — PATIENT INSTRUCTIONS
Joint Injection After [address] [facility]  Bita Shearer MD    Refer to this sheet in the next few days. These insructions provide you with information on caring for yourself after you have had a joint injection. Your caregiver also may give you more specific instructions. Your treatment has been planned according to current medical practices, but problems sometimes occur. Call your caregiver if you have any problems or questions after your procedure. After any type of joint injection, it is not uncommon to experience:     A temporary increase in pain which should resolve within 2-3 days   Soreness, swelling, or bruising around the injection site   Mild numbness, tingling, or weakness around the injection site caused by the numbing medicine used before or with the injection    It is also possible to experience the following effects associated with the specific agent after injection:     Corticosteroids (these effects are rare):  o Allergic reaction  o Increased blood sugar levels (if you have diabetes and you notice that your blood sugar levels have increased, notify your caregiver)  o Increased blood pressure levels  o Mood swings   Hyaluronic acid in the use of viscosupplementation  o Temporary heat or redness  o Temporary rash and itching  o Increased fluid accumulation in the injected join    These effects all should resolve within a day or two after your procedure. HOME CARE INSTRUCTIONS     Limit yourself to light activity the day of your procedure. Avoid lifting heavy objections, bending, stooping or twisting   Take prescription or over-the-counter pain medication as directed by your caregiver   You may apply ice to your injection site to reduce pain and swelling the day of your procedure.  Ice may be applied 3-4 times:  o Put ice in a plastic bag  o Place a towel between your skin and the bag  o Leave the ice on for no longer than 15-20 minutes each time    FOLLOW-UP INSTRUCTIONS    Please make sure you keep your follow-up appointment as indicated by your physician. Patient Education        Gout: Care Instructions  Your Care Instructions     Gout is a form of arthritis caused by a buildup of uric acid crystals in a joint. It causes sudden attacks of pain, swelling, redness, and stiffness, usually in one joint, especially the big toe. Gout usually comes on without a cause. But it can be brought on by drinking alcohol (especially beer) or eating seafood and red meat. Taking certain medicines, such as diuretics or aspirin, also can bring on an attack of gout. Taking your medicines as prescribed and following up with your doctor regularly can help you avoid gout attacks in the future. Follow-up care is a key part of your treatment and safety. Be sure to make and go to all appointments, and call your doctor if you are having problems. It's also a good idea to know your test results and keep a list of the medicines you take. How can you care for yourself at home? · If the joint is swollen, put ice or a cold pack on the area for 10 to 20 minutes at a time. Put a thin cloth between the ice and your skin. · Prop up the sore limb on a pillow when you ice it or anytime you sit or lie down during the next 3 days. Try to keep it above the level of your heart. This will help reduce swelling. · Rest sore joints. Avoid activities that put weight or strain on the joints for a few days. Take short rest breaks from your regular activities during the day. · Take your medicines exactly as prescribed. Call your doctor if you think you are having a problem with your medicine. · Take pain medicines exactly as directed. ? If the doctor gave you a prescription medicine for pain, take it as prescribed. ? If you are not taking a prescription pain medicine, ask your doctor if you can take an over-the-counter medicine. · Eat less seafood and red meat.   · Check with your doctor before drinking approved by the FDA. The Colcrys  brand of colchicine is FDA-approved to treat or prevent gout in adults, and to treat a genetic condition called Familial Mediterranean Fever in adults and children who are at least 3years old. The Mitigare brand of colchicine is FDA-approved to prevent gout flares in adults. Generic forms of colchicine have been used to treat or prevent attacks of gout, or to treat symptoms of Behcets syndrome (such as swelling, redness, warmth, and pain). Colchicine is not a cure for gouty arthritis or Behcets syndrome, and it will not prevent these diseases from progressing. Colchicine should not be used as a routine pain medication for other conditions. Colchicine may also be used for purposes not listed in this medication guide. What should I discuss with my healthcare provider before taking colchicine? You should not use colchicine if you are allergic to it. Some medicines can cause unwanted or dangerous effects when used with colchicine, especially if you have liver or kidney disease. Your doctor may need to change your treatment plan if you use any of the following drugs:  · cyclosporine;  · nefazodone;  · tipranavir;  · clarithromycin or telithromycin;  · itraconazole or ketoconazole; or  · HIV or AIDS medicine --atazanavir, darunavir, fosamprenavir, indinavir, lopinavir, nelfinavir, ritonavir, or saquinavir. To make sure colchicine is safe for you, tell your doctor if you have:  · liver disease;  · kidney disease; or  · if you take digoxin, or cholesterol-lowering medications. It is not known whether this medicine will harm an unborn baby. Tell your doctor if you are pregnant or plan to become pregnant. Colchicine can pass into breast milk and may harm a nursing baby. Tell your doctor if you are breast-feeding a baby. How should I take colchicine? Do not purchase colchicine on the Internet or from vendors outside of the [employer]  Using this medication improperly or without the advice of a doctor can result in serious side effects or death. Follow all directions on your prescription label. Do not take this medicine in larger or smaller amounts or for longer than recommended. Colchicine can be taken with or without food. To treat a gout attack, for best results take colchicine at the first sign of the attack. The longer you wait to start taking the medication, the less effective it may be. You may need to take a second lower dose of colchicine 1 hour after the first dose if you still have gout pain. Follow your doctor's instructions. Your dose will depend on the reason you are taking this medicine. Colchicine doses for gout and Mediterranean fever are different. Do not stop using colchicine unless your doctor tells you to, even if you feel fine. Call your doctor if your symptoms do not improve, or if they get worse. If you use this medicine long-term, you may need frequent medical tests. Store at room temperature away from moisture, heat, and light. Keep the bottle tightly closed when not in use. What happens if I miss a dose? Take the missed dose as soon as you remember. Skip the missed dose if it is almost time for your next scheduled dose. Do not take extra medicine to make up the missed dose. What happens if I overdose? Seek emergency medical attention or call the Poison Help line at 1-386.803.3500. An overdose of colchicine can be fatal.  Overdose symptoms may include diarrhea, nausea, vomiting, stomach pain, muscle weakness, little or no urinating, numbness or tingling, weak pulse, slow heart rate, weak or shallow breathing, or fainting. What should I avoid while taking colchicine? Grapefruit and grapefruit juice may interact with colchicine and lead to unwanted side effects. Avoid the use of grapefruit products while taking colchicine. What are the possible side effects of colchicine?   Get emergency medical help if you have signs of an allergic reaction: hives; difficult breathing; swelling of your face, lips, tongue, or throat. Call your doctor at once if you have:  · muscle pain or weakness;  · numbness or tingly feeling in your fingers or toes;  · pale or gray appearance of your lips, tongue, or hands;  · severe or ongoing vomiting or diarrhea;  · fever, chills, body aches, flu symptoms; or  · easy bruising, unusual bleeding, feeling weak or tired. Common side effects may include:  · nausea, vomiting, stomach pain; or  · diarrhea. This is not a complete list of side effects and others may occur. Call your doctor for medical advice about side effects. You may report side effects to FDA at 8-641-FDA-3998. What other drugs will affect colchicine? Many drugs can interact with colchicine, and some drugs should not be used together. This includes prescription and over-the-counter medicines, vitamins, and herbal products. Not all possible interactions are listed in this medication guide. Tell your doctor about all medicines you use, and those you start or stop using during your treatment with colchicine. Give a list of all your medicines to any healthcare provider who treats you. Where can I get more information? Your pharmacist can provide more information about colchicine. Remember, keep this and all other medicines out of the reach of children, never share your medicines with others, and use this medication only for the indication prescribed. Every effort has been made to ensure that the information provided by [address] is accurate, up-to-date, and complete, but no guarantee is made to that effect. Drug information contained herein may be time sensitive. [facility] information has been compiled for use by healthcare practitioners and consumers in the [place] and therefore [facility] does not warrant that uses outside of the [place] are appropriate, unless specifically indicated otherwise.  [facility]'s drug information does not endorse drugs, diagnose patients or recommend therapy. [facility]'s drug information is an informational resource designed to assist licensed healthcare practitioners in caring for their patients and/or to serve consumers viewing this service as a supplement to, and not a substitute for, the expertise, skill, knowledge and judgment of healthcare practitioners. The absence of a warning for a given drug or drug combination in no way should be construed to indicate that the drug or drug combination is safe, effective or appropriate for any given patient. [facility] does not assume any responsibility for any aspect of healthcare administered with the aid of information [facility] provides. The information contained herein is not intended to cover all possible uses, directions, precautions, warnings, drug interactions, allergic reactions, or adverse effects. If you have questions about the drugs you are taking, check with your doctor, nurse or pharmacist.  Copyright 1485-6563 [address] [place] [address] Dr ORNELAS. Version: 7.02. Revision date: 3/24/2016. Care instructions adapted under license by [facility] ([facility]). If you have questions about a medical condition or this instruction, always ask your healthcare professional. [place] [address] any warranty or liability for your use of this information.

## 2020-11-18 NOTE — PROGRESS NOTES
[address] [facility]  Outpatient Progress Note  Khurram Tobar MD    Patient Name: Segundo Corona MRN: [id number]   Age: 63 y.o. YOB: 1960   Sex: female      [address] [employer] Complaint   Patient presents with    Foot Pain     bilateral        HISTORY OF PRESENT ILLNESS   Segundo Corona is a 63 y.o. female presents to the office today for orthopedic foot and ankle consultation at the request of her primary care nurse practitioner, Apryl Perez, for evaluation of complaints of pain and swelling in her bilateral feet. Patient reports a 12-year history of gout. She had an increase in pain and swelling of both feet approximately 4 weeks ago and has been treated already by her primary care physician with oral steroids, allopurinol and colchicine without significant improvement in symptoms. In fact, her pain and swelling became acutely worse approximately 1 week ago. The patient denies trauma. She denies fever or chills. PAST MEDICAL HISTORY      Past Medical History:   Diagnosis Date    Acid reflux     Bilateral carpal tunnel syndrome 12/21/2017    CAD in native artery 06/19/2018    Mid LAD stented with 3.5 x 15 mm Xience BRIAN.  EF 55%    Chicken pox     Chronic back pain     CKD (chronic kidney disease) 4/15/2016    Depression 12/7/2010    Heart disease     Hypertension     Interstitial lung disease ([address]     Measles     Menopausal symptoms     Morbid obesity with BMI of 45.0-49.9, adult ([facility] [address] 1/29/2015    Osteoarthritis     Overactive bladder     Pure hypercholesterolemia     Stress incontinence        PAST SURGICAL HISTORY     Past Surgical History:   Procedure Laterality Date    BRONCHOSCOPY  6/27/2019    BRONCHOSCOPY ALVEOLAR LAVAGE performed by Lenard Enrique MD at [facility]  6/27/2019    BRONCHOSCOPY DIAGNOSTIC OR CELL [address] ONLY performed by [facility] MD at [facility]  6/27/2019    BRONCHOSCOPY MG tablet Take 1 tablet by mouth 2 times daily 180 tablet 1    zinc gluconate 50 MG tablet Take 50 mg by mouth daily      ASPIRIN LOW DOSE 81 MG chewable tablet CHEW AND SWALLOW ONE TABLET EVERY DAY 90 tablet 3    allopurinol (ZYLOPRIM) 100 MG tablet Take 1 tablet by mouth daily 90 tablet 1    rosuvastatin (CRESTOR) 10 MG tablet TAKE 1 TABLET BY MOUTH DAILY 90 tablet 1    valsartan (DIOVAN) 160 MG tablet Take 1 tablet by mouth daily 90 tablet 3    vitamin D3 (CHOLECALCIFEROL) 25 MCG (1000 UT) TABS tablet Take 1 tablet by mouth daily 90 tablet 1    vitamin C (ASCORBIC ACID) 500 MG tablet Take 500 mg by mouth daily      albuterol sulfate  (90 Base) MCG/ACT inhaler INHALE 2 PUFFS INTO THE LUNGS EVERY 4 HOURS AS NEEDED FOR WHEEZING 42.5 g 1    citalopram (CELEXA) 40 MG tablet TAKE 1 TABLET BY MOUTH EVERY DAY 90 tablet 3    famotidine (PEPCID) 40 MG tablet Take 1 tablet by mouth 2 times daily 60 tablet 5    Multiple Vitamins-Minerals (THERAPEUTIC MULTIVITAMIN-MINERALS) tablet Take 1 tablet by mouth daily       No current facility-administered medications for this visit.         ALLERGIES     Allergies   Allergen Reactions    Atorvastatin Other (See Comments)     Multiple muscle spasms/cramps over body    Protonix [Pantoprazole Sodium] Diarrhea       FAMILY HISTORY     Family History   Problem Relation Age of Onset    High Blood Pressure Mother     Rheum Arthritis Mother     Cancer Father     Hypertension Father     Colon Cancer Father     High Blood Pressure Brother     Stroke Brother     Heart Defect Brother     Hypertension Sister     Rheum Arthritis Sister     Emphysema Sister     Rheum Arthritis Sister     Other Sister         bottom of lungs are getting hard    Hypertension Brother     Rheum Arthritis Brother     Heart Attack Paternal Grandfather     No Known Problems Paternal Grandmother     No Known Problems Maternal Grandmother     No Known Problems Maternal Grandfather     Heart Attack Maternal Aunt     No Known Problems Maternal Aunt     Brain Cancer Maternal Aunt     Other Maternal Aunt         Complications from surgery    Arthritis Maternal Aunt     Arthritis Maternal Aunt     Heart Disease Maternal Aunt     High Blood Pressure Maternal Aunt     Cancer Maternal Uncle     Heart Disease Maternal Uncle     Heart Attack Maternal Uncle     High Blood Pressure Maternal Uncle     Heart Attack Maternal Uncle     High Blood Pressure Maternal Uncle     Parkinsonism Maternal Uncle     Heart Attack Paternal Aunt     Breast Cancer Paternal Aunt     Dementia Paternal Aunt     Heart Attack Paternal Uncle     Heart Attack Paternal Uncle     Brain Cancer Maternal Uncle        SOCIAL HISTORY     Social History     Socioeconomic History    Marital status:      Spouse name: None    Number of children: None    Years of education: None    Highest education level: None   Occupational History    None   Social Needs    Financial resource strain: None    Food insecurity     Worry: None     Inability: None    Transportation needs     Medical: None     Non-medical: None   Tobacco Use    Smoking status: Former Smoker     Packs/day: 0.50     Years: 10.00     Pack years: 5.00     Types: Cigarettes     Last attempt to quit: 1985     Years since quittin.9    Smokeless tobacco: Never Used   Substance and Sexual Activity    Alcohol use:  Yes     Alcohol/week: 1.0 standard drinks     Types: 1 Shots of liquor per week     Comment: occ    Drug use: No    Sexual activity: Yes     Partners: Male   Lifestyle    Physical activity     Days per week: None     Minutes per session: None    Stress: None   Relationships    Social connections     Talks on phone: None     Gets together: None     Attends Cheondoism service: None     Active member of club or organization: None     Attends meetings of clubs or organizations: None     Relationship status: None    Intimate partner violence     Fear of current or ex partner: None     Emotionally abused: None     Physically abused: None     Forced sexual activity: None   Other Topics Concern    None   Social History Narrative    None       REVIEW OF SYSTEMS   General: no fever, chills, night sweats, anorexia, malaise, fatigue, or weight change  Hematologic:  no unexplained bleeding or bruising  HEENT:   no nasal congestion, rhinorrhea, sore throat, or facial pain  Respiratory:  no cough, dyspnea, or chest pain  Cardiovascular:  no angina, TAVERAS, PND, orthopnea, dependent edema, or palpitations  Gastrointestinal:  no nausea, vomiting, diarrhea, constipation, or abdominal pain  Genitourinary:  no urinary urgency, frequency, dysuria, or hematuria  Musculoskeletal: see HPI  Endocrine:  no heat or cold intolerance and no polyphagia, polydipsia, or polyuria  Skin:  no skin eruptions or changing lesions  Neurologic:  no focal weakness, numbness/tingling, tremor, or severe headache. See HPI. See HPI for pertinent positives. PHYSICAL EXAM   Vital Signs:   Vitals:    11/17/20 1436   Weight: 254 lb (115.2 kg)   Height: 5' 3\" (1.6 m)       General appearance: healthy, alert, no distress  Skin: Skin color, texture, turgor normal. No rashes or lesions  HEENT: atraumatic, normocephalic. PERRL  Respiratory: Unlabored breathing  Lymphatic: No adenopathy   Neuro: Alert and oriented, normal distal sensation, normal bilateral DTRs  Vascular: Normal distal capillary and distal pulses  Muskuloskeletal Exam:   Bilateral feet today demonstrate diffuse nonspecific 3+ pitting edema. In addition there is erythema warmth and exquisite tenderness to palpation about the great toe metatarsal phalangeal joints. There is erythema warmth and tenderness in the soft tissues of the dorsal forefoot right greater than left. No other skin lesions or callosities. No lesser toe deformities.   Weightbearing alignment of the feet is normal.  Gait is severely antalgic bilaterally. Lab Results   Component Value Date    LABURIC 8.9 (H) 10/13/2020         RADIOLOGY   No radiographs taken today    IMPRESSION     1. Acute idiopathic gout involving great toe, bilateral    2. Acute foot pain, right    3. Acute foot pain, left         PLAN   I had a lengthy discussion with patient today regarding diagnosis and treatment options and recommendations. The patient has failed oral therapy for treatment of the acute gouty episode. I have recommended we proceed with corticosteroid injection of the great toe metatarsal phalangeal joints followed by oral colchicine therapy. I will also prescribe tramadol for pain and recommend accommodative shoe wear, rest, ice, elevation. The patient was also counseled on dietary and lifestyle modifications for treatment of gout. PROCEDURE     MTP Joint: The patient consented to the procedure and a time out was performed before proceeding. , and the right MTP joint was identified. The skin was prepped in a sterile fashion. A 25 gauge needle was advanced into the MTP joint. A mixture of 2 mL of 1% Lidocaine and 1 mL of 40mg/1 mL Depo-Medrol was injected, with the injection flowing freely. The patient tolerated the procedure well. Post injection expectations were reviewed with the patient    PROCEDURE     MTP Joint: The patient consented to the procedure and a time out was performed before proceeding. , and the left MTP joint was identified. The skin was prepped in a sterile fashion. A 25 gauge needle was advanced into the MTP joint. A mixture of 2 mL of 1% Lidocaine and 1 mL of 40mg/1 mL Depo-Medrol was injected, with the injection flowing freely. The patient tolerated the procedure well. Post injection expectations were reviewed with the patient    No follow-ups on file. Orders Placed This Encounter   Procedures    20600 - MD DRAIN/INJECT SMALL JOINT/BURSA    DJO Post-Op Shoe     Patient was prescribed a DJO Post-Op Shoe.   The bilateral foot will require stabilization / immobilization from this semi-rigid / rigid orthosis to improve their function. The orthosis will assist in protecting the affected area, provide functional support and facilitate healing. Patient was instructed to progress ambulation weight bearing as tolerated in the device. The patient was educated and fit by a healthcare professional with expert knowledge and specialization in brace application. Verbal and written instructions for the use of and application of this item were provided. They were instructed to contact the office immediately should the brace result in increased pain, decreased sensation, increased swelling or worsening of the condition.         Patient was instructed on appropriate use of braces, participation in home exercise programs, healthy lifestyle choices and weight loss as appropriate     Rebecca Sandoval MD

## 2020-11-18 NOTE — PROGRESS NOTES
[address], [place]                                                           [address], [address] [place] [address]                                                              (A) [phone] (F)      Dear Dr. Emilee Oquendo, APRN - CNP:  Please find Rheumatology assessment. Thank you for giving me the opportunity to be involved in Earlene Islas's care and I look forward following Earlene along with you. If you have any questions or concerns please feel free to reach me. Note is transcribed using voice recognition software. Inadvertent computerized transcription errors may be present. Patient identification: Irlanda Islas,: [id number],35 y.o. Sex: female     Earlene was seen today for follow-up. Diagnoses and all orders for this visit:    Polyarticular gout  -     Comprehensive Metabolic Panel; Future  -     CBC Auto Differential; Future  -     Uric Acid; Future    Other orders  -     allopurinol (ZYLOPRIM) 300 MG tablet; Take 1 tablet by mouth daily      Acute visit for polyarticular gout predominantly affecting big toes and ankles. Multiple flares in last 3 months >6 flares. She was taking low-dose prednisone 10 mg a day prior to that which was perhaps suppressing gout flares. Started allopurinol 100 mg a day a month ago, also probenecid. Is not on colchicine. Other medical problems-low titer SAMMY, ILD likely hypersensitive. Followed by Dr Elliot Hager-   The nature of gout is fully explained, including dietary relationship, acute and interval phase and treatment of both. Long term complications such as kidney stones, tophi and arthritis are discussed. Avoidance of alcohol recommended, and written literature is given along with a low purine diet.  Indications for the use of allopurinol for prophylaxis and the use of colchicine to prevent or treat flare-ups is also discussed. Proper use of indomethacin for acute attacks discussed, and its side effects. Call if further attacks occur, or this one does not resolve promptly. Target serum uric acid is <5 mg percent in the setting of CKD. Last uric acid is 8.9 mg%  Start  colchicine 1 tablet daily, increase allopurinol 200 mg daily for next 2 weeks, then 300 mg a day. Discontinue probenecid. She just had bilateral MTP steroid injections yesterday hopefully this will help with the current flares. If she  notices any flares, she was advised to call us. Follow-up in 6 weeks. Labs prior to her next appointment. Patient indicates understanding and agrees with the management plan. I reviewed patient's history, referral documents and electronic medical records. #######################################################################  [id number] for polyarticular gout. Other medical comorbidities coronary artery disease, hypertension, hyperlipidemia and interstitial lung disease. Guanakito Urbina has been having multiple gouty flares in last 3 to 4 months-predominantly affecting her big toes, ankles-tells me that had this episode back-to-back with rather abrupt onset of excruciating pain, swelling, redness and and affected joint is unusable during flares. She has been utilizing multiple courses of prednisone taper for these flares. She started probenecid and allopurinol a month ago, placed even worse. Is not on prophylactic colchicine. ILD remained stable off prednisone now. No other complaints or concerns other than bilateral big toe and distal feet pain and swelling. Rest of review of systems are negative. Past Medical History:   Diagnosis Date    Acid reflux     Bilateral carpal tunnel syndrome 12/21/2017    CAD in native artery 06/19/2018    Mid LAD stented with 3.5 x 15 mm Xience BRIAN.  EF 55% SHOULDER ARTHROSCOPY Right 2017    ROTATOR CUFF REPAIR    THORACOSCOPY Right 2019    RIGHT VIDEO ASSISTED THORACOSCOPIC SURGERY, WEDGE RESECTION AND RIGHT UPPER AND LOWER LOBE NODULES WITH BIOPSY, INTERCOSTAL NERVE BLOCK TIMES SEVEN LEVELS performed by Nisha Landis MD at [address] [place] [address] History     Socioeconomic History    Marital status:      Spouse name: Not on file    Number of children: Not on file    Years of education: Not on file    Highest education level: Not on file   Occupational History    Not on file   Social Needs    Financial resource strain: Not on file    Food insecurity     Worry: Not on file     Inability: Not on file    Transportation needs     Medical: Not on file     Non-medical: Not on file   Tobacco Use    Smoking status: Former Smoker     Packs/day: 0.50     Years: 10.00     Pack years: 5.00     Types: Cigarettes     Last attempt to quit: 1985     Years since quittin.9    Smokeless tobacco: Never Used   Substance and Sexual Activity    Alcohol use:  Yes     Alcohol/week: 1.0 standard drinks     Types: 1 Shots of liquor per week     Comment: occ    Drug use: No    Sexual activity: Yes     Partners: Male   Lifestyle    Physical activity     Days per week: Not on file     Minutes per session: Not on file    Stress: Not on file   Relationships    Social connections     Talks on phone: Not on file     Gets together: Not on file     Attends Rastafarian service: Not on file     Active member of club or organization: Not on file     Attends meetings of clubs or organizations: Not on file     Relationship status: Not on file    Intimate partner violence     Fear of current or ex partner: Not on file     Emotionally abused: Not on file     Physically abused: Not on file     Forced sexual activity: Not on file   Other Topics Concern    Not on file   Social History Narrative    Not on file       No family history 254 lb (115.2 kg)   LMP 07/13/2011   BMI 44.99 kg/m²   General appearance/ Psychiatric: well nourished, and well groomed, normal judgement, alert, appears stated age and cooperative. MKS:  Both big toes are red, tender,swollen, left worse than right, also distal one third of the dorsum of foot is swollen, maximum tenderness hovers around big toe area and right second and third toe web. Left ankle is also tender in joint line, no associated swelling. Rest of the musculoskeletal examination does not reveal any tender, swollen inflamed joints. Normal gait and muscle strength in upper and lower extremities. Skin: No rashes, tophaceous deposits or induration. DATA:   Lab Results   Component Value Date    WBC 8.2 08/13/2020    HGB 13.3 08/13/2020    HCT 39.8 08/13/2020    MCV 88.7 08/13/2020     08/13/2020         Chemistry        Component Value Date/Time     11/04/2020 1349    K 4.3 11/04/2020 1349    K 4.2 03/30/2020 0649     11/04/2020 1349    CO2 25 11/04/2020 1349    BUN 34 (H) 11/04/2020 1349    CREATININE 1.5 (H) 11/04/2020 1349        Component Value Date/Time    CALCIUM 9.1 11/04/2020 1349    ALKPHOS 67 08/13/2020 0807    AST 24 08/13/2020 0807    ALT 17 08/13/2020 0807    BILITOT 0.4 08/13/2020 0807          Lab Results   Component Value Date    LABURIC 8.9 (H) 10/13/2020       Lab Results   Component Value Date    CRP 27.2 (H) 03/30/2020     Lab Results   Component Value Date    SAMMY POSITIVE (A) 09/18/2019    SEDRATE 37 (H) 03/30/2020     Lab Results   Component Value Date    CKTOTAL 53 11/19/2019     Lab Results   Component Value Date    TSH 1.86 04/25/2018     Lab Results   Component Value Date    VITD25 43.0 11/04/2020         Radiology Review:    CT chest June 13, 2019  FINDINGS: The mediastinum appears normal without signs of any lymph node enlargement.  The thyroid gland extends down to the level of the sternum bilaterally.       The aorta is of normal caliber as well as the pulmonary arteries.       Subpleural areas of pulmonary linear changes, likely pulmonary fibrosis or interstitial lung disease are appreciated with 1 to 2 mm pulmonary nodule in the left upper lobe on image 42 series 9, well circumscribed. Additional subpleural linear changes    noted right middle lobe right left lower lobe and lingula near the lung base.       Poorly defined nodules noted just above the right hemidiaphragm the anterior aspect right lower lobe measuring 5 x 4 mm       There are some calcified subcarinal lymph nodes appreciated.       Calcification the left anterior descending coronary artery is appreciated without cardiac enlargement.       No pleural or pericardial effusion is noted.             PFT 6/2018- WNL  Esophageal swallow normal.    Surgical pathology -open lung biopsy -September 4, 2019  ADDENDUM:  ... External consultation has been completed on this case and  the slides have been reviewed by Dr. Nayely Fernandez MD,  PhD at [address] [place] [address].  Dr. Glean Ahumada consultative diagnosis is as follows:  \" Lung, right upper and lower lobes, VATS wedge biopsies (A and B):     - Patchy cellular chronic interstitial pneumonia associated with       ill-formed nonnecrotizing granulomas.     - Arterial vasculopathy.     - No fungal or acid fast microorganisms identified by special stains.     - See case comment \". .. Rawleigh Billing Rawleigh Billing Rawleigh Billing          In a comment, Dr. Glean Ahumada states that: \" The main       findings are patchy bronchiolocentric and subpleural chronic       interstitial pneumonia associated with ill-defined, nonnecrotizing       granulomas and a moderate to severe arterial vasculopathy.  A       primary etiologic consideration for this combination of       histopathologic findings is collagen vascular disease.  Infection       and chronic hypersensitivity pneumonitis are also differential       diagnosis.  Morphologic features diagnostic of usual interstitial       pneumonia seen in idiopathic pulmonary fibrosis are not present. AFB and GMS stain shows no evidence of fungal forms CD3 stains and CD20  stains are positive in the inflammatory component with no atypical T or B  cell infiltrates identified.  CD3 and CD20 stains were performed on  selected blocks from both parts A and B.  CD68 stain material is positive  in a macro 5 population and a nonspecific staining pattern.  Trichrome  stains performed on parts A and B and collagen stain performed on part A  show no evidence of increased interstitial fibrosis are collagen  fibrosis.  Please see outside consultation report for complete detailed  report and comments . ............................ Ban Shaver M.D.  (Electronic Signature)  09/12/2019    A/P- See above.

## 2020-11-18 NOTE — PROGRESS NOTES
Follow up: SPINE (Telemedicine Visit: Audio and visual connection present)    Emerson Drew  1960  [id number]         Chief Complaint   Patient presents with    Lower Back Pain     F/u MBB Bilateral L2, L3, L4, L5  11/17  (#1)         HISTORY OF PRESENT ILLNESS:  Ms. Paloma Church is a 63 y.o. female returns for a follow up visit for multiple medical problems. Her current presenting problems are   1. Spondylosis without myelopathy or radiculopathy, lumbar region    2. Spondylolisthesis of lumbar region    3. DDD (degenerative disc disease), lumbar    . As per information/history obtained from the PADT(patient assessment and documentation tool) - She complains of pain in the lower back with radiation to the lower back She rates the pain 0/10 and describes it as N/A. Pain is made worse by: nothing. She denies side effects from the current pain regimen. Patient reports that since the last follow up visit the physical functioning is better, family/social relationships are better, mood is better and sleep patterns are better, and that the overall functioning is better. Patient denies neurological bowel or bladder. The patient presents today through a virtual visit platform with Doxy. me for a follow-up of bilateral L3, L4, L5 DR VALLEJO #1 on 11/17/20. The patient describes that she had 100% relief following the procedure and continues to have 100% relief today the day after the procedure. The patient describes that her normal pain is more of a dull aching pain along the right greater than left lower back. The patient describes that a huge step for her was being able to do laundry without any back pain which she was able to do following this procedure. Associated signs and symptoms:   Neurogenic bowel or bladder symptoms:  no   Perceived weakness:  no   Difficulty walking:  no            Past medical, surgical, social and family history reviewed with the patient.      Past Medical History:   Past Medical History:   Diagnosis Date    Acid reflux     Bilateral carpal tunnel syndrome 12/21/2017    CAD in native artery 06/19/2018    Mid LAD stented with 3.5 x 15 mm Xience BRIAN.  EF 55%    Chicken pox     Chronic back pain     CKD (chronic kidney disease) 4/15/2016    Depression 12/7/2010    Heart disease     Hypertension     Interstitial lung disease ([facility] [address]     Measles     Menopausal symptoms     Morbid obesity with BMI of 45.0-49.9, adult ([facility] [address] 1/29/2015    Osteoarthritis     Overactive bladder     Pure hypercholesterolemia     Stress incontinence       Past Surgical History:     Past Surgical History:   Procedure Laterality Date    BRONCHOSCOPY  6/27/2019    BRONCHOSCOPY ALVEOLAR LAVAGE performed by Fantasma Forman MD at [address]  6/27/2019    BRONCHOSCOPY DIAGNOSTIC OR CELL [address] ONLY performed by Fantasma Forman MD at [address]  6/27/2019    BRONCHOSCOPY BIOPSY BRONCHUS performed by Fantasma Forman MD at [address] [facility] Bilateral 01/29/2018    EPIDURAL STEROID INJECTION N/A 10/15/2019    MIDLINE CAUDAL EPIDURAL STEROID INJECTION AND COCCYGEAL JOINT INJECTION SITES CONFIRMED BY FLUOROSCOPY performed by Michelle Perdomo MD at [place] [address]  2009,2010    KNEES BILATERAL    PAIN MANAGEMENT PROCEDURE Right 3/3/2020    RIGHT LUMBAR FOUR AND LUMBAR FIVE TRANSFORAMINAL EPIDURAL STEROID INJECTION SITE CONFIRMED BY FLUOROSCOPY performed by Michelle Perdomo MD at [address] [facility] Right 11/2/2020    RIGHT L4 AND L5 TRANSFORAMINAL EPIDURAL STEROID INJECTION WITH FLUOROSCOPY (34274, [phone]) performed by Michelle Perdomo MD at [facility] DX/THER SBST INTRLMNR CRV/THRC W/IMG GDN Right 8/21/2018    RIGHT LUMBAR FOUR/FIVE, LUMBAR FIVE/ SACRAL ONE FACET INJECTION AND RIGHT SACROILIAC JOINT INJECTION SITES CONFIRMED BY FLUOROSCOPY performed by Michelle Perdomo MD at [address] [place] [address] ARTHROSCOPY Right 08/22/2017    ROTATOR CUFF REPAIR    THORACOSCOPY Right 9/4/2019    RIGHT VIDEO ASSISTED THORACOSCOPIC SURGERY, WEDGE RESECTION AND RIGHT UPPER AND LOWER LOBE NODULES WITH BIOPSY, INTERCOSTAL NERVE BLOCK TIMES SEVEN LEVELS performed by [facility] Leonardo Trevino MD at [place] [address]     Current Medications:     Current Outpatient Medications:     traMADol (ULTRAM) 50 MG tablet, Take 1 tablet by mouth every 4 hours as needed for Pain for up to 7 days. Intended supply: 7 days. Take lowest dose possible to manage pain, Disp: 42 tablet, Rfl: 0    colchicine (COLCRYS) 0.6 MG tablet, Take 1 tablet by mouth See Admin Instructions Take 2 (1.6mg) tabs immediately, then take one (0.6mg) tab one hour later. Tomorrow begin 1 tab (0.6mg) twice daily. , Disp: 30 tablet, Rfl: 3    traZODone (DESYREL) 50 MG tablet, TAKE 1 TABLET BY MOUTH AT BEDTIME, Disp: 30 tablet, Rfl: 0    probenecid (BENEMID) 500 MG tablet, Take 1 tablet by mouth 2 times daily, Disp: 180 tablet, Rfl: 1    zinc gluconate 50 MG tablet, Take 50 mg by mouth daily, Disp: , Rfl:     ASPIRIN LOW DOSE 81 MG chewable tablet, CHEW AND SWALLOW ONE TABLET EVERY DAY, Disp: 90 tablet, Rfl: 3    allopurinol (ZYLOPRIM) 100 MG tablet, Take 1 tablet by mouth daily, Disp: 90 tablet, Rfl: 1    rosuvastatin (CRESTOR) 10 MG tablet, TAKE 1 TABLET BY MOUTH DAILY, Disp: 90 tablet, Rfl: 1    valsartan (DIOVAN) 160 MG tablet, Take 1 tablet by mouth daily, Disp: 90 tablet, Rfl: 3    vitamin D3 (CHOLECALCIFEROL) 25 MCG (1000 UT) TABS tablet, Take 1 tablet by mouth daily, Disp: 90 tablet, Rfl: 1    vitamin C (ASCORBIC ACID) 500 MG tablet, Take 500 mg by mouth daily, Disp: , Rfl:     albuterol sulfate  (90 Base) MCG/ACT inhaler, INHALE 2 PUFFS INTO THE LUNGS EVERY 4 HOURS AS NEEDED FOR WHEEZING, Disp: 42.5 g, Rfl: 1    citalopram (CELEXA) 40 MG tablet, TAKE 1 TABLET BY MOUTH EVERY DAY, Disp: 90 tablet, Rfl: 3    famotidine Maternal Uncle        REVIEW OF SYSTEMS:   CONSTITUTIONAL: Denies unexplained weight loss, fevers, chills or fatigue  NEUROLOGICAL: Denies unsteady gait or progressive weakness  MUSCULOSKELETAL: Denies joint swelling or redness  GI: Denies nausea, vomiting, diarrhea   : Denies bowel or bladder issues       PHYSICAL EXAM:    Vitals:   Patient-Reported Vitals 11/18/2020   Patient-Reported Weight 254lbs   Patient-Reported Height 5'3   Patient-Reported Pulse 68   Patient-Reported Temperature 98.1      GENERAL EXAM:  · General Apparence: Patient is adequately groomed with no evidence of malnutrition. · Psychiatric: Orientation: The patient is oriented to time, place and person. The patient's mood and affect are appropriate   · Vascular: Examination reveals no swelling and palpation reveals no tenderness in upper or lower extremities. Good capillary refill. · Sensation is intact without deficit in the upper and lower extremities to light touch. · Coordination of the upper and lower extremities are normal.  · RIGHT UPPER EXTREMITY:  Inspection/examination of the right upper extremity does not show any tenderness, deformity or injury. Range of motion is unremarkable and pain-free. There is no gross instability. There are no rashes, ulcerations or lesions. Strength and tone are normal. No atrophy or abnormal movements are noted. · LEFT UPPER EXTREMITY: Inspection/examination of the left upper extremity does not show any tenderness, deformity or injury. Range of motion is unremarkable and pain-free. There is no gross instability. There are no rashes, ulcerations or lesions. Strength and tone are normal. No atrophy or abnormal movements are noted. LUMBAR/SACRAL EXAMINATION:  · Inspection: Local inspection shows no step-off or bruising. Lumbar alignment is normal. No instability is noted. · Palpation:   No evidence of tenderness at the midline.   Lumbar paraspinal tenderness: Mild L4/5 and L5/S1 tenderness  Bursal tenderness No tenderness bilaterally  There is no paraspinal spasm. · Range of Motion: pain-free ROM  · Strength:   Strength testing is 5/5 in all muscle groups tested. · Special Tests:   Straight leg raise and crossed SLR negative. · Skin: There are no rashes, ulcerations or lesions. · Gait & station: ambulates with a walker and no ataxia  · Additional Examinations:  · RIGHT LOWER EXTREMITY: Inspection/examination of the right lower extremity does not show any tenderness, deformity or injury. Range of motion is normal and pain-free. There is no gross instability. There are no rashes, ulcerations or lesions. Strength and tone are normal. No atrophy or abnormal movements are noted. · LEFT LOWER EXTREMITY:  Inspection/examination of the left lower extremity does not show any tenderness, deformity or injury. Range of motion is normal and pain-free. There is no gross instability. There are no rashes, ulcerations or lesions. Strength and tone are normal. No atrophy or abnormal movements are noted. Diagnostic Testing:    MR Lumbar spine shows  from 1/21/20:  Multiplanar imaging acquired. Comparison 5/18/2018         Conus medullaris remains normal size and configuration, terminating at the superior L1 level. Vertebral height is preserved. No suspicious osseous lesion.         L1-L2-normal with foraminal patency         L2-L3 and L3-L4-mild disc desiccation without disc thinning, stable. No compressive disc pathology. Foramina patent despite facet arthropathy at L3-L4.         L4-L5-grade 1 degenerative anterolisthesis measures 4 mm, without significant change. Annular pseudobulge, in combination with marked facet arthropathy results in moderate central spinal canal stenosis, unchanged. Bilateral lateral recess stenosis    impinges mildly on the proximal L4 nerve roots.  Both foramina mildly compromised         L5-S1-normal. Facets are arthropathic with foraminal patency              Impression         Stable grade 1 degenerative anterolisthesis L4 over L5 resulting in moderate central spinal canal stenosis. Bilateral lateral recess stenosis is redemonstrated with mild impingement of the proximal L4 nerve roots. .      Results for orders placed or performed in visit on 20   Vitamin D 25 Hydroxy   Result Value Ref Range    Vit D, 25-Hydroxy 43.0 >=30 ng/mL     Impression:       1. Spondylosis without myelopathy or radiculopathy, lumbar region    2. Spondylolisthesis of lumbar region    3. DDD (degenerative disc disease), lumbar        Plan:  Clinical Course: Above diagnoses are improving     I discussed the diagnosis and the treatment options with Lisa Javier today. In Summary:  The various treatment options were outlined and discussed with Enedina Islas including:  Conservative care options: physical therapy, ice, medications, bracing, and activity modification. The indications for therapeutic injections. The indications for additional imaging/laboratory studies. The indications for (possible future) interventions. After considering the various options discussed, Lisa [place] elected to pursue a course of treatment that includes the followin. Medications:  No further recommendations for new medications. 2. PT:  Encouraged to continue with Home exercise program.    3. Further studies: No further studies. 4. Interventional:  We discussed pursuing lumbar medial branch blocks for diagnostic purposes. Based on physical exam findings of increased pain with facet loading and radiologic imaging, we will pursue lumbar medial branch blocks at the Bilateral L3, L4, L5 DR levels. Risks, benefits and alternatives were discussed. These include but are not limited to bleeding, infection, increased pain, lack of pain relief and nerve injury. The patient verbalized understanding and would like to proceed.  If there is significant pain relief and functional improvement, the patient may be a good candidate for Radiofrequency ablation. MBB #2. As such, I have confirmed the patient has met the general requirements including failure of conservative management including prescription strength analgesics, adjunctive medications were utilized , therapeutic exercise program, and no underlying addiction or behavioral disorders were identified to the ability of the provider. Symptoms are impacting their ADLs or iADLs such as walking and significant pain was noted in the HPI. Imaging studies as noted in the diagnostic imaging section of the consultation were reviewed and correlated with clinical findings. Fluoroscopy is utilized for interventional procedures. For second Diagnostic MBB  A confirmatory injection is being requested due to a positive response of 80% relief of the primary index pain with onset and duration of relief being consistent with local anesthetic utilized. 5. Follow up:  2-3 weeks      Enedina Islas was instructed to call the office if her symptoms worsen or if new symptoms appear prior to the next scheduled visit. She is specifically instructed to contact the office between now & her scheduled appointment if she has concerns related to her condition or if she needs assistance in scheduling the above tests. She is welcome to call for an appointment sooner if she has any additional concerns or questions. GERMAINE Barraza PA-C  Board Certified by the [employer] on Certification of Physician Assistants  [address] [facility]  Partner of [facility] ([facility])         This dictation was performed with a verbal recognition program [facility]) and it was checked for errors. It is possible that there are still dictated errors within this office note. If so, please bring any errors to my attention for an addendum. All efforts were made to ensure that this office note is accurate. Patient has verbally accepted the following:      We confirm that, for purposes of billing, this is a virtual visit with the provider for which we will submit a claim for reimbursement with the insurance company. The patient accepts responsibility for any co-pays, coinsurance amounts or other amounts not covered by the insurance company. Patient location: [address] [place] [address] patient's home  Physician location:  [address] [place] [address]   Time spent: 10 minutes    Pursuant to the emergency declaration under the [address] [place] [address], [facility] waiver authority and the [employer] Act, this Virtual  Visit was conducted, with patient's consent, to reduce the patient's risk of exposure to COVID-19 and provide continuity of care for an established patient. Services were provided through a video synchronous discussion virtually to substitute for in-person clinic visit. We have confirmed that, for purposes of billing, this is a virtual visit with for which we will submit a claim for reimbursement with your insurance company. The patient has accepted responsibility for any copays, coinsurance amounts or other amounts not covered by their insurance company. This visit was completed virtually using doxy. me.

## 2020-11-20 ENCOUNTER — TELEPHONE (OUTPATIENT)
Dept: ORTHOPEDIC SURGERY | Age: 60
End: 2020-11-20

## 2020-11-20 NOTE — TELEPHONE ENCOUNTER
PA requsted via [facility] by online w/clinicals.   Reference # [id number]

## 2020-11-23 NOTE — TELEPHONE ENCOUNTER
Auth: # [id number]    Date: 12/08/2020 thru 3/08/2021  Type of SX:  Outpatient MBB #2  Location: [facility]  CPT: 66837, 46853   DX Code: [id number], M43.16, M51.36  SX area: Lumbar spine  Insurance: [facility]    CPT: 50 MOD, 0125 69, [address]89  [facility]

## 2020-12-07 NOTE — TELEPHONE ENCOUNTER
Right foot is still painful after steroid injections and medication adjustments, and was seen Dr. Neal Oglesby and is still having pain in right foot. Patient will be seeing Dr. Neal Oglesby 12.30.20 as well.

## 2020-12-08 ENCOUNTER — HOSPITAL ENCOUNTER (OUTPATIENT)
Age: 60
Setting detail: OUTPATIENT SURGERY
Discharge: HOME | End: 2020-12-08
Attending: PHYSICAL MEDICINE & REHABILITATION | Admitting: PHYSICAL MEDICINE & REHABILITATION
Payer: COMMERCIAL

## 2020-12-08 ENCOUNTER — TELEPHONE (OUTPATIENT)
Dept: ORTHOPEDIC SURGERY | Age: 60
End: 2020-12-08

## 2020-12-08 VITALS
OXYGEN SATURATION: 99 % | BODY MASS INDEX: 43.05 KG/M2 | DIASTOLIC BLOOD PRESSURE: 89 MMHG | TEMPERATURE: 97.9 F | HEART RATE: 66 BPM | HEIGHT: 63 IN | WEIGHT: 243 LBS | RESPIRATION RATE: 18 BRPM | SYSTOLIC BLOOD PRESSURE: 186 MMHG

## 2020-12-08 PROCEDURE — 7100000010 HC PHASE II RECOVERY - FIRST 15 MIN: Performed by: PHYSICAL MEDICINE & REHABILITATION

## 2020-12-08 PROCEDURE — 2500000003 HC RX 250 WO HCPCS: Performed by: PHYSICAL MEDICINE & REHABILITATION

## 2020-12-08 PROCEDURE — 6360000004 HC RX CONTRAST MEDICATION: Performed by: PHYSICAL MEDICINE & REHABILITATION

## 2020-12-08 PROCEDURE — 3600000002 HC SURGERY LEVEL 2 BASE: Performed by: PHYSICAL MEDICINE & REHABILITATION

## 2020-12-08 PROCEDURE — 2709999900 HC NON-CHARGEABLE SUPPLY: Performed by: PHYSICAL MEDICINE & REHABILITATION

## 2020-12-08 RX ORDER — LIDOCAINE HYDROCHLORIDE 10 MG/ML
INJECTION, SOLUTION EPIDURAL; INFILTRATION; INTRACAUDAL; PERINEURAL PRN
Status: DISCONTINUED | OUTPATIENT
Start: 2020-12-08 | End: 2020-12-08 | Stop reason: ALTCHOICE

## 2020-12-08 RX ORDER — TRAMADOL HYDROCHLORIDE 50 MG/1
50 TABLET ORAL EVERY 6 HOURS PRN
Qty: 12 TABLET | Refills: 0 | Status: SHIPPED | OUTPATIENT
Start: 2020-12-08 | End: 2020-12-11

## 2020-12-08 RX ORDER — BUPIVACAINE HYDROCHLORIDE 5 MG/ML
INJECTION, SOLUTION EPIDURAL; INTRACAUDAL
Status: DISCONTINUED
Start: 2020-12-08 | End: 2020-12-08 | Stop reason: HOSPADM

## 2020-12-08 RX ORDER — BUPIVACAINE HYDROCHLORIDE 5 MG/ML
INJECTION, SOLUTION EPIDURAL; INTRACAUDAL PRN
Status: DISCONTINUED | OUTPATIENT
Start: 2020-12-08 | End: 2020-12-08 | Stop reason: ALTCHOICE

## 2020-12-08 ASSESSMENT — PAIN DESCRIPTION - DESCRIPTORS: DESCRIPTORS: OTHER (COMMENT);ACHING

## 2020-12-08 ASSESSMENT — PAIN - FUNCTIONAL ASSESSMENT
PAIN_FUNCTIONAL_ASSESSMENT: PREVENTS OR INTERFERES SOME ACTIVE ACTIVITIES AND ADLS
PAIN_FUNCTIONAL_ASSESSMENT: 0-10

## 2020-12-08 NOTE — OP NOTE
Patient:  Sujata Patel  YOB: 1960  Medical Record #:  [id number]   Place:   [address], [place]  Date:  12/8/2020   Physician:  Surekha Hopson MD, CRESENCIO    Procedure: Lumbar Medial Branch Blocks - Bilateral L3, L4 and L5 Dorsal ramus    MBB #2    CPT (73637 Modifier 50 53060 Modifier 50)      Pre-Procedure Diagnosis: Lumbar spondylosis without radiculopathy      Post-Procedure Diagnosis: Same      Sedation: Local with 1% Lidocaine 5 ml      EBL: None      Complications: None      Procedure Summary:      The patient was seen in the office for complaints of low back pain. Review of the imaging and physical exam of the patient confirmed the pre-procedure diagnosis. After a thorough discussion of risks, benefits and alternatives informed consent was obtained. The patient was brought to the procedure suite and placed in the prone position. The skin overlying the lumbar spine was prepped with chloraprep and draped in the usual sterile fashion. Using fluoroscopic guidance, the right L4, L5 and sacral ala levels were identified. Through anesthetized skin a 22 gauge 3.5 inch curved tip spinal needle was advanced to the juncture of the superior articular process and the transverse process at the right L4 level where the right L3 medial branch resides. .3 ml of Isovue M 300 was instilled showing a nerve root outline pattern, without evidence of vascular spread. A total of 0.5 ml of 0.5 % Marcaine was instilled at the right L-4 level corresponding to the right L3 medial branch. This was repeated for the right L5 and sacral ala levels corresponding to the right L4 medial branch and L5 Dorsal ramus. The identical procedure was repeated on the left side. The needles were removed and a band-aid applied. The patient was transferred to the post-operative area in stable condition.

## 2020-12-08 NOTE — H&P
HISTORY AND PHYSICAL/PRE-SEDATION ASSESSMENT    Patient:  Sharath Navarro   :  1960  Medical Record No.:  [id number]   Date:  2020  Physician:  Denton Calvert M.D. Facility: [facility]    HISTORY OF PRESENT ILLNESS:                 The patient is a 63 y.o. female whom presents with lower back pain. Review of the imaging and physical exam of the patient confirmed the pre-procedure diagnosis. After a thorough discussion of risks, benefits and alternatives informed consent was obtained. Past Medical History:   Past Medical History:   Diagnosis Date    Acid reflux     Bilateral carpal tunnel syndrome 2017    CAD in native artery 2018    Mid LAD stented with 3.5 x 15 mm Xience BRIAN.  EF 55%    Chicken pox     Chronic back pain     CKD (chronic kidney disease) 4/15/2016    Depression 2010    Heart disease     Hypertension     [facility]stitial [facility]     Measles     Menopausal symptoms     Morbid obesity with BMI of 45.0-49.9, adult ([facility] [address] [facility] 2015    Osteoarthritis     Overactive bladder     Pure hypercholesterolemia     Stress incontinence       Past Surgical History:     Past Surgical History:   Procedure Laterality Date    BRONCHOSCOPY  2019    BRONCHOSCOPY ALVEOLAR LAVAGE performed by Dale Calhoun MD at [facility]  2019    BRONCHOSCOPY DIAGNOSTIC OR CELL [address] ONLY performed by Dale Calhoun MD at [address] [place] [address]  2019    BRONCHOSCOPY BIOPSY BRONCHUS performed by Dale Calhoun MD at [address] Bilateral 2018    EPIDURAL STEROID INJECTION N/A 10/15/2019    MIDLINE CAUDAL EPIDURAL STEROID INJECTION AND COCCYGEAL JOINT INJECTION SITES CONFIRMED BY FLUOROSCOPY performed by Denton Calvert MD at [place] [address]  ,    KNEES BILATERAL    PAIN MANAGEMENT PROCEDURE Right 3/3/2020    RIGHT LUMBAR FOUR AND LUMBAR FIVE TRANSFORAMINAL EPIDURAL STEROID INJECTION SITE CONFIRMED BY FLUOROSCOPY performed by Cosme Severe, MD at [address] Right 11/2/2020    RIGHT L4 AND L5 TRANSFORAMINAL EPIDURAL STEROID INJECTION WITH FLUOROSCOPY (16722, 15126) performed by Cosme Severe, MD at [address] Bilateral 11/17/2020    BILATERAL LUMBAR THREE LUMBAR FOUR LUMBAR FIVE DORSAL RAMUS LUMBAR MEDIAL BRANCH BLOCK SITE CONFIRMED BY FLUOROSCOPY performed by Cosme Severe, MD at [facility] DX/THER SBST INTRLMNR CRV/THRC W/IMG GDN Right 8/21/2018    RIGHT LUMBAR FOUR/FIVE, LUMBAR FIVE/ SACRAL ONE FACET INJECTION AND RIGHT SACROILIAC JOINT INJECTION SITES CONFIRMED BY FLUOROSCOPY performed by Cosme Severe, MD at [address] ARTHROSCOPY Right 08/22/2017    ROTATOR CUFF REPAIR    THORACOSCOPY Right 9/4/2019    RIGHT VIDEO ASSISTED THORACOSCOPIC SURGERY, WEDGE RESECTION AND RIGHT UPPER AND LOWER LOBE NODULES WITH BIOPSY, INTERCOSTAL NERVE BLOCK TIMES SEVEN LEVELS performed by Scott David MD at [place] [address]     Current Medications:   Prior to Admission medications    Medication Sig Start Date End Date Taking? Authorizing Provider   allopurinol (ZYLOPRIM) 300 MG tablet Take 1 tablet by mouth daily 11/18/20  Yes Shayla Marmolejo MD   colchicine (COLCRYS) 0.6 MG tablet Take 1 tablet by mouth See Admin Instructions Take 2 (1.6mg) tabs immediately, then take one (0.6mg) tab one hour later. Tomorrow begin 1 tab (0.6mg) twice daily.  11/17/20  Yes Chloe Julian MD   traZODone (DESYREL) 50 MG tablet TAKE 1 TABLET BY MOUTH AT BEDTIME 11/10/20  Yes ROMEO Jaramillo CNP   zinc gluconate 50 MG tablet Take 50 mg by mouth daily   Yes Historical Provider, MD   rosuvastatin (CRESTOR) 10 MG tablet TAKE 1 TABLET BY MOUTH DAILY 9/22/20  Yes ROMEO Luna CNP   valsartan (DIOVAN) 160 MG tablet Take 1 tablet by mouth daily 8/24/20  Yes ROMEO King CNP   vitamin D3 (CHOLECALCIFEROL) 25 MCG (1000 UT) TABS tablet Take 1 tablet by mouth daily 7/29/20  Yes Paulina Stevens MD   citalopram (CELEXA) 40 MG tablet TAKE 1 TABLET BY MOUTH EVERY DAY 10/16/19  Yes Maira Irwin,    famotidine (PEPCID) 40 MG tablet Take 1 tablet by mouth 2 times daily 5/3/19  Yes Marah Flores MD   Multiple Vitamins-Minerals (THERAPEUTIC MULTIVITAMIN-MINERALS) tablet Take 1 tablet by mouth daily   Yes Historical Provider, MD   ASPIRIN LOW DOSE 81 MG chewable tablet CHEW AND SWALLOW ONE TABLET EVERY DAY 10/19/20   ROMEO King CNP   allopurinol (ZYLOPRIM) 100 MG tablet Take 1 tablet by mouth daily 10/13/20   ROMEO Dewey CNP   vitamin C (ASCORBIC ACID) 500 MG tablet Take 500 mg by mouth daily    Historical Provider, MD   albuterol sulfate  (90 Base) MCG/ACT inhaler INHALE 2 PUFFS INTO THE LUNGS EVERY 4 HOURS AS NEEDED FOR WHEEZING 4/13/20   Ninoska Lomas MD     Allergies:  Atorvastatin and Protonix [pantoprazole sodium]  Social History:    reports that she quit smoking about 35 years ago. Her smoking use included cigarettes. She has a 5.00 pack-year smoking history. She has never used smokeless tobacco. She reports current alcohol use of about 1.0 standard drinks of alcohol per week. She reports that she does not use drugs.   Family History:   Family History   Problem Relation Age of Onset    High Blood Pressure Mother     Rheum Arthritis Mother     Cancer Father     Hypertension Father     Colon Cancer Father     High Blood Pressure Brother     Stroke Brother     Heart Defect Brother     Hypertension Sister     Rheum Arthritis Sister     Emphysema Sister     Rheum Arthritis Sister     Other Sister         bottom of lungs are getting hard    Hypertension Brother     Rheum Arthritis Brother     Heart Attack Paternal Grandfather     No Known Problems Paternal Grandmother     No Known Problems Maternal Grandmother     No Known Problems Maternal Grandfather     Heart Attack Maternal Aunt     No Known Problems Maternal Aunt     Brain Cancer Maternal Aunt     Other Maternal Aunt         Complications from surgery    Arthritis Maternal Aunt     Arthritis Maternal Aunt     Heart Disease Maternal Aunt     High Blood Pressure Maternal Aunt     Cancer Maternal Uncle     Heart Disease Maternal Uncle     Heart Attack Maternal Uncle     High Blood Pressure Maternal Uncle     Heart Attack Maternal Uncle     High Blood Pressure Maternal Uncle     Parkinsonism Maternal Uncle     Heart Attack Paternal Aunt     Breast Cancer Paternal Aunt     Dementia Paternal Aunt     Heart Attack Paternal Uncle     Heart Attack Paternal Uncle     Brain Cancer Maternal Uncle        Vitals: Blood pressure (!) 154/82, pulse 66, temperature 97 °F (36.1 °C), temperature source Temporal, resp. rate 16, height 5' 3\" (1.6 m), weight 243 lb (110.2 kg), last menstrual period 07/13/2011, SpO2 98 %, not currently breastfeeding. PHYSICAL EXAM:  HENT: Airway patent and reviewed  Cardiovascular: Normal rate, regular rhythm, normal heart sounds. Pulmonary/Chest: No wheezes. No rhonchi. No rales. Abdominal: Soft. Bowel sounds are normal. No distension. Extremities: Moves all extremities equally  Lumbar Spine: Painful range of motion, no midline tenderness       Diagnosis:Lumbar spondylosis without radiculopathy    Plan: Proceed with planned procedure    The patient was counseled at length about the risks of yuli Covid-19 in the echo-operative and post-operative states including the recovery window of their procedure. The patient was made aware that yuli Covid-19 after a surgical procedure may worsen their prognosis for recovering from the virus and lend to a higher morbidity and or mortality risk. The patient was given the options of postponing their procedure.  All of the risks, benefits, and alternatives were

## 2020-12-08 NOTE — TELEPHONE ENCOUNTER
Called and spoke to patient states pain meds is something that she feels is needed at this time.      Please advise

## 2020-12-10 ENCOUNTER — VIRTUAL VISIT (OUTPATIENT)
Dept: ORTHOPEDIC SURGERY | Age: 60
End: 2020-12-10
Payer: COMMERCIAL

## 2020-12-10 PROCEDURE — 99213 OFFICE O/P EST LOW 20 MIN: CPT | Performed by: PHYSICAL MEDICINE & REHABILITATION

## 2020-12-10 PROCEDURE — 3017F COLORECTAL CA SCREEN DOC REV: CPT | Performed by: PHYSICAL MEDICINE & REHABILITATION

## 2020-12-10 PROCEDURE — G8428 CUR MEDS NOT DOCUMENT: HCPCS | Performed by: PHYSICAL MEDICINE & REHABILITATION

## 2020-12-10 RX ORDER — TRAZODONE HYDROCHLORIDE 50 MG/1
TABLET ORAL
Qty: 30 TABLET | Refills: 0 | Status: SHIPPED | OUTPATIENT
Start: 2020-12-10 | End: 2021-01-06 | Stop reason: SDUPTHER

## 2020-12-10 NOTE — LETTER
Please schedule the following with:     Date:   20 @ 10:00    Account: [de-identified]  Patient: Viktor Cardoza    : 1960  Address:  [address] [place] [address] [place]    Phone (H):  [phone] (home) [phone] (work)     ----------------------------------------------------------------------------------------------  Diagnosis:     ICD-10-CM    1. Lumbar spondylosis  M47.816    2. Lumbar degenerative disc disease  M51.36    3. Spondylolisthesis of lumbar region  M43.16          Levels: L3, L4, L5 DR medial branches RFA  Procedure type RFA  Side BILATERAL  CPT Codes 22324, 27977  ----------------------------------------------------------------------------------------------  Injection # 1   MFASC    Attending Physician       Camilla Earl MD.  ----------------------------------------------------------------------------------------------  Injection Scheduled For:    At:    [address] [facility]    Pre-Cert#    [employer] Insurance     Pre-Cert#    Comments or Special instructions:  COVID TEST: no    · Infection control  · Tested positive for MRSA in past 12 months:  no  · Tested positive for MSSA \"staph infection\" in past 12 months: no  · Tested positive for VRE (Vancomycin Resistant Enterococci) in past 12 months:   no  · Currently on any antibiotics for an infection: no  · Anticoagulants:  · On a blood thinner:  no   · Any history of bleeding disorder: no   · Advanced Liver disease: no   · Advanced Renal disease: no   · Glaucoma: no   · Diabetes: no     Sedation:  Yes  -----------------------------------------------------------------------------------------------  Allergies   Allergen Reactions    Atorvastatin Other (See Comments)     Multiple muscle spasms/cramps over body    Protonix [Pantoprazole Sodium] Diarrhea

## 2020-12-10 NOTE — PROGRESS NOTES
Follow up: SPINE    Maximo Johnson  1960  [id number]         Chief Complaint   Patient presents with    Back Problem         HISTORY OF PRESENT ILLNESS:  Ms. Alexus Farias is a 63 y.o. female returns for a follow up visit for multiple medical problems. Her current presenting problems are   1. Lumbar spondylosis    2. Lumbar degenerative disc disease    3. Spondylolisthesis of lumbar region    . As per information/history obtained from the PADT(patient assessment and documentation tool) - She complains of pain in the lower back with radiation to the hips Bilateral She rates the pain 5/10 and describes it as throbbing. Pain is made worse by: standing. She denies side effects from the current pain regimen. Patient reports that since the last follow up visit the physical functioning is better, family/social relationships are better, mood is better and sleep patterns are better, and that the overall functioning is better. Patient denies neurological bowel or bladder. She returns after undergoing bilateral L3,L4, L5 DR #2 medial branch blocks. 100% relief with the ability to stand and walk. Associated signs and symptoms:   Neurogenic bowel or bladder symptoms:  no   Perceived weakness:  no   Difficulty walking:  no            Past medical, surgical, social and family history reviewed with the patient. No pertinent relevant history  Past Medical History:   Past Medical History:   Diagnosis Date    Acid reflux     Bilateral carpal tunnel syndrome 12/21/2017    CAD in native artery 06/19/2018    Mid LAD stented with 3.5 x 15 mm Xience BRIAN.  EF 55%    Chicken pox     Chronic back pain     CKD (chronic kidney disease) 4/15/2016    Depression 12/7/2010    Heart disease     Hypertension     Interstitial lung disease ([facility] [address] [facility]     Measles     Menopausal symptoms     Morbid obesity with BMI of 45.0-49.9, adult ([facility] [address] 1/29/2015    Osteoarthritis     Overactive bladder     Pure hypercholesterolemia     Stress incontinence       Past Surgical History:     Past Surgical History:   Procedure Laterality Date    BRONCHOSCOPY  6/27/2019    BRONCHOSCOPY ALVEOLAR LAVAGE performed by Dale Calhoun MD at [address]  6/27/2019    BRONCHOSCOPY DIAGNOSTIC OR CELL [address] ONLY performed by Dale Calhoun MD at [address]  6/27/2019    BRONCHOSCOPY BIOPSY BRONCHUS performed by Dale Calhoun MD at [facility] Bilateral 01/29/2018    EPIDURAL STEROID INJECTION N/A 10/15/2019    MIDLINE CAUDAL EPIDURAL STEROID INJECTION AND COCCYGEAL JOINT INJECTION SITES CONFIRMED BY FLUOROSCOPY performed by Denton Calvert MD at [place] [address]  2009,2010    KNEES BILATERAL    PAIN MANAGEMENT PROCEDURE Right 3/3/2020    RIGHT LUMBAR FOUR AND LUMBAR FIVE TRANSFORAMINAL EPIDURAL STEROID INJECTION SITE CONFIRMED BY FLUOROSCOPY performed by Denton Calvert MD at [facility] Right 11/2/2020    RIGHT L4 AND L5 TRANSFORAMINAL EPIDURAL STEROID INJECTION WITH FLUOROSCOPY (05878, 94375) performed by Denton Calvert MD at [address] Bilateral 11/17/2020    BILATERAL LUMBAR THREE LUMBAR FOUR LUMBAR FIVE DORSAL RAMUS LUMBAR MEDIAL BRANCH BLOCK SITE CONFIRMED BY FLUOROSCOPY performed by Denton Calvert MD at [facility] [facility] Bilateral 12/8/2020    BILATERAL LUMBAR THREE LUMBAR FOUR LUMBAR FIVE DORSAL RAMUS LUMBAR MEDIAL BRANCH BLOCK SITE CONFIRMED BY FLUOROSCOPY performed by Denton Calvert MD at [facility] DX/THER SBST INTRLMNR CRV/THRC W/IMG GDN Right 8/21/2018    RIGHT LUMBAR FOUR/FIVE, LUMBAR FIVE/ SACRAL ONE FACET INJECTION AND RIGHT SACROILIAC JOINT INJECTION SITES CONFIRMED BY FLUOROSCOPY performed by Denton Calvert MD at [address] ARTHROSCOPY Right 08/22/2017    ROTATOR CUFF REPAIR    THORACOSCOPY Right 9/4/2019    RIGHT VIDEO ASSISTED THORACOSCOPIC SURGERY, WEDGE RESECTION AND RIGHT UPPER AND LOWER LOBE NODULES WITH BIOPSY, INTERCOSTAL NERVE BLOCK TIMES SEVEN LEVELS performed by Danelle Coles MD at [place] [address]     Current Medications:     Current Outpatient Medications:     traZODone (DESYREL) 50 MG tablet, TAKE 1 TABLET BY MOUTH AT BEDTIME, Disp: 30 tablet, Rfl: 0    traMADol (ULTRAM) 50 MG tablet, Take 1 tablet by mouth every 6 hours as needed for Pain for up to 3 days. Intended supply: 3 days. Take lowest dose possible to manage pain, Disp: 12 tablet, Rfl: 0    allopurinol (ZYLOPRIM) 300 MG tablet, Take 1 tablet by mouth daily, Disp: 90 tablet, Rfl: 0    colchicine (COLCRYS) 0.6 MG tablet, Take 1 tablet by mouth See Admin Instructions Take 2 (1.6mg) tabs immediately, then take one (0.6mg) tab one hour later. Tomorrow begin 1 tab (0.6mg) twice daily. , Disp: 30 tablet, Rfl: 3    zinc gluconate 50 MG tablet, Take 50 mg by mouth daily, Disp: , Rfl:     ASPIRIN LOW DOSE 81 MG chewable tablet, CHEW AND SWALLOW ONE TABLET EVERY DAY, Disp: 90 tablet, Rfl: 3    allopurinol (ZYLOPRIM) 100 MG tablet, Take 1 tablet by mouth daily, Disp: 90 tablet, Rfl: 1    rosuvastatin (CRESTOR) 10 MG tablet, TAKE 1 TABLET BY MOUTH DAILY, Disp: 90 tablet, Rfl: 1    valsartan (DIOVAN) 160 MG tablet, Take 1 tablet by mouth daily, Disp: 90 tablet, Rfl: 3    vitamin D3 (CHOLECALCIFEROL) 25 MCG (1000 UT) TABS tablet, Take 1 tablet by mouth daily, Disp: 90 tablet, Rfl: 1    vitamin C (ASCORBIC ACID) 500 MG tablet, Take 500 mg by mouth daily, Disp: , Rfl:     albuterol sulfate  (90 Base) MCG/ACT inhaler, INHALE 2 PUFFS INTO THE LUNGS EVERY 4 HOURS AS NEEDED FOR WHEEZING, Disp: 42.5 g, Rfl: 1    citalopram (CELEXA) 40 MG tablet, TAKE 1 TABLET BY MOUTH EVERY DAY, Disp: 90 tablet, Rfl: 3    famotidine (PEPCID) 40 MG tablet, Take 1 tablet by mouth 2 times daily, Disp: 60 tablet, Rfl: 5    Multiple Vitamins-Minerals (THERAPEUTIC MULTIVITAMIN-MINERALS) tablet, Take 1 tablet by mouth daily, Disp: , Rfl:   Allergies:  Atorvastatin and Protonix [pantoprazole sodium]  Social History:    reports that she quit smoking about 35 years ago. Her smoking use included cigarettes. She has a 5.00 pack-year smoking history. She has never used smokeless tobacco. She reports current alcohol use of about 1.0 standard drinks of alcohol per week. She reports that she does not use drugs.   Family History:   Family History   Problem Relation Age of Onset    High Blood Pressure Mother     Rheum Arthritis Mother     Cancer Father     Hypertension Father     Colon Cancer Father     High Blood Pressure Brother     Stroke Brother     Heart Defect Brother     Hypertension Sister     Rheum Arthritis Sister     Emphysema Sister     Rheum Arthritis Sister     Other Sister         bottom of lungs are getting hard    Hypertension Brother     Rheum Arthritis Brother     Heart Attack Paternal Grandfather     No Known Problems Paternal Grandmother     No Known Problems Maternal Grandmother     No Known Problems Maternal Grandfather     Heart Attack Maternal Aunt     No Known Problems Maternal Aunt     Brain Cancer Maternal Aunt     Other Maternal Aunt         Complications from surgery    Arthritis Maternal Aunt     Arthritis Maternal Aunt     Heart Disease Maternal Aunt     High Blood Pressure Maternal Aunt     Cancer Maternal Uncle     Heart Disease Maternal Uncle     Heart Attack Maternal Uncle     High Blood Pressure Maternal Uncle     Heart Attack Maternal Uncle     High Blood Pressure Maternal Uncle     Parkinsonism Maternal Uncle     Heart Attack Paternal Aunt     Breast Cancer Paternal Aunt     Dementia Paternal Aunt     Heart Attack Paternal Uncle     Heart Attack Paternal Uncle     Brain Cancer Maternal Uncle        REVIEW OF SYSTEMS:   CONSTITUTIONAL: Denies unexplained weight loss, fevers, chills normal. No atrophy or abnormal movements are noted. Diagnostic Testing:    No new diagnostics  Results for orders placed or performed in visit on 20   Vitamin D 25 Hydroxy   Result Value Ref Range    Vit D, 25-Hydroxy 43.0 >=30 ng/mL     Impression:       1. Lumbar spondylosis    2. Lumbar degenerative disc disease    3. Spondylolisthesis of lumbar region        Plan:  Clinical Course: Above diagnoses are improving     I discussed the diagnosis and the treatment options with Esme Lauren today. In Summary:  The various treatment options were outlined and discussed with Enedina Islas including:  Conservative care options: physical therapy, ice, medications, bracing, and activity modification. The indications for therapeutic injections. The indications for additional imaging/laboratory studies. The indications for (possible future) interventions. After considering the various options discussed, Esme Lauren elected to pursue a course of treatment that includes the followin. Medications:  No further recommendations for new medications. 2. PT:  Encouraged to continue with Home exercise program.    3. Further studies: No further studies. 4. Interventional:  We have discussed options such as radiofrequency ablation after undergoing 2 successful lumbar medial branch blocks. Risks include but limited to bleeding, infection, neuritis, increased pain, lack of pain relief, motor nerve injury. This does not include all possible risks. The patient verbalized understanding would like to proceed. Side effects and benefits were also discussed.      As such, I have confirmed the patient has met the general requirements including failure of conservative management including prescription strength analgesics, adjunctive medications were not an option due to comorbidities or advanced age, therapeutic exercise program, and no underlying addiction or behavioral disorders were identified to the ability of the provider. Symptoms are impacting their ADLs or iADLs such as walking and significant pain was noted in the HPI. Imaging studies as noted in the diagnostic imaging section of the consultation were reviewed and correlated with clinical findings. Fluoroscopy is utilized for interventional procedures. For first RFN  Dual diagnostic MBB injections produced 80% or greater pain relief of the primary index pain and the onset and minimum duration of relief consistent with the local anesthetic administered. 5. Follow up:  2-3 weeks      Enedina Islas was instructed to call the office if her symptoms worsen or if new symptoms appear prior to the next scheduled visit. She is specifically instructed to contact the office between now & her scheduled appointment if she has concerns related to her condition or if she needs assistance in scheduling the above tests. She is welcome to call for an appointment sooner if she has any additional concerns or questions. Stas Viveros. Lucy Haas MD, CRESENCIO, [facility]  Board Certified in [address] [place] [address] Certified and Fellowship Trained in [facility] ([facility])             This dictation was performed with a verbal recognition program [facility]) and it was checked for errors. It is possible that there are still dictated errors within this office note. If so, please bring any errors to my attention for an addendum. All efforts were made to ensure that this office note is accurate. Patient has verbally accepted the following: We confirm that, for purposes of billing, this is a virtual visit with the provider for which we will submit a claim for reimbursement with the insurance company. The patient accepts responsibility for any co-pays, coinsurance amounts or other amounts not covered by the insurance company.      Patient location: home  Physician location: home  Time spent: not billed by time  Present on the call: myself and patient    Pursuant to the emergency declaration under the [facility] waiver authority and the [employer] Act, this Virtual  Visit was conducted, with patient's consent, to reduce the patient's risk of exposure to COVID-19 and provide continuity of care for an established patient. Services were provided through a video synchronous discussion virtually to substitute for in-person clinic visit. We have confirmed that, for purposes of billing, this is a virtual visit with for which we will submit a claim for reimbursement with your insurance company. The patient has accepted responsibility for any copays, coinsurance amounts or other amounts not covered by their insurance company. This visit was completed virtually using [email or web address] me.

## 2020-12-11 ENCOUNTER — TELEPHONE (OUTPATIENT)
Dept: ORTHOPEDIC SURGERY | Age: 60
End: 2020-12-11

## 2020-12-14 NOTE — TELEPHONE ENCOUNTER
Auth: # [id number]    Date: 12/22/2020 thru 3/22/2021  Type of SX:  Outpatient RFA  Location: [facility]  CPT: 52030, 09088   DX Code: U04.425, M51.66, M43.16  SX area: Lumbar spine  Insurance: [facility]    CPT: [address], [address], [address] [place] [address]

## 2020-12-18 RX ORDER — SODIUM CHLORIDE, SODIUM LACTATE, POTASSIUM CHLORIDE, CALCIUM CHLORIDE 600; 310; 30; 20 MG/100ML; MG/100ML; MG/100ML; MG/100ML
INJECTION, SOLUTION INTRAVENOUS CONTINUOUS
Status: CANCELLED | OUTPATIENT
Start: 2020-12-22

## 2020-12-22 ENCOUNTER — HOSPITAL ENCOUNTER (OUTPATIENT)
Age: 60
Setting detail: OUTPATIENT SURGERY
Discharge: HOME | End: 2020-12-22
Attending: PHYSICAL MEDICINE & REHABILITATION | Admitting: PHYSICAL MEDICINE & REHABILITATION
Payer: COMMERCIAL

## 2020-12-22 VITALS
HEART RATE: 71 BPM | TEMPERATURE: 97.3 F | DIASTOLIC BLOOD PRESSURE: 81 MMHG | SYSTOLIC BLOOD PRESSURE: 173 MMHG | OXYGEN SATURATION: 99 % | RESPIRATION RATE: 12 BRPM | HEIGHT: 63 IN | BODY MASS INDEX: 43.05 KG/M2 | WEIGHT: 243 LBS

## 2020-12-22 PROCEDURE — 3600000012 HC SURGERY LEVEL 2 ADDTL 15MIN: Performed by: PHYSICAL MEDICINE & REHABILITATION

## 2020-12-22 PROCEDURE — 2500000003 HC RX 250 WO HCPCS: Performed by: PHYSICAL MEDICINE & REHABILITATION

## 2020-12-22 PROCEDURE — 2580000003 HC RX 258: Performed by: PHYSICAL MEDICINE & REHABILITATION

## 2020-12-22 PROCEDURE — 7100000010 HC PHASE II RECOVERY - FIRST 15 MIN: Performed by: PHYSICAL MEDICINE & REHABILITATION

## 2020-12-22 PROCEDURE — 7100000011 HC PHASE II RECOVERY - ADDTL 15 MIN: Performed by: PHYSICAL MEDICINE & REHABILITATION

## 2020-12-22 PROCEDURE — 2709999900 HC NON-CHARGEABLE SUPPLY: Performed by: PHYSICAL MEDICINE & REHABILITATION

## 2020-12-22 PROCEDURE — 6360000002 HC RX W HCPCS: Performed by: PHYSICAL MEDICINE & REHABILITATION

## 2020-12-22 PROCEDURE — 99152 MOD SED SAME PHYS/QHP 5/>YRS: CPT | Performed by: PHYSICAL MEDICINE & REHABILITATION

## 2020-12-22 PROCEDURE — 3600000002 HC SURGERY LEVEL 2 BASE: Performed by: PHYSICAL MEDICINE & REHABILITATION

## 2020-12-22 RX ORDER — SODIUM CHLORIDE, SODIUM LACTATE, POTASSIUM CHLORIDE, CALCIUM CHLORIDE 600; 310; 30; 20 MG/100ML; MG/100ML; MG/100ML; MG/100ML
INJECTION, SOLUTION INTRAVENOUS ONCE
Status: COMPLETED | OUTPATIENT
Start: 2020-12-22 | End: 2020-12-22

## 2020-12-22 RX ORDER — FENTANYL CITRATE 50 UG/ML
INJECTION, SOLUTION INTRAMUSCULAR; INTRAVENOUS
Status: DISCONTINUED
Start: 2020-12-22 | End: 2020-12-22 | Stop reason: HOSPADM

## 2020-12-22 RX ORDER — BUPIVACAINE HYDROCHLORIDE 5 MG/ML
INJECTION, SOLUTION EPIDURAL; INTRACAUDAL PRN
Status: DISCONTINUED | OUTPATIENT
Start: 2020-12-22 | End: 2020-12-22 | Stop reason: ALTCHOICE

## 2020-12-22 RX ORDER — LIDOCAINE HYDROCHLORIDE 20 MG/ML
INJECTION, SOLUTION EPIDURAL; INFILTRATION; INTRACAUDAL; PERINEURAL
Status: DISCONTINUED
Start: 2020-12-22 | End: 2020-12-22 | Stop reason: HOSPADM

## 2020-12-22 RX ORDER — DEXAMETHASONE SODIUM PHOSPHATE 10 MG/ML
INJECTION, SOLUTION INTRAMUSCULAR; INTRAVENOUS
Status: DISCONTINUED
Start: 2020-12-22 | End: 2020-12-22 | Stop reason: HOSPADM

## 2020-12-22 RX ORDER — MIDAZOLAM HYDROCHLORIDE 1 MG/ML
INJECTION INTRAMUSCULAR; INTRAVENOUS PRN
Status: DISCONTINUED | OUTPATIENT
Start: 2020-12-22 | End: 2020-12-22 | Stop reason: ALTCHOICE

## 2020-12-22 RX ORDER — LIDOCAINE HYDROCHLORIDE 20 MG/ML
INJECTION, SOLUTION EPIDURAL; INFILTRATION; INTRACAUDAL; PERINEURAL PRN
Status: DISCONTINUED | OUTPATIENT
Start: 2020-12-22 | End: 2020-12-22 | Stop reason: ALTCHOICE

## 2020-12-22 RX ORDER — MIDAZOLAM HYDROCHLORIDE 1 MG/ML
INJECTION INTRAMUSCULAR; INTRAVENOUS
Status: DISCONTINUED
Start: 2020-12-22 | End: 2020-12-22 | Stop reason: HOSPADM

## 2020-12-22 RX ORDER — FENTANYL CITRATE 50 UG/ML
INJECTION, SOLUTION INTRAMUSCULAR; INTRAVENOUS PRN
Status: DISCONTINUED | OUTPATIENT
Start: 2020-12-22 | End: 2020-12-22 | Stop reason: ALTCHOICE

## 2020-12-22 RX ORDER — BUPIVACAINE HYDROCHLORIDE 5 MG/ML
INJECTION, SOLUTION EPIDURAL; INTRACAUDAL
Status: DISCONTINUED
Start: 2020-12-22 | End: 2020-12-22 | Stop reason: HOSPADM

## 2020-12-22 RX ORDER — DEXAMETHASONE SODIUM PHOSPHATE 10 MG/ML
INJECTION, SOLUTION INTRAMUSCULAR; INTRAVENOUS PRN
Status: DISCONTINUED | OUTPATIENT
Start: 2020-12-22 | End: 2020-12-22 | Stop reason: ALTCHOICE

## 2020-12-22 RX ADMIN — SODIUM CHLORIDE, POTASSIUM CHLORIDE, SODIUM LACTATE AND CALCIUM CHLORIDE: 600; 310; 30; 20 INJECTION, SOLUTION INTRAVENOUS at 09:35

## 2020-12-22 RX ADMIN — LIDOCAINE HYDROCHLORIDE 0.1 ML: 10 INJECTION, SOLUTION EPIDURAL; INFILTRATION; INTRACAUDAL; PERINEURAL at 09:35

## 2020-12-22 NOTE — OP NOTE
Patient:  Hannah Johnson  YOB: 1960  Medical Record #:  [id number]   Place:   [address], [place]  Date:  12/22/2020   Physician:  Alverto Billingsley MD, CRESENCIO    Procedure: Radiofrequency ablation of the Bilateral L3, L4 Medial branches and L5 Dorsal ramus    CPT 04748 Modifier 50 and 25069 Modifier 50     Pre-Procedure Diagnosis: Lumbar spondylosis without radiculopathy     Post-Procedure Diagnosis: Same     Sedation: Local with 1% Lidocaine 5 ml and 1 mg of IV Versed and 50 mcg of IV Fentanyl     EBL: None     Complications: None     Procedure Summary:     The patient was seen in the office for complaints of low back pain. Review of the imaging and physical exam of the patient confirmed the pre-procedure diagnosis. After a thorough discussion of risks, benefits and alternatives informed consent was obtained. The patient was brought to the procedure suite and placed in the prone position. The skin overlying the lumbar spine was prepped with chloraprep and draped in the usual sterile fashion. Using fluoroscopic guidance, the right L4, L5 and sacral ala levels were identified. Through anesthetized skin, a 20 gauge 150 mm RFL needle with a 10 mm active tip was advanced to the juncture of the superior articular process and the transverse process at the right L4 level where the right L3 medial branch resides. After the probe was instilled, motor testing took place up to 2 V without any paresthesia into the right leg. Then ablation took place at 80 C for 90 seconds. This was repeated for the right L5 and sacral ala levels corresponding to the right L4 medial branches and L5 Dorsal ramus. The identical procedure was repeated on the left side. Following the ablation, 10 mg of dexamethasone mixed with 0.5% Marcaine was instilled in 1/6th aliquots at each level. The needles were removed and a band-aid was applied at each level.      The patient was transferred to the post-operative area in stable condition.

## 2020-12-22 NOTE — H&P
HISTORY AND PHYSICAL/PRE-SEDATION ASSESSMENT    Patient:  Lady Kruger   :  1960  Medical Record No.:  [id number]   Date:  2020  Physician:  Maryjo Lee M.D. Facility: [facility]    HISTORY OF PRESENT ILLNESS:                 The patient is a 63 y.o. female whom presents with lower back pain. Review of the imaging and physical exam of the patient confirmed the pre-procedure diagnosis. After a thorough discussion of risks, benefits and alternatives informed consent was obtained. Past Medical History:   Past Medical History:   Diagnosis Date    Acid reflux     Bilateral carpal tunnel syndrome 2017    CAD in native artery 2018    Mid LAD stented with 3.5 x 15 mm Xience BRIAN.  EF 55%    Chicken pox     Chronic back pain     CKD (chronic kidney disease) 4/15/2016    Depression 2010    Heart disease     Hypertension     [facility] lung disease [facility]     Measles     Menopausal symptoms     Morbid obesity with BMI of 45.0-49.9, adult ([facility] [address] [facility] 2015    Osteoarthritis     Overactive bladder     Pure hypercholesterolemia     Stress incontinence       Past Surgical History:     Past Surgical History:   Procedure Laterality Date    BRONCHOSCOPY  2019    BRONCHOSCOPY ALVEOLAR LAVAGE performed by Frieda Travis MD at [address] [place] [address] Rd  2019    BRONCHOSCOPY DIAGNOSTIC OR CELL [address] ONLY performed by Frieda Travis MD at [address] [place] [address]  2019    BRONCHOSCOPY BIOPSY BRONCHUS performed by Frieda Travis MD at [address] [place] [address] Bilateral 2018    EPIDURAL STEROID INJECTION N/A 10/15/2019    MIDLINE CAUDAL EPIDURAL STEROID INJECTION AND COCCYGEAL JOINT INJECTION SITES CONFIRMED BY FLUOROSCOPY performed by Maryjo Lee MD at [place] [address]  ,    KNEES BILATERAL    PAIN MANAGEMENT PROCEDURE Right 3/3/2020    RIGHT LUMBAR FOUR AND LUMBAR FIVE TRANSFORAMINAL EPIDURAL STEROID INJECTION SITE CONFIRMED BY FLUOROSCOPY performed by Alexis Ledesma MD at [facility] Right 11/2/2020    RIGHT L4 AND L5 TRANSFORAMINAL EPIDURAL STEROID INJECTION WITH FLUOROSCOPY (70782, 85150) performed by Alexis Ledesma MD at [facility] Bilateral 11/17/2020    BILATERAL LUMBAR THREE LUMBAR FOUR LUMBAR FIVE DORSAL RAMUS LUMBAR MEDIAL BRANCH BLOCK SITE CONFIRMED BY FLUOROSCOPY performed by Alexis Ledesma MD at [facility] Bilateral 12/8/2020    BILATERAL LUMBAR THREE LUMBAR FOUR LUMBAR FIVE DORSAL RAMUS LUMBAR MEDIAL BRANCH BLOCK SITE CONFIRMED BY FLUOROSCOPY performed by Alexis Ledesma MD at [facility] DX/THER SBST INTRLMNR CRV/THRC W/IMG GDN Right 8/21/2018    RIGHT LUMBAR FOUR/FIVE, LUMBAR FIVE/ SACRAL ONE FACET INJECTION AND RIGHT SACROILIAC JOINT INJECTION SITES CONFIRMED BY FLUOROSCOPY performed by Alexis Ledesma MD at [address] [facility] ARTHROSCOPY Right 08/22/2017    ROTATOR CUFF REPAIR    THORACOSCOPY Right 9/4/2019    RIGHT VIDEO ASSISTED THORACOSCOPIC SURGERY, WEDGE RESECTION AND RIGHT UPPER AND LOWER LOBE NODULES WITH BIOPSY, INTERCOSTAL NERVE BLOCK TIMES SEVEN LEVELS performed by Jammie Cordova MD at [place] [address]     Current Medications:   Prior to Admission medications    Medication Sig Start Date End Date Taking? Authorizing Provider   traZODone (DESYREL) 50 MG tablet TAKE 1 TABLET BY MOUTH AT BEDTIME 12/10/20  Yes ROMEO Santamaria - CNP   allopurinol (ZYLOPRIM) 300 MG tablet Take 1 tablet by mouth daily 11/18/20  Yes Joshua Serra MD   colchicine (COLCRYS) 0.6 MG tablet Take 1 tablet by mouth See Admin Instructions Take 2 (1.6mg) tabs immediately, then take one (0.6mg) tab one hour later. Tomorrow begin 1 tab (0.6mg) twice daily.  11/17/20  Yes Rebecca Sandoval MD   zinc gluconate 50 MG tablet procedure may worsen their prognosis for recovering from the virus and lend to a higher morbidity and or mortality risk. The patient was given the options of postponing their procedure. All of the risks, benefits, and alternatives were discussed. The patient does wish to proceed with the procedure. ASA CLASS:         []   I. Normal, healthy adult           [x]   II.  Mild systemic disease            []   III. Severe systemic disease      Mallampati: Mallampati Class II - (soft palate, fauces & uvula are visible)      Sedation plan:   [x]  Local              []  Minimal                  []  General anesthesia    Patient's condition acceptable for planned procedure/sedation. Post Procedure Plan   Return to same level of care   ______________________     The risks and benefits as well as alternatives to the procedure have been discussed with the patient and or family. The patient and or next of kin understands and agrees to proceed.     Alexis Ledesma M.D.

## 2020-12-22 NOTE — PROGRESS NOTES
Discharge instructions given to pt and pts  verbalized understanding, states pain is at a tolerable level, no numbness or weakness noted, pt wheeled out to car without complications

## 2020-12-23 ENCOUNTER — HOSPITAL ENCOUNTER (OUTPATIENT)
Age: 60
Discharge: HOME | End: 2020-12-23
Payer: COMMERCIAL

## 2020-12-23 LAB
A/G RATIO: 1.2 (ref 1.1–2.2)
ALBUMIN SERPL-MCNC: 4 G/DL (ref 3.4–5)
ALP BLD-CCNC: 71 U/L (ref 40–129)
ALT SERPL-CCNC: 17 U/L (ref 10–40)
ANION GAP SERPL CALCULATED.3IONS-SCNC: 13 MMOL/L (ref 3–16)
AST SERPL-CCNC: 26 U/L (ref 15–37)
BASOPHILS ABSOLUTE: 0 K/UL (ref 0–0.2)
BASOPHILS RELATIVE PERCENT: 0.3 %
BILIRUB SERPL-MCNC: 0.3 MG/DL (ref 0–1)
BUN BLDV-MCNC: 18 MG/DL (ref 7–20)
CALCIUM SERPL-MCNC: 9.8 MG/DL (ref 8.3–10.6)
CHLORIDE BLD-SCNC: 105 MMOL/L (ref 99–110)
CO2: 25 MMOL/L (ref 21–32)
CREAT SERPL-MCNC: 1 MG/DL (ref 0.6–1.2)
EOSINOPHILS ABSOLUTE: 0 K/UL (ref 0–0.6)
EOSINOPHILS RELATIVE PERCENT: 0.1 %
GFR AFRICAN AMERICAN: >60
GFR NON-AFRICAN AMERICAN: 56
GLOBULIN: 3.4 G/DL
GLUCOSE BLD-MCNC: 129 MG/DL (ref 70–99)
HCT VFR BLD CALC: 39 % (ref 36–48)
HEMOGLOBIN: 13 G/DL (ref 12–16)
LYMPHOCYTES ABSOLUTE: 0.9 K/UL (ref 1–5.1)
LYMPHOCYTES RELATIVE PERCENT: 10.5 %
MCH RBC QN AUTO: 28.6 PG (ref 26–34)
MCHC RBC AUTO-ENTMCNC: 33.2 G/DL (ref 31–36)
MCV RBC AUTO: 86.1 FL (ref 80–100)
MONOCYTES ABSOLUTE: 0.4 K/UL (ref 0–1.3)
MONOCYTES RELATIVE PERCENT: 4.9 %
NEUTROPHILS ABSOLUTE: 7.4 K/UL (ref 1.7–7.7)
NEUTROPHILS RELATIVE PERCENT: 84.2 %
PDW BLD-RTO: 14.6 % (ref 12.4–15.4)
PLATELET # BLD: 313 K/UL (ref 135–450)
PMV BLD AUTO: 8.8 FL (ref 5–10.5)
POTASSIUM SERPL-SCNC: 4.3 MMOL/L (ref 3.5–5.1)
RBC # BLD: 4.53 M/UL (ref 4–5.2)
SODIUM BLD-SCNC: 143 MMOL/L (ref 136–145)
TOTAL PROTEIN: 7.4 G/DL (ref 6.4–8.2)
URIC ACID, SERUM: 4.6 MG/DL (ref 2.6–6)
WBC # BLD: 8.8 K/UL (ref 4–11)

## 2020-12-23 PROCEDURE — 84550 ASSAY OF BLOOD/URIC ACID: CPT

## 2020-12-23 PROCEDURE — 36415 COLL VENOUS BLD VENIPUNCTURE: CPT

## 2020-12-23 PROCEDURE — 80053 COMPREHEN METABOLIC PANEL: CPT

## 2020-12-23 PROCEDURE — 85025 COMPLETE CBC W/AUTO DIFF WBC: CPT

## 2020-12-30 ENCOUNTER — VIRTUAL VISIT (OUTPATIENT)
Dept: RHEUMATOLOGY | Age: 60
End: 2020-12-30
Payer: COMMERCIAL

## 2020-12-30 PROCEDURE — 3017F COLORECTAL CA SCREEN DOC REV: CPT | Performed by: INTERNAL MEDICINE

## 2020-12-30 PROCEDURE — G8427 DOCREV CUR MEDS BY ELIG CLIN: HCPCS | Performed by: INTERNAL MEDICINE

## 2020-12-30 PROCEDURE — 99214 OFFICE O/P EST MOD 30 MIN: CPT | Performed by: INTERNAL MEDICINE

## 2020-12-30 RX ORDER — PREDNISONE 10 MG/1
10 TABLET ORAL EVERY MORNING
Qty: 32 TABLET | Refills: 0 | Status: SHIPPED | OUTPATIENT
Start: 2020-12-30 | End: 2021-01-09

## 2020-12-30 RX ORDER — ALLOPURINOL 300 MG/1
300 TABLET ORAL DAILY
Qty: 90 TABLET | Refills: 1 | Status: SHIPPED | OUTPATIENT
Start: 2020-12-30 | End: 2022-07-13

## 2020-12-30 NOTE — PROGRESS NOTES
Mayelin Diaz is a 63 y.o. female being evaluated by a Virtual Visit (video visit) encounter to address concerns as mentioned above. A caregiver was present when appropriate. Due to this being a TeleHealth encounter (During SRDVQ-57 public health emergency), evaluation of the following organ systems was limited: Vitals/Constitutional/EENT/Resp/CV/GI//MS/Neuro/Skin/Heme-Lymph-Imm. Pursuant to the emergency declaration under the [address] [place] [address] and the [employer] Act, this Virtual Visit was conducted with patient's (and/or legal guardian's) consent, to reduce the patient's risk of exposure to COVID-19 and provide necessary medical care. The patient (and/or legal guardian) has also been advised to contact this office for worsening conditions or problems, and seek emergency medical treatment and/or call 911 if deemed necessary. Patient identification was verified at the start of the visit: Yes    Total time spent for this encounter: Not billed by time    Services were provided through a video synchronous discussion virtually to substitute for in-person clinic visit. Patient and provider were located at their individual homes. --Debora Padilla MD on 12/30/2020 at 1:30 PM    An electronic signature was used to authenticate this note. [address] [place], [place] [address] [address] [place] [address] [phone] (S) [phone] (F)      Dear Dr. Carmen Neves, APRN - CNP:  Please find Rheumatology assessment. Thank you for giving me the opportunity to be involved in Sloane Souza Roshan's care and I look forward following Arlet Ferreira along with you. If you have any questions or concerns please feel free to reach me. Note is transcribed using voice recognition software. Inadvertent computerized transcription errors may be present. Patient identification: Carmenza Islas,: ,14 y.o. Sex: female     Arlet Ferreira was seen today for follow-up and arthritis. Diagnoses and all orders for this visit:    Polyarticular gout    Interstitial lung disease ([address]    Other orders  -     allopurinol (ZYLOPRIM) 300 MG tablet; Take 1 tablet by mouth daily  -     predniSONE (DELTASONE) 10 MG tablet; Take 1 tablet by mouth every morning for 10 days 4 pills po daily x 3 days. 3 pills daily x 3 days. 2 pills daily x 3 days. 1 pill daily x 3 days. 1/2 pill daily for next 3 days and stop. Asymptomatic now but the flares in her ankles and R big toe lasted for entire 4 weeks. Is on Allopurinol 300 mg and 1 tab colcrys a day. Labs from last week-CBC, chemistry normal, uric acid 4.6 mg percent. Other medical problems-low titer SAMMY, ILD likely hypersensitive. Followed by Dr Anali Minaya. Is off of prednisone. Plan-   Continue current regimen as above. Has a spare prescription of prednisone in case if needed for acute gout flare although she was advised to call us with any gout flares. We will meet her back in 3 months. Patient indicates understanding and agrees with the management plan. I reviewed patient's history, referral documents and electronic medical records. #######################################################################  [id number] for polyarticular gout. Other medical comorbidities coronary artery disease, hypertension, hyperlipidemia and interstitial lung disease.     Interval Jonn Davidson tells me that she is doing well at this time, has no complaints or concerns however the last gout flare lasted for entire 4 weeks since last seen and had been going on a couple of months prior to that. She is tolerating allopurinol and colchicine well. Tells me that she is scared to eat or drink anything, worries about flares. Denies any rashes, GI upset. Is off of prednisone which she was taking for ILD. Rest of review of systems are negative. Past Medical History:   Diagnosis Date    Acid reflux     Bilateral carpal tunnel syndrome 12/21/2017    CAD in native artery 06/19/2018    Mid LAD stented with 3.5 x 15 mm Xience BRIAN.  EF 55%    Chicken pox     Chronic back pain     CKD (chronic kidney disease) 4/15/2016    Depression 12/7/2010    Heart disease     Hypertension     Interstitial lung disease ([facility] [address] [facility]     Measles     Menopausal symptoms     Morbid obesity with BMI of 45.0-49.9, adult ([facility] [address] [facility] 1/29/2015    Osteoarthritis     Overactive bladder     Pure hypercholesterolemia     Stress incontinence      Past Surgical History:   Procedure Laterality Date    BRONCHOSCOPY  6/27/2019    BRONCHOSCOPY ALVEOLAR LAVAGE performed by Angel Tadeo MD at [address] [place] [address]  6/27/2019    BRONCHOSCOPY DIAGNOSTIC OR CELL [address] ONLY performed by Angel Tadeo MD at [address] [place] [address]  6/27/2019    BRONCHOSCOPY BIOPSY BRONCHUS performed by Angel Tadeo MD at [address] Bilateral 01/29/2018    EPIDURAL STEROID INJECTION N/A 10/15/2019    MIDLINE CAUDAL EPIDURAL STEROID INJECTION AND COCCYGEAL JOINT INJECTION SITES CONFIRMED BY FLUOROSCOPY performed by Sivakumar Koch MD at [facility] [address]  2009,2010    [address] Bilateral 12/22/2020    BILATERAL LUMBAR THREE LUMBAR FOUR LUMBAR FIVE DORSAL RAMUS RADIOFREQUENCY ABLATION SITE CONFIRMED BY FLUOROSCOPY performed by Sivakumar Koch MD at [facility] Right 3/3/2020    RIGHT LUMBAR FOUR AND LUMBAR FIVE TRANSFORAMINAL EPIDURAL STEROID INJECTION SITE CONFIRMED BY FLUOROSCOPY performed by Genny Gross Jes Ragsdale MD at [facility] Right 2020    RIGHT L4 AND L5 TRANSFORAMINAL EPIDURAL STEROID INJECTION WITH FLUOROSCOPY (25974, 42527) performed by Lory Rosales MD at [facility] Bilateral 2020    BILATERAL LUMBAR THREE LUMBAR FOUR LUMBAR FIVE DORSAL RAMUS LUMBAR MEDIAL BRANCH BLOCK SITE CONFIRMED BY FLUOROSCOPY performed by Lory Rosales MD at [address] PAIN MANAGEMENT PROCEDURE Bilateral 2020    BILATERAL LUMBAR THREE LUMBAR FOUR LUMBAR FIVE DORSAL RAMUS LUMBAR MEDIAL BRANCH BLOCK SITE CONFIRMED BY FLUOROSCOPY performed by Lory Rosales MD at [facility] DX/THER SBST INTRLMNR CRV/THRC W/IMG GDN Right 2018    RIGHT LUMBAR FOUR/FIVE, LUMBAR FIVE/ SACRAL ONE FACET INJECTION AND RIGHT SACROILIAC JOINT INJECTION SITES CONFIRMED BY FLUOROSCOPY performed by Lory Rosales MD at [facility] ARTHROSCOPY Right 2017    ROTATOR CUFF REPAIR    THORACOSCOPY Right 2019    RIGHT VIDEO ASSISTED THORACOSCOPIC SURGERY, WEDGE RESECTION AND RIGHT UPPER AND LOWER LOBE NODULES WITH BIOPSY, INTERCOSTAL NERVE BLOCK TIMES SEVEN LEVELS performed by Sujata Luna MD at [facility] History     Socioeconomic History    Marital status:      Spouse name: Not on file    Number of children: Not on file    Years of education: Not on file    Highest education level: Not on file   Occupational History    Not on file   Social Needs    Financial resource strain: Not on file    Food insecurity     Worry: Not on file     Inability: Not on file    Transportation needs     Medical: Not on file     Non-medical: Not on file   Tobacco Use    Smoking status: Former Smoker     Packs/day: 0.50     Years: 10.00     Pack years: 5.00     Types: Cigarettes     Quit date: 1985     Years since quittin.0    Smokeless tobacco: Never Used   Substance and Sexual Activity    Alcohol use: Yes     Alcohol/week: 1.0 standard drinks     Types: 1 Shots of liquor per week     Comment: occ    Drug use: No    Sexual activity: Yes     Partners: Male   Lifestyle    Physical activity     Days per week: Not on file     Minutes per session: Not on file    Stress: Not on file   Relationships    Social connections     Talks on phone: Not on file     Gets together: Not on file     Attends Mandaen service: Not on file     Active member of club or organization: Not on file     Attends meetings of clubs or organizations: Not on file     Relationship status: Not on file    Intimate partner violence     Fear of current or ex partner: Not on file     Emotionally abused: Not on file     Physically abused: Not on file     Forced sexual activity: Not on file   Other Topics Concern    Not on file   Social History Narrative    Not on file       No family history of autoimmune diseases    Current Outpatient Medications   Medication Sig Dispense Refill    allopurinol (ZYLOPRIM) 300 MG tablet Take 1 tablet by mouth daily 90 tablet 1    predniSONE (DELTASONE) 10 MG tablet Take 1 tablet by mouth every morning for 10 days 4 pills po daily x 3 days. 3 pills daily x 3 days. 2 pills daily x 3 days. 1 pill daily x 3 days. 1/2 pill daily for next 3 days and stop. 32 tablet 0    traZODone (DESYREL) 50 MG tablet TAKE 1 TABLET BY MOUTH AT BEDTIME 30 tablet 0    allopurinol (ZYLOPRIM) 300 MG tablet Take 1 tablet by mouth daily 90 tablet 0    colchicine (COLCRYS) 0.6 MG tablet Take 1 tablet by mouth See Admin Instructions Take 2 (1.6mg) tabs immediately, then take one (0.6mg) tab one hour later. Tomorrow begin 1 tab (0.6mg) twice daily.  30 tablet 3    zinc gluconate 50 MG tablet Take 50 mg by mouth daily      ASPIRIN LOW DOSE 81 MG chewable tablet CHEW AND SWALLOW ONE TABLET EVERY DAY 90 tablet 3    allopurinol (ZYLOPRIM) 100 MG tablet Take 1 tablet by mouth daily 90 tablet 1    rosuvastatin (CRESTOR) 10 MG tablet TAKE 1 TABLET BY MOUTH DAILY 90 tablet 1    valsartan (DIOVAN) 160 MG tablet Take 1 tablet by mouth daily 90 tablet 3    vitamin D3 (CHOLECALCIFEROL) 25 MCG (1000 UT) TABS tablet Take 1 tablet by mouth daily 90 tablet 1    vitamin C (ASCORBIC ACID) 500 MG tablet Take 500 mg by mouth daily      albuterol sulfate  (90 Base) MCG/ACT inhaler INHALE 2 PUFFS INTO THE LUNGS EVERY 4 HOURS AS NEEDED FOR WHEEZING 42.5 g 1    citalopram (CELEXA) 40 MG tablet TAKE 1 TABLET BY MOUTH EVERY DAY 90 tablet 3    famotidine (PEPCID) 40 MG tablet Take 1 tablet by mouth 2 times daily 60 tablet 5    Multiple Vitamins-Minerals (THERAPEUTIC MULTIVITAMIN-MINERALS) tablet Take 1 tablet by mouth daily       No current facility-administered medications for this visit. Allergies   Allergen Reactions    Atorvastatin Other (See Comments)     Multiple muscle spasms/cramps over body    Protonix [Pantoprazole Sodium] Diarrhea       PHYSICAL EXAM:    Vitals:    [facility] 07/13/2011   General appearance/ Psychiatric: well nourished, and well groomed, normal judgement, alert, appears stated age and cooperative. MKS: Does not have any musculoskeletal findings to note in VSV  Skin: No rashes, tophaceous deposits   Appears comfortable with normal breathing.       DATA:   Lab Results   Component Value Date    WBC 8.8 12/23/2020    HGB 13.0 12/23/2020    HCT 39.0 12/23/2020    MCV 86.1 12/23/2020     12/23/2020         Chemistry        Component Value Date/Time     12/23/2020 0915    K 4.3 12/23/2020 0915    K 4.2 03/30/2020 0649     12/23/2020 0915    CO2 25 12/23/2020 0915    BUN 18 12/23/2020 0915    CREATININE 1.0 12/23/2020 0915        Component Value Date/Time    CALCIUM 9.8 12/23/2020 0915    ALKPHOS 71 12/23/2020 0915    AST 26 12/23/2020 0915    ALT 17 12/23/2020 0915    BILITOT 0.3 12/23/2020 0915          Lab Results   Component Value Date    LABURIC 4.6 12/23/2020       Lab Results   Component Value Date    CRP 27.2 (H) 03/30/2020     Lab Results   Component Value Date    SAMMY POSITIVE (A) 09/18/2019    SEDRATE 37 (H) 03/30/2020     Lab Results   Component Value Date    CKTOTAL 53 11/19/2019     Lab Results   Component Value Date    TSH 1.86 04/25/2018     Lab Results   Component Value Date    VITD25 43.0 11/04/2020         Radiology Review:    CT chest June 13, 2019  FINDINGS: The mediastinum appears normal without signs of any lymph node enlargement. The thyroid gland extends down to the level of the sternum bilaterally.       The aorta is of normal caliber as well as the pulmonary arteries.       Subpleural areas of pulmonary linear changes, likely pulmonary fibrosis or interstitial lung disease are appreciated with 1 to 2 mm pulmonary nodule in the left upper lobe on image 42 series 9, well circumscribed. Additional subpleural linear changes    noted right middle lobe right left lower lobe and lingula near the lung base.       Poorly defined nodules noted just above the right hemidiaphragm the anterior aspect right lower lobe measuring 5 x 4 mm       There are some calcified subcarinal lymph nodes appreciated.       Calcification the left anterior descending coronary artery is appreciated without cardiac enlargement.       No pleural or pericardial effusion is noted.             PFT 6/2018- WNL  Esophageal swallow normal.    Surgical pathology -open lung biopsy -September 4, 2019  ADDENDUM:  ...  External consultation has been completed on this case and  the slides have been reviewed by Dr. Noe Peck MD,  PhD at [address] [place] [address].  Dr. Tammi Sheets consultative diagnosis is as follows:  \" Lung, right upper and lower lobes, VATS wedge biopsies (A and B):     - Patchy cellular chronic interstitial pneumonia associated with       ill-formed nonnecrotizing granulomas.     - Arterial vasculopathy.     - No fungal or acid fast microorganisms identified by special stains.     - See case comment \". .. Rafa Gimenez      In a comment, Dr. Urvashi Oglesby states that: \" The main       findings are patchy bronchiolocentric and subpleural chronic       interstitial pneumonia associated with ill-defined, nonnecrotizing       granulomas and a moderate to severe arterial vasculopathy.  A       primary etiologic consideration for this combination of       histopathologic findings is collagen vascular disease.  Infection       and chronic hypersensitivity pneumonitis are also differential       diagnosis.  Morphologic features diagnostic of usual interstitial       pneumonia seen in idiopathic pulmonary fibrosis are not present. AFB and GMS stain shows no evidence of fungal forms CD3 stains and CD20  stains are positive in the inflammatory component with no atypical T or B  cell infiltrates identified.  CD3 and CD20 stains were performed on  selected blocks from both parts A and B.  CD68 stain material is positive  in a macro 5 population and a nonspecific staining pattern.  Trichrome  stains performed on parts A and B and collagen stain performed on part A  show no evidence of increased interstitial fibrosis are collagen  fibrosis.  Please see outside consultation report for complete detailed  report and comments . ............................ Susan Garcia M.D.  (Electronic Signature)  09/12/2019    A/P- See above.

## 2021-01-06 DIAGNOSIS — F51.04 PSYCHOPHYSIOLOGICAL INSOMNIA: ICD-10-CM

## 2021-01-06 RX ORDER — TRAZODONE HYDROCHLORIDE 50 MG/1
TABLET ORAL
Qty: 30 TABLET | Refills: 0 | Status: SHIPPED | OUTPATIENT
Start: 2021-01-06 | End: 2021-03-29

## 2021-01-20 ENCOUNTER — VIRTUAL VISIT (OUTPATIENT)
Dept: ORTHOPEDIC SURGERY | Age: 61
End: 2021-01-20
Payer: COMMERCIAL

## 2021-01-20 DIAGNOSIS — M43.16 SPONDYLOLISTHESIS OF LUMBAR REGION: ICD-10-CM

## 2021-01-20 DIAGNOSIS — M47.816 SPONDYLOSIS WITHOUT MYELOPATHY OR RADICULOPATHY, LUMBAR REGION: Primary | ICD-10-CM

## 2021-01-20 DIAGNOSIS — M51.36 DDD (DEGENERATIVE DISC DISEASE), LUMBAR: ICD-10-CM

## 2021-01-20 PROCEDURE — 3017F COLORECTAL CA SCREEN DOC REV: CPT | Performed by: PHYSICIAN ASSISTANT

## 2021-01-20 PROCEDURE — 99213 OFFICE O/P EST LOW 20 MIN: CPT | Performed by: PHYSICIAN ASSISTANT

## 2021-01-20 PROCEDURE — G8427 DOCREV CUR MEDS BY ELIG CLIN: HCPCS | Performed by: PHYSICIAN ASSISTANT

## 2021-01-20 NOTE — PROGRESS NOTES
Follow up: SPINE (Audio and visual connection present for the virtual visit)    Skylar Rae  1960  [id number]         Chief Complaint   Patient presents with    Lower Back Pain     F/u RFA Bilateral L3, L4 MB & L5 DR 12/22/20         HISTORY OF PRESENT ILLNESS:  Ms. Stefany Willson is a 63 y.o. female returns for a follow up visit for multiple medical problems. Her current presenting problems are   1. Spondylosis without myelopathy or radiculopathy, lumbar region    2. Spondylolisthesis of lumbar region    3. DDD (degenerative disc disease), lumbar    . As per information/history obtained from the PADT(patient assessment and documentation tool) - She complains of pain in the lower back with radiation to the lower back She rates the pain 1/10 and describes it as dull, aching. Pain is made worse by: working. She denies side effects from the current pain regimen. Patient reports that since the last follow up visit the physical functioning is better, family/social relationships are better, mood is better and sleep patterns are better, and that the overall functioning is better. Patient denies neurological bowel or bladder. The patient presents today in follow-up after bilateral L3, L4, L5 DR radiofrequency ablation on 12/22/2020. The patient describes that she is 100% improved when she is not working. 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 Overall she is feeling much better at this time. Associated signs and symptoms:   Neurogenic bowel or bladder symptoms:  no   Perceived weakness:  no   Difficulty walking:  no            Past medical, surgical, social and family history reviewed with the patient.      Past Medical History:   Past Medical History:   Diagnosis Date    Acid reflux     Bilateral carpal tunnel syndrome 12/21/2017 BILATERAL LUMBAR THREE LUMBAR FOUR LUMBAR FIVE DORSAL RAMUS LUMBAR MEDIAL BRANCH BLOCK SITE CONFIRMED BY FLUOROSCOPY performed by Desiree Jain MD at [facility] Bilateral 12/8/2020    BILATERAL LUMBAR THREE LUMBAR FOUR LUMBAR FIVE DORSAL RAMUS LUMBAR MEDIAL BRANCH BLOCK SITE CONFIRMED BY FLUOROSCOPY performed by Desiree Jain MD at [facility] DX/THER SBST INTRLMNR CRV/THRC W/IMG GDN Right 8/21/2018    RIGHT LUMBAR FOUR/FIVE, LUMBAR FIVE/ SACRAL ONE FACET INJECTION AND RIGHT SACROILIAC JOINT INJECTION SITES CONFIRMED BY FLUOROSCOPY performed by Desiree Jain MD at [facility] ARTHROSCOPY Right 08/22/2017    ROTATOR CUFF REPAIR    THORACOSCOPY Right 9/4/2019    RIGHT VIDEO ASSISTED THORACOSCOPIC SURGERY, WEDGE RESECTION AND RIGHT UPPER AND LOWER LOBE NODULES WITH BIOPSY, INTERCOSTAL NERVE BLOCK TIMES SEVEN LEVELS performed by Tiffanie Cotto MD at [place] [address]     Current Medications:     Current Outpatient Medications:     traZODone (DESYREL) 50 MG tablet, TAKE 1 TABLET BY MOUTH AT BEDTIME, Disp: 30 tablet, Rfl: 0    allopurinol (ZYLOPRIM) 300 MG tablet, Take 1 tablet by mouth daily, Disp: 90 tablet, Rfl: 1    allopurinol (ZYLOPRIM) 300 MG tablet, Take 1 tablet by mouth daily, Disp: 90 tablet, Rfl: 0    colchicine (COLCRYS) 0.6 MG tablet, Take 1 tablet by mouth See Admin Instructions Take 2 (1.6mg) tabs immediately, then take one (0.6mg) tab one hour later. Tomorrow begin 1 tab (0.6mg) twice daily. , Disp: 30 tablet, Rfl: 3    zinc gluconate 50 MG tablet, Take 50 mg by mouth daily, Disp: , Rfl:     ASPIRIN LOW DOSE 81 MG chewable tablet, CHEW AND SWALLOW ONE TABLET EVERY DAY, Disp: 90 tablet, Rfl: 3    allopurinol (ZYLOPRIM) 100 MG tablet, Take 1 tablet by mouth daily, Disp: 90 tablet, Rfl: 1    rosuvastatin (CRESTOR) 10 MG tablet, TAKE 1 TABLET BY MOUTH DAILY, Disp: 90 tablet, Rfl: 1   valsartan (DIOVAN) 160 MG tablet, Take 1 tablet by mouth daily, Disp: 90 tablet, Rfl: 3    vitamin D3 (CHOLECALCIFEROL) 25 MCG (1000 UT) TABS tablet, Take 1 tablet by mouth daily, Disp: 90 tablet, Rfl: 1    vitamin C (ASCORBIC ACID) 500 MG tablet, Take 500 mg by mouth daily, Disp: , Rfl:     albuterol sulfate  (90 Base) MCG/ACT inhaler, INHALE 2 PUFFS INTO THE LUNGS EVERY 4 HOURS AS NEEDED FOR WHEEZING, Disp: 42.5 g, Rfl: 1    citalopram (CELEXA) 40 MG tablet, TAKE 1 TABLET BY MOUTH EVERY DAY, Disp: 90 tablet, Rfl: 3    famotidine (PEPCID) 40 MG tablet, Take 1 tablet by mouth 2 times daily, Disp: 60 tablet, Rfl: 5    Multiple Vitamins-Minerals (THERAPEUTIC MULTIVITAMIN-MINERALS) tablet, Take 1 tablet by mouth daily, Disp: , Rfl:   Allergies:  Atorvastatin and Protonix [pantoprazole sodium]  Social History:    reports that she quit smoking about 36 years ago. Her smoking use included cigarettes. She has a 5.00 pack-year smoking history. She has never used smokeless tobacco. She reports current alcohol use of about 1.0 standard drinks of alcohol per week. She reports that she does not use drugs.   Family History:   Family History   Problem Relation Age of Onset    High Blood Pressure Mother     Rheum Arthritis Mother     Cancer Father     Hypertension Father     Colon Cancer Father     High Blood Pressure Brother     Stroke Brother     Heart Defect Brother     Hypertension Sister     Rheum Arthritis Sister     Emphysema Sister     Rheum Arthritis Sister     Other Sister         bottom of lungs are getting hard    Hypertension Brother     Rheum Arthritis Brother     Heart Attack Paternal Grandfather     No Known Problems Paternal Grandmother     No Known Problems Maternal Grandmother     No Known Problems Maternal Grandfather     Heart Attack Maternal Aunt     No Known Problems Maternal Aunt     Brain Cancer Maternal Aunt     Other Maternal Aunt         Complications from surgery  Arthritis Maternal Aunt     Arthritis Maternal Aunt     Heart Disease Maternal Aunt     High Blood Pressure Maternal Aunt     Cancer Maternal Uncle     Heart Disease Maternal Uncle     Heart Attack Maternal Uncle     High Blood Pressure Maternal Uncle     Heart Attack Maternal Uncle     High Blood Pressure Maternal Uncle     Parkinsonism Maternal Uncle     Heart Attack Paternal Aunt     Breast Cancer Paternal Aunt     Dementia Paternal Aunt     Heart Attack Paternal Uncle     Heart Attack Paternal Uncle     Brain Cancer Maternal Uncle        REVIEW OF SYSTEMS:   CONSTITUTIONAL: Denies unexplained weight loss, fevers, chills or fatigue  NEUROLOGICAL: Denies unsteady gait or progressive weakness  MUSCULOSKELETAL: Denies joint swelling or redness  GI: Denies nausea, vomiting, diarrhea   : Denies bowel or bladder issues       PHYSICAL EXAM:    Vitals:   Patient-Reported Vitals 1/20/2021   Patient-Reported Weight 243LB   Patient-Reported Height 5'3   Patient-Reported Pulse 68   Patient-Reported Temperature 97.1        GENERAL EXAM:  · General Apparence: Patient is adequately groomed with no evidence of malnutrition. · Psychiatric: Orientation: The patient is oriented to time, place and person. The patient's mood and affect are appropriate   · RIGHT UPPER EXTREMITY:  Inspection/examination of the right upper extremity does not show any tenderness, deformity or injury. Range of motion is unremarkable and pain-free. There is no gross instability. There are no rashes, ulcerations or lesions. Strength and tone are normal. No atrophy or abnormal movements are noted. · LEFT UPPER EXTREMITY: Inspection/examination of the left upper extremity does not show any tenderness, deformity or injury. Range of motion is unremarkable and pain-free. There is no gross instability. There are no rashes, ulcerations or lesions. Strength and tone are normal. No atrophy or abnormal movements are noted. LUMBAR/SACRAL EXAMINATION:  · Palpation:   No evidence of tenderness at the midline. Lumbar paraspinal tenderness: Mild L4/5 and L5/S1 tenderness  Bursal tenderness No tenderness bilaterally  There is no paraspinal spasm. · Range of Motion: limited by 50% in all planes due to pain  · Strength:   Strength testing is 5/5 in all muscle groups tested. · Special Tests:   Straight leg raise and crossed SLR negative. · Skin: There are no rashes, ulcerations or lesions. · Gait & station: normal, patient ambulates without assistance and no ataxia  · Additional Examinations:  · RIGHT LOWER EXTREMITY: Inspection/examination of the right lower extremity does not show any tenderness, deformity or injury. Range of motion is normal and pain-free. There is no gross instability. There are no rashes, ulcerations or lesions. Strength and tone are normal. No atrophy or abnormal movements are noted. · LEFT LOWER EXTREMITY:  Inspection/examination of the left lower extremity does not show any tenderness, deformity or injury. Range of motion is normal and pain-free. There is no gross instability. There are no rashes, ulcerations or lesions. Strength and tone are normal. No atrophy or abnormal movements are noted. Diagnostic Testing:    MR Lumbar spine shows  from 1/21/20:  Conus medullaris remains normal size and configuration, terminating at the superior L1 level. Vertebral height is preserved. No suspicious osseous lesion.       L1-L2-normal with foraminal patency       L2-L3 and L3-L4-mild disc desiccation without disc thinning, stable. No compressive disc pathology. Foramina patent despite facet arthropathy at L3-L4.       L4-L5-grade 1 degenerative anterolisthesis measures 4 mm, without significant change. Annular pseudobulge, in combination with marked facet arthropathy results in moderate central spinal canal stenosis, unchanged.  Bilateral lateral recess stenosis impinges mildly on the proximal L4 nerve roots. Both foramina mildly compromised       L5-S1-normal. Facets are arthropathic with foraminal patency           Impression       Stable grade 1 degenerative anterolisthesis L4 over L5 resulting in moderate central spinal canal stenosis. Bilateral lateral recess stenosis is redemonstrated with mild impingement of the proximal L4 nerve roots. .       Results for orders placed or performed during the hospital encounter of 12/23/20   Comprehensive Metabolic Panel   Result Value Ref Range    Sodium 143 136 - 145 mmol/L    Potassium 4.3 3.5 - 5.1 mmol/L    Chloride 105 99 - 110 mmol/L    CO2 25 21 - 32 mmol/L    Anion Gap 13 3 - 16    Glucose 129 (H) 70 - 99 mg/dL    BUN 18 7 - 20 mg/dL    CREATININE 1.0 0.6 - 1.2 mg/dL    GFR Non- 56 (A) >60    GFR African American >60 >60    Calcium 9.8 8.3 - 10.6 mg/dL    Total Protein 7.4 6.4 - 8.2 g/dL    Alb 4.0 3.4 - 5.0 g/dL    Albumin/Globulin Ratio 1.2 1.1 - 2.2    Total Bilirubin 0.3 0.0 - 1.0 mg/dL    Alkaline Phosphatase 71 40 - 129 U/L    ALT 17 10 - 40 U/L    AST 26 15 - 37 U/L    Globulin 3.4 g/dL   CBC Auto Differential   Result Value Ref Range    WBC 8.8 4.0 - 11.0 K/uL    RBC 4.53 4.00 - 5.20 M/uL    Hemoglobin 13.0 12.0 - 16.0 g/dL    Hematocrit 39.0 36.0 - 48.0 %    MCV 86.1 80.0 - 100.0 fL    MCH 28.6 26.0 - 34.0 pg    MCHC 33.2 31.0 - 36.0 g/dL    RDW 14.6 12.4 - 15.4 %    Platelets 047 232 - 005 K/uL    MPV 8.8 5.0 - 10.5 fL    Neutrophils % 84.2 %    Lymphocytes % 10.5 %    Monocytes % 4.9 %    Eosinophils % 0.1 %    Basophils % 0.3 %    Neutrophils Absolute 7.4 1.7 - 7.7 K/uL    Lymphocytes Absolute 0.9 (L) 1.0 - 5.1 K/uL    Monocytes Absolute 0.4 0.0 - 1.3 K/uL    Eosinophils Absolute 0.0 0.0 - 0.6 K/uL    Basophils Absolute 0.0 0.0 - 0.2 K/uL   Uric Acid   Result Value Ref Range    Uric Acid, Serum 4.6 2.6 - 6.0 mg/dL     Impression: 1. Spondylosis without myelopathy or radiculopathy, lumbar region    2. Spondylolisthesis of lumbar region    3. DDD (degenerative disc disease), lumbar        Plan:  Clinical Course: Above diagnoses are improving     I discussed the diagnosis and the treatment options with Justice Valentine today. In Summary:  The various treatment options were outlined and discussed with Enedina Islas including:  Conservative care options: physical therapy, ice, medications, bracing, and activity modification. The indications for therapeutic injections. The indications for additional imaging/laboratory studies. The indications for (possible future) interventions. After considering the various options discussed, Justice Valentine elected to pursue a course of treatment that includes the followin. Medications:  No further recommendations for new medications. 2. PT:  Prescribed home exercise program. For the lumbar spine. 3. Further studies: No further studies. 4. 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 She is able to take some time off in approximately 2 weeks after the current job she is working out as finished. 5. Follow up:  2-3 months      Justice Valentine was instructed to call the office if her symptoms worsen or if new symptoms appear prior to the next scheduled visit. She is specifically instructed to contact the office between now & her scheduled appointment if she has concerns related to her condition or if she needs assistance in scheduling the above tests. She is welcome to call for an appointment sooner if she has any additional concerns or questions.            GERMAINE Cummings, VIRGIL  Board Certified by the [employer] on Certification of Physician Assistants [id number]  Partner of [facility] ([facility])      This dictation was performed with a verbal recognition program [facility] ) and it was checked for errors. It is possible that there are still dictated errors within this office note. If so, please bring any errors to my attention for an addendum. All efforts were made to ensure that this office note is accurate. This visit was completed virtually using doxy. me. Patient has verbally accepted the following: We confirm that, for purposes of billing, this is a virtual visit with the provider for which we will submit a claim for reimbursement with the insurance company. The patient accepts responsibility for any co-pays, coinsurance amounts or other amounts not covered by the insurance company. Patient location: [place] [address] patient's place of work  Physician location: [address] [place] [address]    Time spent: 10 minutes     Pursuant to the emergency declaration under the [facility] waiver authority and the [employer] and [employer] General Act, this Virtual  Visit was conducted, with patient's consent, to reduce the patient's risk of exposure to COVID-19 and provide continuity of care for an established patient. Services were provided through a video synchronous discussion virtually to substitute for in-person clinic visit. We have confirmed that, for purposes of billing, this is a virtual visit with for which we will submit a claim for reimbursement with your insurance company. The patient has accepted responsibility for any copays, coinsurance amounts or other amounts not covered by their insurance company.

## 2021-02-15 RX ORDER — ALLOPURINOL 300 MG/1
300 TABLET ORAL DAILY
Qty: 90 TABLET | Refills: 0 | Status: SHIPPED | OUTPATIENT
Start: 2021-02-15 | End: 2021-02-20 | Stop reason: SDUPTHER

## 2021-02-17 ENCOUNTER — OFFICE VISIT (OUTPATIENT)
Dept: FAMILY MEDICINE CLINIC | Age: 61
End: 2021-02-17
Payer: COMMERCIAL

## 2021-02-17 VITALS
SYSTOLIC BLOOD PRESSURE: 137 MMHG | TEMPERATURE: 97.3 F | OXYGEN SATURATION: 98 % | DIASTOLIC BLOOD PRESSURE: 82 MMHG | BODY MASS INDEX: 43.41 KG/M2 | HEIGHT: 63 IN | HEART RATE: 74 BPM | WEIGHT: 245 LBS

## 2021-02-17 DIAGNOSIS — E66.01 MORBID OBESITY WITH BMI OF 45.0-49.9, ADULT (HCC): Primary | ICD-10-CM

## 2021-02-17 DIAGNOSIS — M10.471 ACUTE GOUT DUE TO OTHER SECONDARY CAUSE INVOLVING TOE OF RIGHT FOOT: ICD-10-CM

## 2021-02-17 DIAGNOSIS — I10 ESSENTIAL HYPERTENSION: ICD-10-CM

## 2021-02-17 DIAGNOSIS — N30.00 ACUTE CYSTITIS WITHOUT HEMATURIA: ICD-10-CM

## 2021-02-17 DIAGNOSIS — N18.32 STAGE 3B CHRONIC KIDNEY DISEASE (HCC): ICD-10-CM

## 2021-02-17 LAB
BACTERIA URINE, POC: ABNORMAL
BILIRUBIN URINE: 1 MG/DL
BLOOD, URINE: POSITIVE
CASTS URINE, POC: ABNORMAL
CLARITY: CLEAR
COLOR: YELLOW
CRYSTALS URINE, POC: ABNORMAL
EPI CELLS URINE, POC: ABNORMAL
GLUCOSE URINE: ABNORMAL
HBA1C MFR BLD: 6.2 %
KETONES, URINE: NEGATIVE
LEUKOCYTE EST, POC: ABNORMAL
NITRITE, URINE: NEGATIVE
PH UA: 7 (ref 4.5–8)
PROTEIN UA: NEGATIVE
RBC URINE, POC: ABNORMAL
SPECIFIC GRAVITY UA: 1.01 (ref 1–1.03)
UROBILINOGEN, URINE: ABNORMAL
WBC URINE, POC: ABNORMAL
YEAST URINE, POC: ABNORMAL

## 2021-02-17 PROCEDURE — 1036F TOBACCO NON-USER: CPT | Performed by: NURSE PRACTITIONER

## 2021-02-17 PROCEDURE — 83036 HEMOGLOBIN GLYCOSYLATED A1C: CPT | Performed by: NURSE PRACTITIONER

## 2021-02-17 PROCEDURE — G8484 FLU IMMUNIZE NO ADMIN: HCPCS | Performed by: NURSE PRACTITIONER

## 2021-02-17 PROCEDURE — G8427 DOCREV CUR MEDS BY ELIG CLIN: HCPCS | Performed by: NURSE PRACTITIONER

## 2021-02-17 PROCEDURE — G8417 CALC BMI ABV UP PARAM F/U: HCPCS | Performed by: NURSE PRACTITIONER

## 2021-02-17 PROCEDURE — 99214 OFFICE O/P EST MOD 30 MIN: CPT | Performed by: NURSE PRACTITIONER

## 2021-02-17 PROCEDURE — 81003 URINALYSIS AUTO W/O SCOPE: CPT | Performed by: NURSE PRACTITIONER

## 2021-02-17 PROCEDURE — 3017F COLORECTAL CA SCREEN DOC REV: CPT | Performed by: NURSE PRACTITIONER

## 2021-02-17 NOTE — PROGRESS NOTES
Sally Slade (:  1960) is a 63 y.o. female,Established patient, here for evaluation of the following chief complaint(s):  Hypertension      ASSESSMENT/PLAN:  1. Morbid obesity with BMI of 45.0-49.9, adult ([facility])  Assessment & Plan:  A1c 6.2  Enc weight loss and increased exercise  She is limited d/t foot pains with gout  Orders:  -     POCT glycosylated hemoglobin (Hb A1C)  2. Acute cystitis without hematuria  -     URINE RT REFLEX TO CULTURE  -     POCT Urine with Microscopic  -     MICROSCOPIC URINALYSIS; Future  -     MICROSCOPIC URINALYSIS  -     URINALYSIS WITH MICROSCOPIC  3. Stage 3b chronic kidney disease  Assessment & Plan:  Stable. This is followed by nephrology  4. Acute gout due to other secondary cause involving toe of right foot  Assessment & Plan:  Currently on prednisone for flare  Colchicine and allopurinol  Sees Rheum for this  5.  Essential hypertension  Assessment & Plan:  Not at goal- seeing nephrology  Reports better readings at home  Low salt diet  Weight loss    Office Visit on 2021   Component Date Value Ref Range Status    Hemoglobin A1C 2021 6.2  % Final    Color, UA 2021 YELLOW  Straw/Yellow Final    Clarity, UA 2021 Clear  Clear Final    Glucose, Ur 2021 Negative  Negative mg/dL Final    Bilirubin Urine 2021 Negative  Negative Final    Ketones, Urine 2021 Negative  Negative mg/dL Final    Specific Fitzgerald, UA 2021 1.019  1.005 - 1.030 Final    Blood, Urine 2021 Negative  Negative Final    pH, UA 2021 6.0  5.0 - 8.0 Final    Protein, UA 2021 Negative  Negative mg/dL Final    Urobilinogen, Urine 2021 1.0  <2.0 E.U./dL Final    Nitrite, Urine 2021 Negative  Negative Final    Leukocyte Esterase, Urine 2021 SMALL* Negative Final    Microscopic Examination 2021 YES   Final    Urine Type 2021 Voided   Final    Urine Reflex to Culture 2021 Not Indicated   Final  Color, UA 02/17/2021 Yellow   Final    Clarity, UA 02/17/2021 Clear  Clear Final    Glucose, Ur 02/17/2021 neg   Final    Bilirubin Urine 02/17/2021 1  mg/dL Final    Ketones, Urine 02/17/2021 Negative   Final    Specific Gravity, UA 02/17/2021 1.010  1.005 - 1.030 Final    Blood, Urine 02/17/2021 Positive   Final    pH, UA 02/17/2021 7.0  4.5 - 8.0 Final    Protein, UA 02/17/2021 Negative  Negative Final    Nitrite, Urine 02/17/2021 Negative   Final    Leukocytes, UA 02/17/2021 neg   Final    Bacteria, UA 02/17/2021 2+* None Seen /HPF Final    Hyaline Casts, UA 02/17/2021 1  0 - 8 /LPF Final    WBC, UA 02/17/2021 8* 0 - 5 /HPF Final    RBC, UA 02/17/2021 1  0 - 4 /HPF Final    Epithelial Cells, UA 02/17/2021 6* 0 - 5 /HPF Final     Urine contamination, no leuks    No follow-ups on file. SUBJECTIVE/OBJECTIVE:  Here for BP check- Sees nephro and rheum for recurrent gout. Can not take diuretics d/t the gout. Currently on prednisone. BP checks at home 130-150s  Having mild urinary urgency- wants checked for UTI        Current Outpatient Medications   Medication Sig Dispense Refill    allopurinol (ZYLOPRIM) 300 MG tablet TAKE 1 TABLET BY MOUTH DAILY 90 tablet 0    traZODone (DESYREL) 50 MG tablet TAKE 1 TABLET BY MOUTH AT BEDTIME 30 tablet 0    allopurinol (ZYLOPRIM) 300 MG tablet Take 1 tablet by mouth daily 90 tablet 1    colchicine (COLCRYS) 0.6 MG tablet Take 1 tablet by mouth See Admin Instructions Take 2 (1.6mg) tabs immediately, then take one (0.6mg) tab one hour later. Tomorrow begin 1 tab (0.6mg) twice daily.  (Patient taking differently: Take 0.6 mg by mouth See Admin Instructions As needed) 30 tablet 3    ASPIRIN LOW DOSE 81 MG chewable tablet CHEW AND SWALLOW ONE TABLET EVERY DAY 90 tablet 3    rosuvastatin (CRESTOR) 10 MG tablet TAKE 1 TABLET BY MOUTH DAILY 90 tablet 1    valsartan (DIOVAN) 160 MG tablet Take 1 tablet by mouth daily 90 tablet 3

## 2021-02-18 LAB
BACTERIA: ABNORMAL /HPF
BILIRUBIN URINE: NEGATIVE
BLOOD, URINE: NEGATIVE
CLARITY: CLEAR
COLOR: YELLOW
EPITHELIAL CELLS, UA: 6 /HPF (ref 0–5)
GLUCOSE URINE: NEGATIVE MG/DL
HYALINE CASTS: 1 /LPF (ref 0–8)
KETONES, URINE: NEGATIVE MG/DL
LEUKOCYTE ESTERASE, URINE: ABNORMAL
MICROSCOPIC EXAMINATION: YES
NITRITE, URINE: NEGATIVE
PH UA: 6 (ref 5–8)
PROTEIN UA: NEGATIVE MG/DL
RBC UA: 1 /HPF (ref 0–4)
SPECIFIC GRAVITY UA: 1.02 (ref 1–1.03)
URINE REFLEX TO CULTURE: ABNORMAL
URINE TYPE: ABNORMAL
UROBILINOGEN, URINE: 1 E.U./DL
WBC UA: 8 /HPF (ref 0–5)

## 2021-02-22 RX ORDER — ASPIRIN 81 MG/1
81 TABLET, CHEWABLE ORAL DAILY
Qty: 90 TABLET | Refills: 3 | Status: SHIPPED | OUTPATIENT
Start: 2021-02-22 | End: 2022-03-01

## 2021-02-22 RX ORDER — ALLOPURINOL 300 MG/1
300 TABLET ORAL DAILY
Qty: 90 TABLET | Refills: 0 | Status: SHIPPED | OUTPATIENT
Start: 2021-02-22 | End: 2021-07-13

## 2021-02-22 NOTE — TELEPHONE ENCOUNTER
Requested Prescriptions     Pending Prescriptions Disp Refills    aspirin (ASPIRIN LOW DOSE) 81 MG chewable tablet 90 tablet 3     Sig: Take 1 tablet by mouth daily                  Last Office Visit: 10/12/2020     Next Office Visit: 4/12/2021

## 2021-03-04 ENCOUNTER — PATIENT MESSAGE (OUTPATIENT)
Dept: FAMILY MEDICINE CLINIC | Age: 61
End: 2021-03-04

## 2021-03-04 DIAGNOSIS — F32.0 MILD SINGLE CURRENT EPISODE OF MAJOR DEPRESSIVE DISORDER (HCC): ICD-10-CM

## 2021-03-04 RX ORDER — CITALOPRAM 40 MG/1
40 TABLET ORAL DAILY
Qty: 90 TABLET | Refills: 3 | Status: SHIPPED | OUTPATIENT
Start: 2021-03-04 | End: 2022-02-24

## 2021-03-25 RX ORDER — ROSUVASTATIN CALCIUM 10 MG/1
TABLET, COATED ORAL
Qty: 90 TABLET | Refills: 0 | Status: SHIPPED | OUTPATIENT
Start: 2021-03-25 | End: 2021-09-07

## 2021-03-28 DIAGNOSIS — F51.04 PSYCHOPHYSIOLOGICAL INSOMNIA: ICD-10-CM

## 2021-03-29 RX ORDER — TRAZODONE HYDROCHLORIDE 50 MG/1
TABLET ORAL
Qty: 30 TABLET | Refills: 0 | Status: SHIPPED | OUTPATIENT
Start: 2021-03-29 | End: 2021-04-26

## 2021-03-31 ENCOUNTER — VIRTUAL VISIT (OUTPATIENT)
Dept: RHEUMATOLOGY | Age: 61
End: 2021-03-31
Payer: COMMERCIAL

## 2021-03-31 DIAGNOSIS — M79.89 SWELLING OF LEFT FOOT: ICD-10-CM

## 2021-03-31 DIAGNOSIS — M10.9 POLYARTICULAR GOUT: Primary | ICD-10-CM

## 2021-03-31 PROCEDURE — 99214 OFFICE O/P EST MOD 30 MIN: CPT | Performed by: INTERNAL MEDICINE

## 2021-03-31 PROCEDURE — G8427 DOCREV CUR MEDS BY ELIG CLIN: HCPCS | Performed by: INTERNAL MEDICINE

## 2021-03-31 PROCEDURE — 3017F COLORECTAL CA SCREEN DOC REV: CPT | Performed by: INTERNAL MEDICINE

## 2021-03-31 RX ORDER — PREDNISONE 10 MG/1
TABLET ORAL
Qty: 32 TABLET | Refills: 0 | Status: SHIPPED | OUTPATIENT
Start: 2021-03-31 | End: 2021-04-10

## 2021-03-31 NOTE — PROGRESS NOTES
Gael Day is a 63 y.o. female being evaluated by a Virtual Visit (video visit) encounter to address concerns as mentioned above. A caregiver was present when appropriate. Due to this being a TeleHealth encounter (During KVTZB-87 public health emergency), evaluation of the following organ systems was limited: Vitals/Constitutional/EENT/Resp/CV/GI//MS/Neuro/Skin/Heme-Lymph-Imm. Pursuant to the emergency declaration under the [address] [place] [address] and the [employer] Act, this Virtual Visit was conducted with patient's (and/or legal guardian's) consent, to reduce the patient's risk of exposure to COVID-19 and provide necessary medical care. The patient (and/or legal guardian) has also been advised to contact this office for worsening conditions or problems, and seek emergency medical treatment and/or call 911 if deemed necessary. Patient identification was verified at the start of the visit: Yes    Services were provided through a video synchronous discussion virtually to substitute for in-person clinic visit. Patient and provider were located at their individual homes. --Gutierrez Dumont MD on 3/31/2021 at 1:22 PM    An electronic signature was used to authenticate this note. [address] [place] [address], MD                                                           [address]                                                             [phone] (P) [phone] (F)      Dear Dr. Hossein Ji, APRN - CNP:  Please find Rheumatology assessment. Thank you for giving me the opportunity to be involved in Earlene Islas's care and I look forward following Earlene along with you. If you have any questions or concerns please feel free to reach me. Note is transcribed using voice recognition software. Inadvertent computerized transcription errors may be present. Patient identification: Helga Islas,: [id number],79 y.o. Sex: female     Sylvie Jeter was seen today for follow-up and arthritis. Diagnoses and all orders for this visit:    Polyarticular gout  -     Hepatic Function Panel; Future  -     Creatinine; Future  -     Uric Acid; Future    Swelling of left foot    Other orders  -     predniSONE (DELTASONE) 10 MG tablet; 4 pills po daily x 3 days. 3 pills daily x 3 days. 2 pills daily x 3 days. 1 pill daily x 3 days. 1/2 pill daily for next 3 days and stop. Acute gout flare-left foot-2 days. No precipitating factor. Otherwise gout has been in remission for at least couple of years. Target serum uric acid has been achieved. Vomiting with colchicine. Plan-  Treat acute flare with prednisone taper. Continue allopurinol 300 mg a day. Labs after flare is resolved. Other medical problems-low titer SAMMY, ILD likely hypersensitive. Followed by Dr James Ramirez. Is off of prednisone. Follow-up in 6 months. Patient indicates understanding and agrees with the management plan. I reviewed patient's history, referral documents and electronic medical records. Total time 32 minutes that includes the following-  Preparing to see the patient such as reviewing patients records, pre-charting, preparing the visit on the same day, performing a medically appropriate history and physical examination, counseling and educating patient about diagnosis, management plan, ordering appropriate testings, prescriptions, communicating findings to other care providers, and documenting clinical information and electronic medical record. #######################################################################  [id number] for polyarticular gout.   Other medical comorbidities coronary artery disease, hypertension, hyperlipidemia and interstitial lung disease. Interval Bettina Lomas has not had a gout flare in last 2 years - 2 days ago she noticed pain, swelling, redness of her left big toe that spread to her entire left foot. It is swollen, red, difficult to move. No fever or chills. Does not recall any injury. Compliant with allopurinol. Does not drink alcohol in the regular basis, had half a glass of wine couple of weeks ago. Other joints are asymptomatic. ILD stable. Current Outpatient Medications   Medication Sig Dispense Refill    predniSONE (DELTASONE) 10 MG tablet 4 pills po daily x 3 days. 3 pills daily x 3 days. 2 pills daily x 3 days. 1 pill daily x 3 days. 1/2 pill daily for next 3 days and stop. 32 tablet 0    traZODone (DESYREL) 50 MG tablet TAKE ONE TABLET BY MOUTH AT BEDTIME 30 tablet 0    rosuvastatin (CRESTOR) 10 MG tablet TAKE 1 TABLET BY MOUTH EVERY DAY 90 tablet 0    citalopram (CELEXA) 40 MG tablet Take 1 tablet by mouth daily 90 tablet 3    allopurinol (ZYLOPRIM) 300 MG tablet Take 1 tablet by mouth daily 90 tablet 0    aspirin (ASPIRIN LOW DOSE) 81 MG chewable tablet Take 1 tablet by mouth daily 90 tablet 3    allopurinol (ZYLOPRIM) 300 MG tablet Take 1 tablet by mouth daily 90 tablet 1    valsartan (DIOVAN) 160 MG tablet Take 1 tablet by mouth daily 90 tablet 3    vitamin D3 (CHOLECALCIFEROL) 25 MCG (1000 UT) TABS tablet Take 1 tablet by mouth daily 90 tablet 1    albuterol sulfate  (90 Base) MCG/ACT inhaler INHALE 2 PUFFS INTO THE LUNGS EVERY 4 HOURS AS NEEDED FOR WHEEZING 42.5 g 1    famotidine (PEPCID) 40 MG tablet Take 1 tablet by mouth 2 times daily 60 tablet 5    Multiple Vitamins-Minerals (THERAPEUTIC MULTIVITAMIN-MINERALS) tablet Take 1 tablet by mouth daily       No current facility-administered medications for this visit.       Allergies   Allergen Reactions    Atorvastatin Other (See Comments)     Multiple muscle spasms/cramps over body    Protonix [Pantoprazole Sodium] Diarrhea       PHYSICAL EXAM:    Vitals:    LMP 07/13/2011   General appearance/ Psychiatric: well nourished, and well groomed, normal judgement, alert, appears stated age and cooperative. MKS: Other than swelling of left foot dorsally, no vs finding   Appears comfortable with normal breathing.       DATA:   Lab Results   Component Value Date    WBC 8.8 12/23/2020    HGB 13.0 12/23/2020    HCT 39.0 12/23/2020    MCV 86.1 12/23/2020     12/23/2020         Chemistry        Component Value Date/Time     12/23/2020 0915    K 4.3 12/23/2020 0915    K 4.2 03/30/2020 0649     12/23/2020 0915    CO2 25 12/23/2020 0915    BUN 18 12/23/2020 0915    CREATININE 1.0 12/23/2020 0915        Component Value Date/Time    CALCIUM 9.8 12/23/2020 0915    ALKPHOS 71 12/23/2020 0915    AST 26 12/23/2020 0915    ALT 17 12/23/2020 0915    BILITOT 0.3 12/23/2020 0915          Lab Results   Component Value Date    LABURIC 4.6 12/23/2020       Lab Results   Component Value Date    CRP 27.2 (H) 03/30/2020     Lab Results   Component Value Date    SAMMY POSITIVE (A) 09/18/2019    SEDRATE 37 (H) 03/30/2020     Lab Results   Component Value Date    CKTOTAL 53 11/19/2019     Lab Results   Component Value Date    TSH 1.86 04/25/2018     Lab Results   Component Value Date    VITD25 43.0 11/04/2020

## 2021-04-05 ENCOUNTER — HOSPITAL ENCOUNTER (OUTPATIENT)
Age: 61
Discharge: HOME | End: 2021-04-05
Payer: COMMERCIAL

## 2021-04-05 LAB
ALBUMIN SERPL-MCNC: 4.3 G/DL (ref 3.4–5)
ANION GAP SERPL CALCULATED.3IONS-SCNC: 14 MMOL/L (ref 3–16)
BUN BLDV-MCNC: 27 MG/DL (ref 7–20)
CALCIUM SERPL-MCNC: 9.3 MG/DL (ref 8.3–10.6)
CHLORIDE BLD-SCNC: 105 MMOL/L (ref 99–110)
CO2: 25 MMOL/L (ref 21–32)
CREAT SERPL-MCNC: 1.3 MG/DL (ref 0.6–1.2)
CREATININE URINE: 125.9 MG/DL (ref 28–259)
GFR AFRICAN AMERICAN: 50
GFR NON-AFRICAN AMERICAN: 42
GLUCOSE BLD-MCNC: 149 MG/DL (ref 70–99)
PARATHYROID HORMONE INTACT: 47.3 PG/ML (ref 14–72)
PHOSPHORUS: 3.5 MG/DL (ref 2.5–4.9)
POTASSIUM SERPL-SCNC: 4.1 MMOL/L (ref 3.5–5.1)
PROTEIN PROTEIN: 12 MG/DL
PROTEIN/CREAT RATIO: 0.1 MG/DL
SODIUM BLD-SCNC: 144 MMOL/L (ref 136–145)
VITAMIN D 25-HYDROXY: 41.8 NG/ML

## 2021-04-05 PROCEDURE — 36415 COLL VENOUS BLD VENIPUNCTURE: CPT

## 2021-04-05 PROCEDURE — 80069 RENAL FUNCTION PANEL: CPT

## 2021-04-05 PROCEDURE — 82306 VITAMIN D 25 HYDROXY: CPT

## 2021-04-05 PROCEDURE — 82570 ASSAY OF URINE CREATININE: CPT

## 2021-04-05 PROCEDURE — 84156 ASSAY OF PROTEIN URINE: CPT

## 2021-04-05 PROCEDURE — 83970 ASSAY OF PARATHORMONE: CPT

## 2021-04-12 ENCOUNTER — OFFICE VISIT (OUTPATIENT)
Dept: CARDIOLOGY CLINIC | Age: 61
End: 2021-04-12
Payer: COMMERCIAL

## 2021-04-12 VITALS
SYSTOLIC BLOOD PRESSURE: 130 MMHG | WEIGHT: 252.2 LBS | HEART RATE: 72 BPM | DIASTOLIC BLOOD PRESSURE: 68 MMHG | BODY MASS INDEX: 44.68 KG/M2

## 2021-04-12 DIAGNOSIS — I25.10 CAD IN NATIVE ARTERY: Primary | ICD-10-CM

## 2021-04-12 DIAGNOSIS — R06.09 DOE (DYSPNEA ON EXERTION): ICD-10-CM

## 2021-04-12 DIAGNOSIS — E78.2 MIXED HYPERLIPIDEMIA: ICD-10-CM

## 2021-04-12 DIAGNOSIS — Z98.61 S/P PTCA (PERCUTANEOUS TRANSLUMINAL CORONARY ANGIOPLASTY): ICD-10-CM

## 2021-04-12 DIAGNOSIS — I10 BENIGN ESSENTIAL HTN: ICD-10-CM

## 2021-04-12 PROCEDURE — G8427 DOCREV CUR MEDS BY ELIG CLIN: HCPCS | Performed by: NURSE PRACTITIONER

## 2021-04-12 PROCEDURE — G8417 CALC BMI ABV UP PARAM F/U: HCPCS | Performed by: NURSE PRACTITIONER

## 2021-04-12 PROCEDURE — 1036F TOBACCO NON-USER: CPT | Performed by: NURSE PRACTITIONER

## 2021-04-12 PROCEDURE — 99213 OFFICE O/P EST LOW 20 MIN: CPT | Performed by: NURSE PRACTITIONER

## 2021-04-12 PROCEDURE — 3017F COLORECTAL CA SCREEN DOC REV: CPT | Performed by: NURSE PRACTITIONER

## 2021-04-12 NOTE — PROGRESS NOTES
CC 6 month re: CAD/HTN/HLD and chronic TAVERAS    HPI    63 y.o. patient of Dr Renetta Allan here for CAD/PCI, HTN, HLD and TAVERAS. She is feeling wonderful. She has her typical SOB. Denies cp, LH/dizziness, palpitations or syncope. No LE edema, orthopnea or PND. No fever, chills, n/v/d or GI/ bleeding. She has lost 12 lbs and is exercising. 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 Past Medical History:   Diagnosis Date    Acid reflux     Bilateral carpal tunnel syndrome 12/21/2017    CAD in native artery 06/19/2018    Mid LAD stented with 3.5 x 15 mm Xience BRIAN.  EF 55%    Chicken pox     Chronic back pain     CKD (chronic kidney disease) 4/15/2016    Depression 12/7/2010    Heart disease     Hypertension     [facility]stitial lung disease [facility]     Measles     Menopausal symptoms     Morbid obesity with BMI of 45.0-49.9, adult ([facility] [address] 1/29/2015    Osteoarthritis     Overactive bladder     Pure hypercholesterolemia     Stress incontinence      Past Surgical History:   Procedure Laterality Date    BRONCHOSCOPY  6/27/2019    BRONCHOSCOPY ALVEOLAR LAVAGE performed by Arianna Barksdale MD at [address] [place] [address]  6/27/2019    BRONCHOSCOPY DIAGNOSTIC OR CELL [address] ONLY performed by Arianna Barksdale MD at [address] [place] [address]  6/27/2019    BRONCHOSCOPY BIOPSY BRONCHUS performed by Arianna Barksdale MD at [facility] Bilateral 01/29/2018    EPIDURAL STEROID INJECTION N/A 10/15/2019    MIDLINE CAUDAL EPIDURAL STEROID INJECTION AND COCCYGEAL JOINT INJECTION SITES CONFIRMED BY FLUOROSCOPY performed by Gurpreet Francis MD at [facility]  2009,2010    [address] Bilateral 12/22/2020    BILATERAL LUMBAR THREE LUMBAR FOUR LUMBAR FIVE DORSAL RAMUS RADIOFREQUENCY ABLATION SITE CONFIRMED BY FLUOROSCOPY performed by Gurpreet Francis MD at [facility] [facility] Right 3/3/2020    RIGHT LUMBAR FOUR AND LUMBAR FIVE TRANSFORAMINAL EPIDURAL STEROID INJECTION SITE CONFIRMED BY FLUOROSCOPY performed by Jose Daniel Hoover MD at [facility] [facility] Right 11/2/2020    RIGHT L4 AND L5 TRANSFORAMINAL EPIDURAL STEROID INJECTION WITH FLUOROSCOPY (06042, 61872) performed by Jose Daniel Hoover MD at [facility] Bilateral 11/17/2020    BILATERAL LUMBAR THREE LUMBAR FOUR LUMBAR FIVE DORSAL RAMUS LUMBAR MEDIAL BRANCH BLOCK SITE CONFIRMED BY FLUOROSCOPY performed by Jose Daniel Hoover MD at [facility] Bilateral 12/8/2020    BILATERAL LUMBAR THREE LUMBAR FOUR LUMBAR FIVE DORSAL RAMUS LUMBAR MEDIAL BRANCH BLOCK SITE CONFIRMED BY FLUOROSCOPY performed by Jose Daniel Hoover MD at [facility] DX/THER SBST INTRLMNR CRV/THRC W/IMG GDN Right 8/21/2018    RIGHT LUMBAR FOUR/FIVE, LUMBAR FIVE/ SACRAL ONE FACET INJECTION AND RIGHT SACROILIAC JOINT INJECTION SITES CONFIRMED BY FLUOROSCOPY performed by Jose Daniel Hoover MD at [address] ARTHROSCOPY Right 08/22/2017    ROTATOR CUFF REPAIR    THORACOSCOPY Right 9/4/2019    RIGHT VIDEO ASSISTED THORACOSCOPIC SURGERY, WEDGE RESECTION AND RIGHT UPPER AND LOWER LOBE NODULES WITH BIOPSY, INTERCOSTAL NERVE BLOCK TIMES SEVEN LEVELS performed by Ailyn Jose MD at [place] [address]     Family History   Problem Relation Age of Onset    High Blood Pressure Mother     Rheum Arthritis Mother     Cancer Father     Hypertension Father     Colon Cancer Father     High Blood Pressure Brother     Stroke Brother     Heart Defect Brother     Hypertension Sister     Rheum Arthritis Sister     Emphysema Sister     Rheum Arthritis Sister     Other Sister         bottom of lungs are getting hard    Hypertension Brother     Rheum Arthritis Brother     Heart Attack Paternal Grandfather     No Known Problems Paternal Grandmother     No Known Problems Maternal Grandmother     No Known Problems Maternal Grandfather     Heart Attack Maternal Aunt     No Known Problems Maternal Aunt     Brain Cancer Maternal Aunt     Other Maternal Aunt         Complications from surgery    Arthritis Maternal Aunt     Arthritis Maternal Aunt     Heart Disease Maternal Aunt     High Blood Pressure Maternal Aunt     Cancer Maternal Uncle     Heart Disease Maternal Uncle     Heart Attack Maternal Uncle     High Blood Pressure Maternal Uncle     Heart Attack Maternal Uncle     High Blood Pressure Maternal Uncle     Parkinsonism Maternal Uncle     Heart Attack Paternal Aunt     Breast Cancer Paternal Aunt     Dementia Paternal Aunt     Heart Attack Paternal Uncle     Heart Attack Paternal Uncle     Brain Cancer Maternal Uncle      Social History     Tobacco Use    Smoking status: Former Smoker     Packs/day: 0.50     Years: 10.00     Pack years: 5.00     Types: Cigarettes     Quit date: 1985     Years since quittin.3    Smokeless tobacco: Never Used   Substance Use Topics    Alcohol use: Yes     Alcohol/week: 1.0 standard drinks     Types: 1 Shots of liquor per week     Comment: occ    Drug use: No     Allergies:Atorvastatin and Protonix [pantoprazole sodium]    Review of Systems  General: No changes in weight, fatigue, or night sweats. HEENT: No blurry or decreased vision. No changes in hearing, nasal discharge or sore throat. Cardiovascular:  See HPI. Respiratory: No cough, hemoptysis, or wheezing. No history of asthma. +TAVERAS  Gastrointestinal:  No abdominal pain, hematochezia, melana, constipation, diarrhea, or history of GI ulcers. Genito-Urinary: No dysuria or hematuria. No urgency or polyuria. Musculoskeletal:  No complaints of joint pain, joint swelling or muscular weakness/soreness. Neurological:  No dizziness, headaches, numbness/tingling, speech problems or weakness. No history of a stroke or TIA.   Psychological:  No anxiety or depression. Hematological and Lymphatic: No abnormal bleeding or bruising, blood clots, jaundice or swollen lymph nodes. Endocrine:   No malaise/lethargy, palpitations, polydipsia/polyuria, temperature intolerance or unexpected weight changes  Skin:  No rashes or non-healing ulcers. Physical Exam:  /76   Pulse 72   Wt 252 lb 3.2 oz (114.4 kg)   LMP 07/13/2011   BMI 44.68 kg/m²  Blood pressure 132/76, pulse 72, weight 252 lb 3.2 oz (114.4 kg), last menstrual period 07/13/2011, not currently breastfeeding. General:  Alert and oriented. No acute distress. Appears comfortable. HEENT:  Normocephalic. No trauma. EOMI. Neck:  Supple, no JVD  Cardiovascular: RRR  Pulses: 2+ carotid   Lungs:  Clear. Normal effort  Abd:  Soft, non-tender, non-distended. No peritoneal signs. Ext:  No clubbing, cyanosis, or edema. Neuro:  CN's 2-12 grossly in tact. Gait normal.  Motor and sensory exams grossly normal.  Skin:  No rashes or skin breakdown.     CBC:   Lab Results   Component Value Date    WBC 8.8 12/23/2020    HGB 13.0 12/23/2020    HCT 39.0 12/23/2020    MCV 86.1 12/23/2020     12/23/2020     BMP:  Lab Results   Component Value Date    CREATININE 1.3 (H) 04/05/2021    BUN 27 (H) 04/05/2021     04/05/2021    K 4.1 04/05/2021     04/05/2021    CO2 25 04/05/2021     Mag:   Lab Results   Component Value Date    MG 2.10 07/28/2020     LIVER PROFILE:   Lab Results   Component Value Date    ALT 17 12/23/2020    AST 26 12/23/2020    ALKPHOS 71 12/23/2020    BILITOT 0.3 12/23/2020     PT/INR:   Lab Results   Component Value Date    INR 1.20 (H) 08/13/2020    INR 1.02 08/23/2019    INR 1.02 06/19/2018    PROTIME 11.6 08/23/2019    PROTIME 11.6 06/19/2018     BNP:  No results found for: BNP  LIPIDS:  No components found for: CHLPL  Lab Results   Component Value Date    TRIG 348 (H) 07/28/2020    TRIG 265 (H) 11/19/2019    TRIG 187 (H) 06/04/2019     Lab Results   Component Value Date    HDL 41 2020    HDL 51 2019    HDL 38 (L) 2019     Lab Results   Component Value Date    LDLCALC see below 2020    LDLCALC 77 2019    [address] 91 2019     Lab Results   Component Value Date    LABVLDL see below 2020    LABVLDL 53 2019    LABVLDL 37 2019     TSH:  Lab Results   Component Value Date    TSH 1.86 2018       IMAGIN/13/2020 Coronary angiogram   Angiographic Findings:  Left dominant system  Left Main:  Normal  Left Anterior Descending:  Widely patent stent. Mild irregular plaque in the mid LAD. Circumflex:  No significant plaque  Right Coronary:  No significant plaque  Left Ventriculogram:  Not performed due to elevated Cr  Hemodynamics (mm Hg):  Left Ventricular Pressure:  148/4, 25  Central Aortic Pressure:  135/64 (94)  Conclusions:  -Widely patent mid LAD stent  -Elevated LVEDP  -No other significant obstructive CAD     2020 Nuc stress:       Overall findings represent a intermediate risk scan.     Normal LV size and systolic function.     Small /medium size reversible defect involving the basal/mid/distal anterior     cardiac segments.  FIndings are concerning for ischemia.     ECG: Non-diagnostic EKG response due to failure to reach target heart rate.      2019 Echo:   Normal left ventricle size. Borderline left ventricular hypertrophy. Overall   left ventricular systolic function appears normal with an ejection fraction   visually estimated at 55-60%. No regional wall motion abnormalities are   noted. Diastolic filling parameters suggests normal diastolic function.   MItral leaflet tip mildly thickened. Mild mitral regurgitation is present.   Mild tricuspid regurgitation. Estimated pulmonary artery systolic pressure   is normal at 29 mmHg assuming a right atrial pressure of 3 mmHg.   Hyperechoic structure in the liver.  Suggest liver ultrasound.     2019 CT chest:      Bilateral and fairly symmetric reticulation most pronounced about the lower lobes with immediate subpleural sparing may relate to interstitial pneumonitis and may relate to nonspecific interstitial pneumonitis. Findings may be present in connective    tissue disease including SLE, autoimmune disease including rheumatoid arthritis as well as other etiologies including hypersensitivity pneumonitis, drug-induced pneumonitis as well as other causes.       No pulmonary fibrosis.       No lobar consolidation.         9/19/2018 Nuc stress:      1. Abnormal myocardial perfusion study with moderate perfusion abnormality along the anterior wall and lateral wall on both rest images and stress images suggesting remote infarct with scarring. Similar findings may represent attenuation artifact as    well. No evidence for pharmacologically-induced ischemia. 2. Normal wall motion and ejection fraction of 62%.      6/2018 Cath: Angiographic Findings:  Left Main:  Normal  Left Anterior Descending:  Mid 60-70% hazy stenosis. iFR was 0.87. Circumflex:  Dominant. No obstructive plaque  Right Coronary:  Non-dominant. No obstructive plaque  Left Ventriculogram:  LVEF 50-55%. Mid LAD:  Direct Stent: 3.5 x 15 mm Xience BRIAN to 10 RUBY     Post stent, the first large diagonal had a 95-99% stenosis. We performed kissing balloon inflations on the ostial diag (through the stent struts) and the LAD using a 2.0 x 12 mm balloon in the diagonal and the 3.5 x 15 mm stent balloon in the LAD. Inflations taken to 10 RUBY each.  70-80% stenosis remained, but there was MARYLU 3 flow in both arteries.     6/13/2018 Nuc stress:      Pharmacologic stress induced reversible ischemia anterior and   anterolateral walls      5/2/2018 Echo:   Normal left ventricular systolic function with ejection fraction of 55-60%.   No regional wall motion abnormalites are seen.   Left ventricular diastolic filling pressure is normal.   Aortic valve appears sclerotic but opens adequately      Medications:   Current Outpatient Medications   Medication Sig Dispense Refill    traZODone (DESYREL) 50 MG tablet TAKE ONE TABLET BY MOUTH AT BEDTIME 30 tablet 0    rosuvastatin (CRESTOR) 10 MG tablet TAKE 1 TABLET BY MOUTH EVERY DAY 90 tablet 0    citalopram (CELEXA) 40 MG tablet Take 1 tablet by mouth daily 90 tablet 3    allopurinol (ZYLOPRIM) 300 MG tablet Take 1 tablet by mouth daily 90 tablet 0    aspirin (ASPIRIN LOW DOSE) 81 MG chewable tablet Take 1 tablet by mouth daily 90 tablet 3    allopurinol (ZYLOPRIM) 300 MG tablet Take 1 tablet by mouth daily 90 tablet 1    valsartan (DIOVAN) 160 MG tablet Take 1 tablet by mouth daily 90 tablet 3    vitamin D3 (CHOLECALCIFEROL) 25 MCG (1000 UT) TABS tablet Take 1 tablet by mouth daily 90 tablet 1    albuterol sulfate  (90 Base) MCG/ACT inhaler INHALE 2 PUFFS INTO THE LUNGS EVERY 4 HOURS AS NEEDED FOR WHEEZING 42.5 g 1    famotidine (PEPCID) 40 MG tablet Take 1 tablet by mouth 2 times daily 60 tablet 5    Multiple Vitamins-Minerals (THERAPEUTIC MULTIVITAMIN-MINERALS) tablet Take 1 tablet by mouth daily       No current facility-administered medications for this visit. Assessment:  1. CAD in native artery    2. S/P PTCA (percutaneous transluminal coronary angioplasty)    3. Mixed hyperlipidemia    4. Benign essential HTN    5. TAVERAS (dyspnea on exertion)          Plan:  CAD; stable   ASA, statin   Valsartan   No BB d/t bradycardia     HTN; stable   /68   Valsartan 160 mg po daily    HLD; stable   Crestor    Checking CMP/Lipid  TAVERAS; stable   Chronic and unchanged   Cont to f/u Dr. Jazzmine Chirinos     F/U with Dr Lana Weller in 6 month       Reviewed most recent: CBC, BMP, Mag, LFT, PT/INR, lipid profile.    Reviewed most recent: ECG, Echo, stress test and coronary angiogram   Reviewed available results in [facility] EVERY[facility]    Total time spent today for this encounter: 25 minutes

## 2021-04-26 DIAGNOSIS — F51.04 PSYCHOPHYSIOLOGICAL INSOMNIA: ICD-10-CM

## 2021-04-26 RX ORDER — TRAZODONE HYDROCHLORIDE 50 MG/1
TABLET ORAL
Qty: 30 TABLET | Refills: 0 | Status: SHIPPED | OUTPATIENT
Start: 2021-04-26 | End: 2021-07-21

## 2021-05-26 RX ORDER — FAMOTIDINE 40 MG/1
40 TABLET, FILM COATED ORAL 2 TIMES DAILY
Qty: 60 TABLET | Refills: 5 | Status: SHIPPED | OUTPATIENT
Start: 2021-05-26

## 2021-06-29 ENCOUNTER — TELEPHONE (OUTPATIENT)
Dept: ORTHOPEDIC SURGERY | Age: 61
End: 2021-06-29

## 2021-07-02 ENCOUNTER — OFFICE VISIT (OUTPATIENT)
Dept: OBGYN CLINIC | Age: 61
End: 2021-07-02
Payer: COMMERCIAL

## 2021-07-02 ENCOUNTER — HOSPITAL ENCOUNTER (OUTPATIENT)
Age: 61
Discharge: HOME | End: 2021-07-02
Payer: COMMERCIAL

## 2021-07-02 VITALS
DIASTOLIC BLOOD PRESSURE: 88 MMHG | BODY MASS INDEX: 44.82 KG/M2 | TEMPERATURE: 97.7 F | HEART RATE: 95 BPM | SYSTOLIC BLOOD PRESSURE: 158 MMHG | WEIGHT: 253 LBS

## 2021-07-02 DIAGNOSIS — Z01.419 ENCNTR FOR GYN EXAM (GENERAL) (ROUTINE) W/O ABN FINDINGS: Primary | ICD-10-CM

## 2021-07-02 DIAGNOSIS — N95.2 VAGINAL ATROPHY: ICD-10-CM

## 2021-07-02 LAB
ALBUMIN SERPL-MCNC: 4.3 G/DL (ref 3.4–5)
ANION GAP SERPL CALCULATED.3IONS-SCNC: 11 MMOL/L (ref 3–16)
BUN BLDV-MCNC: 30 MG/DL (ref 7–20)
CALCIUM SERPL-MCNC: 9.5 MG/DL (ref 8.3–10.6)
CHLORIDE BLD-SCNC: 107 MMOL/L (ref 99–110)
CO2: 26 MMOL/L (ref 21–32)
CREAT SERPL-MCNC: 1.3 MG/DL (ref 0.6–1.2)
GFR AFRICAN AMERICAN: 50
GFR NON-AFRICAN AMERICAN: 42
GLUCOSE BLD-MCNC: 86 MG/DL (ref 70–99)
PARATHYROID HORMONE INTACT: 35.1 PG/ML (ref 14–72)
PHOSPHORUS: 4.3 MG/DL (ref 2.5–4.9)
POTASSIUM SERPL-SCNC: 4.8 MMOL/L (ref 3.5–5.1)
SODIUM BLD-SCNC: 144 MMOL/L (ref 136–145)
VITAMIN D 25-HYDROXY: 83.2 NG/ML

## 2021-07-02 PROCEDURE — 36415 COLL VENOUS BLD VENIPUNCTURE: CPT

## 2021-07-02 PROCEDURE — 82306 VITAMIN D 25 HYDROXY: CPT

## 2021-07-02 PROCEDURE — 83970 ASSAY OF PARATHORMONE: CPT

## 2021-07-02 PROCEDURE — 84156 ASSAY OF PROTEIN URINE: CPT

## 2021-07-02 PROCEDURE — 82570 ASSAY OF URINE CREATININE: CPT

## 2021-07-02 PROCEDURE — 80069 RENAL FUNCTION PANEL: CPT

## 2021-07-02 PROCEDURE — 99396 PREV VISIT EST AGE 40-64: CPT | Performed by: OBSTETRICS & GYNECOLOGY

## 2021-07-02 ASSESSMENT — ENCOUNTER SYMPTOMS
VOMITING: 0
ABDOMINAL PAIN: 0
DIARRHEA: 0
SHORTNESS OF BREATH: 0
SORE THROAT: 0
COUGH: 0
CONSTIPATION: 0
NAUSEA: 0

## 2021-07-02 NOTE — PROGRESS NOTES
Annual Exam      CC:   Chief Complaint   Patient presents with    Gynecologic Exam       HPI:  63 y.o. Korey Boyd presents for her gynecologic annual exam.    Patient seen and examined. Patient is overall doing well. Patient underwent menopause at age 51. One episode of vaginal bleeding 7 years ago with negative evaluation. Denies bleeding since that time. Patient reports vaginal dryness is under control with lubricants. Medical history significant for stage 3 renal disease, interstitial lung disease. Patient had an angioplasty with stent placement in 2018 and Cardiology advised no HRT. Dr Sawyer Garcia placed on Paxil at that time with significant improvement in hot flashes. Health Maintenance:  Birth control: Postmenopausal  Pregnancy plans: None  Safe relationship: Yes -  to Jewel Hargrove for 40 years     Screening:  Last pap smear: 2020 - NILM, neg HPV  History of abnormal pap smears: Denies  Mammogram: 2020 - Birads 1  Colonoscopy: 2017  DEXA scan: 2019 - Normal    Review of Systems:   Review of Systems   Constitutional: Negative for chills and fever. HENT: Negative for congestion, sneezing and sore throat. Respiratory: Negative for cough and shortness of breath. Cardiovascular: Negative for chest pain and palpitations. Gastrointestinal: Negative for abdominal pain, constipation, diarrhea, nausea and vomiting. Genitourinary: Positive for dyspareunia and vaginal pain. Negative for dysuria, frequency, menstrual problem, pelvic pain and vaginal discharge. Neurological: Negative for headaches. Psychiatric/Behavioral: Negative.     Breast: Denies skin changes, nipple discharge, lesions, dimpling, tenderness or palpable masses     Primary Care Physician: ROMEO Chambers - CNP    Obstetric History  OB History    Para Term  AB Living   3 3 3         SAB TAB Ectopic Molar Multiple Live Births             3      # Outcome Date GA Lbr Tae/2nd Weight Sex Delivery Anes PTL Lv   3 Term 08/24/93 40w0d  6 lb 6 oz (2.892 kg) F Vag-Spont      2 Term 02/15/89 40w0d  6 lb 9 oz (2.977 kg) M Vag-Spont      1 Term 10/28/85 40w0d  6 lb 4 oz (2.835 kg) F Vag-Spont          Gynecologic History  Menstrual History:   LMP: 2008   Age of Menarche: 7   Postmenopausal Bleeding: None currently, prior negative workup    Sexual History:   Contraception: see above   Currently is sexually active   39 Lifetime partners   Denies history of STIs   Reports sexual problems - vaginal dryness and dyspareunia    Pap History:   History of abnormal pap smears: see above   Last pap: see above      Medical History:  Past Medical History:   Diagnosis Date    Acid reflux     Bilateral carpal tunnel syndrome 12/21/2017    CAD in native artery 06/19/2018    Mid LAD stented with 3.5 x 15 mm Xience BRIAN.  EF 55%    Chicken pox     Chronic back pain     CKD (chronic kidney disease) 4/15/2016    Depression 12/7/2010    Heart disease     Hypertension     Interstitial lung disease ([facility] [address]     Measles     Menopausal symptoms     Morbid obesity with BMI of 45.0-49.9, adult ([facility] [address] 1/29/2015    Osteoarthritis     Overactive bladder     Pure hypercholesterolemia     Stress incontinence        Medications:  Current Outpatient Medications   Medication Sig Dispense Refill    famotidine (PEPCID) 40 MG tablet Take 1 tablet by mouth 2 times daily 60 tablet 5    traZODone (DESYREL) 50 MG tablet TAKE ONE TABLET BY MOUTH AT BEDTIME 30 tablet 0    rosuvastatin (CRESTOR) 10 MG tablet TAKE 1 TABLET BY MOUTH EVERY DAY 90 tablet 0    citalopram (CELEXA) 40 MG tablet Take 1 tablet by mouth daily 90 tablet 3    allopurinol (ZYLOPRIM) 300 MG tablet Take 1 tablet by mouth daily 90 tablet 0    aspirin (ASPIRIN LOW DOSE) 81 MG chewable tablet Take 1 tablet by mouth daily 90 tablet 3    allopurinol (ZYLOPRIM) 300 MG tablet Take 1 tablet by mouth daily 90 tablet 1    valsartan (DIOVAN) 160 MG tablet Take 1 tablet by mouth daily 90 tablet 3    vitamin D3 (CHOLECALCIFEROL) 25 MCG (1000 UT) TABS tablet Take 1 tablet by mouth daily 90 tablet 1    albuterol sulfate  (90 Base) MCG/ACT inhaler INHALE 2 PUFFS INTO THE LUNGS EVERY 4 HOURS AS NEEDED FOR WHEEZING (Patient not taking: Reported on 7/2/2021) 42.5 g 1    Multiple Vitamins-Minerals (THERAPEUTIC MULTIVITAMIN-MINERALS) tablet Take 1 tablet by mouth daily (Patient not taking: Reported on 7/2/2021)       No current facility-administered medications for this visit.        Surgical History:  Past Surgical History:   Procedure Laterality Date    BRONCHOSCOPY  6/27/2019    BRONCHOSCOPY ALVEOLAR LAVAGE performed by Dale Calhoun MD at [address] [place] [address]  6/27/2019    BRONCHOSCOPY DIAGNOSTIC OR CELL [address] ONLY performed by Dale Calhoun MD at [address] [place] [address]  6/27/2019    BRONCHOSCOPY BIOPSY BRONCHUS performed by Dale Calhoun MD at [address] Bilateral 01/29/2018    EPIDURAL STEROID INJECTION N/A 10/15/2019    MIDLINE CAUDAL EPIDURAL STEROID INJECTION AND COCCYGEAL JOINT INJECTION SITES CONFIRMED BY FLUOROSCOPY performed by Denton Calvert MD at [place] [address]  2009,2010    KNEES BILATERAL    NERVE SURGERY Bilateral 12/22/2020    BILATERAL LUMBAR THREE LUMBAR FOUR LUMBAR FIVE DORSAL RAMUS RADIOFREQUENCY ABLATION SITE CONFIRMED BY FLUOROSCOPY performed by Denton Calvert MD at [facility] Right 3/3/2020    RIGHT LUMBAR FOUR AND LUMBAR FIVE TRANSFORAMINAL EPIDURAL STEROID INJECTION SITE CONFIRMED BY FLUOROSCOPY performed by Denton Calvert MD at [facility] Right 11/2/2020    RIGHT L4 AND L5 TRANSFORAMINAL EPIDURAL STEROID INJECTION WITH FLUOROSCOPY (35731, [id number]) performed by Denton Calvert MD at [address] [email or web address] Bilateral 11/17/2020    BILATERAL LUMBAR THREE LUMBAR FOUR LUMBAR FIVE DORSAL RAMUS LUMBAR MEDIAL BRANCH BLOCK SITE CONFIRMED BY FLUOROSCOPY performed by Michelle Perdomo MD at [address] [facility] Bilateral 12/8/2020    BILATERAL LUMBAR THREE LUMBAR FOUR LUMBAR FIVE DORSAL RAMUS LUMBAR MEDIAL BRANCH BLOCK SITE CONFIRMED BY FLUOROSCOPY performed by Michelle Perdomo MD at [facility] DX/THER SBST INTRLMNR CRV/THRC W/IMG GDN Right 8/21/2018    RIGHT LUMBAR FOUR/FIVE, LUMBAR FIVE/ SACRAL ONE FACET INJECTION AND RIGHT SACROILIAC JOINT INJECTION SITES CONFIRMED BY FLUOROSCOPY performed by Michelle Perdomo MD at [address] ARTHROSCOPY Right 08/22/2017    ROTATOR CUFF REPAIR    THORACOSCOPY Right 9/4/2019    RIGHT VIDEO ASSISTED THORACOSCOPIC SURGERY, WEDGE RESECTION AND RIGHT UPPER AND LOWER LOBE NODULES WITH BIOPSY, INTERCOSTAL NERVE BLOCK TIMES SEVEN LEVELS performed by Shyam Florian MD at [place] [address]       Allergies:   Allergies   Allergen Reactions    Atorvastatin Other (See Comments)     Multiple muscle spasms/cramps over body    Protonix [Pantoprazole Sodium] Diarrhea       Family History:  Family History   Problem Relation Age of Onset    High Blood Pressure Mother     Rheum Arthritis Mother     Cancer Father     Hypertension Father     Colon Cancer Father     High Blood Pressure Brother     Stroke Brother     Heart Defect Brother     Hypertension Sister     Rheum Arthritis Sister     Emphysema Sister     Rheum Arthritis Sister     Other Sister         bottom of lungs are getting hard    Hypertension Brother     Rheum Arthritis Brother     Heart Attack Paternal Grandfather     No Known Problems Paternal Grandmother     No Known Problems Maternal Grandmother     No Known Problems Maternal Grandfather     Heart Attack Maternal Aunt     No Known Problems Maternal Aunt     Brain Cancer Maternal Aunt     Other Maternal Aunt         Complications from surgery    Arthritis Maternal Aunt     Arthritis Maternal Aunt     Heart Disease Maternal Aunt     High Blood Pressure Maternal Aunt     Cancer Maternal Uncle     Heart Disease Maternal Uncle     Heart Attack Maternal Uncle     High Blood Pressure Maternal Uncle     Heart Attack Maternal Uncle     High Blood Pressure Maternal Uncle     Parkinsonism Maternal Uncle     Heart Attack Paternal Aunt     Breast Cancer Paternal Aunt     Dementia Paternal Aunt     Heart Attack Paternal Uncle     Heart Attack Paternal Uncle     Brain Cancer Maternal Uncle         Reports personal/family history of cervical, uterine, ovarian, vulvar, breast, or colon cancers.      - Father  from colorectal CA     - Paternal sister - Breast cancer - (age 19-27) no genetic testing  Denies personal/family history of bleeding or clotting disorders  Denies personal/family history of genetic disorders    Social History:  Social History     Socioeconomic History    Marital status:      Spouse name: None    Number of children: None    Years of education: None    Highest education level: None   Occupational History    None   Tobacco Use    Smoking status: Former Smoker     Packs/day: 0.50     Years: 10.00     Pack years: 5.00     Types: Cigarettes     Quit date: 1985     Years since quittin.5    Smokeless tobacco: Never Used   Vaping Use    Vaping Use: Never used   Substance and Sexual Activity    Alcohol use: Not Currently     Alcohol/week: 1.0 standard drinks     Types: 1 Shots of liquor per week     Comment: occ    Drug use: No    Sexual activity: Yes     Partners: Male   Other Topics Concern    None   Social History Narrative    None     Social Determinants of Health     Financial Resource Strain:     Difficulty of Paying Living Expenses:    Food Insecurity:     Worried About Running Out of Food in the Last Year:     Ran Out of Food in the Last Year:    Transportation Needs:     Lack of Transportation (Medical):  Lack of Transportation (Non-Medical):    Physical Activity:     Days of Exercise per Week:     Minutes of Exercise per Session:    Stress:     Feeling of Stress :    Social Connections:     Frequency of Communication with Friends and Family:     Frequency of Social Gatherings with Friends and Family:     Attends Restorationist Services:     Active Member of Clubs or Organizations:     Attends Club or Organization Meetings:     Marital Status:    Intimate Partner Violence:     Fear of Current or Ex-Partner:     Emotionally Abused:     Physically Abused:     Sexually Abused:        Objective:  BP (!) 158/88 (Site: Left Upper Arm, Position: Sitting, Cuff Size: Large Adult)   Pulse 95   Temp 97.7 °F (36.5 °C) (Infrared)   Wt 253 lb (114.8 kg)   LMP 07/13/2011   BMI 44.82 kg/m²     Exam:   Physical Exam  Vitals reviewed. Exam conducted with a chaperone present. Constitutional:       Appearance: She is well-developed. HENT:      Head: Normocephalic and atraumatic. Eyes:      Extraocular Movements: Extraocular movements intact. Conjunctiva/sclera: Conjunctivae normal.   Neck:      Thyroid: No thyromegaly. Cardiovascular:      Rate and Rhythm: Normal rate and regular rhythm. Heart sounds: Normal heart sounds. No murmur heard. No friction rub. No gallop. Pulmonary:      Effort: Pulmonary effort is normal.   Chest:      Breasts:         Right: No mass, nipple discharge, skin change or tenderness. Left: No mass, nipple discharge, skin change or tenderness. Abdominal:      General: Bowel sounds are normal. There is no distension. Palpations: Abdomen is soft. There is no mass. Tenderness: There is no abdominal tenderness. There is no guarding or rebound. Genitourinary:     Labia:         Right: No rash, tenderness or lesion. Left: No rash, tenderness or lesion. Vagina: No signs of injury.  No vaginal discharge, erythema (vaginal atrophy), tenderness, bleeding or lesions. Cervix: No cervical motion tenderness, discharge, friability or lesion. Uterus: Not deviated, not enlarged, not fixed and not tender. Adnexa:         Right: No mass, tenderness or fullness. Left: No mass, tenderness or fullness. Musculoskeletal:         General: No swelling. Cervical back: Normal range of motion and neck supple. Skin:     General: Skin is warm and dry. Neurological:      Mental Status: She is alert and oriented to person, place, and time. Psychiatric:         Mood and Affect: Mood normal.         Behavior: Behavior normal.         Thought Content: Thought content normal.       Assessment/Plan:  63 y.o.  presenting for her annual exam:    1. Encntr for gyn exam (general) (routine) w/o abn findings     - Pap smear up to date     - Age based screening recommendations discussed     - Self breast exams/awareness discussed with the patient     - Healthy lifestyle habits discussed including calcium and vitamin D supplementation     - Will follow-up in 1 year for annual exam     2.  Vaginal atrophy     - Utilizing vaginal moisturizers and lubricants with relief       Mary Wilhelm DO

## 2021-07-03 LAB
CREATININE URINE: 72.9 MG/DL (ref 28–259)
PROTEIN PROTEIN: 10 MG/DL

## 2021-07-07 ENCOUNTER — TELEPHONE (OUTPATIENT)
Dept: INTERNAL MEDICINE CLINIC | Age: 61
End: 2021-07-07

## 2021-07-13 ENCOUNTER — OFFICE VISIT (OUTPATIENT)
Dept: FAMILY MEDICINE CLINIC | Age: 61
End: 2021-07-13
Payer: COMMERCIAL

## 2021-07-13 VITALS
WEIGHT: 248 LBS | BODY MASS INDEX: 43.94 KG/M2 | SYSTOLIC BLOOD PRESSURE: 138 MMHG | DIASTOLIC BLOOD PRESSURE: 82 MMHG | TEMPERATURE: 96.9 F | HEIGHT: 63 IN | HEART RATE: 55 BPM | OXYGEN SATURATION: 98 %

## 2021-07-13 DIAGNOSIS — R10.11 COLICKY RUQ ABDOMINAL PAIN: ICD-10-CM

## 2021-07-13 DIAGNOSIS — M79.89 PALPABLE MASS OF SOFT TISSUE OF SHOULDER: Primary | ICD-10-CM

## 2021-07-13 DIAGNOSIS — I10 ESSENTIAL HYPERTENSION: ICD-10-CM

## 2021-07-13 PROCEDURE — G8427 DOCREV CUR MEDS BY ELIG CLIN: HCPCS | Performed by: NURSE PRACTITIONER

## 2021-07-13 PROCEDURE — 1036F TOBACCO NON-USER: CPT | Performed by: NURSE PRACTITIONER

## 2021-07-13 PROCEDURE — G8417 CALC BMI ABV UP PARAM F/U: HCPCS | Performed by: NURSE PRACTITIONER

## 2021-07-13 PROCEDURE — 3017F COLORECTAL CA SCREEN DOC REV: CPT | Performed by: NURSE PRACTITIONER

## 2021-07-13 PROCEDURE — 99214 OFFICE O/P EST MOD 30 MIN: CPT | Performed by: NURSE PRACTITIONER

## 2021-07-13 ASSESSMENT — ENCOUNTER SYMPTOMS: ABDOMINAL PAIN: 1

## 2021-07-13 ASSESSMENT — PATIENT HEALTH QUESTIONNAIRE - PHQ9
SUM OF ALL RESPONSES TO PHQ QUESTIONS 1-9: 0
1. LITTLE INTEREST OR PLEASURE IN DOING THINGS: 0
SUM OF ALL RESPONSES TO PHQ QUESTIONS 1-9: 0
2. FEELING DOWN, DEPRESSED OR HOPELESS: 0
SUM OF ALL RESPONSES TO PHQ QUESTIONS 1-9: 0
SUM OF ALL RESPONSES TO PHQ9 QUESTIONS 1 & 2: 0

## 2021-07-13 NOTE — PROGRESS NOTES
William Cui (:  1960) is a 63 y.o. female,Established patient, here for evaluation of the following chief complaint(s): Other (lump on left shouler x 9 days )      ASSESSMENT/PLAN:  1. Palpable mass of soft tissue of shoulder  -     US SOFT TISSUE LIMITED AREA; Future  2. Colicky RUQ abdominal pain  -     US GALLBLADDER RUQ; Future  -     COMPREHENSIVE METABOLIC PANEL; Future  -     AMYLASE; Future  -     CBC WITH AUTO DIFFERENTIAL; Future  3. Essential hypertension  Assessment & Plan:   At goal, continue current medications      No follow-ups on file. SUBJECTIVE/OBJECTIVE:  Lavinia Castellanos is a 65-year-old female who presents today with complaints of a soft tender mass on her left shoulder. She believes its been there for about 2 weeks. She denies any injury to the area. She has not taken any medications or done anything for this yet. She reports it is tender if she squeezes it. Patient she complains of pain in her right upper quadrant. Describes this is colicky happens to 3 times a day, does not seem to be around meals. She has not taken any medications for this. She denies any nausea vomiting diarrhea constipation. Denies any previous history of gallbladder disease. Did have a lung biopsy in a similar area last year and thought that may be the cause.       Current Outpatient Medications   Medication Sig Dispense Refill    famotidine (PEPCID) 40 MG tablet Take 1 tablet by mouth 2 times daily 60 tablet 5    traZODone (DESYREL) 50 MG tablet TAKE ONE TABLET BY MOUTH AT BEDTIME 30 tablet 0    rosuvastatin (CRESTOR) 10 MG tablet TAKE 1 TABLET BY MOUTH EVERY DAY 90 tablet 0    citalopram (CELEXA) 40 MG tablet Take 1 tablet by mouth daily 90 tablet 3    aspirin (ASPIRIN LOW DOSE) 81 MG chewable tablet Take 1 tablet by mouth daily 90 tablet 3    allopurinol (ZYLOPRIM) 300 MG tablet Take 1 tablet by mouth daily 90 tablet 1    valsartan (DIOVAN) 160 MG tablet Take 1 tablet by mouth daily 90 tablet 3  albuterol sulfate  (90 Base) MCG/ACT inhaler INHALE 2 PUFFS INTO THE LUNGS EVERY 4 HOURS AS NEEDED FOR WHEEZING 42.5 g 1    Multiple Vitamins-Minerals (THERAPEUTIC MULTIVITAMIN-MINERALS) tablet Take 1 tablet by mouth daily       vitamin D3 (CHOLECALCIFEROL) 25 MCG (1000 UT) TABS tablet Take 1 tablet by mouth daily 90 tablet 1     No current facility-administered medications for this visit. Review of Systems   Gastrointestinal: Positive for abdominal pain. Musculoskeletal:        Tenderness and swelling to her left upper shoulder   All other systems reviewed and are negative. Vitals:    07/13/21 1029   BP: 138/82   Pulse: 55   Temp: 96.9 °F (36.1 °C)   SpO2: 98%   Weight: 248 lb (112.5 kg)   Height: 5' 3\" (1.6 m)       Physical Exam  Constitutional:       Appearance: She is well-developed. She is obese. HENT:      Head: Normocephalic. Eyes:      Pupils: Pupils are equal, round, and reactive to light. Cardiovascular:      Rate and Rhythm: Normal rate and regular rhythm. Heart sounds: Normal heart sounds. Pulmonary:      Effort: Pulmonary effort is normal.      Breath sounds: Normal breath sounds. Chest:          Comments: Proximate 2 inch deeper tissue soft mass noted. No skin changes. Nontender  Abdominal:      Palpations: Abdomen is soft. Tenderness: There is abdominal tenderness in the right upper quadrant. Musculoskeletal:         General: Normal range of motion. Cervical back: Normal range of motion. Skin:     General: Skin is warm and dry. Neurological:      Mental Status: She is alert and oriented to person, place, and time. An electronic signature was used to authenticate this note.     --Gunner Zelaya, ROMEO - CNP

## 2021-07-19 ENCOUNTER — HOSPITAL ENCOUNTER (OUTPATIENT)
Dept: ULTRASOUND IMAGING | Age: 61
Discharge: HOME | End: 2021-07-19
Payer: COMMERCIAL

## 2021-07-19 DIAGNOSIS — R10.11 COLICKY RUQ ABDOMINAL PAIN: ICD-10-CM

## 2021-07-19 DIAGNOSIS — M79.89 PALPABLE MASS OF SOFT TISSUE OF SHOULDER: ICD-10-CM

## 2021-07-19 PROCEDURE — 76705 ECHO EXAM OF ABDOMEN: CPT

## 2021-07-19 PROCEDURE — 76999 ECHO EXAMINATION PROCEDURE: CPT

## 2021-07-21 DIAGNOSIS — F51.04 PSYCHOPHYSIOLOGICAL INSOMNIA: ICD-10-CM

## 2021-07-21 RX ORDER — TRAZODONE HYDROCHLORIDE 50 MG/1
TABLET ORAL
Qty: 30 TABLET | Refills: 0 | Status: SHIPPED | OUTPATIENT
Start: 2021-07-21 | End: 2021-08-19

## 2021-07-22 ENCOUNTER — TELEPHONE (OUTPATIENT)
Dept: FAMILY MEDICINE CLINIC | Age: 61
End: 2021-07-22

## 2021-07-22 NOTE — TELEPHONE ENCOUNTER
Patient notified of results. Choosing at this time to wait and forgo referrals unless she becomes symptomatic or chooses to have it removed.

## 2021-07-22 NOTE — TELEPHONE ENCOUNTER
Arsh patient is asking for results and instructions regarding recent shoulder ultrasound  [place] [phone] (home)

## 2021-07-26 DIAGNOSIS — E11.8 CONTROLLED DIABETES MELLITUS TYPE 2 WITH COMPLICATIONS, UNSPECIFIED WHETHER LONG TERM INSULIN USE (HCC): Primary | ICD-10-CM

## 2021-08-19 DIAGNOSIS — F51.04 PSYCHOPHYSIOLOGICAL INSOMNIA: ICD-10-CM

## 2021-08-19 RX ORDER — TRAZODONE HYDROCHLORIDE 50 MG/1
TABLET ORAL
Qty: 30 TABLET | Refills: 0 | Status: SHIPPED | OUTPATIENT
Start: 2021-08-19 | End: 2021-09-20 | Stop reason: SDUPTHER

## 2021-08-26 ENCOUNTER — HOSPITAL ENCOUNTER (OUTPATIENT)
Age: 61
Discharge: HOME | End: 2021-08-26
Payer: COMMERCIAL

## 2021-08-26 ENCOUNTER — HOSPITAL ENCOUNTER (OUTPATIENT)
Dept: GENERAL RADIOLOGY | Age: 61
Discharge: HOME | End: 2021-08-26
Payer: COMMERCIAL

## 2021-08-26 DIAGNOSIS — Z00.00 ROUTINE GENERAL MEDICAL EXAMINATION AT A HEALTH CARE FACILITY: ICD-10-CM

## 2021-08-26 PROCEDURE — 72110 X-RAY EXAM L-2 SPINE 4/>VWS: CPT

## 2021-09-20 DIAGNOSIS — F51.04 PSYCHOPHYSIOLOGICAL INSOMNIA: ICD-10-CM

## 2021-09-20 RX ORDER — TRAZODONE HYDROCHLORIDE 50 MG/1
TABLET ORAL
Qty: 30 TABLET | Refills: 0 | Status: SHIPPED | OUTPATIENT
Start: 2021-09-20 | End: 2021-10-21

## 2021-10-08 ENCOUNTER — PATIENT MESSAGE (OUTPATIENT)
Dept: FAMILY MEDICINE CLINIC | Age: 61
End: 2021-10-08

## 2021-10-08 DIAGNOSIS — M79.89 PALPABLE MASS OF SOFT TISSUE OF SHOULDER: Primary | ICD-10-CM

## 2021-10-08 NOTE — TELEPHONE ENCOUNTER
From: Mohit Leggett  To: ROMEO Cabral CNP  Sent: 10/8/2021 4:20 PM EDT  Subject: Test Results Question    Sofya,  The ultrasound that I had on my shoulder (LT) is very bothersome for me. Could I get a referral for a surgeon or someone to remove it.     Thanks much,

## 2021-11-10 RX ORDER — VALSARTAN 160 MG/1
TABLET ORAL
Qty: 90 TABLET | Refills: 3 | Status: SHIPPED | OUTPATIENT
Start: 2021-11-10

## 2021-11-10 NOTE — TELEPHONE ENCOUNTER
Requested Prescriptions     Pending Prescriptions Disp Refills    valsartan (DIOVAN) 160 MG tablet [Pharmacy Med Name: VALSARTAN 160 MG TABLET] 90 tablet 3     Sig: TAKE ONE TABLET BY MOUTH DAILY              Last Office Visit: 4/12/2021     Next Office Visit: 11/17/2021

## 2021-11-17 ENCOUNTER — OFFICE VISIT (OUTPATIENT)
Dept: CARDIOLOGY CLINIC | Age: 61
End: 2021-11-17
Payer: COMMERCIAL

## 2021-11-17 VITALS
DIASTOLIC BLOOD PRESSURE: 80 MMHG | BODY MASS INDEX: 45.7 KG/M2 | HEART RATE: 78 BPM | SYSTOLIC BLOOD PRESSURE: 130 MMHG | WEIGHT: 258 LBS

## 2021-11-17 DIAGNOSIS — I10 BENIGN ESSENTIAL HTN: ICD-10-CM

## 2021-11-17 DIAGNOSIS — Z98.61 S/P PTCA (PERCUTANEOUS TRANSLUMINAL CORONARY ANGIOPLASTY): ICD-10-CM

## 2021-11-17 DIAGNOSIS — I25.10 CAD IN NATIVE ARTERY: Primary | ICD-10-CM

## 2021-11-17 DIAGNOSIS — E78.2 MIXED HYPERLIPIDEMIA: ICD-10-CM

## 2021-11-17 DIAGNOSIS — R06.09 DOE (DYSPNEA ON EXERTION): ICD-10-CM

## 2021-11-17 PROCEDURE — 99214 OFFICE O/P EST MOD 30 MIN: CPT | Performed by: NURSE PRACTITIONER

## 2021-11-17 PROCEDURE — 93000 ELECTROCARDIOGRAM COMPLETE: CPT | Performed by: NURSE PRACTITIONER

## 2021-11-29 ENCOUNTER — OFFICE VISIT (OUTPATIENT)
Dept: SURGERY | Age: 61
End: 2021-11-29
Payer: COMMERCIAL

## 2021-11-29 VITALS
RESPIRATION RATE: 16 BRPM | BODY MASS INDEX: 47.31 KG/M2 | OXYGEN SATURATION: 98 % | WEIGHT: 267 LBS | TEMPERATURE: 98 F | HEIGHT: 63 IN

## 2021-11-29 DIAGNOSIS — E66.01 MORBID OBESITY WITH BMI OF 45.0-49.9, ADULT (HCC): ICD-10-CM

## 2021-11-29 DIAGNOSIS — R22.30 SHOULDER MASS: Primary | ICD-10-CM

## 2021-11-29 PROCEDURE — G8417 CALC BMI ABV UP PARAM F/U: HCPCS | Performed by: SURGERY

## 2021-11-29 PROCEDURE — 3017F COLORECTAL CA SCREEN DOC REV: CPT | Performed by: SURGERY

## 2021-11-29 PROCEDURE — G8484 FLU IMMUNIZE NO ADMIN: HCPCS | Performed by: SURGERY

## 2021-11-29 PROCEDURE — 1036F TOBACCO NON-USER: CPT | Performed by: SURGERY

## 2021-11-29 PROCEDURE — G8427 DOCREV CUR MEDS BY ELIG CLIN: HCPCS | Performed by: SURGERY

## 2021-11-29 PROCEDURE — 99204 OFFICE O/P NEW MOD 45 MIN: CPT | Performed by: SURGERY

## 2021-11-29 NOTE — PROGRESS NOTES
[facility]    CC: Left shoulder mass    Referring Physician: Valentin Medley (APRN)    HPI: This is an 57 y. o.female with a PMHx as delineated below who presents to clinic in consultation for left shoulder mass. The patient noticed the lesion for approximately 4-6 months and notes that it is now painful causing her difficulties with clothing. Given her pain, plastic surgery was consulted for evaluation and treatment. PMHx:   Past Medical History:   Diagnosis Date    Acid reflux     Bilateral carpal tunnel syndrome 12/21/2017    CAD in native artery 06/19/2018    Mid LAD stented with 3.5 x 15 mm Xience BRIAN.  EF 55%    Chicken pox     Chronic back pain     CKD (chronic kidney disease) 4/15/2016    Depression 12/7/2010    Heart disease     Hypertension     Interstitial lung disease ([facility] [address]     Measles     Menopausal symptoms     Morbid obesity with BMI of 45.0-49.9, adult ([facility] [address] 1/29/2015    Osteoarthritis     Overactive bladder     Pure hypercholesterolemia     Stress incontinence      PSHx:   Past Surgical History:   Procedure Laterality Date    BRONCHOSCOPY  6/27/2019    BRONCHOSCOPY ALVEOLAR LAVAGE performed by Yesica Hou MD at [address]  6/27/2019    BRONCHOSCOPY DIAGNOSTIC OR CELL [address] ONLY performed by Yesica Hou MD at [address]  6/27/2019    BRONCHOSCOPY BIOPSY BRONCHUS performed by Yesica Hou MD at [address] [facility] Bilateral 01/29/2018    EPIDURAL STEROID INJECTION N/A 10/15/2019    MIDLINE CAUDAL EPIDURAL STEROID INJECTION AND COCCYGEAL JOINT INJECTION SITES CONFIRMED BY FLUOROSCOPY performed by Kassy Rojas MD at [facility]  2009,2010    [address]  Bilateral 12/22/2020    BILATERAL LUMBAR THREE LUMBAR FOUR LUMBAR FIVE DORSAL RAMUS RADIOFREQUENCY ABLATION SITE CONFIRMED BY FLUOROSCOPY performed by Kassy Rojas MD at [place] [address]  PAIN MANAGEMENT PROCEDURE Right 3/3/2020    RIGHT LUMBAR FOUR AND LUMBAR FIVE TRANSFORAMINAL EPIDURAL STEROID INJECTION SITE CONFIRMED BY FLUOROSCOPY performed by Clint Kinney MD at [address] [facility] Right 11/2/2020    RIGHT L4 AND L5 TRANSFORAMINAL EPIDURAL STEROID INJECTION WITH FLUOROSCOPY (88[address], [address]) performed by Clint Kinney MD at [address] [facility] Bilateral 11/17/2020    BILATERAL LUMBAR THREE LUMBAR FOUR LUMBAR FIVE DORSAL RAMUS LUMBAR MEDIAL BRANCH BLOCK SITE CONFIRMED BY FLUOROSCOPY performed by Clint Kinney MD at [address] [facility] Bilateral 12/8/2020    BILATERAL LUMBAR THREE LUMBAR FOUR LUMBAR FIVE DORSAL RAMUS LUMBAR MEDIAL BRANCH BLOCK SITE CONFIRMED BY FLUOROSCOPY performed by Clint Kinney MD at [facility] DX/THER SBST INTRLMNR CRV/THRC W/IMG GDN Right 8/21/2018    RIGHT LUMBAR FOUR/FIVE, LUMBAR FIVE/ SACRAL ONE FACET INJECTION AND RIGHT SACROILIAC JOINT INJECTION SITES CONFIRMED BY FLUOROSCOPY performed by Clint Kinney MD at [address] [facility] ARTHROSCOPY Right 08/22/2017    ROTATOR CUFF REPAIR    THORACOSCOPY Right 9/4/2019    RIGHT VIDEO ASSISTED THORACOSCOPIC SURGERY, WEDGE RESECTION AND RIGHT UPPER AND LOWER LOBE NODULES WITH BIOPSY, INTERCOSTAL NERVE BLOCK TIMES SEVEN LEVELS performed by Krissy Osullivan MD at [place] [address]     Allergy:   Allergies   Allergen Reactions    Atorvastatin Other (See Comments)     Multiple muscle spasms/cramps over body    Protonix [Pantoprazole Sodium] Diarrhea       SHx:   Social History     Socioeconomic History    Marital status:      Spouse name: Not on file    Number of children: Not on file    Years of education: Not on file    Highest education level: Not on file   Occupational History    Not on file   Tobacco Use    Smoking status: Former Smoker     Packs/day: 0.50     Years: 10.00 Pack years: 5.00     Types: Cigarettes     Quit date: 1985     Years since quittin.9    Smokeless tobacco: Never Used   Vaping Use    Vaping Use: Never used   Substance and Sexual Activity    Alcohol use: Not Currently     Alcohol/week: 1.0 standard drink     Types: 1 Shots of liquor per week     Comment: occ    Drug use: No    Sexual activity: Yes     Partners: Male   Other Topics Concern    Not on file   Social History Narrative    Not on file     Social Determinants of Health     Financial Resource Strain:     Difficulty of Paying Living Expenses: Not on file   Food Insecurity:     Worried About Running Out of Food in the Last Year: Not on file    Julian of Food in the Last Year: Not on file   Transportation Needs:     Lack of Transportation (Medical): Not on file    Lack of Transportation (Non-Medical):  Not on file   Physical Activity:     Days of Exercise per Week: Not on file    Minutes of Exercise per Session: Not on file   Stress:     Feeling of Stress : Not on file   Social Connections:     Frequency of Communication with Friends and Family: Not on file    Frequency of Social Gatherings with Friends and Family: Not on file    Attends Temple Services: Not on file    Active Member of [address] [place] [address] [email or web address] or Organizations: Not on file    Attends Club or Organization Meetings: Not on file    Marital Status: Not on file   Intimate Partner Violence:     Fear of Current or Ex-Partner: Not on file    Emotionally Abused: Not on file    Physically Abused: Not on file    Sexually Abused: Not on file   Housing Stability:     Unable to Pay for Housing in the Last Year: Not on file    Number of [place] in the Last Year: Not on file    Unstable Housing in the Last Year: Not on file     FHx: Family history of skin CA: None    Meds:   Current Outpatient Medications   Medication Sig Dispense Refill    valsartan (DIOVAN) 160 MG tablet TAKE ONE TABLET BY MOUTH DAILY 90 tablet 3    traZODone (DESYREL) 50 MG tablet TAKE ONE TABLET BY MOUTH EVERY NIGHT AT BEDTIME 90 tablet 2    rosuvastatin (CRESTOR) 10 MG tablet TAKE ONE TABLET BY MOUTH DAILY 90 tablet 1    Semaglutide,0.25 or 0.5MG/DOS, 2 MG/1.5ML SOPN Inject 0.25 mg into the skin once a week 2 pen 3    famotidine (PEPCID) 40 MG tablet Take 1 tablet by mouth 2 times daily 60 tablet 5    citalopram (CELEXA) 40 MG tablet Take 1 tablet by mouth daily 90 tablet 3    aspirin (ASPIRIN LOW DOSE) 81 MG chewable tablet Take 1 tablet by mouth daily 90 tablet 3    allopurinol (ZYLOPRIM) 300 MG tablet Take 1 tablet by mouth daily 90 tablet 1    albuterol sulfate  (90 Base) MCG/ACT inhaler INHALE 2 PUFFS INTO THE LUNGS EVERY 4 HOURS AS NEEDED FOR WHEEZING 42.5 g 1     No current facility-administered medications for this visit. ROS   Constitutional: Negative for chills and fever. HENT: Negative for congestion, facial swelling, and voice change. Eyes: Negative for photophobia and visual disturbance. Respiratory: Negative for apnea, cough, chest tightness and shortness of breath. Cardiovascular: Negative for chest pain and palpitations. Gastrointestinal: Negative for dysphagia and early satiety. Genitourinary: Negative for difficulty urinating, dysuria, flank pain, frequency and hematuria. Musculoskeletal: Negative for new gait problem, joint swelling and myalgias. Skin: Negative for color change, pallor and rash. Endocrine: negative for tremors, temperature intolerance or polydipsia. Allergic/Immunologic: Negative for new environmental or food allergies. Neurological: Negative for dizziness, seizures, speech difficulty, numbness. Hematological: Negative for adenopathy. Psychiatric/Behavioral: Negative for agitation and confusion.     EXAM    Temp 98 °F (36.7 °C)   Resp 16   Ht 5' 3\" (1.6 m)   Wt 267 lb (121.1 kg)   LMP 07/13/2011   SpO2 98%   BMI 47.30 kg/m²     GEN: NAD, pleasant, obese  CVS: RRR  PULM: No respiratory distress  HEENT: PERRLA/EOMI; hearing appears within normal limits  NECK: Supple with trachea in midline, no masses  EXT: Small, slightly palpable mass in the left shoulder / supraclavicular region  ABD: soft/NT/ND  NEURO: No focal deficits, no obvious CN deficits  PSYCH: Mood appropriate    US reviewed with patient    IMP: 57 y. o.female with left shoulder mass concerning for lipoma. PLAN: Discussed surgical options with Marlen. We discussed that with her habitus, she is at higher risk for complication. Given that she does not have nerve symptoms or worsening pain, likely can be excised in an elective manner once optimized. Thus, will refer to [facility] ([facility] [employer] for assistance with weight loss and then return once she weight has improved to decrease her risk of complication. The patient was counseled at length about the risks of yuli Covid-19 during their perioperative period and any recovery window from their procedure. The patient was made aware that yuli Covid-19  may worsen their prognosis for recovering from their procedure  and lend to a higher morbidity and/or mortality risk. All material risks, benefits, and reasonable alternatives including postponing the procedure were discussed. The patient does wish to proceed with the procedure at this time.     Pritesh Nuñez MD  [address] [place] [address] [facility]  [phone]  11/29/21

## 2021-11-29 NOTE — Clinical Note
Eric Cowart! Christina [place] would benefit from excision of her shoulder lipoma, however would ideally do better with preoperative optimization with weight loss. I had a discussion with her and she is interested in [address] [facility] [place] for assistance. She will return after weight lost and then will plan for excision. Please let me know if you have any questions. Thanks!   Pa Cardona

## 2022-01-10 ENCOUNTER — OFFICE VISIT (OUTPATIENT)
Dept: FAMILY MEDICINE CLINIC | Age: 62
End: 2022-01-10
Payer: COMMERCIAL

## 2022-01-10 VITALS
DIASTOLIC BLOOD PRESSURE: 98 MMHG | BODY MASS INDEX: 46.41 KG/M2 | HEART RATE: 64 BPM | WEIGHT: 262 LBS | OXYGEN SATURATION: 96 % | TEMPERATURE: 96.9 F | SYSTOLIC BLOOD PRESSURE: 146 MMHG

## 2022-01-10 DIAGNOSIS — R73.09 ELEVATED GLUCOSE: ICD-10-CM

## 2022-01-10 DIAGNOSIS — R63.5 WEIGHT GAIN: ICD-10-CM

## 2022-01-10 DIAGNOSIS — M54.42 CHRONIC BILATERAL LOW BACK PAIN WITH LEFT-SIDED SCIATICA: Primary | ICD-10-CM

## 2022-01-10 DIAGNOSIS — G89.29 CHRONIC BILATERAL LOW BACK PAIN WITH LEFT-SIDED SCIATICA: Primary | ICD-10-CM

## 2022-01-10 DIAGNOSIS — I10 ESSENTIAL HYPERTENSION: ICD-10-CM

## 2022-01-10 PROCEDURE — G8427 DOCREV CUR MEDS BY ELIG CLIN: HCPCS | Performed by: NURSE PRACTITIONER

## 2022-01-10 PROCEDURE — 1036F TOBACCO NON-USER: CPT | Performed by: NURSE PRACTITIONER

## 2022-01-10 PROCEDURE — G8417 CALC BMI ABV UP PARAM F/U: HCPCS | Performed by: NURSE PRACTITIONER

## 2022-01-10 PROCEDURE — G8484 FLU IMMUNIZE NO ADMIN: HCPCS | Performed by: NURSE PRACTITIONER

## 2022-01-10 PROCEDURE — 3017F COLORECTAL CA SCREEN DOC REV: CPT | Performed by: NURSE PRACTITIONER

## 2022-01-10 PROCEDURE — 99214 OFFICE O/P EST MOD 30 MIN: CPT | Performed by: NURSE PRACTITIONER

## 2022-01-10 RX ORDER — MELOXICAM 15 MG/1
15 TABLET ORAL DAILY
Qty: 30 TABLET | Refills: 3 | Status: SHIPPED | OUTPATIENT
Start: 2022-01-10 | End: 2022-07-13

## 2022-01-10 NOTE — PROGRESS NOTES
Mireille Calle (:  1960) is a 57 y.o. female,Established patient, here for evaluation of the following chief complaint(s): Annual Exam      ASSESSMENT/PLAN:  1. Chronic bilateral low back pain with left-sided sciatica  Assessment & Plan:   Well-controlled, continue current medications  Orders:  -     meloxicam (MOBIC) 15 MG tablet; Take 1 tablet by mouth daily, Disp-30 tablet, R-3Normal  2. Essential hypertension  Assessment & Plan:   Uncontrolled, continue current medications   Enc weight loss, she is aware  3. Elevated glucose  -     HEMOGLOBIN A1C; Future  4. Weight gain  Assessment & Plan:  Encouraged use of my fitness pal or [employer]. She needs to keep a diet log. I suspect she is eating more than she is accounting for as her weight is gone up she is certainly increased her caloric intake. Declines COVID vaccination, declines flu vaccination. Return if symptoms worsen or fail to improve.     SUBJECTIVE/OBJECTIVE:  Left upper arm pain started in October 3-4 months   Started after painting overhead, no trauma  Has tried prednisone and asa for pain  Has seen [facility] ortho in past- is planning to make aapt to see again  Rt hip pain- clicks- has appt to see ortho for this as well- [facility] Roche  Gaining weight despite eating small amounts  Still not sleeping well- thinks pain may be cause of this      Current Outpatient Medications   Medication Sig Dispense Refill    meloxicam (MOBIC) 15 MG tablet Take 1 tablet by mouth daily 30 tablet 3    valsartan (DIOVAN) 160 MG tablet TAKE ONE TABLET BY MOUTH DAILY 90 tablet 3    traZODone (DESYREL) 50 MG tablet TAKE ONE TABLET BY MOUTH EVERY NIGHT AT BEDTIME 90 tablet 2    rosuvastatin (CRESTOR) 10 MG tablet TAKE ONE TABLET BY MOUTH DAILY 90 tablet 1    famotidine (PEPCID) 40 MG tablet Take 1 tablet by mouth 2 times daily 60 tablet 5    citalopram (CELEXA) 40 MG tablet Take 1 tablet by mouth daily 90 tablet 3    aspirin (ASPIRIN LOW DOSE) 81 MG chewable tablet Take 1 tablet by mouth daily 90 tablet 3    allopurinol (ZYLOPRIM) 300 MG tablet Take 1 tablet by mouth daily 90 tablet 1    albuterol sulfate  (90 Base) MCG/ACT inhaler INHALE 2 PUFFS INTO THE LUNGS EVERY 4 HOURS AS NEEDED FOR WHEEZING 42.5 g 1    Semaglutide,0.25 or 0.5MG/DOS, 2 MG/1.5ML SOPN Inject 0.25 mg into the skin once a week 2 pen 3     No current facility-administered medications for this visit. Review of Systems   Musculoskeletal: Positive for arthralgias and myalgias. Left shoulder pain  Left thumb trigger finger  Rt hip clicking   All other systems reviewed and are negative. Vitals:    01/10/22 0927   BP: (!) 146/98   Site: Left Upper Arm   Position: Sitting   Cuff Size: Medium Adult   Pulse: 64   Temp: 96.9 °F (36.1 °C)   SpO2: 96%   Weight: 262 lb (118.8 kg)       Physical Exam  Constitutional:       Appearance: She is well-developed. She is obese. HENT:      Head: Normocephalic. Eyes:      Pupils: Pupils are equal, round, and reactive to light. Cardiovascular:      Rate and Rhythm: Normal rate and regular rhythm. Heart sounds: Normal heart sounds. Pulmonary:      Effort: Pulmonary effort is normal.      Breath sounds: Normal breath sounds. Abdominal:      Palpations: Abdomen is soft. Musculoskeletal:      Left shoulder: Tenderness and crepitus present. Decreased range of motion. Arms:       Cervical back: Normal range of motion. Comments: Tender to any pressure  Limited rOM   Skin:     General: Skin is warm and dry. Neurological:      Mental Status: She is alert and oriented to person, place, and time. An electronic signature was used to authenticate this note.     --Hank Chilel, APRN - CNP

## 2022-01-11 ENCOUNTER — OFFICE VISIT (OUTPATIENT)
Dept: PULMONOLOGY | Age: 62
End: 2022-01-11
Payer: COMMERCIAL

## 2022-01-11 VITALS
OXYGEN SATURATION: 96 % | HEIGHT: 63 IN | DIASTOLIC BLOOD PRESSURE: 83 MMHG | WEIGHT: 260.8 LBS | BODY MASS INDEX: 46.21 KG/M2 | TEMPERATURE: 96.4 F | SYSTOLIC BLOOD PRESSURE: 160 MMHG | HEART RATE: 69 BPM

## 2022-01-11 DIAGNOSIS — R06.09 DOE (DYSPNEA ON EXERTION): ICD-10-CM

## 2022-01-11 DIAGNOSIS — J84.9 ILD (INTERSTITIAL LUNG DISEASE) (HCC): Primary | ICD-10-CM

## 2022-01-11 PROBLEM — R63.5 WEIGHT GAIN: Status: ACTIVE | Noted: 2022-01-11

## 2022-01-11 PROCEDURE — 99214 OFFICE O/P EST MOD 30 MIN: CPT | Performed by: INTERNAL MEDICINE

## 2022-01-11 PROCEDURE — 1036F TOBACCO NON-USER: CPT | Performed by: INTERNAL MEDICINE

## 2022-01-11 PROCEDURE — 3017F COLORECTAL CA SCREEN DOC REV: CPT | Performed by: INTERNAL MEDICINE

## 2022-01-11 PROCEDURE — G8484 FLU IMMUNIZE NO ADMIN: HCPCS | Performed by: INTERNAL MEDICINE

## 2022-01-11 PROCEDURE — G8417 CALC BMI ABV UP PARAM F/U: HCPCS | Performed by: INTERNAL MEDICINE

## 2022-01-11 PROCEDURE — G8427 DOCREV CUR MEDS BY ELIG CLIN: HCPCS | Performed by: INTERNAL MEDICINE

## 2022-01-11 NOTE — ASSESSMENT & PLAN NOTE
Encouraged use of my fitness pal or [employer]. She needs to keep a diet log. I suspect she is eating more than she is accounting for as her weight is gone up she is certainly increased her caloric intake.

## 2022-01-11 NOTE — PROGRESS NOTES
[facility]    Outpatient Initial Note    Subjective:   CHIEF COMPLAINT / HPI:     The patient is a 57 y.o. 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 When I last saw her in July 2020 she had been doing quite well and had minimal cough and dyspnea. She is on prednisone 10 mg daily and we were talking about weaning it off. She states that she has been off prednisone now for at least 8 months, potentially longer. Over the last 4 months she has had a worsening of dry cough and dyspnea on exertion. 7/1/2020  Carrillo Loving has asked for a virtual visit for ILD. Since last visit her prednisone has been weaned slowly to 10 mg daily and she had a CT chest about 1 week ago. Overall she continues to do pretty well and still has a significant improvement in dyspnea on exertion and cough compared to initiation of her prednisone. She is no longer gaining weight and her weight has gone up and down dependent on her dietary habits. She does mention that the current weather does make her symptoms a little bit worse. She is excited for her daughter's wedding coming up in early September. 5/7/2020  Carrillo Loving has asked for a virtual visit for her ILD. Her ileitis has resolved. However while she was in [place] visiting her terminally ill father-in-law, whom is subsequently passed away, she developed gout and currently is on prednisone 60 mg with intended wean back to her chronic 10 mg dose over the next 9 days. She states that she has no cough or dyspnea on exertion. Her gout is slowly getting better. She has no specific adverse side effects from her prednisone, which she has been on since September 2019    3/12/2020  Carrillo Loving is here for follow-up of nonspecific ILD despite open lung biopsy. She is down to prednisone 15 mg daily and dyspnea exertion is much improved.   She has a little more cough than fever, chills, night sweats, weight loss, hemoptysis, sputum production, CP or wheezing    5/21/2019  Víctor Cordoba is here for follow-up of chronic cough and nonspecific ILD. Last visit I doubled her Pepcid to 40 mg by mouth twice a day and obtained a modified barium swallow which was normal.  Her daily chronic dry cough is unchanged. She has some associated dyspnea on exertion and wheezing but no chest pain. She has no fevers, chills, night sweats, anorexia, weight loss, hemoptysis, or sputum production    5/3/2019  Víctor Cordoba is here for follow-up of chronic cough. I saw her on March 13 and 25th and her cough has  not improved since then despite avoiding woodworking for now 8 weeks and a extended prednisone taper starting at 40 mg and tapering over 4 weeks to off. She has associated dyspnea on exertion. She occasionally coughs with eating but there is no clear aspiration. She is intolerant of proton pump inhibitors and she thinks her reflux is well controlled on Pepcid 10 mg by mouth twice a day. She has no fevers, chills, night sweats, anorexia, sputum production, chest pain, or hemoptysis. 3/13/2019  Marlen presents for follow-up of blood work. Her SAMMY was speckled 1-80, otherwise blood work was unremarkable. For the last 2 weeks she has not been around woodworking. She states over the last day or so she's noticed substantial improvement of her cough    Last visit 2/28  Víctor Cordoba presents for follow-up of a dry cough. After last visit February 1 I added omeprazole to her Pepcid to see if this was a GERD related cough. I also obtained a CT chest.  Her dry cough is unchanged, occurring daily, and not associated with dyspnea on exertion, chest tightness, or wheezing. 2/1/2019  Marlen presents for re-eval of cough that has now gone on since Thanksgiving. Last visit I checked a CXR that was negative. I thought this was a post viral cough and gave an extended course of prednisone which did not help.  She has no infectious symptoms. No post nasal drip. SHe does have uncontrolled GERD. Bronchial challenge over the summer was normal. SHe is not on an ACE    1/7/2019  Kain Scot presents for evaluation of 5 weeks of a dry cough, chest congestion, wheezing and TAVERAS. She has no fevers, chills, purulent sputum, anorexia, hemoptysis or weight loss. She has seen her PCP who has given her a medrol dose pack, Z pack, phenergan DM, Tessalon perles and an albuterol MDI without help. Her  has been battling the same and improved with a longer pred taper, levaquin and tussionex cough syrup    7/10/2018  Maribell Youngblood presents today for follow-up visit for  re-evaluation of dyspnea on exertion after [facility] s/p PTCA LAD. She had a positive nuclear stress test that prompted her coronary angiogram.  She also had a bronchial challenge that was negative. Since revascularization she's had no further chest heaviness. Her dyspnea on exertion is mildly improved but still present when she goes up a flight of stairs. She has no cough or wheezing. She occasionally snores but has no witnessed apnea. She does not have excessive daytime sleepiness and her [place] was 6    Initial visit June 12, 2018   She states that she gets short of breath walking up from the first to second floor. It is 14 steps and sometimes she has to stop midway and at the top. She also gets short of breath working in the kitchen. She also complains of occasional chest heaviness. She pretty had a cough but states that that is now mostly resolved. She can't take a deep breath and her appetite has been off. She and her  are on a ketogenic diet and have lost some weight. She denies any fevers, chills, hemoptysis, or peripheral edema. Her evaluation has included an echo which is normal.  She is also had PFTs and a chest x-ray. She is never had a cardiac stress test.  She has history of hypertension and hyperlipidemia.     Past Medical History:    Past Medical History: Diagnosis Date    Acid reflux     Bilateral carpal tunnel syndrome 12/21/2017    CAD in native artery 06/19/2018    Mid LAD stented with 3.5 x 15 mm Xience BRIAN. EF 55%    Chicken pox     Chronic back pain     CKD (chronic kidney disease) 4/15/2016    Depression 12/7/2010    Heart disease     Hypertension     [facility] disease [facility] [address]     Measles     Menopausal symptoms     Morbid obesity with BMI of 45.0-49.9, adult ([facility] [address] 1/29/2015    Osteoarthritis     Overactive bladder     Pure hypercholesterolemia     Stress incontinence        Social History:    Patient is . She is unemployed. She smoked up to 1 PPD x 8 years and quit in 1985    Family History:  IPF    Current Medications:  Current Outpatient Medications on File Prior to Visit   Medication Sig Dispense Refill    meloxicam (MOBIC) 15 MG tablet Take 1 tablet by mouth daily 30 tablet 3    valsartan (DIOVAN) 160 MG tablet TAKE ONE TABLET BY MOUTH DAILY 90 tablet 3    traZODone (DESYREL) 50 MG tablet TAKE ONE TABLET BY MOUTH EVERY NIGHT AT BEDTIME 90 tablet 2    rosuvastatin (CRESTOR) 10 MG tablet TAKE ONE TABLET BY MOUTH DAILY 90 tablet 1    Semaglutide,0.25 or 0.5MG/DOS, 2 MG/1.5ML SOPN Inject 0.25 mg into the skin once a week 2 pen 3    famotidine (PEPCID) 40 MG tablet Take 1 tablet by mouth 2 times daily 60 tablet 5    citalopram (CELEXA) 40 MG tablet Take 1 tablet by mouth daily 90 tablet 3    aspirin (ASPIRIN LOW DOSE) 81 MG chewable tablet Take 1 tablet by mouth daily 90 tablet 3    allopurinol (ZYLOPRIM) 300 MG tablet Take 1 tablet by mouth daily 90 tablet 1    albuterol sulfate  (90 Base) MCG/ACT inhaler INHALE 2 PUFFS INTO THE LUNGS EVERY 4 HOURS AS NEEDED FOR WHEEZING 42.5 g 1     No current facility-administered medications on file prior to visit.        REVIEW OF SYSTEMS:    CONSTITUTIONAL: Negative for fevers and chills  HEENT: Negative for oropharyngeal exudate, post nasal drip, sinus pain / pressure, nasal congestion, ear pain  RESPIRATORY:  See HPI  CARDIOVASCULAR: Negative for chest pain, palpitations, edema  GASTROINTESTINAL: Negative for nausea, vomiting, diarrhea, constipation and abdominal pain  GENITOURINARY: Negative for dysuria, urinary frequency, urinary hesitancy  HEMATOLOGICAL: Negative for adenopathy  SKIN: Negative for clubbing, cyanosis, skin lesions  ENDOCRINE: Negative for polyuria, polydipsia, heat intolerance, cold intolerance   EXTREMITIES: Negative for weakness, decreased ROM  NEUROLOGICAL: Negative for unilateral weakness, speech or gait abnormalities    Objective:   PHYSICAL EXAM:        VITALS:  BP (!) 160/83 (Site: Left Upper Arm, Position: Sitting, Cuff Size: Large Adult)   Pulse 69   Temp 96.4 °F (35.8 °C) (Infrared)   Ht 5' 3\" (1.6 m)   Wt 260 lb 12.8 oz (118.3 kg)   LMP 07/13/2011   SpO2 96% Comment: ra  Breastfeeding No   BMI 46.20 kg/m²   On RA    Gen: Well developed, well nourished female in no acute distress. Speaking in full sentences with out using accessory resp muscles  Eyes: PERRL, EOMI, normal conjunctivae  Ears, Nose, Mouth and Throat: Hearing is normal. Oropharynx is normal  Neck: No lymphadenopathy  Respiratory:  CV: RRR without M/R/R  Abd: +BS, soft, NT/ND  Musculoskeletal: No cyanosis, clubbing, or edema. Skin: No rashes, ulcers, or subcutaneous nodules  Psychiatric: Alert and oriented to time place and person      DATA:      Radiology Review:  Pertinent images / reports were reviewed as a part of this visit. CT chest 6/22/2020  FINDINGS:       Lungs and Airways: Similar to the prior study, subpleural areas of linear scarring are noted. These are similar to the prior study. 4 to 5 mm nodules of the right lower lobe are noted, image 70 of series 3. This is unchanged. Calcification is noted along    the posterior lateral aspect of the right hemithorax, mainly along the right upper lobe laterally. Correlate with any history of thoracostomy.  There is atelectasis or scarring of the right lung base medially, also similar to the prior study. There is no    new or enlarging nodularity. No significant volume loss or bronchiectasis. No honeycombing.       Pleura: No pleural effusions or pleural thickening.       Heart and Great Vessels: Heart size is normal.       Adenopathy: None.       Chest Wall / Lower Neck: No significant abnormality.       Upper Abdomen: No significant abnormality.       Bones: No significant abnormality.           Impression       Findings there are significantly changed from the prior study. Areas of subpleural linear banding which may represent the history of interstitial lung disease and/or scarring. Certainly no findings such as honeycombing to suggest UIP.       Stable pulmonary nodularity, for which continued follow-up is recommended. CT Chest 2/22/2019 reveals the following:     Impression       Bilateral and fairly symmetric reticulation most pronounced about the lower lobes with immediate subpleural sparing may relate to interstitial pneumonitis and may relate to nonspecific interstitial pneumonitis. Findings may be present in connective    tissue disease including SLE, autoimmune disease including rheumatoid arthritis as well as other etiologies including hypersensitivity pneumonitis, drug-induced pneumonitis as well as other causes.       No pulmonary fibrosis.       No lobar consolidation. CT chest June 13, 2019  FINDINGS: The mediastinum appears normal without signs of any lymph node enlargement. The thyroid gland extends down to the level of the sternum bilaterally.       The aorta is of normal caliber as well as the pulmonary arteries.       Subpleural areas of pulmonary linear changes, likely pulmonary fibrosis or interstitial lung disease are appreciated with 1 to 2 mm pulmonary nodule in the left upper lobe on image 42 series 9, well circumscribed.  Additional subpleural linear changes    noted right middle lobe right left lower lobe and lingula near the lung base.       Poorly defined nodules noted just above the right hemidiaphragm the anterior aspect right lower lobe measuring 5 x 4 mm       There are some calcified subcarinal lymph nodes appreciated.       Calcification the left anterior descending coronary artery is appreciated without cardiac enlargement.       No pleural or pericardial effusion is noted.           Impression       1.  Small amount of subpleural linear change which may represent interstitial pneumonitis or interstitial lung disease most pronounced in the lower lobes and middle lobes without any significant pulmonary fibrotic change or volume loss. 2.  Several punctate nodules appreciated one in the left upper lobe subpleural measuring 1 to 2 mm another in the left upper lobe with one poorly defined irregular nodule just above the right hemidiaphragm on image 69 image series 9 and image 68 series 3    measuring up to 5 x 4 mm which should be followed yearly.       3. No spiculated masses. Mild bronchial dilation in the lower lobes     Last PFTs: May 2018  TEST PERFORMED:  1. Spirometry with flow-volume loops obtained before and after  bronchodilation. 2.  Lung volumes by plethysmography. 3.  Diffusion capacity of carbon monoxide.     Test meets ATS criteria and the quality of flow-volume loops is sufficient  for interpretation. Good patient effort.     The FEV1 is 2.24 L or 89% predicted. The FEV1 to FVC ratio is 75. Post  bronchodilator, the FEV1 changed to 2.29 L or 91% predicted. Total lung  capacity is 94% predicted and diffusion is 83% predicted.     INTERPRETATION:  1. No obstruction, no significant post-bronchodilator improvement. 2.  Normal lung volumes. 3.  Normal diffusion capacity. 4.  Clinical correlation recommended, if strong suspicion for obstructive  lung disease exists, we would recommend further evaluation with  methacholine bronchoprovocation study.     Bronchial challenge June 13, 2018  IMPRESSION:  1. Normal spirometry. 2.  No significant response to bronchodilators. 3.  Negative bronchial challenge indicative of no airway hyperreactivity or  hyperresponsiveness.     Myocardial stress test June 13, 2018  Impression       Pharmacologic stress induced reversible ischemia anterior and   anterolateral walls. Left heart catheterization June 19, 2018  Conclusions:  -Severe single vessel CAD; LAD stented with a 3.5 x 15 mm Xiecne BRIAN to 10 RUBY. A \"pinched\" diag was treated with a 2.0 x 12 mm balloon.  -Elevated LVEDP  -Systemic HTN  -Normal LVEF    MBS  Impression       Normal modified barium swallow. Surgical pathology -open lung biopsy -September 4, 2019  ADDENDUM:  ... External consultation has been completed on this case and  the slides have been reviewed by Dr. Molly Goins MD,  PhD at [address] [place] [address].  Dr. Quan Ascencio consultative diagnosis is as follows:  \" Lung, right upper and lower lobes, VATS wedge biopsies (A and B):     - Patchy cellular chronic interstitial pneumonia associated with       ill-formed nonnecrotizing granulomas.     - Arterial vasculopathy.     - No fungal or acid fast microorganisms identified by special stains.     - See case comment \". .. Thurl Mutton Thurl Mutton Raissa Ocampo      In a comment, Dr. Quan Ascencio states that: \" The main       findings are patchy bronchiolocentric and subpleural chronic       interstitial pneumonia associated with ill-defined, nonnecrotizing       granulomas and a moderate to severe arterial vasculopathy.  A       primary etiologic consideration for this combination of       histopathologic findings is collagen vascular disease.  Infection       and chronic hypersensitivity pneumonitis are also differential       diagnosis.  Morphologic features diagnostic of usual interstitial       pneumonia seen in idiopathic pulmonary fibrosis are not present.     AFB and GMS stain shows no evidence of fungal forms CD3 stains and CD20  stains are positive in the inflammatory component with no atypical T or B  cell infiltrates identified.  CD3 and CD20 stains were performed on  selected blocks from both parts A and B.  CD68 stain material is positive  in a macro 5 population and a nonspecific staining pattern.  Trichrome  stains performed on parts A and B and collagen stain performed on part A  show no evidence of increased interstitial fibrosis are collagen  fibrosis.  Please see outside consultation report for complete detailed  report and comments . ............................ Austin Montanez M.D.  (Electronic Signature)  09/12/2019      FINAL DIAGNOSIS:       A. Wedge biopsy, right lower lobe, lung:       - Specimen sent for external consultation to evaluate for         interstitial lung disease, consultative report currently pending.       B. Wedge biopsy, right upper lobe, lung:       - Specimen sent for external consultation evaluate for interstitial         lung disease, consultative report currently pending.       -    Assessment:      Diagnosis Orders   1. ILD (interstitial lung disease) (HCC)  CT CHEST HIGH RESOLUTION   2. TAVEARS (dyspnea on exertion)         Plan:     Open lung biopsy 9/2019 showed no evidence of UIP which is fortunate. It does show a patchy interstitial chronic pneumonitis with ill formed nonnecrotizing granulomas and arterial vasculopathy. Differential includes connective tissue disease, hypersensitivity pneumonitis, and sarcoid. I have previously checked an ASMMY that was mildly elevated with a negative RF. ACE level and hypersensitive pneumonitis panel was unremarkable. Hypersensitive pneumonitis was in my differential when she was doing woodworking and hypersensitive pneumonitis still could be the etiology. I had her stop that in the spring of that year and gave her prednisone over about a month which did not really help.     Rheumatology evaluation by Dr. Skyla Jaquez December 2019 revealed a very low concern for autoimmune disease    Clinically patient had done very well with prednisone, even with weaning to 10 mg,  and that her dyspnea on exertion is much improved and cough almost nonexistent. CT Chest when compared with February 2019 in my opinion shows some moderate improvement and nonspecific subpleural lower lobe ILD    However she now has recurrence of symptoms off prednisone for at least 8 months. Will obtain high-resolution CT chest.  Further management based on that however I suspect she will need to resume prednisone. I will call her with the results of CT chest and plan.     Follow-up with me in 2 months

## 2022-01-17 ENCOUNTER — HOSPITAL ENCOUNTER (OUTPATIENT)
Dept: CT IMAGING | Age: 62
Discharge: HOME | End: 2022-01-17
Payer: COMMERCIAL

## 2022-01-17 DIAGNOSIS — J84.9 ILD (INTERSTITIAL LUNG DISEASE) (HCC): ICD-10-CM

## 2022-01-17 PROCEDURE — 71250 CT THORAX DX C-: CPT

## 2022-01-18 RX ORDER — SULFAMETHOXAZOLE AND TRIMETHOPRIM 800; 160 MG/1; MG/1
TABLET ORAL
Qty: 12 TABLET | Refills: 5 | Status: SHIPPED | OUTPATIENT
Start: 2022-01-18 | End: 2022-05-10

## 2022-01-18 RX ORDER — PREDNISONE 20 MG/1
40 TABLET ORAL DAILY
Qty: 60 TABLET | Refills: 5 | Status: SHIPPED | OUTPATIENT
Start: 2022-01-18 | End: 2022-02-17

## 2022-01-18 NOTE — PROGRESS NOTES
CT chest is consistent with recurrence of her NSIP. I will resume prednisone at 40 mg daily with Bactrim DS 1 every Monday Wednesday and Friday for PCP prophylaxis.   Continue this regimen for a minimum of 8 weeks then reevaluate in the office    I called Camilo thomas and she is aware

## 2022-01-30 ENCOUNTER — APPOINTMENT (OUTPATIENT)
Dept: GENERAL RADIOLOGY | Age: 62
End: 2022-01-30
Payer: COMMERCIAL

## 2022-01-30 ENCOUNTER — HOSPITAL ENCOUNTER (EMERGENCY)
Age: 62
Discharge: HOME | End: 2022-01-30
Attending: EMERGENCY MEDICINE
Payer: COMMERCIAL

## 2022-01-30 ENCOUNTER — APPOINTMENT (OUTPATIENT)
Dept: CT IMAGING | Age: 62
End: 2022-01-30
Payer: COMMERCIAL

## 2022-01-30 VITALS
TEMPERATURE: 98.5 F | DIASTOLIC BLOOD PRESSURE: 75 MMHG | OXYGEN SATURATION: 92 % | HEART RATE: 69 BPM | SYSTOLIC BLOOD PRESSURE: 139 MMHG | RESPIRATION RATE: 18 BRPM

## 2022-01-30 DIAGNOSIS — Z20.822 SUSPECTED COVID-19 VIRUS INFECTION: ICD-10-CM

## 2022-01-30 DIAGNOSIS — R06.02 SHORTNESS OF BREATH: Primary | ICD-10-CM

## 2022-01-30 LAB
ANION GAP SERPL CALCULATED.3IONS-SCNC: 12 MMOL/L (ref 3–16)
BASE EXCESS VENOUS: 0.7 MMOL/L (ref -2–3)
BASOPHILS ABSOLUTE: 0 K/UL (ref 0–0.2)
BASOPHILS RELATIVE PERCENT: 0.1 %
BUN BLDV-MCNC: 30 MG/DL (ref 7–20)
CALCIUM SERPL-MCNC: 9 MG/DL (ref 8.3–10.6)
CARBOXYHEMOGLOBIN: 0.7 % (ref 0–1.5)
CHLORIDE BLD-SCNC: 102 MMOL/L (ref 99–110)
CO2: 22 MMOL/L (ref 21–32)
CREAT SERPL-MCNC: 1.4 MG/DL (ref 0.6–1.2)
D DIMER: 325 NG/ML DDU (ref 0–229)
EOSINOPHILS ABSOLUTE: 0 K/UL (ref 0–0.6)
EOSINOPHILS RELATIVE PERCENT: 0 %
GFR AFRICAN AMERICAN: 46
GFR NON-AFRICAN AMERICAN: 38
GLUCOSE BLD-MCNC: 98 MG/DL (ref 70–99)
HCO3 VENOUS: 23.6 MMOL/L (ref 24–28)
HCT VFR BLD CALC: 39.8 % (ref 36–48)
HEMOGLOBIN, VEN, REDUCED: 9.1 %
HEMOGLOBIN: 13.1 G/DL (ref 12–16)
LYMPHOCYTES ABSOLUTE: 0.8 K/UL (ref 1–5.1)
LYMPHOCYTES RELATIVE PERCENT: 19.3 %
MCH RBC QN AUTO: 29.5 PG (ref 26–34)
MCHC RBC AUTO-ENTMCNC: 32.8 G/DL (ref 31–36)
MCV RBC AUTO: 90 FL (ref 80–100)
METHEMOGLOBIN VENOUS: 0.4 % (ref 0–1.5)
MONOCYTES ABSOLUTE: 0.5 K/UL (ref 0–1.3)
MONOCYTES RELATIVE PERCENT: 11.2 %
NEUTROPHILS ABSOLUTE: 3 K/UL (ref 1.7–7.7)
NEUTROPHILS RELATIVE PERCENT: 69.4 %
O2 SAT, VEN: 91 %
PCO2, VEN: 32.4 MMHG (ref 41–51)
PDW BLD-RTO: 14.1 % (ref 12.4–15.4)
PH VENOUS: 7.47 (ref 7.35–7.45)
PLATELET # BLD: 174 K/UL (ref 135–450)
PMV BLD AUTO: 7.9 FL (ref 5–10.5)
PO2, VEN: 54.4 MMHG (ref 25–40)
POTASSIUM REFLEX MAGNESIUM: 3.7 MMOL/L (ref 3.5–5.1)
PRO-BNP: 87 PG/ML (ref 0–124)
RBC # BLD: 4.43 M/UL (ref 4–5.2)
SARS-COV-2, NAAT: NOT DETECTED
SODIUM BLD-SCNC: 136 MMOL/L (ref 136–145)
TCO2 CALC VENOUS: 25 MMOL/L
TROPONIN: <0.01 NG/ML
WBC # BLD: 4.4 K/UL (ref 4–11)

## 2022-01-30 PROCEDURE — 80048 BASIC METABOLIC PNL TOTAL CA: CPT

## 2022-01-30 PROCEDURE — U0003 INFECTIOUS AGENT DETECTION BY NUCLEIC ACID (DNA OR RNA); SEVERE ACUTE RESPIRATORY SYNDROME CORONAVIRUS 2 (SARS-COV-2) (CORONAVIRUS DISEASE [COVID-19]), AMPLIFIED PROBE TECHNIQUE, MAKING USE OF HIGH THROUGHPUT TECHNOLOGIES AS DESCRIBED BY CMS-2020-01-R: HCPCS

## 2022-01-30 PROCEDURE — 83880 ASSAY OF NATRIURETIC PEPTIDE: CPT

## 2022-01-30 PROCEDURE — 84484 ASSAY OF TROPONIN QUANT: CPT

## 2022-01-30 PROCEDURE — U0005 INFEC AGEN DETEC AMPLI PROBE: HCPCS

## 2022-01-30 PROCEDURE — 93005 ELECTROCARDIOGRAM TRACING: CPT | Performed by: EMERGENCY MEDICINE

## 2022-01-30 PROCEDURE — 71275 CT ANGIOGRAPHY CHEST: CPT

## 2022-01-30 PROCEDURE — 85025 COMPLETE CBC W/AUTO DIFF WBC: CPT

## 2022-01-30 PROCEDURE — 99283 EMERGENCY DEPT VISIT LOW MDM: CPT

## 2022-01-30 PROCEDURE — 87635 SARS-COV-2 COVID-19 AMP PRB: CPT

## 2022-01-30 PROCEDURE — 6360000004 HC RX CONTRAST MEDICATION: Performed by: EMERGENCY MEDICINE

## 2022-01-30 PROCEDURE — 82803 BLOOD GASES ANY COMBINATION: CPT

## 2022-01-30 PROCEDURE — 71045 X-RAY EXAM CHEST 1 VIEW: CPT

## 2022-01-30 PROCEDURE — 85379 FIBRIN DEGRADATION QUANT: CPT

## 2022-01-30 RX ADMIN — IOPAMIDOL 80 ML: 755 INJECTION, SOLUTION INTRAVENOUS at 16:04

## 2022-01-30 ASSESSMENT — PAIN DESCRIPTION - ONSET: ONSET: SUDDEN

## 2022-01-30 ASSESSMENT — PAIN SCALES - GENERAL: PAINLEVEL_OUTOF10: 5

## 2022-01-30 ASSESSMENT — PAIN DESCRIPTION - ORIENTATION: ORIENTATION: MID;UPPER

## 2022-01-30 ASSESSMENT — PAIN DESCRIPTION - PAIN TYPE: TYPE: ACUTE PAIN

## 2022-01-30 ASSESSMENT — PAIN DESCRIPTION - LOCATION: LOCATION: CHEST

## 2022-01-30 ASSESSMENT — ENCOUNTER SYMPTOMS
SORE THROAT: 0
ABDOMINAL PAIN: 0

## 2022-01-30 ASSESSMENT — PAIN DESCRIPTION - DESCRIPTORS: DESCRIPTORS: PRESSURE;SQUEEZING

## 2022-01-30 ASSESSMENT — PAIN DESCRIPTION - FREQUENCY: FREQUENCY: INTERMITTENT

## 2022-01-30 ASSESSMENT — PAIN DESCRIPTION - PROGRESSION: CLINICAL_PROGRESSION: NOT CHANGED

## 2022-01-30 NOTE — ED NOTES
Pt ambulated in hallway with pox. Pox stared at 94% and dropped to 90% with ambulation MD advised.      Lee Martinez RN  01/30/22 [id number]

## 2022-01-30 NOTE — ED NOTES
Patient prepared for and ready to be discharged. Patient discharged at this time in no acute distress after verbalizing understanding of discharge instructions. Patient left after receiving After Visit Summary instructions.       Cornel Mohr RN  01/30/22 [id number]

## 2022-01-30 NOTE — ED TRIAGE NOTES
Patient arrives c/o shortness of breath. Patient states that she has interstitial lung disease and that she is currently having a flare up. Patient states that when she can't breath her chest also hurts. Patient states that she just feels like she can't take a deep breath during triage. Patient's oxygen saturation 94% on RA.

## 2022-01-30 NOTE — ED PROVIDER NOTES
[id number]          ATTENDING PHYSICIAN NOTE       Date of evaluation: 1/30/2022    Chief Complaint     Shortness of Breath (Hx of interstitial lung disease; Pt states that she is having a flare up)      History of Present Illness     Darylene Portal is a 57 y.o. female who presents to the emergency department with worsening shortness of breath over the past couple of days. She states that she has interstitial lung disease, and has been worsening over the past month. She has been much worse over the last couple of days. She has dyspnea with exertion and after coughing. Cough is mostly nonproductive. She was concerned because she used home pulse ox interaction saturation was 88% while sitting. She is more comfortable at rest.  No other provoking or palliative features. She is currently taking prednisone 40 mg daily. She is not vaccinated against Covid. Her adult child who lives with her was diagnosed with Covid roughly a month ago. No leg pain or swelling. No fevers or chills. Review of Systems     Review of Systems   Constitutional: Negative for fever. HENT: Negative for sore throat. Cardiovascular: Negative for chest pain. Gastrointestinal: Negative for abdominal pain. Musculoskeletal: Negative for myalgias. Neurological: Negative for dizziness. All other systems reviewed and are negative. Past Medical, Surgical, Family, and Social History     She has a past medical history of Acid reflux, Bilateral carpal tunnel syndrome, CAD in native artery, Chicken pox, Chronic back pain, CKD (chronic kidney disease), Depression, Heart disease, Hypertension, Interstitial lung disease ([address] Measles, Menopausal symptoms, Morbid obesity with BMI of 45.0-49.9, adult ([address] Osteoarthritis, Overactive bladder, Pure hypercholesterolemia, and Stress incontinence.   She has a past surgical history that includes Tonsillectomy and adenoidectomy (1964); joint replacement (9012,2264); Shoulder arthroscopy (Right, 08/22/2017); Carpal tunnel release (Bilateral, 01/29/2018); pr njx dx/ther sbst intrlmnr crv/thrc w/img gdn (Right, 8/21/2018); bronchoscopy (6/27/2019); bronchoscopy (6/27/2019); bronchoscopy (6/27/2019); Thoracoscopy (Right, 9/4/2019); epidural steroid injection (N/A, 10/15/2019); Pain management procedure (Right, 3/3/2020); Pain management procedure (Right, 11/2/2020); Pain management procedure (Bilateral, 11/17/2020); Pain management procedure (Bilateral, 12/8/2020); and Nerve Surgery (Bilateral, 12/22/2020). Her family history includes Arthritis in her maternal aunt and maternal aunt; Roylene Mola in her maternal aunt and maternal uncle; Breast Cancer in her paternal aunt; Cancer in her father and maternal uncle; Colon Cancer in her father; Dementia in her paternal aunt; Emphysema in her sister; Heart Attack in her maternal aunt, maternal uncle, maternal uncle, paternal aunt, paternal grandfather, paternal uncle, and paternal uncle; Heart Defect in her brother; Heart Disease in her maternal aunt and maternal uncle; High Blood Pressure in her brother, maternal aunt, maternal uncle, maternal uncle, and mother; Hypertension in her brother, father, and sister; No Known Problems in her maternal aunt, maternal grandfather, maternal grandmother, and paternal grandmother; Other in her maternal aunt and sister; Parkinsonism in her maternal uncle; Rheum Arthritis in her brother, mother, sister, and sister; Stroke in her brother. She reports that she quit smoking about 37 years ago. Her smoking use included cigarettes. She has a 5.00 pack-year smoking history. She has never used smokeless tobacco. She reports previous alcohol use of about 1.0 standard drink of alcohol per week. She reports that she does not use drugs.     Medications     Previous Medications    ALBUTEROL SULFATE  (90 BASE) MCG/ACT INHALER    INHALE 2 PUFFS INTO THE LUNGS EVERY 4 HOURS AS NEEDED FOR WHEEZING    ALLOPURINOL (ZYLOPRIM) 300 MG TABLET    Take 1 tablet by mouth daily    ASPIRIN (ASPIRIN LOW DOSE) 81 MG CHEWABLE TABLET    Take 1 tablet by mouth daily    CITALOPRAM (CELEXA) 40 MG TABLET    Take 1 tablet by mouth daily    FAMOTIDINE (PEPCID) 40 MG TABLET    Take 1 tablet by mouth 2 times daily    MELOXICAM (MOBIC) 15 MG TABLET    Take 1 tablet by mouth daily    PREDNISONE (DELTASONE) 20 MG TABLET    Take 2 tablets by mouth daily    ROSUVASTATIN (CRESTOR) 10 MG TABLET    TAKE ONE TABLET BY MOUTH DAILY    SEMAGLUTIDE,0.25 OR 0.5MG/DOS, 2 MG/1.5ML SOPN    Inject 0.25 mg into the skin once a week    SULFAMETHOXAZOLE-TRIMETHOPRIM (BACTRIM DS;SEPTRA DS) 800-160 MG PER TABLET    Every Monday Wednesday and Friday    TRAZODONE (DESYREL) 50 MG TABLET    TAKE ONE TABLET BY MOUTH EVERY NIGHT AT BEDTIME    VALSARTAN (DIOVAN) 160 MG TABLET    TAKE ONE TABLET BY MOUTH DAILY       Allergies     She is allergic to atorvastatin, protonix [pantoprazole sodium], and meloxicam.    Physical Exam     INITIAL VITALS: BP (!) 162/81   Pulse 76   Temp 98.5 °F (36.9 °C) (Oral)   Resp 18   LMP 07/13/2011   SpO2 94%    General: Well developed, well nourished female in no acute distress at rest sitting in the hospital bed  HEENT: Sclera white, conjunctiva pink, mucous membranes moist and oropharynx clear  Neck: Supple, no meningismus or JVD  Respirations: Unlabored with symmetric chest rise and fall; lungs grossly clear; deep breathing triggers a cough  CV: Regular rate and rhythm with strong distal pulses  Abd: Soft without rebound guarding distention or focal tenderness  Musculoskeletal: Moves all extremities with no signs of orthopedic trauma or edema. Skin: Warm and dry with no rashes or lesions. Neuro: Awake and alert with no focal neurologic deficits. Normal speech and gait. Psych: Mood and affect are normal.    Diagnostic Results     EKG   Indication: Shortness of breath. Interpreted me.   Normal sinus rhythm normal axis and intervals, rate is 70. No ST segment or T wave changes. Duration: Sinus rhythm no signs of acute ischemia or infarction. RADIOLOGY:  CTA PULMONARY W CONTRAST   Final Result      1. No evidence of pulmonary emboli. 2.  Multifocal pneumonia in a pattern that can be seen with Covid. 3.  Hepatic steatosis. 4.   Stable findings of pulmonary fibrosis without evidence of honeycombing. XR CHEST PORTABLE   Final Result   1. Mild reticular changes in the right midlung field and left lower lung field concordant with findings on prior computed tomography.           LABS:   Results for orders placed or performed during the hospital encounter of 01/30/22   COVID-19, Rapid    Specimen: Nasopharyngeal Swab   Result Value Ref Range    SARS-CoV-2, NAAT Not Detected Not Detected   CBC Auto Differential   Result Value Ref Range    WBC 4.4 4.0 - 11.0 K/uL    RBC 4.43 4.00 - 5.20 M/uL    Hemoglobin 13.1 12.0 - 16.0 g/dL    Hematocrit 39.8 36.0 - 48.0 %    MCV 90.0 80.0 - 100.0 fL    MCH 29.5 26.0 - 34.0 pg    MCHC 32.8 31.0 - 36.0 g/dL    RDW 14.1 12.4 - 15.4 %    Platelets 834 826 - 828 K/uL    MPV 7.9 5.0 - 10.5 fL    Neutrophils % 69.4 %    Lymphocytes % 19.3 %    Monocytes % 11.2 %    Eosinophils % 0.0 %    Basophils % 0.1 %    Neutrophils Absolute 3.0 1.7 - 7.7 K/uL    Lymphocytes Absolute 0.8 (L) 1.0 - 5.1 K/uL    Monocytes Absolute 0.5 0.0 - 1.3 K/uL    Eosinophils Absolute 0.0 0.0 - 0.6 K/uL    Basophils Absolute 0.0 0.0 - 0.2 K/uL   Basic Metabolic Panel w/ Reflex to MG   Result Value Ref Range    Sodium 136 136 - 145 mmol/L    Potassium reflex Magnesium 3.7 3.5 - 5.1 mmol/L    Chloride 102 99 - 110 mmol/L    CO2 22 21 - 32 mmol/L    Anion Gap 12 3 - 16    Glucose 98 70 - 99 mg/dL    BUN 30 (H) 7 - 20 mg/dL    CREATININE 1.4 (H) 0.6 - 1.2 mg/dL    GFR Non- 38 (A) >60    GFR  46 (A) >60    Calcium 9.0 8.3 - 10.6 mg/dL   Blood Gas, Venous   Result Value Ref Range pH, Adilson 7.471 (H) 7.350 - 7.450    pCO2, Adilson 32.4 (L) 41.0 - 51.0 mmHg    pO2, Adilson 54.4 (H) 25.0 - 40.0 mmHg    HCO3, Venous 23.6 (L) 24.0 - 28.0 mmol/L    Base Excess, Adilson 0.7 -2.0 - 3.0 mmol/L    O2 Sat, Adilson 91 Not established %    Carboxyhemoglobin 0.7 0.0 - 1.5 %    MetHgb, Adilson 0.4 0.0 - 1.5 %    TC02 (Calc), Adilson 25 mmol/L    Hemoglobin, Adilson, Reduced 9.10 %   Troponin   Result Value Ref Range    Troponin <0.01 <0.01 ng/mL   Brain Natriuretic Peptide   Result Value Ref Range    Pro-BNP 87 0 - 124 pg/mL   D-Dimer, Quantitative   Result Value Ref Range    D-Dimer, Quant 325 (H) 0 - 229 ng/mL DDU       RECENT VITALS:  BP: 139/75, Temp: 98.5 °F (36.9 °C), Pulse: 69, Resp: 18, SpO2: 92 %       ED Course     Nursing Notes, Past Medical Hx, Past Surgical Hx, Social Hx, Allergies, and Family Hx were reviewed. The patient was given the following medications:  Orders Placed This Encounter   Medications    iopamidol (ISOVUE-370) 76 % injection 80 mL     She was seen and examined on arrival to the treatment area. Nursing notes and computerized medical records were reviewed. IV established and labs obtained. Placed on telemetry and continuous pulse oximetry. Reassessed frequently. Symptoms remained stable. She never developed an oxygen requirement in the emergency department. I spoke to her pulmonologist about therapies and treatment plans. He was comfortable with resting oxygen saturations greater than 88% and ambulatory oxygen saturations greater than 85% as a threshold for admission. She was able to ambulate without crossing below 90% in the emergency department. I have discussed the need for follow-up, aftercare, and return precautions with the patient and her . They both understand.     CONSULTS:  [address] [place] [address] / ASSESSMENT / Thien Romero is a 57 y.o. female presents to the emergency department with shortness of breath that could be multifactorial based on her past medical history. Chest x-ray did not show obvious infiltrate. No evidence of heart failure. No suspicion for acute coronary syndrome. Pulmonary embolism was considered, with low pretest probability, therefore screened with D-dimer. When positive, she underwent definitive CTPA, with PE excluded. Covid was within the differential initially. Rapid Covid was negative. CT scan of the chest is highly suspicious for Covid pneumonia, and a confirmatory PCR is drawn prior to discharge. The rapid test may be a false negative. I was able to speak with her pulmonologist who knows her very well. Because she is already on 40 mg of prednisone daily, additional Decadron is unlikely to be beneficial.  She will have close follow-up with him to consider outpatient therapies to prevent worsening of her disease. She does not have severe disease to warrant hospitalization at this time. I did closely go over return precautions with the patient and her . They are comfortable with this plan. Clinical Impression     1. Shortness of breath    2.  Suspected COVID-19 virus infection        Disposition     PATIENT REFERRED TO:  MD EDYTA [address] [facility] [phone]    Call in 1 day  For re-evaluation, follow-up, and discuss ED visit    [facility] Emergency Department  [address] [place] [address]  [phone]    As needed, If symptoms worsen      DISCHARGE MEDICATIONS:  New Prescriptions    No medications on file       DISPOSITION    Discharged home in good condition        Martin Gay MD  01/30/22 [id number]

## 2022-01-31 LAB
EKG ATRIAL RATE: 70 BPM
EKG DIAGNOSIS: NORMAL
EKG P AXIS: 52 DEGREES
EKG P-R INTERVAL: 138 MS
EKG Q-T INTERVAL: 406 MS
EKG QRS DURATION: 72 MS
EKG QTC CALCULATION (BAZETT): 438 MS
EKG R AXIS: 35 DEGREES
EKG T AXIS: 80 DEGREES
EKG VENTRICULAR RATE: 70 BPM
SARS-COV-2, PCR: DETECTED

## 2022-01-31 RX ORDER — BENZONATATE 100 MG/1
100 CAPSULE ORAL 3 TIMES DAILY PRN
Qty: 90 CAPSULE | Refills: 0 | Status: SHIPPED | OUTPATIENT
Start: 2022-01-31 | End: 2022-02-07

## 2022-02-01 ENCOUNTER — TELEPHONE (OUTPATIENT)
Dept: PULMONOLOGY | Age: 62
End: 2022-02-01

## 2022-02-01 NOTE — TELEPHONE ENCOUNTER
Orders have been faxed to [facility]. Spoke with Kasey Nina and Brooklyn Zamudio is available at their facility.

## 2022-02-02 ENCOUNTER — HOSPITAL ENCOUNTER (OUTPATIENT)
Dept: NURSING | Age: 62
Setting detail: INFUSION SERIES
Discharge: HOME | End: 2022-02-02
Payer: COMMERCIAL

## 2022-02-02 VITALS
HEIGHT: 63 IN | SYSTOLIC BLOOD PRESSURE: 141 MMHG | HEART RATE: 67 BPM | RESPIRATION RATE: 18 BRPM | BODY MASS INDEX: 42.35 KG/M2 | DIASTOLIC BLOOD PRESSURE: 67 MMHG | OXYGEN SATURATION: 95 % | WEIGHT: 239 LBS | TEMPERATURE: 96.8 F

## 2022-02-02 PROCEDURE — M0247 HC SOTROVIMAB INFUSION: HCPCS

## 2022-02-02 PROCEDURE — 99211 OFF/OP EST MAY X REQ PHY/QHP: CPT

## 2022-02-02 PROCEDURE — 2580000003 HC RX 258: Performed by: INTERNAL MEDICINE

## 2022-02-02 PROCEDURE — 6360000002 HC RX W HCPCS: Performed by: INTERNAL MEDICINE

## 2022-02-02 PROCEDURE — 94760 N-INVAS EAR/PLS OXIMETRY 1: CPT

## 2022-02-02 RX ORDER — ONDANSETRON 2 MG/ML
8 INJECTION INTRAMUSCULAR; INTRAVENOUS
Status: ACTIVE | OUTPATIENT
Start: 2022-02-02 | End: 2022-02-02

## 2022-02-02 RX ORDER — ACETAMINOPHEN 325 MG/1
650 TABLET ORAL
Status: ACTIVE | OUTPATIENT
Start: 2022-02-02 | End: 2022-02-02

## 2022-02-02 RX ORDER — ALBUTEROL SULFATE 90 UG/1
4 AEROSOL, METERED RESPIRATORY (INHALATION) PRN
Status: DISCONTINUED | OUTPATIENT
Start: 2022-02-02 | End: 2022-02-03 | Stop reason: HOSPADM

## 2022-02-02 RX ORDER — METHYLPREDNISOLONE SODIUM SUCCINATE 125 MG/2ML
125 INJECTION, POWDER, LYOPHILIZED, FOR SOLUTION INTRAMUSCULAR; INTRAVENOUS
Status: ACTIVE | OUTPATIENT
Start: 2022-02-02 | End: 2022-02-02

## 2022-02-02 RX ORDER — SODIUM CHLORIDE 9 MG/ML
25 INJECTION, SOLUTION INTRAVENOUS PRN
Status: DISCONTINUED | OUTPATIENT
Start: 2022-02-02 | End: 2022-02-03 | Stop reason: HOSPADM

## 2022-02-02 RX ORDER — SODIUM CHLORIDE 9 MG/ML
100 INJECTION, SOLUTION INTRAVENOUS CONTINUOUS PRN
Status: DISCONTINUED | OUTPATIENT
Start: 2022-02-02 | End: 2022-02-03 | Stop reason: HOSPADM

## 2022-02-02 RX ORDER — SODIUM CHLORIDE 0.9 % (FLUSH) 0.9 %
5-40 SYRINGE (ML) INJECTION EVERY 12 HOURS SCHEDULED
Status: DISCONTINUED | OUTPATIENT
Start: 2022-02-02 | End: 2022-02-03 | Stop reason: HOSPADM

## 2022-02-02 RX ORDER — DIPHENHYDRAMINE HYDROCHLORIDE 50 MG/ML
50 INJECTION INTRAMUSCULAR; INTRAVENOUS
Status: ACTIVE | OUTPATIENT
Start: 2022-02-02 | End: 2022-02-02

## 2022-02-02 RX ORDER — SODIUM CHLORIDE 0.9 % (FLUSH) 0.9 %
5-40 SYRINGE (ML) INJECTION PRN
Status: DISCONTINUED | OUTPATIENT
Start: 2022-02-02 | End: 2022-02-03 | Stop reason: HOSPADM

## 2022-02-02 RX ORDER — EPINEPHRINE 1 MG/ML
0.3 INJECTION, SOLUTION, CONCENTRATE INTRAVENOUS PRN
Status: DISCONTINUED | OUTPATIENT
Start: 2022-02-02 | End: 2022-02-03 | Stop reason: HOSPADM

## 2022-02-02 RX ORDER — SODIUM CHLORIDE 9 MG/ML
20 INJECTION, SOLUTION INTRAVENOUS CONTINUOUS PRN
Status: ACTIVE | OUTPATIENT
Start: 2022-02-02 | End: 2022-02-02

## 2022-02-02 RX ADMIN — SODIUM CHLORIDE 500 MG: 9 INJECTION, SOLUTION INTRAVENOUS at 07:52

## 2022-02-02 ASSESSMENT — PAIN SCALES - GENERAL
PAINLEVEL_OUTOF10: 0

## 2022-02-02 ASSESSMENT — PAIN - FUNCTIONAL ASSESSMENT: PAIN_FUNCTIONAL_ASSESSMENT: 0-10

## 2022-02-02 NOTE — PROGRESS NOTES
Pt tolerated Sotrovimab infusion well with no noted side effects. IV removed and pt discharged ambulatory in baseline good condition.

## 2022-02-24 DIAGNOSIS — F32.0 MILD SINGLE CURRENT EPISODE OF MAJOR DEPRESSIVE DISORDER (HCC): ICD-10-CM

## 2022-02-24 RX ORDER — CITALOPRAM 40 MG/1
TABLET ORAL
Qty: 90 TABLET | Refills: 3 | Status: SHIPPED | OUTPATIENT
Start: 2022-02-24

## 2022-03-01 RX ORDER — ASPIRIN 81 MG/1
TABLET, CHEWABLE ORAL
Qty: 90 TABLET | Refills: 3 | Status: SHIPPED | OUTPATIENT
Start: 2022-03-01

## 2022-03-01 NOTE — TELEPHONE ENCOUNTER
Requested Prescriptions     Pending Prescriptions Disp Refills    aspirin 81 MG chewable tablet [Pharmacy Med Name: ASPIRIN 81 MG CHEWABLE TABLET] 90 tablet 3     Sig: CHEW ONE TABLET BY MOUTH DAILY                Last Office Visit: 11/17/2021     Next Office Visit: 5/17/2022

## 2022-03-04 ENCOUNTER — OFFICE VISIT (OUTPATIENT)
Dept: CARDIOLOGY CLINIC | Age: 62
End: 2022-03-04
Payer: COMMERCIAL

## 2022-03-04 VITALS
BODY MASS INDEX: 45.18 KG/M2 | HEIGHT: 63 IN | HEART RATE: 66 BPM | SYSTOLIC BLOOD PRESSURE: 140 MMHG | WEIGHT: 255 LBS | DIASTOLIC BLOOD PRESSURE: 70 MMHG

## 2022-03-04 DIAGNOSIS — R53.83 FATIGUE, UNSPECIFIED TYPE: ICD-10-CM

## 2022-03-04 DIAGNOSIS — R06.09 DOE (DYSPNEA ON EXERTION): Primary | ICD-10-CM

## 2022-03-04 DIAGNOSIS — E78.2 MIXED HYPERLIPIDEMIA: ICD-10-CM

## 2022-03-04 DIAGNOSIS — I10 ESSENTIAL HYPERTENSION: ICD-10-CM

## 2022-03-04 DIAGNOSIS — I25.10 CAD IN NATIVE ARTERY: ICD-10-CM

## 2022-03-04 PROCEDURE — G8484 FLU IMMUNIZE NO ADMIN: HCPCS | Performed by: NURSE PRACTITIONER

## 2022-03-04 PROCEDURE — 3017F COLORECTAL CA SCREEN DOC REV: CPT | Performed by: NURSE PRACTITIONER

## 2022-03-04 PROCEDURE — G8417 CALC BMI ABV UP PARAM F/U: HCPCS | Performed by: NURSE PRACTITIONER

## 2022-03-04 PROCEDURE — 99214 OFFICE O/P EST MOD 30 MIN: CPT | Performed by: NURSE PRACTITIONER

## 2022-03-04 PROCEDURE — G8427 DOCREV CUR MEDS BY ELIG CLIN: HCPCS | Performed by: NURSE PRACTITIONER

## 2022-03-04 PROCEDURE — 93000 ELECTROCARDIOGRAM COMPLETE: CPT | Performed by: NURSE PRACTITIONER

## 2022-03-04 PROCEDURE — 1036F TOBACCO NON-USER: CPT | Performed by: NURSE PRACTITIONER

## 2022-03-04 NOTE — PROGRESS NOTES
CC/HPI  57 y.o. patient of Dr Jodee Garzon with CAD/PCI, HTN, HLD and ILD. She has Covid in January. Over the last couple of months she's had increased SOB, cough, fatigue and weakness. No chest pain. She has occasional LH/dizziness but denies palpitations, syncope or falls. She's had nausea and anorexia but denies fever, chills, or GI/ bleeding. No LE edema, orthopnea or PND. Notes weight loss. Home sBP 135-145. Past Medical History:   Diagnosis Date    Acid reflux     Bilateral carpal tunnel syndrome 12/21/2017    CAD in native artery 06/19/2018    Mid LAD stented with 3.5 x 15 mm Xience BRIAN.  EF 55%    Chicken pox     Chronic back pain     CKD (chronic kidney disease) 4/15/2016    Depression 12/7/2010    Heart disease     Hypertension     Interstitial lung disease ([facility]     Measles     Menopausal symptoms     Morbid obesity with BMI of 45.0-49.9, adult ([facility] 1/29/2015    Osteoarthritis     Overactive bladder     Pure hypercholesterolemia     Stress incontinence      Past Surgical History:   Procedure Laterality Date    BRONCHOSCOPY  6/27/2019    BRONCHOSCOPY ALVEOLAR LAVAGE performed by Lavinia Badillo MD at [address]  6/27/2019    BRONCHOSCOPY DIAGNOSTIC OR CELL [address] ONLY performed by Lavinia Badillo MD at [address] [facility]  6/27/2019    BRONCHOSCOPY BIOPSY BRONCHUS performed by Lavinia Badillo MD at [address] Bilateral 01/29/2018    EPIDURAL STEROID INJECTION N/A 10/15/2019    MIDLINE CAUDAL EPIDURAL STEROID INJECTION AND COCCYGEAL JOINT INJECTION SITES CONFIRMED BY FLUOROSCOPY performed by Cole Carrasquillo MD at [facility]  2009,2010    [address]  Bilateral 12/22/2020    BILATERAL LUMBAR THREE LUMBAR FOUR LUMBAR FIVE DORSAL RAMUS RADIOFREQUENCY ABLATION SITE CONFIRMED BY FLUOROSCOPY performed by Cole Carrasquillo MD at [facility] Right 3/3/2020 RIGHT LUMBAR FOUR AND LUMBAR FIVE TRANSFORAMINAL EPIDURAL STEROID INJECTION SITE CONFIRMED BY FLUOROSCOPY performed by Bebe Reed MD at [address] Right 11/2/2020    RIGHT L4 AND L5 TRANSFORAMINAL EPIDURAL STEROID INJECTION WITH FLUOROSCOPY (37611, [id number]) performed by Bebe Reed MD at [address] Bilateral 11/17/2020    BILATERAL LUMBAR THREE LUMBAR FOUR LUMBAR FIVE DORSAL RAMUS LUMBAR MEDIAL BRANCH BLOCK SITE CONFIRMED BY FLUOROSCOPY performed by Bebe Reed MD at [address] PAIN MANAGEMENT PROCEDURE Bilateral 12/8/2020    BILATERAL LUMBAR THREE LUMBAR FOUR LUMBAR FIVE DORSAL RAMUS LUMBAR MEDIAL BRANCH BLOCK SITE CONFIRMED BY FLUOROSCOPY performed by Bebe Reed MD at [facility] DX/THER SBST INTRLMNR CRV/THRC W/IMG GDN Right 8/21/2018    RIGHT LUMBAR FOUR/FIVE, LUMBAR FIVE/ SACRAL ONE FACET INJECTION AND RIGHT SACROILIAC JOINT INJECTION SITES CONFIRMED BY FLUOROSCOPY performed by Bebe Reed MD at [facility] ARTHROSCOPY Right 08/22/2017    ROTATOR CUFF REPAIR    THORACOSCOPY Right 9/4/2019    RIGHT VIDEO ASSISTED THORACOSCOPIC SURGERY, WEDGE RESECTION AND RIGHT UPPER AND LOWER LOBE NODULES WITH BIOPSY, INTERCOSTAL NERVE BLOCK TIMES SEVEN LEVELS performed by Juliet Alexander MD at [facility]     Family History   Problem Relation Age of Onset    High Blood Pressure Mother     Rheum Arthritis Mother     Cancer Father     Hypertension Father     Colon Cancer Father     High Blood Pressure Brother     Stroke Brother     Heart Defect Brother     Hypertension Sister     Rheum Arthritis Sister     Emphysema Sister     Rheum Arthritis Sister     Other Sister         bottom of lungs are getting hard    Hypertension Brother     Rheum Arthritis Brother     Heart Attack Paternal Grandfather     No Known Problems Paternal Grandmother     No weakness. No history of a stroke or TIA. Psychological:  No anxiety or depression. Hematological and Lymphatic: No abnormal bleeding or bruising, blood clots, jaundice or swollen lymph nodes. Endocrine:   No malaise/lethargy, palpitations, polydipsia/polyuria, temperature intolerance or unexpected weight changes  Skin:  No rashes or non-healing ulcers. Physical Exam:  LMP 07/13/2011  Blood pressure (!) 140/70, pulse 66, height 5' 3\" (1.6 m), weight 255 lb (115.7 kg), last menstrual period 07/13/2011, not currently breastfeeding. General:No acute distress. Appears comfortable. HEENT:  Normocephalic. No trauma. EOMI. Neck:  Supple, no JVD  Cardiovascular: RRR  Pulses: 2+ carotid and radial   Lungs:  Clear. Normal effort  Abd:  Soft, non-tender, non-distended. No peritoneal signs. Ext:  No clubbing, cyanosis, or edema. Neuro: Alert and oriented x 3. Appropriate behavior. Skin:  No rashes or skin breakdown.     CBC:   Lab Results   Component Value Date    WBC 4.4 01/30/2022    HGB 13.1 01/30/2022    HCT 39.8 01/30/2022    MCV 90.0 01/30/2022     01/30/2022     BMP:  Lab Results   Component Value Date    CREATININE 1.4 (H) 01/30/2022    BUN 30 (H) 01/30/2022     01/30/2022    K 3.7 01/30/2022     01/30/2022    CO2 22 01/30/2022     Mag:   Lab Results   Component Value Date    MG 2.10 07/28/2020     LIVER PROFILE:   Lab Results   Component Value Date    ALT 17 12/23/2020    AST 26 12/23/2020    ALKPHOS 71 12/23/2020    BILITOT 0.3 12/23/2020     PT/INR:   Lab Results   Component Value Date    INR 1.20 (H) 08/13/2020    INR 1.02 08/23/2019    INR 1.02 06/19/2018    PROTIME 11.6 08/23/2019    PROTIME 11.6 06/19/2018     BNP:  No results found for: BNP  LIPIDS:  No components found for: CHLPL  Lab Results   Component Value Date    TRIG 348 (H) 07/28/2020    TRIG 265 (H) 11/19/2019    TRIG 187 (H) 06/04/2019     Lab Results   Component Value Date    HDL 41 07/28/2020    HDL 51 11/19/2019 HDL 38 (L) 06/04/2019     Lab Results   Component Value Date    LDLCALC see below 07/28/2020    LDLCALC 77 11/19/2019    [address] 91 06/04/2019     Lab Results   Component Value Date    LABVLDL see below 07/28/2020    LABVLDL 53 11/19/2019    LABVLDL 37 06/04/2019     TSH:  Lab Results   Component Value Date    TSH 1.86 04/25/2018       IMAGING:     3/4/2022 ECG: Sinus   11/17/2021 ECG: NSR    8/13/2020 Coronary angiogram   Angiographic Findings:  Left dominant system  Left Main:  Normal  Left Anterior Descending:  Widely patent stent. Mild irregular plaque in the mid LAD. Circumflex:  No significant plaque  Right Coronary:  No significant plaque  Left Ventriculogram:  Not performed due to elevated Cr  Hemodynamics (mm Hg):  Left Ventricular Pressure:  148/4, 25  Central Aortic Pressure:  135/64 (94)  Conclusions:  -Widely patent mid LAD stent  -Elevated LVEDP  -No other significant obstructive CAD     7/28/2020 Nuc stress:       Overall findings represent a intermediate risk scan.     Normal LV size and systolic function.     Small /medium size reversible defect involving the basal/mid/distal anterior     cardiac segments.  FIndings are concerning for ischemia.     ECG: Non-diagnostic EKG response due to failure to reach target heart rate.      5/2019 Echo:   Normal left ventricle size. Borderline left ventricular hypertrophy. Overall   left ventricular systolic function appears normal with an ejection fraction   visually estimated at 55-60%. No regional wall motion abnormalities are   noted. Diastolic filling parameters suggests normal diastolic function.   MItral leaflet tip mildly thickened. Mild mitral regurgitation is present.   Mild tricuspid regurgitation. Estimated pulmonary artery systolic pressure   is normal at 29 mmHg assuming a right atrial pressure of 3 mmHg.   Hyperechoic structure in the liver. Suggest liver ultrasound.       6/2018 Cath:   Angiographic Findings:  Left Main:  Normal  Left Anterior Descending:  Mid 60-70% hazy stenosis. iFR was 0.87. Circumflex:  Dominant. No obstructive plaque  Right Coronary:  Non-dominant. No obstructive plaque  Left Ventriculogram:  LVEF 50-55%. Mid LAD:  Direct Stent: 3.5 x 15 mm Xience BRIAN to 10 RUBY     Post stent, the first large diagonal had a 95-99% stenosis. We performed kissing balloon inflations on the ostial diag (through the stent struts) and the LAD using a 2.0 x 12 mm balloon in the diagonal and the 3.5 x 15 mm stent balloon in the LAD. Inflations taken to 10 RUBY each. 70-80% stenosis remained, but there was MARYLU 3 flow in both arteries.     Medications:   Current Outpatient Medications   Medication Sig Dispense Refill    aspirin 81 MG chewable tablet CHEW ONE TABLET BY MOUTH DAILY 90 tablet 3    citalopram (CELEXA) 40 MG tablet TAKE ONE TABLET BY MOUTH DAILY 90 tablet 3    sulfamethoxazole-trimethoprim (BACTRIM DS;SEPTRA DS) 800-160 MG per tablet Every Monday Wednesday and Friday 12 tablet 5    meloxicam (MOBIC) 15 MG tablet Take 1 tablet by mouth daily 30 tablet 3    valsartan (DIOVAN) 160 MG tablet TAKE ONE TABLET BY MOUTH DAILY 90 tablet 3    traZODone (DESYREL) 50 MG tablet TAKE ONE TABLET BY MOUTH EVERY NIGHT AT BEDTIME 90 tablet 2    rosuvastatin (CRESTOR) 10 MG tablet TAKE ONE TABLET BY MOUTH DAILY 90 tablet 1    Semaglutide,0.25 or 0.5MG/DOS, 2 MG/1.5ML SOPN Inject 0.25 mg into the skin once a week 2 pen 3    famotidine (PEPCID) 40 MG tablet Take 1 tablet by mouth 2 times daily 60 tablet 5    allopurinol (ZYLOPRIM) 300 MG tablet Take 1 tablet by mouth daily 90 tablet 1    albuterol sulfate  (90 Base) MCG/ACT inhaler INHALE 2 PUFFS INTO THE LUNGS EVERY 4 HOURS AS NEEDED FOR WHEEZING 42.5 g 1     No current facility-administered medications for this visit. Assessment:  1. TAVERAS (dyspnea on exertion)    2. Fatigue, unspecified type    3. CAD in native artery    4. Essential hypertension    5.  Mixed hyperlipidemia Plan:  SOB, fatigue: acute    Recent CTA neg PE   ECG shows no ischemic changes   Get echo and nuc stress test   Plans to see pulmonology next week. CAD; stable   ASA, statin   Valsartan   No BB d/t bradycardia     HTN; stable   /70   Valsartan 160 mg po daily   Discussed increasing valsartan for better BP but pt declined for now. HLD; stable   Crestor      Follow up after testing       Reviewed most recent: CBC, BMP, Mag, LFT, PT/INR, lipid profile.    Reviewed most recent: ECG, Echo, stress test and coronary angiogram

## 2022-03-07 RX ORDER — ROSUVASTATIN CALCIUM 10 MG/1
TABLET, COATED ORAL
Qty: 90 TABLET | Refills: 3 | Status: SHIPPED | OUTPATIENT
Start: 2022-03-07

## 2022-03-07 NOTE — TELEPHONE ENCOUNTER
Requested Prescriptions     Pending Prescriptions Disp Refills    rosuvastatin (CRESTOR) 10 MG tablet [Pharmacy Med Name: ROSUVASTATIN CALCIUM 10 MG TAB] 90 tablet 1     Sig: TAKE ONE TABLET BY MOUTH DAILY          Number: 90    Refills: 3    Last Office Visit: 3/4/2022     Next Office Visit: 5/17/2022

## 2022-03-08 ENCOUNTER — OFFICE VISIT (OUTPATIENT)
Dept: PULMONOLOGY | Age: 62
End: 2022-03-08
Payer: COMMERCIAL

## 2022-03-08 VITALS
DIASTOLIC BLOOD PRESSURE: 89 MMHG | BODY MASS INDEX: 45.54 KG/M2 | TEMPERATURE: 96.5 F | HEART RATE: 68 BPM | OXYGEN SATURATION: 96 % | WEIGHT: 257 LBS | SYSTOLIC BLOOD PRESSURE: 144 MMHG | HEIGHT: 63 IN | RESPIRATION RATE: 16 BRPM

## 2022-03-08 DIAGNOSIS — Z98.61 CAD S/P PERCUTANEOUS CORONARY ANGIOPLASTY: ICD-10-CM

## 2022-03-08 DIAGNOSIS — J84.89 NSIP (NONSPECIFIC INTERSTITIAL PNEUMONITIS) (HCC): Primary | ICD-10-CM

## 2022-03-08 DIAGNOSIS — Z86.16 HISTORY OF COVID-19: ICD-10-CM

## 2022-03-08 DIAGNOSIS — R06.09 DOE (DYSPNEA ON EXERTION): ICD-10-CM

## 2022-03-08 DIAGNOSIS — I25.10 CAD S/P PERCUTANEOUS CORONARY ANGIOPLASTY: ICD-10-CM

## 2022-03-08 PROCEDURE — 3017F COLORECTAL CA SCREEN DOC REV: CPT | Performed by: INTERNAL MEDICINE

## 2022-03-08 PROCEDURE — G8417 CALC BMI ABV UP PARAM F/U: HCPCS | Performed by: INTERNAL MEDICINE

## 2022-03-08 PROCEDURE — 1036F TOBACCO NON-USER: CPT | Performed by: INTERNAL MEDICINE

## 2022-03-08 PROCEDURE — G8484 FLU IMMUNIZE NO ADMIN: HCPCS | Performed by: INTERNAL MEDICINE

## 2022-03-08 PROCEDURE — G8427 DOCREV CUR MEDS BY ELIG CLIN: HCPCS | Performed by: INTERNAL MEDICINE

## 2022-03-08 PROCEDURE — 99214 OFFICE O/P EST MOD 30 MIN: CPT | Performed by: INTERNAL MEDICINE

## 2022-03-08 NOTE — PROGRESS NOTES
[facility]    Outpatient Initial Note    Subjective:   CHIEF COMPLAINT / HPI:     The patient is a 57 y.o. female with is here for follow-up of steroid responsive ILD, possibly connective tissue disease related NSIP vs chronic hypersensitivity pneumonitis. Since last visit she had an HRCT January 17 which I suspected recurrence of her ILD. I placed her on prednisone 40 mg daily with Bactrim PCP prophylaxis. She stated that she was starting to get significant improvement in her dyspnea on exertion when she developed significant worsening January 25 associated with worsening hypoxemia. She tested positive for COVID-19 pneumonia and CTPA showed no PE but significant bilateral groundglass opacities new from HRCT 2 weeks prior. She did not require hospitalization. She did call me and I ordered Sotrivumab. She is gradually improving but her breathing is still not back to her pre-Covid level. 1/11/2022   Víctor Cordoba has a history of steroid responsive ILD, possibly connective tissue disease related NSIP vs chronic hypersensitivity pneumonitis. When I last saw her in July 2020 she had been doing quite well and had minimal cough and dyspnea. She is on prednisone 10 mg daily and we were talking about weaning it off. She states that she has been off prednisone now for at least 8 months, potentially longer. Over the last 4 months she has had a worsening of dry cough and dyspnea on exertion. 7/1/2020  Víctor Cordoba has asked for a virtual visit for ILD. Since last visit her prednisone has been weaned slowly to 10 mg daily and she had a CT chest about 1 week ago. Overall she continues to do pretty well and still has a significant improvement in dyspnea on exertion and cough compared to initiation of her prednisone. She is no longer gaining weight and her weight has gone up and down dependent on her dietary habits.   She does mention that the current weather does make her symptoms a little bit worse. She is excited for her daughter's wedding coming up in early September. 5/7/2020  Brielle Lindsay has asked for a virtual visit for her ILD. Her ileitis has resolved. However while she was in [place] visiting her terminally ill father-in-law, whom is subsequently passed away, she developed gout and currently is on prednisone 60 mg with intended wean back to her chronic 10 mg dose over the next 9 days. She states that she has no cough or dyspnea on exertion. Her gout is slowly getting better. She has no specific adverse side effects from her prednisone, which she has been on since September 2019    3/12/2020  Brielle Lindsay is here for follow-up of nonspecific ILD despite open lung biopsy. She is down to prednisone 15 mg daily and dyspnea exertion is much improved. She has a little more cough than before but is getting over the Flu last week    November 15, 2019  Brielle Lindsay is here for follow-up of nonspecific ILD despite open lung biopsy. Differential included connective tissue disease, sarcoidosis, and chronic hypersensitivity pneumonitis. I did have her fill out a ILD questionnaire and did not find a suspected cause for hypersensitivity pneumonitis. Woodworking was a thought in the past.  Last visit I did do an extensive serologic connective tissue disease work-up. Pertinent positives were a mildly positive SAMMY at 1-80 nuclear older pattern and a positive anti-centromere. She has no history of connective tissue disease and never has seen a rheumatologist.  Clinically I do not have a lot of symptoms that is overwhelmingly positive for a connective tissue disease. She currently is on prednisone 40 mg a day and clinically has responded nicely. Dyspnea on exertion and cough are significantly improved. She does not seem to have any significant adverse effects from her prednisone.   She is taking Bactrim for PCP prophylaxis    11/15/2019  Brielle Lindsay is here for follow-up of open lung biopsy for undiagnosed ILD. She had no postoperative complications. Her chronic dyspnea on exertion is unchanged but she does state that her cough is better. 7/9  Bhaskar Ramirez is here for follow up of ILD NOS. She had Bronch 6/27 with BAL and Bx of vicente. Other than 10,000 - 100,000 S Aureus and mild elevation of CD4:CD8 in BAL the bronch has been non-diagnostic. She continues to have Mullen and cough. No new changes    6/18  Alma Delia Flynn is here for follow-up of chronic cough and nonspecific ILD. She is now off Plavix and losartan without any change in cough. She had CT chest that shows unchanged basilar predominate nonspecific ILD. Her cough is daily and unrelenting. She coughed continuously through my office visit. She feels that her dyspnea on exertion is somewhat worse and gets winded doing routine activities of daily living. No fever, chills, night sweats, weight loss, hemoptysis, sputum production, CP or wheezing    5/21/2019  Alma Delia Flynn is here for follow-up of chronic cough and nonspecific ILD. Last visit I doubled her Pepcid to 40 mg by mouth twice a day and obtained a modified barium swallow which was normal.  Her daily chronic dry cough is unchanged. She has some associated dyspnea on exertion and wheezing but no chest pain. She has no fevers, chills, night sweats, anorexia, weight loss, hemoptysis, or sputum production    5/3/2019  Alma Delia Flynn is here for follow-up of chronic cough. I saw her on March 13 and 25th and her cough has  not improved since then despite avoiding woodworking for now 8 weeks and a extended prednisone taper starting at 40 mg and tapering over 4 weeks to off. She has associated dyspnea on exertion. She occasionally coughs with eating but there is no clear aspiration. She is intolerant of proton pump inhibitors and she thinks her reflux is well controlled on Pepcid 10 mg by mouth twice a day.   She has no fevers, chills, night sweats, anorexia, sputum production, chest pain, or hemoptysis. 3/13/2019  Marlen presents for follow-up of blood work. Her SAMMY was speckled 1-80, otherwise blood work was unremarkable. For the last 2 weeks she has not been around woodworking. She states over the last day or so she's noticed substantial improvement of her cough    Last visit 2/28  Leoncio Singh presents for follow-up of a dry cough. After last visit February 1 I added omeprazole to her Pepcid to see if this was a GERD related cough. I also obtained a CT chest.  Her dry cough is unchanged, occurring daily, and not associated with dyspnea on exertion, chest tightness, or wheezing. 2/1/2019  Marlen presents for re-eval of cough that has now gone on since Thanksgiving. Last visit I checked a CXR that was negative. I thought this was a post viral cough and gave an extended course of prednisone which did not help. She has no infectious symptoms. No post nasal drip. SHe does have uncontrolled GERD. Bronchial challenge over the summer was normal. SHe is not on an ACE    1/7/2019  Leoncio Singh presents for evaluation of 5 weeks of a dry cough, chest congestion, wheezing and TAVERAS. She has no fevers, chills, purulent sputum, anorexia, hemoptysis or weight loss. She has seen her PCP who has given her a medrol dose pack, Z pack, phenergan DM, Tessalon perles and an albuterol MDI without help. Her  has been battling the same and improved with a longer pred taper, levaquin and tussionex cough syrup    7/10/2018  Neil Garcia presents today for follow-up visit for  re-evaluation of dyspnea on exertion after [facility] s/p PTCA LAD. She had a positive nuclear stress test that prompted her coronary angiogram.  She also had a bronchial challenge that was negative. Since revascularization she's had no further chest heaviness. Her dyspnea on exertion is mildly improved but still present when she goes up a flight of stairs. She has no cough or wheezing. She occasionally snores but has no witnessed apnea.   She does not have excessive daytime sleepiness and her [place] was 6    Initial visit June 12, 2018   She states that she gets short of breath walking up from the first to second floor. It is 14 steps and sometimes she has to stop midway and at the top. She also gets short of breath working in the kitchen. She also complains of occasional chest heaviness. She pretty had a cough but states that that is now mostly resolved. She can't take a deep breath and her appetite has been off. She and her  are on a ketogenic diet and have lost some weight. She denies any fevers, chills, hemoptysis, or peripheral edema. Her evaluation has included an echo which is normal.  She is also had PFTs and a chest x-ray. She is never had a cardiac stress test.  She has history of hypertension and hyperlipidemia. Past Medical History:    Past Medical History:   Diagnosis Date    Acid reflux     Bilateral carpal tunnel syndrome 12/21/2017    CAD in native artery 06/19/2018    Mid LAD stented with 3.5 x 15 mm Xience BRIAN. EF 55%    Chicken pox     Chronic back pain     CKD (chronic kidney disease) 4/15/2016    Depression 12/7/2010    Heart disease     Hypertension     Interstitial lung disease ([facility] [address]     Measles     Menopausal symptoms     Morbid obesity with BMI of 45.0-49.9, adult ([facility] [address] 1/29/2015    Osteoarthritis     Overactive bladder     Pure hypercholesterolemia     Stress incontinence        Social History:    Patient is . She is unemployed.   She smoked up to 1 PPD x 8 years and quit in 1985    Family History:  IPF    Current Medications:  Current Outpatient Medications on File Prior to Visit   Medication Sig Dispense Refill    rosuvastatin (CRESTOR) 10 MG tablet TAKE ONE TABLET BY MOUTH DAILY 90 tablet 3    aspirin 81 MG chewable tablet CHEW ONE TABLET BY MOUTH DAILY 90 tablet 3    citalopram (CELEXA) 40 MG tablet TAKE ONE TABLET BY MOUTH DAILY 90 tablet 3    sulfamethoxazole-trimethoprim (BACTRIM DS;SEPTRA DS) 800-160 MG per tablet Every Monday Wednesday and Friday 12 tablet 5    meloxicam (MOBIC) 15 MG tablet Take 1 tablet by mouth daily 30 tablet 3    valsartan (DIOVAN) 160 MG tablet TAKE ONE TABLET BY MOUTH DAILY 90 tablet 3    traZODone (DESYREL) 50 MG tablet TAKE ONE TABLET BY MOUTH EVERY NIGHT AT BEDTIME 90 tablet 2    Semaglutide,0.25 or 0.5MG/DOS, 2 MG/1.5ML SOPN Inject 0.25 mg into the skin once a week 2 pen 3    famotidine (PEPCID) 40 MG tablet Take 1 tablet by mouth 2 times daily 60 tablet 5    allopurinol (ZYLOPRIM) 300 MG tablet Take 1 tablet by mouth daily 90 tablet 1    albuterol sulfate  (90 Base) MCG/ACT inhaler INHALE 2 PUFFS INTO THE LUNGS EVERY 4 HOURS AS NEEDED FOR WHEEZING 42.5 g 1     No current facility-administered medications on file prior to visit. REVIEW OF SYSTEMS:    CONSTITUTIONAL: Negative for fevers and chills  HEENT: Negative for oropharyngeal exudate, post nasal drip, sinus pain / pressure, nasal congestion, ear pain  RESPIRATORY:  See HPI  CARDIOVASCULAR: Negative for chest pain, palpitations, edema  GASTROINTESTINAL: Negative for nausea, vomiting, diarrhea, constipation and abdominal pain  GENITOURINARY: Negative for dysuria, urinary frequency, urinary hesitancy  HEMATOLOGICAL: Negative for adenopathy  SKIN: Negative for clubbing, cyanosis, skin lesions  ENDOCRINE: Negative for polyuria, polydipsia, heat intolerance, cold intolerance   EXTREMITIES: Negative for weakness, decreased ROM  NEUROLOGICAL: Negative for unilateral weakness, speech or gait abnormalities    Objective:   PHYSICAL EXAM:        VITALS:  BP (!) 144/89 (Site: Left Upper Arm, Position: Sitting, Cuff Size: Large Adult)   Pulse 68   Temp 96.5 °F (35.8 °C)   Resp 16   Ht 5' 3\" (1.6 m)   Wt 257 lb (116.6 kg)   LMP 07/13/2011   SpO2 96%   Breastfeeding No   BMI 45.53 kg/m²   On RA    Gen: Well developed, well nourished female in no acute distress.  Speaking in full sentences with out using accessory resp muscles  Eyes: PERRL, EOMI, normal conjunctivae  Ears, Nose, Mouth and Throat: Hearing is normal. Oropharynx is normal  Neck: No lymphadenopathy  Respiratory:  CV: RRR without M/R/R  Abd: +BS, soft, NT/ND  Musculoskeletal: No cyanosis, clubbing, or edema. Skin: No rashes, ulcers, or subcutaneous nodules  Psychiatric: Alert and oriented to time place and person      DATA:      Radiology Review:  Pertinent images / reports were reviewed as a part of this visit. CTPA January 30, 2022         Pulmonary arteries: No pulmonary embolus. Pulmonary arteries normal in caliber.       Airways: Central airways are patent.     Lungs: There is chronic reticular abnormalities in the subpleural lower lung zones. There is new subpleural peripheral and peribronchial vascular predominant groundglass opacities throughout both lungs. Pleura: No pleural effusions or significant pleural thickening.       Heart: Heart size is normal.    Great vessels: Aorta is normal in caliber. Other mediastinal structures and lower neck: Normal.    Adenopathy: No lymphadenopathy. Few calcified hilar nodes.       Chest wall and axilla: No significant abnormality. Osseous structures: No significant abnormality. Upper abdomen: Hepatic steatosis.         Impression       1.   No evidence of pulmonary emboli. 2.  Multifocal pneumonia in a pattern that can be seen with Covid. 3.  Hepatic steatosis. 4.   Stable findings of pulmonary fibrosis without evidence of honeycombing. HRCT January 17, 2022  Impression   Minimally increased mild lower lobe pulmonary fibrosis, indeterminate for   UIP.  NSIP should be considered.             CT chest 6/22/2020  FINDINGS:       Lungs and Airways: Similar to the prior study, subpleural areas of linear scarring are noted. These are similar to the prior study. 4 to 5 mm nodules of the right lower lobe are noted, image 70 of series 3. This is unchanged.  Calcification is noted along  the posterior lateral aspect of the right hemithorax, mainly along the right upper lobe laterally. Correlate with any history of thoracostomy. There is atelectasis or scarring of the right lung base medially, also similar to the prior study. There is no    new or enlarging nodularity. No significant volume loss or bronchiectasis. No honeycombing.       Pleura: No pleural effusions or pleural thickening.       Heart and Great Vessels: Heart size is normal.       Adenopathy: None.       Chest Wall / Lower Neck: No significant abnormality.       Upper Abdomen: No significant abnormality.       Bones: No significant abnormality.           Impression       Findings there are significantly changed from the prior study. Areas of subpleural linear banding which may represent the history of interstitial lung disease and/or scarring. Certainly no findings such as honeycombing to suggest UIP.       Stable pulmonary nodularity, for which continued follow-up is recommended. CT Chest 2/22/2019 reveals the following:     Impression       Bilateral and fairly symmetric reticulation most pronounced about the lower lobes with immediate subpleural sparing may relate to interstitial pneumonitis and may relate to nonspecific interstitial pneumonitis. Findings may be present in connective    tissue disease including SLE, autoimmune disease including rheumatoid arthritis as well as other etiologies including hypersensitivity pneumonitis, drug-induced pneumonitis as well as other causes.       No pulmonary fibrosis.       No lobar consolidation. CT chest June 13, 2019  FINDINGS: The mediastinum appears normal without signs of any lymph node enlargement.  The thyroid gland extends down to the level of the sternum bilaterally.       The aorta is of normal caliber as well as the pulmonary arteries.       Subpleural areas of pulmonary linear changes, likely pulmonary fibrosis or interstitial lung disease are appreciated with 1 to 2 mm pulmonary nodule in the left upper lobe on image 42 series 9, well circumscribed. Additional subpleural linear changes    noted right middle lobe right left lower lobe and lingula near the lung base.       Poorly defined nodules noted just above the right hemidiaphragm the anterior aspect right lower lobe measuring 5 x 4 mm       There are some calcified subcarinal lymph nodes appreciated.       Calcification the left anterior descending coronary artery is appreciated without cardiac enlargement.       No pleural or pericardial effusion is noted.           Impression       1.  Small amount of subpleural linear change which may represent interstitial pneumonitis or interstitial lung disease most pronounced in the lower lobes and middle lobes without any significant pulmonary fibrotic change or volume loss. 2.  Several punctate nodules appreciated one in the left upper lobe subpleural measuring 1 to 2 mm another in the left upper lobe with one poorly defined irregular nodule just above the right hemidiaphragm on image 69 image series 9 and image 68 series 3    measuring up to 5 x 4 mm which should be followed yearly.       3. No spiculated masses. Mild bronchial dilation in the lower lobes     Last PFTs: May 2018  TEST PERFORMED:  1. Spirometry with flow-volume loops obtained before and after  bronchodilation. 2.  Lung volumes by plethysmography. 3.  Diffusion capacity of carbon monoxide.     Test meets ATS criteria and the quality of flow-volume loops is sufficient  for interpretation. Good patient effort.     The FEV1 is 2.24 L or 89% predicted. The FEV1 to FVC ratio is 75. Post  bronchodilator, the FEV1 changed to 2.29 L or 91% predicted. Total lung  capacity is 94% predicted and diffusion is 83% predicted.     INTERPRETATION:  1. No obstruction, no significant post-bronchodilator improvement. 2.  Normal lung volumes. 3.  Normal diffusion capacity.   4.  Clinical correlation recommended, if strong suspicion for obstructive  lung disease exists, we would recommend further evaluation with  methacholine bronchoprovocation study. Bronchial challenge June 13, 2018  IMPRESSION:  1. Normal spirometry. 2.  No significant response to bronchodilators. 3.  Negative bronchial challenge indicative of no airway hyperreactivity or  hyperresponsiveness.     Myocardial stress test June 13, 2018  Impression       Pharmacologic stress induced reversible ischemia anterior and   anterolateral walls. Left heart catheterization June 19, 2018  Conclusions:  -Severe single vessel CAD; LAD stented with a 3.5 x 15 mm Xiecne BRIAN to 10 RUBY. A \"pinched\" diag was treated with a 2.0 x 12 mm balloon.  -Elevated LVEDP  -Systemic HTN  -Normal LVEF    MBS  Impression       Normal modified barium swallow. Surgical pathology -open lung biopsy -September 4, 2019  ADDENDUM:  ... External consultation has been completed on this case and  the slides have been reviewed by Dr. Romana Cradle, MD,  PhD at [address] [place] [address].  Dr. Petrona Graham consultative diagnosis is as follows:  \" Lung, right upper and lower lobes, VATS wedge biopsies (A and B):     - Patchy cellular chronic interstitial pneumonia associated with       ill-formed nonnecrotizing granulomas.     - Arterial vasculopathy.     - No fungal or acid fast microorganisms identified by special stains.     - See case comment \". .. Rometta Favre Rometta Favre Rometta Favre          In a comment, Dr. Petrona Graham states that: \" The main       findings are patchy bronchiolocentric and subpleural chronic       interstitial pneumonia associated with ill-defined, nonnecrotizing       granulomas and a moderate to severe arterial vasculopathy.  A       primary etiologic consideration for this combination of       histopathologic findings is collagen vascular disease.  Infection       and chronic hypersensitivity pneumonitis are also differential       diagnosis.  Morphologic features diagnostic of usual interstitial       pneumonia seen in idiopathic pulmonary fibrosis are not present. AFB and GMS stain shows no evidence of fungal forms CD3 stains and CD20  stains are positive in the inflammatory component with no atypical T or B  cell infiltrates identified.  CD3 and CD20 stains were performed on  selected blocks from both parts A and B.  CD68 stain material is positive  in a macro 5 population and a nonspecific staining pattern.  Trichrome  stains performed on parts A and B and collagen stain performed on part A  show no evidence of increased interstitial fibrosis are collagen  fibrosis.  Please see outside consultation report for complete detailed  report and comments . ............................ Hardeep Mariee M.D.  (Electronic Signature)  09/12/2019      FINAL DIAGNOSIS:       A. Wedge biopsy, right lower lobe, lung:       - Specimen sent for external consultation to evaluate for         interstitial lung disease, consultative report currently pending.       B. Wedge biopsy, right upper lobe, lung:       - Specimen sent for external consultation evaluate for interstitial         lung disease, consultative report currently pending.       -    Assessment:      Diagnosis Orders   1. NSIP (nonspecific interstitial pneumonitis) ([facility] [address]     2. History of COVID-19     3. TAVERAS (dyspnea on exertion)     4. CAD S/P percutaneous coronary angioplasty         Plan:     1. Agree with cardiac stress test and echo to rule out recurrent significant CAD as cause of dyspnea on exertion  2. However it may be COVID 19 overlapping NSIP as cause of her dyspnea on exertion. She is about 6 weeks out from her COVID 19 and will likely need a longer timeframe to get over her COVID 19 pneumonia  3. Continue prednisone 40 mg daily for NSIP  4. Continue Bactrim DS q.  Monday Wednesday Friday for PCP prophylaxis    Follow-up with me in 2 months

## 2022-03-14 ENCOUNTER — HOSPITAL ENCOUNTER (OUTPATIENT)
Dept: NON INVASIVE DIAGNOSTICS | Age: 62
Discharge: HOME | End: 2022-03-14
Payer: COMMERCIAL

## 2022-03-14 VITALS — BODY MASS INDEX: 45.17 KG/M2 | WEIGHT: 255 LBS

## 2022-03-14 DIAGNOSIS — R53.83 FATIGUE, UNSPECIFIED TYPE: ICD-10-CM

## 2022-03-14 DIAGNOSIS — R06.09 DOE (DYSPNEA ON EXERTION): ICD-10-CM

## 2022-03-14 LAB
LV EF: 79 %
LVEF MODALITY: NORMAL

## 2022-03-14 PROCEDURE — 78452 HT MUSCLE IMAGE SPECT MULT: CPT

## 2022-03-14 PROCEDURE — 3430000000 HC RX DIAGNOSTIC RADIOPHARMACEUTICAL: Performed by: NURSE PRACTITIONER

## 2022-03-14 PROCEDURE — 2580000003 HC RX 258: Performed by: NURSE PRACTITIONER

## 2022-03-14 PROCEDURE — A9502 TC99M TETROFOSMIN: HCPCS | Performed by: NURSE PRACTITIONER

## 2022-03-14 PROCEDURE — 93017 CV STRESS TEST TRACING ONLY: CPT

## 2022-03-14 PROCEDURE — 6360000002 HC RX W HCPCS: Performed by: NURSE PRACTITIONER

## 2022-03-14 RX ORDER — SODIUM CHLORIDE 0.9 % (FLUSH) 0.9 %
10 SYRINGE (ML) INJECTION 2 TIMES DAILY
Status: DISCONTINUED | OUTPATIENT
Start: 2022-03-14 | End: 2022-03-15 | Stop reason: HOSPADM

## 2022-03-14 RX ADMIN — REGADENOSON 0.4 MG: 0.08 INJECTION, SOLUTION INTRAVENOUS at 10:27

## 2022-03-14 RX ADMIN — Medication 10 ML: at 09:22

## 2022-03-14 RX ADMIN — Medication 10 ML: at 10:27

## 2022-03-14 RX ADMIN — TETROFOSMIN 10 MILLICURIE: 1.38 INJECTION, POWDER, LYOPHILIZED, FOR SOLUTION INTRAVENOUS at 09:22

## 2022-03-14 RX ADMIN — TETROFOSMIN 30 MILLICURIE: 1.38 INJECTION, POWDER, LYOPHILIZED, FOR SOLUTION INTRAVENOUS at 10:27

## 2022-03-15 ENCOUNTER — HOSPITAL ENCOUNTER (OUTPATIENT)
Dept: NON INVASIVE DIAGNOSTICS | Age: 62
Discharge: HOME | End: 2022-03-15
Payer: COMMERCIAL

## 2022-03-15 DIAGNOSIS — R06.09 DOE (DYSPNEA ON EXERTION): ICD-10-CM

## 2022-03-15 DIAGNOSIS — R53.83 FATIGUE, UNSPECIFIED TYPE: ICD-10-CM

## 2022-03-15 LAB
LV EF: 58 %
LVEF MODALITY: NORMAL

## 2022-03-15 PROCEDURE — 93306 TTE W/DOPPLER COMPLETE: CPT

## 2022-05-10 ENCOUNTER — OFFICE VISIT (OUTPATIENT)
Dept: FAMILY MEDICINE CLINIC | Age: 62
End: 2022-05-10
Payer: COMMERCIAL

## 2022-05-10 VITALS
WEIGHT: 254 LBS | DIASTOLIC BLOOD PRESSURE: 80 MMHG | SYSTOLIC BLOOD PRESSURE: 118 MMHG | HEART RATE: 75 BPM | TEMPERATURE: 96.4 F | BODY MASS INDEX: 45 KG/M2 | HEIGHT: 63 IN | OXYGEN SATURATION: 99 %

## 2022-05-10 DIAGNOSIS — R53.83 FATIGUE, UNSPECIFIED TYPE: ICD-10-CM

## 2022-05-10 DIAGNOSIS — R53.83 FATIGUE, UNSPECIFIED TYPE: Primary | ICD-10-CM

## 2022-05-10 LAB
A/G RATIO: 1.6 (ref 1.1–2.2)
ALBUMIN SERPL-MCNC: 4 G/DL (ref 3.4–5)
ALP BLD-CCNC: 76 U/L (ref 40–129)
ALT SERPL-CCNC: 22 U/L (ref 10–40)
ANION GAP SERPL CALCULATED.3IONS-SCNC: 13 MMOL/L (ref 3–16)
AST SERPL-CCNC: 15 U/L (ref 15–37)
BILIRUB SERPL-MCNC: 0.5 MG/DL (ref 0–1)
BUN BLDV-MCNC: 20 MG/DL (ref 7–20)
CALCIUM SERPL-MCNC: 9.1 MG/DL (ref 8.3–10.6)
CHLORIDE BLD-SCNC: 104 MMOL/L (ref 99–110)
CHOLESTEROL, TOTAL: 158 MG/DL (ref 0–199)
CO2: 24 MMOL/L (ref 21–32)
CREAT SERPL-MCNC: 1.6 MG/DL (ref 0.6–1.2)
FOLATE: 10.72 NG/ML (ref 4.78–24.2)
GFR AFRICAN AMERICAN: 39
GFR NON-AFRICAN AMERICAN: 33
GLUCOSE BLD-MCNC: 89 MG/DL (ref 70–99)
HCT VFR BLD CALC: 40.3 % (ref 36–48)
HDLC SERPL-MCNC: 47 MG/DL (ref 40–60)
HEMOGLOBIN: 13.5 G/DL (ref 12–16)
LDL CHOLESTEROL CALCULATED: 77 MG/DL
MCH RBC QN AUTO: 29.9 PG (ref 26–34)
MCHC RBC AUTO-ENTMCNC: 33.4 G/DL (ref 31–36)
MCV RBC AUTO: 89.4 FL (ref 80–100)
PDW BLD-RTO: 14.3 % (ref 12.4–15.4)
PLATELET # BLD: 237 K/UL (ref 135–450)
PMV BLD AUTO: 7.7 FL (ref 5–10.5)
POTASSIUM SERPL-SCNC: 4.3 MMOL/L (ref 3.5–5.1)
RBC # BLD: 4.5 M/UL (ref 4–5.2)
SODIUM BLD-SCNC: 141 MMOL/L (ref 136–145)
TOTAL PROTEIN: 6.5 G/DL (ref 6.4–8.2)
TRIGL SERPL-MCNC: 170 MG/DL (ref 0–150)
TSH REFLEX: 1.76 UIU/ML (ref 0.27–4.2)
VITAMIN B-12: 242 PG/ML (ref 211–911)
VITAMIN D 25-HYDROXY: 59.1 NG/ML
VLDLC SERPL CALC-MCNC: 34 MG/DL
WBC # BLD: 7.2 K/UL (ref 4–11)

## 2022-05-10 PROCEDURE — 1036F TOBACCO NON-USER: CPT | Performed by: NURSE PRACTITIONER

## 2022-05-10 PROCEDURE — G8427 DOCREV CUR MEDS BY ELIG CLIN: HCPCS | Performed by: NURSE PRACTITIONER

## 2022-05-10 PROCEDURE — 99214 OFFICE O/P EST MOD 30 MIN: CPT | Performed by: NURSE PRACTITIONER

## 2022-05-10 PROCEDURE — 3017F COLORECTAL CA SCREEN DOC REV: CPT | Performed by: NURSE PRACTITIONER

## 2022-05-10 PROCEDURE — G8417 CALC BMI ABV UP PARAM F/U: HCPCS | Performed by: NURSE PRACTITIONER

## 2022-05-10 SDOH — ECONOMIC STABILITY: FOOD INSECURITY: WITHIN THE PAST 12 MONTHS, YOU WORRIED THAT YOUR FOOD WOULD RUN OUT BEFORE YOU GOT MONEY TO BUY MORE.: NEVER TRUE

## 2022-05-10 SDOH — ECONOMIC STABILITY: FOOD INSECURITY: WITHIN THE PAST 12 MONTHS, THE FOOD YOU BOUGHT JUST DIDN'T LAST AND YOU DIDN'T HAVE MONEY TO GET MORE.: NEVER TRUE

## 2022-05-10 ASSESSMENT — PATIENT HEALTH QUESTIONNAIRE - PHQ9
SUM OF ALL RESPONSES TO PHQ QUESTIONS 1-9: 13
7. TROUBLE CONCENTRATING ON THINGS, SUCH AS READING THE NEWSPAPER OR WATCHING TELEVISION: 2
9. THOUGHTS THAT YOU WOULD BE BETTER OFF DEAD, OR OF HURTING YOURSELF: 0
1. LITTLE INTEREST OR PLEASURE IN DOING THINGS: 3
8. MOVING OR SPEAKING SO SLOWLY THAT OTHER PEOPLE COULD HAVE NOTICED. OR THE OPPOSITE, BEING SO FIGETY OR RESTLESS THAT YOU HAVE BEEN MOVING AROUND A LOT MORE THAN USUAL: 1
4. FEELING TIRED OR HAVING LITTLE ENERGY: 3
6. FEELING BAD ABOUT YOURSELF - OR THAT YOU ARE A FAILURE OR HAVE LET YOURSELF OR YOUR FAMILY DOWN: 0
2. FEELING DOWN, DEPRESSED OR HOPELESS: 0
5. POOR APPETITE OR OVEREATING: 2
SUM OF ALL RESPONSES TO PHQ QUESTIONS 1-9: 13
SUM OF ALL RESPONSES TO PHQ QUESTIONS 1-9: 13
10. IF YOU CHECKED OFF ANY PROBLEMS, HOW DIFFICULT HAVE THESE PROBLEMS MADE IT FOR YOU TO DO YOUR WORK, TAKE CARE OF THINGS AT HOME, OR GET ALONG WITH OTHER PEOPLE: 1
3. TROUBLE FALLING OR STAYING ASLEEP: 2
SUM OF ALL RESPONSES TO PHQ QUESTIONS 1-9: 13
SUM OF ALL RESPONSES TO PHQ9 QUESTIONS 1 & 2: 3

## 2022-05-10 ASSESSMENT — ENCOUNTER SYMPTOMS
COUGH: 0
ABDOMINAL PAIN: 0
COLOR CHANGE: 0
SINUS PAIN: 0
CONSTIPATION: 0
BACK PAIN: 1
SHORTNESS OF BREATH: 0
SINUS PRESSURE: 0
WHEEZING: 0
DIARRHEA: 0

## 2022-05-10 ASSESSMENT — SOCIAL DETERMINANTS OF HEALTH (SDOH): HOW HARD IS IT FOR YOU TO PAY FOR THE VERY BASICS LIKE FOOD, HOUSING, MEDICAL CARE, AND HEATING?: NOT HARD AT ALL

## 2022-05-10 NOTE — PROGRESS NOTES
Stephanie Hines (:  1960) is a 57 y.o. female,Established patient, here for evaluation of the following chief complaint(s):  Fatigue (no energy, bruising easy)      ASSESSMENT/PLAN:  1. Fatigue, unspecified type  Assessment & Plan:  Check labs   Encouraged hydration and increase in exercise as tolerated   Reviewed recent echo   Lungs clear   Orders:  -     Comprehensive Metabolic Panel; Future  -     CBC; Future  -     TSH with Reflex; Future  -     Vitamin B12 & Folate; Future  -     Vitamin D 25 Hydroxy; Future      No follow-ups on file. SUBJECTIVE/OBJECTIVE:  HPI  Here for concern of fatigue over the past 3 weeks. She states she has no energy. She is resting frequently. Unable to clean and do activities she was doing easily 3 weeks ago. Her breathing has been stable. She does have sob with exertion but this is not new. Recently had Echo and stress test due to palpitations per cardiology. Both relatively unremarkable. Denies recent med changes. She has not had much appetite. No changes in diet. She does not get much exercised due to hip and back pain. Had Covid in January. No other recent illnesses. She did feel back to normal following Covid dx.      Current Outpatient Medications   Medication Sig Dispense Refill    rosuvastatin (CRESTOR) 10 MG tablet TAKE ONE TABLET BY MOUTH DAILY 90 tablet 3    aspirin 81 MG chewable tablet CHEW ONE TABLET BY MOUTH DAILY 90 tablet 3    citalopram (CELEXA) 40 MG tablet TAKE ONE TABLET BY MOUTH DAILY 90 tablet 3    meloxicam (MOBIC) 15 MG tablet Take 1 tablet by mouth daily 30 tablet 3    valsartan (DIOVAN) 160 MG tablet TAKE ONE TABLET BY MOUTH DAILY 90 tablet 3    traZODone (DESYREL) 50 MG tablet TAKE ONE TABLET BY MOUTH EVERY NIGHT AT BEDTIME 90 tablet 2    Semaglutide,0.25 or 0.5MG/DOS, 2 MG/1.5ML SOPN Inject 0.25 mg into the skin once a week 2 pen 3    famotidine (PEPCID) 40 MG tablet Take 1 tablet by mouth 2 times daily 60 tablet 5    allopurinol (ZYLOPRIM) 300 MG tablet Take 1 tablet by mouth daily 90 tablet 1    albuterol sulfate  (90 Base) MCG/ACT inhaler INHALE 2 PUFFS INTO THE LUNGS EVERY 4 HOURS AS NEEDED FOR WHEEZING 42.5 g 1     No current facility-administered medications for this visit. Review of Systems   Constitutional: Positive for fatigue. Negative for chills and fever. HENT: Negative for congestion, sinus pressure and sinus pain. Respiratory: Negative for cough, shortness of breath and wheezing. Cardiovascular: Negative for chest pain and palpitations. Gastrointestinal: Negative for abdominal pain, constipation and diarrhea. Musculoskeletal: Positive for arthralgias and back pain. Negative for myalgias. Skin: Negative for color change, pallor and rash. Neurological: Negative for dizziness, syncope, weakness, light-headedness and headaches. Psychiatric/Behavioral: Negative for behavioral problems, confusion and sleep disturbance. The patient is not nervous/anxious. Vitals:    05/10/22 1340   BP: 118/80   Site: Right Upper Arm   Position: Sitting   Cuff Size: Medium Adult   Pulse: 75   Temp: 96.4 °F (35.8 °C)   SpO2: 99%   Weight: 254 lb (115.2 kg)   Height: 5' 3\" (1.6 m)       Physical Exam  Constitutional:       Appearance: She is well-developed. She is obese. HENT:      Head: Normocephalic and atraumatic. Eyes:      Conjunctiva/sclera: Conjunctivae normal.      Pupils: Pupils are equal, round, and reactive to light. Neck:      Thyroid: No thyromegaly. Vascular: No JVD. Cardiovascular:      Rate and Rhythm: Normal rate and regular rhythm. Heart sounds: Normal heart sounds. Pulmonary:      Effort: Pulmonary effort is normal. No respiratory distress. Breath sounds: Normal breath sounds. No wheezing. Musculoskeletal:         General: No deformity. Normal range of motion. Cervical back: Normal range of motion and neck supple. Skin:     General: Skin is warm and dry. Capillary Refill: Capillary refill takes less than 2 seconds. Neurological:      Mental Status: She is alert and oriented to person, place, and time. Psychiatric:         Behavior: Behavior normal.          An electronic signature was used to authenticate this note.     --ROMEO Mcdonough - CNP

## 2022-05-10 NOTE — PROGRESS NOTES
Mona Partida (:  1960) is a 57 y.o. female,Established patient, here for evaluation of the following chief complaint(s):  Fatigue (no energy, bruising easy)      ASSESSMENT/PLAN:  {There are no diagnoses linked to this encounter. (Refresh or delete this SmartLink)}    No follow-ups on file. SUBJECTIVE/OBJECTIVE:  HPI  Fatigue x 3 weeks   Needing to rest more frequently. No changes in meds recently    Covid diagnosis in . Felt back to normal for a time then began feeling fatigued 3 weeks ago. Current Outpatient Medications   Medication Sig Dispense Refill    rosuvastatin (CRESTOR) 10 MG tablet TAKE ONE TABLET BY MOUTH DAILY 90 tablet 3    aspirin 81 MG chewable tablet CHEW ONE TABLET BY MOUTH DAILY 90 tablet 3    citalopram (CELEXA) 40 MG tablet TAKE ONE TABLET BY MOUTH DAILY 90 tablet 3    meloxicam (MOBIC) 15 MG tablet Take 1 tablet by mouth daily 30 tablet 3    valsartan (DIOVAN) 160 MG tablet TAKE ONE TABLET BY MOUTH DAILY 90 tablet 3    traZODone (DESYREL) 50 MG tablet TAKE ONE TABLET BY MOUTH EVERY NIGHT AT BEDTIME 90 tablet 2    Semaglutide,0.25 or 0.5MG/DOS, 2 MG/1.5ML SOPN Inject 0.25 mg into the skin once a week 2 pen 3    famotidine (PEPCID) 40 MG tablet Take 1 tablet by mouth 2 times daily 60 tablet 5    allopurinol (ZYLOPRIM) 300 MG tablet Take 1 tablet by mouth daily 90 tablet 1    albuterol sulfate  (90 Base) MCG/ACT inhaler INHALE 2 PUFFS INTO THE LUNGS EVERY 4 HOURS AS NEEDED FOR WHEEZING 42.5 g 1     No current facility-administered medications for this visit. Review of Systems    Vitals:    05/10/22 1340   BP: 118/80   Site: Right Upper Arm   Position: Sitting   Cuff Size: Medium Adult   Pulse: 75   Temp: 96.4 °F (35.8 °C)   SpO2: 99%   Weight: 254 lb (115.2 kg)   Height: 5' 3\" (1.6 m)       Physical Exam      {Time Documentation Optional:[id number]}      An electronic signature was used to authenticate this note.     --ROMEO Mansfield - CNP

## 2022-05-10 NOTE — ASSESSMENT & PLAN NOTE
Check labs   Encouraged hydration and increase in exercise as tolerated   Reviewed recent echo   Lungs clear

## 2022-05-11 ENCOUNTER — TELEPHONE (OUTPATIENT)
Dept: FAMILY MEDICINE CLINIC | Age: 62
End: 2022-05-11

## 2022-05-11 DIAGNOSIS — N18.32 STAGE 3B CHRONIC KIDNEY DISEASE (HCC): Primary | ICD-10-CM

## 2022-05-12 NOTE — TELEPHONE ENCOUNTER
[facility] [address], [address] [facility], [address] [facility]  Phone: [phone]   Fax: [phone]

## 2022-06-03 ENCOUNTER — HOSPITAL ENCOUNTER (OUTPATIENT)
Dept: ULTRASOUND IMAGING | Age: 62
Discharge: HOME | End: 2022-06-03
Payer: COMMERCIAL

## 2022-06-03 DIAGNOSIS — N18.32 STAGE 3B CHRONIC KIDNEY DISEASE (HCC): ICD-10-CM

## 2022-06-03 PROCEDURE — 76770 US EXAM ABDO BACK WALL COMP: CPT

## 2022-06-06 ENCOUNTER — OFFICE VISIT (OUTPATIENT)
Dept: CARDIOLOGY CLINIC | Age: 62
End: 2022-06-06
Payer: COMMERCIAL

## 2022-06-06 VITALS
DIASTOLIC BLOOD PRESSURE: 76 MMHG | BODY MASS INDEX: 45.95 KG/M2 | SYSTOLIC BLOOD PRESSURE: 142 MMHG | HEART RATE: 70 BPM | WEIGHT: 259.4 LBS

## 2022-06-06 DIAGNOSIS — E78.2 MIXED HYPERLIPIDEMIA: ICD-10-CM

## 2022-06-06 DIAGNOSIS — I10 ESSENTIAL HYPERTENSION: ICD-10-CM

## 2022-06-06 DIAGNOSIS — Z98.61 S/P PTCA (PERCUTANEOUS TRANSLUMINAL CORONARY ANGIOPLASTY): ICD-10-CM

## 2022-06-06 DIAGNOSIS — I25.10 CAD IN NATIVE ARTERY: Primary | ICD-10-CM

## 2022-06-06 PROCEDURE — 1036F TOBACCO NON-USER: CPT | Performed by: NURSE PRACTITIONER

## 2022-06-06 PROCEDURE — G8417 CALC BMI ABV UP PARAM F/U: HCPCS | Performed by: NURSE PRACTITIONER

## 2022-06-06 PROCEDURE — 3017F COLORECTAL CA SCREEN DOC REV: CPT | Performed by: NURSE PRACTITIONER

## 2022-06-06 PROCEDURE — 99214 OFFICE O/P EST MOD 30 MIN: CPT | Performed by: NURSE PRACTITIONER

## 2022-06-06 PROCEDURE — G8427 DOCREV CUR MEDS BY ELIG CLIN: HCPCS | Performed by: NURSE PRACTITIONER

## 2022-06-06 NOTE — PATIENT INSTRUCTIONS
Blood pressure option    Increase valsartan to 320 mg po daily ane check labs in 1 week    Or    Start hydralazine 25 mg po twice a day    Or     Start amlodipine 5 mg po daily

## 2022-06-06 NOTE — PROGRESS NOTES
CC/HPI  57 y.o. patient of Dr David Larkin with CAD/PCI, HTN, HLD and ILD. She denies cp, sob, LH/dizziness, palpitations, or syncope. Has mechanical falls d/t hip locking up. No wieght gain,  LE edema, orthopnea, PND or abdominal bloating. No n/v/d, fever or GI/ bleeding. Has follow with Nephrology this week. Past Medical History:   Diagnosis Date    Acid reflux     Bilateral carpal tunnel syndrome 12/21/2017    CAD in native artery 06/19/2018    Mid LAD stented with 3.5 x 15 mm Xience BRIAN.  EF 55%    Chicken pox     Chronic back pain     CKD (chronic kidney disease) 4/15/2016    Depression 12/7/2010    Heart disease     Hypertension     [facility]stitial lung disease [facility]     Measles     Menopausal symptoms     Morbid obesity with BMI of 45.0-49.9, adult ([facility] [address] 1/29/2015    Osteoarthritis     Overactive bladder     Pure hypercholesterolemia     Stress incontinence      Past Surgical History:   Procedure Laterality Date    BRONCHOSCOPY  6/27/2019    BRONCHOSCOPY ALVEOLAR LAVAGE performed by Rupal Delaney MD at [address] [place] [address]  6/27/2019    BRONCHOSCOPY DIAGNOSTIC OR CELL [address] ONLY performed by Rupal Delaney MD at [address] [place] [address]  6/27/2019    BRONCHOSCOPY BIOPSY BRONCHUS performed by Rupal Delaney MD at [address] [place] [address] Bilateral 01/29/2018    EPIDURAL STEROID INJECTION N/A 10/15/2019    MIDLINE CAUDAL EPIDURAL STEROID INJECTION AND COCCYGEAL JOINT INJECTION SITES CONFIRMED BY FLUOROSCOPY performed by Maame Malone MD at [place] [address]  2009,2010    [address]  Bilateral 12/22/2020    BILATERAL LUMBAR THREE LUMBAR FOUR LUMBAR FIVE DORSAL RAMUS RADIOFREQUENCY ABLATION SITE CONFIRMED BY FLUOROSCOPY performed by Maame Malone MD at [facility] Right 3/3/2020    RIGHT LUMBAR FOUR AND LUMBAR FIVE TRANSFORAMINAL EPIDURAL STEROID INJECTION SITE CONFIRMED BY FLUOROSCOPY performed by Markie Boone MD at [address] Right 11/2/2020    RIGHT L4 AND L5 TRANSFORAMINAL EPIDURAL STEROID INJECTION WITH FLUOROSCOPY (60680, [address]) performed by Markie Boone MD at [facility] Bilateral 11/17/2020    BILATERAL LUMBAR THREE LUMBAR FOUR LUMBAR FIVE DORSAL RAMUS LUMBAR MEDIAL BRANCH BLOCK SITE CONFIRMED BY FLUOROSCOPY performed by Markie Boone MD at [address] PAIN MANAGEMENT PROCEDURE Bilateral 12/8/2020    BILATERAL LUMBAR THREE LUMBAR FOUR LUMBAR FIVE DORSAL RAMUS LUMBAR MEDIAL BRANCH BLOCK SITE CONFIRMED BY FLUOROSCOPY performed by Markie Boone MD at [facility] DX/THER SBST INTRLMNR CRV/THRC W/IMG GDN Right 8/21/2018    RIGHT LUMBAR FOUR/FIVE, LUMBAR FIVE/ SACRAL ONE FACET INJECTION AND RIGHT SACROILIAC JOINT INJECTION SITES CONFIRMED BY FLUOROSCOPY performed by Markie Boone MD at [facility] ARTHROSCOPY Right 08/22/2017    ROTATOR CUFF REPAIR    THORACOSCOPY Right 9/4/2019    RIGHT VIDEO ASSISTED THORACOSCOPIC SURGERY, WEDGE RESECTION AND RIGHT UPPER AND LOWER LOBE NODULES WITH BIOPSY, INTERCOSTAL NERVE BLOCK TIMES SEVEN LEVELS performed by Marina Bazzi MD at [place] [address]     Family History   Problem Relation Age of Onset    High Blood Pressure Mother     Rheum Arthritis Mother     Cancer Father     Hypertension Father     Colon Cancer Father     High Blood Pressure Brother     Stroke Brother     Heart Defect Brother     Hypertension Sister     Rheum Arthritis Sister     Emphysema Sister     Rheum Arthritis Sister     Other Sister         bottom of lungs are getting hard    Hypertension Brother     Rheum Arthritis Brother     Heart Attack Paternal Grandfather     No Known Problems Paternal Grandmother     No Known Problems Maternal Grandmother     No Known Problems Maternal Grandfather     Heart Attack Maternal Aunt     No Known Problems Maternal Aunt     Brain Cancer Maternal Aunt     Other Maternal Aunt         Complications from surgery    Arthritis Maternal Aunt     Arthritis Maternal Aunt     Heart Disease Maternal Aunt     High Blood Pressure Maternal Aunt     Cancer Maternal Uncle     Heart Disease Maternal Uncle     Heart Attack Maternal Uncle     High Blood Pressure Maternal Uncle     Heart Attack Maternal Uncle     High Blood Pressure Maternal Uncle     Parkinsonism Maternal Uncle     Heart Attack Paternal Aunt     Breast Cancer Paternal Aunt     Dementia Paternal Aunt     Heart Attack Paternal Uncle     Heart Attack Paternal Uncle     Brain Cancer Maternal Uncle      Social History     Tobacco Use    Smoking status: Former Smoker     Packs/day: 0.50     Years: 10.00     Pack years: 5.00     Types: Cigarettes     Quit date: 1985     Years since quittin.4    Smokeless tobacco: Never Used   Vaping Use    Vaping Use: Never used   Substance Use Topics    Alcohol use: Not Currently     Alcohol/week: 1.0 standard drink     Types: 1 Shots of liquor per week     Comment: occ    Drug use: No     Allergies:Atorvastatin, Protonix [pantoprazole sodium], and Meloxicam    Review of Systems  General: No changes in weight, fatigue, or night sweats. HEENT: No blurry or decreased vision. No changes in hearing, nasal discharge or sore throat. Cardiovascular:  See HPI. Respiratory: No cough, hemoptysis, or wheezing. No history of asthma. +TAVERAS  Gastrointestinal:  No abdominal pain, hematochezia, melana, constipation, diarrhea, or history of GI ulcers. Genito-Urinary: No dysuria or hematuria. No urgency or polyuria. Musculoskeletal:  No complaints of joint pain, joint swelling or muscular weakness/soreness. Neurological:  No dizziness, headaches, numbness/tingling, speech problems or weakness. No history of a stroke or TIA. Psychological:  No anxiety or depression.   Hematological and Lymphatic: No abnormal bleeding or bruising, blood clots, jaundice or swollen lymph nodes. Endocrine:   No malaise/lethargy, palpitations, polydipsia/polyuria, temperature intolerance or unexpected weight changes  Skin:  No rashes or non-healing ulcers. Physical Exam:  BP (!) 142/76   Pulse 70   Wt 259 lb 6.4 oz (117.7 kg)   LMP 07/13/2011   BMI 45.95 kg/m²  Blood pressure (!) 142/76, pulse 70, weight 259 lb 6.4 oz (117.7 kg), last menstrual period 07/13/2011, not currently breastfeeding. General:No acute distress. Appears comfortable. HEENT:  Normocephalic. No trauma. EOMI. Neck:  Supple, no JVD  Cardiovascular: RRR  Pulses: 2+ carotid and radial   Lungs:  Clear. Normal effort  Abd:  Soft, non-tender, non-distended. No peritoneal signs. Ext:  No clubbing, cyanosis, or edema. Neuro: Alert and oriented x 3. Appropriate behavior. Skin:  No rashes or skin breakdown.     CBC:   Lab Results   Component Value Date    WBC 7.0 06/03/2022    HGB 13.4 06/03/2022    HCT 39.1 06/03/2022    MCV 88.9 06/03/2022     06/03/2022     BMP:  Lab Results   Component Value Date    CREATININE 1.4 (H) 06/03/2022    BUN 31 (H) 06/03/2022     06/03/2022    K 3.8 06/03/2022     06/03/2022    CO2 22 06/03/2022     Mag:   Lab Results   Component Value Date    MG 2.10 06/03/2022     LIVER PROFILE:   Lab Results   Component Value Date    ALT 22 05/10/2022    AST 15 05/10/2022    ALKPHOS 76 05/10/2022    BILITOT 0.5 05/10/2022     PT/INR:   Lab Results   Component Value Date    INR 1.20 (H) 08/13/2020    INR 1.02 08/23/2019    INR 1.02 06/19/2018    PROTIME 11.6 08/23/2019    PROTIME 11.6 06/19/2018     BNP:  No results found for: BNP  LIPIDS:  No components found for: CHLPL  Lab Results   Component Value Date    TRIG 170 (H) 05/10/2022    TRIG 348 (H) 07/28/2020    TRIG 265 (H) 11/19/2019     Lab Results   Component Value Date    HDL 47 05/10/2022    HDL 41 07/28/2020    HDL 51 11/19/2019     Lab Results Component Value Date    LDLCALC 77 05/10/2022    [address] see below 07/28/2020    [address] 77 11/19/2019     Lab Results   Component Value Date    LABVLDL 34 05/10/2022    LABVLDL see below 07/28/2020    LABVLDL 53 11/19/2019     TSH:  Lab Results   Component Value Date    TSH 1.86 04/25/2018       IMAGING:     3/15/2022 Echo:     Normal left ventricle size, wall thickness, and systolic function with an   estimated ejection fraction of 55-60%. No regional wall motion abnormalities are seen. Normal diastolic function. Trivial mitral regurgitation. Trivial aortic regurgitation. Trivial tricuspid regurgitation. The pulmonic valve is not well visualized. Estimated pulmonary artery systolic pressure is at 24 mmHg assuming a right   atrial pressure of 3 mmHg. 3/14/2022 Nuc stress      Overall findings represent a intermediate to high risk scan.    Normal LV size and systolic function.    There is a moderate size reversible defect involving the basal and mid    anterior segments.    Findings are concerning for ischemia in the LAD territory.    Non-diagnostic EKG response due to failure to reach target heart rate. 3/4/2022 ECG: Sinus   11/17/2021 ECG: NSR    8/13/2020 Coronary angiogram   Angiographic Findings:  Left dominant system  Left Main:  Normal  Left Anterior Descending:  Widely patent stent. Mild irregular plaque in the mid LAD. Circumflex:  No significant plaque  Right Coronary:  No significant plaque  Left Ventriculogram:  Not performed due to elevated Cr  Hemodynamics (mm Hg):  Left Ventricular Pressure:  148/4, 25  Central Aortic Pressure:  135/64 (94)  Conclusions:  -Widely patent mid LAD stent  -Elevated LVEDP  -No other significant obstructive CAD     7/28/2020 Nuc stress:       Overall findings represent a intermediate risk scan.     Normal LV size and systolic function.     Small /medium size reversible defect involving the basal/mid/distal anterior     cardiac segments.  FIndings are concerning for ischemia.     ECG: Non-diagnostic EKG response due to failure to reach target heart rate.      5/2019 Echo:   Normal left ventricle size. Borderline left ventricular hypertrophy. Overall   left ventricular systolic function appears normal with an ejection fraction   visually estimated at 55-60%. No regional wall motion abnormalities are   noted. Diastolic filling parameters suggests normal diastolic function.   MItral leaflet tip mildly thickened. Mild mitral regurgitation is present.   Mild tricuspid regurgitation. Estimated pulmonary artery systolic pressure   is normal at 29 mmHg assuming a right atrial pressure of 3 mmHg.   Hyperechoic structure in the liver. Suggest liver ultrasound.       6/2018 Cath: Angiographic Findings:  Left Main:  Normal  Left Anterior Descending:  Mid 60-70% hazy stenosis. iFR was 0.87. Circumflex:  Dominant. No obstructive plaque  Right Coronary:  Non-dominant. No obstructive plaque  Left Ventriculogram:  LVEF 50-55%. Mid LAD:  Direct Stent: 3.5 x 15 mm Xience BRIAN to 10 RUBY     Post stent, the first large diagonal had a 95-99% stenosis. We performed kissing balloon inflations on the ostial diag (through the stent struts) and the LAD using a 2.0 x 12 mm balloon in the diagonal and the 3.5 x 15 mm stent balloon in the LAD. Inflations taken to 10 RUBY each.  70-80% stenosis remained, but there was MARYLU 3 flow in both arteries.     Medications:   Current Outpatient Medications   Medication Sig Dispense Refill    rosuvastatin (CRESTOR) 10 MG tablet TAKE ONE TABLET BY MOUTH DAILY 90 tablet 3    aspirin 81 MG chewable tablet CHEW ONE TABLET BY MOUTH DAILY 90 tablet 3    citalopram (CELEXA) 40 MG tablet TAKE ONE TABLET BY MOUTH DAILY 90 tablet 3    meloxicam (MOBIC) 15 MG tablet Take 1 tablet by mouth daily 30 tablet 3    valsartan (DIOVAN) 160 MG tablet TAKE ONE TABLET BY MOUTH DAILY 90 tablet 3    traZODone (DESYREL) 50 MG tablet TAKE ONE TABLET BY MOUTH EVERY NIGHT AT BEDTIME 90 tablet 2    Semaglutide,0.25 or 0.5MG/DOS, 2 MG/1.5ML SOPN Inject 0.25 mg into the skin once a week 2 pen 3    famotidine (PEPCID) 40 MG tablet Take 1 tablet by mouth 2 times daily 60 tablet 5    allopurinol (ZYLOPRIM) 300 MG tablet Take 1 tablet by mouth daily 90 tablet 1    albuterol sulfate  (90 Base) MCG/ACT inhaler INHALE 2 PUFFS INTO THE LUNGS EVERY 4 HOURS AS NEEDED FOR WHEEZING 42.5 g 1     No current facility-administered medications for this visit. Assessment:  1. CAD in native artery    2. Essential hypertension    3. S/P PTCA (percutaneous transluminal coronary angioplasty)    4. Mixed hyperlipidemia          Plan:    CAD/PCI; stable   ASA, statin   Valsartan   No BB d/t bradycardia     HTN; acute   /76   Valsartan 160 mg po daily   Discussed increasing valsartan vs starting hydralazine vs starting amlodipine. She has follow up with Nephrology this week and would like to speak with him. HLD; stable   Crestor      Follow up in 6 months     Reviewed most recent: CBC, BMP, Mag, LFT, PT/INR, lipid profile.    Reviewed most recent: ECG, Echo, stress test and coronary angiogram

## 2022-06-27 ENCOUNTER — OFFICE VISIT (OUTPATIENT)
Dept: PULMONOLOGY | Age: 62
End: 2022-06-27
Payer: COMMERCIAL

## 2022-06-27 ENCOUNTER — HOSPITAL ENCOUNTER (OUTPATIENT)
Dept: GENERAL RADIOLOGY | Age: 62
Discharge: HOME | End: 2022-06-27
Payer: COMMERCIAL

## 2022-06-27 VITALS
TEMPERATURE: 96.4 F | HEART RATE: 67 BPM | DIASTOLIC BLOOD PRESSURE: 95 MMHG | BODY MASS INDEX: 44.72 KG/M2 | OXYGEN SATURATION: 96 % | HEIGHT: 63 IN | WEIGHT: 252.4 LBS | SYSTOLIC BLOOD PRESSURE: 154 MMHG

## 2022-06-27 DIAGNOSIS — J84.9 ILD (INTERSTITIAL LUNG DISEASE) (HCC): Primary | ICD-10-CM

## 2022-06-27 DIAGNOSIS — Z86.16 HISTORY OF COVID-19: ICD-10-CM

## 2022-06-27 DIAGNOSIS — J84.9 ILD (INTERSTITIAL LUNG DISEASE) (HCC): ICD-10-CM

## 2022-06-27 DIAGNOSIS — J84.89 NSIP (NONSPECIFIC INTERSTITIAL PNEUMONITIS) (HCC): ICD-10-CM

## 2022-06-27 PROCEDURE — G8427 DOCREV CUR MEDS BY ELIG CLIN: HCPCS | Performed by: INTERNAL MEDICINE

## 2022-06-27 PROCEDURE — 3017F COLORECTAL CA SCREEN DOC REV: CPT | Performed by: INTERNAL MEDICINE

## 2022-06-27 PROCEDURE — 1036F TOBACCO NON-USER: CPT | Performed by: INTERNAL MEDICINE

## 2022-06-27 PROCEDURE — G8417 CALC BMI ABV UP PARAM F/U: HCPCS | Performed by: INTERNAL MEDICINE

## 2022-06-27 PROCEDURE — 71046 X-RAY EXAM CHEST 2 VIEWS: CPT

## 2022-06-27 PROCEDURE — 99214 OFFICE O/P EST MOD 30 MIN: CPT | Performed by: INTERNAL MEDICINE

## 2022-06-27 NOTE — PROGRESS NOTES
[facility]    Outpatient Initial Note    Subjective:   CHIEF COMPLAINT / HPI:     The patient is a 57 y.o. female is here for  follow-up of steroid responsive ILD, possibly connective tissue disease related NSIP vs chronic hypersensitivity pneumonitis. Since last visit she has done very well. Breathing is much better and she has no cough. She is now vegetarian. She was feeling so well that she weaned herself off her prednisone and has been off prednisone x 4 weeks without recurrence of dyspnea or cough    3/8/2022  Ish Sweet is here for follow-up of steroid responsive ILD, possibly connective tissue disease related NSIP vs chronic hypersensitivity pneumonitis. Since last visit she had an HRCT January 17 which I suspected recurrence of her ILD. I placed her on prednisone 40 mg daily with Bactrim PCP prophylaxis. She stated that she was starting to get significant improvement in her dyspnea on exertion when she developed significant worsening January 25 associated with worsening hypoxemia. She tested positive for COVID-19 pneumonia and CTPA showed no PE but significant bilateral groundglass opacities new from HRCT 2 weeks prior. She did not require hospitalization. She did call me and I ordered Sotrivumab. She is gradually improving but her breathing is still not back to her pre-Covid level. 1/11/2022   Ish Sweet has a history of steroid responsive ILD, possibly connective tissue disease related NSIP vs chronic hypersensitivity pneumonitis. When I last saw her in July 2020 she had been doing quite well and had minimal cough and dyspnea. She is on prednisone 10 mg daily and we were talking about weaning it off. She states that she has been off prednisone now for at least 8 months, potentially longer. Over the last 4 months she has had a worsening of dry cough and dyspnea on exertion. 7/1/2020  Ish Sweet has asked for a virtual visit for ILD.   Since last visit her prednisone has been weaned slowly to 10 mg daily and she had a CT chest about 1 week ago. Overall she continues to do pretty well and still has a significant improvement in dyspnea on exertion and cough compared to initiation of her prednisone. She is no longer gaining weight and her weight has gone up and down dependent on her dietary habits. She does mention that the current weather does make her symptoms a little bit worse. She is excited for her daughter's wedding coming up in early September. 5/7/2020  Manohar Quiñonez has asked for a virtual visit for her ILD. Her ileitis has resolved. However while she was in [place] visiting her terminally ill father-in-law, whom is subsequently passed away, she developed gout and currently is on prednisone 60 mg with intended wean back to her chronic 10 mg dose over the next 9 days. She states that she has no cough or dyspnea on exertion. Her gout is slowly getting better. She has no specific adverse side effects from her prednisone, which she has been on since September 2019    3/12/2020  Manohar Quiñonez is here for follow-up of nonspecific ILD despite open lung biopsy. She is down to prednisone 15 mg daily and dyspnea exertion is much improved. She has a little more cough than before but is getting over the Flu last week    November 15, 2019  Manohar Quiñonez is here for follow-up of nonspecific ILD despite open lung biopsy. Differential included connective tissue disease, sarcoidosis, and chronic hypersensitivity pneumonitis. I did have her fill out a ILD questionnaire and did not find a suspected cause for hypersensitivity pneumonitis. Woodworking was a thought in the past.  Last visit I did do an extensive serologic connective tissue disease work-up. Pertinent positives were a mildly positive SAMMY at 1-80 nuclear older pattern and a positive anti-centromere.   She has no history of connective tissue disease and never has seen a rheumatologist.  Clinically I do not have a lot of symptoms that is overwhelmingly positive for a connective tissue disease. She currently is on prednisone 40 mg a day and clinically has responded nicely. Dyspnea on exertion and cough are significantly improved. She does not seem to have any significant adverse effects from her prednisone. She is taking Bactrim for PCP prophylaxis    11/15/2019  Krunal White is here for follow-up of open lung biopsy for undiagnosed ILD. She had no postoperative complications. Her chronic dyspnea on exertion is unchanged but she does state that her cough is better. 7/9  [facility] is here for follow up of ILD NOS. She had Bronch 6/27 with BAL and Bx of vicente. Other than 10,000 - 100,000 S Aureus and mild elevation of CD4:CD8 in BAL the bronch has been non-diagnostic. She continues to have Mullen and cough. No new changes    6/18  Krunal White is here for follow-up of chronic cough and nonspecific ILD. She is now off Plavix and losartan without any change in cough. She had CT chest that shows unchanged basilar predominate nonspecific ILD. Her cough is daily and unrelenting. She coughed continuously through my office visit. She feels that her dyspnea on exertion is somewhat worse and gets winded doing routine activities of daily living. No fever, chills, night sweats, weight loss, hemoptysis, sputum production, CP or wheezing    5/21/2019  Krunal White is here for follow-up of chronic cough and nonspecific ILD. Last visit I doubled her Pepcid to 40 mg by mouth twice a day and obtained a modified barium swallow which was normal.  Her daily chronic dry cough is unchanged. She has some associated dyspnea on exertion and wheezing but no chest pain. She has no fevers, chills, night sweats, anorexia, weight loss, hemoptysis, or sputum production    5/3/2019  Krunal White is here for follow-up of chronic cough.   I saw her on March 13 and 25th and her cough has  not improved since then despite avoiding woodworking for now 8 weeks and a extended prednisone taper starting at 40 mg and tapering over 4 weeks to off. She has associated dyspnea on exertion. She occasionally coughs with eating but there is no clear aspiration. She is intolerant of proton pump inhibitors and she thinks her reflux is well controlled on Pepcid 10 mg by mouth twice a day. She has no fevers, chills, night sweats, anorexia, sputum production, chest pain, or hemoptysis. 3/13/2019  Marlen presents for follow-up of blood work. Her SAMMY was speckled 1-80, otherwise blood work was unremarkable. For the last 2 weeks she has not been around woodworking. She states over the last day or so she's noticed substantial improvement of her cough    Last visit 2/28  Krunal White presents for follow-up of a dry cough. After last visit February 1 I added omeprazole to her Pepcid to see if this was a GERD related cough. I also obtained a CT chest.  Her dry cough is unchanged, occurring daily, and not associated with dyspnea on exertion, chest tightness, or wheezing. 2/1/2019  Marlen presents for re-eval of cough that has now gone on since Thanksgiving. Last visit I checked a CXR that was negative. I thought this was a post viral cough and gave an extended course of prednisone which did not help. She has no infectious symptoms. No post nasal drip. SHe does have uncontrolled GERD. Bronchial challenge over the summer was normal. SHe is not on an ACE    1/7/2019  Krunal White presents for evaluation of 5 weeks of a dry cough, chest congestion, wheezing and TAVERAS. She has no fevers, chills, purulent sputum, anorexia, hemoptysis or weight loss. She has seen her PCP who has given her a medrol dose pack, Z pack, phenergan DM, Tessalon perles and an albuterol MDI without help. Her  has been battling the same and improved with a longer pred taper, levaquin and tussionex cough syrup    7/10/2018  Ellis Hossein presents today for follow-up visit for  re-evaluation of dyspnea on exertion after [facility] s/p PTCA LAD.   She had a positive nuclear stress test that prompted her coronary angiogram.  She also had a bronchial challenge that was negative. Since revascularization she's had no further chest heaviness. Her dyspnea on exertion is mildly improved but still present when she goes up a flight of stairs. She has no cough or wheezing. She occasionally snores but has no witnessed apnea. She does not have excessive daytime sleepiness and her [place] was 6    Initial visit June 12, 2018   She states that she gets short of breath walking up from the first to second floor. It is 14 steps and sometimes she has to stop midway and at the top. She also gets short of breath working in the kitchen. She also complains of occasional chest heaviness. She pretty had a cough but states that that is now mostly resolved. She can't take a deep breath and her appetite has been off. She and her  are on a ketogenic diet and have lost some weight. She denies any fevers, chills, hemoptysis, or peripheral edema. Her evaluation has included an echo which is normal.  She is also had PFTs and a chest x-ray. She is never had a cardiac stress test.  She has history of hypertension and hyperlipidemia. Past Medical History:    Past Medical History:   Diagnosis Date    Acid reflux     Bilateral carpal tunnel syndrome 12/21/2017    CAD in native artery 06/19/2018    Mid LAD stented with 3.5 x 15 mm Xience BRIAN. EF 55%    Chicken pox     Chronic back pain     CKD (chronic kidney disease) 4/15/2016    Depression 12/7/2010    Heart disease     Hypertension     Interstitial lung disease ([address]     Measles     Menopausal symptoms     Morbid obesity with BMI of 45.0-49.9, adult ([facility] [address] 1/29/2015    Osteoarthritis     Overactive bladder     Pure hypercholesterolemia     Stress incontinence        Social History:    Patient is . She is unemployed.   She smoked up to 1 PPD x 8 years and quit in 1985    Family History:  IPF    Current Medications:  Current Outpatient Medications on File Prior to Visit   Medication Sig Dispense Refill    rosuvastatin (CRESTOR) 10 MG tablet TAKE ONE TABLET BY MOUTH DAILY 90 tablet 3    aspirin 81 MG chewable tablet CHEW ONE TABLET BY MOUTH DAILY 90 tablet 3    citalopram (CELEXA) 40 MG tablet TAKE ONE TABLET BY MOUTH DAILY 90 tablet 3    meloxicam (MOBIC) 15 MG tablet Take 1 tablet by mouth daily 30 tablet 3    valsartan (DIOVAN) 160 MG tablet TAKE ONE TABLET BY MOUTH DAILY 90 tablet 3    traZODone (DESYREL) 50 MG tablet TAKE ONE TABLET BY MOUTH EVERY NIGHT AT BEDTIME 90 tablet 2    Semaglutide,0.25 or 0.5MG/DOS, 2 MG/1.5ML SOPN Inject 0.25 mg into the skin once a week 2 pen 3    famotidine (PEPCID) 40 MG tablet Take 1 tablet by mouth 2 times daily 60 tablet 5    allopurinol (ZYLOPRIM) 300 MG tablet Take 1 tablet by mouth daily 90 tablet 1    albuterol sulfate  (90 Base) MCG/ACT inhaler INHALE 2 PUFFS INTO THE LUNGS EVERY 4 HOURS AS NEEDED FOR WHEEZING 42.5 g 1     No current facility-administered medications on file prior to visit.        REVIEW OF SYSTEMS:    CONSTITUTIONAL: Negative for fevers and chills  HEENT: Negative for oropharyngeal exudate, post nasal drip, sinus pain / pressure, nasal congestion, ear pain  RESPIRATORY:  See HPI  CARDIOVASCULAR: Negative for chest pain, palpitations, edema  GASTROINTESTINAL: Negative for nausea, vomiting, diarrhea, constipation and abdominal pain  GENITOURINARY: Negative for dysuria, urinary frequency, urinary hesitancy  HEMATOLOGICAL: Negative for adenopathy  SKIN: Negative for clubbing, cyanosis, skin lesions  ENDOCRINE: Negative for polyuria, polydipsia, heat intolerance, cold intolerance   EXTREMITIES: Negative for weakness, decreased ROM  NEUROLOGICAL: Negative for unilateral weakness, speech or gait abnormalities    Objective:   PHYSICAL EXAM:        VITALS:  BP (!) 154/95 (Site: Left Upper Arm, Position: Sitting, Cuff Size: Large Adult)   Pulse 67 Temp (!) 96.4 °F (35.8 °C) (Infrared)   Ht 5' 3\" (1.6 m)   Wt 252 lb 6.4 oz (114.5 kg)   LMP 07/13/2011   SpO2 96% Comment: ra  Breastfeeding No   BMI 44.71 kg/m²   On RA    Gen: Well developed, well nourished female in no acute distress. Speaking in full sentences with out using accessory resp muscles  Eyes: PERRL, EOMI, normal conjunctivae  Ears, Nose, Mouth and Throat: Hearing is normal. Oropharynx is normal  Neck: No lymphadenopathy  Respiratory: CTA  CV: RRR without M/R/R  Abd: +BS, soft, NT/ND  Musculoskeletal: No cyanosis, clubbing, or edema. Skin: No rashes, ulcers, or subcutaneous nodules  Psychiatric: Alert and oriented to time place and person      DATA:      Radiology Review:  Pertinent images / reports were reviewed as a part of this visit. CXR 6/27/2022  Narrative   Chest PA and lateral       HISTORY: Interstitial lung disease           Impression       Mild linear scarring in the left lung base. Suture line in the right upper lobe.       No radiographic evidence of diffuse interstitial lung disease.       Normal cardiomediastinal silhouette. CTPA January 30, 2022         Pulmonary arteries: No pulmonary embolus. Pulmonary arteries normal in caliber.       Airways: Central airways are patent.     Lungs: There is chronic reticular abnormalities in the subpleural lower lung zones. There is new subpleural peripheral and peribronchial vascular predominant groundglass opacities throughout both lungs. Pleura: No pleural effusions or significant pleural thickening.       Heart: Heart size is normal.    Great vessels: Aorta is normal in caliber. Other mediastinal structures and lower neck: Normal.    Adenopathy: No lymphadenopathy. Few calcified hilar nodes.       Chest wall and axilla: No significant abnormality. Osseous structures: No significant abnormality. Upper abdomen: Hepatic steatosis.         Impression       1.   No evidence of pulmonary emboli.    2.  Multifocal pneumonia in a pattern that can be seen with Covid. 3.  Hepatic steatosis. 4.   Stable findings of pulmonary fibrosis without evidence of honeycombing. HRCT January 17, 2022  Impression   Minimally increased mild lower lobe pulmonary fibrosis, indeterminate for   UIP.  NSIP should be considered.         CT chest 6/22/2020  FINDINGS:       Lungs and Airways: Similar to the prior study, subpleural areas of linear scarring are noted. These are similar to the prior study. 4 to 5 mm nodules of the right lower lobe are noted, image 70 of series 3. This is unchanged. Calcification is noted along    the posterior lateral aspect of the right hemithorax, mainly along the right upper lobe laterally. Correlate with any history of thoracostomy. There is atelectasis or scarring of the right lung base medially, also similar to the prior study. There is no    new or enlarging nodularity. No significant volume loss or bronchiectasis. No honeycombing.       Pleura: No pleural effusions or pleural thickening.       Heart and Great Vessels: Heart size is normal.       Adenopathy: None.       Chest Wall / Lower Neck: No significant abnormality.       Upper Abdomen: No significant abnormality.       Bones: No significant abnormality.           Impression       Findings there are significantly changed from the prior study. Areas of subpleural linear banding which may represent the history of interstitial lung disease and/or scarring. Certainly no findings such as honeycombing to suggest UIP.       Stable pulmonary nodularity, for which continued follow-up is recommended. CT Chest 2/22/2019 reveals the following:     Impression       Bilateral and fairly symmetric reticulation most pronounced about the lower lobes with immediate subpleural sparing may relate to interstitial pneumonitis and may relate to nonspecific interstitial pneumonitis.  Findings may be present in connective    tissue disease including SLE, autoimmune disease including rheumatoid arthritis as well as other etiologies including hypersensitivity pneumonitis, drug-induced pneumonitis as well as other causes.       No pulmonary fibrosis.       No lobar consolidation. CT chest June 13, 2019  FINDINGS: The mediastinum appears normal without signs of any lymph node enlargement. The thyroid gland extends down to the level of the sternum bilaterally.       The aorta is of normal caliber as well as the pulmonary arteries.       Subpleural areas of pulmonary linear changes, likely pulmonary fibrosis or interstitial lung disease are appreciated with 1 to 2 mm pulmonary nodule in the left upper lobe on image 42 series 9, well circumscribed. Additional subpleural linear changes    noted right middle lobe right left lower lobe and lingula near the lung base.       Poorly defined nodules noted just above the right hemidiaphragm the anterior aspect right lower lobe measuring 5 x 4 mm       There are some calcified subcarinal lymph nodes appreciated.       Calcification the left anterior descending coronary artery is appreciated without cardiac enlargement.       No pleural or pericardial effusion is noted.           Impression       1.  Small amount of subpleural linear change which may represent interstitial pneumonitis or interstitial lung disease most pronounced in the lower lobes and middle lobes without any significant pulmonary fibrotic change or volume loss. 2.  Several punctate nodules appreciated one in the left upper lobe subpleural measuring 1 to 2 mm another in the left upper lobe with one poorly defined irregular nodule just above the right hemidiaphragm on image 69 image series 9 and image 68 series 3    measuring up to 5 x 4 mm which should be followed yearly.       3. No spiculated masses. Mild bronchial dilation in the lower lobes     Last PFTs: May 2018  TEST PERFORMED:  1. Spirometry with flow-volume loops obtained before and after  bronchodilation.   2.  Lung volumes by anterolateral walls. Left heart catheterization June 19, 2018  Conclusions:  -Severe single vessel CAD; LAD stented with a 3.5 x 15 mm Xiecne BRIAN to 10 RUBY. A \"pinched\" diag was treated with a 2.0 x 12 mm balloon.  -Elevated LVEDP  -Systemic HTN  -Normal LVEF    MBS  Impression       Normal modified barium swallow. Surgical pathology -open lung biopsy -September 4, 2019  ADDENDUM:  ... External consultation has been completed on this case and  the slides have been reviewed by Dr. Margaret Cuenca MD,  PhD at [address] [place] [address].  Dr. Artie Lam consultative diagnosis is as follows:  \" Lung, right upper and lower lobes, VATS wedge biopsies (A and B):     - Patchy cellular chronic interstitial pneumonia associated with       ill-formed nonnecrotizing granulomas.     - Arterial vasculopathy.     - No fungal or acid fast microorganisms identified by special stains.     - See case comment \". .. Teagan Manrique      In a comment, Dr. Artie Lam states that: \" The main       findings are patchy bronchiolocentric and subpleural chronic       interstitial pneumonia associated with ill-defined, nonnecrotizing       granulomas and a moderate to severe arterial vasculopathy.  A       primary etiologic consideration for this combination of       histopathologic findings is collagen vascular disease.  Infection       and chronic hypersensitivity pneumonitis are also differential       diagnosis.  Morphologic features diagnostic of usual interstitial       pneumonia seen in idiopathic pulmonary fibrosis are not present.     AFB and GMS stain shows no evidence of fungal forms CD3 stains and CD20  stains are positive in the inflammatory component with no atypical T or B  cell infiltrates identified.  CD3 and CD20 stains were performed on  selected blocks from both parts A and B.  CD68 stain material is positive  in a macro 5 population and a nonspecific staining pattern.  Trichrome  stains performed on parts A and B and collagen stain performed on part A  show no evidence of increased interstitial fibrosis are collagen  fibrosis.  Please see outside consultation report for complete detailed  report and comments . ............................ FINAL DIAGNOSIS:       A. Wedge biopsy, right lower lobe, lung:       - Specimen sent for external consultation to evaluate for         interstitial lung disease, consultative report currently pending.       B. Wedge biopsy, right upper lobe, lung:       - Specimen sent for external consultation evaluate for interstitial         lung disease, consultative report currently pending. Assessment:      Diagnosis Orders   1. ILD (interstitial lung disease) (HCC)  XR CHEST (2 VW)   2. History of COVID-19     3. NSIP (nonspecific interstitial pneumonitis) ([facility] [address]         Plan:     1. CXR today shows no significant ILD  2.   Remain off prednisone and watch for recurrence of pulmonary symptoms closely    Follow-up with me in 3 months

## 2022-07-13 ENCOUNTER — OFFICE VISIT (OUTPATIENT)
Dept: FAMILY MEDICINE CLINIC | Age: 62
End: 2022-07-13
Payer: COMMERCIAL

## 2022-07-13 VITALS
TEMPERATURE: 97.3 F | WEIGHT: 253 LBS | BODY MASS INDEX: 44.82 KG/M2 | OXYGEN SATURATION: 96 % | DIASTOLIC BLOOD PRESSURE: 78 MMHG | HEART RATE: 67 BPM | SYSTOLIC BLOOD PRESSURE: 124 MMHG

## 2022-07-13 DIAGNOSIS — F32.0 CURRENT MILD EPISODE OF MAJOR DEPRESSIVE DISORDER WITHOUT PRIOR EPISODE (HCC): ICD-10-CM

## 2022-07-13 DIAGNOSIS — N18.32 STAGE 3B CHRONIC KIDNEY DISEASE (HCC): ICD-10-CM

## 2022-07-13 DIAGNOSIS — I10 ESSENTIAL HYPERTENSION: ICD-10-CM

## 2022-07-13 DIAGNOSIS — R73.09 ELEVATED GLUCOSE: ICD-10-CM

## 2022-07-13 DIAGNOSIS — R59.0 LYMPHADENOPATHY, ANTERIOR CERVICAL: Primary | ICD-10-CM

## 2022-07-13 DIAGNOSIS — Z12.31 ENCOUNTER FOR SCREENING MAMMOGRAM FOR MALIGNANT NEOPLASM OF BREAST: ICD-10-CM

## 2022-07-13 PROBLEM — R63.5 WEIGHT GAIN: Status: RESOLVED | Noted: 2022-01-11 | Resolved: 2022-07-13

## 2022-07-13 LAB — HBA1C MFR BLD: 5.9 %

## 2022-07-13 PROCEDURE — 99214 OFFICE O/P EST MOD 30 MIN: CPT | Performed by: NURSE PRACTITIONER

## 2022-07-13 PROCEDURE — G8427 DOCREV CUR MEDS BY ELIG CLIN: HCPCS | Performed by: NURSE PRACTITIONER

## 2022-07-13 PROCEDURE — 83036 HEMOGLOBIN GLYCOSYLATED A1C: CPT | Performed by: NURSE PRACTITIONER

## 2022-07-13 PROCEDURE — G8417 CALC BMI ABV UP PARAM F/U: HCPCS | Performed by: NURSE PRACTITIONER

## 2022-07-13 PROCEDURE — 1036F TOBACCO NON-USER: CPT | Performed by: NURSE PRACTITIONER

## 2022-07-13 PROCEDURE — 3017F COLORECTAL CA SCREEN DOC REV: CPT | Performed by: NURSE PRACTITIONER

## 2022-07-13 RX ORDER — ASCORBIC ACID 500 MG
500 TABLET ORAL DAILY
COMMUNITY
End: 2022-07-26 | Stop reason: ALTCHOICE

## 2022-07-13 RX ORDER — AMOXICILLIN AND CLAVULANATE POTASSIUM 875; 125 MG/1; MG/1
1 TABLET, FILM COATED ORAL 2 TIMES DAILY
Qty: 14 TABLET | Refills: 0 | Status: SHIPPED | OUTPATIENT
Start: 2022-07-13 | End: 2022-07-20

## 2022-07-13 NOTE — PROGRESS NOTES
David Garza (:  1960) is a 57 y.o. female,Established patient, here for evaluation of the following chief complaint(s):  Lymphadenopathy (left side x 4 days )      ASSESSMENT/PLAN:  1. Lymphadenopathy, anterior cervical  Assessment & Plan:  Possible salivary duct obstruction. Patient will try sucking on lemon drops today. If she has no relief with this She will take the Augmentin. Orders:  -     amoxicillin-clavulanate (AUGMENTIN) 875-125 MG per tablet; Take 1 tablet by mouth 2 times daily for 7 days, Disp-14 tablet, R-0Normal  2. Encounter for screening mammogram for malignant neoplasm of breast  -     FELIPE DIGITAL SCREEN W OR WO CAD BILATERAL; Future  3. Elevated glucose  -     POCT glycosylated hemoglobin (Hb A1C)  4. Essential hypertension  Assessment & Plan:  Well-controlled continue medications  Diovan  5. Stage 3b chronic kidney disease [facility])  Assessment & Plan:  Lab Results   Component Value Date/Time     2022 11:23 AM    K 3.8 2022 11:23 AM    K 3.7 2022 02:57 PM     2022 11:23 AM    CO2 22 2022 11:23 AM    BUN 31 2022 11:23 AM    CREATININE 1.4 2022 11:23 AM    GLUCOSE 112 2022 11:23 AM    CALCIUM 9.1 2022 11:23 AM   Sees nephrology for this, stable    6. Current mild episode of major depressive disorder without prior episode [facility])  Assessment & Plan:  Stable on Celexa      Return in about 3 months (around 10/13/2022), or if symptoms worsen or fail to improve. SUBJECTIVE/OBJECTIVE:  Yvonne Guajardo presents today with complaints of lymphadenopathy to her left jaw. States its been going on for couple weeks and has worsened. It is tender and palpable to her. She denies any recent illness, sore throat, ear pain, dental abscess or condition.   Due for rt hip replacement in August d/t avascular necrosis      Current Outpatient Medications   Medication Sig Dispense Refill    ascorbic acid (VITAMIN C) 500 MG tablet Take 500 mg by mouth daily      amoxicillin-clavulanate (AUGMENTIN) 875-125 MG per tablet Take 1 tablet by mouth 2 times daily for 7 days 14 tablet 0    rosuvastatin (CRESTOR) 10 MG tablet TAKE ONE TABLET BY MOUTH DAILY 90 tablet 3    aspirin 81 MG chewable tablet CHEW ONE TABLET BY MOUTH DAILY 90 tablet 3    citalopram (CELEXA) 40 MG tablet TAKE ONE TABLET BY MOUTH DAILY 90 tablet 3    valsartan (DIOVAN) 160 MG tablet TAKE ONE TABLET BY MOUTH DAILY (Patient taking differently: 320 mg ) 90 tablet 3    traZODone (DESYREL) 50 MG tablet TAKE ONE TABLET BY MOUTH EVERY NIGHT AT BEDTIME (Patient taking differently: PRN) 90 tablet 2    Semaglutide,0.25 or 0.5MG/DOS, 2 MG/1.5ML SOPN Inject 0.25 mg into the skin once a week 2 pen 3    famotidine (PEPCID) 40 MG tablet Take 1 tablet by mouth 2 times daily 60 tablet 5    albuterol sulfate  (90 Base) MCG/ACT inhaler INHALE 2 PUFFS INTO THE LUNGS EVERY 4 HOURS AS NEEDED FOR WHEEZING 42.5 g 1     No current facility-administered medications for this visit. Review of Systems   All other systems reviewed and are negative. Vitals:    07/13/22 1005   BP: 124/78   Site: Left Upper Arm   Position: Sitting   Cuff Size: Medium Adult   Pulse: 67   Temp: 97.3 °F (36.3 °C)   SpO2: 96%   Weight: 253 lb (114.8 kg)       Physical Exam  Constitutional:       Appearance: She is well-developed. She is obese. HENT:      Head: Normocephalic. Eyes:      Pupils: Pupils are equal, round, and reactive to light. Cardiovascular:      Rate and Rhythm: Normal rate and regular rhythm. Pulmonary:      Effort: Pulmonary effort is normal.   Musculoskeletal:         General: Normal range of motion. Cervical back: Normal range of motion. Lymphadenopathy:      Head:      Left side of head: Submandibular adenopathy present. Comments: 1\" submandibular firm tender slightly erythematous. Skin:     General: Skin is warm and dry.    Neurological:      Mental Status: She is alert and oriented to person, place, and time. An electronic signature was used to authenticate this note.     --Bettina Waters, ROMEO - CNP

## 2022-07-13 NOTE — ASSESSMENT & PLAN NOTE
Lab Results   Component Value Date/Time     06/03/2022 11:23 AM    K 3.8 06/03/2022 11:23 AM    K 3.7 01/30/2022 02:57 PM     06/03/2022 11:23 AM    CO2 22 06/03/2022 11:23 AM    BUN 31 06/03/2022 11:23 AM    CREATININE 1.4 06/03/2022 11:23 AM    GLUCOSE 112 06/03/2022 11:23 AM    CALCIUM 9.1 06/03/2022 11:23 AM   Sees nephrology for this, stable

## 2022-07-13 NOTE — ASSESSMENT & PLAN NOTE
Possible salivary duct obstruction. Patient will try sucking on lemon drops today. If she has no relief with this She will take the Augmentin.

## 2022-07-19 ENCOUNTER — PATIENT MESSAGE (OUTPATIENT)
Dept: FAMILY MEDICINE CLINIC | Age: 62
End: 2022-07-19

## 2022-07-19 DIAGNOSIS — F51.04 PSYCHOPHYSIOLOGICAL INSOMNIA: ICD-10-CM

## 2022-07-20 RX ORDER — FLUCONAZOLE 150 MG/1
150 TABLET ORAL ONCE
Qty: 1 TABLET | Refills: 0 | Status: SHIPPED | OUTPATIENT
Start: 2022-07-20 | End: 2022-07-20

## 2022-07-20 RX ORDER — TRAZODONE HYDROCHLORIDE 50 MG/1
TABLET ORAL
Qty: 90 TABLET | Refills: 2 | Status: SHIPPED | OUTPATIENT
Start: 2022-07-20

## 2022-07-26 ENCOUNTER — OFFICE VISIT (OUTPATIENT)
Dept: FAMILY MEDICINE CLINIC | Age: 62
End: 2022-07-26
Payer: COMMERCIAL

## 2022-07-26 VITALS
BODY MASS INDEX: 45.17 KG/M2 | TEMPERATURE: 97.5 F | WEIGHT: 255 LBS | SYSTOLIC BLOOD PRESSURE: 136 MMHG | DIASTOLIC BLOOD PRESSURE: 78 MMHG | OXYGEN SATURATION: 99 % | HEART RATE: 78 BPM

## 2022-07-26 DIAGNOSIS — M16.11 PRIMARY OSTEOARTHRITIS OF RIGHT HIP: Primary | ICD-10-CM

## 2022-07-26 DIAGNOSIS — Z01.810 PREOP CARDIOVASCULAR EXAM: ICD-10-CM

## 2022-07-26 DIAGNOSIS — I10 ESSENTIAL HYPERTENSION: ICD-10-CM

## 2022-07-26 DIAGNOSIS — J84.9 INTERSTITIAL LUNG DISEASE (HCC): ICD-10-CM

## 2022-07-26 DIAGNOSIS — M10.471 ACUTE GOUT DUE TO OTHER SECONDARY CAUSE INVOLVING TOE OF RIGHT FOOT: ICD-10-CM

## 2022-07-26 DIAGNOSIS — I25.10 CAD IN NATIVE ARTERY: ICD-10-CM

## 2022-07-26 PROBLEM — R53.83 FATIGUE: Status: RESOLVED | Noted: 2022-05-10 | Resolved: 2022-07-26

## 2022-07-26 PROCEDURE — 99214 OFFICE O/P EST MOD 30 MIN: CPT | Performed by: NURSE PRACTITIONER

## 2022-07-26 RX ORDER — VITAMIN B COMPLEX
1 CAPSULE ORAL DAILY
COMMUNITY
End: 2022-10-26 | Stop reason: ALTCHOICE

## 2022-07-26 NOTE — PROGRESS NOTES
Requesting surgeon: Dr Ted Gunn  Reason for Consult: Preoperative Evaluation of Risk  Surgery location: [facility]  Surgery date:8/16/22    HPI:   Ladan Rivera is a 60 y.o. female with history of OA, CAD w/stent, CKD, ILD, Gout  Presents for pre op evaluation for THR   Denies fever, chills, or current illness. Denies personal or family history of anesthesia complications. Medications:  Current Outpatient Medications   Medication Sig Dispense Refill    b complex vitamins capsule Take 1 capsule by mouth in the morning. traZODone (DESYREL) 50 MG tablet TAKE ONE TABLET BY MOUTH EVERY NIGHT AT BEDTIME 90 tablet 2    rosuvastatin (CRESTOR) 10 MG tablet TAKE ONE TABLET BY MOUTH DAILY 90 tablet 3    aspirin 81 MG chewable tablet CHEW ONE TABLET BY MOUTH DAILY 90 tablet 3    citalopram (CELEXA) 40 MG tablet TAKE ONE TABLET BY MOUTH DAILY 90 tablet 3    valsartan (DIOVAN) 160 MG tablet TAKE ONE TABLET BY MOUTH DAILY (Patient taking differently: 320 mg) 90 tablet 3    famotidine (PEPCID) 40 MG tablet Take 1 tablet by mouth 2 times daily 60 tablet 5    albuterol sulfate  (90 Base) MCG/ACT inhaler INHALE 2 PUFFS INTO THE LUNGS EVERY 4 HOURS AS NEEDED FOR WHEEZING 42.5 g 1     No current facility-administered medications for this visit. Allergies:  Atorvastatin, Protonix [pantoprazole sodium], and Meloxicam  History:  Past Medical History:   Diagnosis Date    Acid reflux     Bilateral carpal tunnel syndrome 12/21/2017    CAD in native artery 06/19/2018    Mid LAD stented with 3.5 x 15 mm Xience BRIAN.  EF 55%    Chicken pox     Chronic back pain     CKD (chronic kidney disease) 4/15/2016    Depression 12/7/2010    Heart disease     Hypertension     Interstitial lung disease ([facility]     Lymphadenopathy, anterior cervical 7/13/2022    Measles     Menopausal symptoms     Morbid obesity with BMI of 45.0-49.9, adult ([facility] 1/29/2015    Osteoarthritis     Overactive bladder     Pure hypercholesterolemia     Stress incontinence      Family:  Family History   Problem Relation Age of Onset    High Blood Pressure Mother     Rheum Arthritis Mother     Cancer Father     Hypertension Father     Colon Cancer Father     High Blood Pressure Brother     Stroke Brother     Heart Defect Brother     Hypertension Sister     Rheum Arthritis Sister     Emphysema Sister     Rheum Arthritis Sister     Other Sister         bottom of lungs are getting hard    Hypertension Brother     Rheum Arthritis Brother     Heart Attack Paternal Grandfather     No Known Problems Paternal Grandmother     No Known Problems Maternal Grandmother     No Known Problems Maternal Grandfather     Heart Attack Maternal Aunt     No Known Problems Maternal Aunt     Brain Cancer Maternal Aunt     Other Maternal Aunt         Complications from surgery    Arthritis Maternal Aunt     Arthritis Maternal Aunt     Heart Disease Maternal Aunt     High Blood Pressure Maternal Aunt     Cancer Maternal Uncle     Heart Disease Maternal Uncle     Heart Attack Maternal Uncle     High Blood Pressure Maternal Uncle     Heart Attack Maternal Uncle     High Blood Pressure Maternal Uncle     Parkinsonism Maternal Uncle     Heart Attack Paternal Aunt     Breast Cancer Paternal Aunt     Dementia Paternal Aunt     Heart Attack Paternal Uncle     Heart Attack Paternal Uncle     Brain Cancer Maternal Uncle      Social history:  Social History     Socioeconomic History    Marital status:      Spouse name: Not on file    Number of children: Not on file    Years of education: Not on file    Highest education level: Not on file   Occupational History    Not on file   Tobacco Use    Smoking status: Former     Packs/day: 0.50     Years: 10.00     Pack years: 5.00     Types: Cigarettes     Quit date: 1985     Years since quittin.5    Smokeless tobacco: Never   Vaping Use    Vaping Use: Never used   Substance and Sexual Activity    Alcohol use: Not Currently     Alcohol/week: 1.0 standard drink     Types: 1 Shots of liquor per week     Comment: occ    Drug use: No    Sexual activity: Yes     Partners: Male   Other Topics Concern    Not on file   Social History Narrative    Not on file     Social Determinants of Health     Financial Resource Strain: Low Risk     Difficulty of Paying Living Expenses: Not hard at all   Food Insecurity: No Food Insecurity    Worried About Running Out of Food in the Last Year: Never true    Ran Out of Food in the Last Year: Never true   Transportation Needs: Not on file   Physical Activity: Not on file   Stress: Not on file   Social Connections: Not on file   Intimate Partner Violence: Not on file   Housing Stability: Not on file     Surgical history:  Past Surgical History:   Procedure Laterality Date    BRONCHOSCOPY  6/27/2019    [address] performed by Yesica Hou MD at [address]  6/27/2019    BRONCHOSCOPY DIAGNOSTIC OR CELL [address] performed by Yesica Hou MD at [address]  6/27/2019    BRONCHOSCOPY BIOPSY BRONCHUS performed by Yesica Hou MD at [address] [facility] Bilateral 01/29/2018    EPIDURAL STEROID INJECTION N/A 10/15/2019    MIDLINE CAUDAL EPIDURAL STEROID INJECTION AND COCCYGEAL JOINT INJECTION SITES CONFIRMED BY FLUOROSCOPY performed by Kassy Rojas MD at [address] [facility]  2009,2010    KNEES BILATERAL    NERVE SURGERY Bilateral 12/22/2020    BILATERAL LUMBAR THREE LUMBAR FOUR LUMBAR FIVE DORSAL RAMUS RADIOFREQUENCY ABLATION SITE CONFIRMED BY FLUOROSCOPY performed by Kassy Rojas MD at [address] [employer] Right 3/3/2020    RIGHT LUMBAR FOUR AND LUMBAR FIVE TRANSFORAMINAL EPIDURAL STEROID INJECTION SITE CONFIRMED BY FLUOROSCOPY performed by Kassy Rojas MD at [address] [employer] Right 11/2/2020    RIGHT L4 AND L5 TRANSFORAMINAL EPIDURAL STEROID INJECTION WITH FLUOROSCOPY (39785, [id number]) performed by Dannielle Levy MD at [address] Bilateral 11/17/2020    BILATERAL LUMBAR THREE LUMBAR FOUR LUMBAR FIVE DORSAL RAMUS LUMBAR MEDIAL BRANCH BLOCK SITE CONFIRMED BY FLUOROSCOPY performed by Dannielle Levy MD at [address] [employer] [facility] Bilateral 12/8/2020    BILATERAL LUMBAR THREE LUMBAR FOUR LUMBAR FIVE DORSAL RAMUS LUMBAR MEDIAL BRANCH BLOCK SITE CONFIRMED BY FLUOROSCOPY performed by Dannielle Levy MD at [address] [facility] DX/THER SBST INTRLMNR CRV/THRC W/IMG GDN Right 8/21/2018    RIGHT LUMBAR FOUR/FIVE, LUMBAR FIVE/ SACRAL ONE FACET INJECTION AND RIGHT SACROILIAC JOINT INJECTION SITES CONFIRMED BY FLUOROSCOPY performed by Dannielle Levy MD at [address] ARTHROSCOPY Right 08/22/2017    ROTATOR CUFF REPAIR    THORACOSCOPY Right 9/4/2019    RIGHT VIDEO ASSISTED THORACOSCOPIC SURGERY, WEDGE RESECTION AND RIGHT UPPER AND LOWER LOBE NODULES WITH BIOPSY, INTERCOSTAL NERVE BLOCK TIMES SEVEN LEVELS performed by Kristina Piedra MD at [facility]:  Constitutional: Denies unexplained weight loss. Skin-Denies rashes or unhealing wounds  Neuro- Denies dizziness, headache, or seizures. HEENT- Denies vision disturbances, tinnitus, vertigo, sinus congestion, or sore throat  Cardiovascular: Denies chest pain, palpitations, dyspnea, or syncope. Respiratory- Denies SOB, wheezing, hemoptysis, or difficulty breathing. - Denies dysuria or hematuria   GI- Denies abdominal pain, nausea/vomiting, or dysphagia. Musculoskeletal: Denies joint pain  Other- Can climb a flight of stairs or walk up a hill without chest pain or shortness of breath.      Physical Exam:  Vital signs:   Vitals:    07/26/22 1319   BP: 136/78   Site: Left Upper Arm   Position: Sitting   Cuff Size: Medium Adult   Pulse: 78   Temp: 97.5 °F (36.4 °C)   SpO2: 99%   Weight: 255 lb (115.7 kg)     Constitutional: Alert and oriented x 3, no apparent distress  HEENT: PERRL, EOMI, moist mucus membranes  Neck: Supple. Resp: CTA bilaterally, no wheezes or rhonchi  Cardio: RRR without MRG. GI: Soft, nontender, nondistended, BS+  Extremities: No edema  Neurological: Grossly intact. Skin: Warm & dry    Additional testing:   Office Visit on 07/13/2022   Component Date Value Ref Range Status    Hemoglobin A1C 07/13/2022 5.9  % Final   H will do PAT testing  Seeing Bhanu Santos NP for card clearance this week      Assessment:   Diagnosis Orders   1. Primary osteoarthritis of right hip        2. Preop cardiovascular exam        3. Interstitial lung disease ([address]        4. CAD in native artery        5. Acute gout due to other secondary cause involving toe of right foot        6. Essential hypertension            Functional capacity: > 4 METs    Inherent cardiac risk of planned procedure: High (>5%): Intermediate (1-5%); Low (<1%)    Revised Cardiac Index Score:   1 point for each of the following: high-risk surgery, CAD, insulin, CKD/Cr>2, history of stroke/TIA, and CHF. Risk for cardiac complications (ischemia, MI, arrhythmia)    0 points=0.4%, 1 point=0.9%, 2 points=4%, 3+ points=9%. Patient score: 0.9%    Plan:   1. - Risk/benefit analysis favors proceding with the planned operation pending cardiac clearance and acceptable labs to be done through [facility] [place].    2. -Stopping asa today per instructions  Electronically signed by: ROMEO Pickett CNP, 7/26/2022, 2:14 PM

## 2022-07-28 ENCOUNTER — TELEPHONE (OUTPATIENT)
Dept: CARDIOLOGY CLINIC | Age: 62
End: 2022-07-28

## 2022-08-04 NOTE — PROGRESS NOTES
[facility]     Outpatient Cardiology         Patient Name:  Baldo Lewis  Requesting Physician: No admitting provider for patient encounter. Primary Care Physician: ROMEO Hernández CNP    Reason for Consultation/Chief Complaint:   Chief Complaint   Patient presents with    Cardiac Clearance     Hip replacement surgery 8/16/22     History of Present Illness:    RICARDO Kelly a 60 y.o. female with PMH of CAD-PCI to LAD, HTN, HLD, CKD, former smoker. Here to establish care and CV risk assessment for right hip replacement. CAD, asymptomatic, repeat cath in 2020 with patent stent. No symptoms concerning for CAD at this point. Doing okay with aspirin and Crestor. Hypertension, well-controlled on valsartan. No need for medication changes today. From a preop cardiovascular perspective she does not complain of any symptoms concerning for CAD/heart failure, her EKG only shows normal sinus rhythm without ischemic changes. PMH  Past Medical History:   Diagnosis Date    Acid reflux     Bilateral carpal tunnel syndrome 12/21/2017    CAD in native artery 06/19/2018    Mid LAD stented with 3.5 x 15 mm Xience BRIAN.  EF 55%    Chicken pox     Chronic back pain     CKD (chronic kidney disease) 4/15/2016    Depression 12/7/2010    Heart disease     Hypertension     Interstitial lung disease ([address]     Lymphadenopathy, anterior cervical 7/13/2022    Measles     Menopausal symptoms     Morbid obesity with BMI of 45.0-49.9, adult ([address] 1/29/2015    Osteoarthritis     Overactive bladder     Pure hypercholesterolemia     Stress incontinence        PSH  Past Surgical History:   Procedure Laterality Date    BRONCHOSCOPY  6/27/2019    BRONCHOSCOPY ALVEOLAR LAVAGE performed by Mary Morrow MD at [address]  6/27/2019    BRONCHOSCOPY DIAGNOSTIC OR CELL [address] ONLY performed by Mary Morrow MD at [address]  6/27/2019    BRONCHOSCOPY BIOPSY BRONCHUS performed by Larissa Khan MD at [address] [facility] Bilateral 01/29/2018    EPIDURAL STEROID INJECTION N/A 10/15/2019    MIDLINE CAUDAL EPIDURAL STEROID INJECTION AND COCCYGEAL JOINT INJECTION SITES CONFIRMED BY FLUOROSCOPY performed by Shiraz Levy MD at [address]  2009,2010    KNEES BILATERAL    NERVE SURGERY Bilateral 12/22/2020    BILATERAL LUMBAR THREE LUMBAR FOUR LUMBAR FIVE DORSAL RAMUS RADIOFREQUENCY ABLATION SITE CONFIRMED BY FLUOROSCOPY performed by Shiraz Levy MD at [address] Right 3/3/2020    RIGHT LUMBAR FOUR AND LUMBAR FIVE TRANSFORAMINAL EPIDURAL STEROID INJECTION SITE CONFIRMED BY FLUOROSCOPY performed by Shiraz Levy MD at [address] Right 11/2/2020    RIGHT L4 AND L5 TRANSFORAMINAL EPIDURAL STEROID INJECTION WITH FLUOROSCOPY (32445, [id number]) performed by Shiraz Levy MD at [address] Bilateral 11/17/2020    BILATERAL LUMBAR THREE LUMBAR FOUR LUMBAR FIVE DORSAL RAMUS LUMBAR MEDIAL BRANCH BLOCK SITE CONFIRMED BY FLUOROSCOPY performed by Shiraz Levy MD at [address] Bilateral 12/8/2020    BILATERAL LUMBAR THREE LUMBAR FOUR LUMBAR FIVE DORSAL RAMUS LUMBAR MEDIAL BRANCH BLOCK SITE CONFIRMED BY FLUOROSCOPY performed by Shiraz Levy MD at [address] [facility] DX/THER SBST INTRLMNR CRV/THRC W/IMG GDN Right 8/21/2018    RIGHT LUMBAR FOUR/FIVE, LUMBAR FIVE/ SACRAL ONE FACET INJECTION AND RIGHT SACROILIAC JOINT INJECTION SITES CONFIRMED BY FLUOROSCOPY performed by Shiraz Levy MD at [address] [facility] [address] ARTHROSCOPY Right 08/22/2017    ROTATOR CUFF REPAIR    THORACOSCOPY Right 9/4/2019    RIGHT VIDEO ASSISTED THORACOSCOPIC SURGERY, WEDGE RESECTION AND RIGHT UPPER AND LOWER LOBE NODULES WITH BIOPSY, INTERCOSTAL NERVE BLOCK TIMES SEVEN LEVELS performed by Ghazal Arce MD at [address] [facility] [address] TONSILLECTOMY AND ADENOIDECTOMY  1965        Social HIstory  Social History     Tobacco Use    Smoking status: Former     Packs/day: 0.50     Years: 10.00     Pack years: 5.00     Types: Cigarettes     Quit date: 1985     Years since quittin.6    Smokeless tobacco: Never   Vaping Use    Vaping Use: Never used   Substance Use Topics    Alcohol use: Not Currently     Alcohol/week: 1.0 standard drink     Types: 1 Shots of liquor per week     Comment: occ    Drug use: No       Family History  Family History   Problem Relation Age of Onset    High Blood Pressure Mother     Rheum Arthritis Mother     Cancer Father     Hypertension Father     Colon Cancer Father     High Blood Pressure Brother     Stroke Brother     Heart Defect Brother     Hypertension Sister     Rheum Arthritis Sister     Emphysema Sister     Rheum Arthritis Sister     Other Sister         bottom of lungs are getting hard    Hypertension Brother     Rheum Arthritis Brother     Heart Attack Paternal Grandfather     No Known Problems Paternal Grandmother     No Known Problems Maternal Grandmother     No Known Problems Maternal Grandfather     Heart Attack Maternal Aunt     No Known Problems Maternal Aunt     Brain Cancer Maternal Aunt     Other Maternal Aunt         Complications from surgery    Arthritis Maternal Aunt     Arthritis Maternal Aunt     Heart Disease Maternal Aunt     High Blood Pressure Maternal Aunt     Cancer Maternal Uncle     Heart Disease Maternal Uncle     Heart Attack Maternal Uncle     High Blood Pressure Maternal Uncle     Heart Attack Maternal Uncle     High Blood Pressure Maternal Uncle     Parkinsonism Maternal Uncle     Heart Attack Paternal Aunt     Breast Cancer Paternal Aunt     Dementia Paternal Aunt     Heart Attack Paternal Uncle     Heart Attack Paternal Uncle     Brain Cancer Maternal Uncle        Allergies   Allergies   Allergen Reactions    Atorvastatin Other (See Comments)     Multiple muscle spasms/cramps over body    Protonix [Pantoprazole Sodium] Diarrhea    Meloxicam Nausea And Vomiting and Other (See Comments)     Dizziness       Medications:     Home Medications:  Were reviewed and are listed in nursing record. and/or listed below    Prior to Admission medications    Medication Sig Start Date End Date Taking? Authorizing Provider   b complex vitamins capsule Take 1 capsule by mouth in the morning. Yes Historical Provider, MD   traZODone (DESYREL) 50 MG tablet TAKE ONE TABLET BY MOUTH EVERY NIGHT AT BEDTIME  Patient taking differently: Take 50 mg by mouth as needed TAKE ONE TABLET BY MOUTH EVERY NIGHT AT BEDTIME 7/20/22  Yes ROMEO Mercedes CNP   rosuvastatin (CRESTOR) 10 MG tablet TAKE ONE TABLET BY MOUTH DAILY 3/7/22  Yes ROMEO Alonso CNP   aspirin 81 MG chewable tablet CHEW ONE TABLET BY MOUTH DAILY 3/1/22  Yes ROMEO Alonso CNP   citalopram (CELEXA) 40 MG tablet TAKE ONE TABLET BY MOUTH DAILY 2/24/22  Yes ROMEO Mercedes CNP   valsartan (DIOVAN) 160 MG tablet TAKE ONE TABLET BY MOUTH DAILY  Patient taking differently: 320 mg 11/10/21  Yes ROMEO Alonso CNP   famotidine (PEPCID) 40 MG tablet Take 1 tablet by mouth 2 times daily 5/26/21  Yes Willi Lozoya MD   albuterol sulfate  (90 Base) MCG/ACT inhaler INHALE 2 PUFFS INTO THE LUNGS EVERY 4 HOURS AS NEEDED FOR WHEEZING 4/13/20  Yes Anastasiya Ernst MD        Review of Systems   Constitutional:  Negative for activity change, appetite change, diaphoresis, fatigue, fever and unexpected weight change. HENT:  Negative for congestion, facial swelling, mouth sores and nosebleeds. Eyes:  Negative for discharge and visual disturbance. Respiratory:  Negative for cough, chest tightness, shortness of breath and wheezing. Cardiovascular:  Negative for chest pain, palpitations and leg swelling. Gastrointestinal:  Negative for abdominal distention, abdominal pain, blood in stool and vomiting.    Endocrine: Negative for cold intolerance, heat intolerance and polyuria. Genitourinary:  Negative for difficulty urinating, dysuria, frequency and hematuria. Musculoskeletal:  Negative for back pain, joint swelling, myalgias and neck pain. Skin:  Negative for color change, pallor and rash. Allergic/Immunologic: Negative for immunocompromised state. Neurological:  Negative for dizziness, syncope, weakness, light-headedness, numbness and headaches. Hematological:  Negative for adenopathy. Does not bruise/bleed easily. Psychiatric/Behavioral:  Negative for behavioral problems, confusion, decreased concentration and suicidal ideas. The patient is not nervous/anxious. Vitals:    08/08/22 1516   BP: (!) 144/80   Pulse: 73    Weight: 261 lb (118.4 kg)       Vitals:    08/08/22 1516   BP: (!) 144/80   Pulse: 73   Weight: 261 lb (118.4 kg)       BP Readings from Last 3 Encounters:   08/08/22 (!) 144/80   07/26/22 136/78   07/13/22 124/78       Wt Readings from Last 3 Encounters:   08/08/22 261 lb (118.4 kg)   07/26/22 255 lb (115.7 kg)   07/13/22 253 lb (114.8 kg)       Physical Exam  Constitutional:       General: She is not in acute distress. Appearance: She is well-developed. She is not diaphoretic. HENT:      Head: Normocephalic and atraumatic. Eyes:      Pupils: Pupils are equal, round, and reactive to light. Neck:      Thyroid: No thyromegaly. Vascular: No JVD. Cardiovascular:      Rate and Rhythm: Normal rate and regular rhythm. Chest Wall: PMI is not displaced. Heart sounds: Normal heart sounds, S1 normal and S2 normal. No murmur heard. No friction rub. No gallop. Pulmonary:      Effort: Pulmonary effort is normal. No respiratory distress. Breath sounds: Normal breath sounds. No stridor. No wheezing or rales. Chest:      Chest wall: No tenderness. Abdominal:      General: Bowel sounds are normal. There is no distension. Palpations: Abdomen is soft. Tenderness: There is no abdominal tenderness. There is no guarding or rebound. Musculoskeletal:         General: No tenderness. Normal range of motion. Cervical back: Normal range of motion. Lymphadenopathy:      Cervical: No cervical adenopathy. Skin:     General: Skin is warm and dry. Findings: No erythema or rash. Neurological:      Mental Status: She is alert and oriented to person, place, and time. Coordination: Coordination normal.   Psychiatric:         Behavior: Behavior normal.         Thought Content:  Thought content normal.         Judgment: Judgment normal.       Labs:       Lab Results   Component Value Date    WBC 7.0 06/03/2022    HGB 13.4 06/03/2022    HCT 39.1 06/03/2022    MCV 88.9 06/03/2022     06/03/2022     Lab Results   Component Value Date     06/03/2022    K 3.8 06/03/2022     06/03/2022    CO2 22 06/03/2022    BUN 31 (H) 06/03/2022    CREATININE 1.4 (H) 06/03/2022    GLUCOSE 112 (H) 06/03/2022    CALCIUM 9.1 06/03/2022    PROT 6.3 (L) 06/03/2022    LABALBU 3.0 (L) 06/03/2022    BILITOT 0.5 05/10/2022    ALKPHOS 76 05/10/2022    AST 15 05/10/2022    ALT 22 05/10/2022    LABGLOM 38 (A) 06/03/2022    GFRAA 46 (A) 06/03/2022    AGRATIO 1.6 05/10/2022    GLOB 3.4 12/23/2020         Lab Results   Component Value Date    CHOL 158 05/10/2022    CHOL 276 (H) 07/28/2020    CHOL 181 11/19/2019     Lab Results   Component Value Date    TRIG 170 (H) 05/10/2022    TRIG 348 (H) 07/28/2020    TRIG 265 (H) 11/19/2019     Lab Results   Component Value Date    HDL 47 05/10/2022    HDL 41 07/28/2020    HDL 51 11/19/2019     Lab Results   Component Value Date    LDLCALC 77 05/10/2022    LDLCALC see below 07/28/2020    LDLCALC 77 11/19/2019     Lab Results   Component Value Date    LABVLDL 34 05/10/2022    LABVLDL see below 07/28/2020    LABVLDL 53 11/19/2019     No results found for: [facility]    Lab Results   Component Value Date    INR 1.20 (H) 08/13/2020    INR 1.02 08/23/2019    INR 1.02 06/19/2018    PROTIME 11.6 08/23/2019    PROTIME 11.6 06/19/2018       The 10-year ASCVD risk score (Frank Ramirez, et al., 2013) is: 7.1%    Values used to calculate the score:      Age: 60 years      Sex: Female      Is Non- : No      Diabetic: No      Tobacco smoker: No      Systolic Blood Pressure: 588 mmHg      Is BP treated: Yes      HDL Cholesterol: 47 mg/dL      Total Cholesterol: 158 mg/dL      Imaging:       Last ECG (if available, Personally interpreted):  Normal sinus rhythm, no ischemic changes    Last Monitor/Holter (if available):    Last Stress (if available): 3/14/22  Summary    Overall findings represent a intermediate to high risk scan. Normal LV size and systolic function. There is a moderate size reversible defect involving the basal and mid    anterior segments. Findings are concerning for ischemia in the LAD territory. Non-diagnostic EKG response due to failure to reach target heart rate. Last Cath (if available): 8/13/20  Angiographic Findings:  Left dominant system  Left Main:  Normal     Left Anterior Descending:  Widely patent stent. Mild irregular plaque in the mid LAD. Circumflex:  No significant plaque     Right Coronary:  No significant plaque     Left Ventriculogram:  Not performed due to elevated Cr     Hemodynamics (mm Hg):  Left Ventricular Pressure:  148/4, 25  Central Aortic Pressure:  135/64 (94)     Conclusions:  -Widely patent mid LAD stent  -Elevated LVEDP  -No other significant obstructive CAD    6/19/18  Angiographic Findings:  Left Main:  Normal     Left Anterior Descending:  Mid 60-70% hazy stenosis. iFR was 0.87. Circumflex:  Dominant. No obstructive plaque     Right Coronary:  Non-dominant. No obstructive plaque     Left Ventriculogram:  LVEF 50-55%. Intervention:  Anticoagulation with Heparin and aggrastat was used (see nursing notes for details).   A 6 Fr EBU 3.0 guide catheter was then advanced over the wire to the ascending aorta. The wire was removed, and the left main coronary artery was engaged. A Volcano wire was then advanced to the distal LAD. After iFR, we stented the LAD. Mid LAD:  Direct Stent: 3.5 x 15 mm Xience BRIAN to 10 RUBY     Post stent, the first large diagonal had a 95-99% stenosis. We performed kissing balloon inflations on the ostial diag (through the stent struts) and the LAD using a 2.0 x 12 mm balloon in the diagonal and the 3.5 x 15 mm stent balloon in the LAD. Inflations taken to 10 RUBY each. 70-80% stenosis remained, but there was MARYLU 3 flow in both arteries. Hemodynamics (mm Hg):  Left Ventricular Pressure:  142/6, 20  Central Aortic Pressure:  142/68 (96)     Conclusions:  -Severe single vessel CAD; LAD stented with a 3.5 x 15 mm Xiecne BRIAN to 10 RUBY. A \"pinched\" diag was treated with a 2.0 x 12 mm balloon.  -Elevated LVEDP  -Systemic HTN  -Normal LVEF    Last TTE/EDUARDO(if available): 315/22  Summary   Normal left ventricle size, wall thickness, and systolic function with an   estimated ejection fraction of 55-60%. No regional wall motion abnormalities are seen. Normal diastolic function. Trivial mitral regurgitation. Trivial aortic regurgitation. Trivial tricuspid regurgitation. The pulmonic valve is not well visualized. Estimated pulmonary artery systolic pressure is at 24 mmHg assuming a right   atrial pressure of 3 mmHg. Last CMR  (if available):    Last Coronary Artery Calcium Score: Ankle-brachial index:    Carotid ultrasound screening:    Abdominal aortic aneurysm screening:       Assessment / Plan:     CAD in native artery  Asymptomatic and stable. Continue aspirin and Crestor  Feeling well    Pure hypercholesterolemia  Continue Crestor. No side effects    Essential hypertension  Controlled on Valsartan  Continue current dose.     Preop cardiovascular exam.  Okay to proceed with planned hip surgery, no need for further work-up prior to surgery. Follow up in 12 months. I had the opportunity to review the clinical symptoms and presentation of Sai Leal. Patient's allergies and medications were reviewed and updated. Patient's past medical, surgical, social and family history were reviewed and updated. Patient's testing including laboratory, ECGs, monitor, imaging (TTE,DERIK,CMR,cath) were reviewed. Tobacco use was discussed with the patient and educated on the negative effects. I have asked the patient to not utilize these agents. All questions and concerns were addressed to the patient/family. Alternatives to my treatment were discussed. The note was completed using EMR. Every effort wasmade to ensure accuracy; however, inadvertent computerized transcription errors may be present. Thank you for allowing [facility] to [facility] or [address] [place] [address].  Vero Nagy MD, [facility], [facility] [address]

## 2022-08-05 ENCOUNTER — HOSPITAL ENCOUNTER (OUTPATIENT)
Dept: MAMMOGRAPHY | Age: 62
Discharge: HOME | End: 2022-08-05
Payer: COMMERCIAL

## 2022-08-05 DIAGNOSIS — Z12.31 ENCOUNTER FOR SCREENING MAMMOGRAM FOR MALIGNANT NEOPLASM OF BREAST: ICD-10-CM

## 2022-08-05 DIAGNOSIS — E11.8 CONTROLLED DIABETES MELLITUS TYPE 2 WITH COMPLICATIONS, UNSPECIFIED WHETHER LONG TERM INSULIN USE (HCC): ICD-10-CM

## 2022-08-05 PROCEDURE — 77063 BREAST TOMOSYNTHESIS BI: CPT

## 2022-08-05 RX ORDER — SEMAGLUTIDE 1.34 MG/ML
INJECTION, SOLUTION SUBCUTANEOUS
Qty: 4 PEN | Refills: 5 | Status: SHIPPED
Start: 2022-08-05 | End: 2022-08-08 | Stop reason: CLARIF

## 2022-08-08 ENCOUNTER — OFFICE VISIT (OUTPATIENT)
Dept: CARDIOLOGY CLINIC | Age: 62
End: 2022-08-08
Payer: COMMERCIAL

## 2022-08-08 VITALS
SYSTOLIC BLOOD PRESSURE: 144 MMHG | WEIGHT: 261 LBS | BODY MASS INDEX: 46.23 KG/M2 | HEART RATE: 73 BPM | DIASTOLIC BLOOD PRESSURE: 80 MMHG

## 2022-08-08 DIAGNOSIS — I10 ESSENTIAL HYPERTENSION: Primary | ICD-10-CM

## 2022-08-08 DIAGNOSIS — I25.10 CAD IN NATIVE ARTERY: ICD-10-CM

## 2022-08-08 DIAGNOSIS — E78.00 PURE HYPERCHOLESTEROLEMIA: ICD-10-CM

## 2022-08-08 PROCEDURE — G8417 CALC BMI ABV UP PARAM F/U: HCPCS | Performed by: INTERNAL MEDICINE

## 2022-08-08 PROCEDURE — 1036F TOBACCO NON-USER: CPT | Performed by: INTERNAL MEDICINE

## 2022-08-08 PROCEDURE — 3017F COLORECTAL CA SCREEN DOC REV: CPT | Performed by: INTERNAL MEDICINE

## 2022-08-08 PROCEDURE — 93000 ELECTROCARDIOGRAM COMPLETE: CPT | Performed by: INTERNAL MEDICINE

## 2022-08-08 PROCEDURE — G8428 CUR MEDS NOT DOCUMENT: HCPCS | Performed by: INTERNAL MEDICINE

## 2022-08-08 PROCEDURE — 99214 OFFICE O/P EST MOD 30 MIN: CPT | Performed by: INTERNAL MEDICINE

## 2022-08-08 ASSESSMENT — ENCOUNTER SYMPTOMS
ABDOMINAL DISTENTION: 0
BACK PAIN: 0
SHORTNESS OF BREATH: 0
COLOR CHANGE: 0
VOMITING: 0
CHEST TIGHTNESS: 0
COUGH: 0
EYE DISCHARGE: 0
ABDOMINAL PAIN: 0
FACIAL SWELLING: 0
BLOOD IN STOOL: 0
WHEEZING: 0

## 2022-10-26 ENCOUNTER — OFFICE VISIT (OUTPATIENT)
Dept: FAMILY MEDICINE CLINIC | Age: 62
End: 2022-10-26
Payer: COMMERCIAL

## 2022-10-26 VITALS
HEART RATE: 89 BPM | HEIGHT: 63 IN | BODY MASS INDEX: 46.25 KG/M2 | SYSTOLIC BLOOD PRESSURE: 128 MMHG | TEMPERATURE: 97.1 F | OXYGEN SATURATION: 98 % | WEIGHT: 261 LBS | DIASTOLIC BLOOD PRESSURE: 84 MMHG

## 2022-10-26 DIAGNOSIS — M25.50 ARTHRALGIA, UNSPECIFIED JOINT: ICD-10-CM

## 2022-10-26 DIAGNOSIS — M79.89 LEG SWELLING: Primary | ICD-10-CM

## 2022-10-26 DIAGNOSIS — I10 ESSENTIAL HYPERTENSION: ICD-10-CM

## 2022-10-26 PROCEDURE — G8484 FLU IMMUNIZE NO ADMIN: HCPCS | Performed by: NURSE PRACTITIONER

## 2022-10-26 PROCEDURE — 3074F SYST BP LT 130 MM HG: CPT | Performed by: NURSE PRACTITIONER

## 2022-10-26 PROCEDURE — 1036F TOBACCO NON-USER: CPT | Performed by: NURSE PRACTITIONER

## 2022-10-26 PROCEDURE — 3078F DIAST BP <80 MM HG: CPT | Performed by: NURSE PRACTITIONER

## 2022-10-26 PROCEDURE — 3017F COLORECTAL CA SCREEN DOC REV: CPT | Performed by: NURSE PRACTITIONER

## 2022-10-26 PROCEDURE — G8417 CALC BMI ABV UP PARAM F/U: HCPCS | Performed by: NURSE PRACTITIONER

## 2022-10-26 PROCEDURE — 99214 OFFICE O/P EST MOD 30 MIN: CPT | Performed by: NURSE PRACTITIONER

## 2022-10-26 PROCEDURE — G8427 DOCREV CUR MEDS BY ELIG CLIN: HCPCS | Performed by: NURSE PRACTITIONER

## 2022-10-26 NOTE — ASSESSMENT & PLAN NOTE
Suspect SVT  Low suspicion for DVT but given travel history and inflammatory diseases will get US doppler

## 2022-10-26 NOTE — PROGRESS NOTES
Kaci De Leon (:  1960) is a 60 y.o. female,Established patient, here for evaluation of the following chief complaint(s): Other (Pain in right ankle)      ASSESSMENT/PLAN:  1. Leg swelling  Assessment & Plan:  Suspect SVT  Low suspicion for DVT but given travel history and inflammatory diseases will get US doppler  Orders:  -     VL DUP LOWER EXTREMITY VENOUS RIGHT; Future  2. Arthralgia, unspecified joint  Assessment & Plan:   ? RA- known gout. Will have f/u with Rheumatology  Orders:  -     Courtney Lucas MD, Rheumatology, [place]  3. Essential hypertension  Assessment & Plan:   Well-controlled, continue current medications    No follow-ups on file. SUBJECTIVE/OBJECTIVE:  Noted painful lump to rt lower inner ankle since 10/18 following an extended car trip. No leg swelling  Walked lots of times during that car ride  No h/o DVT  No complaints of SOB, no known injury  Additionally having worsening arthragias. All joints ache all thetime. No relief with Mobic. Has seen rheum in past for gout virtually but never had a full lab work up.     Current Outpatient Medications   Medication Sig Dispense Refill    traZODone (DESYREL) 50 MG tablet TAKE ONE TABLET BY MOUTH EVERY NIGHT AT BEDTIME (Patient taking differently: Take 50 mg by mouth as needed TAKE ONE TABLET BY MOUTH EVERY NIGHT AT BEDTIME) 90 tablet 2    rosuvastatin (CRESTOR) 10 MG tablet TAKE ONE TABLET BY MOUTH DAILY 90 tablet 3    aspirin 81 MG chewable tablet CHEW ONE TABLET BY MOUTH DAILY 90 tablet 3    citalopram (CELEXA) 40 MG tablet TAKE ONE TABLET BY MOUTH DAILY 90 tablet 3    valsartan (DIOVAN) 160 MG tablet TAKE ONE TABLET BY MOUTH DAILY (Patient taking differently: 320 mg) 90 tablet 3    famotidine (PEPCID) 40 MG tablet Take 1 tablet by mouth 2 times daily 60 tablet 5    albuterol sulfate  (90 Base) MCG/ACT inhaler INHALE 2 PUFFS INTO THE LUNGS EVERY 4 HOURS AS NEEDED FOR WHEEZING 42.5 g 1     No current facility-administered medications for this visit. Review of Systems   All other systems reviewed and are negative. Vitals:    10/26/22 1126   BP: 128/84   Site: Left Upper Arm   Position: Sitting   Cuff Size: Large Adult   Pulse: 89   Temp: 97.1 °F (36.2 °C)   SpO2: 98%   Weight: 261 lb (118.4 kg)   Height: 5' 3\" (1.6 m)       Physical Exam  Constitutional:       Appearance: Normal appearance. She is well-developed and normal weight. HENT:      Head: Normocephalic. Right Ear: Tympanic membrane normal.      Left Ear: Tympanic membrane normal.      Mouth/Throat:      Mouth: Mucous membranes are moist.   Eyes:      Pupils: Pupils are equal, round, and reactive to light. Cardiovascular:      Rate and Rhythm: Normal rate and regular rhythm. Pulmonary:      Effort: Pulmonary effort is normal.   Musculoskeletal:         General: Normal range of motion. Cervical back: Normal range of motion. Skin:     General: Skin is warm and dry. Comments: 1\" pink tender palpable mass noted to left medial superior ankle  Negative homans  No calf swelling   Neurological:      Mental Status: She is alert and oriented to person, place, and time. An electronic signature was used to authenticate this note.     --Wisam Yeh, APRN - CNP

## 2022-10-31 ENCOUNTER — HOSPITAL ENCOUNTER (OUTPATIENT)
Dept: VASCULAR LAB | Age: 62
Discharge: HOME | End: 2022-10-31
Payer: COMMERCIAL

## 2022-10-31 DIAGNOSIS — M79.89 LEG SWELLING: ICD-10-CM

## 2022-10-31 PROCEDURE — 93971 EXTREMITY STUDY: CPT

## 2022-11-01 NOTE — PROGRESS NOTES
.    An electronic signature was used to authenticate this note. [address] [place], [place] [address], [place]                                                           [address] [place] [address] Av                                                             [phone] (Z) [phone] (F)      Dear Dr. Balbir Patel, APRN - CNP:  Please find Rheumatology assessment. Thank you for giving me the opportunity to be involved in Enmanuel Islas's care and I look forward following Enmanuel Amaya along with you. If you have any questions or concerns please feel free to reach me. Note is transcribed using voice recognition software. Inadvertent computerized transcription errors may be present. Patient identification: Ac Islas,: ,80 y.o. Sex: female     Enmanuel Amaya was seen today for establish care and joint pain. Diagnoses and all orders for this visit:    Polyarthralgia  -     C-Reactive Protein; Future  -     Sedimentation Rate; Future  -     Rheumatoid Factor; Future  -     Cyclic Citrul Peptide Antibody, IgG; Future    Polyarticular gout    Worsening generalized musculoskeletal discomfort-mostly in her neck, shoulders, arms, lower back, hips and lower extremity joints. No focal findings on examination other than subjective myalgias and arthralgias. Differential-noninflammatory pain from osteoarthritis/fibromyalgia versus PMR. Check lab work as above. If she does have elevated inflammatory markers, will plan for low-dose prednisone otherwise we will plan for Cymbalta. Addendum: Inflammatory markers are elevated, start prednisone taper. 2.  Polyarticular gout-no recent flares-off of allopurinol-self discontinued.   Trying vegetarian diet does seem to help with her gout per patient. Colchicine-vomiting. RTC 4 weeks. Patient indicates understanding and agrees with the management plan. #######################################################################    [id number] for worsening musculoskeletal pain. Other medical comorbidities coronary artery disease, hypertension, hyperlipidemia and interstitial lung disease CKD. Interval changes-  Last seen March 2021 for polyarticular gout and osteoarthritis returns for follow-up. States that she was doing fairly well up until 3 months ago started noticing generalized pain all over, got worse over time to the point that it is affecting her ADLs. Patient describes pain is all over her head to toe-worse in her lower back, hips, thighs, and also gets stiffness in her neck and shoulder blade area. Does not have much symptoms in small or intermediate joints. Symptoms are 24/7, most worse in the morning, and in the evening, tramadol, Tylenol arthritis is not helping. She does have generalized osteoarthritis and degenerative disc disease, follows with Dr. Vaughn Bui for back pain. She denies any history that would suggest temporal arteritis. No psoriasis, inflammatory bowel diseases.       Current Outpatient Medications   Medication Sig Dispense Refill    traZODone (DESYREL) 50 MG tablet TAKE ONE TABLET BY MOUTH EVERY NIGHT AT BEDTIME (Patient taking differently: Take 50 mg by mouth as needed TAKE ONE TABLET BY MOUTH EVERY NIGHT AT BEDTIME) 90 tablet 2    rosuvastatin (CRESTOR) 10 MG tablet TAKE ONE TABLET BY MOUTH DAILY 90 tablet 3    aspirin 81 MG chewable tablet CHEW ONE TABLET BY MOUTH DAILY 90 tablet 3    citalopram (CELEXA) 40 MG tablet TAKE ONE TABLET BY MOUTH DAILY 90 tablet 3    valsartan (DIOVAN) 160 MG tablet TAKE ONE TABLET BY MOUTH DAILY (Patient taking differently: 320 mg) 90 tablet 3    famotidine (PEPCID) 40 MG tablet Take 1 tablet by mouth 2 times daily 60 tablet 5    albuterol sulfate  (90 Base) MCG/ACT inhaler INHALE 2 PUFFS INTO THE LUNGS EVERY 4 HOURS AS NEEDED FOR WHEEZING 42.5 g 1     No current facility-administered medications for this visit. Allergies   Allergen Reactions    Atorvastatin Other (See Comments)     Multiple muscle spasms/cramps over body    Protonix [Pantoprazole Sodium] Diarrhea       PHYSICAL EXAM:    Vitals:    /80   Ht 5' 3\" (1.6 m)   Wt 261 lb (118.4 kg)   LMP 07/13/2011   BMI 46.23 kg/m²   General appearance/ Psychiatric: well nourished, and well groomed, normal judgement, alert, appears stated age and cooperative. MKS:   Other than subjective myalgias in her arms, shoulder blade area, neck paraspinals, lower back, or hips: No appreciable tender, swollen, inflamed joints in upper or lower extremities. Range of motion in her shoulders are full, associated with discomfort in the surrounding muscles. Hip range of motion are limited about 50%. She walks with wide-based gait. No rashes. Normal temporal arteries without any tenderness or tortuosity.       DATA:   Lab Results   Component Value Date    WBC 7.0 06/03/2022    HGB 13.4 06/03/2022    HCT 39.1 06/03/2022    MCV 88.9 06/03/2022     06/03/2022         Chemistry        Component Value Date/Time     06/03/2022 1123    K 3.8 06/03/2022 1123    K 3.7 01/30/2022 1457     06/03/2022 1123    CO2 22 06/03/2022 1123    BUN 31 (H) 06/03/2022 1123    CREATININE 1.4 (H) 06/03/2022 1123        Component Value Date/Time    CALCIUM 9.1 06/03/2022 1123    ALKPHOS 76 05/10/2022 1410    AST 15 05/10/2022 1410    ALT 22 05/10/2022 1410    BILITOT 0.5 05/10/2022 1410          Lab Results   Component Value Date    LABURIC 4.6 12/23/2020       Lab Results   Component Value Date    CRP 27.2 (H) 03/30/2020     Lab Results   Component Value Date    SAMMY POSITIVE (A) 09/18/2019    SEDRATE 37 (H) 03/30/2020     Lab Results   Component Value Date    CKTOTAL 53 11/19/2019     Lab Results   Component Value Date    TSH 1.86 04/25/2018     Lab Results   Component Value Date    VITD25 59.1 05/10/2022         Total time >40 minutes- reviewing medical records & pre charting on the same day of visit,  history taking, exam, explaining medical diagnosis, management plan, ordering labs, filling medications, communicating findings with other providers and medical documentation in EHR.

## 2022-11-03 ENCOUNTER — OFFICE VISIT (OUTPATIENT)
Dept: RHEUMATOLOGY | Age: 62
End: 2022-11-03
Payer: COMMERCIAL

## 2022-11-03 VITALS
HEIGHT: 63 IN | WEIGHT: 261 LBS | DIASTOLIC BLOOD PRESSURE: 80 MMHG | SYSTOLIC BLOOD PRESSURE: 132 MMHG | BODY MASS INDEX: 46.25 KG/M2

## 2022-11-03 DIAGNOSIS — M25.50 POLYARTHRALGIA: ICD-10-CM

## 2022-11-03 DIAGNOSIS — M10.9 POLYARTICULAR GOUT: ICD-10-CM

## 2022-11-03 DIAGNOSIS — M25.50 POLYARTHRALGIA: Primary | ICD-10-CM

## 2022-11-03 LAB
C-REACTIVE PROTEIN: 12.9 MG/L (ref 0–5.1)
RHEUMATOID FACTOR: <10 IU/ML
SEDIMENTATION RATE, ERYTHROCYTE: 32 MM/HR (ref 0–30)

## 2022-11-03 PROCEDURE — G8427 DOCREV CUR MEDS BY ELIG CLIN: HCPCS | Performed by: INTERNAL MEDICINE

## 2022-11-03 PROCEDURE — 1036F TOBACCO NON-USER: CPT | Performed by: INTERNAL MEDICINE

## 2022-11-03 PROCEDURE — 3017F COLORECTAL CA SCREEN DOC REV: CPT | Performed by: INTERNAL MEDICINE

## 2022-11-03 PROCEDURE — 3078F DIAST BP <80 MM HG: CPT | Performed by: INTERNAL MEDICINE

## 2022-11-03 PROCEDURE — G8484 FLU IMMUNIZE NO ADMIN: HCPCS | Performed by: INTERNAL MEDICINE

## 2022-11-03 PROCEDURE — 3074F SYST BP LT 130 MM HG: CPT | Performed by: INTERNAL MEDICINE

## 2022-11-03 PROCEDURE — G8417 CALC BMI ABV UP PARAM F/U: HCPCS | Performed by: INTERNAL MEDICINE

## 2022-11-03 PROCEDURE — 99214 OFFICE O/P EST MOD 30 MIN: CPT | Performed by: INTERNAL MEDICINE

## 2022-11-04 LAB — CYCLIC CITRULLINATED PEPTIDE ANTIBODY IGG: <0.5 U/ML (ref 0–2.9)

## 2022-11-04 RX ORDER — PREDNISONE 10 MG/1
TABLET ORAL
Qty: 50 TABLET | Refills: 0 | Status: SHIPPED | OUTPATIENT
Start: 2022-11-04

## 2022-12-01 DIAGNOSIS — R59.0 LYMPHADENOPATHY, ANTERIOR CERVICAL: ICD-10-CM

## 2022-12-01 RX ORDER — ROSUVASTATIN CALCIUM 10 MG/1
TABLET, COATED ORAL
Qty: 90 TABLET | Refills: 3 | Status: SHIPPED | OUTPATIENT
Start: 2022-12-01

## 2022-12-05 ENCOUNTER — HOSPITAL ENCOUNTER (OUTPATIENT)
Dept: CT IMAGING | Age: 62
Discharge: HOME | End: 2022-12-05
Payer: COMMERCIAL

## 2022-12-05 ENCOUNTER — TELEPHONE (OUTPATIENT)
Dept: PULMONOLOGY | Age: 62
End: 2022-12-05

## 2022-12-05 ENCOUNTER — OFFICE VISIT (OUTPATIENT)
Dept: FAMILY MEDICINE CLINIC | Age: 62
End: 2022-12-05
Payer: COMMERCIAL

## 2022-12-05 VITALS
SYSTOLIC BLOOD PRESSURE: 134 MMHG | WEIGHT: 256 LBS | DIASTOLIC BLOOD PRESSURE: 86 MMHG | OXYGEN SATURATION: 94 % | BODY MASS INDEX: 45.35 KG/M2 | HEART RATE: 54 BPM | TEMPERATURE: 97.2 F

## 2022-12-05 DIAGNOSIS — N18.32 STAGE 3B CHRONIC KIDNEY DISEASE (HCC): ICD-10-CM

## 2022-12-05 DIAGNOSIS — Z09 HOSPITAL DISCHARGE FOLLOW-UP: ICD-10-CM

## 2022-12-05 DIAGNOSIS — I26.99 OTHER ACUTE PULMONARY EMBOLISM, UNSPECIFIED WHETHER ACUTE COR PULMONALE PRESENT (HCC): Primary | ICD-10-CM

## 2022-12-05 DIAGNOSIS — I26.99 OTHER ACUTE PULMONARY EMBOLISM, UNSPECIFIED WHETHER ACUTE COR PULMONALE PRESENT (HCC): ICD-10-CM

## 2022-12-05 DIAGNOSIS — N30.01 ACUTE CYSTITIS WITH HEMATURIA: ICD-10-CM

## 2022-12-05 PROBLEM — R59.0 LYMPHADENOPATHY, ANTERIOR CERVICAL: Status: RESOLVED | Noted: 2022-07-13 | Resolved: 2022-12-05

## 2022-12-05 PROBLEM — M79.89 LEG SWELLING: Status: RESOLVED | Noted: 2022-10-26 | Resolved: 2022-12-05

## 2022-12-05 LAB
BILIRUBIN, POC: ABNORMAL
BLOOD URINE, POC: ABNORMAL
CLARITY, POC: ABNORMAL
COLOR, POC: YELLOW
GFR SERPL CREATININE-BSD FRML MDRD: 51 ML/MIN/{1.73_M2}
GLUCOSE URINE, POC: ABNORMAL
KETONES, POC: ABNORMAL
LEUKOCYTE EST, POC: ABNORMAL
NITRITE, POC: ABNORMAL
PERFORMED ON: ABNORMAL
PH, POC: 5.5
POC CREATININE: 1.2 MG/DL (ref 0.6–1.2)
POC SAMPLE TYPE: ABNORMAL
PROTEIN, POC: ABNORMAL
SPECIFIC GRAVITY, POC: 1.03
UROBILINOGEN, POC: 0.2

## 2022-12-05 PROCEDURE — G8484 FLU IMMUNIZE NO ADMIN: HCPCS | Performed by: NURSE PRACTITIONER

## 2022-12-05 PROCEDURE — 82565 ASSAY OF CREATININE: CPT

## 2022-12-05 PROCEDURE — 71260 CT THORAX DX C+: CPT | Performed by: NURSE PRACTITIONER

## 2022-12-05 PROCEDURE — 3017F COLORECTAL CA SCREEN DOC REV: CPT | Performed by: NURSE PRACTITIONER

## 2022-12-05 PROCEDURE — 99215 OFFICE O/P EST HI 40 MIN: CPT | Performed by: NURSE PRACTITIONER

## 2022-12-05 PROCEDURE — 3078F DIAST BP <80 MM HG: CPT | Performed by: NURSE PRACTITIONER

## 2022-12-05 PROCEDURE — 6360000004 HC RX CONTRAST MEDICATION: Performed by: NURSE PRACTITIONER

## 2022-12-05 PROCEDURE — 1111F DSCHRG MED/CURRENT MED MERGE: CPT | Performed by: NURSE PRACTITIONER

## 2022-12-05 PROCEDURE — G8427 DOCREV CUR MEDS BY ELIG CLIN: HCPCS | Performed by: NURSE PRACTITIONER

## 2022-12-05 PROCEDURE — G8417 CALC BMI ABV UP PARAM F/U: HCPCS | Performed by: NURSE PRACTITIONER

## 2022-12-05 PROCEDURE — 3074F SYST BP LT 130 MM HG: CPT | Performed by: NURSE PRACTITIONER

## 2022-12-05 PROCEDURE — 81002 URINALYSIS NONAUTO W/O SCOPE: CPT | Performed by: NURSE PRACTITIONER

## 2022-12-05 PROCEDURE — 1036F TOBACCO NON-USER: CPT | Performed by: NURSE PRACTITIONER

## 2022-12-05 RX ORDER — NITROFURANTOIN 25; 75 MG/1; MG/1
100 CAPSULE ORAL 2 TIMES DAILY
COMMUNITY

## 2022-12-05 RX ORDER — ROSUVASTATIN CALCIUM 40 MG/1
40 TABLET, COATED ORAL EVERY EVENING
COMMUNITY
End: 2022-12-05 | Stop reason: SDUPTHER

## 2022-12-05 RX ORDER — ROSUVASTATIN CALCIUM 40 MG/1
40 TABLET, COATED ORAL EVERY EVENING
Qty: 90 TABLET | Refills: 2 | Status: SHIPPED | OUTPATIENT
Start: 2022-12-05

## 2022-12-05 RX ADMIN — IOPAMIDOL 75 ML: 755 INJECTION, SOLUTION INTRAVENOUS at 12:09

## 2022-12-05 ASSESSMENT — ENCOUNTER SYMPTOMS
BACK PAIN: 1
COUGH: 1
SHORTNESS OF BREATH: 1
CHEST TIGHTNESS: 1

## 2022-12-05 NOTE — TELEPHONE ENCOUNTER
Patient calls complaining of sob. She was admitted to [facility] with sepsis on11/26/2022 but since she was dc'd she has become more short of breath. She would like to see Dr Sykes Smiling in the office or asking if he could call her. I have asked for images to be pushed over from [facility] radiology. Please advise.  Thank you

## 2022-12-05 NOTE — PROGRESS NOTES
Andre Bergeron (:  1960) is a 60 y.o. female,Established patient, here for evaluation of the following chief complaint(s):  Follow-Up from Hospital      ASSESSMENT/PLAN:  1. Other acute pulmonary embolism, unspecified whether acute cor pul[facility]ale present ([facility]  -     Basic Metabolic Panel; Future  -     CT CHEST PULMONARY EMBOLISM W CONTRAST; Future  2. Acute cystitis with hematuria  Assessment & Plan:  POC urine dip negative in office. Will send for culture. Orders:  -     Urinalysis with Reflex to Culture  3. Hospital discharge follow-up  -     DC DISCHARGE MEDS RECONCILED W/ CURRENT OUTPATIENT MED LIST  4. Stage 3b chronic kidney disease ([facility]  Assessment & Plan:  Sees nephrology, follow-up labs today      Return in 1 month (on 2023). SUBJECTIVE/OBJECTIVE:  Candice Ortiz presents today for follow-up on her hospitalization. She was admitted to [facility] 2022 for urinary sepsis. She was admitted following a syncopal episode/chemical fall at home. She was found by family members. At that time she complained of a fever, sore throat, weakness. Head CT was negative, cardiac testing negative for MI or cardiac event,  chest CT which was inconclusive for PE. Indicated a small focal hypoattenuation at the bifurcation of descending right lower lobe pulmonary artery. Small pulmonary emboli was not entirely excluded. Subtle patchy groundglass opacities were noted. Since discharge she has continued to have difficulty breathing with some diffuse pain in her posterior mid back. She denies any dysuria or difficulty urinating however was not sent home with follow-up Macrobid as the chart indicates.     Current Outpatient Medications   Medication Sig Dispense Refill    rosuvastatin (CRESTOR) 40 MG tablet Take 1 tablet by mouth every evening 90 tablet 2    nitrofurantoin, macrocrystal-monohydrate, (MACROBID) 100 MG capsule Take 100 mg by mouth 2 times daily      predniSONE (DELTASONE) 10 MG tablet 2 tab po daily x 7 days. 1.5 tab po daily x 7 days. 1 tab po daily x 7 days. 1/2 tab thereafter. 50 tablet 0    traZODone (DESYREL) 50 MG tablet TAKE ONE TABLET BY MOUTH EVERY NIGHT AT BEDTIME (Patient taking differently: Take 50 mg by mouth as needed TAKE ONE TABLET BY MOUTH EVERY NIGHT AT BEDTIME) 90 tablet 2    aspirin 81 MG chewable tablet CHEW ONE TABLET BY MOUTH DAILY 90 tablet 3    citalopram (CELEXA) 40 MG tablet TAKE ONE TABLET BY MOUTH DAILY 90 tablet 3    famotidine (PEPCID) 40 MG tablet Take 1 tablet by mouth 2 times daily 60 tablet 5    albuterol sulfate  (90 Base) MCG/ACT inhaler INHALE 2 PUFFS INTO THE LUNGS EVERY 4 HOURS AS NEEDED FOR WHEEZING 42.5 g 1     No current facility-administered medications for this visit. Review of Systems   Respiratory:  Positive for cough, chest tightness and shortness of breath. Musculoskeletal:  Positive for back pain and myalgias. All other systems reviewed and are negative. Vitals:    12/05/22 0947   BP: 134/86   Site: Left Upper Arm   Position: Sitting   Cuff Size: Large Adult   Pulse: 54   Temp: 97.2 °F (36.2 °C)   SpO2: 94%   Weight: 256 lb (116.1 kg)       Physical Exam  Constitutional:       Appearance: Normal appearance. She is well-developed. She is obese. HENT:      Head: Normocephalic. Mouth/Throat:      Mouth: Mucous membranes are moist.   Eyes:      Pupils: Pupils are equal, round, and reactive to light. Cardiovascular:      Rate and Rhythm: Normal rate and regular rhythm. Heart sounds: Normal heart sounds. Pulmonary:      Effort: Pulmonary effort is normal.      Breath sounds: Normal breath sounds. Musculoskeletal:         General: Normal range of motion. Cervical back: Normal range of motion. Skin:     General: Skin is warm and dry. Neurological:      Mental Status: She is alert and oriented to person, place, and time. An electronic signature was used to authenticate this note.     --Lana Muller Isi Estrella CNP

## 2022-12-06 LAB — URINE CULTURE, ROUTINE: NORMAL

## 2022-12-07 ENCOUNTER — OFFICE VISIT (OUTPATIENT)
Dept: PULMONOLOGY | Age: 62
End: 2022-12-07
Payer: COMMERCIAL

## 2022-12-07 VITALS
BODY MASS INDEX: 45.71 KG/M2 | HEIGHT: 63 IN | WEIGHT: 258 LBS | DIASTOLIC BLOOD PRESSURE: 84 MMHG | OXYGEN SATURATION: 96 % | HEART RATE: 74 BPM | SYSTOLIC BLOOD PRESSURE: 154 MMHG

## 2022-12-07 DIAGNOSIS — N30.00 ACUTE CYSTITIS WITHOUT HEMATURIA: ICD-10-CM

## 2022-12-07 DIAGNOSIS — I25.10 CAD S/P PERCUTANEOUS CORONARY ANGIOPLASTY: ICD-10-CM

## 2022-12-07 DIAGNOSIS — Z86.16 HISTORY OF COVID-19: ICD-10-CM

## 2022-12-07 DIAGNOSIS — Z98.61 CAD S/P PERCUTANEOUS CORONARY ANGIOPLASTY: ICD-10-CM

## 2022-12-07 DIAGNOSIS — R06.09 DOE (DYSPNEA ON EXERTION): Primary | ICD-10-CM

## 2022-12-07 DIAGNOSIS — J84.89 NSIP (NONSPECIFIC INTERSTITIAL PNEUMONITIS) (HCC): ICD-10-CM

## 2022-12-07 PROCEDURE — 99214 OFFICE O/P EST MOD 30 MIN: CPT | Performed by: INTERNAL MEDICINE

## 2022-12-07 PROCEDURE — G8427 DOCREV CUR MEDS BY ELIG CLIN: HCPCS | Performed by: INTERNAL MEDICINE

## 2022-12-07 PROCEDURE — G8417 CALC BMI ABV UP PARAM F/U: HCPCS | Performed by: INTERNAL MEDICINE

## 2022-12-07 PROCEDURE — 3017F COLORECTAL CA SCREEN DOC REV: CPT | Performed by: INTERNAL MEDICINE

## 2022-12-07 PROCEDURE — 3074F SYST BP LT 130 MM HG: CPT | Performed by: INTERNAL MEDICINE

## 2022-12-07 PROCEDURE — G8484 FLU IMMUNIZE NO ADMIN: HCPCS | Performed by: INTERNAL MEDICINE

## 2022-12-07 PROCEDURE — 3078F DIAST BP <80 MM HG: CPT | Performed by: INTERNAL MEDICINE

## 2022-12-07 PROCEDURE — 1036F TOBACCO NON-USER: CPT | Performed by: INTERNAL MEDICINE

## 2022-12-07 RX ORDER — CIPROFLOXACIN 250 MG/1
250 TABLET, FILM COATED ORAL 2 TIMES DAILY
Qty: 14 TABLET | Refills: 0 | Status: SHIPPED | OUTPATIENT
Start: 2022-12-07 | End: 2022-12-14

## 2022-12-07 NOTE — PROGRESS NOTES
[facility] Pulmonary and Critical Care    Outpatient Follow Up Note    Subjective:   CHIEF COMPLAINT / HPI:     The patient is a 60 y.o. female is here for an acute visit duration of dyspnea, fatigue, and malaise. Young Velasquez  was admitted November 26 to November 29 at [facility] severe sepsis secondary to a pansensitive E. coli UTI. Discharged she was supposed to finish a course of antibiotics but they state there is an error and it was never prescribed never got it. Currently she continues to have fatigue, malaise, and some dyspnea on exertion. She just had a CTPA as outpatient for further evaluation    6/28/2022   Young Velasquez is here for  follow-up of steroid responsive ILD, possibly connective tissue disease related NSIP vs chronic hypersensitivity pneumonitis. Since last visit she has done very well. Breathing is much better and she has no cough. She is now vegetarian. She was feeling so well that she weaned herself off her prednisone and has been off prednisone x 4 weeks without recurrence of dyspnea or cough. 3/8/2022  Malren is here for follow-up of steroid responsive ILD, possibly connective tissue disease related NSIP vs chronic hypersensitivity pneumonitis. Since last visit she had an HRCT January 17 which I suspected recurrence of her ILD. I placed her on prednisone 40 mg daily with Bactrim PCP prophylaxis. She stated that she was starting to get significant improvement in her dyspnea on exertion when she developed significant worsening January 25 associated with worsening hypoxemia. She tested positive for COVID-19 pneumonia and CTPA showed no PE but significant bilateral groundglass opacities new from HRCT 2 weeks prior. She did not require hospitalization. She did call me and I ordered Sotrivumab. She is gradually improving but her breathing is still not back to her pre-Covid level.     1/11/2022   Young Velasquez has a history of steroid responsive ILD, possibly connective tissue disease related NSIP vs chronic hypersensitivity pneumonitis. When I last saw her in July 2020 she had been doing quite well and had minimal cough and dyspnea. She is on prednisone 10 mg daily and we were talking about weaning it off. She states that she has been off prednisone now for at least 8 months, potentially longer. Over the last 4 months she has had a worsening of dry cough and dyspnea on exertion. 7/1/2020  Jasbir Kennedy has asked for a virtual visit for ILD. Since last visit her prednisone has been weaned slowly to 10 mg daily and she had a CT chest about 1 week ago. Overall she continues to do pretty well and still has a significant improvement in dyspnea on exertion and cough compared to initiation of her prednisone. She is no longer gaining weight and her weight has gone up and down dependent on her dietary habits. She does mention that the current weather does make her symptoms a little bit worse. She is excited for her daughter's wedding coming up in early September. 5/7/2020  Jasbir Kennedy has asked for a virtual visit for her ILD. Her ileitis has resolved. However while she was in [place] visiting her terminally ill father-in-law, whom is subsequently passed away, she developed gout and currently is on prednisone 60 mg with intended wean back to her chronic 10 mg dose over the next 9 days. She states that she has no cough or dyspnea on exertion. Her gout is slowly getting better. She has no specific adverse side effects from her prednisone, which she has been on since September 2019    3/12/2020  Jasbir Kenendy is here for follow-up of nonspecific ILD despite open lung biopsy. She is down to prednisone 15 mg daily and dyspnea exertion is much improved. She has a little more cough than before but is getting over the Flu last week    November 15, 2019  Jasbir Kennedy is here for follow-up of nonspecific ILD despite open lung biopsy.   Differential included connective tissue disease, sarcoidosis, and chronic hypersensitivity pneumonitis. I did have her fill out a ILD questionnaire and did not find a suspected cause for hypersensitivity pneumonitis. Woodworking was a thought in the past.  Last visit I did do an extensive serologic connective tissue disease work-up. Pertinent positives were a mildly positive SAMMY at 1-80 nuclear older pattern and a positive anti-centromere. She has no history of connective tissue disease and never has seen a rheumatologist.  Clinically I do not have a lot of symptoms that is overwhelmingly positive for a connective tissue disease. She currently is on prednisone 40 mg a day and clinically has responded nicely. Dyspnea on exertion and cough are significantly improved. She does not seem to have any significant adverse effects from her prednisone. She is taking Bactrim for PCP prophylaxis    11/15/2019  Jigna Heart is here for follow-up of open lung biopsy for undiagnosed ILD. She had no postoperative complications. Her chronic dyspnea on exertion is unchanged but she does state that her cough is better. 7/9  Jose Luis Casiano is here for follow up of ILD NOS. She had Bronch 6/27 with BAL and Bx of vicente. Other than 10,000 - 100,000 S Aureus and mild elevation of CD4:CD8 in BAL the bronch has been non-diagnostic. She continues to have Mullen and cough. No new changes    6/18  Jigna Heart is here for follow-up of chronic cough and nonspecific ILD. She is now off Plavix and losartan without any change in cough. She had CT chest that shows unchanged basilar predominate nonspecific ILD. Her cough is daily and unrelenting. She coughed continuously through my office visit. She feels that her dyspnea on exertion is somewhat worse and gets winded doing routine activities of daily living. No fever, chills, night sweats, weight loss, hemoptysis, sputum production, CP or wheezing    5/21/2019  Jigna Heart is here for follow-up of chronic cough and nonspecific ILD.   Last visit I doubled her Pepcid to 40 mg by mouth twice a day and obtained a modified barium swallow which was normal.  Her daily chronic dry cough is unchanged. She has some associated dyspnea on exertion and wheezing but no chest pain. She has no fevers, chills, night sweats, anorexia, weight loss, hemoptysis, or sputum production    5/3/2019  Mayra Devine is here for follow-up of chronic cough. I saw her on March 13 and 25th and her cough has  not improved since then despite avoiding woodworking for now 8 weeks and a extended prednisone taper starting at 40 mg and tapering over 4 weeks to off. She has associated dyspnea on exertion. She occasionally coughs with eating but there is no clear aspiration. She is intolerant of proton pump inhibitors and she thinks her reflux is well controlled on Pepcid 10 mg by mouth twice a day. She has no fevers, chills, night sweats, anorexia, sputum production, chest pain, or hemoptysis. 3/13/2019  Marlen presents for follow-up of blood work. Her SAMMY was speckled 1-80, otherwise blood work was unremarkable. For the last 2 weeks she has not been around woodworking. She states over the last day or so she's noticed substantial improvement of her cough    Last visit 2/28  Mayra Devine presents for follow-up of a dry cough. After last visit February 1 I added omeprazole to her Pepcid to see if this was a GERD related cough. I also obtained a CT chest.  Her dry cough is unchanged, occurring daily, and not associated with dyspnea on exertion, chest tightness, or wheezing. 2/1/2019  Marlen presents for re-eval of cough that has now gone on since Thanksgiving. Last visit I checked a CXR that was negative. I thought this was a post viral cough and gave an extended course of prednisone which did not help. She has no infectious symptoms. No post nasal drip. SHe does have uncontrolled GERD.  Bronchial challenge over the summer was normal. SHe is not on an ACE    1/7/2019  Mayra Devine presents for evaluation of 5 weeks of a dry cough, chest congestion, wheezing and TAVERAS. She has no fevers, chills, purulent sputum, anorexia, hemoptysis or weight loss. She has seen her PCP who has given her a medrol dose pack, Z pack, phenergan DM, Tessalon perles and an albuterol MDI without help. Her  has been battling the same and improved with a longer pred taper, levaquin and tussionex cough syrup    7/10/2018  Chanelle Davis presents today for follow-up visit for  re-evaluation of dyspnea on exertion after [facility] s/p PTCA LAD. She had a positive nuclear stress test that prompted her coronary angiogram.  She also had a bronchial challenge that was negative. Since revascularization she's had no further chest heaviness. Her dyspnea on exertion is mildly improved but still present when she goes up a flight of stairs. She has no cough or wheezing. She occasionally snores but has no witnessed apnea. She does not have excessive daytime sleepiness and her [place] was 6    Initial visit June 12, 2018   She states that she gets short of breath walking up from the first to second floor. It is 14 steps and sometimes she has to stop midway and at the top. She also gets short of breath working in the kitchen. She also complains of occasional chest heaviness. She pretty had a cough but states that that is now mostly resolved. She can't take a deep breath and her appetite has been off. She and her  are on a ketogenic diet and have lost some weight. She denies any fevers, chills, hemoptysis, or peripheral edema. Her evaluation has included an echo which is normal.  She is also had PFTs and a chest x-ray. She is never had a cardiac stress test.  She has history of hypertension and hyperlipidemia. Past Medical History:    Past Medical History:   Diagnosis Date    Acid reflux     Bilateral carpal tunnel syndrome 12/21/2017    CAD in native artery 06/19/2018    Mid LAD stented with 3.5 x 15 mm Xience BRIAN.  EF 55%    Chicken pox     Chronic back pain     CKD (chronic kidney disease) 4/15/2016    Depression 12/7/2010    Heart disease     Hypertension     Interstitial lung disease (HCC)     Lymphadenopathy, anterior cervical 7/13/2022    Measles     Menopausal symptoms     Morbid obesity with BMI of 45.0-49.9, adult ([facility] [address] 1/29/2015    Osteoarthritis     Overactive bladder     Pure hypercholesterolemia     Stress incontinence        Social History:    Patient is . She is unemployed. She smoked up to 1 PPD x 8 years and quit in 1985    Family History:  IPF    Current Medications:  Current Outpatient Medications on File Prior to Visit   Medication Sig Dispense Refill    rosuvastatin (CRESTOR) 40 MG tablet Take 1 tablet by mouth every evening 90 tablet 2    nitrofurantoin, macrocrystal-monohydrate, (MACROBID) 100 MG capsule Take 100 mg by mouth 2 times daily      predniSONE (DELTASONE) 10 MG tablet 2 tab po daily x 7 days. 1.5 tab po daily x 7 days. 1 tab po daily x 7 days. 1/2 tab thereafter. 50 tablet 0    traZODone (DESYREL) 50 MG tablet TAKE ONE TABLET BY MOUTH EVERY NIGHT AT BEDTIME (Patient taking differently: Take 50 mg by mouth as needed TAKE ONE TABLET BY MOUTH EVERY NIGHT AT BEDTIME) 90 tablet 2    aspirin 81 MG chewable tablet CHEW ONE TABLET BY MOUTH DAILY 90 tablet 3    citalopram (CELEXA) 40 MG tablet TAKE ONE TABLET BY MOUTH DAILY 90 tablet 3    famotidine (PEPCID) 40 MG tablet Take 1 tablet by mouth 2 times daily 60 tablet 5    albuterol sulfate  (90 Base) MCG/ACT inhaler INHALE 2 PUFFS INTO THE LUNGS EVERY 4 HOURS AS NEEDED FOR WHEEZING 42.5 g 1     No current facility-administered medications on file prior to visit.        REVIEW OF SYSTEMS:    CONSTITUTIONAL: Negative for fevers and chills  HEENT: Negative for oropharyngeal exudate, post nasal drip, sinus pain / pressure, nasal congestion, ear pain  RESPIRATORY:  See HPI  CARDIOVASCULAR: Negative for chest pain, palpitations, edema  GASTROINTESTINAL: Negative for nausea, vomiting, diarrhea, constipation and abdominal pain  GENITOURINARY: Negative for dysuria, urinary frequency, urinary hesitancy  HEMATOLOGICAL: Negative for adenopathy  SKIN: Negative for clubbing, cyanosis, skin lesions  ENDOCRINE: Negative for polyuria, polydipsia, heat intolerance, cold intolerance   EXTREMITIES: Negative for weakness, decreased ROM  NEUROLOGICAL: Negative for unilateral weakness, speech or gait abnormalities    Objective:   PHYSICAL EXAM:        VITALS:  BP (!) 154/84 (Site: Left Upper Arm, Position: Sitting, Cuff Size: Large Adult)   Pulse 74   Ht 5' 3\" (1.6 m)   Wt 258 lb (117 kg)   LMP 07/13/2011   SpO2 96%   BMI 45.70 kg/m²   On RA    Gen: Well developed, well nourished female in no acute distress. Speaking in full sentences with out using accessory resp muscles  Eyes: PERRL, EOMI, normal conjunctivae  Ears, Nose, Mouth and Throat: Hearing is normal. Oropharynx is normal  Neck: No lymphadenopathy  Respiratory: CTA  CV: RRR without M/R/R  Abd: +BS, soft, NT/ND  Musculoskeletal: No cyanosis, clubbing, or edema. Skin: No rashes, ulcers, or subcutaneous nodules  Psychiatric: Alert and oriented to time place and person      DATA:      Radiology Review:  Pertinent images / reports were reviewed as a part of this visit. CT Chest PE 12/5/2022  FINDINGS:        The central airways are patent. The lungs are clear. Wedge resection in the right upper lobe. There is no evidence of axillary, hilar, or mediastinal adenopathy. The heart and great vessels are normal.       There is no evidence of pulmonary embolism to the segmental level. The imaged abdomen shows no additional abnormalities. The bones show no suspicious lesions. Impression       1. No evidence of pulmonary embolism to the segmental level. CXR 6/27/2022  Narrative   Chest PA and lateral       HISTORY: Interstitial lung disease           Impression       Mild linear scarring in the left lung base.  Suture line in the right upper lobe. No radiographic evidence of diffuse interstitial lung disease. Normal cardiomediastinal silhouette. CTPA January 30, 2022         Pulmonary arteries: No pulmonary embolus. Pulmonary arteries normal in caliber. Airways: Central airways are patent. Lungs: There is chronic reticular abnormalities in the subpleural lower lung zones. There is new subpleural peripheral and peribronchial vascular predominant groundglass opacities throughout both lungs. Pleura: No pleural effusions or significant pleural thickening. Heart: Heart size is normal.    Great vessels: Aorta is normal in caliber. Other mediastinal structures and lower neck: Normal.    Adenopathy: No lymphadenopathy. Few calcified hilar nodes. Chest wall and axilla: No significant abnormality. Osseous structures: No significant abnormality. Upper abdomen: Hepatic steatosis. Impression       1. No evidence of pulmonary emboli. 2.  Multifocal pneumonia in a pattern that can be seen with Covid. 3.  Hepatic steatosis. 4.   Stable findings of pulmonary fibrosis without evidence of honeycombing. HRCT January 17, 2022  Impression   Minimally increased mild lower lobe pulmonary fibrosis, indeterminate for   UIP.  NSIP should be considered. CT chest 6/22/2020  FINDINGS:       Lungs and Airways: Similar to the prior study, subpleural areas of linear scarring are noted. These are similar to the prior study. 4 to 5 mm nodules of the right lower lobe are noted, image 70 of series 3. This is unchanged. Calcification is noted along    the posterior lateral aspect of the right hemithorax, mainly along the right upper lobe laterally. Correlate with any history of thoracostomy. There is atelectasis or scarring of the right lung base medially, also similar to the prior study. There is no    new or enlarging nodularity. No significant volume loss or bronchiectasis. No honeycombing. Pleura: No pleural effusions or pleural thickening. Heart and Great Vessels: Heart size is normal.       Adenopathy: None. Chest Wall / Lower Neck: No significant abnormality. Upper Abdomen: No significant abnormality. Bones: No significant abnormality. Impression       Findings there are significantly changed from the prior study. Areas of subpleural linear banding which may represent the history of interstitial lung disease and/or scarring. Certainly no findings such as honeycombing to suggest UIP. Stable pulmonary nodularity, for which continued follow-up is recommended. CT Chest 2/22/2019 reveals the following:     Impression       Bilateral and fairly symmetric reticulation most pronounced about the lower lobes with immediate subpleural sparing may relate to interstitial pneumonitis and may relate to nonspecific interstitial pneumonitis. Findings may be present in connective    tissue disease including SLE, autoimmune disease including rheumatoid arthritis as well as other etiologies including hypersensitivity pneumonitis, drug-induced pneumonitis as well as other causes. No pulmonary fibrosis. No lobar consolidation. CT chest June 13, 2019  FINDINGS: The mediastinum appears normal without signs of any lymph node enlargement. The thyroid gland extends down to the level of the sternum bilaterally. The aorta is of normal caliber as well as the pulmonary arteries. Subpleural areas of pulmonary linear changes, likely pulmonary fibrosis or interstitial lung disease are appreciated with 1 to 2 mm pulmonary nodule in the left upper lobe on image 42 series 9, well circumscribed. Additional subpleural linear changes    noted right middle lobe right left lower lobe and lingula near the lung base.        Poorly defined nodules noted just above the right hemidiaphragm the anterior aspect right lower lobe measuring 5 x 4 mm       There are some calcified subcarinal lymph nodes appreciated. Calcification the left anterior descending coronary artery is appreciated without cardiac enlargement. No pleural or pericardial effusion is noted. Impression       1. Small amount of subpleural linear change which may represent interstitial pneumonitis or interstitial lung disease most pronounced in the lower lobes and middle lobes without any significant pulmonary fibrotic change or volume loss. 2.  Several punctate nodules appreciated one in the left upper lobe subpleural measuring 1 to 2 mm another in the left upper lobe with one poorly defined irregular nodule just above the right hemidiaphragm on image 69 image series 9 and image 68 series 3    measuring up to 5 x 4 mm which should be followed yearly. 3. No spiculated masses. Mild bronchial dilation in the lower lobes     Last PFTs: May 2018  TEST PERFORMED:  1. Spirometry with flow-volume loops obtained before and after  bronchodilation. 2.  Lung volumes by plethysmography. 3.  Diffusion capacity of carbon monoxide. Test meets ATS criteria and the quality of flow-volume loops is sufficient  for interpretation. Good patient effort. The FEV1 is 2.24 L or 89% predicted. The FEV1 to FVC ratio is 75. Post  bronchodilator, the FEV1 changed to 2.29 L or 91% predicted. Total lung  capacity is 94% predicted and diffusion is 83% predicted. INTERPRETATION:  1. No obstruction, no significant post-bronchodilator improvement. 2.  Normal lung volumes. 3.  Normal diffusion capacity. 4.  Clinical correlation recommended, if strong suspicion for obstructive  lung disease exists, we would recommend further evaluation with  methacholine bronchoprovocation study. Bronchial challenge June 13, 2018  IMPRESSION:  1. Normal spirometry. 2.  No significant response to bronchodilators.   3.  Negative bronchial challenge indicative of no airway hyperreactivity or  hyperresponsiveness. Cardiac studies  Echo March 15, 2022  Summary   Normal left ventricle size, wall thickness, and systolic function with an   estimated ejection fraction of 55-60%. No regional wall motion abnormalities are seen. Normal diastolic function. Trivial mitral regurgitation. Trivial aortic regurgitation. Trivial tricuspid regurgitation. The pulmonic valve is not well visualized. Estimated pulmonary artery systolic pressure is at 24 mmHg assuming a right   atrial pressure of 3 mmHg. Myocardial stress test March 14, 2022  Summary    Overall findings represent a intermediate to high risk scan. Normal LV size and systolic function. There is a moderate size reversible defect involving the basal and mid    anterior segments. Findings are concerning for ischemia in the LAD territory. Non-diagnostic EKG response due to failure to reach target heart rate. Myocardial stress test June 13, 2018  Impression       Pharmacologic stress induced reversible ischemia anterior and   anterolateral walls. Left heart catheterization June 19, 2018  Conclusions:  -Severe single vessel CAD; LAD stented with a 3.5 x 15 mm Xiecne BRIAN to 10 RUBY. A \"pinched\" diag was treated with a 2.0 x 12 mm balloon.  -Elevated LVEDP  -Systemic HTN  -Normal LVEF    MBS  Impression       Normal modified barium swallow. Surgical pathology -open lung biopsy -September 4, 2019  ADDENDUM:  ... External consultation has been completed on this case and  the slides have been reviewed by Dr. Nena Dumont MD,  PhD at [address] Dr. Claudia Pisano consultative diagnosis is as follows:  \" Lung, right upper and lower lobes, VATS wedge biopsies (A and B):     - Patchy cellular chronic interstitial pneumonia associated with       ill-formed nonnecrotizing granulomas.      - Arterial vasculopathy.     - No fungal or acid fast microorganisms identified by special stains.     - See case comment \". ..... In a comment, Dr. Liliane Jean states that: \" The main       findings are patchy bronchiolocentric and subpleural chronic       interstitial pneumonia associated with ill-defined, nonnecrotizing       granulomas and a moderate to severe arterial vasculopathy. A       primary etiologic consideration for this combination of       histopathologic findings is collagen vascular disease. Infection       and chronic hypersensitivity pneumonitis are also differential       diagnosis. Morphologic features diagnostic of usual interstitial       pneumonia seen in idiopathic pulmonary fibrosis are not present. AFB and GMS stain shows no evidence of fungal forms CD3 stains and CD20  stains are positive in the inflammatory component with no atypical T or B  cell infiltrates identified. CD3 and CD20 stains were performed on  selected blocks from both parts A and B. CD68 stain material is positive  in a macro 5 population and a nonspecific staining pattern. Trichrome  stains performed on parts A and B and collagen stain performed on part A  show no evidence of increased interstitial fibrosis are collagen  fibrosis. Please see outside consultation report for complete detailed  report and comments . ............................ FINAL DIAGNOSIS:       A. Wedge biopsy, right lower lobe, lung:       - Specimen sent for external consultation to evaluate for         interstitial lung disease, consultative report currently pending. B. Wedge biopsy, right upper lobe, lung:       - Specimen sent for external consultation evaluate for interstitial         lung disease, consultative report currently pending. Assessment:      Diagnosis Orders   1. TAVERAS (dyspnea on exertion)        2. NSIP (nonspecific interstitial pneumonitis) ([address]        3. History of COVID-19        4.  CAD S/P percutaneous coronary angioplasty              Plan:     CT PE protocol reviewed and showed no abnormality and specifically no pneumonia, PE, or ILD recurrent  I wonder if some of her symptoms are residual from her sepsis secondary to UTI  There is a discharge air and she never finished her course of antibiotics so I will give her Cipro 250 mg twice daily x1 week in case she has incompletely treated infection causing fatigue and malaise  Prednisone per rheumatology    Follow-up with me as needed

## 2022-12-14 DIAGNOSIS — I26.99 OTHER ACUTE PULMONARY EMBOLISM, UNSPECIFIED WHETHER ACUTE COR PULMONALE PRESENT (HCC): ICD-10-CM

## 2022-12-14 LAB
ANION GAP SERPL CALCULATED.3IONS-SCNC: 9 MMOL/L (ref 3–16)
BUN BLDV-MCNC: 21 MG/DL (ref 7–20)
CALCIUM SERPL-MCNC: 9.3 MG/DL (ref 8.3–10.6)
CHLORIDE BLD-SCNC: 107 MMOL/L (ref 99–110)
CO2: 28 MMOL/L (ref 21–32)
CREAT SERPL-MCNC: 1.2 MG/DL (ref 0.6–1.2)
GFR SERPL CREATININE-BSD FRML MDRD: 51 ML/MIN/{1.73_M2}
GLUCOSE BLD-MCNC: 128 MG/DL (ref 70–99)
POTASSIUM SERPL-SCNC: 5.1 MMOL/L (ref 3.5–5.1)
SODIUM BLD-SCNC: 144 MMOL/L (ref 136–145)

## 2023-02-02 ENCOUNTER — OFFICE VISIT (OUTPATIENT)
Dept: FAMILY MEDICINE CLINIC | Age: 63
End: 2023-02-02
Payer: COMMERCIAL

## 2023-02-02 VITALS
TEMPERATURE: 97.3 F | SYSTOLIC BLOOD PRESSURE: 120 MMHG | HEART RATE: 87 BPM | DIASTOLIC BLOOD PRESSURE: 78 MMHG | OXYGEN SATURATION: 98 %

## 2023-02-02 DIAGNOSIS — M54.16 LUMBAR RADICULOPATHY: Primary | ICD-10-CM

## 2023-02-02 DIAGNOSIS — J84.9 INTERSTITIAL LUNG DISEASE (HCC): ICD-10-CM

## 2023-02-02 DIAGNOSIS — N18.32 STAGE 3B CHRONIC KIDNEY DISEASE (HCC): ICD-10-CM

## 2023-02-02 DIAGNOSIS — E66.01 MORBID OBESITY WITH BMI OF 45.0-49.9, ADULT (HCC): ICD-10-CM

## 2023-02-02 DIAGNOSIS — M51.36 LUMBAR DEGENERATIVE DISC DISEASE: Chronic | ICD-10-CM

## 2023-02-02 DIAGNOSIS — F32.0 CURRENT MILD EPISODE OF MAJOR DEPRESSIVE DISORDER WITHOUT PRIOR EPISODE (HCC): ICD-10-CM

## 2023-02-02 PROBLEM — I26.99 ACUTE PULMONARY EMBOLISM (HCC): Status: RESOLVED | Noted: 2022-12-05 | Resolved: 2023-02-02

## 2023-02-02 PROBLEM — N30.01 ACUTE CYSTITIS WITH HEMATURIA: Status: RESOLVED | Noted: 2022-12-05 | Resolved: 2023-02-02

## 2023-02-02 PROCEDURE — G8427 DOCREV CUR MEDS BY ELIG CLIN: HCPCS | Performed by: NURSE PRACTITIONER

## 2023-02-02 PROCEDURE — 3074F SYST BP LT 130 MM HG: CPT | Performed by: NURSE PRACTITIONER

## 2023-02-02 PROCEDURE — 99214 OFFICE O/P EST MOD 30 MIN: CPT | Performed by: NURSE PRACTITIONER

## 2023-02-02 PROCEDURE — 3017F COLORECTAL CA SCREEN DOC REV: CPT | Performed by: NURSE PRACTITIONER

## 2023-02-02 PROCEDURE — G8417 CALC BMI ABV UP PARAM F/U: HCPCS | Performed by: NURSE PRACTITIONER

## 2023-02-02 PROCEDURE — 3078F DIAST BP <80 MM HG: CPT | Performed by: NURSE PRACTITIONER

## 2023-02-02 PROCEDURE — 1036F TOBACCO NON-USER: CPT | Performed by: NURSE PRACTITIONER

## 2023-02-02 PROCEDURE — G8484 FLU IMMUNIZE NO ADMIN: HCPCS | Performed by: NURSE PRACTITIONER

## 2023-02-02 RX ORDER — NIFEDIPINE 30 MG/1
30 TABLET, FILM COATED, EXTENDED RELEASE ORAL DAILY
COMMUNITY

## 2023-02-02 RX ORDER — HYDROCODONE BITARTRATE AND ACETAMINOPHEN 5; 325 MG/1; MG/1
1 TABLET ORAL EVERY 4 HOURS PRN
Qty: 30 TABLET | Refills: 0 | Status: SHIPPED | OUTPATIENT
Start: 2023-02-02 | End: 2023-02-07

## 2023-02-02 ASSESSMENT — PATIENT HEALTH QUESTIONNAIRE - PHQ9
SUM OF ALL RESPONSES TO PHQ QUESTIONS 1-9: 0
4. FEELING TIRED OR HAVING LITTLE ENERGY: 0
6. FEELING BAD ABOUT YOURSELF - OR THAT YOU ARE A FAILURE OR HAVE LET YOURSELF OR YOUR FAMILY DOWN: 0
2. FEELING DOWN, DEPRESSED OR HOPELESS: 0
1. LITTLE INTEREST OR PLEASURE IN DOING THINGS: 0
SUM OF ALL RESPONSES TO PHQ QUESTIONS 1-9: 0
9. THOUGHTS THAT YOU WOULD BE BETTER OFF DEAD, OR OF HURTING YOURSELF: 0
3. TROUBLE FALLING OR STAYING ASLEEP: 0
5. POOR APPETITE OR OVEREATING: 0
SUM OF ALL RESPONSES TO PHQ QUESTIONS 1-9: 0
SUM OF ALL RESPONSES TO PHQ QUESTIONS 1-9: 0
7. TROUBLE CONCENTRATING ON THINGS, SUCH AS READING THE NEWSPAPER OR WATCHING TELEVISION: 0
8. MOVING OR SPEAKING SO SLOWLY THAT OTHER PEOPLE COULD HAVE NOTICED. OR THE OPPOSITE, BEING SO FIGETY OR RESTLESS THAT YOU HAVE BEEN MOVING AROUND A LOT MORE THAN USUAL: 0
SUM OF ALL RESPONSES TO PHQ9 QUESTIONS 1 & 2: 0
10. IF YOU CHECKED OFF ANY PROBLEMS, HOW DIFFICULT HAVE THESE PROBLEMS MADE IT FOR YOU TO DO YOUR WORK, TAKE CARE OF THINGS AT HOME, OR GET ALONG WITH OTHER PEOPLE: 0

## 2023-02-02 NOTE — ASSESSMENT & PLAN NOTE
Weight has remained stable. This is uncontrolled however. I have encouraged her to continue to work on weight loss however with her back pain or hip pain she is unable to exercise.   Hemoglobin A1C   Date Value Ref Range Status   07/13/2022 5.9 % Final

## 2023-02-02 NOTE — ASSESSMENT & PLAN NOTE
Lab Results   Component Value Date    LABGLOM 46 (A) 12/14/2022    LABGLOM 51 (A) 12/14/2022    LABGLOM 51 (A) 12/05/2022    LABGLOM 38 (A) 06/03/2022    LABGLOM 33 (A) 05/10/2022   Stable sees nephrology

## 2023-02-02 NOTE — PROGRESS NOTES
Celestino Fisher (:  1960) is a 60 y.o. female,Established patient, here for evaluation of the following chief complaint(s):  Hip Pain (Right side x 4 months intermittent and getting worse  )      ASSESSMENT/PLAN:  1. Lumbar radiculopathy  -     MRI LUMBAR SPINE W CONTRAST; Future  -     HYDROcodone-acetaminophen (NORCO) 5-325 MG per tablet; Take 1 tablet by mouth every 4 hours as needed for Pain for up to 5 days. Intended supply: 5 days. Take lowest dose possible to manage pain Max Daily Amount: 6 tablets, Disp-30 tablet, R-0Normal  2. Interstitial lung disease ([address]  Assessment & Plan:  Chronic and stable. This is followed by Dr. Eleonora Bates through pulmonology  3. Morbid obesity with BMI of 45.0-49.9, adult [facility])  Assessment & Plan:  Weight has remained stable. This is uncontrolled however. I have encouraged her to continue to work on weight loss however with her back pain or hip pain she is unable to exercise. Hemoglobin A1C   Date Value Ref Range Status   2022 5.9 % Final       4. Stage 3b chronic kidney disease [facility])  Assessment & Plan:  Lab Results   Component Value Date    LABGLOM 46 (A) 2022    LABGLOM 51 (A) 2022    LABGLOM 51 (A) 2022    LABGLOM 38 (A) 2022    LABGLOM 33 (A) 05/10/2022   Stable sees nephrology    5. Lumbar degenerative disc disease  Assessment & Plan:  Janice Crandall for pain management. 6. Current mild episode of major depressive disorder without prior episode [facility])  Assessment & Plan:  Stable on Celexa    No follow-ups on file. [place] presents today with ongoing right hip pain. This has been bothering her for at least a couple of years. She is status post right hip replacement. She says recently however she has been having significant pain in her right lower back radiates down to the front of her right leg and down towards her knee. Recently she has begun to fall she states her right leg gives out.   She is not sure if it is at the knee or at the hip she just goes down. She has not sustained any recent injuries from this. She is concerned she carries her grandchild and she does not want to drop her. States the pain has become so significant she is really unable to sleep much at night. She is requesting refill on Norco to take when the pain gets severe. She has an appointment to see Dr. Sandra Stewart in [facility] but does not until March. She is uncertain if this pain is her hip is her or is coming out of her back. Current Outpatient Medications   Medication Sig Dispense Refill    NIFEdipine (ADALAT CC) 30 MG extended release tablet Take 30 mg by mouth daily      HYDROcodone-acetaminophen (NORCO) 5-325 MG per tablet Take 1 tablet by mouth every 4 hours as needed for Pain for up to 5 days. Intended supply: 5 days. Take lowest dose possible to manage pain Max Daily Amount: 6 tablets 30 tablet 0    rosuvastatin (CRESTOR) 40 MG tablet Take 1 tablet by mouth every evening 90 tablet 2    traZODone (DESYREL) 50 MG tablet TAKE ONE TABLET BY MOUTH EVERY NIGHT AT BEDTIME (Patient taking differently: Take 50 mg by mouth as needed TAKE ONE TABLET BY MOUTH EVERY NIGHT AT BEDTIME) 90 tablet 2    aspirin 81 MG chewable tablet CHEW ONE TABLET BY MOUTH DAILY 90 tablet 3    citalopram (CELEXA) 40 MG tablet TAKE ONE TABLET BY MOUTH DAILY 90 tablet 3    famotidine (PEPCID) 40 MG tablet Take 1 tablet by mouth 2 times daily 60 tablet 5    albuterol sulfate  (90 Base) MCG/ACT inhaler INHALE 2 PUFFS INTO THE LUNGS EVERY 4 HOURS AS NEEDED FOR WHEEZING 42.5 g 1     No current facility-administered medications for this visit. Review of Systems    Vitals:    02/02/23 1053   BP: 120/78   Site: Left Upper Arm   Position: Sitting   Cuff Size: Medium Adult   Pulse: 87   Temp: 97.3 °F (36.3 °C)   SpO2: 98%       Physical Exam            An electronic signature was used to authenticate this note.     --ROMEO Goodwin - CNP

## 2023-02-08 ENCOUNTER — HOSPITAL ENCOUNTER (OUTPATIENT)
Dept: MRI IMAGING | Age: 63
Discharge: HOME | End: 2023-02-08

## 2023-02-08 DIAGNOSIS — M54.16 LUMBAR RADICULOPATHY: ICD-10-CM

## 2023-02-13 ENCOUNTER — HOSPITAL ENCOUNTER (OUTPATIENT)
Dept: MRI IMAGING | Age: 63
Discharge: HOME | End: 2023-02-13
Payer: COMMERCIAL

## 2023-02-13 PROCEDURE — 6360000004 HC RX CONTRAST MEDICATION: Performed by: NURSE PRACTITIONER

## 2023-02-13 PROCEDURE — 72158 MRI LUMBAR SPINE W/O & W/DYE: CPT

## 2023-02-13 PROCEDURE — A9579 GAD-BASE MR CONTRAST NOS,1ML: HCPCS | Performed by: NURSE PRACTITIONER

## 2023-02-13 PROCEDURE — 2580000003 HC RX 258: Performed by: NURSE PRACTITIONER

## 2023-02-13 RX ORDER — SODIUM CHLORIDE 0.9 % (FLUSH) 0.9 %
5-40 SYRINGE (ML) INJECTION 2 TIMES DAILY
Status: DISCONTINUED | OUTPATIENT
Start: 2023-02-13 | End: 2023-02-14 | Stop reason: HOSPADM

## 2023-02-13 RX ADMIN — GADOTERIDOL 20 ML: 279.3 INJECTION, SOLUTION INTRAVENOUS at 10:04

## 2023-02-13 RX ADMIN — SODIUM CHLORIDE, PRESERVATIVE FREE 10 ML: 5 INJECTION INTRAVENOUS at 10:04

## 2023-02-19 DIAGNOSIS — F32.0 MILD SINGLE CURRENT EPISODE OF MAJOR DEPRESSIVE DISORDER (HCC): ICD-10-CM

## 2023-02-20 RX ORDER — CITALOPRAM 40 MG/1
TABLET ORAL
Qty: 90 TABLET | Refills: 3 | Status: SHIPPED | OUTPATIENT
Start: 2023-02-20

## 2023-02-21 ENCOUNTER — OFFICE VISIT (OUTPATIENT)
Dept: RHEUMATOLOGY | Age: 63
End: 2023-02-21
Payer: COMMERCIAL

## 2023-02-21 ENCOUNTER — TELEPHONE (OUTPATIENT)
Dept: PULMONOLOGY | Age: 63
End: 2023-02-21

## 2023-02-21 VITALS
HEIGHT: 63 IN | DIASTOLIC BLOOD PRESSURE: 74 MMHG | WEIGHT: 258 LBS | SYSTOLIC BLOOD PRESSURE: 126 MMHG | BODY MASS INDEX: 45.71 KG/M2

## 2023-02-21 DIAGNOSIS — M19.90 INFLAMMATORY ARTHRITIS: Primary | ICD-10-CM

## 2023-02-21 DIAGNOSIS — J84.89 NSIP (NONSPECIFIC INTERSTITIAL PNEUMONITIS) (HCC): Primary | ICD-10-CM

## 2023-02-21 DIAGNOSIS — J84.9 ILD (INTERSTITIAL LUNG DISEASE) (HCC): ICD-10-CM

## 2023-02-21 DIAGNOSIS — M35.9 UNDIFFERENTIATED CONNECTIVE TISSUE DISEASE (HCC): ICD-10-CM

## 2023-02-21 PROCEDURE — 3078F DIAST BP <80 MM HG: CPT | Performed by: INTERNAL MEDICINE

## 2023-02-21 PROCEDURE — 99214 OFFICE O/P EST MOD 30 MIN: CPT | Performed by: INTERNAL MEDICINE

## 2023-02-21 PROCEDURE — 3074F SYST BP LT 130 MM HG: CPT | Performed by: INTERNAL MEDICINE

## 2023-02-21 PROCEDURE — G8427 DOCREV CUR MEDS BY ELIG CLIN: HCPCS | Performed by: INTERNAL MEDICINE

## 2023-02-21 PROCEDURE — 1036F TOBACCO NON-USER: CPT | Performed by: INTERNAL MEDICINE

## 2023-02-21 PROCEDURE — G8484 FLU IMMUNIZE NO ADMIN: HCPCS | Performed by: INTERNAL MEDICINE

## 2023-02-21 PROCEDURE — 3017F COLORECTAL CA SCREEN DOC REV: CPT | Performed by: INTERNAL MEDICINE

## 2023-02-21 PROCEDURE — G8417 CALC BMI ABV UP PARAM F/U: HCPCS | Performed by: INTERNAL MEDICINE

## 2023-02-21 RX ORDER — PREDNISONE 10 MG/1
TABLET ORAL
Qty: 60 TABLET | Refills: 0 | Status: SHIPPED | OUTPATIENT
Start: 2023-02-21

## 2023-02-21 NOTE — PROGRESS NOTES
.    An electronic signature was used to authenticate this note. [address] [place] [address] Avenue, [place]                                                           [address] [place] [facility] [address], [address]                                                              (M) [phone] (F)      Dear Dr. Claudeen Bowman, APRN - CNP:  Please find Rheumatology assessment. Thank you for giving me the opportunity to be involved in Earlene Islas's care and I look forward following Earlene along with you. If you have any questions or concerns please feel free to reach me. Note is transcribed using voice recognition software. Inadvertent computerized transcription errors may be present. Patient identification: Stanislav Islas,: ,45 y.o. Sex: female     Earlene was seen today for follow-up. Diagnoses and all orders for this visit:    Inflammatory arthritis  -     SAMMY Reflex to Antibody Cascade; Future  -     C-Reactive Protein; Future  -     Sedimentation Rate; Future  -     CK; Future  -     Aldolase; Future  -     Miscellaneous Sendout; Future  -     Anti-Scleroderma Antibody; Future    ILD (interstitial lung disease) ([facility])  -     Anti-Scleroderma Antibody; Future    Undifferentiated connective tissue disease ([facility] [address]    Other orders  -     predniSONE (DELTASONE) 10 MG tablet; Take 2 Tab daily. Inflammatory polyarthritis-predominantly affecting large and intermediate joints. High disease activity  Worsening shortness of breath, history of ILD. Reevaluate PFTs. I suspect patient has undifferentiated connective tissue disease. Re-Check further serological evaluation, was unrevealing in the past.  Start 20 mg of prednisone daily. Depending on work-up, will plan for further DMARDs-likely Imuran.  Avoid Methotrexate due to ILD. 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      2.  Polyarticular gout-no recent flares-off of allopurinol-self discontinued. Trying vegetarian diet does seem to help with her gout per patient. Colchicine-vomiting. RTC 2 weeks. Patient indicates understanding and agrees with the management plan. #######################################################################    [id number] for worsening musculoskeletal pain. Other medical comorbidities coronary artery disease, hypertension, hyperlipidemia and interstitial lung disease. Interval changes-  States that she is miserable. Cannot move, cannot breathe. Experiencing discomfort, stiffness, swelling across her finger joints, elbows, worse in her shoulders, neck, hips and thighs. Is not able to raise her arms. This is affecting her ADLs and recreational activities significantly. She also reports worsening shortness of breath, unclear if this is from ILD or physical deconditioning. She denies any headaches or symptoms of temporal arteritis. I gave her a course of prednisone at last visit. Believes that it did help, however does not recall response well. Followed by Dr. Jesslyn Peabody for back pain. No psoriasis, inflammatory bowel diseases. Current Outpatient Medications   Medication Sig Dispense Refill    predniSONE (DELTASONE) 10 MG tablet Take 2 Tab daily.  60 tablet 0    citalopram (CELEXA) 40 MG tablet TAKE ONE TABLET BY MOUTH DAILY 90 tablet 3    NIFEdipine (ADALAT CC) 30 MG extended release tablet Take 30 mg by mouth daily      rosuvastatin (CRESTOR) 40 MG tablet Take 1 tablet by mouth every evening 90 tablet 2    traZODone (DESYREL) 50 MG tablet TAKE ONE TABLET BY MOUTH EVERY NIGHT AT BEDTIME (Patient taking differently: Take 50 mg by mouth as needed TAKE ONE TABLET BY MOUTH EVERY NIGHT AT BEDTIME) 90 tablet 2    aspirin 81 MG chewable tablet CHEW ONE TABLET BY MOUTH DAILY 90 tablet 3    famotidine (PEPCID) 40 MG tablet Take 1 tablet by mouth 2 times daily 60 tablet 5    albuterol sulfate  (90 Base) MCG/ACT inhaler INHALE 2 PUFFS INTO THE LUNGS EVERY 4 HOURS AS NEEDED FOR WHEEZING 42.5 g 1     No current facility-administered medications for this visit. Allergies   Allergen Reactions    Atorvastatin Other (See Comments)     Multiple muscle spasms/cramps over body    Protonix [Pantoprazole Sodium] Diarrhea       PHYSICAL EXAM:    Vitals:    /74   Ht 5' 3\" (1.6 m)   Wt 258 lb (117 kg)   LMP 07/13/2011   BMI 45.70 kg/m²   General appearance/ Psychiatric: well nourished, and well groomed, normal judgement, alert, appears stated age and cooperative. MKS:   MCPS 2.3.4 and PIP 2,3 tender and swollen b/l. Elbows are tender in joint line. Shoulders- 70% limited ROM in all plains. Walks with wide gait with limited ROM of hips. Ankles are tender in joint line. No rashes. Normal temporal arteries without any tenderness or tortuosity.       DATA:   Lab Results   Component Value Date    WBC 4.2 02/10/2023    HGB 12.7 02/10/2023    HCT 37.3 02/10/2023    MCV 85.7 02/10/2023     02/10/2023         Chemistry        Component Value Date/Time     (H) 02/10/2023 1034    K 4.1 02/10/2023 1034    K 3.7 01/30/2022 1457     02/10/2023 1034    CO2 25 02/10/2023 1034    BUN 20 02/10/2023 1034    CREATININE 1.4 (H) 02/10/2023 1034        Component Value Date/Time    CALCIUM 9.7 02/10/2023 1034    ALKPHOS 76 05/10/2022 1410    AST 15 05/10/2022 1410    ALT 22 05/10/2022 1410    BILITOT 0.5 05/10/2022 1410          Lab Results   Component Value Date    LABURIC 4.6 12/23/2020       Lab Results   Component Value Date    CRP 28.6 (H) 02/21/2023     Lab Results   Component Value Date    SAMMY POSITIVE (A) 09/18/2019    SEDRATE 52 (H) 02/21/2023     Lab Results   Component Value Date    CKTOTAL 111 02/21/2023     Lab Results   Component Value Date    TSH 1.86 04/25/2018     Lab Results   Component Value Date    VITD25 41.1 02/10/2023         Total time 35 minutes- reviewing medical records & pre charting on the same day of visit,  history taking, exam, explaining medical diagnosis, management plan, ordering labs, filling medications, communicating findings with other providers and medical documentation in EHR.

## 2023-02-21 NOTE — TELEPHONE ENCOUNTER
DR GARCIA [facility] PATIENT    Patient came in to ask for Dr Lamonte Fox to order PFTs. Patient was seen by Dr Nanci Duran this morning and she would like pt to have a PFT completed because of her ILD & Inflammatory arthritis. Please sign.  Thank you

## 2023-03-06 ENCOUNTER — HOSPITAL ENCOUNTER (OUTPATIENT)
Dept: PULMONOLOGY | Age: 63
Discharge: HOME | End: 2023-03-06
Payer: COMMERCIAL

## 2023-03-06 DIAGNOSIS — J84.89 NSIP (NONSPECIFIC INTERSTITIAL PNEUMONITIS) (HCC): ICD-10-CM

## 2023-03-06 PROCEDURE — 94060 EVALUATION OF WHEEZING: CPT

## 2023-03-06 PROCEDURE — 94729 DIFFUSING CAPACITY: CPT

## 2023-03-06 PROCEDURE — 6360000002 HC RX W HCPCS: Performed by: INTERNAL MEDICINE

## 2023-03-06 PROCEDURE — 94761 N-INVAS EAR/PLS OXIMETRY MLT: CPT

## 2023-03-06 PROCEDURE — 94726 PLETHYSMOGRAPHY LUNG VOLUMES: CPT

## 2023-03-06 PROCEDURE — 94664 DEMO&/EVAL PT USE INHALER: CPT

## 2023-03-06 RX ORDER — ALBUTEROL SULFATE 2.5 MG/3ML
2.5 SOLUTION RESPIRATORY (INHALATION) ONCE
Status: COMPLETED | OUTPATIENT
Start: 2023-03-06 | End: 2023-03-06

## 2023-03-06 RX ADMIN — ALBUTEROL SULFATE 2.5 MG: 2.5 SOLUTION RESPIRATORY (INHALATION) at 13:43

## 2023-03-07 ENCOUNTER — OFFICE VISIT (OUTPATIENT)
Dept: RHEUMATOLOGY | Age: 63
End: 2023-03-07
Payer: COMMERCIAL

## 2023-03-07 VITALS
DIASTOLIC BLOOD PRESSURE: 78 MMHG | HEIGHT: 63 IN | WEIGHT: 258 LBS | SYSTOLIC BLOOD PRESSURE: 120 MMHG | BODY MASS INDEX: 45.71 KG/M2

## 2023-03-07 DIAGNOSIS — Z79.899 HIGH RISK MEDICATION USE: ICD-10-CM

## 2023-03-07 DIAGNOSIS — M19.90 INFLAMMATORY ARTHRITIS: ICD-10-CM

## 2023-03-07 DIAGNOSIS — J84.9 ILD (INTERSTITIAL LUNG DISEASE) (HCC): Primary | ICD-10-CM

## 2023-03-07 LAB
C-REACTIVE PROTEIN: <3 MG/L (ref 0–5.1)
SEDIMENTATION RATE, ERYTHROCYTE: 22 MM/HR (ref 0–30)

## 2023-03-07 PROCEDURE — G8484 FLU IMMUNIZE NO ADMIN: HCPCS | Performed by: INTERNAL MEDICINE

## 2023-03-07 PROCEDURE — 3017F COLORECTAL CA SCREEN DOC REV: CPT | Performed by: INTERNAL MEDICINE

## 2023-03-07 PROCEDURE — 1036F TOBACCO NON-USER: CPT | Performed by: INTERNAL MEDICINE

## 2023-03-07 PROCEDURE — 3078F DIAST BP <80 MM HG: CPT | Performed by: INTERNAL MEDICINE

## 2023-03-07 PROCEDURE — 3074F SYST BP LT 130 MM HG: CPT | Performed by: INTERNAL MEDICINE

## 2023-03-07 PROCEDURE — G8417 CALC BMI ABV UP PARAM F/U: HCPCS | Performed by: INTERNAL MEDICINE

## 2023-03-07 PROCEDURE — 99214 OFFICE O/P EST MOD 30 MIN: CPT | Performed by: INTERNAL MEDICINE

## 2023-03-07 PROCEDURE — G8428 CUR MEDS NOT DOCUMENT: HCPCS | Performed by: INTERNAL MEDICINE

## 2023-03-07 RX ORDER — PREDNISONE 1 MG/1
TABLET ORAL
Qty: 90 TABLET | Refills: 0 | Status: SHIPPED | OUTPATIENT
Start: 2023-03-07

## 2023-03-07 RX ORDER — AZATHIOPRINE 50 MG/1
TABLET ORAL
Qty: 60 TABLET | Refills: 1 | Status: SHIPPED | OUTPATIENT
Start: 2023-03-07

## 2023-03-07 NOTE — PROGRESS NOTES
.    An electronic signature was used to authenticate this note. Adam Covarrubias MD                                                           [address] [place] [address] Ave, [address]                                                              (E) [phone] (F)      Dear Dr. Marquita Weldon, APRN - CNP:  Please find Rheumatology assessment. Thank you for giving me the opportunity to be involved in Halina Islas's care and I look forward following Halina Crawford along with you. If you have any questions or concerns please feel free to reach me. Note is transcribed using voice recognition software. Inadvertent computerized transcription errors may be present. Patient identification: Grisel Islas,: ,88 y.o. Sex: female     Halina Crawford was seen today for follow-up. Diagnoses and all orders for this visit:    ILD (interstitial lung disease) ([facility]    Inflammatory arthritis    High risk medication use  -     Sedimentation Rate; Future  -     C-Reactive Protein; Future  -     TPMT Genetic; Future  -     AST(SGOT) & ALT(SGPT); Standing  -     CBC with Auto Differential; Standing  -     C-Reactive Protein; Standing  -     Creatinine; Standing  -     Sedimentation Rate; Standing    Other orders  -     azaTHIOprine (IMURAN) 50 MG tablet; Take 1 Tab daily x 3 weeks then take 2 tab po daily. Safety alert: Labs as recommended by prescribing MD.  -     predniSONE (DELTASONE) 5 MG tablet; Take 1 tab po daily. Inflammatory polyarthritis-predominantly affecting large and intermediate joints. High disease activity-nearly 100% improvement in her symptoms on prednisone 20 mg a day, responded dramatically to prednisone. Suspect inflammatory arthritis in PMR distribution.   All her autoimmune work-up including myositis panel, SAMMY, RF, CCP is negative. Has UIP ILD-idiopathic interstitial fibrosis versus connective tissue disease related ILD. Keeping ILD in mind, and inflammatory arthritis, recommend Imuran and steroid sparing agent. Side effects were directions, monitoring explained in details including but not limited to bone marrow, liver, GI toxicity, risk of typical atypical infection, pancreatitis. Check labs as above. PFTs awaited. Avoid Methotrexate due to ILD. 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      2.  Polyarticular gout-no recent flares-off of allopurinol-self discontinued. Trying vegetarian diet does seem to help with her gout per patient. Colchicine-vomiting. RTC 2 weeks. Patient indicates understanding and agrees with the management plan. #######################################################################    [id number] for worsening musculoskeletal pain. Other medical comorbidities coronary artery disease, hypertension, hyperlipidemia and interstitial lung disease. Interval changes-  Last seen couple of weeks ago, had high disease activity inflammatory arthritis, mainly in PMR distribution but did have involvement of small and intermediate joints as well. She responded dramatically to prednisone, is nearly asymptomatic now, on 10 mg of prednisone twice a day. No side effects from prednisone at this time. History of AVN in the past.  Denies any symptoms of temporal arteritis. She continues to have exertional shortness of breath. Had pulmonary function test completed yesterday. No other symptoms. work-up for autoimmune ILD is negative. Followed by Dr. Dino Ambrose for back pain. No psoriasis, inflammatory bowel diseases. Current Outpatient Medications   Medication Sig Dispense Refill    azaTHIOprine (IMURAN) 50 MG tablet Take 1 Tab daily x 3 weeks then take 2 tab po daily.  Safety alert: Labs as recommended by prescribing MD. 60 tablet 1    predniSONE (DELTASONE) 5 MG tablet Take 1 tab po daily. 90 tablet 0    predniSONE (DELTASONE) 10 MG tablet Take 2 Tab daily. 60 tablet 0    citalopram (CELEXA) 40 MG tablet TAKE ONE TABLET BY MOUTH DAILY 90 tablet 3    NIFEdipine (ADALAT CC) 30 MG extended release tablet Take 30 mg by mouth daily      rosuvastatin (CRESTOR) 40 MG tablet Take 1 tablet by mouth every evening 90 tablet 2    traZODone (DESYREL) 50 MG tablet TAKE ONE TABLET BY MOUTH EVERY NIGHT AT BEDTIME (Patient taking differently: Take 50 mg by mouth as needed TAKE ONE TABLET BY MOUTH EVERY NIGHT AT BEDTIME) 90 tablet 2    aspirin 81 MG chewable tablet CHEW ONE TABLET BY MOUTH DAILY 90 tablet 3    famotidine (PEPCID) 40 MG tablet Take 1 tablet by mouth 2 times daily 60 tablet 5    albuterol sulfate  (90 Base) MCG/ACT inhaler INHALE 2 PUFFS INTO THE LUNGS EVERY 4 HOURS AS NEEDED FOR WHEEZING 42.5 g 1     No current facility-administered medications for this visit. Allergies   Allergen Reactions    Atorvastatin Other (See Comments)     Multiple muscle spasms/cramps over body    Protonix [Pantoprazole Sodium] Diarrhea       PHYSICAL EXAM:    Vitals:    /78   Ht 5' 3\" (1.6 m)   Wt 258 lb (117 kg)   LMP 07/13/2011   BMI 45.70 kg/m²   General appearance/ Psychiatric: well nourished, and well groomed, normal judgement, alert, appears stated age and cooperative. MKS:   No appreciable tender, swollen or inflamed joints in the upper or lower extremities. Full range of motion all peripheral joints including her shoulders. In the last visit she has significantly limited range of motion both shoulders, had multiple swollen and inflamed joints as below-    MCPS 2.3.4 and PIP 2,3 tender and swollen b/l. Elbows are tender in joint line. Shoulders- 70% limited ROM in all plains. Walks with wide gait with limited ROM of hips. Ankles are tender in joint line. No rashes.   Normal temporal arteries without any tenderness or tortuosity. DATA:   Lab Results   Component Value Date    WBC 4.2 02/10/2023    HGB 12.7 02/10/2023    HCT 37.3 02/10/2023    MCV 85.7 02/10/2023     02/10/2023         Chemistry        Component Value Date/Time     (H) 02/10/2023 1034    K 4.1 02/10/2023 1034    K 3.7 01/30/2022 1457     02/10/2023 1034    CO2 25 02/10/2023 1034    BUN 20 02/10/2023 1034    CREATININE 1.4 (H) 02/10/2023 1034        Component Value Date/Time    CALCIUM 9.7 02/10/2023 1034    ALKPHOS 76 05/10/2022 1410    AST 15 05/10/2022 1410    ALT 22 05/10/2022 1410    BILITOT 0.5 05/10/2022 1410          Lab Results   Component Value Date    LABURIC 4.6 12/23/2020       Lab Results   Component Value Date    CRP 28.6 (H) 02/21/2023     Lab Results   Component Value Date    SAMMY Negative 02/21/2023    SEDRATE 52 (H) 02/21/2023     Lab Results   Component Value Date    CKTOTAL 111 02/21/2023     Lab Results   Component Value Date    TSH 1.86 04/25/2018     Lab Results   Component Value Date    VITD25 41.1 02/10/2023         Total time 36 minutes- reviewing medical records & pre charting on the same day of visit,  history taking, exam, explaining medical diagnosis, management plan, ordering labs, filling medications, communicating findings with other providers and medical documentation in EHR.

## 2023-04-11 DIAGNOSIS — R10.11 COLICKY RUQ ABDOMINAL PAIN: ICD-10-CM

## 2023-04-11 LAB
BASOPHILS # BLD: 0 K/UL (ref 0–0.2)
BASOPHILS NFR BLD: 0.4 %
DEPRECATED RDW RBC AUTO: 14.4 % (ref 12.4–15.4)
EOSINOPHIL # BLD: 0.2 K/UL (ref 0–0.6)
EOSINOPHIL NFR BLD: 2.4 %
HCT VFR BLD AUTO: 40.9 % (ref 36–48)
HGB BLD-MCNC: 13.6 G/DL (ref 12–16)
LYMPHOCYTES # BLD: 1.3 K/UL (ref 1–5.1)
LYMPHOCYTES NFR BLD: 18.2 %
MCH RBC QN AUTO: 29.5 PG (ref 26–34)
MCHC RBC AUTO-ENTMCNC: 33.2 G/DL (ref 31–36)
MCV RBC AUTO: 88.8 FL (ref 80–100)
MONOCYTES # BLD: 0.6 K/UL (ref 0–1.3)
MONOCYTES NFR BLD: 8.5 %
NEUTROPHILS # BLD: 4.9 K/UL (ref 1.7–7.7)
NEUTROPHILS NFR BLD: 70.5 %
PLATELET # BLD AUTO: 281 K/UL (ref 135–450)
PMV BLD AUTO: 8.1 FL (ref 5–10.5)
RBC # BLD AUTO: 4.6 M/UL (ref 4–5.2)
WBC # BLD AUTO: 7 K/UL (ref 4–11)

## 2023-04-12 PROBLEM — R10.11 COLICKY RUQ ABDOMINAL PAIN: Status: ACTIVE | Noted: 2023-04-12

## 2023-04-12 LAB
ALBUMIN SERPL-MCNC: 4.2 G/DL (ref 3.4–5)
ALP SERPL-CCNC: 84 U/L (ref 40–129)
ALT SERPL-CCNC: 26 U/L (ref 10–40)
AST SERPL-CCNC: 23 U/L (ref 15–37)
BILIRUB DIRECT SERPL-MCNC: <0.2 MG/DL (ref 0–0.3)
BILIRUB INDIRECT SERPL-MCNC: NORMAL MG/DL (ref 0–1)
BILIRUB SERPL-MCNC: 0.4 MG/DL (ref 0–1)
PROT SERPL-MCNC: 6.5 G/DL (ref 6.4–8.2)

## 2023-04-13 LAB — UREA BREATH TEST QL: NEGATIVE

## 2023-04-26 DIAGNOSIS — F51.04 PSYCHOPHYSIOLOGICAL INSOMNIA: ICD-10-CM

## 2023-04-26 RX ORDER — TRAZODONE HYDROCHLORIDE 50 MG/1
TABLET ORAL
Qty: 90 TABLET | Refills: 2 | Status: SHIPPED | OUTPATIENT
Start: 2023-04-26

## 2023-05-01 ENCOUNTER — ANESTHESIA (OUTPATIENT)
Dept: ENDOSCOPY | Age: 63
End: 2023-05-01
Payer: COMMERCIAL

## 2023-05-01 ENCOUNTER — HOSPITAL ENCOUNTER (OUTPATIENT)
Age: 63
Setting detail: OUTPATIENT SURGERY
Discharge: HOME | End: 2023-05-01
Attending: INTERNAL MEDICINE | Admitting: INTERNAL MEDICINE
Payer: COMMERCIAL

## 2023-05-01 ENCOUNTER — ANESTHESIA EVENT (OUTPATIENT)
Dept: ENDOSCOPY | Age: 63
End: 2023-05-01
Payer: COMMERCIAL

## 2023-05-01 VITALS
BODY MASS INDEX: 44.65 KG/M2 | WEIGHT: 252 LBS | DIASTOLIC BLOOD PRESSURE: 80 MMHG | RESPIRATION RATE: 16 BRPM | OXYGEN SATURATION: 97 % | HEIGHT: 63 IN | HEART RATE: 74 BPM | SYSTOLIC BLOOD PRESSURE: 139 MMHG | TEMPERATURE: 97.2 F

## 2023-05-01 DIAGNOSIS — K21.9 GASTROESOPHAGEAL REFLUX DISEASE, UNSPECIFIED WHETHER ESOPHAGITIS PRESENT: ICD-10-CM

## 2023-05-01 PROCEDURE — 2500000003 HC RX 250 WO HCPCS: Performed by: NURSE ANESTHETIST, CERTIFIED REGISTERED

## 2023-05-01 PROCEDURE — 6360000002 HC RX W HCPCS: Performed by: NURSE ANESTHETIST, CERTIFIED REGISTERED

## 2023-05-01 PROCEDURE — 2580000003 HC RX 258: Performed by: FAMILY MEDICINE

## 2023-05-01 PROCEDURE — 7100000011 HC PHASE II RECOVERY - ADDTL 15 MIN: Performed by: INTERNAL MEDICINE

## 2023-05-01 PROCEDURE — 3609012400 HC EGD TRANSORAL BIOPSY SINGLE/MULTIPLE: Performed by: INTERNAL MEDICINE

## 2023-05-01 PROCEDURE — 7100000010 HC PHASE II RECOVERY - FIRST 15 MIN: Performed by: INTERNAL MEDICINE

## 2023-05-01 PROCEDURE — 88305 TISSUE EXAM BY PATHOLOGIST: CPT

## 2023-05-01 PROCEDURE — 2709999900 HC NON-CHARGEABLE SUPPLY: Performed by: INTERNAL MEDICINE

## 2023-05-01 PROCEDURE — 3700000000 HC ANESTHESIA ATTENDED CARE: Performed by: INTERNAL MEDICINE

## 2023-05-01 RX ORDER — PROPOFOL 10 MG/ML
INJECTION, EMULSION INTRAVENOUS CONTINUOUS PRN
Status: DISCONTINUED | OUTPATIENT
Start: 2023-05-01 | End: 2023-05-01 | Stop reason: SDUPTHER

## 2023-05-01 RX ORDER — LIDOCAINE HYDROCHLORIDE 20 MG/ML
INJECTION, SOLUTION EPIDURAL; INFILTRATION; INTRACAUDAL; PERINEURAL PRN
Status: DISCONTINUED | OUTPATIENT
Start: 2023-05-01 | End: 2023-05-01 | Stop reason: SDUPTHER

## 2023-05-01 RX ORDER — SODIUM CHLORIDE, SODIUM LACTATE, POTASSIUM CHLORIDE, CALCIUM CHLORIDE 600; 310; 30; 20 MG/100ML; MG/100ML; MG/100ML; MG/100ML
INJECTION, SOLUTION INTRAVENOUS ONCE
Status: DISCONTINUED | OUTPATIENT
Start: 2023-05-01 | End: 2023-05-01 | Stop reason: HOSPADM

## 2023-05-01 RX ORDER — GLYCOPYRROLATE 0.2 MG/ML
INJECTION INTRAMUSCULAR; INTRAVENOUS PRN
Status: DISCONTINUED | OUTPATIENT
Start: 2023-05-01 | End: 2023-05-01 | Stop reason: SDUPTHER

## 2023-05-01 RX ORDER — SODIUM CHLORIDE, SODIUM LACTATE, POTASSIUM CHLORIDE, CALCIUM CHLORIDE 600; 310; 30; 20 MG/100ML; MG/100ML; MG/100ML; MG/100ML
INJECTION, SOLUTION INTRAVENOUS CONTINUOUS
Status: DISCONTINUED | OUTPATIENT
Start: 2023-05-01 | End: 2023-05-01 | Stop reason: HOSPADM

## 2023-05-01 RX ORDER — PROPOFOL 10 MG/ML
INJECTION, EMULSION INTRAVENOUS PRN
Status: DISCONTINUED | OUTPATIENT
Start: 2023-05-01 | End: 2023-05-01 | Stop reason: SDUPTHER

## 2023-05-01 RX ADMIN — PROPOFOL 40 MG: 10 INJECTION, EMULSION INTRAVENOUS at 09:06

## 2023-05-01 RX ADMIN — LIDOCAINE HYDROCHLORIDE 40 MG: 20 INJECTION, SOLUTION EPIDURAL; INFILTRATION; INTRACAUDAL; PERINEURAL at 09:08

## 2023-05-01 RX ADMIN — GLYCOPYRROLATE 0.2 MG: 0.2 INJECTION INTRAMUSCULAR; INTRAVENOUS at 09:08

## 2023-05-01 RX ADMIN — SODIUM CHLORIDE, POTASSIUM CHLORIDE, SODIUM LACTATE AND CALCIUM CHLORIDE: 600; 310; 30; 20 INJECTION, SOLUTION INTRAVENOUS at 08:16

## 2023-05-01 RX ADMIN — PROPOFOL 150 MCG/KG/MIN: 10 INJECTION, EMULSION INTRAVENOUS at 09:05

## 2023-05-01 RX ADMIN — PROPOFOL 40 MG: 10 INJECTION, EMULSION INTRAVENOUS at 09:04

## 2023-05-01 ASSESSMENT — PAIN SCALES - WONG BAKER
WONGBAKER_NUMERICALRESPONSE: 0

## 2023-05-01 ASSESSMENT — ENCOUNTER SYMPTOMS
DYSPNEA ACTIVITY LEVEL: AFTER AMBULATING 2 FLIGHTS OF STAIRS
SHORTNESS OF BREATH: 1

## 2023-05-01 ASSESSMENT — PAIN - FUNCTIONAL ASSESSMENT: PAIN_FUNCTIONAL_ASSESSMENT: 0-10

## 2023-05-01 ASSESSMENT — PAIN SCALES - GENERAL
PAINLEVEL_OUTOF10: 0
PAINLEVEL_OUTOF10: 0

## 2023-05-01 NOTE — DISCHARGE INSTRUCTIONS
ENDOSCOPY DISCHARGE INSTRUCTIONS:    Call the physician that did your procedure for any questions or concerns:           DR. Echevarria Lety:  [phone]               ACTIVITY:    There are potential side effects to the medications used for sedation and anesthesia during your procedure. These include:  Dizziness or light-headedness, confusion or memory loss, delayed reaction times, loss of coordination, nausea and vomiting. Because of your increased risk for injury, we ask that you observe the following precautions: For the next 24 hours,  DO NOT operate an automobile, bicycle, motorcycle, , power tools or large equipment of any kind. Do not drink alcohol, sign any legal documents or make any legal decisions for 24 hours. Do not bend your head over lower than your heart. DO sit on the side of bed/couch awhile before getting up. Plan on bedrest or quiet relaxation today. You may resume normal activities in 24 hours. DIET:    Your first meal today should be light, avoiding spicy and fatty foods. If you tolerate this first meal,  then you may advance to your regular diet unless otherwise advised by your physician. NORMAL SYMPTOMS:  -Sore throat if youve had an EGD  -Gaseous discomfort    NOTIFY YOUR PHYSICIAN IF THESE SYMPTOMS OCCUR:  1. Fever (greater than 100)  5. Increased abdominal bloating  2. Severe pain    6. Excessive bleeding  3. Nausea and vomiting  7. Chest pain                                                                    4. Chills    8. Shortness of breath      ADDITIONAL INSTRUCTIONS:    Biopsy results:  WILL CALL YOU IN 1 WEEK WITH BIOPSY RESULTS. Educational Information:    Follow up appointment:                               Please review these discharge instructions this evening or tomorrow for   information you may have forgotten. We want to thank you for choosing the [facility] as your health care provider.  We always strive to provide you with

## 2023-05-01 NOTE — H&P
History and Physical / Pre-Sedation Assessment    Leigh Epstein is a 60 y.o. female who presents today for EGD procedure. PMHx:    Past Medical History:   Diagnosis Date    Acid reflux     Bilateral carpal tunnel syndrome 12/21/2017    CAD in native artery 06/19/2018    Mid LAD stented with 3.5 x 15 mm Xience BRIAN. EF 55%    Chicken pox     Chronic back pain     CKD (chronic kidney disease) 04/15/2016    Depression 12/07/2010    Heart disease     Hypertension     Interstitial lung disease ([facility] [address]     Lymphadenopathy, anterior cervical 07/13/2022    Measles     Menopausal symptoms     Morbid obesity with BMI of 45.0-49.9, adult ([facility] [address] 01/29/2015    Osteoarthritis     Overactive bladder     Pure hypercholesterolemia     Snores     Stress incontinence        Medications:    Prior to Admission medications    Medication Sig Start Date End Date Taking? Authorizing Provider   traZODone (DESYREL) 50 MG tablet TAKE ONE TABLET BY MOUTH EVERY NIGHT AT BEDTIME 4/26/23   ROMEO Peters CNP   azaTHIOprine (IMURAN) 50 MG tablet Take 1 Tab daily x 3 weeks then take 2 tab po daily. Safety alert: Labs as recommended by prescribing MD. 3/7/23   Mounika Leggett MD   predniSONE (DELTASONE) 5 MG tablet Take 1 tab po daily. 3/7/23   Mounika Leggett MD   predniSONE (DELTASONE) 10 MG tablet Take 2 Tab daily.   Patient not taking: Reported on 5/1/2023 2/21/23   Mounika Leggett MD   citalopram (CELEXA) 40 MG tablet TAKE ONE TABLET BY MOUTH DAILY 2/20/23   ROMEO Peters CNP   NIFEdipine (ADALAT CC) 30 MG extended release tablet Take 1 tablet by mouth daily    Historical Provider, MD   rosuvastatin (CRESTOR) 40 MG tablet Take 1 tablet by mouth every evening 12/5/22   ROMEO Peters CNP   aspirin 81 MG chewable tablet CHEW ONE TABLET BY MOUTH DAILY 3/1/22   ROMEO Jane CNP   famotidine (PEPCID) 40 MG tablet Take 1 tablet by mouth 2 times daily 5/26/21   Ember Badillo MD   albuterol sulfate  (90

## 2023-05-01 NOTE — PROGRESS NOTES
Ambulatory Surgery/Procedure Discharge Note    Vitals:    05/01/23 0940   BP: 139/80   Pulse: 74   Resp: 16   Temp:    SpO2: 97%       No intake/output data recorded. Restroom use offered before discharge. Yes, pt void per bathroom, assist x1. Pain assessment:  level of pain (1-10, 10 severe)  Pain Level: 0  Pt to Endoscopy recovery post EGD. Pt denies pain at this time. Pt denies nausea at this time, pt tolerating PO fluids well. Discharge instructions given to pt's  and he states understanding of these instructions. Pt and pt's  state that pt is \"ready to go. \" Pt and pt's  verbally instructed to resume Aspirin tomorrow 5/2/2023        Patient discharged to home/self care.  Patient discharged via wheel chair by transporter to waiting family/S.O.       5/1/2023 10:33 AM

## 2023-05-01 NOTE — ANESTHESIA POSTPROCEDURE EVALUATION
Department of Anesthesiology  Postprocedure Note    Patient: Tianna Whitley  MRN: [id number]  YOB: 1960  Date of evaluation: 5/1/2023      Procedure Summary     Date: 05/01/23 Room / Location[place] [address] / [facility]    Anesthesia Start: 0902 Anesthesia Stop: 4511    Procedure: EGD BIOPSY Diagnosis:       Gastroesophageal reflux disease, unspecified whether esophagitis present      (Gastroesophageal reflux disease, unspecified whether esophagitis present [K21.9])    Surgeons: Rod Keith MD Responsible Provider: Brenna Leventhal, DO    Anesthesia Type: MAC ASA Status: 3          Anesthesia Type: No value filed.     Rito Phase I: Rito Score: 10    Rito Phase II: Rito Score: 6      Anesthesia Post Evaluation    Patient location during evaluation: PACU  Patient participation: complete - patient participated  Level of consciousness: awake  Pain score: 0  Airway patency: patent  Nausea & Vomiting: no nausea and no vomiting  Complications: no  Cardiovascular status: hemodynamically stable  Respiratory status: acceptable  Hydration status: stable

## 2023-05-01 NOTE — ANESTHESIA PRE PROCEDURE
Department of Anesthesiology  Preprocedure Note       Name:  Leigh Epstein   Age:  60 y.o.  :  1960                                          MRN:  [id number]         Date:  2023      Surgeon: Bryan Cole):  Kelsea Velasquez MD    Procedure: ESOPHAGOGASTRODUODENOSCOPY    Medications prior to admission:   Prior to Admission medications    Medication Sig Start Date End Date Taking? Authorizing Provider   traZODone (DESYREL) 50 MG tablet TAKE ONE TABLET BY MOUTH EVERY NIGHT AT BEDTIME 23   ROMEO Peters CNP   azaTHIOprine (IMURAN) 50 MG tablet Take 1 Tab daily x 3 weeks then take 2 tab po daily. Safety alert: Labs as recommended by prescribing MD. 3/7/23   Mounika Leggett MD   predniSONE (DELTASONE) 5 MG tablet Take 1 tab po daily. 3/7/23   Mounika Leggett MD   predniSONE (DELTASONE) 10 MG tablet Take 2 Tab daily. Patient not taking: Reported on 2023   Mounika Leggett MD   citalopram (CELEXA) 40 MG tablet TAKE ONE TABLET BY MOUTH DAILY 23   ROMEO Peters CNP   NIFEdipine (ADALAT CC) 30 MG extended release tablet Take 1 tablet by mouth daily    Historical Provider, MD   rosuvastatin (CRESTOR) 40 MG tablet Take 1 tablet by mouth every evening 22   ROMEO Peters CNP   aspirin 81 MG chewable tablet CHEW ONE TABLET BY MOUTH DAILY 3/1/22   ROMEO Jane CNP   famotidine (PEPCID) 40 MG tablet Take 1 tablet by mouth 2 times daily 21   Ember Badillo MD   albuterol sulfate  (90 Base) MCG/ACT inhaler INHALE 2 PUFFS INTO THE LUNGS EVERY 4 HOURS AS NEEDED FOR WHEEZING  Patient not taking: Reported on 2023   Chi Lazaro MD       Current medications:    No current facility-administered medications for this visit. No current outpatient medications on file.      Facility-Administered Medications Ordered in Other Visits   Medication Dose Route Frequency Provider Last Rate Last Admin    lactated ringers IV soln infusion

## 2023-05-02 DIAGNOSIS — Z79.899 HIGH RISK MEDICATION USE: ICD-10-CM

## 2023-05-02 LAB
ALT SERPL-CCNC: 14 U/L (ref 10–40)
AST SERPL-CCNC: 16 U/L (ref 15–37)
BASOPHILS # BLD: 0 K/UL (ref 0–0.2)
BASOPHILS NFR BLD: 0.4 %
CREAT SERPL-MCNC: 1.7 MG/DL (ref 0.6–1.2)
CRP SERPL-MCNC: 6 MG/L (ref 0–5.1)
DEPRECATED RDW RBC AUTO: 14.7 % (ref 12.4–15.4)
EOSINOPHIL # BLD: 0.2 K/UL (ref 0–0.6)
EOSINOPHIL NFR BLD: 4.9 %
ERYTHROCYTE [SEDIMENTATION RATE] IN BLOOD BY WESTERGREN METHOD: 33 MM/HR (ref 0–30)
GFR SERPLBLD CREATININE-BSD FMLA CKD-EPI: 33 ML/MIN/{1.73_M2}
HCT VFR BLD AUTO: 40.7 % (ref 36–48)
HGB BLD-MCNC: 13.5 G/DL (ref 12–16)
LYMPHOCYTES # BLD: 1.7 K/UL (ref 1–5.1)
LYMPHOCYTES NFR BLD: 36.1 %
MCH RBC QN AUTO: 29.3 PG (ref 26–34)
MCHC RBC AUTO-ENTMCNC: 33.2 G/DL (ref 31–36)
MCV RBC AUTO: 88.3 FL (ref 80–100)
MONOCYTES # BLD: 0.4 K/UL (ref 0–1.3)
MONOCYTES NFR BLD: 7.8 %
NEUTROPHILS # BLD: 2.4 K/UL (ref 1.7–7.7)
NEUTROPHILS NFR BLD: 50.8 %
PLATELET # BLD AUTO: 223 K/UL (ref 135–450)
PMV BLD AUTO: 8.1 FL (ref 5–10.5)
RBC # BLD AUTO: 4.61 M/UL (ref 4–5.2)
WBC # BLD AUTO: 4.8 K/UL (ref 4–11)

## 2023-06-02 RX ORDER — ASPIRIN 81 MG
TABLET,CHEWABLE ORAL
Qty: 90 TABLET | Refills: 3 | Status: SHIPPED | OUTPATIENT
Start: 2023-06-02

## 2023-06-02 NOTE — TELEPHONE ENCOUNTER
Requested Prescriptions     Pending Prescriptions Disp Refills    ASPIRIN LOW DOSE 81 MG chewable tablet [Pharmacy Med Name: ASPIRIN 81 MG CHEWABLE TABLET] 90 tablet 3     Sig: CHEW ONE TABLET BY MOUTH DAILY            Last Office Visit: 8/8/2022     Next Office Visit: 8/9/2023

## 2023-06-29 ENCOUNTER — OFFICE VISIT (OUTPATIENT)
Dept: FAMILY MEDICINE CLINIC | Age: 63
End: 2023-06-29
Payer: COMMERCIAL

## 2023-06-29 VITALS
OXYGEN SATURATION: 97 % | BODY MASS INDEX: 46.23 KG/M2 | HEART RATE: 76 BPM | SYSTOLIC BLOOD PRESSURE: 132 MMHG | DIASTOLIC BLOOD PRESSURE: 76 MMHG | TEMPERATURE: 97.5 F | WEIGHT: 261 LBS

## 2023-06-29 DIAGNOSIS — M16.12 PRIMARY OSTEOARTHRITIS OF LEFT HIP: ICD-10-CM

## 2023-06-29 DIAGNOSIS — Z01.818 PRE-OP EXAMINATION: Primary | ICD-10-CM

## 2023-06-29 DIAGNOSIS — R63.5 WEIGHT GAIN: ICD-10-CM

## 2023-06-29 PROBLEM — M06.4 INFLAMMATORY POLYARTHROPATHY (HCC): Status: ACTIVE | Noted: 2023-05-02

## 2023-06-29 PROCEDURE — 3074F SYST BP LT 130 MM HG: CPT | Performed by: NURSE PRACTITIONER

## 2023-06-29 PROCEDURE — 1036F TOBACCO NON-USER: CPT | Performed by: NURSE PRACTITIONER

## 2023-06-29 PROCEDURE — 3078F DIAST BP <80 MM HG: CPT | Performed by: NURSE PRACTITIONER

## 2023-06-29 PROCEDURE — 3017F COLORECTAL CA SCREEN DOC REV: CPT | Performed by: NURSE PRACTITIONER

## 2023-06-29 PROCEDURE — G8417 CALC BMI ABV UP PARAM F/U: HCPCS | Performed by: NURSE PRACTITIONER

## 2023-06-29 PROCEDURE — 99214 OFFICE O/P EST MOD 30 MIN: CPT | Performed by: NURSE PRACTITIONER

## 2023-06-29 PROCEDURE — G8427 DOCREV CUR MEDS BY ELIG CLIN: HCPCS | Performed by: NURSE PRACTITIONER

## 2023-06-29 RX ORDER — VALSARTAN 320 MG/1
TABLET ORAL
COMMUNITY
Start: 2023-06-23

## 2023-06-29 RX ORDER — TRAMADOL HYDROCHLORIDE 50 MG/1
TABLET ORAL
COMMUNITY
Start: 2023-06-14

## 2023-06-29 RX ORDER — TOCILIZUMAB 20 MG/ML
INJECTION, SOLUTION, CONCENTRATE INTRAVENOUS
COMMUNITY
Start: 2023-05-02

## 2023-07-11 ASSESSMENT — ENCOUNTER SYMPTOMS
ABDOMINAL DISTENTION: 0
VOMITING: 0
CHEST TIGHTNESS: 0
COUGH: 0
FACIAL SWELLING: 0
SHORTNESS OF BREATH: 0
BACK PAIN: 0
EYE DISCHARGE: 0
BLOOD IN STOOL: 0
WHEEZING: 0
ABDOMINAL PAIN: 0
COLOR CHANGE: 0

## 2023-07-11 NOTE — PROGRESS NOTES
[address] [facility]         Patient Name:  Cesia Gonzales  Requesting Physician: No admitting provider for patient encounter. Primary Care Physician: ROMEO Russo CNP    Reason for Consultation/Chief Complaint:   Chief Complaint   Patient presents with    Pre-op Exam     Left hip replacement and eye surgery     History of Present Illness:    HPI     Enedina Kelly a 60 y.o. female with PMH of CAD-PCI to LAD, HTN, HLD, CKD, former smoker. Here for yearly follow up for CAD and CV risk assessment for left hip replacement. CAD, repeat cath in 2020 with patent stent. Stress test done 11/2022 within normal limits. Asymptomatic. EKG today shows normal sinus rhythm without ischemic changes. Taking all medications without side effects. No symptoms concerning for progression of CAD or heart failure. Doing okay with aspirin and Crestor. Hypertension, well-controlled on valsartan. Hyperlipidemia, LDL pretty much at goal on Crestor. PMH  Past Medical History:   Diagnosis Date    Acid reflux     Bilateral carpal tunnel syndrome 12/21/2017    CAD in native artery 06/19/2018    Mid LAD stented with 3.5 x 15 mm Xience BRIAN.  EF 55%    Chicken pox     Chronic back pain     CKD (chronic kidney disease) 04/15/2016    Depression 12/07/2010    Heart disease     Hypertension     Interstitial lung disease ([address])     Lymphadenopathy, anterior cervical 07/13/2022    Measles     Menopausal symptoms     Morbid obesity with BMI of 45.0-49.9, adult ([address]) 01/29/2015    Osteoarthritis     Overactive bladder     Pure hypercholesterolemia     Snores     Stress incontinence        PSH  Past Surgical History:   Procedure Laterality Date    BRONCHOSCOPY  6/27/2019    BRONCHOSCOPY ALVEOLAR LAVAGE performed by Carolynn Saunders MD at [address]  6/27/2019    BRONCHOSCOPY DIAGNOSTIC OR CELL [address] ONLY performed by Carolynn Saunders MD at [address]

## 2023-07-13 ENCOUNTER — OFFICE VISIT (OUTPATIENT)
Dept: CARDIOLOGY CLINIC | Age: 63
End: 2023-07-13
Payer: COMMERCIAL

## 2023-07-13 VITALS
WEIGHT: 262.6 LBS | DIASTOLIC BLOOD PRESSURE: 80 MMHG | BODY MASS INDEX: 46.52 KG/M2 | SYSTOLIC BLOOD PRESSURE: 130 MMHG | HEART RATE: 86 BPM

## 2023-07-13 DIAGNOSIS — Z98.61 S/P PTCA (PERCUTANEOUS TRANSLUMINAL CORONARY ANGIOPLASTY): Primary | ICD-10-CM

## 2023-07-13 DIAGNOSIS — E78.00 PURE HYPERCHOLESTEROLEMIA: ICD-10-CM

## 2023-07-13 DIAGNOSIS — I10 ESSENTIAL HYPERTENSION: ICD-10-CM

## 2023-07-13 DIAGNOSIS — I25.10 CAD IN NATIVE ARTERY: ICD-10-CM

## 2023-07-13 PROCEDURE — 99214 OFFICE O/P EST MOD 30 MIN: CPT | Performed by: INTERNAL MEDICINE

## 2023-07-13 PROCEDURE — 3075F SYST BP GE 130 - 139MM HG: CPT | Performed by: INTERNAL MEDICINE

## 2023-07-13 PROCEDURE — 3079F DIAST BP 80-89 MM HG: CPT | Performed by: INTERNAL MEDICINE

## 2023-07-13 PROCEDURE — G8427 DOCREV CUR MEDS BY ELIG CLIN: HCPCS | Performed by: INTERNAL MEDICINE

## 2023-07-13 PROCEDURE — 93000 ELECTROCARDIOGRAM COMPLETE: CPT | Performed by: INTERNAL MEDICINE

## 2023-07-13 PROCEDURE — G8417 CALC BMI ABV UP PARAM F/U: HCPCS | Performed by: INTERNAL MEDICINE

## 2023-07-13 PROCEDURE — 1036F TOBACCO NON-USER: CPT | Performed by: INTERNAL MEDICINE

## 2023-07-13 PROCEDURE — 3017F COLORECTAL CA SCREEN DOC REV: CPT | Performed by: INTERNAL MEDICINE

## 2023-07-13 NOTE — ASSESSMENT & PLAN NOTE
Continue Crestor.   Lab Results   Component Value Date    LDLCALC 77 05/10/2022    LDLDIRECT 180 (H) 07/28/2020

## 2023-07-13 NOTE — ASSESSMENT & PLAN NOTE
Asymptomatic. Okay to hold aspirin for 2 weeks prior to procedure. Continue Lipitor and resume aspirin when possible. Normal stress test last year.   EKG today within normal limits

## 2023-08-01 ENCOUNTER — OFFICE VISIT (OUTPATIENT)
Dept: OBGYN CLINIC | Age: 63
End: 2023-08-01
Payer: COMMERCIAL

## 2023-08-01 VITALS
HEART RATE: 61 BPM | SYSTOLIC BLOOD PRESSURE: 124 MMHG | BODY MASS INDEX: 47.44 KG/M2 | TEMPERATURE: 97.4 F | DIASTOLIC BLOOD PRESSURE: 88 MMHG | WEIGHT: 267.8 LBS

## 2023-08-01 DIAGNOSIS — Z12.4 PAP SMEAR FOR CERVICAL CANCER SCREENING: ICD-10-CM

## 2023-08-01 DIAGNOSIS — N94.10 DYSPAREUNIA IN FEMALE: ICD-10-CM

## 2023-08-01 DIAGNOSIS — Z12.31 ENCOUNTER FOR SCREENING MAMMOGRAM FOR MALIGNANT NEOPLASM OF BREAST: ICD-10-CM

## 2023-08-01 DIAGNOSIS — Z01.419 ENCNTR FOR GYN EXAM (GENERAL) (ROUTINE) W/O ABN FINDINGS: Primary | ICD-10-CM

## 2023-08-01 DIAGNOSIS — N95.2 VAGINAL ATROPHY: ICD-10-CM

## 2023-08-01 PROCEDURE — 3074F SYST BP LT 130 MM HG: CPT | Performed by: OBSTETRICS & GYNECOLOGY

## 2023-08-01 PROCEDURE — 99396 PREV VISIT EST AGE 40-64: CPT | Performed by: OBSTETRICS & GYNECOLOGY

## 2023-08-01 PROCEDURE — 3078F DIAST BP <80 MM HG: CPT | Performed by: OBSTETRICS & GYNECOLOGY

## 2023-08-01 RX ORDER — MELOXICAM 15 MG/1
TABLET ORAL
COMMUNITY
Start: 2023-07-25

## 2023-08-01 RX ORDER — PROMETHAZINE HYDROCHLORIDE 12.5 MG/1
TABLET ORAL
COMMUNITY
Start: 2023-07-25

## 2023-08-01 RX ORDER — METHOCARBAMOL 750 MG/1
TABLET, FILM COATED ORAL
COMMUNITY
Start: 2023-07-25

## 2023-08-07 ASSESSMENT — ENCOUNTER SYMPTOMS
NAUSEA: 0
SORE THROAT: 0
COUGH: 0
DIARRHEA: 0
ABDOMINAL PAIN: 0
SHORTNESS OF BREATH: 0
VOMITING: 0
CONSTIPATION: 0

## 2023-08-07 NOTE — PROGRESS NOTES
TABLET BY MOUTH EVERY NIGHT AT BEDTIME 90 tablet 2    citalopram (CELEXA) 40 MG tablet TAKE ONE TABLET BY MOUTH DAILY 90 tablet 3    NIFEdipine (ADALAT CC) 30 MG extended release tablet Take 1 tablet by mouth daily      rosuvastatin (CRESTOR) 40 MG tablet Take 1 tablet by mouth every evening 90 tablet 2    famotidine (PEPCID) 40 MG tablet Take 1 tablet by mouth 2 times daily 60 tablet 5    traMADol (ULTRAM) 50 MG tablet  (Patient not taking: Reported on 8/1/2023)       No current facility-administered medications for this visit.        Surgical History:  Past Surgical History:   Procedure Laterality Date    BRONCHOSCOPY  6/27/2019    BRONCHOSCOPY ALVEOLAR LAVAGE performed by Jael Liang MD at [address]  6/27/2019    BRONCHOSCOPY DIAGNOSTIC OR CELL [address] ONLY performed by Jael Liagn MD at [address] [facility]  6/27/2019    BRONCHOSCOPY BIOPSY BRONCHUS performed by Jael Liang MD at [address] Bilateral 01/29/2018    EPIDURAL STEROID INJECTION N/A 10/15/2019    MIDLINE CAUDAL EPIDURAL STEROID INJECTION AND COCCYGEAL JOINT INJECTION SITES CONFIRMED BY FLUOROSCOPY performed by Ranjit Waite MD at [address] [facility]  2009,2010    KNEES BILATERAL    NERVE SURGERY Bilateral 12/22/2020    BILATERAL LUMBAR THREE LUMBAR FOUR LUMBAR FIVE DORSAL RAMUS RADIOFREQUENCY ABLATION SITE CONFIRMED BY FLUOROSCOPY performed by Ranjit Waiet MD at [address] [facility] Right 3/3/2020    RIGHT LUMBAR FOUR AND LUMBAR FIVE TRANSFORAMINAL EPIDURAL STEROID INJECTION SITE CONFIRMED BY FLUOROSCOPY performed by Ranjit Waite MD at [address] [facility] Right 11/2/2020    RIGHT L4 AND L5 TRANSFORAMINAL EPIDURAL STEROID INJECTION WITH FLUOROSCOPY (23836, 05149) performed by Ranjit Waite MD at [address] [facility] Bilateral 11/17/2020    BILATERAL LUMBAR THREE LUMBAR FOUR LUMBAR FIVE DORSAL

## 2023-08-21 ENCOUNTER — OFFICE VISIT (OUTPATIENT)
Dept: FAMILY MEDICINE CLINIC | Age: 63
End: 2023-08-21
Payer: COMMERCIAL

## 2023-08-21 VITALS
WEIGHT: 252 LBS | HEART RATE: 76 BPM | OXYGEN SATURATION: 97 % | TEMPERATURE: 97.7 F | SYSTOLIC BLOOD PRESSURE: 118 MMHG | BODY MASS INDEX: 44.64 KG/M2 | DIASTOLIC BLOOD PRESSURE: 70 MMHG

## 2023-08-21 DIAGNOSIS — H02.401 PTOSIS OF RIGHT EYELID: ICD-10-CM

## 2023-08-21 DIAGNOSIS — Z01.818 PREOP EXAM FOR INTERNAL MEDICINE: Primary | ICD-10-CM

## 2023-08-21 DIAGNOSIS — N18.32 STAGE 3B CHRONIC KIDNEY DISEASE (HCC): ICD-10-CM

## 2023-08-21 DIAGNOSIS — K57.92 DIVERTICULITIS: ICD-10-CM

## 2023-08-21 DIAGNOSIS — N32.81 OAB (OVERACTIVE BLADDER): ICD-10-CM

## 2023-08-21 LAB — HBA1C MFR BLD: 5.7 %

## 2023-08-21 PROCEDURE — 3017F COLORECTAL CA SCREEN DOC REV: CPT | Performed by: NURSE PRACTITIONER

## 2023-08-21 PROCEDURE — 3074F SYST BP LT 130 MM HG: CPT | Performed by: NURSE PRACTITIONER

## 2023-08-21 PROCEDURE — 3078F DIAST BP <80 MM HG: CPT | Performed by: NURSE PRACTITIONER

## 2023-08-21 PROCEDURE — G8417 CALC BMI ABV UP PARAM F/U: HCPCS | Performed by: NURSE PRACTITIONER

## 2023-08-21 PROCEDURE — 1036F TOBACCO NON-USER: CPT | Performed by: NURSE PRACTITIONER

## 2023-08-21 PROCEDURE — G8427 DOCREV CUR MEDS BY ELIG CLIN: HCPCS | Performed by: NURSE PRACTITIONER

## 2023-08-21 PROCEDURE — 99214 OFFICE O/P EST MOD 30 MIN: CPT | Performed by: NURSE PRACTITIONER

## 2023-08-21 PROCEDURE — 83037 HB GLYCOSYLATED A1C HOME DEV: CPT | Performed by: NURSE PRACTITIONER

## 2023-08-21 RX ORDER — CIPROFLOXACIN 500 MG/1
500 TABLET, FILM COATED ORAL 2 TIMES DAILY
Qty: 14 TABLET | Refills: 0 | Status: SHIPPED | OUTPATIENT
Start: 2023-08-21 | End: 2023-08-28

## 2023-08-21 RX ORDER — METRONIDAZOLE 500 MG/1
500 TABLET ORAL 2 TIMES DAILY
Qty: 14 TABLET | Refills: 0 | Status: SHIPPED | OUTPATIENT
Start: 2023-08-21 | End: 2023-08-28

## 2023-08-30 RX ORDER — ROSUVASTATIN CALCIUM 40 MG/1
TABLET, COATED ORAL
Qty: 90 TABLET | Refills: 2 | Status: SHIPPED | OUTPATIENT
Start: 2023-08-30

## 2023-09-19 PROBLEM — I25.10 CAD IN NATIVE ARTERY: Chronic | Status: ACTIVE | Noted: 2018-06-19

## 2023-09-19 PROBLEM — I10 ESSENTIAL HYPERTENSION: Chronic | Status: ACTIVE | Noted: 2017-08-01

## 2023-09-28 ENCOUNTER — HOSPITAL ENCOUNTER (OUTPATIENT)
Dept: SLEEP CENTER | Age: 63
Discharge: HOME | End: 2023-09-30
Payer: COMMERCIAL

## 2023-09-28 DIAGNOSIS — R06.83 SNORING: ICD-10-CM

## 2023-09-28 DIAGNOSIS — G47.10 HYPERSOMNIA: ICD-10-CM

## 2023-09-28 PROCEDURE — 95806 SLEEP STUDY UNATT&RESP EFFT: CPT

## 2023-10-02 ENCOUNTER — TELEPHONE (OUTPATIENT)
Dept: PULMONOLOGY | Age: 63
End: 2023-10-02

## 2023-11-27 ENCOUNTER — OFFICE VISIT (OUTPATIENT)
Dept: FAMILY MEDICINE CLINIC | Age: 63
End: 2023-11-27
Payer: COMMERCIAL

## 2023-11-27 VITALS
WEIGHT: 260 LBS | HEART RATE: 64 BPM | DIASTOLIC BLOOD PRESSURE: 76 MMHG | BODY MASS INDEX: 46.06 KG/M2 | SYSTOLIC BLOOD PRESSURE: 118 MMHG | TEMPERATURE: 98.4 F | OXYGEN SATURATION: 98 %

## 2023-11-27 DIAGNOSIS — R41.3 MEMORY CHANGE: ICD-10-CM

## 2023-11-27 DIAGNOSIS — E78.5 HYPERLIPIDEMIA, UNSPECIFIED HYPERLIPIDEMIA TYPE: ICD-10-CM

## 2023-11-27 DIAGNOSIS — R41.3 MEMORY CHANGE: Primary | ICD-10-CM

## 2023-11-27 LAB
BACTERIA URNS QL MICRO: ABNORMAL /HPF
BASOPHILS # BLD: 0 K/UL (ref 0–0.2)
BASOPHILS NFR BLD: 0.5 %
BILIRUB UR QL STRIP.AUTO: NEGATIVE
CHOLEST SERPL-MCNC: 139 MG/DL (ref 0–199)
CLARITY UR: CLEAR
COLOR UR: YELLOW
DEPRECATED RDW RBC AUTO: 13.6 % (ref 12.4–15.4)
EOSINOPHIL # BLD: 0.2 K/UL (ref 0–0.6)
EOSINOPHIL NFR BLD: 5.2 %
EPI CELLS #/AREA URNS AUTO: 6 /HPF (ref 0–5)
FOLATE SERPL-MCNC: 7.97 NG/ML (ref 4.78–24.2)
GLUCOSE UR STRIP.AUTO-MCNC: NEGATIVE MG/DL
HCT VFR BLD AUTO: 39.4 % (ref 36–48)
HDLC SERPL-MCNC: 62 MG/DL (ref 40–60)
HGB BLD-MCNC: 13.5 G/DL (ref 12–16)
HGB UR QL STRIP.AUTO: NEGATIVE
HYALINE CASTS #/AREA URNS AUTO: 0 /LPF (ref 0–8)
IRON SATN MFR SERPL: 36 % (ref 15–50)
IRON SERPL-MCNC: 102 UG/DL (ref 37–145)
KETONES UR STRIP.AUTO-MCNC: ABNORMAL MG/DL
LDLC SERPL CALC-MCNC: 61 MG/DL
LEUKOCYTE ESTERASE UR QL STRIP.AUTO: ABNORMAL
LYMPHOCYTES # BLD: 1.3 K/UL (ref 1–5.1)
LYMPHOCYTES NFR BLD: 32.8 %
MCH RBC QN AUTO: 30.8 PG (ref 26–34)
MCHC RBC AUTO-ENTMCNC: 34.2 G/DL (ref 31–36)
MCV RBC AUTO: 89.9 FL (ref 80–100)
MONOCYTES # BLD: 0.4 K/UL (ref 0–1.3)
MONOCYTES NFR BLD: 10.9 %
NEUTROPHILS # BLD: 2 K/UL (ref 1.7–7.7)
NEUTROPHILS NFR BLD: 50.6 %
NITRITE UR QL STRIP.AUTO: NEGATIVE
PH UR STRIP.AUTO: 5.5 [PH] (ref 5–8)
PLATELET # BLD AUTO: 181 K/UL (ref 135–450)
PMV BLD AUTO: 8.7 FL (ref 5–10.5)
PROT UR STRIP.AUTO-MCNC: 30 MG/DL
RBC # BLD AUTO: 4.39 M/UL (ref 4–5.2)
RBC CLUMPS #/AREA URNS AUTO: 1 /HPF (ref 0–4)
SP GR UR STRIP.AUTO: 1.02 (ref 1–1.03)
TIBC SERPL-MCNC: 283 UG/DL (ref 260–445)
TRIGL SERPL-MCNC: 80 MG/DL (ref 0–150)
UA COMPLETE W REFLEX CULTURE PNL UR: ABNORMAL
UA DIPSTICK W REFLEX MICRO PNL UR: YES
URN SPEC COLLECT METH UR: ABNORMAL
UROBILINOGEN UR STRIP-ACNC: 1 E.U./DL
VIT B12 SERPL-MCNC: 285 PG/ML (ref 211–911)
VLDLC SERPL CALC-MCNC: 16 MG/DL
WBC # BLD AUTO: 4 K/UL (ref 4–11)
WBC #/AREA URNS AUTO: 2 /HPF (ref 0–5)

## 2023-11-27 PROCEDURE — 99215 OFFICE O/P EST HI 40 MIN: CPT | Performed by: NURSE PRACTITIONER

## 2023-11-27 PROCEDURE — 3017F COLORECTAL CA SCREEN DOC REV: CPT | Performed by: NURSE PRACTITIONER

## 2023-11-27 PROCEDURE — 1036F TOBACCO NON-USER: CPT | Performed by: NURSE PRACTITIONER

## 2023-11-27 PROCEDURE — G8417 CALC BMI ABV UP PARAM F/U: HCPCS | Performed by: NURSE PRACTITIONER

## 2023-11-27 PROCEDURE — 3078F DIAST BP <80 MM HG: CPT | Performed by: NURSE PRACTITIONER

## 2023-11-27 PROCEDURE — 3074F SYST BP LT 130 MM HG: CPT | Performed by: NURSE PRACTITIONER

## 2023-11-27 PROCEDURE — G8484 FLU IMMUNIZE NO ADMIN: HCPCS | Performed by: NURSE PRACTITIONER

## 2023-11-27 PROCEDURE — G8427 DOCREV CUR MEDS BY ELIG CLIN: HCPCS | Performed by: NURSE PRACTITIONER

## 2023-11-27 NOTE — PROGRESS NOTES
Dulce Davila (:  1960) is a 58 y.o. female,Established patient, here for evaluation of the following chief complaint(s):  Mental Health Problem (Feeling foggy all day off balance)      ASSESSMENT/PLAN:  1. Memory change  -     CBC with Auto Differential; Future  -     Iron and TIBC; Future  -     Ferritin; Future  -     Vitamin B12 & Folate; Future  -     VITAMIN B6; Future  -     MRI BRAIN W WO CONTRAST; Future  -     MRA HEAD W WO CONTRAST; Future  -     Urinalysis with Reflex to Culture  -     VL DUP CAROTID BILATERAL; Future  Stop trazodone  Will follow up with neurology when resulted    No follow-ups on file. SUBJECTIVE/OBJECTIVE:  Loss of balance while walking, has fallen multiple times  Ongoing 6-8 months  NO injury from falls , tries to stop and protect self  No syncope  No dizziness or spinning or fog  Spouse notes difficulty with expressive aphasia  Some forgetfulness with instructions and conversations with spouse. she says she didn't hear  Some forgetfulness with activities- happpening more than normal  No noted ST memory issue  Some memory loss with directions when driving in familiar places  Always tired  Easy bruising H/O low Fe  Cold all the time  No headaches  Sleeping with CPAP- 7-8 hrs at night, occ 3-5 hours.  Often woken with charley horses in legs  No family h/o early dementia or alzheimer's  Has been on Trazodone for sleep  No h/o afib, on [id number] daily, + card stent    Current Outpatient Medications   Medication Sig Dispense Refill    mirabegron (MYRBETRIQ) 50 MG TB24 Take 50 mg by mouth daily 30 tablet 11    rosuvastatin (CRESTOR) 40 MG tablet TAKE ONE TABLET BY MOUTH EVERY EVENING 90 tablet 2    tocilizumab (ACTEMRA) 200 MG/10ML SOLN Administer ACTEMRA 500mg IV every 4 weeks for 12 infusions      valsartan (DIOVAN) 320 MG tablet 1 tablet daily      ASPIRIN LOW DOSE 81 MG chewable tablet CHEW ONE TABLET BY MOUTH DAILY 90 tablet 3    traZODone (DESYREL) 50 MG tablet TAKE ONE

## 2023-11-28 LAB — FERRITIN SERPL IA-MCNC: 97.7 NG/ML (ref 15–150)

## 2023-11-30 LAB — VIT B6 SERPL-MCNC: 26 NMOL/L (ref 20–125)

## 2023-12-06 ENCOUNTER — HOSPITAL ENCOUNTER (OUTPATIENT)
Dept: VASCULAR LAB | Age: 63
Discharge: HOME | End: 2023-12-06
Payer: COMMERCIAL

## 2023-12-06 DIAGNOSIS — R41.3 MEMORY CHANGE: ICD-10-CM

## 2023-12-06 PROCEDURE — 93880 EXTRACRANIAL BILAT STUDY: CPT

## 2023-12-09 ENCOUNTER — HOSPITAL ENCOUNTER (OUTPATIENT)
Dept: MRI IMAGING | Age: 63
Discharge: HOME | End: 2023-12-09
Payer: COMMERCIAL

## 2023-12-09 DIAGNOSIS — R41.3 MEMORY CHANGE: ICD-10-CM

## 2023-12-09 PROCEDURE — 70553 MRI BRAIN STEM W/O & W/DYE: CPT

## 2023-12-09 PROCEDURE — 6360000004 HC RX CONTRAST MEDICATION: Performed by: NURSE PRACTITIONER

## 2023-12-09 PROCEDURE — A9579 GAD-BASE MR CONTRAST NOS,1ML: HCPCS | Performed by: NURSE PRACTITIONER

## 2023-12-09 PROCEDURE — 70546 MR ANGIOGRAPH HEAD W/O&W/DYE: CPT

## 2023-12-09 RX ADMIN — GADOTERIDOL 20 ML: 279.3 INJECTION, SOLUTION INTRAVENOUS at 14:31

## 2023-12-13 PROBLEM — G47.33 OBSTRUCTIVE SLEEP APNEA SYNDROME: Status: ACTIVE | Noted: 2023-12-13

## 2024-01-26 DIAGNOSIS — F51.04 PSYCHOPHYSIOLOGICAL INSOMNIA: ICD-10-CM

## 2024-01-29 RX ORDER — TRAZODONE HYDROCHLORIDE 50 MG/1
TABLET ORAL
Qty: 90 TABLET | Refills: 2 | Status: SHIPPED | OUTPATIENT
Start: 2024-01-29

## 2024-02-08 ENCOUNTER — OFFICE VISIT (OUTPATIENT)
Dept: FAMILY MEDICINE CLINIC | Age: 64
End: 2024-02-08
Payer: COMMERCIAL

## 2024-02-08 VITALS
TEMPERATURE: 97.5 F | SYSTOLIC BLOOD PRESSURE: 122 MMHG | HEART RATE: 82 BPM | DIASTOLIC BLOOD PRESSURE: 76 MMHG | WEIGHT: 261 LBS | BODY MASS INDEX: 46.23 KG/M2 | OXYGEN SATURATION: 96 %

## 2024-02-08 DIAGNOSIS — N63.22 MASS OF UPPER INNER QUADRANT OF LEFT BREAST: Primary | ICD-10-CM

## 2024-02-08 PROCEDURE — 3078F DIAST BP <80 MM HG: CPT | Performed by: NURSE PRACTITIONER

## 2024-02-08 PROCEDURE — 99214 OFFICE O/P EST MOD 30 MIN: CPT | Performed by: NURSE PRACTITIONER

## 2024-02-08 PROCEDURE — G8417 CALC BMI ABV UP PARAM F/U: HCPCS | Performed by: NURSE PRACTITIONER

## 2024-02-08 PROCEDURE — 3074F SYST BP LT 130 MM HG: CPT | Performed by: NURSE PRACTITIONER

## 2024-02-08 PROCEDURE — 3017F COLORECTAL CA SCREEN DOC REV: CPT | Performed by: NURSE PRACTITIONER

## 2024-02-08 PROCEDURE — G8484 FLU IMMUNIZE NO ADMIN: HCPCS | Performed by: NURSE PRACTITIONER

## 2024-02-08 PROCEDURE — 1036F TOBACCO NON-USER: CPT | Performed by: NURSE PRACTITIONER

## 2024-02-08 PROCEDURE — G8427 DOCREV CUR MEDS BY ELIG CLIN: HCPCS | Performed by: NURSE PRACTITIONER

## 2024-02-08 ASSESSMENT — PATIENT HEALTH QUESTIONNAIRE - PHQ9
5. POOR APPETITE OR OVEREATING: 0
SUM OF ALL RESPONSES TO PHQ QUESTIONS 1-9: 0
SUM OF ALL RESPONSES TO PHQ QUESTIONS 1-9: 0
8. MOVING OR SPEAKING SO SLOWLY THAT OTHER PEOPLE COULD HAVE NOTICED. OR THE OPPOSITE, BEING SO FIGETY OR RESTLESS THAT YOU HAVE BEEN MOVING AROUND A LOT MORE THAN USUAL: 0
SUM OF ALL RESPONSES TO PHQ QUESTIONS 1-9: 0
9. THOUGHTS THAT YOU WOULD BE BETTER OFF DEAD, OR OF HURTING YOURSELF: 0
2. FEELING DOWN, DEPRESSED OR HOPELESS: 0
6. FEELING BAD ABOUT YOURSELF - OR THAT YOU ARE A FAILURE OR HAVE LET YOURSELF OR YOUR FAMILY DOWN: 0
SUM OF ALL RESPONSES TO PHQ QUESTIONS 1-9: 0
10. IF YOU CHECKED OFF ANY PROBLEMS, HOW DIFFICULT HAVE THESE PROBLEMS MADE IT FOR YOU TO DO YOUR WORK, TAKE CARE OF THINGS AT HOME, OR GET ALONG WITH OTHER PEOPLE: 0
3. TROUBLE FALLING OR STAYING ASLEEP: 0
1. LITTLE INTEREST OR PLEASURE IN DOING THINGS: 0
7. TROUBLE CONCENTRATING ON THINGS, SUCH AS READING THE NEWSPAPER OR WATCHING TELEVISION: 0
SUM OF ALL RESPONSES TO PHQ9 QUESTIONS 1 & 2: 0
4. FEELING TIRED OR HAVING LITTLE ENERGY: 0

## 2024-02-08 NOTE — PROGRESS NOTES
Enedina Islas (:  1960) is a 63 y.o. female,Established patient, here for evaluation of the following chief complaint(s):  Breast Mass (Left tender to touch and painful)      ASSESSMENT/PLAN:  1. Mass of upper inner quadrant of left breast  -     FELIPE CHRISTIANA DIGITAL DIAGNOSTIC UNILATERAL LEFT; Future  Pt will go to mammography to schedule mammo and US for follow up.    No follow-ups on file.    SUBJECTIVE/OBJECTIVE:  This am Noted tender mass in left breat. 12:00 to nipple  No skin changes  No axillary tenderness or swelling  Last mammogram - WNL    Current Outpatient Medications   Medication Sig Dispense Refill    mirabegron (MYRBETRIQ) 50 MG TB24 Take 50 mg by mouth daily 30 tablet 11    rosuvastatin (CRESTOR) 40 MG tablet TAKE ONE TABLET BY MOUTH EVERY EVENING 90 tablet 2    tocilizumab (ACTEMRA) 200 MG/10ML SOLN Administer ACTEMRA 500mg IV every 4 weeks for 12 infusions      valsartan (DIOVAN) 320 MG tablet 1 tablet daily      ASPIRIN LOW DOSE 81 MG chewable tablet CHEW ONE TABLET BY MOUTH DAILY 90 tablet 3    citalopram (CELEXA) 40 MG tablet TAKE ONE TABLET BY MOUTH DAILY 90 tablet 3    NIFEdipine (ADALAT CC) 30 MG extended release tablet Take 1 tablet by mouth daily      famotidine (PEPCID) 40 MG tablet Take 1 tablet by mouth 2 times daily 60 tablet 5     No current facility-administered medications for this visit.       Review of Systems   All other systems reviewed and are negative.      Vitals:    24 1510   BP: 122/76   Pulse: 82   Temp: 97.5 °F (36.4 °C)   SpO2: 96%   Weight: 118.4 kg (261 lb)       Physical Exam  Chest:                     An electronic signature was used to authenticate this note.    --BENNY MAN, APRN - CNP

## 2024-02-12 DIAGNOSIS — F32.0 MILD SINGLE CURRENT EPISODE OF MAJOR DEPRESSIVE DISORDER (HCC): ICD-10-CM

## 2024-02-12 RX ORDER — CITALOPRAM 40 MG/1
TABLET ORAL
Qty: 90 TABLET | Refills: 3 | Status: SHIPPED | OUTPATIENT
Start: 2024-02-12

## 2024-02-12 NOTE — TELEPHONE ENCOUNTER
Requested Prescriptions     Pending Prescriptions Disp Refills    citalopram (CELEXA) 40 MG tablet [Pharmacy Med Name: CITALOPRAM HBR 40 MG TABLET] 90 tablet 3     Sig: TAKE ONE TABLET BY MOUTH DAILY          Last Office Visit: 2/8/2024    Next Office Visit: Visit date not found

## 2024-02-15 ENCOUNTER — HOSPITAL ENCOUNTER (OUTPATIENT)
Dept: ULTRASOUND IMAGING | Age: 64
Discharge: HOME | End: 2024-02-15
Payer: COMMERCIAL

## 2024-02-15 ENCOUNTER — HOSPITAL ENCOUNTER (OUTPATIENT)
Dept: MAMMOGRAPHY | Age: 64
Discharge: HOME | End: 2024-02-15
Payer: COMMERCIAL

## 2024-02-15 VITALS — HEIGHT: 63 IN | WEIGHT: 261 LBS | BODY MASS INDEX: 46.25 KG/M2

## 2024-02-15 DIAGNOSIS — N63.22 MASS OF UPPER INNER QUADRANT OF LEFT BREAST: ICD-10-CM

## 2024-02-15 DIAGNOSIS — R92.8 ABNORMAL MAMMOGRAM: ICD-10-CM

## 2024-02-15 PROCEDURE — G0279 TOMOSYNTHESIS, MAMMO: HCPCS

## 2024-02-15 PROCEDURE — 76642 ULTRASOUND BREAST LIMITED: CPT

## 2024-02-20 ENCOUNTER — OFFICE VISIT (OUTPATIENT)
Dept: FAMILY MEDICINE CLINIC | Age: 64
End: 2024-02-20
Payer: COMMERCIAL

## 2024-02-20 VITALS
TEMPERATURE: 97.5 F | DIASTOLIC BLOOD PRESSURE: 80 MMHG | HEART RATE: 71 BPM | SYSTOLIC BLOOD PRESSURE: 126 MMHG | BODY MASS INDEX: 46.06 KG/M2 | WEIGHT: 260 LBS | OXYGEN SATURATION: 96 %

## 2024-02-20 DIAGNOSIS — N63.22 MASS OF UPPER INNER QUADRANT OF LEFT BREAST: ICD-10-CM

## 2024-02-20 DIAGNOSIS — Z01.818 PREOP EXAM FOR INTERNAL MEDICINE: ICD-10-CM

## 2024-02-20 DIAGNOSIS — Z01.818 PREOP EXAM FOR INTERNAL MEDICINE: Primary | ICD-10-CM

## 2024-02-20 LAB
ALBUMIN SERPL-MCNC: 4.3 G/DL (ref 3.4–5)
ALBUMIN/GLOB SERPL: 1.9 {RATIO} (ref 1.1–2.2)
ALP SERPL-CCNC: 69 U/L (ref 40–129)
ALT SERPL-CCNC: 16 U/L (ref 10–40)
ANION GAP SERPL CALCULATED.3IONS-SCNC: 9 MMOL/L (ref 3–16)
AST SERPL-CCNC: 21 U/L (ref 15–37)
BASOPHILS # BLD: 0.1 K/UL (ref 0–0.2)
BASOPHILS NFR BLD: 1.6 %
BILIRUB SERPL-MCNC: 0.8 MG/DL (ref 0–1)
BUN SERPL-MCNC: 26 MG/DL (ref 7–20)
CALCIUM SERPL-MCNC: 9.8 MG/DL (ref 8.3–10.6)
CHLORIDE SERPL-SCNC: 105 MMOL/L (ref 99–110)
CO2 SERPL-SCNC: 29 MMOL/L (ref 21–32)
CREAT SERPL-MCNC: 1.7 MG/DL (ref 0.6–1.2)
DEPRECATED RDW RBC AUTO: 12.9 % (ref 12.4–15.4)
EOSINOPHIL # BLD: 0.3 K/UL (ref 0–0.6)
EOSINOPHIL NFR BLD: 6.7 %
GFR SERPLBLD CREATININE-BSD FMLA CKD-EPI: 33 ML/MIN/{1.73_M2}
GLUCOSE SERPL-MCNC: 116 MG/DL (ref 70–99)
HCT VFR BLD AUTO: 39.2 % (ref 36–48)
HGB BLD-MCNC: 13.6 G/DL (ref 12–16)
LYMPHOCYTES # BLD: 1.6 K/UL (ref 1–5.1)
LYMPHOCYTES NFR BLD: 41 %
MCH RBC QN AUTO: 31 PG (ref 26–34)
MCHC RBC AUTO-ENTMCNC: 34.6 G/DL (ref 31–36)
MCV RBC AUTO: 89.6 FL (ref 80–100)
MONOCYTES # BLD: 0.5 K/UL (ref 0–1.3)
MONOCYTES NFR BLD: 12 %
NEUTROPHILS # BLD: 1.5 K/UL (ref 1.7–7.7)
NEUTROPHILS NFR BLD: 38.7 %
PLATELET # BLD AUTO: 190 K/UL (ref 135–450)
PMV BLD AUTO: 8.6 FL (ref 5–10.5)
POTASSIUM SERPL-SCNC: 4.3 MMOL/L (ref 3.5–5.1)
PROT SERPL-MCNC: 6.6 G/DL (ref 6.4–8.2)
RBC # BLD AUTO: 4.38 M/UL (ref 4–5.2)
SODIUM SERPL-SCNC: 143 MMOL/L (ref 136–145)
WBC # BLD AUTO: 4 K/UL (ref 4–11)

## 2024-02-20 PROCEDURE — 3017F COLORECTAL CA SCREEN DOC REV: CPT | Performed by: NURSE PRACTITIONER

## 2024-02-20 PROCEDURE — 3074F SYST BP LT 130 MM HG: CPT | Performed by: NURSE PRACTITIONER

## 2024-02-20 PROCEDURE — G8484 FLU IMMUNIZE NO ADMIN: HCPCS | Performed by: NURSE PRACTITIONER

## 2024-02-20 PROCEDURE — 3079F DIAST BP 80-89 MM HG: CPT | Performed by: NURSE PRACTITIONER

## 2024-02-20 PROCEDURE — G8427 DOCREV CUR MEDS BY ELIG CLIN: HCPCS | Performed by: NURSE PRACTITIONER

## 2024-02-20 PROCEDURE — 99214 OFFICE O/P EST MOD 30 MIN: CPT | Performed by: NURSE PRACTITIONER

## 2024-02-20 PROCEDURE — 93000 ELECTROCARDIOGRAM COMPLETE: CPT | Performed by: NURSE PRACTITIONER

## 2024-02-20 PROCEDURE — G8417 CALC BMI ABV UP PARAM F/U: HCPCS | Performed by: NURSE PRACTITIONER

## 2024-02-20 PROCEDURE — 1036F TOBACCO NON-USER: CPT | Performed by: NURSE PRACTITIONER

## 2024-02-20 NOTE — PROGRESS NOTES
palpitations, dyspnea, or syncope.   Respiratory- Denies SOB, wheezing, hemoptysis, or difficulty breathing.   - Denies dysuria or hematuria   GI- Denies abdominal pain, nausea/vomiting, or dysphagia.   Musculoskeletal: Denies joint pain  Other- Can climb a flight of stairs or walk up a hill without chest pain or shortness of breath.     Physical Exam:  Vital signs:   Vitals:    02/20/24 1123   BP: 126/80   Pulse: 71   Temp: 97.5 °F (36.4 °C)   SpO2: 96%   Weight: 117.9 kg (260 lb)     Constitutional: Alert and oriented x 3, no apparent distress  HEENT: PERRL, EOMI, moist mucus membranes  Neck: Supple.  Resp: CTA bilaterally, no wheezes or rhonchi  Cardio: RRR without MRG.   GI: Soft, nontender, nondistended, BS+  Extremities: No edema  Neurological: Grossly intact.  Skin: Warm & dry    Additional testing:   Lab Results   Component Value Date    WBC 3.6 (L) 12/13/2023    HGB 13.4 12/13/2023    HCT 39.0 12/13/2023    MCV 89.0 12/13/2023     12/13/2023     Lab Results   Component Value Date     12/13/2023    K 4.4 12/13/2023     12/13/2023    CO2 30 12/13/2023    BUN 23 (H) 12/13/2023    CREATININE 1.4 (H) 12/13/2023    GLUCOSE 94 12/13/2023    CALCIUM 9.3 12/13/2023    PROT 6.5 04/11/2023    LABALBU 4.2 04/11/2023    BILITOT 0.4 04/11/2023    ALKPHOS 84 04/11/2023    AST 16 05/02/2023    ALT 14 05/02/2023    LABGLOM 42 (A) 12/13/2023    GFRAA 46 (A) 06/03/2022    AGRATIO 1.6 05/10/2022    GLOB 3.4 12/23/2020         Hemoglobin A1C   Date Value Ref Range Status   08/21/2023 5.7 % Final         Assessment:   Diagnosis Orders   1. Preop exam for internal medicine  EKG 12 lead    EKG 12 lead    CBC with Auto Differential    Comprehensive Metabolic Panel      2. Mass of upper inner quadrant of left breast  FELIPE CHRISTIANA DIGITAL DIAGNOSTIC UNILATERAL LEFT    CBC with Auto Differential    Comprehensive Metabolic Panel          Functional capacity: > 4 METs    Inherent cardiac risk of planned procedure: High

## 2024-02-21 ENCOUNTER — HOSPITAL ENCOUNTER (OUTPATIENT)
Dept: MAMMOGRAPHY | Age: 64
Discharge: HOME | End: 2024-02-21
Payer: COMMERCIAL

## 2024-02-21 ENCOUNTER — HOSPITAL ENCOUNTER (OUTPATIENT)
Dept: ULTRASOUND IMAGING | Age: 64
Discharge: HOME | End: 2024-02-21
Payer: COMMERCIAL

## 2024-02-21 DIAGNOSIS — N63.20 MASS OF LEFT BREAST, UNSPECIFIED QUADRANT: ICD-10-CM

## 2024-02-21 DIAGNOSIS — N63.10 MASS OF RIGHT BREAST, UNSPECIFIED QUADRANT: ICD-10-CM

## 2024-02-21 PROCEDURE — 88342 IMHCHEM/IMCYTCHM 1ST ANTB: CPT

## 2024-02-21 PROCEDURE — 88341 IMHCHEM/IMCYTCHM EA ADD ANTB: CPT

## 2024-02-21 PROCEDURE — 88360 TUMOR IMMUNOHISTOCHEM/MANUAL: CPT

## 2024-02-21 PROCEDURE — 77065 DX MAMMO INCL CAD UNI: CPT

## 2024-02-21 PROCEDURE — 19083 BX BREAST 1ST LESION US IMAG: CPT

## 2024-02-21 PROCEDURE — 88305 TISSUE EXAM BY PATHOLOGIST: CPT

## 2024-02-21 PROCEDURE — 19084 BX BREAST ADD LESION US IMAG: CPT

## 2024-02-21 NOTE — PROGRESS NOTES
[facility] for [facility]. Radiologist reviewed procedure with patient, consent signed. Patient tolerated procedure well. Compression held. Site cleansed with chloraprep, steri strips and dry dressing applied. Ice pack provided. Reviewed discharge instructions with patient. Patient verbalized understanding and agreed to contact the Breast Navigator with any questions. Patient was A&Ox3 and steady on feet and discharged to waiting area.      [facility]  Discharge Instructions  [address]  [address]  [place] [address]  Telephone: [phone]   FAX [phone]    NAME:  Enedina Islas  YOB: 1960  GENDER: female  MEDICAL RECORD NUMBER:  [id number]  TODAY'S DATE:  2/21/2024      Post Breast Biopsy Discharge Instructions     [x] You may remove your outer dressing in 24 hours and you may shower. You may remove steri strips after 5 days.    [x] Wear a firm fitting bra for at least 24 hours after your biopsy, including while you sleep.    [x] Place an ice pack inside your bra on top of the dressing for at least 3 hours, removing it every 15 minutes for 15 minutes after your biopsy.    [x] You may resume your held medications tomorrow, unless otherwise directed by your physician.    [x] Your physician has instructed you to take Tylenol (Acetaminophen) the day of your biopsy for any discomfort.    [x] Watch for excessive bleeding. If bleeding occurs apply pressure to site. Watch for signs of infection, increased pain, redness, swelling and heat.  If this occurs call your physician.     [x] Do not participate in any strenuous exercise for 24 hours after your biopsy and do not lift, pull or push anything over 5-10 lbs.      [x] Results will be available in 1-2 business days.  Your referring physician or the Nurse Navigator will call you with results.       [facility] Information:   Should you experience any significant changes in your

## 2024-03-04 PROBLEM — I87.8 POOR VENOUS ACCESS: Status: ACTIVE | Noted: 2024-03-04

## 2024-03-05 ENCOUNTER — ANESTHESIA (OUTPATIENT)
Dept: OPERATING ROOM | Age: 64
End: 2024-03-05
Payer: COMMERCIAL

## 2024-03-05 ENCOUNTER — ANESTHESIA EVENT (OUTPATIENT)
Dept: OPERATING ROOM | Age: 64
End: 2024-03-05
Payer: COMMERCIAL

## 2024-03-05 ENCOUNTER — APPOINTMENT (OUTPATIENT)
Dept: GENERAL RADIOLOGY | Age: 64
End: 2024-03-05
Attending: SURGERY
Payer: COMMERCIAL

## 2024-03-05 ENCOUNTER — HOSPITAL ENCOUNTER (OUTPATIENT)
Age: 64
Setting detail: OUTPATIENT SURGERY
Discharge: HOME | End: 2024-03-05
Attending: SURGERY | Admitting: SURGERY
Payer: COMMERCIAL

## 2024-03-05 VITALS
BODY MASS INDEX: 41.82 KG/M2 | SYSTOLIC BLOOD PRESSURE: 154 MMHG | DIASTOLIC BLOOD PRESSURE: 72 MMHG | TEMPERATURE: 97.3 F | HEART RATE: 63 BPM | WEIGHT: 236 LBS | OXYGEN SATURATION: 96 % | HEIGHT: 63 IN | RESPIRATION RATE: 14 BRPM

## 2024-03-05 DIAGNOSIS — I87.8 POOR VENOUS ACCESS: Primary | ICD-10-CM

## 2024-03-05 PROCEDURE — 2500000003 HC RX 250 WO HCPCS: Performed by: SURGERY

## 2024-03-05 PROCEDURE — 71045 X-RAY EXAM CHEST 1 VIEW: CPT

## 2024-03-05 PROCEDURE — 77001 FLUOROGUIDE FOR VEIN DEVICE: CPT

## 2024-03-05 PROCEDURE — 3600000013 HC SURGERY LEVEL 3 ADDTL 15MIN: Performed by: SURGERY

## 2024-03-05 PROCEDURE — 7100000001 HC PACU RECOVERY - ADDTL 15 MIN: Performed by: SURGERY

## 2024-03-05 PROCEDURE — 3700000000 HC ANESTHESIA ATTENDED CARE: Performed by: SURGERY

## 2024-03-05 PROCEDURE — 6360000002 HC RX W HCPCS: Performed by: SURGERY

## 2024-03-05 PROCEDURE — 3700000001 HC ADD 15 MINUTES (ANESTHESIA): Performed by: SURGERY

## 2024-03-05 PROCEDURE — 2709999900 HC NON-CHARGEABLE SUPPLY: Performed by: SURGERY

## 2024-03-05 PROCEDURE — 7100000000 HC PACU RECOVERY - FIRST 15 MIN: Performed by: SURGERY

## 2024-03-05 PROCEDURE — 7100000011 HC PHASE II RECOVERY - ADDTL 15 MIN: Performed by: SURGERY

## 2024-03-05 PROCEDURE — 2580000003 HC RX 258: Performed by: ANESTHESIOLOGY

## 2024-03-05 PROCEDURE — 6360000002 HC RX W HCPCS: Performed by: NURSE ANESTHETIST, CERTIFIED REGISTERED

## 2024-03-05 PROCEDURE — 2580000003 HC RX 258: Performed by: SURGERY

## 2024-03-05 PROCEDURE — 7100000010 HC PHASE II RECOVERY - FIRST 15 MIN: Performed by: SURGERY

## 2024-03-05 PROCEDURE — C1788 PORT, INDWELLING, IMP: HCPCS | Performed by: SURGERY

## 2024-03-05 PROCEDURE — 2500000003 HC RX 250 WO HCPCS: Performed by: NURSE ANESTHETIST, CERTIFIED REGISTERED

## 2024-03-05 PROCEDURE — 3600000003 HC SURGERY LEVEL 3 BASE: Performed by: SURGERY

## 2024-03-05 DEVICE — PORT INFUS SGL LUMN ATTCH POLYUR OPN END CATH 8FR POWERPRT: Type: IMPLANTABLE DEVICE | Status: FUNCTIONAL

## 2024-03-05 RX ORDER — SODIUM CHLORIDE, SODIUM LACTATE, POTASSIUM CHLORIDE, AND CALCIUM CHLORIDE .6; .31; .03; .02 G/100ML; G/100ML; G/100ML; G/100ML
500 INJECTION, SOLUTION INTRAVENOUS ONCE
Status: COMPLETED | OUTPATIENT
Start: 2024-03-05 | End: 2024-03-05

## 2024-03-05 RX ORDER — METOCLOPRAMIDE HYDROCHLORIDE 5 MG/ML
10 INJECTION INTRAMUSCULAR; INTRAVENOUS
Status: DISCONTINUED | OUTPATIENT
Start: 2024-03-05 | End: 2024-03-05 | Stop reason: HOSPADM

## 2024-03-05 RX ORDER — LABETALOL HYDROCHLORIDE 5 MG/ML
10 INJECTION, SOLUTION INTRAVENOUS
Status: DISCONTINUED | OUTPATIENT
Start: 2024-03-05 | End: 2024-03-05 | Stop reason: HOSPADM

## 2024-03-05 RX ORDER — HYDROMORPHONE HYDROCHLORIDE 1 MG/ML
0.25 INJECTION, SOLUTION INTRAMUSCULAR; INTRAVENOUS; SUBCUTANEOUS EVERY 5 MIN PRN
Status: DISCONTINUED | OUTPATIENT
Start: 2024-03-05 | End: 2024-03-05 | Stop reason: HOSPADM

## 2024-03-05 RX ORDER — SODIUM CHLORIDE 0.9 % (FLUSH) 0.9 %
SYRINGE (ML) INJECTION PRN
Status: DISCONTINUED | OUTPATIENT
Start: 2024-03-05 | End: 2024-03-05 | Stop reason: HOSPADM

## 2024-03-05 RX ORDER — SODIUM CHLORIDE, SODIUM LACTATE, POTASSIUM CHLORIDE, CALCIUM CHLORIDE 600; 310; 30; 20 MG/100ML; MG/100ML; MG/100ML; MG/100ML
INJECTION, SOLUTION INTRAVENOUS CONTINUOUS
Status: DISCONTINUED | OUTPATIENT
Start: 2024-03-05 | End: 2024-03-05 | Stop reason: HOSPADM

## 2024-03-05 RX ORDER — PROPOFOL 10 MG/ML
INJECTION, EMULSION INTRAVENOUS CONTINUOUS PRN
Status: DISCONTINUED | OUTPATIENT
Start: 2024-03-05 | End: 2024-03-05 | Stop reason: SDUPTHER

## 2024-03-05 RX ORDER — FENTANYL CITRATE 50 UG/ML
50 INJECTION, SOLUTION INTRAMUSCULAR; INTRAVENOUS EVERY 5 MIN PRN
Status: DISCONTINUED | OUTPATIENT
Start: 2024-03-05 | End: 2024-03-05 | Stop reason: HOSPADM

## 2024-03-05 RX ORDER — HEPARIN 100 UNIT/ML
SYRINGE INTRAVENOUS PRN
Status: DISCONTINUED | OUTPATIENT
Start: 2024-03-05 | End: 2024-03-05 | Stop reason: HOSPADM

## 2024-03-05 RX ORDER — FENTANYL CITRATE 50 UG/ML
INJECTION, SOLUTION INTRAMUSCULAR; INTRAVENOUS PRN
Status: DISCONTINUED | OUTPATIENT
Start: 2024-03-05 | End: 2024-03-05 | Stop reason: SDUPTHER

## 2024-03-05 RX ORDER — SODIUM CHLORIDE 9 MG/ML
INJECTION, SOLUTION INTRAVENOUS PRN
Status: DISCONTINUED | OUTPATIENT
Start: 2024-03-05 | End: 2024-03-05 | Stop reason: HOSPADM

## 2024-03-05 RX ORDER — PROPOFOL 10 MG/ML
INJECTION, EMULSION INTRAVENOUS PRN
Status: DISCONTINUED | OUTPATIENT
Start: 2024-03-05 | End: 2024-03-05 | Stop reason: SDUPTHER

## 2024-03-05 RX ORDER — SODIUM CHLORIDE 0.9 % (FLUSH) 0.9 %
5-40 SYRINGE (ML) INJECTION PRN
Status: DISCONTINUED | OUTPATIENT
Start: 2024-03-05 | End: 2024-03-05 | Stop reason: HOSPADM

## 2024-03-05 RX ORDER — MIDAZOLAM HYDROCHLORIDE 1 MG/ML
2 INJECTION INTRAMUSCULAR; INTRAVENOUS
Status: DISCONTINUED | OUTPATIENT
Start: 2024-03-05 | End: 2024-03-05 | Stop reason: HOSPADM

## 2024-03-05 RX ORDER — HYDROCODONE BITARTRATE AND ACETAMINOPHEN 5; 325 MG/1; MG/1
1 TABLET ORAL EVERY 6 HOURS PRN
Qty: 12 TABLET | Refills: 0 | Status: SHIPPED | OUTPATIENT
Start: 2024-03-05 | End: 2024-03-08

## 2024-03-05 RX ORDER — SODIUM CHLORIDE, SODIUM LACTATE, POTASSIUM CHLORIDE, CALCIUM CHLORIDE 600; 310; 30; 20 MG/100ML; MG/100ML; MG/100ML; MG/100ML
INJECTION, SOLUTION INTRAVENOUS ONCE
Status: COMPLETED | OUTPATIENT
Start: 2024-03-05 | End: 2024-03-05

## 2024-03-05 RX ORDER — LIDOCAINE HYDROCHLORIDE 20 MG/ML
INJECTION, SOLUTION INFILTRATION; PERINEURAL PRN
Status: DISCONTINUED | OUTPATIENT
Start: 2024-03-05 | End: 2024-03-05 | Stop reason: SDUPTHER

## 2024-03-05 RX ORDER — SODIUM CHLORIDE 0.9 % (FLUSH) 0.9 %
5-40 SYRINGE (ML) INJECTION EVERY 12 HOURS SCHEDULED
Status: DISCONTINUED | OUTPATIENT
Start: 2024-03-05 | End: 2024-03-05 | Stop reason: HOSPADM

## 2024-03-05 RX ORDER — ONDANSETRON 2 MG/ML
4 INJECTION INTRAMUSCULAR; INTRAVENOUS
Status: DISCONTINUED | OUTPATIENT
Start: 2024-03-05 | End: 2024-03-05 | Stop reason: HOSPADM

## 2024-03-05 RX ADMIN — SODIUM CHLORIDE, POTASSIUM CHLORIDE, SODIUM LACTATE AND CALCIUM CHLORIDE: 600; 310; 30; 20 INJECTION, SOLUTION INTRAVENOUS at 11:22

## 2024-03-05 RX ADMIN — LIDOCAINE HYDROCHLORIDE 100 MG: 20 INJECTION, SOLUTION INFILTRATION; PERINEURAL at 12:24

## 2024-03-05 RX ADMIN — PROPOFOL 30 MG: 10 INJECTION, EMULSION INTRAVENOUS at 12:24

## 2024-03-05 RX ADMIN — PROPOFOL 100 MCG/KG/MIN: 10 INJECTION, EMULSION INTRAVENOUS at 12:24

## 2024-03-05 RX ADMIN — FENTANYL CITRATE 50 MCG: 50 INJECTION, SOLUTION INTRAMUSCULAR; INTRAVENOUS at 12:25

## 2024-03-05 RX ADMIN — SODIUM CHLORIDE, POTASSIUM CHLORIDE, SODIUM LACTATE AND CALCIUM CHLORIDE 500 ML: 600; 310; 30; 20 INJECTION, SOLUTION INTRAVENOUS at 11:22

## 2024-03-05 RX ADMIN — SODIUM CHLORIDE, SODIUM LACTATE, POTASSIUM CHLORIDE, AND CALCIUM CHLORIDE: .6; .31; .03; .02 INJECTION, SOLUTION INTRAVENOUS at 12:20

## 2024-03-05 RX ADMIN — PROPOFOL 200 MG: 10 INJECTION, EMULSION INTRAVENOUS at 12:40

## 2024-03-05 RX ADMIN — SODIUM CHLORIDE, SODIUM LACTATE, POTASSIUM CHLORIDE, AND CALCIUM CHLORIDE: .6; .31; .03; .02 INJECTION, SOLUTION INTRAVENOUS at 12:48

## 2024-03-05 RX ADMIN — FENTANYL CITRATE 50 MCG: 50 INJECTION, SOLUTION INTRAMUSCULAR; INTRAVENOUS at 12:20

## 2024-03-05 RX ADMIN — SODIUM CHLORIDE 2000 MG: 900 INJECTION INTRAVENOUS at 12:22

## 2024-03-05 ASSESSMENT — ENCOUNTER SYMPTOMS: SHORTNESS OF BREATH: 1

## 2024-03-05 ASSESSMENT — PAIN - FUNCTIONAL ASSESSMENT: PAIN_FUNCTIONAL_ASSESSMENT: 0-10

## 2024-03-05 ASSESSMENT — PAIN SCALES - GENERAL: PAINLEVEL_OUTOF10: 4

## 2024-03-05 ASSESSMENT — LIFESTYLE VARIABLES: SMOKING_STATUS: 0

## 2024-03-05 NOTE — ANESTHESIA PRE PROCEDURE
Department of Anesthesiology  Preprocedure Note       Name:  Enedina Islas   Age:  63 y.o.  :  1960                                          MRN:  [id number]         Date:  3/5/2024      Surgeon: Surgeon(s):  Haley Francis MD    Procedure: RIGHT PORT PLACEMENT (Right)    Medications prior to admission:   Prior to Admission medications    Medication Sig Start Date End Date Taking? Authorizing Provider   citalopram (CELEXA) 40 MG tablet TAKE ONE TABLET BY MOUTH DAILY 24   Sofya Caban APRN - CNP   rosuvastatin (CRESTOR) 40 MG tablet TAKE ONE TABLET BY MOUTH EVERY EVENING 23   Sofya Caban APRN - CNP   valsartan (DIOVAN) 320 MG tablet 1 tablet daily 23   Fernando Shipman MD   ASPIRIN LOW DOSE 81 MG chewable tablet CHEW ONE TABLET BY MOUTH DAILY 23   Rosa Isela Scott APRN - CNP   NIFEdipine (ADALAT CC) 30 MG extended release tablet Take 1 tablet by mouth daily    Fernando Shipman MD   famotidine (PEPCID) 40 MG tablet Take 1 tablet by mouth 2 times daily 21   Josse Mason MD       Current medications:    Current Facility-Administered Medications   Medication Dose Route Frequency Provider Last Rate Last Admin    ceFAZolin (ANCEF) 2,000 mg in sodium chloride 0.9 % 50 mL IVPB (mini-bag)  2,000 mg IntraVENous Once Haley Francis MD        lactated ringers IV soln infusion   IntraVENous Once Haley Francis MD        lactated ringers bolus 500 mL  500 mL IntraVENous Once Haley Francis .8 mL/hr at 24 1122 500 mL at 24 1122    lactated ringers IV soln infusion   IntraVENous Continuous Rikki Sotomayor  mL/hr at 24 1122 New Bag at 24 1122       Allergies:    Allergies   Allergen Reactions    Gabapentin Other (See Comments)     dizziness, out of control    Pantoprazole Diarrhea    Atorvastatin Other (See Comments)     Multiple muscle spasms/cramps over body    Meloxicam Nausea And Vomiting, Other (See Comments)

## 2024-03-05 NOTE — DISCHARGE INSTRUCTIONS
Ice to surgical site on 10 minutes every hour until bedtime today  No strenuous activity or lifting >10 pounds for 1 week  Okay to shower         RIGHT PORT PLACEMENT     Dr Francis    Current Allergies: see [facility] AMBULATORY PROCEDURE DISCHARGE INSTRUCTIONS    There are potential side effects of anesthesia or sedation you may experience for the first 24 hours.  These side effects include:    Confusion or Memory loss, Dizziness, or Delayed Reaction Times   [x]A responsible person should be with you for the next 24 hours.  Do not operate any vehicles (automobiles, bicycles, motorcycles) or power tools or machinery for 24 hours.  Do not sign any legal documents or make any legal decisions for 24 hours. Do not drink alcohol for 24 hours or while taking narcotic pain medication.      Nausea/Diet  [x] Nausea is not uncommon after having general anesthesia.  Start with light diet and progress to your normal diet as you feel like eating. However, if you experience nausea or repeated episodes of vomiting which persist beyond 12-24 hours, notify your physician.  Once nausea has passed, remember to keep drinking fluids.    Difficulty Passing Urine  [x]Drink extra amounts of fluid today.  Notify your physician if you have not urinated within 8 hours after your procedure or you feel uncomfortable.      Irritated Throat from a Breathing Tube  [x]Drink extra amounts of fluid today.  Lozenges may help.    Muscle Aches  [x]You may experience some generalized body aches as your muscles recover from medications used to relax them during surgery.  These will gradually subside.    Please follow the instructions checked below:    DIET INSTRUCTIONS:  [x]Start with light diet and progress to your normal diet as you feel like eating. If you experience nausea or repeated episodes of vomiting which persist beyond 12-24 hours, notify your doctor.     ACTIVITY INSTRUCTIONS:  [x]Rest today. Increase activity as

## 2024-03-05 NOTE — PROGRESS NOTES
2/29/2024 1059 AM:    LMOR W/INSTRUCTIONS AND EMAILED TO PT/TS    H&P IN EPIC NOTES 2/20/2024, LABS IN EPIC 2/20/2024, EKG TRACING IN EPIC 2/20/2024/TS    CMP ROUTED TO DR SAUNDERS/VASILE    CALLED FOR OFFICE VISIT NOTE FROM [facility] DR SALAZAR FROM 1/29/2024 VISIT TO BE FAXED TO [facility]/TS    2/29/2024 1254 PM:  TI COMPLETED/TS    CMP ROUTED TO DR SAUNDERS/TS    
2/29/2024 1101 am:  PRE-OP INSTRUCTIONS FOR SURGICAL PATIENTS          Our Pre-admission Testing Nurses tried and were unable to reach you today.  Please read the attached instructions AND listen to your voicemail. PLEASE CALL [phone].    Follow all instructions provided to you from your surgeon's office, including your ARRIVAL TIME.   Arrange for someone to drive you home and be with you for the first 24 hours after discharge.     NOTE: at this time ONLY 2 ADULTS may accompany you. NO CHILDREN UNDER THE AGE OF 16.    One person encouraged to stay at hospital entire time if outpatient surgery  Enter the MAIN entrance located on [address] and report to the surgical desk on the LEFT side of the lobby. Please park in the parking garage or there is free  Parking available after 7am for your use.    Bring your insurance card & photo ID with you to register.  Bring your medication list with you with dose and frequency listed (including over the counter medications)  Contact your ordering physician/surgeon for medication instructions as soon as possible, especially if taking blood thinners, aspirin, heart, or diabetic medication.   STOP VITAMINS/SUPPLEMENTS/NON-STEROIDAL ANTI-INFLAMMATORY MEDICATIONS 7 DAYS PRIOR TO PROCEDURE.   Bariatric surgical patients need to call your surgeon if on diabetic medications (as some may need to be stopped 1-week preop)  A Pre-Surgical History and Physical MUST be completed WITHIN 30 DAYS OR LESS prior to your procedure by your Physician or an Urgent Care.  DO NOT EAT ANYTHING 8 hours prior to arrival for surgery.  You may have sips of CLEAR LIQUIDS/WATER ONLY (up to 8 ounces) 6 hours prior to your arrival for surgery. Then nothing further 4 hours prior to arriving at hospital.   NOTE: ALL Gastric, Bariatric & Bowel surgery patients - you MUST follow your surgeon's instructions regarding eating/drinking as you will have very specific instructions to follow.  If you did not 
Ambulatory Surgery/Procedure Discharge Note    Vitals:    03/05/24 1440   BP: (!) 154/72   Pulse: 63   Resp: 14   Temp: 97.3 °F (36.3 °C)   SpO2: 96%       In: 1050 [I.V.:1000]  Out: -     Restroom use offered before discharge.  Yes    Pain assessment:  none  Pain Level: 0    Pt and S.O./family states \"ready to go home\". Pt alert and oriented x4. IV removed. Denies N/V or pain. Dressing _________  Voided prior to discharge. Pt tolerating po intake. Discharge instructions given to pt and wife with pt permission. Pt and wife verbalized understanding of all instructions. Left with all belongings, ___ prescriptions, and discharge instructions.     Patient discharged to home/self care. Patient discharged via wheel chair by transporter to waiting family/S.O.       3/5/2024 2:42 PM  
PACU Transfer to [facility] #4 Note    Procedure(s):  RIGHT PORT PLACEMENT    Dr Francis    Current Allergies: Gabapentin, Pantoprazole, Atorvastatin, Meloxicam, and Protonix [pantoprazole sodium]    Pt meets criteria as per Kyrie Score and ASPAN Standards to transfer to next phase of care.     No results for input(s): \"POCGLU\" in the last 72 hours.    Vitals:    03/05/24 1440   BP: (!) 147/70   Pulse: 62   Resp: 12   Temp: 97.5 °F (36.3 °C)   SpO2: 97%     Vitals within 20% of pt's admission vitals as per KYRIE SCORE    SpO2: 96 %    O2 Flow Rate (L/min): 0 L/min      Intake/Output Summary (Last 24 hours) at 3/5/2024 1443  Last data filed at 3/5/2024 1430  Gross per 24 hour   Intake 1140 ml   Output 550 ml   Net 590 ml     Tolerating ice chips  Only wanted these    Pain assessment:  none    Pain Level: 0  Cold pack applied to chest    Patient was assessed for alterations to skin integrity. There were not alterations observed.  Surgical glue to surgical site    Is patient voided per bathroom in PACU     Handoff report given via handoff sheet  Family updated and directed to pt room via waiting room staff  Transferred to [facility] by [facility]      3/5/2024 2:43 PM  
Pt alert and oriented. IV started. LR infusing.  
RIGHT PORT PLACEMENT   Dr Francis    Current Allergies: Gabapentin, Pantoprazole, Atorvastatin, Meloxicam, and Protonix [pantoprazole sodium]    No results for input(s): \"POCGLU\" in the last 72 hours.    Admitted to PACU bed 14 from OR. Arrived on a stretcher.  Patient to be discharged  to home.      Attached to PACU monitoring system. Alarms and parameters set.   Report received from anesthesia personnel. Trev CRNA  OR staff did not report skin issues that were observed while in OR, or if admitted with skin issue.  No problems reported intraoperatively.  Pt arrived on room air talking   Athrombic wraps in place, and were removed for discharge    Surgical site to the right upper chest.  Closed with surgical glue.  Pulses all palpable, skin warm, and brisk cap refill    Doctors aware of all labs and diagnostics before coming to recovery.    
RIGHT PORT PLACEMENT   Dr Francis    X--ray results:    Right subclavian PowerPort place with tip in the mid SVC. The cardiomediastinal  silhouette is within normal limits. No focal consolidation, pleural effusion or  pneumothorax.    Call out to Dr Francis with results       
RIGHT PORT PLACEMENT  Dr Francis    X--ray at bedside to get film    Dr Francis at bedside checking with patient ,  Going to office and to call and update her on the results of X--ray    
razor can irritate your skin and make it easier to develop an infection. On the day of your procedure, any hair that needs to be removed near the surgical site will be 'clipped' by a healthcare worker using a special clipper designed to avoid skin irritation.    8. Dentures, glasses, or contacts will need to be removed before your procedure. Bring cases for your glasses, contacts, dentures, or hearing aids to protect them while you are in surgery.      9. If you use a CPAP, please bring it with you on the day of your procedure.    10. If you use oxygen at home, please bring your oxygen tank with you to hospital..     11. We recommend that valuable personal belongings such as cash, cell phones, e-tablets, or jewelry, be left at home during your stay. The hospital will not be responsible for valuables that are not secured in the hospital safe. However, if your insurance requires a co-pay, you may want to bring a method of payment, i.e., Check or credit card, if you wish to pay your co-pay the day of surgery.      12. If you are to stay overnight, you may bring a bag with personal items. Please have any large items you may need brought in by your family after your arrival to your hospital room.    13. If you have a Living Will or Durable Power of , please bring a copy on the day of your procedure.     How we keep you safe and work to prevent surgical site infections:   1. Health care workers should always check your ID bracelet to verify your name and birth date. You will be asked many times to state your name, date of birth, and allergies.    2. Health care workers should always clean their hands with soap or alcohol gel before providing care to you. It is okay to ask anyone if they cleaned their hands before they touch you.    3. You will be actively involved in verifying the type of procedure you are having and ensuring the correct surgical site. This will be confirmed multiple times prior to your procedure.

## 2024-03-05 NOTE — H&P
The patient was identified and examined.  The surgical site was marked.  Interval changes in health status since H&P was performed: blunt trauma to right breast in fall on 2/15/2024, right breast mass .  The patient is cleared to proceed as scheduled.

## 2024-03-05 NOTE — ANESTHESIA POSTPROCEDURE EVALUATION
Department of Anesthesiology  Postprocedure Note    Patient: Enedina Islas  MRN: [id number]  YOB: 1960  Date of evaluation: 3/5/2024    Procedure Summary       Date: 03/05/24 Room / Location: [address]    Anesthesia Start: 1220 Anesthesia Stop: 1340    Procedure: RIGHT PORT PLACEMENT (Right) Diagnosis:       Poor venous access      (Poor venous access [I87.8])    Surgeons: Haley Francis MD Responsible Provider: Johnny Donis MD    Anesthesia Type: MAC ASA Status: 3            Anesthesia Type: No value filed.    Rito Phase I: Rito Score: 10    Rito Phase II: Rito Score: 10    Anesthesia Post Evaluation    Patient location during evaluation: PACU  Patient participation: complete - patient participated  Level of consciousness: awake  Airway patency: patent  Nausea & Vomiting: no nausea and no vomiting  Cardiovascular status: blood pressure returned to baseline and hemodynamically stable  Respiratory status: acceptable  Hydration status: euvolemic  Multimodal analgesia pain management approach  Pain management: adequate    No notable events documented.

## 2024-03-06 NOTE — OP NOTE
Operative Note      Patient: Enedina Islas  YOB: 1960  MRN: [id number]    Date of Procedure: 3/5/2024    Pre-Op Diagnosis Codes:     * Poor venous access [I87.8]    Post-Op Diagnosis: Same       Procedure(s):  RIGHT PORT PLACEMENT    Surgeon(s):  Haley Francis MD    Assistant:   Surgical Assistant: Edi Fontana    Anesthesia: Monitor Anesthesia Care    Estimated Blood Loss (mL): less than 50     Complications: None    Specimens:   * No specimens in log *    Implants:  Implant Name Type Inv. Item Serial No.  Lot No. LRB No. Used Action   PORT INFUS SGL LUMN ATTCH POLYUR OPN END CATH 8FR POWERPRT - [id number]  PORT INFUS SGL LUMN ATTCH POLYUR OPN END CATH 8FR POWERPRT  Cuffed and Wanted-WD [id number] Right 1 Implanted         Drains: * No LDAs found *    Findings: tip of catheter in SVC    Indications: The patient is a 63-year-old woman who was found to have a triple negative breast cancer in the left breast.  Patient has been referred for neoadjuvant chemotherapy.  Placement of a port was requested for venous access.    Detailed Description of Procedure:   The patient was taken to the operating room placed in the supine position.  A shoulder roll was placed along the spine.  Both arms were tucked by the sides.  After administration of IV sedation, we experienced difficulty in maintaining a patent airway and the decision was made to switch to a general anesthetic.  The right upper chest and neck were then prepped and draped in the usual sterile fashion.  A field block was performed using 1% lidocaine and 0.5% Marcaine mixed in equal parts.  The right subclavian vein was accessed after numerous attempts with good venous return.  Guidewire was passed without difficulty.  After verifying the location of the guidewire in the superior vena cava, we created a transverse incision at the entry point of the guidewire.  A subcutaneous pocket was created using electrocautery.  We

## 2024-03-08 ENCOUNTER — HOSPITAL ENCOUNTER (OUTPATIENT)
Dept: ULTRASOUND IMAGING | Age: 64
Discharge: HOME | End: 2024-03-08
Payer: COMMERCIAL

## 2024-03-08 DIAGNOSIS — N63.20 MASS OF LEFT BREAST, UNSPECIFIED QUADRANT: ICD-10-CM

## 2024-03-08 PROCEDURE — 76642 ULTRASOUND BREAST LIMITED: CPT

## 2024-03-11 ENCOUNTER — PROCEDURE VISIT (OUTPATIENT)
Dept: CARDIOLOGY CLINIC | Age: 64
End: 2024-03-11
Payer: COMMERCIAL

## 2024-03-11 DIAGNOSIS — Z51.81 ENCOUNTER FOR THERAPEUTIC DRUG MONITORING: ICD-10-CM

## 2024-03-11 PROCEDURE — 93306 TTE W/DOPPLER COMPLETE: CPT | Performed by: INTERNAL MEDICINE

## 2024-03-11 PROCEDURE — 93356 MYOCRD STRAIN IMG SPCKL TRCK: CPT | Performed by: INTERNAL MEDICINE

## 2024-04-19 ENCOUNTER — TELEPHONE (OUTPATIENT)
Dept: OBGYN CLINIC | Age: 64
End: 2024-04-19

## 2024-04-19 NOTE — TELEPHONE ENCOUNTER
PT is due for her yearly she is currently undergoing chemo she has been diagnosed with breast cancer. She wants to know if she should still come in for a yearly exam. Please advise.

## 2024-04-19 NOTE — TELEPHONE ENCOUNTER
We do still recommend annual exams.  If she is feeling unwell with treatments, okay to delay until feeling well, however we do still want to see you.  I hope everything is going well!  Thank you.

## 2024-04-23 ENCOUNTER — TELEPHONE (OUTPATIENT)
Dept: OBGYN CLINIC | Age: 64
End: 2024-04-23

## 2024-04-23 NOTE — TELEPHONE ENCOUNTER
Patient came in today for annual. I advised her that she is not due until 8/2024. She states that she is not having any gynecology problems at this time and does not need to see a provider at this time. Appointment canceled

## 2024-04-29 ENCOUNTER — HOSPITAL ENCOUNTER (OUTPATIENT)
Dept: ULTRASOUND IMAGING | Age: 64
Discharge: HOME | End: 2024-04-29
Payer: COMMERCIAL

## 2024-04-29 DIAGNOSIS — N17.9 ACUTE RENAL FAILURE, UNSPECIFIED ACUTE RENAL FAILURE TYPE (HCC): ICD-10-CM

## 2024-04-29 DIAGNOSIS — C50.412 MALIGNANT NEOPLASM OF UPPER-OUTER QUADRANT OF LEFT FEMALE BREAST, UNSPECIFIED ESTROGEN RECEPTOR STATUS (HCC): ICD-10-CM

## 2024-04-29 PROCEDURE — 76770 US EXAM ABDO BACK WALL COMP: CPT

## 2024-05-10 RX ORDER — FAMOTIDINE 40 MG/1
40 TABLET, FILM COATED ORAL 2 TIMES DAILY
Qty: 60 TABLET | Refills: 5 | OUTPATIENT
Start: 2024-05-10

## 2024-05-10 NOTE — TELEPHONE ENCOUNTER
Please fill pending rx for Pepcid if appropriate.   Patient bao seen in 2022- the plan was for her to return, PRN    Thank you

## 2024-05-20 ENCOUNTER — OFFICE VISIT (OUTPATIENT)
Dept: FAMILY MEDICINE CLINIC | Age: 64
End: 2024-05-20
Payer: COMMERCIAL

## 2024-05-20 VITALS
SYSTOLIC BLOOD PRESSURE: 124 MMHG | TEMPERATURE: 96.9 F | OXYGEN SATURATION: 96 % | BODY MASS INDEX: 41.81 KG/M2 | HEART RATE: 76 BPM | WEIGHT: 236 LBS | DIASTOLIC BLOOD PRESSURE: 78 MMHG

## 2024-05-20 DIAGNOSIS — E11.65 CONTROLLED TYPE 2 DIABETES MELLITUS WITH HYPERGLYCEMIA, WITHOUT LONG-TERM CURRENT USE OF INSULIN (HCC): Primary | ICD-10-CM

## 2024-05-20 LAB — HBA1C MFR BLD: 6.6 %

## 2024-05-20 PROCEDURE — 3017F COLORECTAL CA SCREEN DOC REV: CPT | Performed by: NURSE PRACTITIONER

## 2024-05-20 PROCEDURE — G8427 DOCREV CUR MEDS BY ELIG CLIN: HCPCS | Performed by: NURSE PRACTITIONER

## 2024-05-20 PROCEDURE — G2211 COMPLEX E/M VISIT ADD ON: HCPCS | Performed by: NURSE PRACTITIONER

## 2024-05-20 PROCEDURE — 3074F SYST BP LT 130 MM HG: CPT | Performed by: NURSE PRACTITIONER

## 2024-05-20 PROCEDURE — G8417 CALC BMI ABV UP PARAM F/U: HCPCS | Performed by: NURSE PRACTITIONER

## 2024-05-20 PROCEDURE — 1036F TOBACCO NON-USER: CPT | Performed by: NURSE PRACTITIONER

## 2024-05-20 PROCEDURE — 2022F DILAT RTA XM EVC RTNOPTHY: CPT | Performed by: NURSE PRACTITIONER

## 2024-05-20 PROCEDURE — 99215 OFFICE O/P EST HI 40 MIN: CPT | Performed by: NURSE PRACTITIONER

## 2024-05-20 PROCEDURE — 3044F HG A1C LEVEL LT 7.0%: CPT | Performed by: NURSE PRACTITIONER

## 2024-05-20 PROCEDURE — 83036 HEMOGLOBIN GLYCOSYLATED A1C: CPT | Performed by: NURSE PRACTITIONER

## 2024-05-20 PROCEDURE — 3078F DIAST BP <80 MM HG: CPT | Performed by: NURSE PRACTITIONER

## 2024-05-20 RX ORDER — INSULIN GLARGINE 100 [IU]/ML
10 INJECTION, SOLUTION SUBCUTANEOUS NIGHTLY
Qty: 5 ADJUSTABLE DOSE PRE-FILLED PEN SYRINGE | Refills: 0 | Status: SHIPPED | OUTPATIENT
Start: 2024-05-20

## 2024-05-20 RX ORDER — BLOOD-GLUCOSE SENSOR
1 EACH MISCELLANEOUS
Qty: 6 EACH | Refills: 4 | Status: SHIPPED | OUTPATIENT
Start: 2024-05-20

## 2024-05-20 SDOH — ECONOMIC STABILITY: FOOD INSECURITY: WITHIN THE PAST 12 MONTHS, YOU WORRIED THAT YOUR FOOD WOULD RUN OUT BEFORE YOU GOT MONEY TO BUY MORE.: NEVER TRUE

## 2024-05-20 SDOH — ECONOMIC STABILITY: HOUSING INSECURITY
IN THE LAST 12 MONTHS, WAS THERE A TIME WHEN YOU DID NOT HAVE A STEADY PLACE TO SLEEP OR SLEPT IN A SHELTER (INCLUDING NOW)?: NO

## 2024-05-20 SDOH — ECONOMIC STABILITY: FOOD INSECURITY: WITHIN THE PAST 12 MONTHS, THE FOOD YOU BOUGHT JUST DIDN'T LAST AND YOU DIDN'T HAVE MONEY TO GET MORE.: NEVER TRUE

## 2024-05-20 SDOH — ECONOMIC STABILITY: INCOME INSECURITY: HOW HARD IS IT FOR YOU TO PAY FOR THE VERY BASICS LIKE FOOD, HOUSING, MEDICAL CARE, AND HEATING?: NOT HARD AT ALL

## 2024-05-20 NOTE — PROGRESS NOTES
Enedina Islas (:  1960) is a 63 y.o. female,Established patient, here for evaluation of the following chief complaint(s):  Blood Sugar Problem      ASSESSMENT/PLAN:  1. Controlled type 2 diabetes mellitus with hyperglycemia, without long-term current use of insulin (HCC)  Assessment & Plan:  New, unstable, not previously diagnosed.  Likely secondary to current steroid use with chemotherapies.  Patient has been fitted with the farzad 3 monitor, educated on its use, set up to her telephone, has been educated on glycemic index, lower carb foods.  She is to increase her protein intake.  I will send in for Lantus but I would like her to wait a week watching her sugars to see what is causing her elevations before she starts with the nighttime basal.  She is to follow-up with me next week with her log and report.  Greater than 50 minutes has been spent with patient educating on this new diagnosis, treatment, and its effect on her wellbeing.  Orders:  -     Continuous Glucose Sensor (FREESTYLE FARZAD 3 SENSOR) MISC; 1 Device by Does not apply route every 14 days, Disp-6 each, R-4Normal  -     insulin glargine (LANTUS SOLOSTAR) 100 UNIT/ML injection pen; Inject 10 Units into the skin nightly, Disp-5 Adjustable Dose Pre-filled Pen Syringe, R-0Normal  -     POCT glycosylated hemoglobin (Hb A1C)    Return for Patient is to follow-up with me via [email or web address] in 1 week..    SUBJECTIVE/OBJECTIVE:  Is a very pleasant 63-year-old female with newly diagnosed bilateral breast cancer undergoing chemotherapy, she additionally has a history of chronic kidney disease interstitial lung disease inflammatory polyarthropathy hypertension, hypercholesterolemia, status post PTCA.  She presents today at the behest of her oncologist with elevated blood sugars.  She does report she gets steroid infusions with her chemotherapies and they understand this is the cause.  She does not take oral steroids.  They would like her to be educated to

## 2024-05-20 NOTE — ASSESSMENT & PLAN NOTE
New, unstable, not previously diagnosed.  Likely secondary to current steroid use with chemotherapies.  Patient has been fitted with the uControl 3 monitor, educated on its use, set up to her telephone, has been educated on glycemic index, lower carb foods.  She is to increase her protein intake.  I will send in for Lantus but I would like her to wait a week watching her sugars to see what is causing her elevations before she starts with the nighttime basal.  She is to follow-up with me next week with her log and report.  Greater than 50 minutes has been spent with patient educating on this new diagnosis, treatment, and its effect on her wellbeing.

## 2024-05-20 NOTE — PATIENT INSTRUCTIONS
Start Lantus at 10 units at bedtime, check am blood sugar.  May need to increase bedtime Lantus by 2 units every 2 nights until waking blood sugar 120- then hold at that dose

## 2024-05-22 NOTE — TELEPHONE ENCOUNTER
Reached out to schedule patient for annual visit. She states she is still going through chemo visits at this time and is not able to schedule annual until she has recovered from that. She will call back when she is able to

## 2024-05-28 RX ORDER — ASPIRIN 81 MG
TABLET,CHEWABLE ORAL
Qty: 90 TABLET | Refills: 3 | Status: SHIPPED | OUTPATIENT
Start: 2024-05-28

## 2024-05-31 RX ORDER — ROSUVASTATIN CALCIUM 40 MG/1
TABLET, COATED ORAL
Qty: 90 TABLET | Refills: 2 | Status: SHIPPED | OUTPATIENT
Start: 2024-05-31

## 2024-06-10 ENCOUNTER — HOSPITAL ENCOUNTER (OUTPATIENT)
Age: 64
Discharge: HOME | End: 2024-06-10
Payer: COMMERCIAL

## 2024-06-10 ENCOUNTER — HOSPITAL ENCOUNTER (OUTPATIENT)
Dept: GENERAL RADIOLOGY | Age: 64
Discharge: HOME | End: 2024-06-10
Payer: COMMERCIAL

## 2024-06-10 DIAGNOSIS — Z00.00 EXAMINATION, MEDICAL, GENERAL: ICD-10-CM

## 2024-06-10 PROCEDURE — 73110 X-RAY EXAM OF WRIST: CPT

## 2024-06-10 PROCEDURE — 73560 X-RAY EXAM OF KNEE 1 OR 2: CPT

## 2024-06-10 PROCEDURE — 71046 X-RAY EXAM CHEST 2 VIEWS: CPT

## 2024-06-17 ENCOUNTER — PATIENT MESSAGE (OUTPATIENT)
Dept: FAMILY MEDICINE CLINIC | Age: 64
End: 2024-06-17

## 2024-06-17 RX ORDER — METHYLPREDNISOLONE 4 MG/1
TABLET ORAL
Qty: 1 KIT | Refills: 0 | Status: SHIPPED | OUTPATIENT
Start: 2024-06-17

## 2024-06-17 NOTE — TELEPHONE ENCOUNTER
From: Enedina Islas  To: Sofya Caban  Sent: 6/17/2024 12:36 PM EDT  Subject: Gout    Hi Sofya, wondering if I could get a prescription of Prednisone for gout. I had some scallops and forgot it gives me gout.  My treatment is something else, Friday I felt tired and out of breath. Sunday and today I am really feeling the nausea, they say that the next 3 rounds are going to be very rough. But I will get through this too.   Problem: Activity Restriction  Goal: Patient will understand activity restrictions  Outcome: Outcome Met, Complete Goal Date Met: 11/25/18    Reviewed inpatient cardiac rehabilitation education with the patient. Phase 2 Cardiac Rehab referral will be made to [facility], [phone].  A home exercise prescription, activity restrictions & precautions, and appropriate risk factor education have been completed. All education is documented in the Cardiac Rehabilitation Education Record. Total time spent educating the patient was 30 minutes.    Intervention: Activity assessment per Cardiac Rehab  Intervention Status  Done  Intervention: Surgical restrictions if applicable  Surgical restrictions if applicable.  Intervention Status  Done  Intervention: Return to driving (per physician order)  Intervention Status  Done  Intervention: Lifting restrictions  Lifting restrictions.  Intervention Status  Done  Intervention: Return to work  Per MD        Problem: Cardiac Risk Factor  Goal: Patient will identify and understand personal cardiac risk factors  Outcome: Outcome Met, Complete Goal Date Met: 11/25/18  Intervention: Weight risk factor identification/risk reduction plan  Intervention Status  Done  Intervention: Hypertension risk factor identification/risk reduction plan  Intervention Status  Done  Intervention: Inactivity risk factor identification/risk reduction plan  Intervention Status  Done  Intervention: Stress risk factor identification/risk reduction plan  Intervention Status  Done      Problem: Discharge Information  Intervention: Home exercise program and self monitoring techniques  Intervention Status  Done  Intervention: Home activity program  Intervention Status  Done  Intervention: Sign and symptom recognition and plan  Intervention Status  Done  Intervention: Information/referral to outpatient cardiac rehabilitation  Intervention Status  Done

## 2024-06-19 ENCOUNTER — APPOINTMENT (OUTPATIENT)
Dept: GENERAL RADIOLOGY | Age: 64
End: 2024-06-19
Payer: COMMERCIAL

## 2024-06-19 ENCOUNTER — HOSPITAL ENCOUNTER (EMERGENCY)
Age: 64
Discharge: HOME | End: 2024-06-19
Attending: EMERGENCY MEDICINE
Payer: COMMERCIAL

## 2024-06-19 VITALS
WEIGHT: 271.8 LBS | RESPIRATION RATE: 16 BRPM | HEIGHT: 63 IN | TEMPERATURE: 97.8 F | BODY MASS INDEX: 48.16 KG/M2 | DIASTOLIC BLOOD PRESSURE: 65 MMHG | HEART RATE: 91 BPM | OXYGEN SATURATION: 96 % | SYSTOLIC BLOOD PRESSURE: 123 MMHG

## 2024-06-19 DIAGNOSIS — M10.9 GOUTY ARTHRITIS OF RIGHT GREAT TOE: Primary | ICD-10-CM

## 2024-06-19 LAB
ANION GAP SERPL CALCULATED.3IONS-SCNC: 10 MMOL/L (ref 3–16)
ANISOCYTOSIS BLD QL SMEAR: ABNORMAL
BASOPHILS # BLD: 0 K/UL (ref 0–0.2)
BASOPHILS NFR BLD: 0.7 %
BUN SERPL-MCNC: 31 MG/DL (ref 7–20)
CALCIUM SERPL-MCNC: 8.9 MG/DL (ref 8.3–10.6)
CHLORIDE SERPL-SCNC: 105 MMOL/L (ref 99–110)
CO2 SERPL-SCNC: 25 MMOL/L (ref 21–32)
CREAT SERPL-MCNC: 1.4 MG/DL (ref 0.6–1.2)
CRP SERPL-MCNC: 19 MG/L (ref 0–5.1)
DACRYOCYTES BLD QL SMEAR: ABNORMAL
DEPRECATED RDW RBC AUTO: 15.6 % (ref 12.4–15.4)
EOSINOPHIL # BLD: 0.1 K/UL (ref 0–0.6)
EOSINOPHIL NFR BLD: 2 %
ERYTHROCYTE [SEDIMENTATION RATE] IN BLOOD BY WESTERGREN METHOD: 35 MM/HR (ref 0–30)
GFR SERPLBLD CREATININE-BSD FMLA CKD-EPI: 42 ML/MIN/{1.73_M2}
GLUCOSE SERPL-MCNC: 149 MG/DL (ref 70–99)
HCT VFR BLD AUTO: 29.7 % (ref 36–48)
HGB BLD-MCNC: 9.9 G/DL (ref 12–16)
LYMPHOCYTES # BLD: 1 K/UL (ref 1–5.1)
LYMPHOCYTES NFR BLD: 22.5 %
MCH RBC QN AUTO: 30.4 PG (ref 26–34)
MCHC RBC AUTO-ENTMCNC: 33.3 G/DL (ref 31–36)
MCV RBC AUTO: 91.3 FL (ref 80–100)
MONOCYTES # BLD: 0 K/UL (ref 0–1.3)
MONOCYTES NFR BLD: 1 %
NEUTROPHILS # BLD: 3.4 K/UL (ref 1.7–7.7)
NEUTROPHILS NFR BLD: 73.8 %
PLATELET # BLD AUTO: 191 K/UL (ref 135–450)
PMV BLD AUTO: 8 FL (ref 5–10.5)
POTASSIUM SERPL-SCNC: 3.7 MMOL/L (ref 3.5–5.1)
RBC # BLD AUTO: 3.25 M/UL (ref 4–5.2)
SODIUM SERPL-SCNC: 140 MMOL/L (ref 136–145)
WBC # BLD AUTO: 4.5 K/UL (ref 4–11)

## 2024-06-19 PROCEDURE — 80048 BASIC METABOLIC PNL TOTAL CA: CPT

## 2024-06-19 PROCEDURE — 6360000002 HC RX W HCPCS: Performed by: EMERGENCY MEDICINE

## 2024-06-19 PROCEDURE — 73630 X-RAY EXAM OF FOOT: CPT

## 2024-06-19 PROCEDURE — 6370000000 HC RX 637 (ALT 250 FOR IP): Performed by: EMERGENCY MEDICINE

## 2024-06-19 PROCEDURE — 85025 COMPLETE CBC W/AUTO DIFF WBC: CPT

## 2024-06-19 PROCEDURE — 96374 THER/PROPH/DIAG INJ IV PUSH: CPT

## 2024-06-19 PROCEDURE — 86140 C-REACTIVE PROTEIN: CPT

## 2024-06-19 PROCEDURE — 85652 RBC SED RATE AUTOMATED: CPT

## 2024-06-19 PROCEDURE — 99284 EMERGENCY DEPT VISIT MOD MDM: CPT

## 2024-06-19 RX ORDER — INDOMETHACIN 50 MG/1
50 CAPSULE ORAL 3 TIMES DAILY
Qty: 60 CAPSULE | Refills: 3 | Status: SHIPPED | OUTPATIENT
Start: 2024-06-19

## 2024-06-19 RX ORDER — INDOMETHACIN 25 MG/1
25 CAPSULE ORAL ONCE
Status: COMPLETED | OUTPATIENT
Start: 2024-06-19 | End: 2024-06-19

## 2024-06-19 RX ORDER — ACETAMINOPHEN 500 MG
1000 TABLET ORAL
Status: COMPLETED | OUTPATIENT
Start: 2024-06-19 | End: 2024-06-19

## 2024-06-19 RX ORDER — HYDROMORPHONE HYDROCHLORIDE 1 MG/ML
1 INJECTION, SOLUTION INTRAMUSCULAR; INTRAVENOUS; SUBCUTANEOUS
Status: COMPLETED | OUTPATIENT
Start: 2024-06-19 | End: 2024-06-19

## 2024-06-19 RX ORDER — OXYCODONE HYDROCHLORIDE 5 MG/1
5 TABLET ORAL ONCE
Status: COMPLETED | OUTPATIENT
Start: 2024-06-19 | End: 2024-06-19

## 2024-06-19 RX ADMIN — IBUPROFEN 600 MG: 200 TABLET, FILM COATED ORAL at 05:31

## 2024-06-19 RX ADMIN — INDOMETHACIN 25 MG: 25 CAPSULE ORAL at 07:05

## 2024-06-19 RX ADMIN — HYDROMORPHONE HYDROCHLORIDE 1 MG: 1 INJECTION, SOLUTION INTRAMUSCULAR; INTRAVENOUS; SUBCUTANEOUS at 05:32

## 2024-06-19 RX ADMIN — ACETAMINOPHEN 1000 MG: 500 TABLET ORAL at 05:31

## 2024-06-19 RX ADMIN — OXYCODONE HYDROCHLORIDE 5 MG: 5 TABLET ORAL at 07:04

## 2024-06-19 ASSESSMENT — LIFESTYLE VARIABLES
HOW MANY STANDARD DRINKS CONTAINING ALCOHOL DO YOU HAVE ON A TYPICAL DAY: PATIENT DOES NOT DRINK
HOW OFTEN DO YOU HAVE A DRINK CONTAINING ALCOHOL: NEVER

## 2024-06-19 ASSESSMENT — PAIN DESCRIPTION - DESCRIPTORS: DESCRIPTORS: STABBING;THROBBING

## 2024-06-19 ASSESSMENT — PAIN DESCRIPTION - ORIENTATION
ORIENTATION: RIGHT;LEFT
ORIENTATION: RIGHT

## 2024-06-19 ASSESSMENT — PAIN DESCRIPTION - LOCATION
LOCATION: FOOT
LOCATION: FOOT

## 2024-06-19 ASSESSMENT — PAIN SCALES - GENERAL
PAINLEVEL_OUTOF10: 10
PAINLEVEL_OUTOF10: 4

## 2024-06-19 ASSESSMENT — PAIN - FUNCTIONAL ASSESSMENT: PAIN_FUNCTIONAL_ASSESSMENT: 0-10

## 2024-06-19 NOTE — ED PROVIDER NOTES
maternal uncle; Colon Cancer in her father; Dementia in her paternal aunt; Emphysema in her sister; Heart Attack in her maternal aunt, maternal uncle, maternal uncle, paternal aunt, paternal grandfather, paternal uncle, and paternal uncle; Heart Defect in her brother; Heart Disease in her maternal aunt and maternal uncle; High Blood Pressure in her brother, maternal aunt, maternal uncle, maternal uncle, and mother; Hypertension in her brother, father, and sister; No Known Problems in her maternal aunt, maternal grandfather, maternal grandmother, and paternal grandmother; Other in her maternal aunt and sister; Parkinsonism in her maternal uncle; Rheum Arthritis in her brother, mother, sister, and sister; Sleep Apnea in her brother, mother, and sister; Stroke in her brother.  She reports that she quit smoking about 39 years ago. Her smoking use included cigarettes. She started smoking about 49 years ago. She has a 5.0 pack-year smoking history. She has been exposed to tobacco smoke. She has never used smokeless tobacco. She reports current alcohol use of about 1.0 standard drink of alcohol per week. She reports that she does not use drugs.    Medications     Previous Medications    ASPIRIN LOW DOSE 81 MG CHEWABLE TABLET    CHEW 1 TABLET BY MOUTH DAILY    CITALOPRAM (CELEXA) 40 MG TABLET    TAKE ONE TABLET BY MOUTH DAILY    CONTINUOUS GLUCOSE SENSOR (FREESTYLE FARZAD 3 SENSOR) MISC    1 Device by Does not apply route every 14 days    INSULIN GLARGINE (LANTUS SOLOSTAR) 100 UNIT/ML INJECTION PEN    Inject 10 Units into the skin nightly    INSULIN PEN NEEDLE 32G X 4 MM MISC    1 each by Does not apply route daily    METHYLPREDNISOLONE (MEDROL DOSEPACK) 4 MG TABLET    Take by mouth.    NIFEDIPINE (ADALAT CC) 30 MG EXTENDED RELEASE TABLET    Take 1 tablet by mouth daily    ROSUVASTATIN (CRESTOR) 40 MG TABLET    TAKE ONE TABLET BY MOUTH EVERY EVENING    VALSARTAN (DIOVAN) 320 MG TABLET    1 tablet daily       Allergies

## 2024-06-19 NOTE — DISCHARGE INSTRUCTIONS
We saw you in the emergency department for a gout flare of your right big toe.    Your labs and x-rays were reassuring.    You were treated with pain medications in the emergency department.    You have a prescription for a medication called indomethacin when you go home.  You should take 1 capsule by mouth 3 times daily.  Continue to take the Medrol Dosepak that was prescribed by your PCP.  Please call them for follow-up appointment.    If you start to have any new symptoms such as fevers, nausea or vomiting, or uncontrolled pain then come back to the emergency department to be seen again.

## 2024-07-11 DIAGNOSIS — F32.0 MILD SINGLE CURRENT EPISODE OF MAJOR DEPRESSIVE DISORDER (HCC): ICD-10-CM

## 2024-07-11 NOTE — TELEPHONE ENCOUNTER
Requested Prescriptions     Pending Prescriptions Disp Refills    citalopram (CELEXA) 40 MG tablet 90 tablet 3     Sig: Take 1 tablet by mouth daily    rosuvastatin (CRESTOR) 40 MG tablet 90 tablet 2     Sig: Take 1 tablet by mouth nightly          Last Office Visit: 5/20/2024     Next Office Visit: Visit date not found

## 2024-07-12 RX ORDER — ROSUVASTATIN CALCIUM 40 MG/1
40 TABLET, COATED ORAL NIGHTLY
Qty: 90 TABLET | Refills: 2 | Status: SHIPPED | OUTPATIENT
Start: 2024-07-12

## 2024-07-12 RX ORDER — CITALOPRAM 40 MG/1
40 TABLET ORAL DAILY
Qty: 90 TABLET | Refills: 3 | Status: SHIPPED | OUTPATIENT
Start: 2024-07-12

## 2024-07-12 RX ORDER — ASPIRIN 81 MG/1
TABLET, CHEWABLE ORAL
Qty: 90 TABLET | Refills: 3 | Status: SHIPPED | OUTPATIENT
Start: 2024-07-12

## 2024-07-12 NOTE — TELEPHONE ENCOUNTER
Requested Prescriptions     Pending Prescriptions Disp Refills    aspirin (ASPIRIN LOW DOSE) 81 MG chewable tablet 90 tablet 3     Sig: CHEW 1 TABLET BY MOUTH DAILY       Last Office Visit: 7/13/2023     Next Office Visit: 7/15/2024

## 2024-07-12 NOTE — PROGRESS NOTES
[facility]     Otpatient Cardiology         Patient Name:  Enedina Islas  Requesting Physician: No admitting provider for patient encounter.  Primary Care Physician: Sofya Caban APRN - CNP    Reason for Consultation/Chief Complaint:   Chief Complaint   Patient presents with    Shortness of Breath    Edema     BLLE    Hypertension     History of Present Illness:    HPI     Enedina Islasis a 63 y.o. female with PMH of CAD-PCI to LAD, HTN, HLD, CKD, former smoker.    Here for yearly follow up for CAD.  CAD, repeat cath in 2020 with patent stent. Stress test done 11/2022 within normal limits.  On aspirin 81 mg daily.  No angina.  Feeling good.  Hypertension, on valsartan 320 mg daily, nifedipine 30 mg daily well-controlled, no side effects  Hyperlipidemia, Last LDL 61 (11/27/23) on crestor 40 mg daily at goal  Mild aortic stenosis, asymptomatic, follow-up with echo in 2 to 3 years      PMH  Past Medical History:   Diagnosis Date    Acid reflux     Bilateral carpal tunnel syndrome 12/21/2017    CAD in native artery 06/19/2018    Mid LAD stented with 3.5 x 15 mm Xience BRIAN. EF 55%    Cancer ([facility])     breast    Chicken pox     Chronic back pain     CKD (chronic kidney disease) 04/15/2016    Depression 12/07/2010    Depression 12/07/2010    Heart disease     [place] (hard of hearing)     Hx of smoking     Hypertension     Interstitial lung disease (HCC)     Lymphadenopathy, anterior cervical 07/13/2022    Measles     Menopausal symptoms     Morbid obesity with BMI of 45.0-49.9, adult ([facility]) 01/29/2015    Obstructive sleep apnea syndrome 12/13/2023    Osteoarthritis     Overactive bladder     PMR (polymyalgia rheumatica) ([facility])     Poor venous access     Pure hypercholesterolemia     Sleep apnea     does not use a CPAP machine    Snores     Stress incontinence     Wears dentures     Wears glasses     Wears hearing aid        PSH  Past Surgical History:   Procedure Laterality Date

## 2024-07-15 ENCOUNTER — OFFICE VISIT (OUTPATIENT)
Dept: CARDIOLOGY CLINIC | Age: 64
End: 2024-07-15
Payer: MEDICARE

## 2024-07-15 VITALS
BODY MASS INDEX: 46.59 KG/M2 | DIASTOLIC BLOOD PRESSURE: 64 MMHG | WEIGHT: 263 LBS | HEART RATE: 73 BPM | SYSTOLIC BLOOD PRESSURE: 146 MMHG

## 2024-07-15 DIAGNOSIS — Z98.61 S/P PTCA (PERCUTANEOUS TRANSLUMINAL CORONARY ANGIOPLASTY): ICD-10-CM

## 2024-07-15 DIAGNOSIS — I10 ESSENTIAL HYPERTENSION: Chronic | ICD-10-CM

## 2024-07-15 DIAGNOSIS — I25.10 CAD IN NATIVE ARTERY: Primary | Chronic | ICD-10-CM

## 2024-07-15 DIAGNOSIS — E78.00 PURE HYPERCHOLESTEROLEMIA: ICD-10-CM

## 2024-07-15 DIAGNOSIS — I35.0 NONRHEUMATIC AORTIC VALVE STENOSIS: ICD-10-CM

## 2024-07-15 DIAGNOSIS — F32.0 MILD SINGLE CURRENT EPISODE OF MAJOR DEPRESSIVE DISORDER (HCC): ICD-10-CM

## 2024-07-15 PROCEDURE — G8417 CALC BMI ABV UP PARAM F/U: HCPCS | Performed by: INTERNAL MEDICINE

## 2024-07-15 PROCEDURE — 3078F DIAST BP <80 MM HG: CPT | Performed by: INTERNAL MEDICINE

## 2024-07-15 PROCEDURE — G8427 DOCREV CUR MEDS BY ELIG CLIN: HCPCS | Performed by: INTERNAL MEDICINE

## 2024-07-15 PROCEDURE — 99214 OFFICE O/P EST MOD 30 MIN: CPT | Performed by: INTERNAL MEDICINE

## 2024-07-15 PROCEDURE — 3017F COLORECTAL CA SCREEN DOC REV: CPT | Performed by: INTERNAL MEDICINE

## 2024-07-15 PROCEDURE — 93000 ELECTROCARDIOGRAM COMPLETE: CPT | Performed by: INTERNAL MEDICINE

## 2024-07-15 PROCEDURE — 1036F TOBACCO NON-USER: CPT | Performed by: INTERNAL MEDICINE

## 2024-07-15 PROCEDURE — 3077F SYST BP >= 140 MM HG: CPT | Performed by: INTERNAL MEDICINE

## 2024-07-15 RX ORDER — NIFEDIPINE 30 MG
30 TABLET, EXTENDED RELEASE ORAL DAILY
Qty: 90 TABLET | Refills: 3 | Status: SHIPPED | OUTPATIENT
Start: 2024-07-15

## 2024-07-15 RX ORDER — ROSUVASTATIN CALCIUM 40 MG/1
40 TABLET, COATED ORAL NIGHTLY
Qty: 90 TABLET | Refills: 3 | Status: SHIPPED | OUTPATIENT
Start: 2024-07-15

## 2024-07-15 RX ORDER — VALSARTAN 320 MG/1
320 TABLET ORAL DAILY
Qty: 90 TABLET | Refills: 3 | Status: SHIPPED | OUTPATIENT
Start: 2024-07-15

## 2024-07-15 RX ORDER — ASPIRIN 81 MG/1
TABLET, CHEWABLE ORAL
Qty: 90 TABLET | Refills: 3 | Status: SHIPPED | OUTPATIENT
Start: 2024-07-15

## 2024-07-15 RX ORDER — CITALOPRAM 40 MG/1
40 TABLET ORAL DAILY
Qty: 90 TABLET | Refills: 3 | Status: SHIPPED | OUTPATIENT
Start: 2024-07-15

## 2024-07-15 RX ORDER — OMEPRAZOLE 20 MG/1
TABLET, DELAYED RELEASE ORAL
COMMUNITY
Start: 2024-05-16

## 2024-07-15 NOTE — TELEPHONE ENCOUNTER
Requested Prescriptions     Pending Prescriptions Disp Refills    citalopram (CELEXA) 40 MG tablet 90 tablet 3     Sig: Take 1 tablet by mouth daily     LOV 5.20.24  No FOV scheduled  Requesting rx now be sent to [facility]   The remainder of your STD testing was all negative. No other concerning findings.   Fide Hernández PA-C

## 2024-07-16 NOTE — PROGRESS NOTES
[facility]    CC: Breast CA     Referring physician: Haley Francis MD    HPI: This is a 63 y.o. female with a PMHx as delineated below who presents in consultation for breast reconstruction. She was found to have bilateral breast cancer and desires to proceed with breast conservation therapywith reconstruction.  Plastic surgery was consulted for evaluation and treatment.The specifics of her breast cancer workup / treatment is as follows:     Diagnosis: invasive ductal  Mo/Yr Diagnosed: 2/24  Breast Involved:Bilateral  Jazzy status: Negative  ER: Negative [place]: Negative HER2: Negative    Oncologist: Medhat Vivas MD  Radiation: WILL NEED ADJUVANT RADIATION    Chemo/Meds: Completes neoadjuvant chemotherapy (8/15/24)  Pregnancy/Miscarriages: 3 / 0    PMHx:   Past Medical History:   Diagnosis Date    Acid reflux     Bilateral carpal tunnel syndrome 12/21/2017    CAD in native artery 06/19/2018    Mid LAD stented with 3.5 x 15 mm Xience BRIAN. EF 55%    Cancer (HCC)     breast    Chicken pox     Chronic back pain     CKD (chronic kidney disease) 04/15/2016    Depression 12/07/2010    Depression 12/07/2010    Heart disease     [place] (hard of hearing)     Hx of smoking     Hypertension     Interstitial lung disease (HCC)     Lymphadenopathy, anterior cervical 07/13/2022    Measles     Menopausal symptoms     Morbid obesity with BMI of 45.0-49.9, adult ([facility]) 01/29/2015    Obstructive sleep apnea syndrome 12/13/2023    Osteoarthritis     Overactive bladder     PMR (polymyalgia rheumatica) ([facility])     Poor venous access     Pure hypercholesterolemia     Sleep apnea     does not use a CPAP machine    Snores     Stress incontinence     Wears dentures     Wears glasses     Wears hearing aid    Polymyalgia rheumatica (Was on immunosuppresion but held secondary to chemotherapy)    PSHx:   Past Surgical History:   Procedure Laterality Date    BRONCHOSCOPY  06/27/2019    BRONCHOSCOPY ALVEOLAR LAVAGE performed

## 2024-07-17 ENCOUNTER — OFFICE VISIT (OUTPATIENT)
Dept: SURGERY | Age: 64
End: 2024-07-17
Payer: MEDICARE

## 2024-07-17 VITALS
TEMPERATURE: 97.8 F | HEIGHT: 63 IN | BODY MASS INDEX: 46.25 KG/M2 | DIASTOLIC BLOOD PRESSURE: 81 MMHG | HEART RATE: 75 BPM | WEIGHT: 261 LBS | SYSTOLIC BLOOD PRESSURE: 149 MMHG | OXYGEN SATURATION: 98 %

## 2024-07-17 DIAGNOSIS — C50.912 BILATERAL MALIGNANT NEOPLASM OF BREAST IN FEMALE, UNSPECIFIED ESTROGEN RECEPTOR STATUS, UNSPECIFIED SITE OF BREAST (HCC): Primary | ICD-10-CM

## 2024-07-17 DIAGNOSIS — C50.911 BILATERAL MALIGNANT NEOPLASM OF BREAST IN FEMALE, UNSPECIFIED ESTROGEN RECEPTOR STATUS, UNSPECIFIED SITE OF BREAST (HCC): Primary | ICD-10-CM

## 2024-07-17 PROCEDURE — 1036F TOBACCO NON-USER: CPT | Performed by: SURGERY

## 2024-07-17 PROCEDURE — G8417 CALC BMI ABV UP PARAM F/U: HCPCS | Performed by: SURGERY

## 2024-07-17 PROCEDURE — 3077F SYST BP >= 140 MM HG: CPT | Performed by: SURGERY

## 2024-07-17 PROCEDURE — G8427 DOCREV CUR MEDS BY ELIG CLIN: HCPCS | Performed by: SURGERY

## 2024-07-17 PROCEDURE — 99204 OFFICE O/P NEW MOD 45 MIN: CPT | Performed by: SURGERY

## 2024-07-17 PROCEDURE — 3079F DIAST BP 80-89 MM HG: CPT | Performed by: SURGERY

## 2024-07-17 PROCEDURE — 3017F COLORECTAL CA SCREEN DOC REV: CPT | Performed by: SURGERY

## 2024-07-18 ENCOUNTER — OFFICE VISIT (OUTPATIENT)
Dept: FAMILY MEDICINE CLINIC | Age: 64
End: 2024-07-18
Payer: MEDICARE

## 2024-07-18 VITALS
DIASTOLIC BLOOD PRESSURE: 70 MMHG | TEMPERATURE: 96.9 F | HEIGHT: 63 IN | SYSTOLIC BLOOD PRESSURE: 134 MMHG | WEIGHT: 260.8 LBS | HEART RATE: 73 BPM | BODY MASS INDEX: 46.21 KG/M2 | OXYGEN SATURATION: 98 %

## 2024-07-18 DIAGNOSIS — M06.4 INFLAMMATORY POLYARTHROPATHY (HCC): ICD-10-CM

## 2024-07-18 DIAGNOSIS — M35.9 UNDIFFERENTIATED CONNECTIVE TISSUE DISEASE (HCC): ICD-10-CM

## 2024-07-18 DIAGNOSIS — E66.01 MORBID OBESITY WITH BMI OF 45.0-49.9, ADULT (HCC): Primary | Chronic | ICD-10-CM

## 2024-07-18 DIAGNOSIS — J84.9 ILD (INTERSTITIAL LUNG DISEASE) (HCC): ICD-10-CM

## 2024-07-18 DIAGNOSIS — J84.9 INTERSTITIAL LUNG DISEASE (HCC): ICD-10-CM

## 2024-07-18 DIAGNOSIS — F32.0 CURRENT MILD EPISODE OF MAJOR DEPRESSIVE DISORDER WITHOUT PRIOR EPISODE (HCC): ICD-10-CM

## 2024-07-18 DIAGNOSIS — N18.32 STAGE 3B CHRONIC KIDNEY DISEASE (HCC): ICD-10-CM

## 2024-07-18 PROCEDURE — 1036F TOBACCO NON-USER: CPT | Performed by: NURSE PRACTITIONER

## 2024-07-18 PROCEDURE — 99214 OFFICE O/P EST MOD 30 MIN: CPT | Performed by: NURSE PRACTITIONER

## 2024-07-18 PROCEDURE — 3075F SYST BP GE 130 - 139MM HG: CPT | Performed by: NURSE PRACTITIONER

## 2024-07-18 PROCEDURE — G2211 COMPLEX E/M VISIT ADD ON: HCPCS | Performed by: NURSE PRACTITIONER

## 2024-07-18 PROCEDURE — 3078F DIAST BP <80 MM HG: CPT | Performed by: NURSE PRACTITIONER

## 2024-07-18 PROCEDURE — 3017F COLORECTAL CA SCREEN DOC REV: CPT | Performed by: NURSE PRACTITIONER

## 2024-07-18 PROCEDURE — G8427 DOCREV CUR MEDS BY ELIG CLIN: HCPCS | Performed by: NURSE PRACTITIONER

## 2024-07-18 PROCEDURE — G8417 CALC BMI ABV UP PARAM F/U: HCPCS | Performed by: NURSE PRACTITIONER

## 2024-07-18 NOTE — ASSESSMENT & PLAN NOTE
Uncontrolled, W I had a long talk about her need to stay well-hydrated, and well-nourished.  GLP-1 will prevent much of this just in an effort to lose weight.  I have encouraged her to try to exercise that she is able, decrease any additional heavy fats and high glycemic index food but maintain a good high-protein diet.  We talked about her need to remain well-nourished in order to handle the chemotherapy and the cancer treatment.

## 2024-07-18 NOTE — ASSESSMENT & PLAN NOTE
Lab Results   Component Value Date    LABGLOM 42 (A) 06/19/2024    LABGLOM 33 (A) 02/20/2024    LABGLOM 42 (A) 12/13/2023    LABGLOM 33 (A) 08/22/2023    LABGLOM 33 (A) 05/02/2023     Chronic- stable  [facility]

## 2024-07-18 NOTE — PROGRESS NOTES
Enedina Islas (:  1960) is a 63 y.o. female,Established patient, here for evaluation of the following chief complaint(s):  Discuss Medications (Discuss GLP1 )      ASSESSMENT/PLAN:  1. Morbid obesity with BMI of 45.0-49.9, adult (HCC)  Assessment & Plan:  Uncontrolled, W I had a long talk about her need to stay well-hydrated, and well-nourished.  GLP-1 will prevent much of this just in an effort to lose weight.  I have encouraged her to try to exercise that she is able, decrease any additional heavy fats and high glycemic index food but maintain a good high-protein diet.  We talked about her need to remain well-nourished in order to handle the chemotherapy and the cancer treatment.  2. Undifferentiated connective tissue disease (HCC)  3. Inflammatory polyarthropathy (HCC)  Assessment & Plan:  Chronic- stable  4. ILD (interstitial lung disease) ([facility])  5. Current mild episode of major depressive disorder without prior episode (HCC)  Assessment & Plan:  Chronic- stable on celexa. Kerri well  6. Stage 3b chronic kidney disease (HCC)  Assessment & Plan:  Lab Results   Component Value Date    LABGLOM 42 (A) 2024    LABGLOM 33 (A) 2024    LABGLOM 42 (A) 2023    LABGLOM 33 (A) 2023    LABGLOM 33 (A) 2023     Chronic- stable  Sees [facility]  7. Interstitial lung disease (HCC)  Assessment & Plan:  Chronic- stable. Sees [facility]    No follow-ups on file.    SUBJECTIVE/OBJECTIVE:  Marlen is a very pleasant 63-year-old female with history most significant currently for bilateral breast cancer invasive ductal triple negative currently undergoing chemotherapy, will need radiation therapy.  She is looking ahead for her lumpectomy and discussed with the plastic surgeon reconstruction possibility however he states prefers not to do any reconstruction until she has dropped her BMI.  She is requesting a GLP-1 for weight loss at this time.   Current Outpatient Medications

## 2024-08-01 ENCOUNTER — TELEPHONE (OUTPATIENT)
Dept: ADMINISTRATIVE | Age: 64
End: 2024-08-01

## 2024-08-01 NOTE — TELEPHONE ENCOUNTER
Fax received about pts olanzapine and rosuvastatin, says that caution be advised.  It has been scanned into media for review. thanks    This fax came to the pa dept so wanted to make sure it made it to the office

## 2024-08-19 ENCOUNTER — OFFICE VISIT (OUTPATIENT)
Dept: FAMILY MEDICINE CLINIC | Age: 64
End: 2024-08-19
Payer: MEDICARE

## 2024-08-19 VITALS
BODY MASS INDEX: 45.53 KG/M2 | HEART RATE: 106 BPM | WEIGHT: 257 LBS | SYSTOLIC BLOOD PRESSURE: 124 MMHG | OXYGEN SATURATION: 91 % | DIASTOLIC BLOOD PRESSURE: 60 MMHG | TEMPERATURE: 96.9 F

## 2024-08-19 DIAGNOSIS — M06.00 SERONEGATIVE RHEUMATOID ARTHRITIS (HCC): ICD-10-CM

## 2024-08-19 DIAGNOSIS — M06.4 INFLAMMATORY POLYARTHROPATHY (HCC): ICD-10-CM

## 2024-08-19 DIAGNOSIS — J84.9 INTERSTITIAL LUNG DISEASE (HCC): ICD-10-CM

## 2024-08-19 DIAGNOSIS — Z01.818 PREOP EXAMINATION: Primary | ICD-10-CM

## 2024-08-19 DIAGNOSIS — E11.65 CONTROLLED TYPE 2 DIABETES MELLITUS WITH HYPERGLYCEMIA, WITHOUT LONG-TERM CURRENT USE OF INSULIN (HCC): ICD-10-CM

## 2024-08-19 DIAGNOSIS — Z98.61 S/P PTCA (PERCUTANEOUS TRANSLUMINAL CORONARY ANGIOPLASTY): ICD-10-CM

## 2024-08-19 DIAGNOSIS — N18.32 STAGE 3B CHRONIC KIDNEY DISEASE (HCC): ICD-10-CM

## 2024-08-19 DIAGNOSIS — I10 ESSENTIAL HYPERTENSION: Chronic | ICD-10-CM

## 2024-08-19 DIAGNOSIS — C50.919 PRIMARY MALIGNANT NEOPLASM OF FEMALE BREAST (HCC): ICD-10-CM

## 2024-08-19 LAB
ANISOCYTOSIS BLD QL SMEAR: ABNORMAL
BACTERIA URNS QL MICRO: ABNORMAL /HPF
BASOPHILS # BLD: 0 K/UL (ref 0–0.2)
BASOPHILS NFR BLD: 0 %
BILIRUB UR QL STRIP.AUTO: NEGATIVE
CLARITY UR: CLEAR
COLOR UR: YELLOW
DACRYOCYTES BLD QL SMEAR: ABNORMAL
DEPRECATED RDW RBC AUTO: 18.8 % (ref 12.4–15.4)
EOSINOPHIL # BLD: 0 K/UL (ref 0–0.6)
EOSINOPHIL NFR BLD: 0 %
EPI CELLS #/AREA URNS AUTO: 10 /HPF (ref 0–5)
GLUCOSE UR STRIP.AUTO-MCNC: NEGATIVE MG/DL
HCT VFR BLD AUTO: 29.2 % (ref 36–48)
HGB BLD-MCNC: 9.4 G/DL (ref 12–16)
HGB UR QL STRIP.AUTO: NEGATIVE
HYALINE CASTS #/AREA URNS AUTO: 2 /LPF (ref 0–8)
KETONES UR STRIP.AUTO-MCNC: NEGATIVE MG/DL
LEUKOCYTE ESTERASE UR QL STRIP.AUTO: ABNORMAL
LYMPHOCYTES # BLD: 0.7 K/UL (ref 1–5.1)
LYMPHOCYTES NFR BLD: 3 %
MCH RBC QN AUTO: 31.4 PG (ref 26–34)
MCHC RBC AUTO-ENTMCNC: 32 G/DL (ref 31–36)
MCV RBC AUTO: 98.1 FL (ref 80–100)
MONOCYTES # BLD: 0 K/UL (ref 0–1.3)
MONOCYTES NFR BLD: 0 %
NEUTROPHILS # BLD: 21.2 K/UL (ref 1.7–7.7)
NEUTROPHILS NFR BLD: 93 %
NEUTS BAND NFR BLD MANUAL: 4 % (ref 0–7)
NITRITE UR QL STRIP.AUTO: NEGATIVE
PH UR STRIP.AUTO: 5.5 [PH] (ref 5–8)
PLATELET # BLD AUTO: 217 K/UL (ref 135–450)
PLATELET BLD QL SMEAR: ADEQUATE
PMV BLD AUTO: 7.6 FL (ref 5–10.5)
POIKILOCYTOSIS BLD QL SMEAR: ABNORMAL
PROT UR STRIP.AUTO-MCNC: 30 MG/DL
RBC # BLD AUTO: 2.98 M/UL (ref 4–5.2)
RBC CLUMPS #/AREA URNS AUTO: 1 /HPF (ref 0–4)
SP GR UR STRIP.AUTO: 1.02 (ref 1–1.03)
UA COMPLETE W REFLEX CULTURE PNL UR: ABNORMAL
UA DIPSTICK W REFLEX MICRO PNL UR: YES
URN SPEC COLLECT METH UR: ABNORMAL
UROBILINOGEN UR STRIP-ACNC: 1 E.U./DL
WBC # BLD AUTO: 21.9 K/UL (ref 4–11)
WBC #/AREA URNS AUTO: 9 /HPF (ref 0–5)

## 2024-08-19 PROCEDURE — G8399 PT W/DXA RESULTS DOCUMENT: HCPCS | Performed by: NURSE PRACTITIONER

## 2024-08-19 PROCEDURE — 3017F COLORECTAL CA SCREEN DOC REV: CPT | Performed by: NURSE PRACTITIONER

## 2024-08-19 PROCEDURE — 1123F ACP DISCUSS/DSCN MKR DOCD: CPT | Performed by: NURSE PRACTITIONER

## 2024-08-19 PROCEDURE — G8427 DOCREV CUR MEDS BY ELIG CLIN: HCPCS | Performed by: NURSE PRACTITIONER

## 2024-08-19 PROCEDURE — 1090F PRES/ABSN URINE INCON ASSESS: CPT | Performed by: NURSE PRACTITIONER

## 2024-08-19 PROCEDURE — 1036F TOBACCO NON-USER: CPT | Performed by: NURSE PRACTITIONER

## 2024-08-19 PROCEDURE — 3044F HG A1C LEVEL LT 7.0%: CPT | Performed by: NURSE PRACTITIONER

## 2024-08-19 PROCEDURE — 99214 OFFICE O/P EST MOD 30 MIN: CPT | Performed by: NURSE PRACTITIONER

## 2024-08-19 PROCEDURE — G8417 CALC BMI ABV UP PARAM F/U: HCPCS | Performed by: NURSE PRACTITIONER

## 2024-08-19 PROCEDURE — 2022F DILAT RTA XM EVC RTNOPTHY: CPT | Performed by: NURSE PRACTITIONER

## 2024-08-19 PROCEDURE — 3078F DIAST BP <80 MM HG: CPT | Performed by: NURSE PRACTITIONER

## 2024-08-19 PROCEDURE — 3074F SYST BP LT 130 MM HG: CPT | Performed by: NURSE PRACTITIONER

## 2024-08-19 NOTE — PROGRESS NOTES
Requesting surgeon: Dr Francis  Reason for Consult: Preoperative Evaluation of Risk  Surgery location: [facility]  Surgery date:9/10/24, 9/3/24    HPI:   Enedina Islas is a 64 y.o. female with history of bilat breast cancer, CKD3, ILD, Polyarthropathy,s/p PTCA, HTN. Chronic medical conditions well controlled on current medications and treatments.  Presents for pre op evaluation for BILATERAL LUMPECTOMY, BILATERAL SENTINEL NODE BIOPSY (7:30)AM-IMAGE GUIDED BILATERAL RADIOFREQUENCY TAG, 3 TAGS, TARGETS: RIGHT BREAST 8 O'CLOCK, 9 CENTIMETERS FROM THE NIPPLE, LEFT BREAST 12 TO 1 O'CLOCK, 1 CENTIMETER FROM THE NIPPLE AND 1 O'CLOCK 4 CENTIMETERS FROM THE NIPPLE (9/3/24)    Denies fever, chills, or current illness.  Denies personal or family history of anesthesia complications.   Denies personal history of dvt/pe    Medications:  Medication list reviewed and updated to be current  Current Outpatient Medications   Medication Sig Dispense Refill    omeprazole (PRILOSEC OTC) 20 MG tablet Take by mouth      aspirin (ASPIRIN LOW DOSE) 81 MG chewable tablet CHEW 1 TABLET BY MOUTH DAILY 90 tablet 3    NIFEdipine (ADALAT CC) 30 MG extended release tablet Take 1 tablet by mouth daily 90 tablet 3    rosuvastatin (CRESTOR) 40 MG tablet Take 1 tablet by mouth nightly 90 tablet 3    valsartan (DIOVAN) 320 MG tablet Take 1 tablet by mouth daily 90 tablet 3    citalopram (CELEXA) 40 MG tablet Take 1 tablet by mouth daily 90 tablet 3     No current facility-administered medications for this visit.     Allergies:  Gabapentin, Pantoprazole, Atorvastatin, Meloxicam, and Protonix [pantoprazole sodium]  History:  Past Medical History:   Diagnosis Date    Acid reflux     Bilateral carpal tunnel syndrome 12/21/2017    CAD in native artery 06/19/2018    Mid LAD stented with 3.5 x 15 mm Xience BRIAN. EF 55%    Cancer (HCC)     breast    Chicken pox     Chronic back pain     CKD (chronic kidney disease) 04/15/2016    Depression 12/07/2010    Depression

## 2024-08-20 PROBLEM — M06.00 SERONEGATIVE RHEUMATOID ARTHRITIS (HCC): Status: ACTIVE | Noted: 2023-05-02

## 2024-08-20 PROBLEM — C50.919 PRIMARY MALIGNANT NEOPLASM OF FEMALE BREAST (HCC): Status: ACTIVE | Noted: 2024-02-21

## 2024-08-20 PROBLEM — R60.0 EDEMA OF LOWER EXTREMITY: Status: ACTIVE | Noted: 2022-10-26

## 2024-08-20 PROBLEM — G62.9 PERIPHERAL NERVE DISEASE: Status: ACTIVE | Noted: 2024-08-20

## 2024-08-20 LAB
ALBUMIN SERPL-MCNC: 3.7 G/DL (ref 3.4–5)
ALBUMIN/GLOB SERPL: 1.6 {RATIO} (ref 1.1–2.2)
ALP SERPL-CCNC: 128 U/L (ref 40–129)
ALT SERPL-CCNC: 8 U/L (ref 10–40)
ANION GAP SERPL CALCULATED.3IONS-SCNC: 13 MMOL/L (ref 3–16)
AST SERPL-CCNC: 14 U/L (ref 15–37)
BILIRUB SERPL-MCNC: 0.5 MG/DL (ref 0–1)
BUN SERPL-MCNC: 30 MG/DL (ref 7–20)
CALCIUM SERPL-MCNC: 9.2 MG/DL (ref 8.3–10.6)
CHLORIDE SERPL-SCNC: 104 MMOL/L (ref 99–110)
CO2 SERPL-SCNC: 22 MMOL/L (ref 21–32)
CREAT SERPL-MCNC: 1.5 MG/DL (ref 0.6–1.2)
GFR SERPLBLD CREATININE-BSD FMLA CKD-EPI: 38 ML/MIN/{1.73_M2}
GLUCOSE SERPL-MCNC: 145 MG/DL (ref 70–99)
POTASSIUM SERPL-SCNC: 4.3 MMOL/L (ref 3.5–5.1)
PROT SERPL-MCNC: 6 G/DL (ref 6.4–8.2)
SODIUM SERPL-SCNC: 139 MMOL/L (ref 136–145)

## 2024-08-25 ENCOUNTER — PATIENT MESSAGE (OUTPATIENT)
Dept: FAMILY MEDICINE CLINIC | Age: 64
End: 2024-08-25

## 2024-08-26 RX ORDER — CEPHALEXIN 500 MG/1
500 CAPSULE ORAL 3 TIMES DAILY
Qty: 21 CAPSULE | Refills: 0 | Status: ON HOLD | OUTPATIENT
Start: 2024-08-26 | End: 2024-08-29 | Stop reason: HOSPADM

## 2024-08-27 ENCOUNTER — APPOINTMENT (OUTPATIENT)
Dept: GENERAL RADIOLOGY | Age: 64
End: 2024-08-27
Payer: MEDICARE

## 2024-08-27 ENCOUNTER — HOSPITAL ENCOUNTER (INPATIENT)
Age: 64
LOS: 2 days | Discharge: HOME | End: 2024-08-29
Attending: EMERGENCY MEDICINE | Admitting: INTERNAL MEDICINE
Payer: MEDICARE

## 2024-08-27 ENCOUNTER — APPOINTMENT (OUTPATIENT)
Dept: CT IMAGING | Age: 64
End: 2024-08-27
Payer: MEDICARE

## 2024-08-27 ENCOUNTER — APPOINTMENT (OUTPATIENT)
Dept: VASCULAR LAB | Age: 64
End: 2024-08-27
Attending: INTERNAL MEDICINE
Payer: MEDICARE

## 2024-08-27 DIAGNOSIS — R50.9 FEVER, UNSPECIFIED FEVER CAUSE: Primary | ICD-10-CM

## 2024-08-27 DIAGNOSIS — L98.9 SKIN LESION OF BACK: ICD-10-CM

## 2024-08-27 PROBLEM — R06.00 DYSPNEA: Status: ACTIVE | Noted: 2024-08-27

## 2024-08-27 LAB
ABO + RH BLD: NORMAL
AMORPH SED URNS QL MICRO: ABNORMAL /HPF
ANION GAP SERPL CALCULATED.3IONS-SCNC: 13 MMOL/L (ref 3–16)
ANISOCYTOSIS BLD QL SMEAR: ABNORMAL
BASE EXCESS BLDV CALC-SCNC: -1.5 MMOL/L (ref -2–3)
BASOPHILS # BLD: 0 K/UL (ref 0–0.2)
BASOPHILS NFR BLD: 0 %
BILIRUB UR QL STRIP.AUTO: NEGATIVE
BLD GP AB SCN SERPL QL: NORMAL
BUN SERPL-MCNC: 20 MG/DL (ref 7–20)
CALCIUM SERPL-MCNC: 8.5 MG/DL (ref 8.3–10.6)
CHLORIDE SERPL-SCNC: 104 MMOL/L (ref 99–110)
CLARITY UR: CLEAR
CO2 BLDV-SCNC: 24 MMOL/L
CO2 SERPL-SCNC: 20 MMOL/L (ref 21–32)
COHGB MFR BLDV: 1.3 % (ref 0–1.5)
COLOR UR: YELLOW
CREAT SERPL-MCNC: 1.3 MG/DL (ref 0.6–1.2)
D-DIMER QUANTITATIVE: 1.78 UG/ML FEU (ref 0–0.6)
DEPRECATED RDW RBC AUTO: 17.3 % (ref 12.4–15.4)
DO-HGB MFR BLDV: 10 %
EKG ATRIAL RATE: 100 BPM
EKG DIAGNOSIS: NORMAL
EKG P AXIS: 60 DEGREES
EKG P-R INTERVAL: 164 MS
EKG Q-T INTERVAL: 412 MS
EKG QRS DURATION: 72 MS
EKG QTC CALCULATION (BAZETT): 531 MS
EKG R AXIS: 51 DEGREES
EKG T AXIS: 73 DEGREES
EKG VENTRICULAR RATE: 100 BPM
EOSINOPHIL # BLD: 0 K/UL (ref 0–0.6)
EOSINOPHIL NFR BLD: 0 %
EPI CELLS #/AREA URNS HPF: ABNORMAL /HPF (ref 0–5)
FERRITIN SERPL IA-MCNC: 318.1 NG/ML (ref 15–150)
FLUAV RNA RESP QL NAA+PROBE: NOT DETECTED
FLUBV RNA RESP QL NAA+PROBE: NOT DETECTED
FOLATE SERPL-MCNC: 5.66 NG/ML (ref 4.78–24.2)
GFR SERPLBLD CREATININE-BSD FMLA CKD-EPI: 46 ML/MIN/{1.73_M2}
GLUCOSE SERPL-MCNC: 144 MG/DL (ref 70–99)
GLUCOSE UR STRIP.AUTO-MCNC: NEGATIVE MG/DL
HCO3 BLDV-SCNC: 23 MMOL/L (ref 24–28)
HCT VFR BLD AUTO: 21.4 % (ref 36–48)
HCT VFR BLD AUTO: 23.9 % (ref 36–48)
HGB BLD-MCNC: 8.1 G/DL (ref 12–16)
HGB UR QL STRIP.AUTO: ABNORMAL
IMMATURE RETIC FRACT: 0.68 (ref 0.21–0.37)
IRON SATN MFR SERPL: 15 % (ref 15–50)
IRON SERPL-MCNC: 23 UG/DL (ref 37–145)
KETONES UR STRIP.AUTO-MCNC: NEGATIVE MG/DL
LACTATE BLDV-SCNC: 1.6 MMOL/L (ref 0.4–1.9)
LACTATE BLDV-SCNC: 1.8 MMOL/L (ref 0.4–1.9)
LEUKOCYTE ESTERASE UR QL STRIP.AUTO: ABNORMAL
LYMPHOCYTES # BLD: 0.8 K/UL (ref 1–5.1)
LYMPHOCYTES NFR BLD: 12 %
MCH RBC QN AUTO: 31.8 PG (ref 26–34)
MCHC RBC AUTO-ENTMCNC: 34 G/DL (ref 31–36)
MCV RBC AUTO: 93.4 FL (ref 80–100)
METAMYELOCYTES NFR BLD MANUAL: 2 %
METHGB MFR BLDV: 0.3 % (ref 0–1.5)
MONOCYTES # BLD: 0.1 K/UL (ref 0–1.3)
MONOCYTES NFR BLD: 2 %
NEUTROPHILS # BLD: 5.6 K/UL (ref 1.7–7.7)
NEUTROPHILS NFR BLD: 81 %
NEUTS BAND NFR BLD MANUAL: 3 % (ref 0–7)
NITRITE UR QL STRIP.AUTO: NEGATIVE
NT-PROBNP SERPL-MCNC: 1050 PG/ML (ref 0–124)
PCO2 BLDV: 36.8 MMHG (ref 41–51)
PH BLDV: 7.41 [PH] (ref 7.35–7.45)
PH UR STRIP.AUTO: 6 [PH] (ref 5–8)
PLATELET # BLD AUTO: 114 K/UL (ref 135–450)
PMV BLD AUTO: 7.3 FL (ref 5–10.5)
PO2 BLDV: 59.4 MMHG (ref 25–40)
POTASSIUM SERPL-SCNC: 3.6 MMOL/L (ref 3.5–5.1)
PROT UR STRIP.AUTO-MCNC: 30 MG/DL
RBC # BLD AUTO: 2.56 M/UL (ref 4–5.2)
RBC #/AREA URNS HPF: ABNORMAL /HPF (ref 0–4)
REPORT: NORMAL
RESP PATH DNA+RNA PNL NPH NAA+NON-PROBE: NORMAL
RETICS # AUTO: 0.05 M/UL (ref 0.02–0.1)
RETICS/RBC NFR AUTO: 2.4 % (ref 0.5–2.18)
SAO2 % BLDV: 90 %
SARS-COV-2 RNA RESP QL NAA+PROBE: NOT DETECTED
SODIUM SERPL-SCNC: 137 MMOL/L (ref 136–145)
SP GR UR STRIP.AUTO: 1.02 (ref 1–1.03)
TIBC SERPL-MCNC: 153 UG/DL (ref 260–445)
TROPONIN, HIGH SENSITIVITY: 46 NG/L (ref 0–14)
TROPONIN, HIGH SENSITIVITY: 49 NG/L (ref 0–14)
UA COMPLETE W REFLEX CULTURE PNL UR: YES
UA DIPSTICK W REFLEX MICRO PNL UR: YES
URN SPEC COLLECT METH UR: ABNORMAL
UROBILINOGEN UR STRIP-ACNC: 0.2 E.U./DL
VIT B12 SERPL-MCNC: 1629 PG/ML (ref 211–911)
WBC # BLD AUTO: 6.5 K/UL (ref 4–11)
WBC #/AREA URNS HPF: ABNORMAL /HPF (ref 0–5)

## 2024-08-27 PROCEDURE — 83880 ASSAY OF NATRIURETIC PEPTIDE: CPT

## 2024-08-27 PROCEDURE — 99285 EMERGENCY DEPT VISIT HI MDM: CPT

## 2024-08-27 PROCEDURE — 71046 X-RAY EXAM CHEST 2 VIEWS: CPT

## 2024-08-27 PROCEDURE — 96365 THER/PROPH/DIAG IV INF INIT: CPT

## 2024-08-27 PROCEDURE — 87040 BLOOD CULTURE FOR BACTERIA: CPT

## 2024-08-27 PROCEDURE — 36415 COLL VENOUS BLD VENIPUNCTURE: CPT

## 2024-08-27 PROCEDURE — 87636 SARSCOV2 & INF A&B AMP PRB: CPT

## 2024-08-27 PROCEDURE — 6360000004 HC RX CONTRAST MEDICATION: Performed by: STUDENT IN AN ORGANIZED HEALTH CARE EDUCATION/TRAINING PROGRAM

## 2024-08-27 PROCEDURE — 71260 CT THORAX DX C+: CPT

## 2024-08-27 PROCEDURE — 81001 URINALYSIS AUTO W/SCOPE: CPT

## 2024-08-27 PROCEDURE — 82728 ASSAY OF FERRITIN: CPT

## 2024-08-27 PROCEDURE — 85025 COMPLETE CBC W/AUTO DIFF WBC: CPT

## 2024-08-27 PROCEDURE — 85045 AUTOMATED RETICULOCYTE COUNT: CPT

## 2024-08-27 PROCEDURE — 85379 FIBRIN DEGRADATION QUANT: CPT

## 2024-08-27 PROCEDURE — 87086 URINE CULTURE/COLONY COUNT: CPT

## 2024-08-27 PROCEDURE — 6360000002 HC RX W HCPCS: Performed by: EMERGENCY MEDICINE

## 2024-08-27 PROCEDURE — 2580000003 HC RX 258: Performed by: EMERGENCY MEDICINE

## 2024-08-27 PROCEDURE — 82525 ASSAY OF COPPER: CPT

## 2024-08-27 PROCEDURE — 84484 ASSAY OF TROPONIN QUANT: CPT

## 2024-08-27 PROCEDURE — 96375 TX/PRO/DX INJ NEW DRUG ADDON: CPT

## 2024-08-27 PROCEDURE — 82607 VITAMIN B-12: CPT

## 2024-08-27 PROCEDURE — 83540 ASSAY OF IRON: CPT

## 2024-08-27 PROCEDURE — 93005 ELECTROCARDIOGRAM TRACING: CPT | Performed by: EMERGENCY MEDICINE

## 2024-08-27 PROCEDURE — 0202U NFCT DS 22 TRGT SARS-COV-2: CPT

## 2024-08-27 PROCEDURE — 86900 BLOOD TYPING SEROLOGIC ABO: CPT

## 2024-08-27 PROCEDURE — 6370000000 HC RX 637 (ALT 250 FOR IP): Performed by: INTERNAL MEDICINE

## 2024-08-27 PROCEDURE — 83605 ASSAY OF LACTIC ACID: CPT

## 2024-08-27 PROCEDURE — 82746 ASSAY OF FOLIC ACID SERUM: CPT

## 2024-08-27 PROCEDURE — 86901 BLOOD TYPING SEROLOGIC RH(D): CPT

## 2024-08-27 PROCEDURE — 2580000003 HC RX 258: Performed by: INTERNAL MEDICINE

## 2024-08-27 PROCEDURE — 6360000002 HC RX W HCPCS: Performed by: INTERNAL MEDICINE

## 2024-08-27 PROCEDURE — 82803 BLOOD GASES ANY COMBINATION: CPT

## 2024-08-27 PROCEDURE — 93970 EXTREMITY STUDY: CPT

## 2024-08-27 PROCEDURE — 2060000000 HC ICU INTERMEDIATE R&B

## 2024-08-27 PROCEDURE — 80048 BASIC METABOLIC PNL TOTAL CA: CPT

## 2024-08-27 PROCEDURE — 86850 RBC ANTIBODY SCREEN: CPT

## 2024-08-27 PROCEDURE — 83550 IRON BINDING TEST: CPT

## 2024-08-27 RX ORDER — IOPAMIDOL 755 MG/ML
75 INJECTION, SOLUTION INTRAVASCULAR
Status: COMPLETED | OUTPATIENT
Start: 2024-08-27 | End: 2024-08-27

## 2024-08-27 RX ORDER — MAGNESIUM SULFATE IN WATER 40 MG/ML
2000 INJECTION, SOLUTION INTRAVENOUS PRN
Status: DISCONTINUED | OUTPATIENT
Start: 2024-08-27 | End: 2024-08-29 | Stop reason: HOSPADM

## 2024-08-27 RX ORDER — ONDANSETRON 2 MG/ML
4 INJECTION INTRAMUSCULAR; INTRAVENOUS EVERY 6 HOURS PRN
Status: DISCONTINUED | OUTPATIENT
Start: 2024-08-27 | End: 2024-08-29 | Stop reason: HOSPADM

## 2024-08-27 RX ORDER — ACETAMINOPHEN 650 MG/1
650 SUPPOSITORY RECTAL EVERY 6 HOURS PRN
Status: DISCONTINUED | OUTPATIENT
Start: 2024-08-27 | End: 2024-08-29 | Stop reason: HOSPADM

## 2024-08-27 RX ORDER — SODIUM CHLORIDE 0.9 % (FLUSH) 0.9 %
5-40 SYRINGE (ML) INJECTION PRN
Status: DISCONTINUED | OUTPATIENT
Start: 2024-08-27 | End: 2024-08-29 | Stop reason: HOSPADM

## 2024-08-27 RX ORDER — SODIUM CHLORIDE 9 MG/ML
INJECTION, SOLUTION INTRAVENOUS PRN
Status: DISCONTINUED | OUTPATIENT
Start: 2024-08-27 | End: 2024-08-29 | Stop reason: HOSPADM

## 2024-08-27 RX ORDER — ENOXAPARIN SODIUM 100 MG/ML
30 INJECTION SUBCUTANEOUS 2 TIMES DAILY
Status: DISCONTINUED | OUTPATIENT
Start: 2024-08-27 | End: 2024-08-29 | Stop reason: HOSPADM

## 2024-08-27 RX ORDER — ONDANSETRON 4 MG/1
4 TABLET, ORALLY DISINTEGRATING ORAL EVERY 8 HOURS PRN
Status: DISCONTINUED | OUTPATIENT
Start: 2024-08-27 | End: 2024-08-29 | Stop reason: HOSPADM

## 2024-08-27 RX ORDER — POTASSIUM CHLORIDE 7.45 MG/ML
10 INJECTION INTRAVENOUS PRN
Status: DISCONTINUED | OUTPATIENT
Start: 2024-08-27 | End: 2024-08-29 | Stop reason: HOSPADM

## 2024-08-27 RX ORDER — ACETAMINOPHEN 325 MG/1
650 TABLET ORAL EVERY 6 HOURS PRN
Status: DISCONTINUED | OUTPATIENT
Start: 2024-08-27 | End: 2024-08-29 | Stop reason: HOSPADM

## 2024-08-27 RX ORDER — POLYETHYLENE GLYCOL 3350 17 G/17G
17 POWDER, FOR SOLUTION ORAL DAILY PRN
Status: DISCONTINUED | OUTPATIENT
Start: 2024-08-27 | End: 2024-08-29 | Stop reason: HOSPADM

## 2024-08-27 RX ORDER — SODIUM CHLORIDE 0.9 % (FLUSH) 0.9 %
5-40 SYRINGE (ML) INJECTION EVERY 12 HOURS SCHEDULED
Status: DISCONTINUED | OUTPATIENT
Start: 2024-08-27 | End: 2024-08-29 | Stop reason: HOSPADM

## 2024-08-27 RX ORDER — OXYCODONE HYDROCHLORIDE 5 MG/1
5 TABLET ORAL EVERY 4 HOURS PRN
Status: DISCONTINUED | OUTPATIENT
Start: 2024-08-27 | End: 2024-08-29 | Stop reason: HOSPADM

## 2024-08-27 RX ORDER — PANTOPRAZOLE SODIUM 40 MG/1
40 TABLET, DELAYED RELEASE ORAL
Status: DISCONTINUED | OUTPATIENT
Start: 2024-08-28 | End: 2024-08-27

## 2024-08-27 RX ORDER — POTASSIUM CHLORIDE 1500 MG/1
40 TABLET, EXTENDED RELEASE ORAL PRN
Status: DISCONTINUED | OUTPATIENT
Start: 2024-08-27 | End: 2024-08-29 | Stop reason: HOSPADM

## 2024-08-27 RX ORDER — POLYETHYLENE GLYCOL 3350 17 G/17G
17 POWDER, FOR SOLUTION ORAL DAILY
Status: DISCONTINUED | OUTPATIENT
Start: 2024-08-27 | End: 2024-08-29 | Stop reason: HOSPADM

## 2024-08-27 RX ADMIN — OXYCODONE HYDROCHLORIDE 5 MG: 5 TABLET ORAL at 20:05

## 2024-08-27 RX ADMIN — CEFEPIME 2000 MG: 2 INJECTION, POWDER, FOR SOLUTION INTRAVENOUS at 18:37

## 2024-08-27 RX ADMIN — ENOXAPARIN SODIUM 30 MG: 100 INJECTION SUBCUTANEOUS at 20:05

## 2024-08-27 RX ADMIN — VANCOMYCIN HYDROCHLORIDE 2000 MG: 10 INJECTION, POWDER, LYOPHILIZED, FOR SOLUTION INTRAVENOUS at 08:00

## 2024-08-27 RX ADMIN — CEFEPIME 2000 MG: 2 INJECTION, POWDER, FOR SOLUTION INTRAVENOUS at 06:51

## 2024-08-27 RX ADMIN — IOPAMIDOL 75 ML: 755 INJECTION, SOLUTION INTRAVENOUS at 07:37

## 2024-08-27 RX ADMIN — ACETAMINOPHEN 650 MG: 325 TABLET ORAL at 23:17

## 2024-08-27 RX ADMIN — ACETAMINOPHEN 650 MG: 325 TABLET ORAL at 11:47

## 2024-08-27 RX ADMIN — SODIUM CHLORIDE, PRESERVATIVE FREE 10 ML: 5 INJECTION INTRAVENOUS at 11:48

## 2024-08-27 RX ADMIN — SODIUM CHLORIDE, PRESERVATIVE FREE 10 ML: 5 INJECTION INTRAVENOUS at 20:05

## 2024-08-27 ASSESSMENT — PAIN SCALES - GENERAL
PAINLEVEL_OUTOF10: 2
PAINLEVEL_OUTOF10: 4
PAINLEVEL_OUTOF10: 0
PAINLEVEL_OUTOF10: 0
PAINLEVEL_OUTOF10: 3
PAINLEVEL_OUTOF10: 5

## 2024-08-27 ASSESSMENT — PAIN DESCRIPTION - PAIN TYPE
TYPE: ACUTE PAIN

## 2024-08-27 ASSESSMENT — ENCOUNTER SYMPTOMS
BACK PAIN: 1
COUGH: 1

## 2024-08-27 ASSESSMENT — PAIN DESCRIPTION - ONSET
ONSET: GRADUAL

## 2024-08-27 ASSESSMENT — PAIN DESCRIPTION - ORIENTATION
ORIENTATION: UPPER;MID
ORIENTATION: MID;UPPER
ORIENTATION: MID;UPPER

## 2024-08-27 ASSESSMENT — PAIN DESCRIPTION - FREQUENCY
FREQUENCY: INTERMITTENT

## 2024-08-27 ASSESSMENT — LIFESTYLE VARIABLES
HOW OFTEN DO YOU HAVE A DRINK CONTAINING ALCOHOL: NEVER
HOW MANY STANDARD DRINKS CONTAINING ALCOHOL DO YOU HAVE ON A TYPICAL DAY: PATIENT DOES NOT DRINK

## 2024-08-27 ASSESSMENT — PAIN DESCRIPTION - LOCATION
LOCATION: BACK
LOCATION: BACK
LOCATION: HEAD

## 2024-08-27 ASSESSMENT — PAIN - FUNCTIONAL ASSESSMENT
PAIN_FUNCTIONAL_ASSESSMENT: ACTIVITIES ARE NOT PREVENTED
PAIN_FUNCTIONAL_ASSESSMENT: NONE - DENIES PAIN
PAIN_FUNCTIONAL_ASSESSMENT: ACTIVITIES ARE NOT PREVENTED
PAIN_FUNCTIONAL_ASSESSMENT: ACTIVITIES ARE NOT PREVENTED

## 2024-08-27 ASSESSMENT — PAIN DESCRIPTION - DESCRIPTORS
DESCRIPTORS: ACHING;DISCOMFORT

## 2024-08-27 NOTE — CONSULTS
[facility] -  Clinical Pharmacy Note    Vancomycin - Management by Pharmacy    Consult Date: 8/27/2024  Consulting Provider: Maryam Trujillo MD   Assessment / Plan  Fever 2° to chemotherapy - Vancomycin  Concurrent Antimicrobials: Cefepime 2 g IV q12h day #1  Day of Vanc Therapy / Ordered Duration: day #1 (duration not specified)  Current Dosing Method: Bayesian-Guided AUC Dosing  Therapeutic Goal: -600 mg/L*hr  Current Dose / Plan:   Creatinine today = 1.3 mg/mL.  Patient has h/o CKD with baseline creatinine ~1.6.  Patient got vancomycin 2 g IV (17 mg/kg) x1 loading dose this morning.  Will order vancomycin 1 g IV q24h starting tomorrow.  This regimen predicts a steady-state AUC of 458 mg/L*hr with an associated trough of 13.7 mg/L.  Will order random level tomorrow morning to assess kinetics and adjust dose accordingly if necessary.  Might switch dosing method from AUC to intermittent by levels if creatinine rises.  Will continue to monitor clinical condition and make adjustments to regimen as appropriate.    Thank you for consulting pharmacy,    Bryce Stanley PharmD BCOP 8/27/2024 2:54 PM      Interval update:  HDS (was hypotensive). Febrile, not neutropenic.  Back pain from coughing.      Subjective/Objective:   Enedina Islas is a 64 y.o. female with a PMHx significant for IDC breast cancer ER/NH-pos HER2-neg (s/p pilar-adjuvant Taxol+pembro x4 followed by AC+pembro then surgery on 9/10/24), obesity, peripheral neuropathy, depression, RA, sleep apnea, type 2 DM, gout, CAD s/p PTCA, pedal edema, and CKD.  She presented with fever (up to 101.2 °F), cough, SOB, and chills.  Has UTI POA (was taking cephalexin).  She was admitted for fever 2° to chemotherapy.  Concern for pneumonia from recent sick contact, but CXR did not show evidence of pneumonia.    Pharmacy is consulted to dose vancomycin.    Ht Readings from Last 1 Encounters:   08/27/24 1.6 m (5' 3\")     Wt Readings from Last 1 Encounters:

## 2024-08-27 NOTE — PLAN OF CARE
Problem: ABCDS Injury Assessment  Goal: Absence of physical injury  Outcome: Progressing  Pt up with assist x1, call light within reach, bed alarm in use     Problem: Safety - Adult  Goal: Free from fall injury  Outcome: Progressing  Pt up with assist x1, call light within reach, bed alarm in use     Problem: Pain  Goal: Verbalizes/displays adequate comfort level or baseline comfort level  Outcome: Not Progressing  Pt complaining of upper to mid back pain, tylenol x1. Pt claims pain is still persistent, messaged doctor for pain medication. No new orders at this time.

## 2024-08-27 NOTE — CARE COORDINATION
Case Management Assessment  Initial Evaluation    Date/Time of Evaluation: 8/27/2024 1:27 PM  Assessment Completed by: ANJALI Ocasio   for [facility] ([facility])  [place] Mobile: [phone]    If patient is discharged prior to next notation, then this note serves as note for discharge by case management.    Patient Name: Enedina Islas                   YOB: 1960  Diagnosis: Dyspnea [R06.00]  Fever, unspecified fever cause [R50.9]                   Date / Time: 8/27/2024  5:10 AM    Patient Admission Status: Inpatient   Readmission Risk (Low < 19, Mod (19-27), High > 27): Readmission Risk Score: 13.5    Current PCP: Sofya Caban APRN - CNP  PCP verified by CM? Yes    Chart Reviewed: Yes      History Provided by: Patient  Patient Orientation: Alert and Oriented    Patient Cognition: Alert    Hospitalization in the last 30 days (Readmission):  No    If yes, Readmission Assessment in CM Navigator will be completed.    Advance Directives:      Code Status: Full Code   Patient's Primary Decision Maker is: Legal Next of Kin    Primary Decision Maker: Clinton Islas - Spouse - [phone]    Discharge Planning:    Patient lives with: Spouse/Significant Other, Children Type of Home: House  Primary Care Giver: Self  Patient Support Systems include: Spouse/Significant Other, Children   Current Financial resources: Medicare, Other (Comment) ([employer] secondary)  Current community resources: None  Current services prior to admission: Durable Medical Equipment, Other (Comment) ([facility])            Current DME: Cane, Walker, Other (Comment) ([employer])            Type of Home Care services:  None    ADLS  Prior functional level: Independent in ADLs/IADLs  Current functional level: Independent in ADLs/IADLs    PT AM-PAC:   /24  OT AM-PAC:   /24    Family can provide assistance at DC: Yes  Would you like Case Management to discuss the discharge  plan with any other family members/significant others, and if so, who? Yes  Plans to Return to Present Housing: Yes  Other Identified Issues/Barriers to RETURNING to current housing: n/a  Potential Assistance needed at discharge: N/A            Potential DME:    Patient expects to discharge to: House  Plan for transportation at discharge:      Financial    Payor: MEDICARE / Plan: MEDICARE PART A AND B / Product Type: *No Product type* /     Does insurance require precert for SNF: No    Potential assistance Purchasing Medications: No  Meds-to-Beds request:        [id number] - [place], [place] - [address] [address] - P [phone] - F [phone]  [address] [address]  Phone: [phone] Fax: [phone]      Notes:    Factors facilitating achievement of predicted outcomes: Family support, Caregiver support, Motivated, Cooperative, Pleasant, Sense of humor, Good insight into deficits, and Has needed Durable Medical Equipment at home    Barriers to discharge: Medical complications    Additional Case Management Notes: Chart review completed. Spoke with pt and  at bedside with pt permission. Pt stated she is independent with her ADL's and will return home when able. She said she uses the hand rails on the steps and when she is outside, her  holds on to her for assistance. She declined skilled home care needs when discharged for RN/PT/OT. She is aware SW will follow    SW will follow     The Plan for Transition of Care is related to the following treatment goals of Dyspnea [R06.00]  Fever, unspecified fever cause [R50.9]    ANJALI Ocasio   for [facility] ([facility])  [place] Mobile: [phone]

## 2024-08-27 NOTE — H&P
Heart size is normal. The trachea is midline. PLEURAL SPACES: Costophrenic angles are clear. No pneumothorax is seen. LUNGS: There is no acute consolidation. BONES / SOFT TISSUES: No significant abnormality. OTHER: None.     No acute pulmonary pathology Electronically signed by Ana Cohn MD        Electronically signed by Maryam Trujillo MD on 8/27/2024 at 12:35 PM

## 2024-08-27 NOTE — ED NOTES
ED TO INPATIENT SBAR HANDOFF    Patient Name: Enedina Islas   :  1960  64 y.o.   MRN:  [id number]  Preferred Name    ED Room #:  A07/A07-07  Family/Caregiver Present no   Restraints no   Sitter no   Sepsis Risk Score      Situation  Code Status: Prior No additional code details.    Allergies: Gabapentin, Pantoprazole, Atorvastatin, Meloxicam, and Protonix [pantoprazole sodium]  Weight: Patient Vitals for the past 96 hrs (Last 3 readings):   Weight   24 0501 116.1 kg (256 lb)     Arrived from: home  Chief Complaint:   Chief Complaint   Patient presents with   • Fever   • Shortness of Breath   • Cough     Hospital Problem/Diagnosis:  Active Problems:    * No active hospital problems. *  Resolved Problems:    * No resolved hospital problems. *    Imaging:   CT CHEST PULMONARY EMBOLISM W CONTRAST   Final Result      1. No evidence of pulmonary embolus to the segmental level.            Electronically signed by Adam Yepez      XR CHEST (2 VW)   Final Result      No acute pulmonary pathology            Electronically signed by Ana Cohn MD        Abnormal labs:   Abnormal Labs Reviewed   CBC WITH AUTO DIFFERENTIAL - Abnormal; Notable for the following components:       Result Value    RBC 2.56 (*)     Hemoglobin 8.1 (*)     Hematocrit 23.9 (*)     RDW 17.3 (*)     Platelets 114 (*)     Lymphocytes Absolute 0.8 (*)     Metamyelocytes Relative 2 (*)     Anisocytosis Occasional (*)     All other components within normal limits   BASIC METABOLIC PANEL W/ REFLEX TO MG FOR LOW K - Abnormal; Notable for the following components:    CO2 20 (*)     Glucose 144 (*)     Creatinine 1.3 (*)     Est, Glom Filt Rate 46 (*)     All other components within normal limits   BLOOD GAS, VENOUS - Abnormal; Notable for the following components:    pCO2, [place] 36.8 (*)     PO2, [place] 59.4 (*)     HCO3, Venous 23.0 (*)     All other components within normal limits   TROPONIN - Abnormal; Notable for the following components:

## 2024-08-27 NOTE — ACP (ADVANCE CARE PLANNING)
Advance Care Planning     General Advance Care Planning (ACP) Conversation    Date of Conversation: 8/27/2024  Conducted with: Patient with Decision Making Capacity  Other persons present: Spouse Clinton    Healthcare Decision Maker:   Primary Decision Maker: RoshanClinton andrea - Spouse - [phone]     Today we documented Decision Maker(s) consistent with Legal Next of Kin hierarchy.    Length of Voluntary ACP Conversation in minutes:  <16 minutes (Non-Billable)    ANJALI Ocasio   for [facility] ([facility])  [place] Mobile: [phone]

## 2024-08-27 NOTE — CONSULTS
Oncology [facility]ology [facility]    Consult Note      Requesting Physician:  Yessenia Coburn     CHIEF COMPLAINT:  Fever      HISTORY OF PRESENT ILLNESS:    Ms. Islas  is a 64 y.o. female we are seeing in consultation for bilateral Breast Cancer. She is followed by Dr. Mcgee as an outpatient. Recently completed neoadjuvant chemotherapy with 4 cycles Taxol/Pembrolizumab followed by AC/Pembro. Surgery with Dr. Francis is scheduled 9/10/24.     She presented to the ED with c/o fever, cough, SOB. She felt chills and then developed fever to 101.2. She is actively being treated for UTI with antibiotics. Recent sick contacts with her sister who developed pneumonia following their visit.     CXR did not show any evidence of pneumonia. Blood cultures pending. Empiric antibiotics started. CTPA without evidence of PE.     Today she is seen with her  at bedside. She complains of sternal and mid thoracic back pain. She suspects this is from coughing. She has baseline lumbar back pain for which she occasionally uses Oxycodone. Typically manages with Tylenol. She would like to try Tylenol first to see if her back pain improves.           Past Medical History:  Past Medical History:   Diagnosis Date    Acid reflux     Bilateral carpal tunnel syndrome 12/21/2017    CAD in native artery 06/19/2018    Mid LAD stented with 3.5 x 15 mm Xience BRIAN. EF 55%    Cancer (HCC)     breast    Chicken pox     Chronic back pain     CKD (chronic kidney disease) 04/15/2016    Depression 12/07/2010    Depression 12/07/2010    Heart disease     [place] (hard of hearing)     Hx of smoking     Hypertension     Interstitial lung disease (HCC)     Lymphadenopathy, anterior cervical 07/13/2022    Measles     Menopausal symptoms     Morbid obesity with BMI of 45.0-49.9, adult ([facility]) 01/29/2015    Obstructive sleep apnea syndrome 12/13/2023     Osteoarthritis     Overactive bladder     PMR (polymyalgia rheumatica) ([facility])     Poor venous access     Pure hypercholesterolemia     Sleep apnea     does not use a CPAP machine    Snores     Stress incontinence     Wears dentures     Wears glasses     Wears hearing aid        Past Surgical History:  Past Surgical History:   Procedure Laterality Date    BRONCHOSCOPY  06/27/2019    BRONCHOSCOPY ALVEOLAR LAVAGE performed by Josse Mason MD at [facility] ENDOSCOPY    BRONCHOSCOPY  06/27/2019    BRONCHOSCOPY DIAGNOSTIC OR CELL WASH ONLY performed by Josse Mason MD at [facility] ENDOSCOPY    BRONCHOSCOPY  06/27/2019    BRONCHOSCOPY BIOPSY BRONCHUS performed by Josse Mason MD at [facility] ENDOSCOPY    CARPAL TUNNEL RELEASE Bilateral 01/29/2018    COLONOSCOPY      EPIDURAL STEROID INJECTION N/A 10/15/2019    MIDLINE CAUDAL EPIDURAL STEROID INJECTION AND COCCYGEAL JOINT INJECTION SITES CONFIRMED BY FLUOROSCOPY performed by Raysa Crandall MD at [facility] OR    JOINT REPLACEMENT  2009,2010    KNEES BILATERAL    JOINT REPLACEMENT Bilateral     NERVE SURGERY Bilateral 12/22/2020    BILATERAL LUMBAR THREE LUMBAR FOUR LUMBAR FIVE DORSAL RAMUS RADIOFREQUENCY ABLATION SITE CONFIRMED BY FLUOROSCOPY performed by Raysa Crandall MD at [facility] OR    PAIN MANAGEMENT PROCEDURE Right 03/03/2020    RIGHT LUMBAR FOUR AND LUMBAR FIVE TRANSFORAMINAL EPIDURAL STEROID INJECTION SITE CONFIRMED BY FLUOROSCOPY performed by Raysa Crandall MD at [facility] OR    PAIN MANAGEMENT PROCEDURE Right 11/02/2020    RIGHT L4 AND L5 TRANSFORAMINAL EPIDURAL STEROID INJECTION WITH FLUOROSCOPY (33098, 26404) performed by Raysa Crandall MD at [facility]    PAIN MANAGEMENT PROCEDURE Bilateral 11/17/2020    BILATERAL LUMBAR THREE LUMBAR FOUR LUMBAR FIVE DORSAL RAMUS LUMBAR MEDIAL BRANCH BLOCK SITE CONFIRMED BY FLUOROSCOPY performed by Raysa Crandall MD at [facility] OR    PAIN MANAGEMENT PROCEDURE Bilateral 12/08/2020    BILATERAL LUMBAR THREE LUMBAR FOUR

## 2024-08-27 NOTE — PROGRESS NOTES
4 Eyes Skin Assessment     NAME:  Enedina Islas  YOB: 1960  MEDICAL RECORD NUMBER:  [id number]    The patient is being assessed for  Admission    I agree that at least one RN has performed a thorough Head to Toe Skin Assessment on the patient. ALL assessment sites listed below have been assessed.      Areas assessed by both nurses:    Head, Face, Ears, Shoulders, Back, Chest, Arms, Elbows, Hands, Sacrum. Buttock, Coccyx, Ischium, and Legs. Feet and Heels        Does the Patient have a Wound? No noted wound(s)       Jonathon Prevention initiated by RN: No  Wound Care Orders initiated by RN: No    Pressure Injury (Stage 3,4, Unstageable, DTI, NWPT, and Complex wounds) if present, place Wound referral order by RN under : No    New Ostomies, if present place, Ostomy referral order under : No     Nurse 1 eSignature: Electronically signed by Jackie Luque RN on 8/27/24 at 3:05 PM EDT    **SHARE this note so that the co-signing nurse can place an eSignature**    Nurse 2 eSignature: Electronically signed by Tammi Forman RN on 8/27/24 at 3:13 PM EDT

## 2024-08-27 NOTE — PROGRESS NOTES
Perfect serve sent to attending at 1521. Pt is having upper to mid back pain with no relief from tylenol. Awaiting orders.

## 2024-08-28 LAB
ALBUMIN SERPL-MCNC: 3.2 G/DL (ref 3.4–5)
AMORPH SED URNS QL MICRO: ABNORMAL /HPF
ANION GAP SERPL CALCULATED.3IONS-SCNC: 11 MMOL/L (ref 3–16)
ANISOCYTOSIS BLD QL SMEAR: ABNORMAL
BACTERIA UR CULT: NORMAL
BASOPHILS # BLD: 0 K/UL (ref 0–0.2)
BASOPHILS NFR BLD: 0 %
BILIRUB UR QL STRIP.AUTO: NEGATIVE
BUN SERPL-MCNC: 22 MG/DL (ref 7–20)
CALCIUM SERPL-MCNC: 8.5 MG/DL (ref 8.3–10.6)
CASTS #/AREA URNS LPF: ABNORMAL /LPF
CHLORIDE SERPL-SCNC: 105 MMOL/L (ref 99–110)
CLARITY UR: CLEAR
CO2 SERPL-SCNC: 22 MMOL/L (ref 21–32)
COLOR UR: YELLOW
CREAT SERPL-MCNC: 1.6 MG/DL (ref 0.6–1.2)
DEPRECATED RDW RBC AUTO: 17.6 % (ref 12.4–15.4)
ECHO BSA: 2.27 M2
EOSINOPHIL # BLD: 0.3 K/UL (ref 0–0.6)
EOSINOPHIL NFR BLD: 4 %
EPI CELLS #/AREA URNS HPF: ABNORMAL /HPF (ref 0–5)
GFR SERPLBLD CREATININE-BSD FMLA CKD-EPI: 36 ML/MIN/{1.73_M2}
GLUCOSE SERPL-MCNC: 109 MG/DL (ref 70–99)
GLUCOSE UR STRIP.AUTO-MCNC: NEGATIVE MG/DL
HCT VFR BLD AUTO: 22.4 % (ref 36–48)
HGB BLD-MCNC: 7.5 G/DL (ref 12–16)
HGB UR QL STRIP.AUTO: NEGATIVE
KETONES UR STRIP.AUTO-MCNC: NEGATIVE MG/DL
LEUKOCYTE ESTERASE UR QL STRIP.AUTO: NEGATIVE
LYMPHOCYTES # BLD: 0.4 K/UL (ref 1–5.1)
LYMPHOCYTES NFR BLD: 5 %
MACROCYTES BLD QL SMEAR: ABNORMAL
MCH RBC QN AUTO: 31.4 PG (ref 26–34)
MCHC RBC AUTO-ENTMCNC: 33.4 G/DL (ref 31–36)
MCV RBC AUTO: 94 FL (ref 80–100)
MONOCYTES # BLD: 1.4 K/UL (ref 0–1.3)
MONOCYTES NFR BLD: 19 %
MUCOUS THREADS #/AREA URNS LPF: ABNORMAL /LPF
NEUTROPHILS # BLD: 5.3 K/UL (ref 1.7–7.7)
NEUTROPHILS NFR BLD: 71 %
NEUTS BAND NFR BLD MANUAL: 1 % (ref 0–7)
NITRITE UR QL STRIP.AUTO: NEGATIVE
PH UR STRIP.AUTO: 6 [PH] (ref 5–8)
PHOSPHATE SERPL-MCNC: 3.8 MG/DL (ref 2.5–4.9)
PLATELET # BLD AUTO: 132 K/UL (ref 135–450)
PMV BLD AUTO: 7.5 FL (ref 5–10.5)
POTASSIUM SERPL-SCNC: 3.8 MMOL/L (ref 3.5–5.1)
PROT UR STRIP.AUTO-MCNC: ABNORMAL MG/DL
RBC # BLD AUTO: 2.39 M/UL (ref 4–5.2)
RBC #/AREA URNS HPF: ABNORMAL /HPF (ref 0–4)
RENAL EPI CELLS #/AREA UR COMP ASSIST: ABNORMAL /HPF (ref 0–1)
SODIUM SERPL-SCNC: 138 MMOL/L (ref 136–145)
SP GR UR STRIP.AUTO: 1.02 (ref 1–1.03)
UA DIPSTICK W REFLEX MICRO PNL UR: YES
URN SPEC COLLECT METH UR: ABNORMAL
UROBILINOGEN UR STRIP-ACNC: 0.2 E.U./DL
VANCOMYCIN SERPL-MCNC: 9.4 UG/ML
WBC # BLD AUTO: 7.4 K/UL (ref 4–11)
WBC #/AREA URNS HPF: ABNORMAL /HPF (ref 0–5)

## 2024-08-28 PROCEDURE — 6360000002 HC RX W HCPCS: Performed by: EMERGENCY MEDICINE

## 2024-08-28 PROCEDURE — 2060000000 HC ICU INTERMEDIATE R&B

## 2024-08-28 PROCEDURE — 81001 URINALYSIS AUTO W/SCOPE: CPT

## 2024-08-28 PROCEDURE — 2580000003 HC RX 258: Performed by: INTERNAL MEDICINE

## 2024-08-28 PROCEDURE — 93970 EXTREMITY STUDY: CPT | Performed by: SURGERY

## 2024-08-28 PROCEDURE — 6370000000 HC RX 637 (ALT 250 FOR IP): Performed by: INTERNAL MEDICINE

## 2024-08-28 PROCEDURE — 2580000003 HC RX 258: Performed by: EMERGENCY MEDICINE

## 2024-08-28 PROCEDURE — 51798 US URINE CAPACITY MEASURE: CPT

## 2024-08-28 PROCEDURE — 85025 COMPLETE CBC W/AUTO DIFF WBC: CPT

## 2024-08-28 PROCEDURE — 6360000002 HC RX W HCPCS: Performed by: INTERNAL MEDICINE

## 2024-08-28 PROCEDURE — 80202 ASSAY OF VANCOMYCIN: CPT

## 2024-08-28 PROCEDURE — 36592 COLLECT BLOOD FROM PICC: CPT

## 2024-08-28 PROCEDURE — 80069 RENAL FUNCTION PANEL: CPT

## 2024-08-28 RX ORDER — SODIUM CHLORIDE 9 MG/ML
INJECTION, SOLUTION INTRAVENOUS CONTINUOUS
Status: ACTIVE | OUTPATIENT
Start: 2024-08-28 | End: 2024-08-28

## 2024-08-28 RX ADMIN — SODIUM CHLORIDE, PRESERVATIVE FREE 10 ML: 5 INJECTION INTRAVENOUS at 19:51

## 2024-08-28 RX ADMIN — VANCOMYCIN HYDROCHLORIDE 1000 MG: 1 INJECTION, POWDER, LYOPHILIZED, FOR SOLUTION INTRAVENOUS at 10:32

## 2024-08-28 RX ADMIN — ACETAMINOPHEN 650 MG: 325 TABLET ORAL at 11:44

## 2024-08-28 RX ADMIN — SODIUM CHLORIDE, PRESERVATIVE FREE 10 ML: 5 INJECTION INTRAVENOUS at 08:16

## 2024-08-28 RX ADMIN — SODIUM CHLORIDE: 9 INJECTION, SOLUTION INTRAVENOUS at 08:15

## 2024-08-28 RX ADMIN — ENOXAPARIN SODIUM 30 MG: 100 INJECTION SUBCUTANEOUS at 19:50

## 2024-08-28 RX ADMIN — OMEPRAZOLE 20 MG: 20 CAPSULE, DELAYED RELEASE ORAL at 06:33

## 2024-08-28 RX ADMIN — ENOXAPARIN SODIUM 30 MG: 100 INJECTION SUBCUTANEOUS at 08:20

## 2024-08-28 RX ADMIN — CEFEPIME 2000 MG: 2 INJECTION, POWDER, FOR SOLUTION INTRAVENOUS at 06:33

## 2024-08-28 RX ADMIN — CEFEPIME 2000 MG: 2 INJECTION, POWDER, FOR SOLUTION INTRAVENOUS at 19:46

## 2024-08-28 ASSESSMENT — PAIN DESCRIPTION - FREQUENCY
FREQUENCY: INTERMITTENT
FREQUENCY: INTERMITTENT

## 2024-08-28 ASSESSMENT — PAIN SCALES - GENERAL
PAINLEVEL_OUTOF10: 8
PAINLEVEL_OUTOF10: 0
PAINLEVEL_OUTOF10: 4

## 2024-08-28 ASSESSMENT — PAIN DESCRIPTION - LOCATION
LOCATION: HEAD
LOCATION: HEAD

## 2024-08-28 ASSESSMENT — PAIN DESCRIPTION - DESCRIPTORS: DESCRIPTORS: ACHING;DISCOMFORT

## 2024-08-28 ASSESSMENT — PAIN DESCRIPTION - ONSET
ONSET: PROGRESSIVE
ONSET: PROGRESSIVE

## 2024-08-28 ASSESSMENT — PAIN DESCRIPTION - ORIENTATION
ORIENTATION: MID;POSTERIOR
ORIENTATION: POSTERIOR

## 2024-08-28 ASSESSMENT — PAIN DESCRIPTION - PAIN TYPE
TYPE: ACUTE PAIN
TYPE: ACUTE PAIN

## 2024-08-28 NOTE — PROGRESS NOTES
Pt c/o burning with urination. Attending physician notified; new orders received for repeat UA and bladder scan. Bladder scan result 120ml.

## 2024-08-28 NOTE — PROGRESS NOTES
ONCOLOGY HEMATOLOGY CARE PROGRESS NOTE      SUBJECTIVE: Still w/ dry cough. No fevers. No SOB      ROS:  Constitutional:  No weight loss, No fever, No chills, No night sweats.  Energy level good.  Eyes:  No impairment or change in vision  ENT / Mouth:  No pain, abnormal ulceration, bleeding, nasal drip or change in voice or hearing  Cardiovascular:  No chest pain, palpitations, new edema, or calf discomfort  Respiratory:  No pain, hemoptysis, change to breathing  Breast:  No pain, discharge, change in appearance or texture  Gastrointestinal:  No pain, cramping, jaundice, change to eating and bowel habits  Urinary:  No pain, bleeding or change in continence  Musculoskeletal:  No redness, pain, edema or weakness  Skin:  No pruritus, rash, change to nodules or lesions  Neurologic:  No discomfort, change in mental status, speech, sensory or motor activity  Psychiatric:  No change in concentration or change to affect or mood  Endocrine:  No hot flashes, increased thirst, or change to urine production  Hematologic: No petechiae, ecchymosis or bleeding  Lymphatic:  No lymphadenopathy or lymphedema  Allergy / Immunologic:  No eczema, hives, frequent or recurrent infections    OBJECTIVE      Ms. Islas  is a 64 y.o. female we are seeing in consultation for bilateral Breast Cancer. She is followed by Dr. Mcgee as an outpatient. Recently completed neoadjuvant chemotherapy with 4 cycles Taxol/Pembrolizumab followed by AC/Pembro. Surgery with Dr. Francis is scheduled 9/10/24.      She presented to the ED with c/o fever, cough, SOB. She felt chills and then developed fever to 101.2. She is actively being treated for UTI with antibiotics. Recent sick contacts with her sister who developed pneumonia following their visit.      CXR did not show any evidence of pneumonia. Blood cultures pending. Empiric antibiotics started. CTPA without evidence of PE.      Today she is seen with her  appears intact   EXTREMITIES: no LE edema     DATA:  CBC:    Recent Labs     08/28/24  0300 08/27/24  0733 08/27/24  0534 08/19/24  0922   WBC 7.4  --  6.5 21.9*   NEUTROABS 5.3  --  5.6 21.2*   LYMPHOPCT 5.0  --  12.0 3.0   RBC 2.39*  --  2.56* 2.98*   HGB 7.5*  --  8.1* 9.4*   HCT 22.4* 21.4* 23.9* 29.2*   MCV 94.0  --  93.4 98.1   MCH 31.4  --  31.8 31.4   MCHC 33.4  --  34.0 32.0   RDW 17.6*  --  17.3* 18.8*   *  --  114* 217       CMP:    Recent Labs     08/28/24  0300 08/27/24  0534 08/19/24  0922    137 139   K 3.8 3.6 4.3    104 104   CO2 22 20* 22   GLUCOSE 109* 144* 145*   BUN 22* 20 30*   CREATININE 1.6* 1.3* 1.5*   LABGLOM 36* 46* 38*   CALCIUM 8.5 8.5 9.2   AGRATIO  --   --  1.6   BILITOT  --   --  0.5   ALKPHOS  --   --  128   ALT  --   --  8*   AST  --   --  14*       Lab Results   Component Value Date    CALCIUM 8.5 08/28/2024    PHOS 3.8 08/28/2024       Radiology Review:  CT CHEST PULMONARY EMBOLISM W CONTRAST  Narrative: EXAM: CT CHEST PULMONARY EMBOLUS PROTOCOL.    INDICATION: Shortness of breath    COMPARISON: CTPA dated 12/5/2022.    TECHNIQUE: Axial CT imaging obtained through the chest using CT pulmonary  angiogram protocol. Axial images, multiplanar reformatted images and maximum  intensity projection images were reviewed. Departmental dose lowering techniques  for CT include automated exposure control, adjustment of the mA and/or kV  according to patient size, and use of iterative reconstruction technique.  IV Contrast Media and volume: 75 cc Isovue 370.    FINDINGS:    DIAGNOSTIC QUALITY: Mildly limited evaluation due to respiratory motion..    PULMONARY EMBOLI: No evidence of pulm embolism..    RIGHT HEART STRAIN: None.     LUNGS AND AIRWAYS: The central airways are patent. Postsurgical changes from  prior wedge resection in the right upper and lower lobes. There is subpleural  atelectasis and scarring along the dependent lower lobes bilaterally. Lungs are  otherwise

## 2024-08-28 NOTE — PROGRESS NOTES
ADENOPATHY: Enlarged subcarinal node with calcification is consistent with sequelae of healed granulomatous disease. There are also mildly enlarged right hilar lymph nodes with calcification. CHEST WALL / LOWER NECK: No significant abnormality. Right-sided port catheter terminates at the SVC. UPPER ABDOMEN: Normal. BONES: No acute osseous abnormality. Multilevel degenerative spurring throughout the spine. OTHER FINDINGS: Small hiatal hernia..     1. No evidence of pulmonary embolus to the segmental level. Electronically signed by Adam Yepez    XR CHEST (2 VW)    Result Date: 8/27/2024  EXAM: PA AND LATERAL CHEST X-RAY ON 8/27/2024 INDICATION: Shortness of breath COMPARISON: PA/lateral chest 6/10/2024 FINDINGS: LIMITATIONS:None LINES/TUBES:Right-sided Port-A-Cath is seen with tip overlying the mid superior vena cava. HEART / MEDIASTINUM: Heart size is normal. The trachea is midline. PLEURAL SPACES: Costophrenic angles are clear. No pneumothorax is seen. LUNGS: There is no acute consolidation. BONES / SOFT TISSUES: No significant abnormality. OTHER: None.     No acute pulmonary pathology Electronically signed by Ana Cohn MD      CBC:   Recent Labs     08/27/24  0534 08/28/24  0300   WBC 6.5 7.4   HGB 8.1* 7.5*   * 132*     BMP:    Recent Labs     08/27/24  0534 08/28/24  0300    138   K 3.6 3.8    105   CO2 20* 22   BUN 20 22*   CREATININE 1.3* 1.6*   GLUCOSE 144* 109*     Hepatic: No results for input(s): \"AST\", \"ALT\", \"BILITOT\", \"ALKPHOS\" in the last 72 hours.    Invalid input(s): \"ALB\"  Lipids:   Lab Results   Component Value Date/Time    CHOL 139 11/27/2023 12:10 PM    HDL 62 11/27/2023 12:10 PM    TRIG 80 11/27/2023 12:10 PM     Hemoglobin A1C:   Lab Results   Component Value Date/Time    LABA1C 6.6 05/20/2024 11:44 AM     TSH:   Lab Results   Component Value Date/Time    TSH 1.76 05/10/2022 02:10 PM     Troponin: No results found for: \"TROPONINT\"  Lactic Acid: No results for  input(s): \"LACTA\" in the last 72 hours.  BNP:   Recent Labs     08/27/24  0534   PROBNP 1,050*     UA:  Lab Results   Component Value Date/Time    NITRU Negative 08/27/2024 07:23 AM    COLORU Yellow 08/27/2024 07:23 AM    PHUR 6.0 08/27/2024 07:23 AM    PHUR 5.5 11/27/2023 12:10 PM    WBCUA 10-20 08/27/2024 07:23 AM    RBCUA None seen 08/27/2024 07:23 AM    MUCUS 1+ 08/23/2019 01:00 PM    BACTERIA None Seen 08/19/2024 09:21 AM    CLARITYU Clear 08/27/2024 07:23 AM    SPECGRAV 1.030 12/05/2022 10:30 AM    LEUKOCYTESUR TRACE 08/27/2024 07:23 AM    UROBILINOGEN 0.2 08/27/2024 07:23 AM    BILIRUBINUR Negative 08/27/2024 07:23 AM    BLOODU TRACE-INTACT 08/27/2024 07:23 AM    GLUCOSEU Negative 08/27/2024 07:23 AM    KETUA Negative 08/27/2024 07:23 AM    AMORPHOUS 3+ 08/27/2024 07:23 AM     Urine Cultures:   Lab Results   Component Value Date/Time    LABURIN No growth at 18 to 36 hours 08/27/2024 08:56 AM     Blood Cultures:   Lab Results   Component Value Date/Time    BC  08/27/2024 05:34 AM     No Growth to date.  Any change in status will be called.     No results found for: \"BLOODCULT2\"  Organism:   Lab Results   Component Value Date/Time    ORG Escherichia coli 09/30/2020 01:00 PM         Electronically signed by Tatum Ly MD on 8/28/2024 at 2:40 PM

## 2024-08-28 NOTE — CONSULTS
[facility] -  Clinical Pharmacy Note    Vancomycin - Management by Pharmacy    Consult Date: 8/27/2024  Consulting Provider: Maryam Trujillo MD   Assessment / Plan  Fever 2° to chemotherapy - Vancomycin  Concurrent Antimicrobials: Cefepime 2 g IV q12h day #2  Day of Vanc Therapy / Ordered Duration: day #2 (duration not specified)  Current Dosing Method: Bayesian-Guided AUC Dosing  Therapeutic Goal: -600 mg/L*hr  Current Dose / Plan:   Creatinine today = 1.6 mg/mL.  Patient has h/o CKD with baseline creatinine ~1.6.  Random level this morning = 9.4 mg/L drawn 19 hours after 2 g load.  Will continue vancomycin 1 g IV q24h. This regimen predicts a steady-state AUC of 465 mg/L*hr with an associated trough of 14.9 mg/L.  Creatinine increased 1.3 (below baseline) to 1.6 which is baseline.  Will re-order another random level tomorrow morning in case creatinine rises again.  Might switch dosing method from AUC to intermittent by levels if creatinine continues to rise.  Otherwise, will keep AUC method if creatinine is stable.  Will continue to monitor clinical condition and make adjustments to regimen as appropriate.    Thank you for consulting pharmacy,    Bryce Stanley, PharmD BCOP 8/28/2024 1:39 PM      Interval update:  HDS, afebrile, not neutropenic.  Back pain from coughing, managed by Tylenol then oxycodone as 2nd line.      Subjective/Objective:   Enedina Islas is a 64 y.o. female with a PMHx significant for IDC breast cancer [place]-pos HER2-neg (s/p pilar-adjuvant Taxol+pembro x4 followed by AC+pembro then surgery on 9/10/24), obesity, peripheral neuropathy, depression, RA, sleep apnea, type 2 DM, gout, CAD s/p PTCA, pedal edema, and CKD.  She presented with fever (up to 101.2 °F), cough, SOB, and chills.  Has UTI POA (was taking cephalexin).  She was admitted for fever 2° to chemotherapy.  Concern for pneumonia from recent sick contact, but CXR did not show evidence of pneumonia.    Pharmacy is  consulted to dose vancomycin.    Ht Readings from Last 1 Encounters:   08/27/24 1.6 m (5' 3\")     Wt Readings from Last 1 Encounters:   08/27/24 119.1 kg (262 lb 9.1 oz)     Current & Prior Antimicrobial Regimen(s):  Cefepime 2 g IV q12h (8/27 - current)  Vancomycin 2 g IV load on 8/27  Vancomycin 1 g IV q24h (8/28 - current)    Vancomycin Level(s) / Doses:    Date Time Dose Type of Level / Level Interpretation   8/28 ~03:00 1 g IV q24h Random level = 9.4 Drawn 19 hrs after prior dose  AUC = 465, tr = 14.9  Continue 1 g IV q24h          Note: Serum levels collected for AUC-based dosing may be high if collected in close proximity to the dose administered. This is not necessarily indicative of toxicity.    Cultures & Sensitivities:    Date Site Micro Susceptibility / Result   8/27 COVID-19 & influ Not detected    8/27 Respiratory panel Not detected    8/27 Urine culture No growth    8/27 Blood culture #1 NGTD    8/27 Blood culture #2 In process      Recent Labs     08/27/24  0534 08/28/24  0300   CREATININE 1.3* 1.6*   BUN 20 22*   WBC 6.5 7.4       Estimated Creatinine Clearance: 44 mL/min (A) (based on SCr of 1.6 mg/dL (H)).    Additional Lab Values / Findings of Note:    No results for input(s): \"PROCAL\" in the last 72 hours.

## 2024-08-28 NOTE — PLAN OF CARE
Problem: ABCDS Injury Assessment  Goal: Absence of physical injury  8/28/2024 0920 by Angelica Hammond RN  Outcome: Progressing    Problem: Safety - Adult  Goal: Free from fall injury  8/28/2024 0920 by Angelica Hammond RN  Outcome: Progressing  Note:   - Screening for Orthostasis AND not a [place] Risk per MARTINEZ/ABCDS: Patient is a fall risk. Fall risk protocol in place as per fall risk score, see assessment for details. Bed is locked and in lowest possible position, side rails up x2, alarm in place and on, no slip footwear on. Call light/belongings within reach. Patient utilizes call light appropriately for assist as needed. Hourly rounds in place for safety, pt remains free from fall/injury. Will continue to monitor.        Problem: Chronic Conditions and Co-morbidities  Goal: Patient's chronic conditions and co-morbidity symptoms are monitored and maintained or improved  8/28/2024 0920 by Angelica Hammond RN  Outcome: Progressing    Problem: Pain  Goal: Verbalizes/displays adequate comfort level or baseline comfort level  8/28/2024 0920 by Angelica Hammond RN  Outcome: Progressing  Note: Pt denied pain upon initial assessment, made aware of prn medications for pain and nonpharmacologic interventions. Will continue to monitor.      Problem: Metabolic/Fluid and Electrolytes - Adult  Goal: Electrolytes maintained within normal limits  8/28/2024 0920 by Angelica Hammond RN  Outcome: Progressing  Note: Electrolytes WNL on morning lab results, no replacements needed.      Problem: Hematologic - Adult  Goal: Maintains hematologic stability  8/28/2024 0920 by Angelica Hammond RN  Outcome: Progressing  8/28/2024 0557 by Felice Rendon RN  Outcome: Progressing  H/H 7.5/22.4  Patient showing no signs or symptoms of active bleeding.  Transfusion not indicated at this time.

## 2024-08-28 NOTE — CARE COORDINATION
7:36am: Chart review completed. Spoke with pt yesterday and she plans on returning home with her  with no anticipated needs for skilled home care.     SW will follow but anticipates no needs.     Cailin MENCHACA, ANJALI   for [facility] ([facility])  [employer] Mobile: [phone]

## 2024-08-28 NOTE — PLAN OF CARE
Problem: Safety - Adult  Goal: Free from fall injury  8/28/2024 0557 by Felice Rendon, RN  Outcome: Progressing  Note:   - Screening for Orthostasis AND not a [place] Risk per MARTINEZ/ABCDS: Pt bed is in low position, side rails up, call light and belongings are in reach.  Fall risk light is on outside pts room.  Pt encouraged to call for assistance as needed. Will continue with hourly rounds for PO intake, pain needs, toileting and repositioning as needed.       Problem: Pain  Goal: Verbalizes/displays adequate comfort level or baseline comfort level  8/28/2024 0557 by Felice Rendon RN  Outcome: Progressing  Note: Pt complained of back pain, PRN Oxy given.     Problem: Metabolic/Fluid and Electrolytes - Adult  Goal: Electrolytes maintained within normal limits  Outcome: Progressing  Note: No electrolyte replacement needed this shift     Problem: Hematologic - Adult  Goal: Maintains hematologic stability  Outcome: Progressing  Note: Patient's hemoglobin this AM:   Recent Labs     08/28/24  0300   HGB 7.5*     Patient's platelet count this AM:   Recent Labs     08/28/24  0300   *    Thrombocytopenia not present at this time.  Patient showing no signs or symptoms of active bleeding.  Transfusion not indicated at this time.  Patient verbalizes understanding of all instructions. Will continue to assess and implement POC. Call light within reach and hourly rounding in place.       Problem: Pain  Goal: Verbalizes/displays adequate comfort level or baseline comfort level  8/28/2024 0557 by Felice Rendon, RN  Outcome: Progressing  Note: Pt complained of back pain, PRN Oxy given.  8/27/2024 1634 by Jackie Luque, RN  Outcome: Not Progressing

## 2024-08-29 VITALS
OXYGEN SATURATION: 97 % | TEMPERATURE: 97.8 F | DIASTOLIC BLOOD PRESSURE: 65 MMHG | RESPIRATION RATE: 14 BRPM | SYSTOLIC BLOOD PRESSURE: 139 MMHG | BODY MASS INDEX: 46.52 KG/M2 | HEIGHT: 63 IN | HEART RATE: 73 BPM | WEIGHT: 262.57 LBS

## 2024-08-29 PROBLEM — R53.1 GENERALIZED WEAKNESS: Status: ACTIVE | Noted: 2022-05-10

## 2024-08-29 PROBLEM — R30.0 DYSURIA: Status: ACTIVE | Noted: 2024-08-29

## 2024-08-29 LAB
ALBUMIN SERPL-MCNC: 3.2 G/DL (ref 3.4–5)
ANION GAP SERPL CALCULATED.3IONS-SCNC: 9 MMOL/L (ref 3–16)
BUN SERPL-MCNC: 20 MG/DL (ref 7–20)
CALCIUM SERPL-MCNC: 8.3 MG/DL (ref 8.3–10.6)
CHLORIDE SERPL-SCNC: 109 MMOL/L (ref 99–110)
CO2 SERPL-SCNC: 23 MMOL/L (ref 21–32)
CREAT SERPL-MCNC: 1.4 MG/DL (ref 0.6–1.2)
GFR SERPLBLD CREATININE-BSD FMLA CKD-EPI: 42 ML/MIN/{1.73_M2}
GLUCOSE SERPL-MCNC: 107 MG/DL (ref 70–99)
PHOSPHATE SERPL-MCNC: 3 MG/DL (ref 2.5–4.9)
POTASSIUM SERPL-SCNC: 4 MMOL/L (ref 3.5–5.1)
SODIUM SERPL-SCNC: 141 MMOL/L (ref 136–145)
VANCOMYCIN SERPL-MCNC: 11.7 UG/ML

## 2024-08-29 PROCEDURE — 36592 COLLECT BLOOD FROM PICC: CPT

## 2024-08-29 PROCEDURE — 6360000002 HC RX W HCPCS: Performed by: EMERGENCY MEDICINE

## 2024-08-29 PROCEDURE — 2580000003 HC RX 258: Performed by: INTERNAL MEDICINE

## 2024-08-29 PROCEDURE — 80202 ASSAY OF VANCOMYCIN: CPT

## 2024-08-29 PROCEDURE — 2580000003 HC RX 258: Performed by: EMERGENCY MEDICINE

## 2024-08-29 PROCEDURE — 6360000002 HC RX W HCPCS: Performed by: INTERNAL MEDICINE

## 2024-08-29 PROCEDURE — 80069 RENAL FUNCTION PANEL: CPT

## 2024-08-29 PROCEDURE — 6370000000 HC RX 637 (ALT 250 FOR IP): Performed by: INTERNAL MEDICINE

## 2024-08-29 RX ORDER — LEVOFLOXACIN 250 MG/1
250 TABLET, FILM COATED ORAL DAILY
Qty: 4 TABLET | Refills: 0 | Status: SHIPPED | OUTPATIENT
Start: 2024-08-30 | End: 2024-09-03

## 2024-08-29 RX ORDER — LEVOFLOXACIN 500 MG/1
500 TABLET, FILM COATED ORAL ONCE
Status: COMPLETED | OUTPATIENT
Start: 2024-08-29 | End: 2024-08-29

## 2024-08-29 RX ADMIN — SODIUM CHLORIDE, PRESERVATIVE FREE 10 ML: 5 INJECTION INTRAVENOUS at 08:13

## 2024-08-29 RX ADMIN — ENOXAPARIN SODIUM 30 MG: 100 INJECTION SUBCUTANEOUS at 08:10

## 2024-08-29 RX ADMIN — OMEPRAZOLE 20 MG: 20 CAPSULE, DELAYED RELEASE ORAL at 06:46

## 2024-08-29 RX ADMIN — CEFEPIME 2000 MG: 2 INJECTION, POWDER, FOR SOLUTION INTRAVENOUS at 06:46

## 2024-08-29 RX ADMIN — LEVOFLOXACIN 500 MG: 500 TABLET, FILM COATED ORAL at 09:56

## 2024-08-29 NOTE — CONSULTS
Vancomycin has been discontinued. Pharmacy will sign off consult. If medication dosing is resumed, please re-consult pharmacy.

## 2024-08-29 NOTE — CARE COORDINATION
Case Management Assessment            Discharge Note                    Date / Time of Note: 8/29/2024 9:07 AM                  Discharge Note Completed by: ANJALI Ocasio   for [facility] ([facility])  [employer] Mobile: [phone]    Patient Name: Enedina Islas   YOB: 1960  Diagnosis: Dyspnea [R06.00]  Fever, unspecified fever cause [R50.9]   Date / Time: 8/27/2024  5:10 AM    Current PCP: Sofya Caban, APRN - CNP  Clinic patient: No    Hospitalization in the last 30 days: No       Advance Directives:  Code Status: Full Code  [place] DNR form completed and on chart: Not Indicated    Financial:  Payor: MEDICARE / Plan: MEDICARE PART A AND B / Product Type: *No Product type* /      Pharmacy:    [id number] [place] [address] -  [phone] -  [phone]  [address] [place] [id number]  Phone: [phone] Fax: [phone]      Assistance purchasing medications?: Potential Assistance Purchasing Medications: No  Assistance provided by Case Management: None at this time    Does patient want to participate in local refill/ meds to beds program?:      Meds To Beds General Rules:  1. Can ONLY be done Monday- Friday between 8:30am-5pm  2. Prescription(s) must be in pharmacy by 3pm to be filled same day  3.Copy of patient's insurance/ prescription drug card and patient face sheet must be sent along with the prescription(s)  4. Cost of Rx cannot be added to hospital bill. If financial assistance is needed, please contact unit  or ;  or  CANNOT provide pharmacy voucher for patients co-pays  5. Patients can then  the prescription on their way out of the hospital at discharge, or pharmacy can deliver to the bedside if staff is available. (payment due at time of pick-up or delivery - cash, check, or card accepted)     Able to afford home

## 2024-08-29 NOTE — PLAN OF CARE
Problem: Safety - Adult  Goal: Free from fall injury  Outcome: Progressing  Note:  High Fall Risk per MARTINEZ/ABCDS: Explained fall risk precautions to pt and family and rationale behind their use to keep the patient safe. Pt bed is in low position, side rails up, call light and belongings are in reach. Fall wristband applied and present on pts wrist.  Bed alarm on.  Pt encouraged to call for assistance. Will continue with hourly rounds for PO intake, pain needs, toileting and repositioning as needed.        Problem: Pain  Goal: Verbalizes/displays adequate comfort level or baseline comfort level  Outcome: Progressing  Note: Pt did not complain of any pain this shift.     Problem: Metabolic/Fluid and Electrolytes - Adult  Goal: Electrolytes maintained within normal limits  Outcome: Progressing  Note: No electrolyte replacement needed this shift.

## 2024-08-29 NOTE — PROGRESS NOTES
alert, cooperative, no apparent distress   EYES: pupils equal, round and reactive to light, sclera clear and conjunctiva normal  ENT: Normocephalic, without obvious abnormality, atraumatic  NECK: supple, symmetrical, no jugular venous distension and no carotid bruits   HEMATOLOGIC/LYMPHATIC: no cervical, supraclavicular or axillary lymphadenopathy   LUNGS: no increased work of breathing and clear to auscultation   CARDIOVASCULAR: regular rate and rhythm, normal S1 and S2, no murmur noted  ABDOMEN: normal bowel sounds x 4, soft, non-distended, non-tender, no masses palpated, no hepatosplenomgaly   MUSCULOSKELETAL: full range of motion noted, tone is normal  NEUROLOGIC: awake, alert, oriented to name, place and time. Motor skills grossly intact.   SKIN: Normal skin color, texture, turgor and no jaundice. appears intact   EXTREMITIES: no LE edema     DATA:  CBC:    Recent Labs     08/28/24  0300 08/27/24  0733 08/27/24  0534 08/19/24  0922   WBC 7.4  --  6.5 21.9*   NEUTROABS 5.3  --  5.6 21.2*   LYMPHOPCT 5.0  --  12.0 3.0   RBC 2.39*  --  2.56* 2.98*   HGB 7.5*  --  8.1* 9.4*   HCT 22.4* 21.4* 23.9* 29.2*   MCV 94.0  --  93.4 98.1   MCH 31.4  --  31.8 31.4   MCHC 33.4  --  34.0 32.0   RDW 17.6*  --  17.3* 18.8*   *  --  114* 217       PT/INR:  No results for input(s): \"INR\" in the last 720 hours.    Invalid input(s): \"PROT\"  PTT:  No results for input(s): \"APTT\" in the last 720 hours.    CMP:    Recent Labs     08/29/24  0355 08/28/24  0300 08/27/24  0534 08/19/24  0922    138 137 139   K 4.0 3.8 3.6 4.3    105 104 104   CO2 23 22 20* 22   GLUCOSE 107* 109* 144* 145*   BUN 20 22* 20 30*   CREATININE 1.4* 1.6* 1.3* 1.5*   LABGLOM 42* 36* 46* 38*   CALCIUM 8.3 8.5 8.5 9.2   AGRATIO  --   --   --  1.6   BILITOT  --   --   --  0.5   ALKPHOS  --   --   --  128   ALT  --   --   --  8*   AST  --   --   --  14*       Lab Results   Component Value Date    CALCIUM 8.3 08/29/2024    PHOS 3.0 08/29/2024  surgical date 09/10/24  - She will F/U w/ her primary oncologist, Dr Mcgee as an outpt and he will decide on the timing of endocrine therapy    CKD, stage 3  - GFR has been in the 40's since at least 2020  - Hypertensive kidney disease  - There may also be a component of rheumatologic kidney dz as she had a positive SAMMY in 2019.  - She takes Diovan and Nifedipine as an outpt     HTN  - Home Nifedipine and Valsartan are being held     Anemia  Thrombocytopenia   Iron studies WNL 8/15/24  Workup consistent with anemia of chronic disease/chemo induced      Back pain  Her symptoms are well controlled      UTI/URI  Fever  Continue antibiotics   Her symptoms are much improved       Thank you for asking me to see the patient.      ONCOLOGIC DISPOSITION:  Ok to discharge from an oncology standpoint    ROMEO Qureshi - CNP  Please contact through [employer]

## 2024-08-30 ENCOUNTER — TELEPHONE (OUTPATIENT)
Dept: FAMILY MEDICINE CLINIC | Age: 64
End: 2024-08-30

## 2024-08-30 NOTE — TELEPHONE ENCOUNTER
Care Transitions Initial Follow Up Call    Call within 2 business days of discharge: Yes     Patient: Enedina Islas Patient : 1960 MRN: [id number]        RARS: Readmission Risk Score: 14.8       Spoke with: Enedina Islas    Discharge department/facility: [facility]      Non-face-to-face services provided:  Scheduled appointment with PCP-     Follow Up:  9/3/2024 11:20 AM OFFICE VISIT [facility] Medicine Sofya Caban, APRN - FRANCESCA Mohr

## 2024-08-31 LAB
BACTERIA BLD CULT ORG #2: NORMAL
BACTERIA BLD CULT: NORMAL

## 2024-09-01 NOTE — DISCHARGE SUMMARY
[id number]      Phone: [phone]   levoFLOXacin 250 MG tablet        Objective Findings at Discharge:   /65   Pulse 73   Temp 97.8 °F (36.6 °C) (Oral)   Resp 14   Ht 1.6 m (5' 3\")   Wt 119.1 kg (262 lb 9.1 oz)   LMP 07/13/2009   SpO2 97%   BMI 46.51 kg/m²       Physical Exam:   General: NAD  Eyes: EOMI  ENT: neck supple  Cardiovascular: Regular rate.  Respiratory: Clear to auscultation  Gastrointestinal: Soft, non tender  Genitourinary: no suprapubic tenderness  Musculoskeletal: bilateral trace edema  Neuro: Alert.  Psych: Mood appropriate.    .         Labs and Imaging   Vascular duplex lower extremity venous bilateral    Result Date: 8/28/2024    No evidence of deep or superficial venous thrombosis in the bilateral lower extremities.     CT CHEST PULMONARY EMBOLISM W CONTRAST    Result Date: 8/27/2024  EXAM: CT CHEST PULMONARY EMBOLUS PROTOCOL. INDICATION: Shortness of breath COMPARISON: CTPA dated 12/5/2022. TECHNIQUE: Axial CT imaging obtained through the chest using CT pulmonary angiogram protocol. Axial images, multiplanar reformatted images and maximum intensity projection images were reviewed. Departmental dose lowering techniques for CT include automated exposure control, adjustment of the mA and/or kV according to patient size, and use of iterative reconstruction technique. IV Contrast Media and volume: 75 cc Isovue 370. FINDINGS: DIAGNOSTIC QUALITY: Mildly limited evaluation due to respiratory motion.. PULMONARY EMBOLI: No evidence of pulm embolism.. RIGHT HEART STRAIN: None. LUNGS AND AIRWAYS: The central airways are patent. Postsurgical changes from prior wedge resection in the right upper and lower lobes. There is subpleural atelectasis and scarring along the dependent lower lobes bilaterally. Lungs are otherwise clear. No suspicious pulm nodule. PLEURAL / PERICARDIAL SPACES: No pleural or pericardial effusion. HEART / GREAT VESSELS: Heart size is normal. Coronary artery calcification with

## 2024-09-03 ENCOUNTER — OFFICE VISIT (OUTPATIENT)
Dept: FAMILY MEDICINE CLINIC | Age: 64
End: 2024-09-03

## 2024-09-03 ENCOUNTER — HOSPITAL ENCOUNTER (OUTPATIENT)
Dept: ULTRASOUND IMAGING | Age: 64
Discharge: HOME | End: 2024-09-03
Payer: MEDICARE

## 2024-09-03 ENCOUNTER — HOSPITAL ENCOUNTER (OUTPATIENT)
Dept: MAMMOGRAPHY | Age: 64
Discharge: HOME | End: 2024-09-03
Payer: MEDICARE

## 2024-09-03 VITALS
OXYGEN SATURATION: 96 % | HEART RATE: 96 BPM | DIASTOLIC BLOOD PRESSURE: 64 MMHG | TEMPERATURE: 96.9 F | WEIGHT: 257 LBS | BODY MASS INDEX: 45.54 KG/M2 | HEIGHT: 63 IN | SYSTOLIC BLOOD PRESSURE: 132 MMHG

## 2024-09-03 DIAGNOSIS — R92.8 ABNORMAL MAMMOGRAM: ICD-10-CM

## 2024-09-03 DIAGNOSIS — N63.0 BREAST MASS IN FEMALE: ICD-10-CM

## 2024-09-03 DIAGNOSIS — Z09 HOSPITAL DISCHARGE FOLLOW-UP: Primary | ICD-10-CM

## 2024-09-03 DIAGNOSIS — C50.919 PRIMARY MALIGNANT NEOPLASM OF FEMALE BREAST (HCC): ICD-10-CM

## 2024-09-03 PROCEDURE — 77065 DX MAMMO INCL CAD UNI: CPT

## 2024-09-03 PROCEDURE — 19285 PERQ DEV BREAST 1ST US IMAG: CPT

## 2024-09-03 PROCEDURE — 19286 PERQ DEV BREAST ADD US IMAG: CPT

## 2024-09-03 NOTE — PROGRESS NOTES
Post-Discharge Transitional Care Follow Up      Enedina Islas   YOB: 1960    Date of Office Visit:  9/3/2024  Date of Hospital Admission: 8/27/24  Date of Hospital Discharge: 8/29/24  Readmission Risk Score (high >=14%. Medium >=10%):Readmission Risk Score: 14.8      Care management risk score Rising risk (score 2-5) and Complex Care (Scores >=6): No Risk Score On File     Non face to face  following discharge, date last encounter closed (first attempt may have been earlier): 08/30/2024     Call initiated 2 business days of discharge: Yes     Hospital discharge follow-up  -     NM DISCHARGE MEDS RECONCILED W/ CURRENT OUTPATIENT MED LIST  Primary malignant neoplasm of female breast (HCC)  -     CBC with Auto Differential; Future  -     Comprehensive Metabolic Panel; Future      Medical Decision Making: moderate complexity  Return in 1 month (on 10/3/2024).           Subjective:   Patient presents posthospital follow-up for the following:Dyspnea and nonproductive cough, shortness of breath and reported fever to 101.2 at home.  Recent sick contacts. CT PE negative for PE, and no consolidation. COVID and flu ruled out.  Blood cultures no growth to date, urine culture no growth to date, viral respiratory panel negative. Burning with urination for 4 to 5 days.  -Continued broad-spectrum antibiotics  -on day of dc, feeling better, plan to complete 7 days of empiric abx with Levaquin, dosing was discussed with pharmacy given CKD     On day of dc Feeling much better, so far infectious mariee is negative, Duplex negative for acute DVT          Inpatient course: Discharge summary reviewed- see chart.    Interval history/Current status: improving daily, cont with fatigue but improving, afebrile    Patient Active Problem List   Diagnosis    Depression    Total knee replacement status    Obesity    Lumbar degenerative disc disease    Lumbar stenosis    Stage 3b chronic kidney disease (HCC)    Essential

## 2024-09-03 NOTE — PROGRESS NOTES
89/3/2024 0846 AM:    BERNARD COMPLETED/TS    H&P IN [facility] NOTES FILE DATE 8/202/24, LABS EPIC 8/29/24&8/28/24, EKG TRACING IN [facility] 8/27/2024/TS    ROUTED LABS FROM 8/28&2/29 CBC, RENAL PANEL AND URINALYSIS TO   DR SAUNDERS/TS     CALLED DR GONZALEZ W/H&H-NEW ORDER TO REPEAT DOS.    RECENTLY IN HOSPITAL  AND SEEING PCP FOR HOSPITAL F/U 9/3/2024/TS

## 2024-09-03 NOTE — PROGRESS NOTES
9/3/2024 0856 AM:        [facility] PRE-SURGICAL TESTING INSTRUCTIONS                      PRIOR TO PROCEDURE DATE:    1. PLEASE FOLLOW ANY INSTRUCTIONS GIVEN TO YOU PER YOUR SURGEON.      2. Arrange for someone to drive you home and be with you for the first 24 hours after discharge for your safety after your procedure for which you received sedation. Ensure it is someone we can share information with regarding your discharge.     NOTE: At this time ONLY 2 ADULTS may accompany you. NO CHILDREN UNDER AGE OF 16.    One person ENCOURAGED to stay at hospital entire time if outpatient surgery      3. You must contact your surgeon for instructions IF:  You are taking any blood thinners, aspirin, anti-inflammatory or vitamins.   Contact your ordering physician/surgeon for prescription medication instructions as soon as possible, especially if taking blood thinners, aspirin, heart, or diabetic medication.   There is a change in your physical condition such as a cold, fever, rash, cuts, sores, or any other infection, especially near your surgical site.    4. Do not drink alcohol the day before or day of your procedure.  Do not use any recreational marijuana at least 24 hours or street drugs (heroin, cocaine) at minimum 5 days prior to your procedure.     5. A Pre-Surgical History and Physical MUST be completed WITHIN 30 DAYS OR LESS prior to your procedure.by your Physician or an Urgent Care        THE DAY OF YOUR PROCEDURE:  1.  Follow instructions for ARRIVAL TIME as DIRECTED BY YOUR SURGEON. [place] [phone]     2. Enter the MAIN entrance from [facility] and follow the signs to the free Parking Garage or  Parking (offered free of charge 7 am-5pm).      3. Enter the Main Entrance of the hospital (do not enter from the lower level of the parking garage). Upon entrance, check in with the  at the surgical information desk on your LEFT.   Bring your  AM      ADDITIONAL EDUCATIONAL INFORMATION REVIEWED PER PHONE WITH YOU AND/OR YOUR FAMILY:  No Hibiclens® Bathing Instructions   Yes Antibacterial Soap

## 2024-09-09 ENCOUNTER — ANESTHESIA EVENT (OUTPATIENT)
Dept: OPERATING ROOM | Age: 64
End: 2024-09-09
Payer: MEDICARE

## 2024-09-09 PROBLEM — Z17.0 MALIGNANT NEOPLASM OF LOWER-OUTER QUADRANT OF RIGHT BREAST OF FEMALE, ESTROGEN RECEPTOR POSITIVE (HCC): Status: ACTIVE | Noted: 2024-09-09

## 2024-09-09 PROBLEM — Z17.1 MALIGNANT NEOPLASM OF UPPER-OUTER QUADRANT OF LEFT BREAST IN FEMALE, ESTROGEN RECEPTOR NEGATIVE (HCC): Status: ACTIVE | Noted: 2024-09-09

## 2024-09-09 PROBLEM — Z92.21 HISTORY OF CHEMOTHERAPY: Status: ACTIVE | Noted: 2024-09-09

## 2024-09-09 PROBLEM — C50.511 MALIGNANT NEOPLASM OF LOWER-OUTER QUADRANT OF RIGHT BREAST OF FEMALE, ESTROGEN RECEPTOR POSITIVE (HCC): Status: ACTIVE | Noted: 2024-09-09

## 2024-09-09 PROBLEM — C50.412 MALIGNANT NEOPLASM OF UPPER-OUTER QUADRANT OF LEFT BREAST IN FEMALE, ESTROGEN RECEPTOR NEGATIVE (HCC): Status: ACTIVE | Noted: 2024-09-09

## 2024-09-10 ENCOUNTER — APPOINTMENT (OUTPATIENT)
Dept: MAMMOGRAPHY | Age: 64
End: 2024-09-10
Attending: SURGERY
Payer: MEDICARE

## 2024-09-10 ENCOUNTER — ANESTHESIA (OUTPATIENT)
Dept: OPERATING ROOM | Age: 64
End: 2024-09-10
Payer: MEDICARE

## 2024-09-10 ENCOUNTER — HOSPITAL ENCOUNTER (OUTPATIENT)
Age: 64
Setting detail: OUTPATIENT SURGERY
Discharge: HOME | End: 2024-09-10
Attending: SURGERY | Admitting: SURGERY
Payer: MEDICARE

## 2024-09-10 VITALS
WEIGHT: 252.6 LBS | DIASTOLIC BLOOD PRESSURE: 57 MMHG | OXYGEN SATURATION: 94 % | RESPIRATION RATE: 14 BRPM | TEMPERATURE: 97.8 F | SYSTOLIC BLOOD PRESSURE: 114 MMHG | HEIGHT: 63 IN | BODY MASS INDEX: 44.76 KG/M2 | HEART RATE: 74 BPM

## 2024-09-10 DIAGNOSIS — C50.919 PRIMARY MALIGNANT NEOPLASM OF FEMALE BREAST (HCC): Primary | ICD-10-CM

## 2024-09-10 DIAGNOSIS — C50.511 MALIGNANT NEOPLASM OF LOWER-OUTER QUADRANT OF RIGHT FEMALE BREAST, UNSPECIFIED ESTROGEN RECEPTOR STATUS (HCC): ICD-10-CM

## 2024-09-10 DIAGNOSIS — C50.412 MALIGNANT NEOPLASM OF UPPER-OUTER QUADRANT OF LEFT FEMALE BREAST, UNSPECIFIED ESTROGEN RECEPTOR STATUS (HCC): ICD-10-CM

## 2024-09-10 DIAGNOSIS — G89.18 ACUTE POST-OPERATIVE PAIN: ICD-10-CM

## 2024-09-10 LAB
DEPRECATED RDW RBC AUTO: 17.8 % (ref 12.4–15.4)
HCT VFR BLD AUTO: 28.2 % (ref 36–48)
HGB BLD-MCNC: 9.5 G/DL (ref 12–16)
MCH RBC QN AUTO: 31.9 PG (ref 26–34)
MCHC RBC AUTO-ENTMCNC: 33.6 G/DL (ref 31–36)
MCV RBC AUTO: 94.9 FL (ref 80–100)
PLATELET # BLD AUTO: 242 K/UL (ref 135–450)
PMV BLD AUTO: 7.3 FL (ref 5–10.5)
RBC # BLD AUTO: 2.97 M/UL (ref 4–5.2)
WBC # BLD AUTO: 4.5 K/UL (ref 4–11)

## 2024-09-10 PROCEDURE — 6360000002 HC RX W HCPCS: Performed by: SURGERY

## 2024-09-10 PROCEDURE — 76098 X-RAY EXAM SURGICAL SPECIMEN: CPT

## 2024-09-10 PROCEDURE — 6360000002 HC RX W HCPCS

## 2024-09-10 PROCEDURE — 3700000000 HC ANESTHESIA ATTENDED CARE: Performed by: SURGERY

## 2024-09-10 PROCEDURE — 3600000004 HC SURGERY LEVEL 4 BASE: Performed by: SURGERY

## 2024-09-10 PROCEDURE — 2580000003 HC RX 258

## 2024-09-10 PROCEDURE — 2580000003 HC RX 258: Performed by: SURGERY

## 2024-09-10 PROCEDURE — 7100000000 HC PACU RECOVERY - FIRST 15 MIN: Performed by: SURGERY

## 2024-09-10 PROCEDURE — 2580000003 HC RX 258: Performed by: ANESTHESIOLOGY

## 2024-09-10 PROCEDURE — A4217 STERILE WATER/SALINE, 500 ML: HCPCS | Performed by: SURGERY

## 2024-09-10 PROCEDURE — 6370000000 HC RX 637 (ALT 250 FOR IP): Performed by: ANESTHESIOLOGY

## 2024-09-10 PROCEDURE — 7100000010 HC PHASE II RECOVERY - FIRST 15 MIN: Performed by: SURGERY

## 2024-09-10 PROCEDURE — 88305 TISSUE EXAM BY PATHOLOGIST: CPT

## 2024-09-10 PROCEDURE — 88342 IMHCHEM/IMCYTCHM 1ST ANTB: CPT

## 2024-09-10 PROCEDURE — 3700000001 HC ADD 15 MINUTES (ANESTHESIA): Performed by: SURGERY

## 2024-09-10 PROCEDURE — 2500000003 HC RX 250 WO HCPCS: Performed by: SURGERY

## 2024-09-10 PROCEDURE — 7100000001 HC PACU RECOVERY - ADDTL 15 MIN: Performed by: SURGERY

## 2024-09-10 PROCEDURE — 85027 COMPLETE CBC AUTOMATED: CPT

## 2024-09-10 PROCEDURE — 2709999900 HC NON-CHARGEABLE SUPPLY: Performed by: SURGERY

## 2024-09-10 PROCEDURE — A9520 TC99 TILMANOCEPT DIAG 0.5MCI: HCPCS | Performed by: SURGERY

## 2024-09-10 PROCEDURE — 2500000003 HC RX 250 WO HCPCS

## 2024-09-10 PROCEDURE — 7100000011 HC PHASE II RECOVERY - ADDTL 15 MIN: Performed by: SURGERY

## 2024-09-10 PROCEDURE — 3430000000 HC RX DIAGNOSTIC RADIOPHARMACEUTICAL: Performed by: SURGERY

## 2024-09-10 PROCEDURE — 2720000010 HC SURG SUPPLY STERILE: Performed by: SURGERY

## 2024-09-10 PROCEDURE — 88307 TISSUE EXAM BY PATHOLOGIST: CPT

## 2024-09-10 PROCEDURE — 88331 PATH CONSLTJ SURG 1 BLK 1SPC: CPT

## 2024-09-10 PROCEDURE — 3600000014 HC SURGERY LEVEL 4 ADDTL 15MIN: Performed by: SURGERY

## 2024-09-10 RX ORDER — LABETALOL HYDROCHLORIDE 5 MG/ML
10 INJECTION, SOLUTION INTRAVENOUS
Status: DISCONTINUED | OUTPATIENT
Start: 2024-09-10 | End: 2024-09-10 | Stop reason: HOSPADM

## 2024-09-10 RX ORDER — NEOSTIGMINE METHYLSULFATE 1 MG/ML
INJECTION INTRAVENOUS PRN
Status: DISCONTINUED | OUTPATIENT
Start: 2024-09-10 | End: 2024-09-10 | Stop reason: SDUPTHER

## 2024-09-10 RX ORDER — HYDRALAZINE HYDROCHLORIDE 20 MG/ML
INJECTION INTRAMUSCULAR; INTRAVENOUS PRN
Status: DISCONTINUED | OUTPATIENT
Start: 2024-09-10 | End: 2024-09-10 | Stop reason: SDUPTHER

## 2024-09-10 RX ORDER — NALOXONE HYDROCHLORIDE 0.4 MG/ML
INJECTION, SOLUTION INTRAMUSCULAR; INTRAVENOUS; SUBCUTANEOUS PRN
Status: DISCONTINUED | OUTPATIENT
Start: 2024-09-10 | End: 2024-09-10 | Stop reason: HOSPADM

## 2024-09-10 RX ORDER — SODIUM CHLORIDE 0.9 % (FLUSH) 0.9 %
5-40 SYRINGE (ML) INJECTION PRN
Status: DISCONTINUED | OUTPATIENT
Start: 2024-09-10 | End: 2024-09-10 | Stop reason: HOSPADM

## 2024-09-10 RX ORDER — DEXAMETHASONE SODIUM PHOSPHATE 4 MG/ML
INJECTION, SOLUTION INTRA-ARTICULAR; INTRALESIONAL; INTRAMUSCULAR; INTRAVENOUS; SOFT TISSUE PRN
Status: DISCONTINUED | OUTPATIENT
Start: 2024-09-10 | End: 2024-09-10 | Stop reason: SDUPTHER

## 2024-09-10 RX ORDER — SODIUM CHLORIDE 0.9 % (FLUSH) 0.9 %
5-40 SYRINGE (ML) INJECTION EVERY 12 HOURS SCHEDULED
Status: DISCONTINUED | OUTPATIENT
Start: 2024-09-10 | End: 2024-09-10 | Stop reason: HOSPADM

## 2024-09-10 RX ORDER — GLYCOPYRROLATE 0.2 MG/ML
INJECTION INTRAMUSCULAR; INTRAVENOUS PRN
Status: DISCONTINUED | OUTPATIENT
Start: 2024-09-10 | End: 2024-09-10 | Stop reason: SDUPTHER

## 2024-09-10 RX ORDER — EPHEDRINE SULFATE 50 MG/ML
INJECTION INTRAVENOUS PRN
Status: DISCONTINUED | OUTPATIENT
Start: 2024-09-10 | End: 2024-09-10 | Stop reason: SDUPTHER

## 2024-09-10 RX ORDER — ROCURONIUM BROMIDE 10 MG/ML
INJECTION, SOLUTION INTRAVENOUS PRN
Status: DISCONTINUED | OUTPATIENT
Start: 2024-09-10 | End: 2024-09-10 | Stop reason: SDUPTHER

## 2024-09-10 RX ORDER — LIDOCAINE HYDROCHLORIDE 10 MG/ML
1 INJECTION, SOLUTION EPIDURAL; INFILTRATION; INTRACAUDAL; PERINEURAL
Status: DISCONTINUED | OUTPATIENT
Start: 2024-09-10 | End: 2024-09-10 | Stop reason: HOSPADM

## 2024-09-10 RX ORDER — OXYCODONE HYDROCHLORIDE 5 MG/1
10 TABLET ORAL PRN
Status: COMPLETED | OUTPATIENT
Start: 2024-09-10 | End: 2024-09-10

## 2024-09-10 RX ORDER — PROPOFOL 10 MG/ML
INJECTION, EMULSION INTRAVENOUS PRN
Status: DISCONTINUED | OUTPATIENT
Start: 2024-09-10 | End: 2024-09-10 | Stop reason: SDUPTHER

## 2024-09-10 RX ORDER — LIDOCAINE HYDROCHLORIDE 20 MG/ML
INJECTION, SOLUTION INTRAVENOUS PRN
Status: DISCONTINUED | OUTPATIENT
Start: 2024-09-10 | End: 2024-09-10 | Stop reason: SDUPTHER

## 2024-09-10 RX ORDER — HYDROMORPHONE HYDROCHLORIDE 1 MG/ML
0.5 INJECTION, SOLUTION INTRAMUSCULAR; INTRAVENOUS; SUBCUTANEOUS EVERY 5 MIN PRN
Status: DISCONTINUED | OUTPATIENT
Start: 2024-09-10 | End: 2024-09-10 | Stop reason: HOSPADM

## 2024-09-10 RX ORDER — FENTANYL CITRATE 50 UG/ML
INJECTION, SOLUTION INTRAMUSCULAR; INTRAVENOUS PRN
Status: DISCONTINUED | OUTPATIENT
Start: 2024-09-10 | End: 2024-09-10 | Stop reason: SDUPTHER

## 2024-09-10 RX ORDER — MAGNESIUM HYDROXIDE 1200 MG/15ML
LIQUID ORAL CONTINUOUS PRN
Status: DISCONTINUED | OUTPATIENT
Start: 2024-09-10 | End: 2024-09-10 | Stop reason: HOSPADM

## 2024-09-10 RX ORDER — DEXMEDETOMIDINE HYDROCHLORIDE 100 UG/ML
INJECTION, SOLUTION INTRAVENOUS PRN
Status: DISCONTINUED | OUTPATIENT
Start: 2024-09-10 | End: 2024-09-10 | Stop reason: SDUPTHER

## 2024-09-10 RX ORDER — SODIUM CHLORIDE, SODIUM LACTATE, POTASSIUM CHLORIDE, CALCIUM CHLORIDE 600; 310; 30; 20 MG/100ML; MG/100ML; MG/100ML; MG/100ML
INJECTION, SOLUTION INTRAVENOUS CONTINUOUS
Status: DISCONTINUED | OUTPATIENT
Start: 2024-09-10 | End: 2024-09-10 | Stop reason: HOSPADM

## 2024-09-10 RX ORDER — PHENYLEPHRINE HYDROCHLORIDE 10 MG/ML
INJECTION INTRAVENOUS PRN
Status: DISCONTINUED | OUTPATIENT
Start: 2024-09-10 | End: 2024-09-10 | Stop reason: SDUPTHER

## 2024-09-10 RX ORDER — DIPHENHYDRAMINE HYDROCHLORIDE 50 MG/ML
INJECTION INTRAMUSCULAR; INTRAVENOUS PRN
Status: DISCONTINUED | OUTPATIENT
Start: 2024-09-10 | End: 2024-09-10 | Stop reason: SDUPTHER

## 2024-09-10 RX ORDER — HYDROMORPHONE HYDROCHLORIDE 2 MG/ML
INJECTION, SOLUTION INTRAMUSCULAR; INTRAVENOUS; SUBCUTANEOUS PRN
Status: DISCONTINUED | OUTPATIENT
Start: 2024-09-10 | End: 2024-09-10 | Stop reason: SDUPTHER

## 2024-09-10 RX ORDER — SODIUM CHLORIDE 9 MG/ML
INJECTION, SOLUTION INTRAVENOUS PRN
Status: DISCONTINUED | OUTPATIENT
Start: 2024-09-10 | End: 2024-09-10 | Stop reason: HOSPADM

## 2024-09-10 RX ORDER — FENTANYL CITRATE 50 UG/ML
25 INJECTION, SOLUTION INTRAMUSCULAR; INTRAVENOUS EVERY 5 MIN PRN
Status: DISCONTINUED | OUTPATIENT
Start: 2024-09-10 | End: 2024-09-10 | Stop reason: HOSPADM

## 2024-09-10 RX ORDER — PROCHLORPERAZINE EDISYLATE 5 MG/ML
5 INJECTION INTRAMUSCULAR; INTRAVENOUS
Status: DISCONTINUED | OUTPATIENT
Start: 2024-09-10 | End: 2024-09-10 | Stop reason: HOSPADM

## 2024-09-10 RX ORDER — FAMOTIDINE 10 MG/ML
INJECTION, SOLUTION INTRAVENOUS PRN
Status: DISCONTINUED | OUTPATIENT
Start: 2024-09-10 | End: 2024-09-10 | Stop reason: SDUPTHER

## 2024-09-10 RX ORDER — ONDANSETRON 2 MG/ML
INJECTION INTRAMUSCULAR; INTRAVENOUS PRN
Status: DISCONTINUED | OUTPATIENT
Start: 2024-09-10 | End: 2024-09-10 | Stop reason: SDUPTHER

## 2024-09-10 RX ORDER — OXYCODONE HYDROCHLORIDE 5 MG/1
5 TABLET ORAL PRN
Status: COMPLETED | OUTPATIENT
Start: 2024-09-10 | End: 2024-09-10

## 2024-09-10 RX ORDER — ISOSULFAN BLUE 50 MG/5ML
INJECTION, SOLUTION SUBCUTANEOUS PRN
Status: DISCONTINUED | OUTPATIENT
Start: 2024-09-10 | End: 2024-09-10 | Stop reason: HOSPADM

## 2024-09-10 RX ORDER — OXYCODONE AND ACETAMINOPHEN 5; 325 MG/1; MG/1
1 TABLET ORAL EVERY 6 HOURS PRN
Qty: 20 TABLET | Refills: 0 | Status: SHIPPED | OUTPATIENT
Start: 2024-09-10 | End: 2024-09-15

## 2024-09-10 RX ADMIN — PHENYLEPHRINE HYDROCHLORIDE 50 MCG: 10 INJECTION, SOLUTION INTRAVENOUS at 08:20

## 2024-09-10 RX ADMIN — PHENYLEPHRINE HYDROCHLORIDE 15 MCG/MIN: 10 INJECTION, SOLUTION INTRAVENOUS at 08:12

## 2024-09-10 RX ADMIN — ONDANSETRON 4 MG: 2 INJECTION INTRAMUSCULAR; INTRAVENOUS at 08:45

## 2024-09-10 RX ADMIN — HYDRALAZINE HYDROCHLORIDE 5 MG: 20 INJECTION INTRAMUSCULAR; INTRAVENOUS at 11:21

## 2024-09-10 RX ADMIN — TILMANOCEPT 820 MICRO CURIE: KIT at 07:55

## 2024-09-10 RX ADMIN — PHENYLEPHRINE HYDROCHLORIDE 50 MCG: 10 INJECTION, SOLUTION INTRAVENOUS at 08:26

## 2024-09-10 RX ADMIN — FENTANYL CITRATE 50 MCG: 50 INJECTION, SOLUTION INTRAMUSCULAR; INTRAVENOUS at 07:37

## 2024-09-10 RX ADMIN — SODIUM CHLORIDE, POTASSIUM CHLORIDE, SODIUM LACTATE AND CALCIUM CHLORIDE: 600; 310; 30; 20 INJECTION, SOLUTION INTRAVENOUS at 08:45

## 2024-09-10 RX ADMIN — DEXAMETHASONE SODIUM PHOSPHATE 4 MG: 4 INJECTION INTRA-ARTICULAR; INTRALESIONAL; INTRAMUSCULAR; INTRAVENOUS; SOFT TISSUE at 08:43

## 2024-09-10 RX ADMIN — DEXMEDETOMIDINE HYDROCHLORIDE 8 MCG: 100 INJECTION, SOLUTION INTRAVENOUS at 10:47

## 2024-09-10 RX ADMIN — SODIUM CHLORIDE, POTASSIUM CHLORIDE, SODIUM LACTATE AND CALCIUM CHLORIDE: 600; 310; 30; 20 INJECTION, SOLUTION INTRAVENOUS at 06:05

## 2024-09-10 RX ADMIN — DEXMEDETOMIDINE HYDROCHLORIDE 8 MCG: 100 INJECTION, SOLUTION INTRAVENOUS at 10:31

## 2024-09-10 RX ADMIN — LIDOCAINE HYDROCHLORIDE 50 MG: 20 INJECTION, SOLUTION INTRAVENOUS at 07:37

## 2024-09-10 RX ADMIN — EPHEDRINE SULFATE 25 MG: 50 INJECTION INTRAVENOUS at 09:48

## 2024-09-10 RX ADMIN — ROCURONIUM BROMIDE 30 MG: 10 INJECTION, SOLUTION INTRAVENOUS at 08:35

## 2024-09-10 RX ADMIN — OXYCODONE HYDROCHLORIDE 5 MG: 5 TABLET ORAL at 12:45

## 2024-09-10 RX ADMIN — ROCURONIUM BROMIDE 70 MG: 10 INJECTION, SOLUTION INTRAVENOUS at 07:37

## 2024-09-10 RX ADMIN — PHENYLEPHRINE HYDROCHLORIDE 50 MCG: 10 INJECTION, SOLUTION INTRAVENOUS at 08:32

## 2024-09-10 RX ADMIN — PHENYLEPHRINE HYDROCHLORIDE 50 MCG: 10 INJECTION, SOLUTION INTRAVENOUS at 09:02

## 2024-09-10 RX ADMIN — ROCURONIUM BROMIDE 30 MG: 10 INJECTION, SOLUTION INTRAVENOUS at 08:48

## 2024-09-10 RX ADMIN — GLYCOPYRROLATE 0.6 MG: 0.2 INJECTION INTRAMUSCULAR; INTRAVENOUS at 11:15

## 2024-09-10 RX ADMIN — DEXAMETHASONE SODIUM PHOSPHATE 8 MG: 4 INJECTION INTRA-ARTICULAR; INTRALESIONAL; INTRAMUSCULAR; INTRAVENOUS; SOFT TISSUE at 07:50

## 2024-09-10 RX ADMIN — PHENYLEPHRINE HYDROCHLORIDE 200 MCG: 10 INJECTION, SOLUTION INTRAVENOUS at 07:50

## 2024-09-10 RX ADMIN — FENTANYL CITRATE 50 MCG: 50 INJECTION, SOLUTION INTRAMUSCULAR; INTRAVENOUS at 08:07

## 2024-09-10 RX ADMIN — PROPOFOL 150 MG: 10 INJECTION, EMULSION INTRAVENOUS at 07:37

## 2024-09-10 RX ADMIN — HYDROMORPHONE HYDROCHLORIDE 0.5 MG: 2 INJECTION, SOLUTION INTRAMUSCULAR; INTRAVENOUS; SUBCUTANEOUS at 10:53

## 2024-09-10 RX ADMIN — PHENYLEPHRINE HYDROCHLORIDE 50 MCG: 10 INJECTION, SOLUTION INTRAVENOUS at 08:12

## 2024-09-10 RX ADMIN — NEOSTIGMINE METHYLSULFATE 1 MG: 1 INJECTION INTRAVENOUS at 11:16

## 2024-09-10 RX ADMIN — GLYCOPYRROLATE 0.2 MG: 0.2 INJECTION INTRAMUSCULAR; INTRAVENOUS at 08:12

## 2024-09-10 RX ADMIN — TILMANOCEPT 800 MICRO CURIE: KIT at 08:02

## 2024-09-10 RX ADMIN — FAMOTIDINE 20 MG: 10 INJECTION, SOLUTION INTRAVENOUS at 08:43

## 2024-09-10 RX ADMIN — NEOSTIGMINE METHYLSULFATE 2 MG: 1 INJECTION INTRAVENOUS at 11:19

## 2024-09-10 RX ADMIN — HYDROMORPHONE HYDROCHLORIDE 0.5 MG: 2 INJECTION, SOLUTION INTRAMUSCULAR; INTRAVENOUS; SUBCUTANEOUS at 09:13

## 2024-09-10 RX ADMIN — DIPHENHYDRAMINE HYDROCHLORIDE 25 MG: 50 INJECTION, SOLUTION INTRAMUSCULAR; INTRAVENOUS at 08:44

## 2024-09-10 RX ADMIN — PHENYLEPHRINE HYDROCHLORIDE 50 MCG: 10 INJECTION, SOLUTION INTRAVENOUS at 08:17

## 2024-09-10 RX ADMIN — PHENYLEPHRINE HYDROCHLORIDE 100 MCG: 10 INJECTION, SOLUTION INTRAVENOUS at 07:55

## 2024-09-10 RX ADMIN — WATER 2000 MG: 1 INJECTION INTRAMUSCULAR; INTRAVENOUS; SUBCUTANEOUS at 07:41

## 2024-09-10 RX ADMIN — DEXMEDETOMIDINE HYDROCHLORIDE 4 MCG: 100 INJECTION, SOLUTION INTRAVENOUS at 11:27

## 2024-09-10 ASSESSMENT — PAIN SCALES - GENERAL
PAINLEVEL_OUTOF10: 4
PAINLEVEL_OUTOF10: 1

## 2024-09-10 ASSESSMENT — PAIN DESCRIPTION - PAIN TYPE: TYPE: ACUTE PAIN

## 2024-09-10 ASSESSMENT — PAIN DESCRIPTION - DESCRIPTORS
DESCRIPTORS: ACHING
DESCRIPTORS: DULL

## 2024-09-10 ASSESSMENT — PAIN DESCRIPTION - LOCATION
LOCATION: BACK
LOCATION: BREAST

## 2024-09-10 ASSESSMENT — PAIN DESCRIPTION - FREQUENCY: FREQUENCY: INTERMITTENT

## 2024-09-10 ASSESSMENT — PAIN DESCRIPTION - ORIENTATION
ORIENTATION: MID;RIGHT
ORIENTATION: RIGHT

## 2024-09-10 ASSESSMENT — PAIN DESCRIPTION - ONSET: ONSET: GRADUAL

## 2024-09-10 ASSESSMENT — ENCOUNTER SYMPTOMS: SHORTNESS OF BREATH: 1

## 2024-09-10 NOTE — PROGRESS NOTES
Spiritual Health Assessment/Progress Note  [facility]    (P) Pre-Op,  ,  ,      Name: Enedina Islas MRN: [id number]    Age: 64 y.o.     Sex: female   Language: English   Confucianist: Mu-ism   Malignant neoplasm of upper-outer quadrant of left breast in female, estrogen receptor negative (HCC)     Date: 9/10/2024            Total Time Calculated: (P) 7 min              Spiritual Assessment began in [facility] GENERAL SURGERY        Referral/Consult From: (P) Other (comment) ([place])   Encounter Overview/Reason: (P) Pre-Op  Service Provided For: (P) Patient and family together    Noemí, Belief, Meaning:   Patient is connected with a noemí tradition or spiritual practice and has beliefs or practices that help with coping during difficult times  Family/Friends are connected with a noemí tradition or spiritual practice and have beliefs or practices that help with coping during difficult times    Community:  Patient feels well-supported. Support system includes: Spouse/Partner  Family/Friends feel well-supported. Support system includes: Spouse/Partner    Assessment and Plan of Care:   Patient Interventions include: Facilitated expression of thoughts and feelings, Affirmed coping skills/support systems, and Other: Explored pts spirituality and hopes  Family/Friends Interventions include: Affirmed coping skills/support systems    Patient Plan of Care: No spiritual needs identified for follow-up, call/consult with further spiritual care needs  Electronically signed by LUISITO Perez on 9/10/2024 at 7:28 AM

## 2024-09-10 NOTE — PROGRESS NOTES
Pt to [facility] for bilateral breast lumpectomy, etc.  Pt is alert; oriented X 4; speech clear; breathing easily on RA; c/o mid right back pain of 1/10 \"from not sleeping well last night\" per pt report.  Pt ambulates with steady gait without assist.  Voided X 2 in SDS.  After warming pt, placed #20 IV in right hand, and CBC drawn and sent to lab.  Ancef 2 g with sterile water will go to OR with pt.  Pt has a right chest port, unaccessed.   David at bedside.  Call light within reach.  Awaiting arrival of anesthesia doctor and surgeon.    Report given to BLANCA Dutton

## 2024-09-10 NOTE — BRIEF OP NOTE
Brief Postoperative Note      Patient: Enedina Islas  YOB: 1960  MRN: [id number]    Date of Procedure: 9/10/2024    Pre-Op Diagnosis Codes:      * Malignant neoplasm of lower-outer quadrant of right female breast, unspecified estrogen receptor status (HCC) [C50.511]     * Malignant neoplasm of upper-outer quadrant of left female breast, unspecified estrogen receptor status (HCC) [C50.412], S/P neoadjuvant chemotherapy    Post-Op Diagnosis: Same       Procedure(s):  BILATERAL LUMPECTOMY, BILATERAL SENTINEL NODE BIOPSY (7:30)AM-IMAGE GUIDED BILATERAL RADIOFREQUENCY TAG, 3 TAGS, TARGETS: RIGHT BREAST 8 O'CLOCK, 9 CENTIMETERS FROM THE NIPPLE, LEFT BREAST 12 TO 1 O'CLOCK, 1 CENTIMETER FROM THE NIPPLE AND 1 O'CLOCK 4 CENTIMETERS FROM THE NIPPLE (9/3/24) @ 1:00 PM  .    Surgeon(s):  Haley Francis MD    Assistant:  Surgical Assistant: Tata Aaron    Anesthesia: General    Estimated Blood Loss (mL): less than 100     Complications: Other: urticaria    Specimens:   ID Type Source Tests Collected by Time Destination   A : SENTINEL NODE #1 LEFT AXILLA BLUE LEVEL 1 (COUNT [id number]) RADIOACTIVE HOT SPOTS MARKED BY SUTURE Tissue Tissue SURGICAL PATHOLOGY Haley Francis MD 9/10/2024 0818    B : SENTINEL NODE #2 LEFT AXILLA BLUE LEVEL 1 (COUNT 3492) Tissue Tissue SURGICAL PATHOLOGY Haley Francis MD 9/10/2024 0826    C : ADDITIONAL LEFT AXILLARY TISSUE Tissue Tissue SURGICAL PATHOLOGY Haley Francis MD 9/10/2024 0838    D : LEFT LUMPECTOMY Tissue Tissue SURGICAL PATHOLOGY Haley Francis MD 9/10/2024 0911    E : SUPERIOR POSTERIOR MARGIN LEFT BREAST Tissue Tissue SURGICAL PATHOLOGY Haley Francis MD 9/10/2024 0921    F : SENTINEL NODE #1 RIGHT AXILLA BLUE LEVEL 1 (COUNT [id number]) Tissue Tissue SURGICAL PATHOLOGY Haley Francis MD 9/10/2024 0958    G : SENTINEL NODE #2 RIGHT AXILLA LEVEL 2 NOT BLUE (COUNT 1169) Tissue Tissue SURGICAL PATHOLOGY Haley Francis MD 9/10/2024 1012     H : ADDITIONAL RIGHT AXILLARY TISSUE Tissue Tissue SURGICAL PATHOLOGY Haley Francis MD 9/10/2024 1015    I : ANTERIOR MEDIAL  MARGIN RIGHT BREAST Tissue Tissue SURGICAL PATHOLOGY Haley Francis MD 9/10/2024 1036    J : RIGHT BREAST LUMPECTOMY Tissue Tissue SURGICAL PATHOLOGY Haley Francis MD 9/10/2024 1042        Implants:  * No implants in log *      Drains:   Closed/Suction Drain Left Breast Bulb (Active)   Site Description Clean, dry & intact 09/10/24 0849   Dressing Status Old drainage noted 09/10/24 1330   Drainage Appearance Serosanguinous 09/10/24 1330   Drain Status To bulb suction 09/10/24 1330   Output (ml) 10 ml 09/10/24 1330       Closed/Suction Drain Lateral;Right Breast Bulb (Active)   Site Description Clean, dry & intact 09/10/24 1122   Dressing Status Old drainage noted;Intact 09/10/24 1415   Drainage Appearance Serosanguinous 09/10/24 1415   Drain Status To bulb suction 09/10/24 1415   Output (ml) 30 ml 09/10/24 1415       Findings:  Infection Present At Time Of Surgery (PATOS) (choose all levels that have infection present):  No infection present  Other Findings: sentinel nodes negative for malignancy, 3 RFID tags retrieved , biopsy marker x 2 noted in left specimens, biopsy marker not identified  [place] Node Biopsy for Breast Cancer - Right  Operation performed with curative intent. Yes   Tracer(s) used to identify sentinel nodes in the upfront surgery (non-neoadjuvant) setting (select all that apply). N/A   Tracer(s) used to identify sentinel nodes in the neoadjuvant setting (select all that apply). Dye and Radioactive tracer   All nodes (colored or non-colored) present at the end of a dye-filled lymphatic channel were removed. Yes   All significantly radioactive nodes were removed. Yes   All palpably suspicious nodes were removed. Yes   Biopsy-proven positive nodes marked with clips prior to chemotherapy were identified and removed. N/A     [place] Node Biopsy for Breast  Cancer - Left  Operation performed with curative intent. Yes   Tracer(s) used to identify sentinel nodes in the upfront surgery (non-neoadjuvant) setting (select all that apply). N/A   Tracer(s) used to identify sentinel nodes in the neoadjuvant setting (select all that apply). Dye and Radioactive tracer   All nodes (colored or non-colored) present at the end of a dye-filled lymphatic channel were removed. Yes   All significantly radioactive nodes were removed. Yes   All palpably suspicious nodes were removed. Yes   Biopsy-proven positive nodes marked with clips prior to chemotherapy were identified and removed. N/A          Electronically signed by Haley Francis MD on 9/10/2024 at 2:44 PM

## 2024-09-10 NOTE — PROGRESS NOTES
PACU Transfer Note    Vitals:    09/10/24 1300   BP: 120/60   Pulse: 70   Resp: 14   Temp: 97.9 °F (36.6 °C)   SpO2: 91%       In: 1950 [P.O.:120; I.V.:1830]  Out: 10     Pain assessment:    Pain Level: 4 Took Oxycodone 5 mg PO, declined any IV meds, states pain tolerable.    Report given to Receiving unit RN.    9/10/2024 1:03 PM

## 2024-09-10 NOTE — PROGRESS NOTES
Tolerated ice chips and sips of water. Now eating vanilla pudding and drinking ginger ale and tolerating well.

## 2024-09-10 NOTE — FLOWSHEET NOTE
09/10/24 1305   Vitals   Temp 97.8 °F (36.6 °C)   Temp Source Temporal   Pulse 74   Heart Rate Source Monitor   BP (!) 114/57   MAP (Calculated) 76   BP Location Left lower arm   BP Method Automatic   Patient Position Semi fowlers   Oxygen Therapy   SpO2 (!) 89 %   O2 Device None (Room air)     Spo2 trended up to 94% RA.     Discharge order obtained, and discharge instructions reviewed. Patient educated, using the teach back method, about follow up instructions and discharge instructions. A completed copy of the AVS instructions given to patient and all questions answered. IV catheter removed without complaints, catheter intact, site WNL. Discharged to lobby via wheel chair per transportation.

## 2024-09-10 NOTE — H&P
The patient was identified and examined.  The surgical site was marked by RFID tag localization.  Interval changes in health status since H&P was performed: admission on 8/27 for fever (UTI, upper respiratory infection)  The patient is cleared to proceed as scheduled.

## 2024-09-10 NOTE — DISCHARGE INSTRUCTIONS
Wear bra 24 to 36 hours for comfort  Ice to surgical site on 10 minutes every hour until bedtime today  No strenuous activity or lifting >10 pounds for 1 week  Okay to shower  Strip and empty bulb twice a day  Record drain output  Wear bulb pinned to blouse at waist level      [facility] AMBULATORY PROCEDURE DISCHARGE INSTRUCTIONS    There are potential side effects of anesthesia or sedation you may experience for the first 24 hours.  These side effects include:    Confusion or Memory loss, Dizziness, or Delayed Reaction Times   [x]A responsible person should be with you for the next 24 hours.  Do not operate any vehicles (automobiles, bicycles, motorcycles) or power tools or machinery for 24 hours.  Do not sign any legal documents or make any legal decisions for 24 hours. Do not drink alcohol for 24 hours or while taking narcotic pain medication.      Nausea    [x]Start with light diet and progress to your normal diet as you feel like eating. However, if you experience nausea or repeated episodes of vomiting which persist beyond 12-24 hours, notify your physician.  Once nausea has passed, remember to keep drinking fluids.    Difficulty Passing Urine  [x]Drink extra amounts of fluid today.  Notify your physician if you have not urinated within 8 hours after your procedure or you feel uncomfortable.      Irritated Throat from a Breathing Tube  [x]Drink extra amounts of fluid today.  Lozenges may help.    Muscle Aches  [x]You may experience some generalized body aches as your muscles recover from medications used to relax them during surgery.  These will gradually subside.    MEDICATION INSTRUCTIONS:  []Prescription(S) x     sent with you.  Use as directed.  When taking pain medications, you may experience the side effect of dizziness or drowsiness.  Do not drink alcohol or drive when taking these medications.  [x]Prescription(S) x  ONE    e-scribed to [facility]:    [x]Give the list of your  medications to your primary care physician on your next visit. Keep your med list updated and carry it with in case of emergencies.    [x] Narcotic pain medications can cause the side effect of significant constipation.  You may want to add a stool softener to your postoperative medication schedule or speak to your surgeon on how best to manage this side effect.    NARCOTIC SAFETY:  Your pain medicine is only for you to take.  Safely store your medicines.  Store pills up high and out of reach of children and pets.  Ensure safety caps are snapped tightly  Keep track of how many pills you have left    Unused medication can be disposed of by taking them to a drop-off box or take-back program that is authorized by the Critical access hospital.  Access to a site near you can be found on the [employer] website (deadiversion.[employer].gov).    If you have a CPAP machine, it is very important that you use it daily during all periods of sleep and daytime rest during your recovery at home.  Surgery and Anesthesia place a significant amount of stress on your body.  Using your CPAP will help keep you safe and lessen the negative effects of that stress.    FOLLOW-UP RECOVERY CARE:  [x]Call the office at [phone] for follow-up appointment and problems    Watch for these possible complications, symptoms, or side effects of anesthesia.  Call physician if they or any other problems occur:  Signs of INFECTION   > Fever over 101°     > Redness, swelling, hardness or warmth at the operative site   >Foul smelling or cloudy drainage at the operative site   Unrelieved PAIN  Unrelieved NAUSEA  Blood soaked dressing.  (Some oozing may be normal)  Inability to urinate      Numb, pale, blue, cold or tingling extremity      Physician:  Dr. Haley Francis    The above instructions were reviewed with patient/significant other.  The following additional patient specific information was reviewed with the patient/significant

## 2024-09-10 NOTE — PROGRESS NOTES
From OR arousable but very drowsy, incision on bilateral axillary area, left breast JD with surgical CDI, surgical bras in chart, still needs to be applied, [place] drains on bilateral breasts with chg tegaderm, /65, other VSS.    Report from CRNA and OR RN.    S/P BILATERAL LUMPECTOMY, BILATERAL SENTINEL NODE BIOPSY (7:30)AM-IMAGE GUIDED BILATERAL RADIOFREQUENCY TAG, 3 TAGS, TARGETS: RIGHT BREAST 8 O'CLOCK, 9 CENTIMETERS FROM THE NIPPLE, LEFT BREAST 12 TO 1 O'CLOCK, 1 CENTIMETER FROM THE NIPPLE AND 1 O'CLOCK 4 CENTIMETERS FROM THE NIPPLE (9/3/24) @ 1:00 PM - Bilateral

## 2024-09-13 NOTE — OP NOTE
Operative Note      Patient: Enedina Islas  YOB: 1960  MRN: [id number]    Date of Procedure: 9/10/2024    Pre-Op Diagnosis Codes:      * Malignant neoplasm of lower-outer quadrant of right female breast, unspecified estrogen receptor status (HCC) [C50.511]     * Malignant neoplasm of upper-outer quadrant of left female breast, unspecified estrogen receptor status (HCC) [C50.412], S/P neoadjuvant chemotherapy    Post-Op Diagnosis: Same       Procedure(s):  BILATERAL LUMPECTOMY, BILATERAL SENTINEL NODE BIOPSY (7:30)AM-IMAGE GUIDED BILATERAL RADIOFREQUENCY TAG, 3 TAGS, TARGETS: RIGHT BREAST 8 O'CLOCK, 9 CENTIMETERS FROM THE NIPPLE, LEFT BREAST 12 TO 1 O'CLOCK, 1 CENTIMETER FROM THE NIPPLE AND 1 O'CLOCK 4 CENTIMETERS FROM THE NIPPLE (9/3/24) @ 1:00 PM  .    Surgeon(s):  Haley Francis MD    Assistant:   Surgical Assistant: Tata Aaron    Anesthesia: General    Estimated Blood Loss (mL): less than 100     Complications: Other: urticaria secondary to isosulfan blue    Specimens:   ID Type Source Tests Collected by Time Destination   A : SENTINEL NODE #1 LEFT AXILLA BLUE LEVEL 1 (COUNT [id number]) RADIOACTIVE HOT SPOTS MARKED BY SUTURE Tissue Tissue SURGICAL PATHOLOGY Haley Francis MD 9/10/2024 0818    B : SENTINEL NODE #2 LEFT AXILLA BLUE LEVEL 1 (COUNT 3492) Tissue Tissue SURGICAL PATHOLOGY Haley Francis MD 9/10/2024 0826    C : ADDITIONAL LEFT AXILLARY TISSUE Tissue Tissue SURGICAL PATHOLOGY Haley Francis MD 9/10/2024 0838    D : LEFT LUMPECTOMY Tissue Tissue SURGICAL PATHOLOGY Haley Francis MD 9/10/2024 0911    E : SUPERIOR POSTERIOR MARGIN LEFT BREAST Tissue Tissue SURGICAL PATHOLOGY Haley Francis MD 9/10/2024 0921    F : SENTINEL NODE #1 RIGHT AXILLA BLUE LEVEL 1 (COUNT 21855) Tissue Tissue SURGICAL PATHOLOGY Haley Francis MD 9/10/2024 0958    G : SENTINEL NODE #2 RIGHT AXILLA LEVEL 2 NOT BLUE (COUNT 1169) Tissue Tissue SURGICAL PATHOLOGY Haley Francis MD  9/10/2024 1012    H : ADDITIONAL RIGHT AXILLARY TISSUE Tissue Tissue SURGICAL PATHOLOGY Haley Francis MD 9/10/2024 1015    I : ANTERIOR MEDIAL  MARGIN RIGHT BREAST Tissue Tissue SURGICAL PATHOLOGY Haley Francis MD 9/10/2024 1036    J : RIGHT BREAST LUMPECTOMY Tissue Tissue SURGICAL PATHOLOGY Haley Francis MD 9/10/2024 1042        Implants:  * No implants in log *      Drains:   [REMOVED] Closed/Suction Drain Left Breast Bulb (Removed)   Site Description Clean, dry & intact 09/10/24 0849   Dressing Status Old drainage noted 09/10/24 1330   Drainage Appearance Serosanguinous 09/10/24 1330   Drain Status To bulb suction 09/10/24 1330   Output (ml) 10 ml 09/10/24 1330       [REMOVED] Closed/Suction Drain Lateral;Right Breast Bulb (Removed)   Site Description Clean, dry & intact 09/10/24 1122   Dressing Status Old drainage noted;Intact 09/10/24 1415   Drainage Appearance Serosanguinous 09/10/24 1415   Drain Status To bulb suction 09/10/24 1415   Output (ml) 30 ml 09/10/24 1415       Findings:  Infection Present At Time Of Surgery (PATOS) (choose all levels that have infection present):  No infection present  Other Findings: sentinel node #1 bilateral axillae negative for malignancy, retrieved 3 RFID tags, 2 biopsy clips left, biopsy clip right not identified  [place] Node Biopsy for Breast Cancer - Right  Operation performed with curative intent. No   Tracer(s) used to identify sentinel nodes in the upfront surgery (non-neoadjuvant) setting (select all that apply). N/A   Tracer(s) used to identify sentinel nodes in the neoadjuvant setting (select all that apply). Dye and Radioactive tracer   All nodes (colored or non-colored) present at the end of a dye-filled lymphatic channel were removed. Yes   All significantly radioactive nodes were removed. Yes   All palpably suspicious nodes were removed. Yes   Biopsy-proven positive nodes marked with clips prior to chemotherapy were identified and removed. N/A

## 2024-10-01 ENCOUNTER — OFFICE VISIT (OUTPATIENT)
Age: 64
End: 2024-10-01
Payer: MEDICARE

## 2024-10-01 VITALS
BODY MASS INDEX: 44.12 KG/M2 | DIASTOLIC BLOOD PRESSURE: 80 MMHG | SYSTOLIC BLOOD PRESSURE: 148 MMHG | HEART RATE: 72 BPM | OXYGEN SATURATION: 98 % | HEIGHT: 63 IN | WEIGHT: 249 LBS

## 2024-10-01 DIAGNOSIS — J84.89 NSIP (NONSPECIFIC INTERSTITIAL PNEUMONITIS) (HCC): ICD-10-CM

## 2024-10-01 DIAGNOSIS — T45.1X5A ANEMIA DUE TO ANTINEOPLASTIC CHEMOTHERAPY: ICD-10-CM

## 2024-10-01 DIAGNOSIS — R06.09 DOE (DYSPNEA ON EXERTION): Primary | ICD-10-CM

## 2024-10-01 DIAGNOSIS — D64.81 ANEMIA DUE TO ANTINEOPLASTIC CHEMOTHERAPY: ICD-10-CM

## 2024-10-01 PROCEDURE — 3077F SYST BP >= 140 MM HG: CPT | Performed by: INTERNAL MEDICINE

## 2024-10-01 PROCEDURE — G8427 DOCREV CUR MEDS BY ELIG CLIN: HCPCS | Performed by: INTERNAL MEDICINE

## 2024-10-01 PROCEDURE — 3079F DIAST BP 80-89 MM HG: CPT | Performed by: INTERNAL MEDICINE

## 2024-10-01 PROCEDURE — 99214 OFFICE O/P EST MOD 30 MIN: CPT | Performed by: INTERNAL MEDICINE

## 2024-10-01 PROCEDURE — G8399 PT W/DXA RESULTS DOCUMENT: HCPCS | Performed by: INTERNAL MEDICINE

## 2024-10-01 PROCEDURE — 1090F PRES/ABSN URINE INCON ASSESS: CPT | Performed by: INTERNAL MEDICINE

## 2024-10-01 PROCEDURE — 1123F ACP DISCUSS/DSCN MKR DOCD: CPT | Performed by: INTERNAL MEDICINE

## 2024-10-01 PROCEDURE — G8484 FLU IMMUNIZE NO ADMIN: HCPCS | Performed by: INTERNAL MEDICINE

## 2024-10-01 PROCEDURE — 3017F COLORECTAL CA SCREEN DOC REV: CPT | Performed by: INTERNAL MEDICINE

## 2024-10-01 PROCEDURE — G8417 CALC BMI ABV UP PARAM F/U: HCPCS | Performed by: INTERNAL MEDICINE

## 2024-10-01 PROCEDURE — 1036F TOBACCO NON-USER: CPT | Performed by: INTERNAL MEDICINE

## 2024-10-01 NOTE — PROGRESS NOTES
[facility] Pulmonary and Critical Care    Outpatient Follow Up Note    Subjective:   CHIEF COMPLAINT / HPI:     The patient is a 64 y.o. female here for evaluation of dyspnea on exertion.  A lot has happened to diabetes since I saw her last in December 2022 for NSIP    She was diagnosed with breast cancer and is undergoing chemotherapy with Taxol and pembrolizumab last treatment August 15.  This was complicated with a hospitalization at [facility] from August 27 to August 29 with fever and dyspnea on exertion.  CTPA done that did not show significant abnormality.  UA also normal.  Source not entirely clear but she did improve with broad-spectrum antibiotics.  Hemoglobin at that time was 7.5 but most recently checked on September 10 and was up to 9.5.  She states that her dyspnea on exertion is gradually improving.  No cough, wheezing, or chest tightness      12/7/2022  Marlen is here for an acute visit duration of dyspnea, fatigue, and malaise.  Marlen  was admitted November 26 to November 29 at [facility] severe sepsis secondary to a pansensitive E. coli UTI.  Discharged she was supposed to finish a course of antibiotics but they state there is an error and it was never prescribed never got it.  Currently she continues to have fatigue, malaise, and some dyspnea on exertion.  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 Since last visit she has done very well. Breathing is much better and she has no cough. She is now vegetarian. She was feeling so well that she weaned herself off her prednisone and has been off prednisone x 4 weeks without recurrence of dyspnea or cough.   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

## 2024-10-10 ENCOUNTER — HOSPITAL ENCOUNTER (OUTPATIENT)
Dept: MAMMOGRAPHY | Age: 64
Discharge: HOME | End: 2024-10-10
Payer: MEDICARE

## 2024-10-10 DIAGNOSIS — R92.8 ABNORMAL FINDING ON MAMMOGRAPHY: ICD-10-CM

## 2024-10-10 PROCEDURE — 19281 PERQ DEVICE BREAST 1ST IMAG: CPT

## 2024-10-11 RX ORDER — ALLOPURINOL 300 MG/1
300 TABLET ORAL DAILY
COMMUNITY
Start: 2024-10-09 | End: 2024-10-11

## 2024-10-11 NOTE — PROGRESS NOTES
Patient is going to [facility] today will have CBC drawn and is starting Keytruda. Patient given fax number for [facility] PAT to have [facility] send over CBC and patient informed will also need CMP for upcoming surgery if [facility] will not complete patient needs to reach out to PCP office for orders. Patient confirmed understanding of instructions.-AS

## 2024-10-11 NOTE — PROGRESS NOTES
[facility] PRE-SURGICAL TESTING INSTRUCTIONS                      PRIOR TO PROCEDURE DATE:    1. PLEASE FOLLOW ANY INSTRUCTIONS GIVEN TO YOU PER YOUR SURGEON.      2. Arrange for someone to drive you home and be with you for the first 24 hours after discharge for your safety after your procedure for which you received sedation. Ensure it is someone we can share information with regarding your discharge.     NOTE: At this time ONLY 2 ADULTS may accompany you   One person ENCOURAGED to stay at hospital entire time if outpatient surgery      3. You must contact your surgeon for instructions IF:  You are taking any blood thinners, aspirin, anti-inflammatory or vitamins.  There is a change in your physical condition such as a cold, fever, rash, cuts, sores, or any other infection, especially near your surgical site.    4. Do not drink alcohol the day before or day of your procedure.  Do not use any recreational marijuana at least 24 hours or street drugs (heroin, cocaine) at minimum 5 days prior to your procedure.     5. A Pre-Surgical History and Physical MUST be completed WITHIN 30 DAYS OR LESS prior to your procedure.by your Physician or an Urgent Care        THE DAY OF YOUR PROCEDURE:  1.  Follow instructions for ARRIVAL TIME as DIRECTED BY YOUR SURGEON.     2. Enter the MAIN entrance from [facility] and follow the signs to the free Parking Garage or  Parking (offered free of charge 7 am-5pm).      3. Enter the Main Entrance of the hospital (do not enter from the lower level of the parking garage). Upon entrance, check in with the  at the surgical information desk on your LEFT.   Bring your insurance card and photo ID to register      4. DO NOT EAT ANYTHING 8 hours prior to arrival for surgery.  You may have up to 8 ounces of water 4 hours prior to your arrival for surgery.   NOTE: ALL Gastric, Bariatric & Bowel surgery patients - you MUST follow your surgeon's instructions regarding

## 2024-10-14 ENCOUNTER — ANESTHESIA EVENT (OUTPATIENT)
Dept: OPERATING ROOM | Age: 64
End: 2024-10-14
Payer: MEDICARE

## 2024-10-14 RX ORDER — SODIUM CHLORIDE, SODIUM LACTATE, POTASSIUM CHLORIDE, CALCIUM CHLORIDE 600; 310; 30; 20 MG/100ML; MG/100ML; MG/100ML; MG/100ML
INJECTION, SOLUTION INTRAVENOUS CONTINUOUS
Status: CANCELLED | OUTPATIENT
Start: 2024-10-14

## 2024-10-15 ENCOUNTER — ANESTHESIA (OUTPATIENT)
Dept: OPERATING ROOM | Age: 64
End: 2024-10-15
Payer: MEDICARE

## 2024-10-15 ENCOUNTER — HOSPITAL ENCOUNTER (OUTPATIENT)
Age: 64
Setting detail: OUTPATIENT SURGERY
Discharge: HOME | End: 2024-10-15
Attending: SURGERY | Admitting: SURGERY
Payer: MEDICARE

## 2024-10-15 ENCOUNTER — APPOINTMENT (OUTPATIENT)
Dept: MAMMOGRAPHY | Age: 64
End: 2024-10-15
Attending: SURGERY
Payer: MEDICARE

## 2024-10-15 VITALS
BODY MASS INDEX: 44.37 KG/M2 | OXYGEN SATURATION: 96 % | HEART RATE: 72 BPM | DIASTOLIC BLOOD PRESSURE: 56 MMHG | WEIGHT: 250.4 LBS | SYSTOLIC BLOOD PRESSURE: 127 MMHG | RESPIRATION RATE: 16 BRPM | TEMPERATURE: 97.4 F | HEIGHT: 63 IN

## 2024-10-15 DIAGNOSIS — C50.511 MALIGNANT NEOPLASM OF LOWER-OUTER QUADRANT OF RIGHT FEMALE BREAST, UNSPECIFIED ESTROGEN RECEPTOR STATUS (HCC): ICD-10-CM

## 2024-10-15 DIAGNOSIS — Z17.0 MALIGNANT NEOPLASM OF LOWER-OUTER QUADRANT OF RIGHT BREAST OF FEMALE, ESTROGEN RECEPTOR POSITIVE (HCC): Primary | ICD-10-CM

## 2024-10-15 DIAGNOSIS — C50.511 MALIGNANT NEOPLASM OF LOWER-OUTER QUADRANT OF RIGHT BREAST OF FEMALE, ESTROGEN RECEPTOR POSITIVE (HCC): Primary | ICD-10-CM

## 2024-10-15 LAB
ALBUMIN SERPL-MCNC: 4 G/DL (ref 3.4–5)
ALBUMIN/GLOB SERPL: 1.3 {RATIO} (ref 1.1–2.2)
ALP SERPL-CCNC: 95 U/L (ref 40–129)
ALT SERPL-CCNC: 14 U/L (ref 10–40)
ANION GAP SERPL CALCULATED.3IONS-SCNC: 13 MMOL/L (ref 3–16)
AST SERPL-CCNC: 28 U/L (ref 15–37)
BILIRUB SERPL-MCNC: 0.4 MG/DL (ref 0–1)
BUN SERPL-MCNC: 23 MG/DL (ref 7–20)
CALCIUM SERPL-MCNC: 9 MG/DL (ref 8.3–10.6)
CHLORIDE SERPL-SCNC: 106 MMOL/L (ref 99–110)
CO2 SERPL-SCNC: 20 MMOL/L (ref 21–32)
CREAT SERPL-MCNC: 1.5 MG/DL (ref 0.6–1.2)
GFR SERPLBLD CREATININE-BSD FMLA CKD-EPI: 38 ML/MIN/{1.73_M2}
GLUCOSE SERPL-MCNC: 107 MG/DL (ref 70–99)
POTASSIUM SERPL-SCNC: 3.7 MMOL/L (ref 3.5–5.1)
PROT SERPL-MCNC: 7.1 G/DL (ref 6.4–8.2)
SODIUM SERPL-SCNC: 139 MMOL/L (ref 136–145)

## 2024-10-15 PROCEDURE — 3600000004 HC SURGERY LEVEL 4 BASE: Performed by: SURGERY

## 2024-10-15 PROCEDURE — 76098 X-RAY EXAM SURGICAL SPECIMEN: CPT

## 2024-10-15 PROCEDURE — 7100000010 HC PHASE II RECOVERY - FIRST 15 MIN: Performed by: SURGERY

## 2024-10-15 PROCEDURE — 7100000011 HC PHASE II RECOVERY - ADDTL 15 MIN: Performed by: SURGERY

## 2024-10-15 PROCEDURE — 6360000002 HC RX W HCPCS: Performed by: SURGERY

## 2024-10-15 PROCEDURE — 2709999900 HC NON-CHARGEABLE SUPPLY: Performed by: SURGERY

## 2024-10-15 PROCEDURE — 2580000003 HC RX 258

## 2024-10-15 PROCEDURE — 6360000002 HC RX W HCPCS

## 2024-10-15 PROCEDURE — 2500000003 HC RX 250 WO HCPCS

## 2024-10-15 PROCEDURE — 3600000014 HC SURGERY LEVEL 4 ADDTL 15MIN: Performed by: SURGERY

## 2024-10-15 PROCEDURE — 80053 COMPREHEN METABOLIC PANEL: CPT

## 2024-10-15 PROCEDURE — A4217 STERILE WATER/SALINE, 500 ML: HCPCS | Performed by: SURGERY

## 2024-10-15 PROCEDURE — 7100000001 HC PACU RECOVERY - ADDTL 15 MIN: Performed by: SURGERY

## 2024-10-15 PROCEDURE — 3700000000 HC ANESTHESIA ATTENDED CARE: Performed by: SURGERY

## 2024-10-15 PROCEDURE — 2720000010 HC SURG SUPPLY STERILE: Performed by: SURGERY

## 2024-10-15 PROCEDURE — 7100000000 HC PACU RECOVERY - FIRST 15 MIN: Performed by: SURGERY

## 2024-10-15 PROCEDURE — 2500000003 HC RX 250 WO HCPCS: Performed by: SURGERY

## 2024-10-15 PROCEDURE — 2580000003 HC RX 258: Performed by: SURGERY

## 2024-10-15 PROCEDURE — 88305 TISSUE EXAM BY PATHOLOGIST: CPT

## 2024-10-15 PROCEDURE — 3700000001 HC ADD 15 MINUTES (ANESTHESIA): Performed by: SURGERY

## 2024-10-15 RX ORDER — HYDROMORPHONE HYDROCHLORIDE 1 MG/ML
0.5 INJECTION, SOLUTION INTRAMUSCULAR; INTRAVENOUS; SUBCUTANEOUS EVERY 10 MIN PRN
Status: DISCONTINUED | OUTPATIENT
Start: 2024-10-15 | End: 2024-10-15 | Stop reason: HOSPADM

## 2024-10-15 RX ORDER — FENTANYL CITRATE 50 UG/ML
INJECTION, SOLUTION INTRAMUSCULAR; INTRAVENOUS
Status: DISCONTINUED | OUTPATIENT
Start: 2024-10-15 | End: 2024-10-15 | Stop reason: SDUPTHER

## 2024-10-15 RX ORDER — MEPERIDINE HYDROCHLORIDE 25 MG/ML
12.5 INJECTION INTRAMUSCULAR; INTRAVENOUS; SUBCUTANEOUS EVERY 5 MIN PRN
Status: DISCONTINUED | OUTPATIENT
Start: 2024-10-15 | End: 2024-10-15 | Stop reason: HOSPADM

## 2024-10-15 RX ORDER — SODIUM CHLORIDE, SODIUM LACTATE, POTASSIUM CHLORIDE, CALCIUM CHLORIDE 600; 310; 30; 20 MG/100ML; MG/100ML; MG/100ML; MG/100ML
INJECTION, SOLUTION INTRAVENOUS
Status: DISCONTINUED | OUTPATIENT
Start: 2024-10-15 | End: 2024-10-15 | Stop reason: SDUPTHER

## 2024-10-15 RX ORDER — GLYCOPYRROLATE 0.2 MG/ML
INJECTION INTRAMUSCULAR; INTRAVENOUS
Status: DISCONTINUED | OUTPATIENT
Start: 2024-10-15 | End: 2024-10-15 | Stop reason: SDUPTHER

## 2024-10-15 RX ORDER — HYDROCODONE BITARTRATE AND ACETAMINOPHEN 5; 325 MG/1; MG/1
1 TABLET ORAL EVERY 6 HOURS PRN
Qty: 20 TABLET | Refills: 0 | Status: SHIPPED | OUTPATIENT
Start: 2024-10-15 | End: 2024-10-20

## 2024-10-15 RX ORDER — SODIUM CHLORIDE 0.9 % (FLUSH) 0.9 %
5-40 SYRINGE (ML) INJECTION EVERY 12 HOURS SCHEDULED
Status: DISCONTINUED | OUTPATIENT
Start: 2024-10-15 | End: 2024-10-15 | Stop reason: HOSPADM

## 2024-10-15 RX ORDER — SODIUM CHLORIDE 9 MG/ML
INJECTION, SOLUTION INTRAVENOUS PRN
Status: DISCONTINUED | OUTPATIENT
Start: 2024-10-15 | End: 2024-10-15 | Stop reason: HOSPADM

## 2024-10-15 RX ORDER — NALOXONE HYDROCHLORIDE 0.4 MG/ML
INJECTION, SOLUTION INTRAMUSCULAR; INTRAVENOUS; SUBCUTANEOUS PRN
Status: DISCONTINUED | OUTPATIENT
Start: 2024-10-15 | End: 2024-10-15 | Stop reason: HOSPADM

## 2024-10-15 RX ORDER — HYDRALAZINE HYDROCHLORIDE 20 MG/ML
10 INJECTION INTRAMUSCULAR; INTRAVENOUS
Status: DISCONTINUED | OUTPATIENT
Start: 2024-10-15 | End: 2024-10-15 | Stop reason: HOSPADM

## 2024-10-15 RX ORDER — METOCLOPRAMIDE HYDROCHLORIDE 5 MG/ML
10 INJECTION INTRAMUSCULAR; INTRAVENOUS
Status: DISCONTINUED | OUTPATIENT
Start: 2024-10-15 | End: 2024-10-15 | Stop reason: HOSPADM

## 2024-10-15 RX ORDER — SODIUM CHLORIDE 0.9 % (FLUSH) 0.9 %
5-40 SYRINGE (ML) INJECTION PRN
Status: DISCONTINUED | OUTPATIENT
Start: 2024-10-15 | End: 2024-10-15 | Stop reason: HOSPADM

## 2024-10-15 RX ORDER — MIDAZOLAM HYDROCHLORIDE 1 MG/ML
INJECTION INTRAMUSCULAR; INTRAVENOUS
Status: DISCONTINUED | OUTPATIENT
Start: 2024-10-15 | End: 2024-10-15 | Stop reason: SDUPTHER

## 2024-10-15 RX ORDER — EPHEDRINE SULFATE 50 MG/ML
INJECTION INTRAVENOUS
Status: DISCONTINUED | OUTPATIENT
Start: 2024-10-15 | End: 2024-10-15

## 2024-10-15 RX ORDER — PHENYLEPHRINE HYDROCHLORIDE 10 MG/ML
INJECTION INTRAVENOUS
Status: DISCONTINUED | OUTPATIENT
Start: 2024-10-15 | End: 2024-10-15 | Stop reason: SDUPTHER

## 2024-10-15 RX ORDER — LABETALOL HYDROCHLORIDE 5 MG/ML
10 INJECTION, SOLUTION INTRAVENOUS
Status: DISCONTINUED | OUTPATIENT
Start: 2024-10-15 | End: 2024-10-15 | Stop reason: HOSPADM

## 2024-10-15 RX ORDER — MAGNESIUM HYDROXIDE 1200 MG/15ML
LIQUID ORAL CONTINUOUS PRN
Status: DISCONTINUED | OUTPATIENT
Start: 2024-10-15 | End: 2024-10-15 | Stop reason: HOSPADM

## 2024-10-15 RX ORDER — EPHEDRINE SULFATE 50 MG/ML
INJECTION INTRAVENOUS
Status: DISCONTINUED | OUTPATIENT
Start: 2024-10-15 | End: 2024-10-15 | Stop reason: SDUPTHER

## 2024-10-15 RX ORDER — LIDOCAINE HYDROCHLORIDE 20 MG/ML
INJECTION, SOLUTION INTRAVENOUS
Status: DISCONTINUED | OUTPATIENT
Start: 2024-10-15 | End: 2024-10-15 | Stop reason: SDUPTHER

## 2024-10-15 RX ORDER — HYDROMORPHONE HYDROCHLORIDE 1 MG/ML
0.5 INJECTION, SOLUTION INTRAMUSCULAR; INTRAVENOUS; SUBCUTANEOUS EVERY 5 MIN PRN
Status: DISCONTINUED | OUTPATIENT
Start: 2024-10-15 | End: 2024-10-15 | Stop reason: HOSPADM

## 2024-10-15 RX ORDER — KETAMINE HYDROCHLORIDE 50 MG/ML
INJECTION, SOLUTION INTRAMUSCULAR; INTRAVENOUS
Status: DISCONTINUED | OUTPATIENT
Start: 2024-10-15 | End: 2024-10-15 | Stop reason: SDUPTHER

## 2024-10-15 RX ORDER — PROPOFOL 10 MG/ML
INJECTION, EMULSION INTRAVENOUS
Status: DISCONTINUED | OUTPATIENT
Start: 2024-10-15 | End: 2024-10-15 | Stop reason: SDUPTHER

## 2024-10-15 RX ORDER — DIPHENHYDRAMINE HYDROCHLORIDE 50 MG/ML
12.5 INJECTION INTRAMUSCULAR; INTRAVENOUS
Status: DISCONTINUED | OUTPATIENT
Start: 2024-10-15 | End: 2024-10-15 | Stop reason: HOSPADM

## 2024-10-15 RX ADMIN — MIDAZOLAM HYDROCHLORIDE 2 MG: 2 INJECTION, SOLUTION INTRAMUSCULAR; INTRAVENOUS at 07:21

## 2024-10-15 RX ADMIN — KETAMINE HYDROCHLORIDE 10 MG: 50 INJECTION, SOLUTION INTRAMUSCULAR; INTRAVENOUS at 07:41

## 2024-10-15 RX ADMIN — WATER 2000 MG: 1 INJECTION INTRAMUSCULAR; INTRAVENOUS; SUBCUTANEOUS at 07:33

## 2024-10-15 RX ADMIN — LIDOCAINE HYDROCHLORIDE 100 MG: 20 INJECTION, SOLUTION INTRAVENOUS at 07:32

## 2024-10-15 RX ADMIN — PHENYLEPHRINE HYDROCHLORIDE 200 MCG: 10 INJECTION, SOLUTION INTRAVENOUS at 08:21

## 2024-10-15 RX ADMIN — PROPOFOL 30 MG: 10 INJECTION, EMULSION INTRAVENOUS at 08:32

## 2024-10-15 RX ADMIN — EPHEDRINE SULFATE 10 MG: 50 INJECTION INTRAVENOUS at 08:01

## 2024-10-15 RX ADMIN — PHENYLEPHRINE HYDROCHLORIDE 100 MCG: 10 INJECTION, SOLUTION INTRAVENOUS at 08:14

## 2024-10-15 RX ADMIN — KETAMINE HYDROCHLORIDE 10 MG: 50 INJECTION, SOLUTION INTRAMUSCULAR; INTRAVENOUS at 07:36

## 2024-10-15 RX ADMIN — PROPOFOL 125 MCG/KG/MIN: 10 INJECTION, EMULSION INTRAVENOUS at 07:27

## 2024-10-15 RX ADMIN — PROPOFOL 10 MG: 10 INJECTION, EMULSION INTRAVENOUS at 07:45

## 2024-10-15 RX ADMIN — PROPOFOL 5 MG: 10 INJECTION, EMULSION INTRAVENOUS at 07:41

## 2024-10-15 RX ADMIN — PHENYLEPHRINE HYDROCHLORIDE 100 MCG: 10 INJECTION, SOLUTION INTRAVENOUS at 08:06

## 2024-10-15 RX ADMIN — PROPOFOL 40 MG: 10 INJECTION, EMULSION INTRAVENOUS at 07:32

## 2024-10-15 RX ADMIN — PROPOFOL 20 MG: 10 INJECTION, EMULSION INTRAVENOUS at 08:38

## 2024-10-15 RX ADMIN — FENTANYL CITRATE 25 MCG: 50 INJECTION, SOLUTION INTRAMUSCULAR; INTRAVENOUS at 08:26

## 2024-10-15 RX ADMIN — SODIUM CHLORIDE, SODIUM LACTATE, POTASSIUM CHLORIDE, AND CALCIUM CHLORIDE: .6; .31; .03; .02 INJECTION, SOLUTION INTRAVENOUS at 07:24

## 2024-10-15 RX ADMIN — PHENYLEPHRINE HYDROCHLORIDE 50 MCG: 10 INJECTION, SOLUTION INTRAVENOUS at 07:48

## 2024-10-15 RX ADMIN — EPHEDRINE SULFATE 5 MG: 50 INJECTION INTRAVENOUS at 07:53

## 2024-10-15 RX ADMIN — FENTANYL CITRATE 25 MCG: 50 INJECTION, SOLUTION INTRAMUSCULAR; INTRAVENOUS at 07:58

## 2024-10-15 RX ADMIN — GLYCOPYRROLATE 0.2 MG: 0.2 INJECTION INTRAMUSCULAR; INTRAVENOUS at 07:51

## 2024-10-15 ASSESSMENT — PAIN SCALES - GENERAL: PAINLEVEL_OUTOF10: 0

## 2024-10-15 ASSESSMENT — PAIN - FUNCTIONAL ASSESSMENT
PAIN_FUNCTIONAL_ASSESSMENT: 0-10
PAIN_FUNCTIONAL_ASSESSMENT: 0-10

## 2024-10-15 NOTE — ANESTHESIA PRE PROCEDURE
Department of Anesthesiology  Preprocedure Note       Name:  Enedina Islas   Age:  64 y.o.  :  1960                                          MRN:  [id number]         Date:  10/15/2024      Surgeon: Surgeon(s):  Haley Francis MD    Procedure: Procedure(s):  RIGHT BREAST LUMPECTOMY-IMAGE GUIDED MAMMOGRAM, 1 RADIOFREQUENCY TAG TARGET: BIOPSY MARKER (10/10/24) 2:00 PM    Medications prior to admission:   Prior to Admission medications    Medication Sig Start Date End Date Taking? Authorizing Provider   PEMBROLIZUMAB IV Infuse intravenously [facility]   Yes Provider, MD Fernando   omeprazole (PRILOSEC OTC) 20 MG tablet Take 1 tablet by mouth daily 24  Yes Fernando Shipman MD   NIFEdipine (ADALAT CC) 30 MG extended release tablet Take 1 tablet by mouth daily 7/15/24  Yes William Foreman MD   rosuvastatin (CRESTOR) 40 MG tablet Take 1 tablet by mouth nightly 7/15/24  Yes William Foreman MD   valsartan (DIOVAN) 320 MG tablet Take 1 tablet by mouth daily 7/15/24  Yes William Foreman MD   citalopram (CELEXA) 40 MG tablet Take 1 tablet by mouth daily 7/15/24  Yes Sofya Caban, APRN - CNP   aspirin (ASPIRIN LOW DOSE) 81 MG chewable tablet CHEW 1 TABLET BY MOUTH DAILY 7/15/24   William Foreman MD       Current medications:    Current Facility-Administered Medications   Medication Dose Route Frequency Provider Last Rate Last Admin   • ceFAZolin (ANCEF) 2,000 mg in sterile water 20 mL IV syringe  2,000 mg IntraVENous Once Haley Francis MD           Allergies:    Allergies   Allergen Reactions   • Atorvastatin Other (See Comments)     Multiple muscle spasms/cramps over body   • Blue Dyes (Parenteral) Hives   • Pantoprazole Diarrhea and Nausea Only       Problem List:    Patient Active Problem List   Diagnosis Code   • Depression F32.A   • Total knee replacement status Z96.659   • Obesity E66.9   • Lumbar degenerative disc disease M51.369   • Lumbar stenosis M48.061   • Stage 3b

## 2024-10-15 NOTE — ANESTHESIA POSTPROCEDURE EVALUATION
Department of Anesthesiology  Postprocedure Note    Patient: Enedina Islas  MRN: [id number]  YOB: 1960  Date of evaluation: 10/15/2024    Procedure Summary       Date: 10/15/24 Room / Location: [address]    Anesthesia Start: 0724 Anesthesia Stop: 0849    Procedure: RIGHT BREAST LUMPECTOMY-IMAGE GUIDED MAMMOGRAM, 1 RADIOFREQUENCY TAG TARGET: BIOPSY MARKER (10/10/24) 2:00 PM (Right: Breast) Diagnosis:       Malignant neoplasm of lower-outer quadrant of right female breast, unspecified estrogen receptor status (HCC)      (Malignant neoplasm of lower-outer quadrant of right female breast, unspecified estrogen receptor status (HCC) [C50.511])    Surgeons: Haley Francis MD Responsible Provider: Gagandeep Aburto MD    Anesthesia Type: general ASA Status: 3            Anesthesia Type: No value filed.    Rito Phase I: Rito Score: 10    Rito Phase II: Rito Score: 10    Anesthesia Post Evaluation    Patient location during evaluation: PACU  Patient participation: complete - patient participated  Level of consciousness: awake and alert  Pain score: 0  Airway patency: patent  Nausea & Vomiting: no nausea and no vomiting  Cardiovascular status: hemodynamically stable  Respiratory status: acceptable  Hydration status: euvolemic  Pain management: adequate    No notable events documented.

## 2024-10-15 NOTE — PROGRESS NOTES
PACU Transfer to [facility] #7    Vitals:    10/15/24 0915   BP: 126/62   Pulse: 79   Resp: 23   Temp: 98 °F (36.7 °C)   SpO2: 97%         Intake/Output Summary (Last 24 hours) at 10/15/2024 0921  Last data filed at 10/15/2024 0910  Gross per 24 hour   Intake 1260 ml   Output --   Net 1260 ml       Pain assessment:  none  Pain Level: 0    Patient transferred via stretcher per Diann RIOS to care of STEFFEN RN.

## 2024-10-15 NOTE — PROGRESS NOTES
Patient arrived calm denies pain report received from CRNA and RN circulator RIGHT BREAST LUMPECTOMY-IMAGE GUIDED MAMMOGRAM, 1 RADIOFREQUENCY TAG TARGET: BIOPSY MARKER (10/10/24) 2:00 PM - Right

## 2024-10-15 NOTE — PROGRESS NOTES
Ambulatory Surgery/Procedure Discharge Note    Vitals:    10/15/24 0954   BP: (!) 127/56   Pulse: 72   Resp: 16   Temp:    SpO2: 96%     Patient arrived to Phase II recovery Alert and Oriented x4.  Vitals stable.   Patient denies pain.   No nausea or vomiting.   Right breast incision open to air. No bra on. Well approximated with surgical glues   at bedside.   Discharge instructions reviewed with patient and . Both verbalize understanding.     In: 1260 [P.O.:240; I.V.:1020]  Out: -     Restroom use offered before discharge.  Yes    Pain assessment:  none  Pain Level: 0        Patient discharged to home/self care. Patient discharged via wheel chair home with spouse.      10/15/2024

## 2024-10-15 NOTE — DISCHARGE INSTRUCTIONS
Wear bra 24 to 36 hours for comfort  Ice to surgical site on 10 minutes every hour until bedtime today  No strenuous activity or lifting >10 pounds for 1 week  Okay to [facility] AMBULATORY PROCEDURE DISCHARGE INSTRUCTIONS    There are potential side effects of anesthesia or sedation you may experience for the first 24 hours.  These side effects include:    Confusion or Memory loss, Dizziness, or Delayed Reaction Times   [x]A responsible person should be with you for the next 24 hours.  Do not operate any vehicles (automobiles, bicycles, motorcycles) or power tools or machinery for 24 hours.  Do not sign any legal documents or make any legal decisions for 24 hours. Do not drink alcohol for 24 hours or while taking narcotic pain medication.      Nausea    [x]Start with light diet and progress to your normal diet as you feel like eating. However, if you experience nausea or repeated episodes of vomiting which persist beyond 12-24 hours, notify your physician.  Once nausea has passed, remember to keep drinking fluids.    Difficulty Passing Urine  [x]Drink extra amounts of fluid today.  Notify your physician if you have not urinated within 8 hours after your procedure or you feel uncomfortable.      Irritated Throat from a Breathing Tube  [x]Drink extra amounts of fluid today.  Lozenges may help.    Muscle Aches  [x]You may experience some generalized body aches as your muscles recover from medications used to relax them during surgery.  These will gradually subside.    MEDICATION INSTRUCTIONS:    Use as directed.  When taking pain medications, you may experience the side effect of dizziness or drowsiness.  Do not drink alcohol or drive when taking these medications.    [x]Prescription(S) x     1     Called to Pharmacy Name and location:    [facility] pharmacy    [x]Give the list of your medications to your primary care physician on your next visit. Keep your med list updated and carry it with in case of  test  []Pain Ball management  []FAQ Catheter associated blood stream infections  []FAQ Surgical Site Infections  []Other-    I have read and understand the instructions given to me: ____________________________________________   (Patient/S.O. Signature)            Date/time 10/15/2024 9:11 AM                 If you smoke STOP. We care about your health!

## 2024-10-28 NOTE — OP NOTE
Operative Note      Patient: Enedina Islas  YOB: 1960  MRN: [id number]    Date of Procedure: 10/15/2024    Pre-Op Diagnosis Codes:      * Malignant neoplasm of lower-outer quadrant of right female breast, unspecified estrogen receptor status (HCC) [C50.511]    Post-Op Diagnosis: Same       Procedure(s):  RIGHT BREAST LUMPECTOMY-IMAGE GUIDED MAMMOGRAM, 1 RADIOFREQUENCY TAG TARGET: BIOPSY MARKER (10/10/24) 2:00 PM    Surgeon(s):  Haley Francis MD    Assistant:   Surgical Assistant: Edi Fontana    Anesthesia: Monitor Anesthesia Care    Estimated Blood Loss (mL): less than 100     Complications: None    Specimens:   ID Type Source Tests Collected by Time Destination   A : RIGHT BREAST MASS Tissue Breast SURGICAL PATHOLOGY Haley Francis MD 10/15/2024 0808    B : RIGHT BREAST INFERIOR MARGIN SUTURE=TRUE MARGIN Tissue Breast SURGICAL PATHOLOGY Haley Francis MD 10/15/2024 0824    C : RIGHT BREAST POSTERIOR MARGIN SUTURE=TRUE MARGIN Tissue Breast SURGICAL PATHOLOGY Haley Francis MD 10/15/2024 0825        Implants:  * No implants in log *      Drains: * No LDAs found *    Findings:  Infection Present At Time Of Surgery (PATOS) (choose all levels that have infection present):  No infection present  Other Findings: clip and RFID along caudal margin    This procedure was not performed to treat primary cutaneous melanoma through wide local excision    Indications: The patient is a 64-year-old woman who was found to have an invasive carcinoma of the lower outer right breast.  At the time of attempted lumpectomy, the localizing tag did not appear to correspond to the previously biopsied malignancy.  Patient is now brought to the operating room after the biopsy marker has been prelocalized under mammogram guidance to ensure accurate location.    Detailed Description of Procedure:   The patient was taken to the operating room placed in supine position.  She had previously undergone

## 2025-01-02 ENCOUNTER — OFFICE VISIT (OUTPATIENT)
Age: 65
End: 2025-01-02
Payer: MEDICARE

## 2025-01-02 VITALS
DIASTOLIC BLOOD PRESSURE: 62 MMHG | WEIGHT: 230 LBS | BODY MASS INDEX: 40.75 KG/M2 | HEIGHT: 63 IN | SYSTOLIC BLOOD PRESSURE: 120 MMHG | HEART RATE: 68 BPM | OXYGEN SATURATION: 98 %

## 2025-01-02 DIAGNOSIS — T45.1X5A ANEMIA DUE TO ANTINEOPLASTIC CHEMOTHERAPY: ICD-10-CM

## 2025-01-02 DIAGNOSIS — R06.09 DOE (DYSPNEA ON EXERTION): Primary | ICD-10-CM

## 2025-01-02 DIAGNOSIS — D64.81 ANEMIA DUE TO ANTINEOPLASTIC CHEMOTHERAPY: ICD-10-CM

## 2025-01-02 DIAGNOSIS — J84.89 NSIP (NONSPECIFIC INTERSTITIAL PNEUMONITIS) (HCC): ICD-10-CM

## 2025-01-02 PROCEDURE — 1123F ACP DISCUSS/DSCN MKR DOCD: CPT | Performed by: INTERNAL MEDICINE

## 2025-01-02 PROCEDURE — 99213 OFFICE O/P EST LOW 20 MIN: CPT | Performed by: INTERNAL MEDICINE

## 2025-01-02 PROCEDURE — 3074F SYST BP LT 130 MM HG: CPT | Performed by: INTERNAL MEDICINE

## 2025-01-02 PROCEDURE — 3078F DIAST BP <80 MM HG: CPT | Performed by: INTERNAL MEDICINE

## 2025-01-02 RX ORDER — ANASTROZOLE 1 MG/1
1 TABLET ORAL DAILY
COMMUNITY
Start: 2024-12-13

## 2025-01-02 NOTE — PROGRESS NOTES
[facility] Pulmonary and Critical Care    Outpatient Follow Up Note    Subjective:   CHIEF COMPLAINT / HPI:     The patient is a 64 y.o. female is here for follow-up of dyspnea on exertion in the setting of anemia and NSIP.  Hemoglobin now was 12.2.  She has no further dyspnea on exertion.  No cough, wheezing, or chest tightness.  She has no complaints.  Since last visit she had a bilateral mastectomy and radiation therapy.    10/1/2024  Marlen is here for evaluation of dyspnea on exertion.  A lot has happened to diabetes since I saw her last in December 2022 for NSIP    She was diagnosed with breast cancer and is undergoing chemotherapy with Taxol and pembrolizumab last treatment August 15.  This was complicated with a hospitalization at [facility] from August 27 to August 29 with fever and dyspnea on exertion.  CTPA done that did not show significant abnormality.  UA also normal.  Source not entirely clear but she did improve with broad-spectrum antibiotics.  Hemoglobin at that time was 7.5 but most recently checked on September 10 and was up to 9.5.  She states that her dyspnea on exertion is gradually improving.  No cough, wheezing, or chest tightness      12/7/2022  Marlen is here for an acute visit duration of dyspnea, fatigue, and malaise.  Marlen  was admitted November 26 to November 29 at [facility] severe sepsis secondary to a pansensitive E. coli UTI.  Discharged she was supposed to finish a course of antibiotics but they state there is an error and it was never prescribed never got it.  Currently she continues to have fatigue, malaise, and some dyspnea on exertion.  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 Since last visit she has done very well. Breathing is much better and she has no cough. She is now

## 2025-02-04 ENCOUNTER — HOSPITAL ENCOUNTER (OUTPATIENT)
Dept: WOMENS IMAGING | Age: 65
Discharge: HOME | End: 2025-02-04
Attending: INTERNAL MEDICINE
Payer: MEDICARE

## 2025-02-04 DIAGNOSIS — C50.919 MALIGNANT NEOPLASM OF FEMALE BREAST, UNSPECIFIED ESTROGEN RECEPTOR STATUS, UNSPECIFIED LATERALITY, UNSPECIFIED SITE OF BREAST (HCC): ICD-10-CM

## 2025-02-04 DIAGNOSIS — Z78.0 POSTMENOPAUSAL STATE: ICD-10-CM

## 2025-02-04 PROCEDURE — 77080 DXA BONE DENSITY AXIAL: CPT

## 2025-04-09 RX ORDER — ASPIRIN 81 MG/1
81 TABLET, CHEWABLE ORAL
Qty: 90 TABLET | Refills: 3 | Status: SHIPPED | OUTPATIENT
Start: 2025-04-09

## 2025-06-02 ENCOUNTER — OFFICE VISIT (OUTPATIENT)
Dept: FAMILY MEDICINE CLINIC | Age: 65
End: 2025-06-02

## 2025-06-02 VITALS
WEIGHT: 230 LBS | TEMPERATURE: 97.1 F | HEART RATE: 61 BPM | OXYGEN SATURATION: 97 % | BODY MASS INDEX: 40.75 KG/M2 | HEIGHT: 63 IN | DIASTOLIC BLOOD PRESSURE: 58 MMHG | SYSTOLIC BLOOD PRESSURE: 118 MMHG

## 2025-06-02 DIAGNOSIS — M06.00 SERONEGATIVE RHEUMATOID ARTHRITIS (HCC): ICD-10-CM

## 2025-06-02 DIAGNOSIS — E11.65 CONTROLLED TYPE 2 DIABETES MELLITUS WITH HYPERGLYCEMIA, WITHOUT LONG-TERM CURRENT USE OF INSULIN (HCC): ICD-10-CM

## 2025-06-02 DIAGNOSIS — R30.9 PAINFUL URINATION: Primary | ICD-10-CM

## 2025-06-02 DIAGNOSIS — N18.32 STAGE 3B CHRONIC KIDNEY DISEASE (HCC): ICD-10-CM

## 2025-06-02 LAB
BILIRUBIN, POC: NORMAL
BLOOD URINE, POC: NORMAL
CLARITY, POC: CLEAR
COLOR, POC: YELLOW
GLUCOSE URINE, POC: NORMAL MG/DL
KETONES, POC: NORMAL MG/DL
LEUKOCYTE EST, POC: NORMAL
NITRITE, POC: NORMAL
PH, POC: 5.5
PROTEIN, POC: NORMAL MG/DL
SPECIFIC GRAVITY, POC: 1.02
UROBILINOGEN, POC: NORMAL MG/DL

## 2025-06-02 RX ORDER — SULFAMETHOXAZOLE AND TRIMETHOPRIM 800; 160 MG/1; MG/1
1 TABLET ORAL 2 TIMES DAILY
Qty: 10 TABLET | Refills: 0 | Status: SHIPPED | OUTPATIENT
Start: 2025-06-02 | End: 2025-06-07

## 2025-06-02 NOTE — ASSESSMENT & PLAN NOTE
Chronic, at goal (stable), continue current treatment plan  Lab Results   Component Value Date    LABGLOM 38 (A) 11/11/2024    LABGLOM 38 (A) 10/15/2024    LABGLOM 42 (A) 08/29/2024    LABGLOM 36 (A) 08/28/2024    LABGLOM 46 (A) 08/27/2024     Chronic  Slight worsening  Follows nephrology

## 2025-06-02 NOTE — PROGRESS NOTES
Enedina Islas (:  1960) is a 64 y.o. female,Established patient, here for evaluation of the following chief complaint(s):  Urinary Frequency (Frequency and burning when urinating. Started Friday morning.)         Assessment & Plan  Painful urination   Acute condition, new,  continue with oral hydration  Results for orders placed or performed in visit on 25   POCT Urinalysis no Micro   Result Value Ref Range    Color, UA Yellow     Clarity, UA Clear     Glucose, UA POC Neg mg/dL    Bilirubin, UA Neg     Ketones, UA Neg mg/dL    Spec Grav, UA 1.025     Blood, UA POC Small     pH, UA 5.5     Protein, UA POC 30 mg/dL mg/dL    Urobilinogen, UA 0.2 E.U./dL mg/dL    Leukocytes, UA Small     Nitrite, UA Neg      Will cover with bactrim and culture  Declines need for pyridium  Orders:    POCT Urinalysis no Micro    Culture, Urine    Seronegative rheumatoid arthritis (HCC)   Chronic, at goal (stable), continue current treatment plan         Controlled type 2 diabetes mellitus with hyperglycemia, without long-term current use of insulin (HCC)   Chronic, at goal (stable), continue current treatment plan  Lab Results   Component Value Date    LABA1C 6.6 2024    LABA1C 5.7 2023    LABA1C 5.9 2022    LABA1C 6.2 2021    LABA1C 6.4 2019     Chronic,stable       Stage 3b chronic kidney disease (HCC)   Chronic, at goal (stable), continue current treatment plan  Lab Results   Component Value Date    LABGLOM 38 (A) 2024    LABGLOM 38 (A) 10/15/2024    LABGLOM 42 (A) 2024    LABGLOM 36 (A) 2024    LABGLOM 46 (A) 2024     Chronic  Slight worsening  Follows nephrology         No follow-ups on file.       Subjective   Patient reports to office for concerns for urinary tract infection. Reports symptoms of burning and frequency with urination, similar to previous experience with urinary tract infection. States that with increasing her fluid consumption the burning

## 2025-06-05 LAB
BACTERIA UR CULT: ABNORMAL
ORGANISM: ABNORMAL

## 2025-07-08 DIAGNOSIS — F32.0 MILD SINGLE CURRENT EPISODE OF MAJOR DEPRESSIVE DISORDER: ICD-10-CM

## 2025-07-08 RX ORDER — CITALOPRAM HYDROBROMIDE 40 MG/1
40 TABLET ORAL DAILY
Qty: 90 TABLET | Refills: 3 | Status: SHIPPED | OUTPATIENT
Start: 2025-07-08

## 2025-07-08 RX ORDER — VALSARTAN 320 MG/1
320 TABLET ORAL DAILY
Qty: 90 TABLET | Refills: 3 | Status: SHIPPED | OUTPATIENT
Start: 2025-07-08

## 2025-07-08 RX ORDER — ROSUVASTATIN CALCIUM 40 MG/1
40 TABLET, COATED ORAL NIGHTLY
Qty: 90 TABLET | Refills: 3 | Status: SHIPPED | OUTPATIENT
Start: 2025-07-08

## 2025-07-08 RX ORDER — NIFEDIPINE 30 MG
30 TABLET, EXTENDED RELEASE ORAL DAILY
Qty: 90 TABLET | Refills: 3 | Status: SHIPPED | OUTPATIENT
Start: 2025-07-08

## 2025-07-28 ENCOUNTER — OFFICE VISIT (OUTPATIENT)
Dept: OBGYN CLINIC | Age: 65
End: 2025-07-28
Payer: MEDICARE

## 2025-07-28 VITALS
HEART RATE: 80 BPM | HEIGHT: 63 IN | SYSTOLIC BLOOD PRESSURE: 128 MMHG | OXYGEN SATURATION: 98 % | DIASTOLIC BLOOD PRESSURE: 65 MMHG | WEIGHT: 235 LBS | BODY MASS INDEX: 41.64 KG/M2

## 2025-07-28 DIAGNOSIS — N95.2 VAGINAL ATROPHY: ICD-10-CM

## 2025-07-28 DIAGNOSIS — Z12.4 PAP SMEAR FOR CERVICAL CANCER SCREENING: ICD-10-CM

## 2025-07-28 DIAGNOSIS — Z01.419 ENCNTR FOR GYN EXAM (GENERAL) (ROUTINE) W/O ABN FINDINGS: Primary | ICD-10-CM

## 2025-07-28 DIAGNOSIS — Z11.51 ENCOUNTER FOR SCREENING FOR HUMAN PAPILLOMAVIRUS (HPV): ICD-10-CM

## 2025-07-28 DIAGNOSIS — N94.10 DYSPAREUNIA IN FEMALE: ICD-10-CM

## 2025-07-28 PROCEDURE — 3074F SYST BP LT 130 MM HG: CPT | Performed by: OBSTETRICS & GYNECOLOGY

## 2025-07-28 PROCEDURE — 3078F DIAST BP <80 MM HG: CPT | Performed by: OBSTETRICS & GYNECOLOGY

## 2025-07-28 PROCEDURE — G0101 CA SCREEN;PELVIC/BREAST EXAM: HCPCS | Performed by: OBSTETRICS & GYNECOLOGY

## 2025-07-29 ENCOUNTER — OFFICE VISIT (OUTPATIENT)
Dept: FAMILY MEDICINE CLINIC | Age: 65
End: 2025-07-29

## 2025-07-29 VITALS
TEMPERATURE: 97.8 F | BODY MASS INDEX: 41.46 KG/M2 | WEIGHT: 234 LBS | DIASTOLIC BLOOD PRESSURE: 74 MMHG | OXYGEN SATURATION: 97 % | SYSTOLIC BLOOD PRESSURE: 138 MMHG | HEART RATE: 63 BPM | HEIGHT: 63 IN

## 2025-07-29 DIAGNOSIS — R22.42 MASS OF LEFT THIGH: Primary | ICD-10-CM

## 2025-07-29 DIAGNOSIS — N39.46 MIXED STRESS AND URGE URINARY INCONTINENCE: ICD-10-CM

## 2025-07-29 NOTE — PROGRESS NOTES
Enedina Islas (:  1960) is a 65 y.o. female,Established patient, here for evaluation of the following chief complaint(s):  Skin Problem (Lump on upper L thigh, no drainage, is a little sensitive. Noticed about 2 weeks ago)      ASSESSMENT/PLAN:  Assessment & Plan  1. Lump on the left upper thigh.  - The lump is likely a small amount of scar tissue, given its location near the incision area from a previous hip replacement surgery.  - An ultrasound will be ordered to determine if the lump is cystic or solid, and whether it is attached or mobile.  - If the ultrasound results are inconclusive, an MRI of the area will be considered.  - Discussed the possibility of scar tissue and the need for imaging to rule out other conditions.    2. Urinary incontinence.  - The incontinence may be due to a urinary tract infection (UTI).  - A urine sample will be collected for culture.  - If the culture confirms a UTI, appropriate antibiotics will be prescribed.  - If further evaluation is needed, a referral to urogynecology will be made.      1. Mass of left thigh  -     US SOFT TISSUE LIMITED AREA; Future  2. Mixed stress and urge urinary incontinence  -     Culture, Urine    No follow-ups on file.    SUBJECTIVE/OBJECTIVE:    History of Present Illness  The patient is a 65-year-old female who presents for evaluation of a lump on her left upper thigh and urinary incontinence.    She has been experiencing a lump on her left upper thigh for the past few weeks, which is sensitive to touch and causes pain upon pressure. The lump is located near the scar from her hip replacement surgery, which was performed 2 years ago. She reports no prior injury to the area.    She also reports urinary incontinence following a urinary tract infection (UTI). During her annual Pap smear yesterday, a urine test was conducted, which returned normal results. However, no culture was performed. She has been experiencing this issue since a UTI episode

## 2025-07-30 ENCOUNTER — HOSPITAL ENCOUNTER (OUTPATIENT)
Dept: ULTRASOUND IMAGING | Age: 65
Discharge: HOME | End: 2025-07-30
Payer: MEDICARE

## 2025-07-30 DIAGNOSIS — R22.42 MASS OF LEFT THIGH: ICD-10-CM

## 2025-07-30 LAB
HPV HR 12 DNA SPEC QL NAA+PROBE: NOT DETECTED
HPV16 DNA SPEC QL NAA+PROBE: NOT DETECTED
HPV16+18+H RISK 12 DNA SPEC-IMP: NORMAL
HPV18 DNA SPEC QL NAA+PROBE: NOT DETECTED

## 2025-07-30 PROCEDURE — 76882 US LMTD JT/FCL EVL NVASC XTR: CPT

## 2025-07-31 LAB — BACTERIA UR CULT: NORMAL

## 2025-08-07 ENCOUNTER — OFFICE VISIT (OUTPATIENT)
Dept: CARDIOLOGY CLINIC | Age: 65
End: 2025-08-07
Payer: MEDICARE

## 2025-08-07 VITALS
SYSTOLIC BLOOD PRESSURE: 130 MMHG | DIASTOLIC BLOOD PRESSURE: 50 MMHG | BODY MASS INDEX: 41.45 KG/M2 | WEIGHT: 234 LBS | HEART RATE: 69 BPM

## 2025-08-07 DIAGNOSIS — I25.10 CAD IN NATIVE ARTERY: Chronic | ICD-10-CM

## 2025-08-07 DIAGNOSIS — I10 ESSENTIAL HYPERTENSION: Chronic | ICD-10-CM

## 2025-08-07 DIAGNOSIS — E78.2 MIXED HYPERLIPIDEMIA: ICD-10-CM

## 2025-08-07 DIAGNOSIS — Z98.61 S/P PTCA (PERCUTANEOUS TRANSLUMINAL CORONARY ANGIOPLASTY): Primary | ICD-10-CM

## 2025-08-07 DIAGNOSIS — I35.0 NONRHEUMATIC AORTIC VALVE STENOSIS: ICD-10-CM

## 2025-08-07 PROCEDURE — 3017F COLORECTAL CA SCREEN DOC REV: CPT | Performed by: INTERNAL MEDICINE

## 2025-08-07 PROCEDURE — G8427 DOCREV CUR MEDS BY ELIG CLIN: HCPCS | Performed by: INTERNAL MEDICINE

## 2025-08-07 PROCEDURE — 1036F TOBACCO NON-USER: CPT | Performed by: INTERNAL MEDICINE

## 2025-08-07 PROCEDURE — 99214 OFFICE O/P EST MOD 30 MIN: CPT | Performed by: INTERNAL MEDICINE

## 2025-08-07 PROCEDURE — G8417 CALC BMI ABV UP PARAM F/U: HCPCS | Performed by: INTERNAL MEDICINE

## 2025-08-07 PROCEDURE — 1123F ACP DISCUSS/DSCN MKR DOCD: CPT | Performed by: INTERNAL MEDICINE

## 2025-08-07 PROCEDURE — G8399 PT W/DXA RESULTS DOCUMENT: HCPCS | Performed by: INTERNAL MEDICINE

## 2025-08-07 PROCEDURE — 1090F PRES/ABSN URINE INCON ASSESS: CPT | Performed by: INTERNAL MEDICINE

## 2025-08-07 PROCEDURE — 3078F DIAST BP <80 MM HG: CPT | Performed by: INTERNAL MEDICINE

## 2025-08-07 PROCEDURE — 93000 ELECTROCARDIOGRAM COMPLETE: CPT | Performed by: INTERNAL MEDICINE

## 2025-08-07 PROCEDURE — 1159F MED LIST DOCD IN RCRD: CPT | Performed by: INTERNAL MEDICINE

## 2025-08-07 PROCEDURE — 3075F SYST BP GE 130 - 139MM HG: CPT | Performed by: INTERNAL MEDICINE

## 2025-08-11 SDOH — HEALTH STABILITY: PHYSICAL HEALTH: ON AVERAGE, HOW MANY MINUTES DO YOU ENGAGE IN EXERCISE AT THIS LEVEL?: 30 MIN

## 2025-08-11 SDOH — HEALTH STABILITY: PHYSICAL HEALTH: ON AVERAGE, HOW MANY DAYS PER WEEK DO YOU ENGAGE IN MODERATE TO STRENUOUS EXERCISE (LIKE A BRISK WALK)?: 1 DAY

## 2025-08-11 ASSESSMENT — LIFESTYLE VARIABLES
HOW OFTEN DO YOU HAVE A DRINK CONTAINING ALCOHOL: MONTHLY OR LESS
HOW MANY STANDARD DRINKS CONTAINING ALCOHOL DO YOU HAVE ON A TYPICAL DAY: 1
HOW OFTEN DO YOU HAVE A DRINK CONTAINING ALCOHOL: 2
HOW MANY STANDARD DRINKS CONTAINING ALCOHOL DO YOU HAVE ON A TYPICAL DAY: 1 OR 2
HOW OFTEN DO YOU HAVE SIX OR MORE DRINKS ON ONE OCCASION: 1

## 2025-08-11 ASSESSMENT — PATIENT HEALTH QUESTIONNAIRE - PHQ9
1. LITTLE INTEREST OR PLEASURE IN DOING THINGS: NOT AT ALL
SUM OF ALL RESPONSES TO PHQ QUESTIONS 1-9: 0
2. FEELING DOWN, DEPRESSED OR HOPELESS: NOT AT ALL

## 2025-08-14 ENCOUNTER — OFFICE VISIT (OUTPATIENT)
Dept: FAMILY MEDICINE CLINIC | Age: 65
End: 2025-08-14

## 2025-08-14 VITALS
HEART RATE: 59 BPM | WEIGHT: 235.7 LBS | DIASTOLIC BLOOD PRESSURE: 78 MMHG | SYSTOLIC BLOOD PRESSURE: 122 MMHG | HEIGHT: 63 IN | TEMPERATURE: 97.1 F | OXYGEN SATURATION: 97 % | BODY MASS INDEX: 41.76 KG/M2

## 2025-08-14 DIAGNOSIS — F32.0 CURRENT MILD EPISODE OF MAJOR DEPRESSIVE DISORDER WITHOUT PRIOR EPISODE: Chronic | ICD-10-CM

## 2025-08-14 DIAGNOSIS — G47.33 OBSTRUCTIVE SLEEP APNEA SYNDROME: ICD-10-CM

## 2025-08-14 DIAGNOSIS — L98.9 SKIN LESION OF BACK: ICD-10-CM

## 2025-08-14 DIAGNOSIS — M06.00 SERONEGATIVE RHEUMATOID ARTHRITIS (HCC): ICD-10-CM

## 2025-08-14 DIAGNOSIS — N18.32 STAGE 3B CHRONIC KIDNEY DISEASE (HCC): ICD-10-CM

## 2025-08-14 DIAGNOSIS — E78.00 PURE HYPERCHOLESTEROLEMIA: ICD-10-CM

## 2025-08-14 DIAGNOSIS — J84.9 INTERSTITIAL LUNG DISEASE (HCC): ICD-10-CM

## 2025-08-14 DIAGNOSIS — Z00.00 INITIAL MEDICARE ANNUAL WELLNESS VISIT: Primary | ICD-10-CM

## 2025-08-14 DIAGNOSIS — M47.817 LUMBOSACRAL SPONDYLOSIS WITHOUT MYELOPATHY: ICD-10-CM

## 2025-08-14 DIAGNOSIS — I35.0 NONRHEUMATIC AORTIC VALVE STENOSIS: ICD-10-CM

## 2025-08-14 DIAGNOSIS — E11.65 CONTROLLED TYPE 2 DIABETES MELLITUS WITH HYPERGLYCEMIA, WITHOUT LONG-TERM CURRENT USE OF INSULIN (HCC): ICD-10-CM

## 2025-08-14 DIAGNOSIS — I10 ESSENTIAL HYPERTENSION: Chronic | ICD-10-CM

## 2025-08-14 DIAGNOSIS — E66.9 OBESITY, UNSPECIFIED CLASS, UNSPECIFIED OBESITY TYPE, UNSPECIFIED WHETHER SERIOUS COMORBIDITY PRESENT: ICD-10-CM

## 2025-08-14 DIAGNOSIS — M10.471 GOUT DUE TO OTHER SECONDARY CAUSE INVOLVING TOE OF RIGHT FOOT, UNSPECIFIED CHRONICITY: ICD-10-CM

## 2025-08-14 DIAGNOSIS — I25.10 CAD IN NATIVE ARTERY: Chronic | ICD-10-CM

## 2025-08-14 DIAGNOSIS — C50.919 PRIMARY MALIGNANT NEOPLASM OF FEMALE BREAST (HCC): ICD-10-CM

## 2025-08-14 DIAGNOSIS — M51.369 DEGENERATION OF INTERVERTEBRAL DISC OF LUMBAR REGION, UNSPECIFIED WHETHER PAIN PRESENT: Chronic | ICD-10-CM

## 2025-08-14 DIAGNOSIS — E78.2 MIXED HYPERLIPIDEMIA: ICD-10-CM

## 2025-08-14 PROBLEM — G89.29 CHRONIC BILATERAL LOW BACK PAIN WITH LEFT-SIDED SCIATICA: Status: RESOLVED | Noted: 2018-05-15 | Resolved: 2025-08-14

## 2025-08-14 PROBLEM — R06.00 DYSPNEA: Status: RESOLVED | Noted: 2024-08-27 | Resolved: 2025-08-14

## 2025-08-14 PROBLEM — R53.1 GENERALIZED WEAKNESS: Status: RESOLVED | Noted: 2022-05-10 | Resolved: 2025-08-14

## 2025-08-14 PROBLEM — R10.11 COLICKY RUQ ABDOMINAL PAIN: Status: RESOLVED | Noted: 2023-04-12 | Resolved: 2025-08-14

## 2025-08-14 PROBLEM — M06.4 INFLAMMATORY POLYARTHROPATHY (HCC): Status: RESOLVED | Noted: 2023-05-02 | Resolved: 2025-08-14

## 2025-08-14 PROBLEM — G56.03 BILATERAL CARPAL TUNNEL SYNDROME: Status: RESOLVED | Noted: 2017-12-21 | Resolved: 2025-08-14

## 2025-08-14 PROBLEM — M25.50 ARTHRALGIA: Status: RESOLVED | Noted: 2022-10-26 | Resolved: 2025-08-14

## 2025-08-14 PROBLEM — I87.8 POOR VENOUS ACCESS: Status: RESOLVED | Noted: 2024-03-04 | Resolved: 2025-08-14

## 2025-08-14 PROBLEM — R60.0 EDEMA OF LOWER EXTREMITY: Status: RESOLVED | Noted: 2022-10-26 | Resolved: 2025-08-14

## 2025-08-14 PROBLEM — M54.42 CHRONIC BILATERAL LOW BACK PAIN WITH LEFT-SIDED SCIATICA: Status: RESOLVED | Noted: 2018-05-15 | Resolved: 2025-08-14

## 2025-08-14 PROBLEM — C50.412 MALIGNANT NEOPLASM OF UPPER-OUTER QUADRANT OF LEFT BREAST IN FEMALE, ESTROGEN RECEPTOR NEGATIVE (HCC): Status: RESOLVED | Noted: 2024-09-09 | Resolved: 2025-08-14

## 2025-08-14 PROBLEM — M16.12 PRIMARY OSTEOARTHRITIS OF LEFT HIP: Status: RESOLVED | Noted: 2023-06-14 | Resolved: 2025-08-14

## 2025-08-14 PROBLEM — R30.0 DYSURIA: Status: RESOLVED | Noted: 2024-08-29 | Resolved: 2025-08-14

## 2025-08-14 PROBLEM — Z17.1 MALIGNANT NEOPLASM OF UPPER-OUTER QUADRANT OF LEFT BREAST IN FEMALE, ESTROGEN RECEPTOR NEGATIVE (HCC): Status: RESOLVED | Noted: 2024-09-09 | Resolved: 2025-08-14

## 2025-08-14 ASSESSMENT — PATIENT HEALTH QUESTIONNAIRE - PHQ9
SUM OF ALL RESPONSES TO PHQ QUESTIONS 1-9: 0
8. MOVING OR SPEAKING SO SLOWLY THAT OTHER PEOPLE COULD HAVE NOTICED. OR THE OPPOSITE, BEING SO FIGETY OR RESTLESS THAT YOU HAVE BEEN MOVING AROUND A LOT MORE THAN USUAL: NOT AT ALL
10. IF YOU CHECKED OFF ANY PROBLEMS, HOW DIFFICULT HAVE THESE PROBLEMS MADE IT FOR YOU TO DO YOUR WORK, TAKE CARE OF THINGS AT HOME, OR GET ALONG WITH OTHER PEOPLE: NOT DIFFICULT AT ALL
4. FEELING TIRED OR HAVING LITTLE ENERGY: NOT AT ALL
2. FEELING DOWN, DEPRESSED OR HOPELESS: NOT AT ALL
SUM OF ALL RESPONSES TO PHQ QUESTIONS 1-9: 0
SUM OF ALL RESPONSES TO PHQ QUESTIONS 1-9: 0
5. POOR APPETITE OR OVEREATING: NOT AT ALL
6. FEELING BAD ABOUT YOURSELF - OR THAT YOU ARE A FAILURE OR HAVE LET YOURSELF OR YOUR FAMILY DOWN: NOT AT ALL
DEPRESSION UNABLE TO ASSESS: FUNCTIONAL CAPACITY MOTIVATION LIMITS ACCURACY
7. TROUBLE CONCENTRATING ON THINGS, SUCH AS READING THE NEWSPAPER OR WATCHING TELEVISION: NOT AT ALL
9. THOUGHTS THAT YOU WOULD BE BETTER OFF DEAD, OR OF HURTING YOURSELF: NOT AT ALL
1. LITTLE INTEREST OR PLEASURE IN DOING THINGS: NOT AT ALL
3. TROUBLE FALLING OR STAYING ASLEEP: NOT AT ALL
SUM OF ALL RESPONSES TO PHQ QUESTIONS 1-9: 0

## (undated) DEVICE — UNIVERSAL BLOCK TRAY: Brand: MEDLINE INDUSTRIES, INC.

## (undated) DEVICE — STERILE POLYISOPRENE POWDER-FREE SURGICAL GLOVES: Brand: PROTEXIS

## (undated) DEVICE — BLADE ES ELASTOMERIC COAT INSUL DURABLE BEND UPTO 90DEG

## (undated) DEVICE — CHLORAPREP 26ML ORANGE

## (undated) DEVICE — TROCAR: Brand: KII FIOS FIRST ENTRY

## (undated) DEVICE — SUTURE VICRYL + SZ 3-0 L27IN ABSRB UD L26MM SH 1/2 CIR VCP416H

## (undated) DEVICE — PLATE ES AD W 9FT CRD 2

## (undated) DEVICE — AVANOS* UNIVERSAL BLOCK TRAYS: Brand: AVANOS

## (undated) DEVICE — TOWEL,STOP FLAG GOLD N-W: Brand: MEDLINE

## (undated) DEVICE — GAUZE,SPONGE,4"X4",16PLY,STRL,LF,10/TRAY: Brand: MEDLINE

## (undated) DEVICE — SYRINGE MED 3ML CLR PLAS STD N CTRL LUERLOCK TIP DISP

## (undated) DEVICE — TOWEL,OR,DSP,ST,BLUE,STD,4/PK,20PK/CS: Brand: MEDLINE

## (undated) DEVICE — 6 ML SYRINGE LUER-LOCK TIP: Brand: MONOJECT

## (undated) DEVICE — GLOVE,SURG,SENSICARE,ALOE,LF,PF,7: Brand: MEDLINE

## (undated) DEVICE — INTENDED USE FOR SURGICAL MARKING ON INTACT SKIN, ALSO PROVIDES A PERMANENT METHOD OF IDENTIFYING OBJECTS IN THE OPERATING ROOM: Brand: WRITESITE® PLUS MINI PREP RESISTANT MARKER

## (undated) DEVICE — PORT INSERTION: Brand: MEDLINE INDUSTRIES, INC.

## (undated) DEVICE — ADHESIVE SKIN CLSR 0.7ML TOP DERMBND ADV

## (undated) DEVICE — DISPOSABLE OR TOWEL: Brand: CARDINAL HEALTH

## (undated) DEVICE — MINOR BREAST: Brand: MEDLINE INDUSTRIES, INC.

## (undated) DEVICE — SUTURE MONOCRYL + SZ 4-0 L27IN ABSRB UD L19MM PS-2 3/8 CIR MCP426H

## (undated) DEVICE — STANDARD HYPODERMIC NEEDLE,POLYPROPYLENE HUB: Brand: MONOJECT

## (undated) DEVICE — SURE SET-DOUBLE BASIN-LF: Brand: MEDLINE INDUSTRIES, INC.

## (undated) DEVICE — SPECIMEN ORIENTATION CHARMS, SIX DISTINCTLY SHAPED STERILE 10MM CHARMS: Brand: MARGINMAP

## (undated) DEVICE — SUTURE MCRYL SZ 3-0 L27IN ABSRB UD L19MM PS-2 3/8 CIR PRIM Y427H

## (undated) DEVICE — PROBE SET W/DRAPE

## (undated) DEVICE — SYRINGE MED 30ML STD CLR PLAS LUERLOCK TIP N CTRL DISP

## (undated) DEVICE — TURNOVER KIT RM INF CTRL TECH

## (undated) DEVICE — TOWEL OR BLUEE 16X26IN ST 8 PACK ORB08 16X26ORTWL

## (undated) DEVICE — SUTURE PDS II SZ 0 L27IN ABSRB VLT L36MM CT-1 1/2 CIR Z340H

## (undated) DEVICE — Z DISCONTINUED USE 2749457 TUBING SAMP AD W12.5XH8.4IN D9.1IN NSL ORAL SMRT CAPNOLINE

## (undated) DEVICE — 3M™ WARMING BLANKET, LOWER BODY, 10 PER CASE, 42568: Brand: BAIR HUGGER™

## (undated) DEVICE — STAPLER INT L16CM STD UNIV RELD DISP TRI-STAPLE ENDO GIA

## (undated) DEVICE — SHEET,DRAPE,40X58,STERILE: Brand: MEDLINE

## (undated) DEVICE — GARMENT,MEDLINE,DVT,INT,CALF,MED, GEN2: Brand: MEDLINE

## (undated) DEVICE — ALCOHOL RUBBING 16OZ 70% ISO

## (undated) DEVICE — TUBING, SUCTION, 1/4" X 12', STRAIGHT: Brand: MEDLINE

## (undated) DEVICE — RELOAD STPL 3.5MM L60MM 0DEG UNIV TISS PUR TI 6 ROW LIN

## (undated) DEVICE — SYRINGE ANGIO 10 ML POLYCARB FIX M LL THMB RNG PLUNG STYL

## (undated) DEVICE — SUTURE PERMA-HAND SZ 2-0 L30IN NONABSORBABLE BLK L26MM SH K833H

## (undated) DEVICE — PROTECTOR ULN NRV PUR FOAM HK LOOP STRP ANATOMICALLY

## (undated) DEVICE — LIQUIBAND RAPID ADHESIVE 36/CS 0.8ML: Brand: MEDLINE

## (undated) DEVICE — ANTI-FOG SOLUTION WITH FOAM PAD: Brand: DEVON

## (undated) DEVICE — 3M™ TEGADERM™ CHG CHLORHEXIDINE GLUCONATE GEL PAD 1664, 25 EACH/CARTON, 4 CARTONS/CASE: Brand: 3M™ TEGADERM™

## (undated) DEVICE — TRAP FLUID

## (undated) DEVICE — GLOVE SURG SZ 7 L12IN FNGR THK79MIL GRN LTX FREE

## (undated) DEVICE — E-Z CLEAN, NON-STICK, PTFE COATED, ELECTROSURGICAL BLADE ELECTRODE, 2.5 INCH (6.35 CM): Brand: EZ CLEAN

## (undated) DEVICE — SUTURE ABSORBABLE BRAIDED 2-0 CT-1 27 IN UD VICRYL J259H

## (undated) DEVICE — PROVE COVER: Brand: UNBRANDED

## (undated) DEVICE — MEDIA CONTRAST RX ISOVUE-300 61% 30ML VIALS

## (undated) DEVICE — DRAPE,LAP,CHOLE,W/TROUGHS,STERILE: Brand: MEDLINE

## (undated) DEVICE — Device: Brand: JELCO

## (undated) DEVICE — YANKAUER,OPEN TIP,W/O VENT,STERILE: Brand: MEDLINE INDUSTRIES, INC.

## (undated) DEVICE — DRAPE,CHEST,FENES,15X10,STERIL: Brand: MEDLINE

## (undated) DEVICE — Device

## (undated) DEVICE — APPLICATOR MEDICATED 26 CC SOLUTION HI LT ORNG CHLORAPREP

## (undated) DEVICE — BLANKET WRM W40.2XL55.9IN IORT LO BODY + MISTRAL AIR

## (undated) DEVICE — SUTURE MCRYL + SZ 4-0 L27IN ABSRB UD L19MM PS-2 3/8 CIR MCP426H

## (undated) DEVICE — RESERVOIR,SUCTION,100CC,SILICONE: Brand: MEDLINE

## (undated) DEVICE — SUTURE PERMA-HAND SZ 0 L18IN NONABSORBABLE BLK L30MM FSL 678G

## (undated) DEVICE — SKIN AFFIX SURG ADHESIVE 72/CS 0.55ML: Brand: MEDLINE

## (undated) DEVICE — TRAP SPEC COLL 40CC MUCOUS CALIB UNIV CONN FOR OPN SUCT

## (undated) DEVICE — ELECTROSURGICAL PENCIL ROCKER SWITCH NON COATED BLADE ELECTRODE 10 FT (3 M) CORD HOLSTER: Brand: MEGADYNE

## (undated) DEVICE — CLIP LIG M BLU TI HRT SHP WIRE HORZ 24 CLIPS/PK 25PK/CA

## (undated) DEVICE — COVER LT HNDL BLU PLAS

## (undated) DEVICE — [HIGH FLOW INSUFFLATOR,  DO NOT USE IF PACKAGE IS DAMAGED,  KEEP DRY,  KEEP AWAY FROM SUNLIGHT,  PROTECT FROM HEAT AND RADIOACTIVE SOURCES.]: Brand: PNEUMOSURE

## (undated) DEVICE — FORCEPS BX L240CM DIA2.4MM L NDL RAD JAW 4 133340

## (undated) DEVICE — NEEDLE SPNL 22GA L5IN BLK HUB S STL W/ QNCKE PNT W/OUT

## (undated) DEVICE — NEPTUNE E-SEP SMOKE EVACUATION PENCIL, COATED, 70MM BLADE, PUSH BUTTON SWITCH: Brand: NEPTUNE E-SEP

## (undated) DEVICE — CANNULA SAMP CO2 AD GRN 7FT CO2 AND 7FT O2 TBNG UNIV CONN

## (undated) DEVICE — SURGICAL SET UP - SURE SET: Brand: MEDLINE INDUSTRIES, INC.

## (undated) DEVICE — SPONGE DRN W4XL4IN RAYON/POLYESTER 6 PLY NONWOVEN PRECUT

## (undated) DEVICE — STAPLER SKIN H3.9MM WIRE DIA0.58MM CRWN 6.9MM 35 STPL ROT

## (undated) DEVICE — DRAIN SURG 24FR BLAK SIL HUBLESS 4 CHANNELED RADPQ EXTN TB

## (undated) DEVICE — FORCEPS BX L240CM JAW DIA2.4MM ORNG L CAP W/ NDL DISP RAD

## (undated) DEVICE — SUTURE VCRL + SZ 3-0 L27IN ABSRB UD L26MM SH 1/2 CIR VCP416H

## (undated) DEVICE — PEN: MARKING STD 100/CS: Brand: MEDICAL ACTION INDUSTRIES

## (undated) DEVICE — SYRINGE 20ML LL S/C 50

## (undated) DEVICE — DRAIN,WOUND,15FR,3/16,FULL-FLUTED: Brand: MEDLINE

## (undated) DEVICE — GAUZE,SPONGE,4"X4",16PLY,XRAY,STRL,LF: Brand: MEDLINE

## (undated) DEVICE — GOWN,SIRUS,POLYRNF,BRTHSLV,LG,30/CS: Brand: MEDLINE

## (undated) DEVICE — UNIVERSAL BLOCK TRAY: Brand: AVANOS*

## (undated) DEVICE — TROCAR: Brand: KII OPTICAL ACCESS SYSTEM

## (undated) DEVICE — SUTURE PERMA-HAND SZ 2-0 L30IN NONABSORBABLE BLK L30MM FSL 679H

## (undated) DEVICE — RELOAD STPL 45MM THCK TISS GRN W/ GRIPPING SURF TECHNOLOGY